# Patient Record
Sex: FEMALE | Race: WHITE | Employment: OTHER | URBAN - METROPOLITAN AREA
[De-identification: names, ages, dates, MRNs, and addresses within clinical notes are randomized per-mention and may not be internally consistent; named-entity substitution may affect disease eponyms.]

---

## 2017-01-24 NOTE — PATIENT INSTRUCTIONS
Call the imaging center at [phone] or  [phone] to schedule your echocardiogram.    Did you know you can lower your blood pressure with your daily habits?    *Losing 20 pounds of weight lowers blood pressure 5 to 20 points.  *Eating a low-fat diet rich in fruits, vegetables and low-fat dairy lowers blood pressure 8 to 14 points.  *Eating a low-salt diet lowers blood pressure 2 to 8 points.  *Exercising regularly lowers blood pressure 4 to 9 points.  *Reducing alcohol use lowers blood pressure 2 to 4 points.    [facility]    If you have any questions regarding to your visit please contact your care team:       Team Red:   Clinic Hours Telephone Number   Dr. Vita Marie, NP   7am-7pm  Monday - Thursday   7am-5pm  Fridays  [phone]  (Appointment scheduling available 24/7)    Questions about your visit?   Team Line  [phone]   [facility]gent [facility] - [place] [facility] - 11am-9pm Monday-Friday Saturday-Sunday- 9am-5pm   [place] - 5pm-9pm Monday-Friday Saturday-Sunday- 9am-5pm  [phone] - [place]   [phone] - [place]       What options do I have for visits at the clinic other than the traditional office visit?  To expand how we care for you, many of our providers are utilizing electronic visits (e-visits) and telephone visits, when medically appropriate, for interactions with their patients rather than a visit in the clinic.   We also offer nurse visits for many medical concerns. Just like any other service, we will bill your insurance company for this type of visit based on time spent on the phone with your provider. Not all insurance companies cover these visits. Please check with your medical insurance if this type of visit is covered. You will be responsible for any charges that are not paid by your insurance.      E-visits via [employer]:  generally incur a $35.00 fee.  Telephone visits:  Time spent on the  phone: *charged based on time that is spent on the phone in increments of 10 minutes. Estimated cost:   5-10 mins $30.00   11-20 mins. $59.00   21-30 mins. $85.00     Use "[employer]"hart (secure email communication and access to your chart) to send your primary care provider a message or make an appointment. Ask someone on your Team how to sign up for [employer].  For a Price Quote for your services, please call our [place] Line at [phone].      As always, Thank you for trusting us with your health care needs!  Belkis TUCKER MA    Preventive Health Recommendations  Female Ages 65 +    Yearly exam:     See your health care provider every year in order to  o Review health changes.   o Discuss preventive care.    o Review your medicines if your doctor has prescribed any.      You no longer need a yearly Pap test unless you've had an abnormal Pap test in the past 10 years. If you have vaginal symptoms, such as bleeding or discharge, be sure to talk with your provider about a Pap test.      Every 1 to 2 years, have a mammogram.  If you are over 69, talk with your health care provider about whether or not you want to continue having screening mammograms.      Every 10 years, have a colonoscopy. Or, have a yearly FIT test (stool test). These exams will check for colon cancer.       Have a cholesterol test every 5 years, or more often if your doctor advises it.       Have a diabetes test (fasting glucose) every three years. If you are at risk for diabetes, you should have this test more often.       At age 65, have a bone density scan (DEXA) to check for osteoporosis (brittle bone disease).    Shots:    Get a flu shot each year.    Get a tetanus shot every 10 years.    Talk to your doctor about your pneumonia vaccines. There are now two you should receive - Pneumovax (PPSV 23) and Prevnar (PCV 13).    Talk to your doctor about the shingles vaccine.    Talk to your doctor about the hepatitis B vaccine.    Nutrition:     Eat at  least 5 servings of fruits and vegetables each day.      Eat whole-grain bread, whole-wheat pasta and brown rice instead of white grains and rice.      Talk to your provider about Calcium and Vitamin D.     Lifestyle    Exercise at least 150 minutes a week (30 minutes a day, 5 days a week). This will help you control your weight and prevent disease.      Limit alcohol to one drink per day.      No smoking.       Wear sunscreen to prevent skin cancer.       See your dentist twice a year for an exam and cleaning.      See your eye doctor every 1 to 2 years to screen for conditions such as glaucoma, macular degeneration and cataracts.    The 10-year ASCVD risk score (Dawn BOBO Jr., et al., 2013) is: 12%    Values used to calculate the score:      Age: 71 years      Sex: Female      Is an : No      Diabetic: No      Tobacco smoker: No      Systolic Blood Pressure: 150 mmHg      Prescribed Antihypertensives: No      HDL Cholesterol: 52 mg/dL      Total Cholesterol: 220 mg/dL

## 2017-01-24 NOTE — PROGRESS NOTES
SUBJECTIVE:                                                            Chelo Brooks is a 71 year old female super morbidly obese  with history of asthma who presents for Preventive Visit.  Are you in the first 12 months of your Medicare Part B coverage?  No    She did not schedule stress echocardiogram as recommended last year. She has ongoing shortness of breath, worse with exertion, accompanied by intermittent dependent lower extremity edema. Symptoms are moderate and unchanged for months. She has interrupted sleep with snoring and worsening fatigue, but denies chest pain or palpitations. She did not start hypercholesterolemia medication as recommended. She has chronic intermittent low back pain without injury or neurologic symptoms. She tried physical therapy once for this, and does not think it would help to try again.     Healthy Habits:    Do you get at least three servings of calcium containing foods daily (dairy, green leafy vegetables, etc.)? no    Amount of exercise or daily activities, outside of work: no    Problems taking medications regularly Yes not taking pravastatin     Medication side effects: No    Have you had an eye exam in the past two years? yes    Do you see a dentist twice per year? no    Do you have sleep apnea, excessive snoring or daytime drowsiness?yes    COGNITIVE SCREEN  1) Repeat 3 items (Banana, Sunrise, Chair)    2) Clock draw: NORMAL  3) 3 item recall: Recalls 3 objects  Results: NORMAL clock, 1-2 items recalled: COGNITIVE IMPAIRMENT LESS LIKELY    Mini-CogTM Copyright NEPTALI Martinez. Licensed by the author for use in [facility]; reprinted with permission (danilo@.[facility]). All rights reserved.            Extreme fatigue    All Histories reviewed and updated in EPIC as appropriate.  Social History   Substance Use Topics     Smoking status: Never Smoker      Smokeless tobacco: Never Used     Alcohol Use: Yes      Comment: very rare         The patient does not drink  >3 drinks per day nor >7 drinks per week.    Today's PHQ-2 Score:   PHQ-2 ( 1999 [employer]) 1/25/2017 4/25/2016   Q1: Little interest or pleasure in doing things 0 0   Q2: Feeling down, depressed or hopeless 0 0   PHQ-2 Score 0 0       Do you feel safe in your environment - Yes    Do you have a Health Care Directive?: No: Advance care planning reviewed with patient; information given to patient to review.    Current providers sharing in care for this patient include:   Patient Care Team:  Vita Zavala MD as PCP - General (Family Practice)      Hearing impairment: No    Ability to successfully perform activities of daily living: Yes, no assistance needed     Fall risk:  Fallen 2 or more times in the past year?: No  Any fall with injury in the past year?: No    Home safety:  none identified      The following health maintenance items are reviewed in Epic and correct as of today:  Health Maintenance   Topic Date Due     DEXA SCAN SCREENING (SYSTEM ASSIGNED)  03/09/2012     FIT Q1 YR (NO INBASKET)  06/05/2013     EYE EXAM Q1 YEAR( NO INBASKET)  11/05/2014     ASTHMA CONTROL TEST Q6 MOS (NO INBASKET)  08/22/2016     INFLUENZA VACCINE (SYSTEM ASSIGNED)  09/01/2016     MAMMO SCREEN Q2 YR (SYSTEM ASSIGNED)  09/18/2016     FALL RISK ASSESSMENT  11/23/2016     LIPID MONITORING Q1 YEAR( NO INBASKET)  11/23/2016     ADVANCE DIRECTIVE PLANNING Q5 YRS (NO INBASKET)  03/15/2017     ASTHMA ACTION PLAN Q1 YR (NO INBASKET)  01/25/2018     TETANUS IMMUNIZATION (SYSTEM ASSIGNED)  01/25/2027     PNEUMOCOCCAL  Completed     HEPATITIS C SCREENING  Completed     ROS:  CONSTITUTIONAL:POSITIVE  for weight gain  I: NEGATIVE for worrisome rashes, moles or lesions  E: NEGATIVE for vision changes or irritation  E/M: NEGATIVE for ear, mouth and throat problems  RESP:as above  B: NEGATIVE for masses, tenderness or discharge  CV: as above   GI: NEGATIVE for nausea, abdominal pain, heartburn, or change in bowel habits  : NEGATIVE for frequency,  "dysuria, or hematuria  MUSCULOSKELETAL: as above   N: NEGATIVE for weakness, dizziness or paresthesias  E: NEGATIVE for temperature intolerance, skin/hair changes  H: NEGATIVE for bleeding problems  P: NEGATIVE for changes in mood or affect    Problem list, Medication list, Allergies, and Medical/Social/Surgical histories reviewed in [facility] and updated as appropriate.  OBJECTIVE:                                                            /88 mmHg  Pulse 89  Temp(Src) 96.2  F (35.7  C) (Oral)  Ht 5' 7\" (1.702 m)  Wt 333 lb (151.048 kg)  BMI 52.14 kg/m2  SpO2 97%  Breastfeeding? No Estimated body mass index is 52.14 kg/(m^2) as calculated from the following:    Height as of this encounter: 5' 7\" (1.702 m).    Weight as of this encounter: 333 lb (151.048 kg).  EXAM:   GENERAL: alert, no distress and obese  EYES: Eyes grossly normal to inspection, PERRL and conjunctivae and sclerae normal  HENT: ear canals and TM's normal, nose and mouth without ulcers or lesions  NECK: no adenopathy, no asymmetry, masses, or scars and thyroid normal to palpation  RESP: lungs clear to auscultation - no rales, rhonchi or wheezes  BREAST: normal without masses, tenderness or nipple discharge and no palpable axillary masses or adenopathy  CV: regular rate and rhythm, normal S1 S2, no S3 or S4, no murmur, click or rub, no peripheral edema and peripheral pulses strong  ABDOMEN: soft, nontender, no hepatosplenomegaly, no masses and bowel sounds normal  MS: no gross musculoskeletal defects noted, no edema  SKIN: no suspicious lesions or rashes  NEURO: Normal strength and tone, mentation intact and speech normal  PSYCH: mentation appears normal, affect normal/bright    ASSESSMENT / PLAN:                                                            (Z00.00) Wellness examination  (primary encounter diagnosis)  Plan: TD (ADULT, 7+) PRESERVE FREE          (E66.01) Morbid obesity due to excess calories (H)  Plan: Comprehensive metabolic " panel        Counseled to make better food choices, exercise as tolerated, and lose weight. Offered bariatric surgery referral.     (I10) Hypertension goal BP (blood pressure) < 150/90  Comment: uncontrolled with lifestyle strategies   Plan: Comprehensive metabolic panel, lisinopril         (PRINIVIL/ZESTRIL) 10 MG tablet, Basic         metabolic panel        Discussed risks and benefits of this medication. Follow up in one month with BMP or sooner for worsening of symptoms or side effects.     (E78.5) Hyperlipidemia LDL goal <130  Plan: Comprehensive metabolic panel, LDL cholesterol         direct        Consider statin     (J45.20) Intermittent asthma, uncomplicated  Comment: Well controlled with medications without side effects.   Plan: albuterol (PROAIR HFA/PROVENTIL HFA/VENTOLIN         HFA) 108 (90 BASE) MCG/ACT Inhaler        ACT q 6 months      (R06.02) SOB (shortness of breath)  Plan: Comprehensive metabolic panel, XR Chest 2         Views, Echocardiogram Complete        Follow-up pending results     (R60.9) Dependent edema  Plan: Comprehensive metabolic panel, Echocardiogram         Complete        See above     (M54.5) Midline low back pain without sciatica, unspecified chronicity  Plan: Over the counter pain medication as needed, ice or heat as she finds helpful, avoidance of aggravating activities, and return for persistence for consideration of further imaging or referral.     (R53.83) Fatigue, unspecified type  Plan: CBC with platelets differential, TSH with free         T4 reflex, SLEEP EVALUATION & MANAGEMENT         REFERRAL - ADULT          (R06.83) Snoring  Plan: SLEEP EVALUATION & MANAGEMENT REFERRAL - ADULT            End of Life Planning:  Patient currently has an advanced directive: No.  I have verified the patient's ablity to prepare an advanced directive/make health care decisions.  Literature was provided to assist patient in preparing an advanced directive.    COUNSELING:  Reviewed  "preventive health counseling, as reflected in patient instructions  Special attention given to:       Regular exercise       Healthy diet/nutrition       Vaccinated for: Influenza and Td       Osteoporosis Prevention/Bone Health       Colon cancer screening       Hepatitis C screening       The 10-year ASCVD risk score (Dawn BOBO Jr., et al., 2013) is: 14%    Values used to calculate the score:      Age: 71 years      Sex: Female      Is an : No      Diabetic: No      Tobacco smoker: No      Systolic Blood Pressure: 140 mmHg      Prescribed Antihypertensives: Yes      HDL Cholesterol: 52 mg/dL      Total Cholesterol: 220 mg/dL       Advanced Planning         Estimated body mass index is 52.14 kg/(m^2) as calculated from the following:    Height as of this encounter: 5' 7\" (1.702 m).    Weight as of this encounter: 333 lb (151.048 kg).  Weight management plan: Discussed healthy diet and exercise guidelines and patient will follow up in 1 month in clinic to re-evaluate.   reports that she has never smoked. She has never used smokeless tobacco.      Appropriate preventive services were discussed with this patient, including applicable screening as appropriate for cardiovascular disease, diabetes, osteopenia/osteoporosis, and glaucoma.  As appropriate for age/gender, discussed screening for colorectal cancer, prostate cancer, breast cancer, and cervical cancer. Checklist reviewing preventive services available has been given to the patient.    Reviewed patients plan of care and provided an AVS. The Basic Care Plan (routine screening as documented in Health Maintenance) for Chelo meets the Care Plan requirement. This Care Plan has been established and reviewed with the Patient.    Counseling Resources:  ATP IV Guidelines  Pooled Cohorts Equation Calculator  Breast Cancer Risk Calculator  FRAX Risk Assessment  ICSI Preventive Guidelines  Dietary Guidelines for Americans, 2010  USDA's MyPlate  ASA " Prophylaxis  Lung CA Screening    Vita Zavala MD  [facility]

## 2017-01-25 ENCOUNTER — RADIANT APPOINTMENT (OUTPATIENT)
Dept: GENERAL RADIOLOGY | Facility: CLINIC | Age: 72
End: 2017-01-25
Attending: FAMILY MEDICINE
Payer: COMMERCIAL

## 2017-01-25 ENCOUNTER — OFFICE VISIT (OUTPATIENT)
Dept: FAMILY MEDICINE | Facility: CLINIC | Age: 72
End: 2017-01-25
Payer: COMMERCIAL

## 2017-01-25 ENCOUNTER — RADIANT APPOINTMENT (OUTPATIENT)
Dept: MAMMOGRAPHY | Facility: CLINIC | Age: 72
End: 2017-01-25
Attending: FAMILY MEDICINE
Payer: COMMERCIAL

## 2017-01-25 VITALS
HEART RATE: 89 BPM | WEIGHT: 293 LBS | OXYGEN SATURATION: 97 % | DIASTOLIC BLOOD PRESSURE: 88 MMHG | TEMPERATURE: 96.2 F | HEIGHT: 67 IN | SYSTOLIC BLOOD PRESSURE: 140 MMHG | BODY MASS INDEX: 45.99 KG/M2

## 2017-01-25 DIAGNOSIS — M54.50 MIDLINE LOW BACK PAIN WITHOUT SCIATICA, UNSPECIFIED CHRONICITY: ICD-10-CM

## 2017-01-25 DIAGNOSIS — R06.02 SOB (SHORTNESS OF BREATH): ICD-10-CM

## 2017-01-25 DIAGNOSIS — Z12.31 VISIT FOR SCREENING MAMMOGRAM: ICD-10-CM

## 2017-01-25 DIAGNOSIS — E66.01 MORBID OBESITY DUE TO EXCESS CALORIES (H): ICD-10-CM

## 2017-01-25 DIAGNOSIS — R60.9 DEPENDENT EDEMA: ICD-10-CM

## 2017-01-25 DIAGNOSIS — E78.5 HYPERLIPIDEMIA LDL GOAL <130: ICD-10-CM

## 2017-01-25 DIAGNOSIS — Z78.0 ASYMPTOMATIC POSTMENOPAUSAL STATUS: ICD-10-CM

## 2017-01-25 DIAGNOSIS — Z23 NEED FOR VACCINATION: ICD-10-CM

## 2017-01-25 DIAGNOSIS — I10 HYPERTENSION GOAL BP (BLOOD PRESSURE) < 150/90: ICD-10-CM

## 2017-01-25 DIAGNOSIS — Z71.89 ADVANCED DIRECTIVES, COUNSELING/DISCUSSION: ICD-10-CM

## 2017-01-25 DIAGNOSIS — J45.20 INTERMITTENT ASTHMA, UNCOMPLICATED: ICD-10-CM

## 2017-01-25 DIAGNOSIS — Z23 NEED FOR PROPHYLACTIC VACCINATION AND INOCULATION AGAINST INFLUENZA: ICD-10-CM

## 2017-01-25 DIAGNOSIS — R06.83 SNORING: ICD-10-CM

## 2017-01-25 DIAGNOSIS — R53.83 FATIGUE, UNSPECIFIED TYPE: ICD-10-CM

## 2017-01-25 DIAGNOSIS — Z00.00 WELLNESS EXAMINATION: Primary | ICD-10-CM

## 2017-01-25 DIAGNOSIS — Z12.11 SCREEN FOR COLON CANCER: ICD-10-CM

## 2017-01-25 LAB
ALBUMIN SERPL-MCNC: 3.5 G/DL (ref 3.4–5)
ALP SERPL-CCNC: 71 U/L (ref 40–150)
ALT SERPL W P-5'-P-CCNC: 21 U/L (ref 0–50)
ANION GAP SERPL CALCULATED.3IONS-SCNC: 7 MMOL/L (ref 3–14)
AST SERPL W P-5'-P-CCNC: 17 U/L (ref 0–45)
BASOPHILS # BLD AUTO: 0 10E9/L (ref 0–0.2)
BASOPHILS NFR BLD AUTO: 0.6 %
BILIRUB SERPL-MCNC: 0.6 MG/DL (ref 0.2–1.3)
BUN SERPL-MCNC: 18 MG/DL (ref 7–30)
CALCIUM SERPL-MCNC: 8.4 MG/DL (ref 8.5–10.1)
CHLORIDE SERPL-SCNC: 107 MMOL/L (ref 94–109)
CO2 SERPL-SCNC: 29 MMOL/L (ref 20–32)
CREAT SERPL-MCNC: 0.94 MG/DL (ref 0.52–1.04)
DIFFERENTIAL METHOD BLD: NORMAL
EOSINOPHIL # BLD AUTO: 0.2 10E9/L (ref 0–0.7)
EOSINOPHIL NFR BLD AUTO: 4.4 %
ERYTHROCYTE [DISTWIDTH] IN BLOOD BY AUTOMATED COUNT: 14.6 % (ref 10–15)
GFR SERPL CREATININE-BSD FRML MDRD: 59 ML/MIN/1.7M2
GLUCOSE SERPL-MCNC: 86 MG/DL (ref 70–99)
HCT VFR BLD AUTO: 42.6 % (ref 35–47)
HGB BLD-MCNC: 13.6 G/DL (ref 11.7–15.7)
LDLC SERPL DIRECT ASSAY-MCNC: 137 MG/DL
LYMPHOCYTES # BLD AUTO: 1 10E9/L (ref 0.8–5.3)
LYMPHOCYTES NFR BLD AUTO: 20.5 %
MCH RBC QN AUTO: 27 PG (ref 26.5–33)
MCHC RBC AUTO-ENTMCNC: 31.9 G/DL (ref 31.5–36.5)
MCV RBC AUTO: 85 FL (ref 78–100)
MONOCYTES # BLD AUTO: 0.4 10E9/L (ref 0–1.3)
MONOCYTES NFR BLD AUTO: 8.3 %
NEUTROPHILS # BLD AUTO: 3.2 10E9/L (ref 1.6–8.3)
NEUTROPHILS NFR BLD AUTO: 66.2 %
PLATELET # BLD AUTO: 175 10E9/L (ref 150–450)
POTASSIUM SERPL-SCNC: 4 MMOL/L (ref 3.4–5.3)
PROT SERPL-MCNC: 6.8 G/DL (ref 6.8–8.8)
RBC # BLD AUTO: 5.04 10E12/L (ref 3.8–5.2)
SODIUM SERPL-SCNC: 143 MMOL/L (ref 133–144)
TSH SERPL DL<=0.005 MIU/L-ACNC: 1.35 MU/L (ref 0.4–4)
WBC # BLD AUTO: 4.8 10E9/L (ref 4–11)

## 2017-01-25 PROCEDURE — 36415 COLL VENOUS BLD VENIPUNCTURE: CPT | Performed by: FAMILY MEDICINE

## 2017-01-25 PROCEDURE — 85025 COMPLETE CBC W/AUTO DIFF WBC: CPT | Performed by: FAMILY MEDICINE

## 2017-01-25 PROCEDURE — 83721 ASSAY OF BLOOD LIPOPROTEIN: CPT | Performed by: FAMILY MEDICINE

## 2017-01-25 PROCEDURE — 99213 OFFICE O/P EST LOW 20 MIN: CPT | Mod: 25 | Performed by: FAMILY MEDICINE

## 2017-01-25 PROCEDURE — 80053 COMPREHEN METABOLIC PANEL: CPT | Performed by: FAMILY MEDICINE

## 2017-01-25 PROCEDURE — G0008 ADMIN INFLUENZA VIRUS VAC: HCPCS | Mod: 59 | Performed by: FAMILY MEDICINE

## 2017-01-25 PROCEDURE — G0202 SCR MAMMO BI INCL CAD: HCPCS | Mod: TC

## 2017-01-25 PROCEDURE — 99397 PER PM REEVAL EST PAT 65+ YR: CPT | Mod: 25 | Performed by: FAMILY MEDICINE

## 2017-01-25 PROCEDURE — 90662 IIV NO PRSV INCREASED AG IM: CPT | Performed by: FAMILY MEDICINE

## 2017-01-25 PROCEDURE — 84443 ASSAY THYROID STIM HORMONE: CPT | Performed by: FAMILY MEDICINE

## 2017-01-25 PROCEDURE — 90714 TD VACC NO PRESV 7 YRS+ IM: CPT | Performed by: FAMILY MEDICINE

## 2017-01-25 PROCEDURE — 90471 IMMUNIZATION ADMIN: CPT | Performed by: FAMILY MEDICINE

## 2017-01-25 PROCEDURE — 71020 XR CHEST 2 VW: CPT

## 2017-01-25 RX ORDER — AMLODIPINE BESYLATE 5 MG/1
5 TABLET ORAL DAILY
Qty: 30 TABLET | Refills: 0 | Status: CANCELLED | OUTPATIENT
Start: 2017-01-25

## 2017-01-25 RX ORDER — LISINOPRIL 10 MG/1
10 TABLET ORAL DAILY
Qty: 30 TABLET | Refills: 0 | Status: SHIPPED | OUTPATIENT
Start: 2017-01-25 | End: 2017-02-16

## 2017-01-25 RX ORDER — ALBUTEROL SULFATE 90 UG/1
1-2 AEROSOL, METERED RESPIRATORY (INHALATION) EVERY 4 HOURS PRN
Qty: 1 INHALER | Refills: 3 | Status: SHIPPED | OUTPATIENT
Start: 2017-01-25 | End: 2018-02-13

## 2017-01-25 RX ORDER — PRAVASTATIN SODIUM 20 MG
20 TABLET ORAL DAILY
Qty: 90 TABLET | Refills: 3 | Status: CANCELLED | OUTPATIENT
Start: 2017-01-25

## 2017-01-25 ASSESSMENT — ANXIETY QUESTIONNAIRES
6. BECOMING EASILY ANNOYED OR IRRITABLE: NOT AT ALL
7. FEELING AFRAID AS IF SOMETHING AWFUL MIGHT HAPPEN: NOT AT ALL
3. WORRYING TOO MUCH ABOUT DIFFERENT THINGS: MORE THAN HALF THE DAYS
2. NOT BEING ABLE TO STOP OR CONTROL WORRYING: MORE THAN HALF THE DAYS
1. FEELING NERVOUS, ANXIOUS, OR ON EDGE: SEVERAL DAYS
5. BEING SO RESTLESS THAT IT IS HARD TO SIT STILL: NOT AT ALL
GAD7 TOTAL SCORE: 5

## 2017-01-25 ASSESSMENT — PATIENT HEALTH QUESTIONNAIRE - PHQ9: 5. POOR APPETITE OR OVEREATING: NOT AT ALL

## 2017-01-25 NOTE — MR AVS SNAPSHOT
After Visit Summary   1/25/2017    Chelo Brooks    MRN: [id number]           Patient Information     Date Of Birth          1945        Visit Information        Provider Department      1/25/2017 11:40 AM Vita Zavala MD [facility]        Today's Diagnoses     Wellness examination    -  1     Morbid obesity due to excess calories (H)         Hypertension goal BP (blood pressure) < 150/90         Hyperlipidemia LDL goal <130         Intermittent asthma, uncomplicated         SOB (shortness of breath)         Dependent edema         Midline low back pain without sciatica, unspecified chronicity         Fatigue, unspecified type         Snoring         Screen for colon cancer         Asymptomatic postmenopausal status         Visit for screening mammogram         Need for vaccination           Care Instructions    Call the imaging center at [phone] or  [phone] to schedule your echocardiogram.    Did you know you can lower your blood pressure with your daily habits?    *Losing 20 pounds of weight lowers blood pressure 5 to 20 points.  *Eating a low-fat diet rich in fruits, vegetables and low-fat dairy lowers blood pressure 8 to 14 points.  *Eating a low-salt diet lowers blood pressure 2 to 8 points.  *Exercising regularly lowers blood pressure 4 to 9 points.  *Reducing alcohol use lowers blood pressure 2 to 4 points.    [facility]    If you have any questions regarding to your visit please contact your care team:       Team Red:   Clinic Hours Telephone Number   Dr. Vita Marie, NP   7am-7pm  Monday - Thursday   7am-5pm  Fridays  [phone]  (Appointment scheduling available 24/7)    Questions about your visit?   Team Line  [phone]   Urgent Care - [place] - 11am-9pm Monday-Friday Saturday-Sunday- 9am-5pm   [place] - 5pm-9pm Monday-Friday Saturday-Sunday- 9am-5pm   [phone] - [place]   [phone] - [place]       What options do I have for visits at the clinic other than the traditional office visit?  To expand how we care for you, many of our providers are utilizing electronic visits (e-visits) and telephone visits, when medically appropriate, for interactions with their patients rather than a visit in the clinic.   We also offer nurse visits for many medical concerns. Just like any other service, we will bill your insurance company for this type of visit based on time spent on the phone with your provider. Not all insurance companies cover these visits. Please check with your medical insurance if this type of visit is covered. You will be responsible for any charges that are not paid by your insurance.      E-visits via [employer]:  generally incur a $35.00 fee.  Telephone visits:  Time spent on the phone: *charged based on time that is spent on the phone in increments of 10 minutes. Estimated cost:   5-10 mins $30.00   11-20 mins. $59.00   21-30 mins. $85.00     Use [employer] (secure email communication and access to your chart) to send your primary care provider a message or make an appointment. Ask someone on your Team how to sign up for [employer].  For a Price Quote for your services, please call our Consumer Price Line at [phone].      As always, Thank you for trusting us with your health care needs!  Belkis TUCKER MA    Preventive Health Recommendations  Female Ages 65 +    Yearly exam:     See your health care provider every year in order to  o Review health changes.   o Discuss preventive care.    o Review your medicines if your doctor has prescribed any.      You no longer need a yearly Pap test unless you've had an abnormal Pap test in the past 10 years. If you have vaginal symptoms, such as bleeding or discharge, be sure to talk with your provider about a Pap test.      Every 1 to 2 years, have a mammogram.  If you are over 69, talk with your health care  provider about whether or not you want to continue having screening mammograms.      Every 10 years, have a colonoscopy. Or, have a yearly FIT test (stool test). These exams will check for colon cancer.       Have a cholesterol test every 5 years, or more often if your doctor advises it.       Have a diabetes test (fasting glucose) every three years. If you are at risk for diabetes, you should have this test more often.       At age 65, have a bone density scan (DEXA) to check for osteoporosis (brittle bone disease).    Shots:    Get a flu shot each year.    Get a tetanus shot every 10 years.    Talk to your doctor about your pneumonia vaccines. There are now two you should receive - Pneumovax (PPSV 23) and Prevnar (PCV 13).    Talk to your doctor about the shingles vaccine.    Talk to your doctor about the hepatitis B vaccine.    Nutrition:     Eat at least 5 servings of fruits and vegetables each day.      Eat whole-grain bread, whole-wheat pasta and brown rice instead of white grains and rice.      Talk to your provider about Calcium and Vitamin D.     Lifestyle    Exercise at least 150 minutes a week (30 minutes a day, 5 days a week). This will help you control your weight and prevent disease.      Limit alcohol to one drink per day.      No smoking.       Wear sunscreen to prevent skin cancer.       See your dentist twice a year for an exam and cleaning.      See your eye doctor every 1 to 2 years to screen for conditions such as glaucoma, macular degeneration and cataracts.    The 10-year ASCVD risk score (Dawn BOBO Jr., et al., 2013) is: 12%    Values used to calculate the score:      Age: 71 years      Sex: Female      Is an : No      Diabetic: No      Tobacco smoker: No      Systolic Blood Pressure: 150 mmHg      Prescribed Antihypertensives: No      HDL Cholesterol: 52 mg/dL      Total Cholesterol: 220 mg/dL          Follow-ups after your visit        Additional Services     SLEEP EVALUATION  & MANAGEMENT REFERRAL - ADULT       Please be aware that coverage of these services is subject to the terms and limitations of your health insurance plan.  Call member services at your health plan with any benefit or coverage questions.      Please bring the following to your appointment:    >>   List of current medications   >>   This referral request   >>   Any documents/labs given to you for this referral    [facility] - [place]  [phone] (Age 15 and up)                  Follow-up notes from your care team     Return in about 1 month (around 2/25/2017) for BP Recheck.      Future tests that were ordered for you today     Open Future Orders        Priority Expected Expires Ordered    Fecal colorectal cancer screen (FIT) Routine 2/15/2017 4/19/2017 1/25/2017    SLEEP EVALUATION & MANAGEMENT REFERRAL - ADULT Routine  1/25/2018 1/25/2017    XR Chest 2 Views Routine 1/25/2017 1/25/2018 1/25/2017    Echocardiogram Complete Routine  1/25/2018 1/25/2017    Basic metabolic panel Routine 2/25/2017 1/25/2018 1/25/2017    DEXA HIP/PELVIS/SPINE - Future Routine  1/24/2018 1/25/2017    MA SCREENING DIGITAL BILAT - Future  (s+30) Routine  1/24/2018 1/25/2017            Who to contact     If you have questions or need follow up information about today's clinic visit or your schedule please contact [facility] [place] directly at [phone].  Normal or non-critical lab and imaging results will be communicated to you by MyChart, letter or phone within 4 business days after the clinic has received the results. If you do not hear from us within 7 days, please contact the clinic through MyChart or phone. If you have a critical or abnormal lab result, we will notify you by phone as soon as possible.  Submit refill requests through [employer] or call your pharmacy and they will forward the refill request to us. Please allow 3 business days for your refill to be completed.          Additional Information About  "Your Visit        Care EveryWhere ID     This is your Care EveryWhere ID. This could be used by other organizations to access your [facility] medical records  [id number]        Your Vitals Were     Pulse Temperature Height BMI (Body Mass Index) Pulse Oximetry Breastfeeding?    89 96.2  F (35.7  C) (Oral) 5' 7\" (1.702 m) 52.14 kg/m2 97% No       Blood Pressure from Last 3 Encounters:   01/25/17 150/100   04/25/16 136/84   02/22/16 138/86    Weight from Last 3 Encounters:   01/25/17 333 lb (151.048 kg)   04/25/16 320 lb (145.151 kg)   02/22/16 325 lb (147.419 kg)              We Performed the Following     Asthma Action Plan   (Please complete E-AAP by signing order and opening link in order details)     CBC with platelets differential     Comprehensive metabolic panel     LDL cholesterol direct     TD (ADULT, 7+) PRESERVE FREE     TSH with free T4 reflex          Today's Medication Changes          These changes are accurate as of: 1/25/17 12:39 PM.  If you have any questions, ask your nurse or doctor.               Start taking these medicines.        Dose/Directions    lisinopril 10 MG tablet   Commonly known as:  PRINIVIL/ZESTRIL   Used for:  Hypertension goal BP (blood pressure) < 150/90   Started by:  Vita Zavala MD        Dose:  10 mg   Take 1 tablet (10 mg) by mouth daily   Quantity:  30 tablet   Refills:  0            Where to get your medicines      These medications were sent to [id number] [place] [facility]  [address]  [address] [place] [address]     Phone:  [phone]    - albuterol 108 (90 BASE) MCG/ACT Inhaler  - lisinopril 10 MG tablet             Primary Care Provider Office Phone # Fax #    Vita Zavala [phone]       [facility] [address]  [place] [address]        Thank you!     Thank you for choosing [facility]  for your care. Our goal is always to provide " you with excellent care. Hearing back from our patients is one way we can continue to improve our services. Please take a few minutes to complete the written survey that you may receive in the mail after your visit with us. Thank you!             Your Updated Medication List - Protect others around you: Learn how to safely use, store and throw away your medicines at www.disposemymeds.org.          This list is accurate as of: 1/25/17 12:39 PM.  Always use your most recent med list.                   Brand Name Dispense Instructions for use    albuterol 108 (90 BASE) MCG/ACT Inhaler    PROAIR HFA/PROVENTIL HFA/VENTOLIN HFA    1 Inhaler    Inhale 1-2 puffs into the lungs every 4 hours as needed for shortness of breath / dyspnea       fluticasone 50 MCG/ACT spray    FLONASE     2 sprays by Both Nostrils route daily.       lisinopril 10 MG tablet    PRINIVIL/ZESTRIL    30 tablet    Take 1 tablet (10 mg) by mouth daily       mometasone 0.1 % cream    ELOCON    60 g    Apply  topically.       SINUS RINSE BOTTLE KIT Pack      Spray 1 Bottle in nostril daily as needed.       TYLENOL 500 MG tablet   Generic drug:  acetaminophen      Take 1 tablet (500 mg) by mouth every 4 hours as needed for pain

## 2017-01-25 NOTE — PROGRESS NOTES
Injectable Influenza Immunization Documentation    1.  Is the person to be vaccinated sick today?  No    2. Does the person to be vaccinated have an allergy to eggs or to a component of the vaccine?  No    3. Has the person to be vaccinated today ever had a serious reaction to influenza vaccine in the past?  No    4. Has the person to be vaccinated ever had Guillain-[place] syndrome?  No     Form completed by Belkis TUCKER MA

## 2017-01-25 NOTE — NURSING NOTE
"Chief Complaint   Patient presents with     Wellness Visit     Flu Shot       Initial /100 mmHg  Pulse 89  Temp(Src) 96.2  F (35.7  C) (Oral)  Ht 5' 7\" (1.702 m)  Wt 333 lb (151.048 kg)  BMI 52.14 kg/m2  SpO2 97%  Breastfeeding? No Estimated body mass index is 52.14 kg/(m^2) as calculated from the following:    Height as of this encounter: 5' 7\" (1.702 m).    Weight as of this encounter: 333 lb (151.048 kg).  BP completed using cuff size: vince TUCKER MA      "

## 2017-01-25 NOTE — Clinical Note
[address] [place] [address]    January 26, 2017    Chelo Brooks  [address] [facility] [id number]          Dear Chelo,    Your liver, thyroid, blood glucose and blood count tests are normal. Your kidney test is slightly abnormal. Avoid medications like ibuprofen, aleve, Excedrin, or higher doses than 81 mg of aspirin. Let's repeat this test at your follow-up within one month.     Based on your cholesterol level and risk factors, your 10-year ASCVD heart disease risk score (Dawn BOBO Jr., et al., 2013) is: 14%. You can lower your risk for heart disease by taking a daily cholesterol medication called atorvastatin 40 mg daily. Let me know when you're ready to start so I can send a prescription. Possible side effects include muscle aches and liver problems.     Enclosed is a copy of your results.     Results for orders placed or performed in visit on 01/25/17   Comprehensive metabolic panel   Result Value Ref Range    Sodium 143 133 - 144 mmol/L    Potassium 4.0 3.4 - 5.3 mmol/L    Chloride 107 94 - 109 mmol/L    Carbon Dioxide 29 20 - 32 mmol/L    Anion Gap 7 3 - 14 mmol/L    Glucose 86 70 - 99 mg/dL    Urea Nitrogen 18 7 - 30 mg/dL    Creatinine 0.94 0.52 - 1.04 mg/dL    GFR Estimate 59 (L) >60 mL/min/1.7m2    GFR Estimate If Black 72 >60 mL/min/1.7m2    Calcium 8.4 (L) 8.5 - 10.1 mg/dL    Bilirubin Total 0.6 0.2 - 1.3 mg/dL    Albumin 3.5 3.4 - 5.0 g/dL    Protein Total 6.8 6.8 - 8.8 g/dL    Alkaline Phosphatase 71 40 - 150 U/L    ALT 21 0 - 50 U/L    AST 17 0 - 45 U/L   CBC with platelets differential   Result Value Ref Range    WBC 4.8 4.0 - 11.0 10e9/L    RBC Count 5.04 3.8 - 5.2 10e12/L    Hemoglobin 13.6 11.7 - 15.7 g/dL    Hematocrit 42.6 35.0 - 47.0 %    MCV 85 78 - 100 fl    MCH 27.0 26.5 - 33.0 pg    MCHC 31.9 31.5 - 36.5 g/dL    RDW 14.6 10.0 - 15.0 %    Platelet Count 175 150 - 450 10e9/L    Diff  Method Automated Method     % Neutrophils 66.2 %    % Lymphocytes 20.5 %    % Monocytes 8.3 %    % Eosinophils 4.4 %    % Basophils 0.6 %    Absolute Neutrophil 3.2 1.6 - 8.3 10e9/L    Absolute Lymphocytes 1.0 0.8 - 5.3 10e9/L    Absolute Monocytes 0.4 0.0 - 1.3 10e9/L    Absolute Eosinophils 0.2 0.0 - 0.7 10e9/L    Absolute Basophils 0.0 0.0 - 0.2 10e9/L   TSH with free T4 reflex   Result Value Ref Range    TSH 1.35 0.40 - 4.00 mU/L   LDL cholesterol direct   Result Value Ref Range    LDL Cholesterol Direct 137 (H) <100 mg/dL       If you have any questions or concerns, please call myself or my nurse at [phone].      Sincerely,        Vita Zavala MD/HA

## 2017-01-25 NOTE — Clinical Note
[address] [place] [address]    January 25, 2017    Chelo Brooks  [address] [place] [phone]          Dear Mary Whitney is a copy of your results. Your labs are all within normal limits    Results for orders placed or performed in visit on 01/25/17   XR Chest 2 Views    Narrative    XR CHEST 2 VW  1/25/2017 1:49 PM    HISTORY:  Shortness of breath    COMPARISON:  None.      Impression    IMPRESSION:  Negative.     JOHN SAHU MD       If you have any questions or concerns, please call myself or my nurse at [phone].      Sincerely,      Vita Zavala MD, dr

## 2017-01-25 NOTE — Clinical Note
My Asthma Action Plan  Name: Chelo Brooks   YOB: 1945  Date: 1/25/2017   My doctor: Vita Zavala   My clinic: [facility]      My Control Medicine:          Dose:    My Rescue Medicine: Albuterol (Proair/Ventolin/Proventil) HFA        Dose: 2 puffs as needed    My Asthma Severity: intermittent  Avoid your asthma triggers: upper respiratory infections        GREEN ZONE   Good Control    I feel good    No cough or wheeze    Can work, sleep and play without asthma symptoms       Take your asthma control medicine every day.     1. If exercise triggers your asthma, take your rescue medication    15 minutes before exercise or sports, and    During exercise if you have asthma symptoms  2. Spacer to use with inhaler: If you have a spacer, make sure to use it with your inhaler             YELLOW ZONE Getting Worse  I have ANY of these:    I do not feel good    Cough or wheeze    Chest feels tight    Wake up at night   1. Keep taking your Green Zone medications  2. Start taking your rescue medicine:    every 20 minutes for up to 1 hour. Then every 4 hours for 24-48 hours.  3. If you stay in the Yellow Zone for more than 12-24 hours, contact your doctor.  4. If you do not return to the Green Zone in 12-24 hours or you get worse, start taking your oral steroid medicine if prescribed by your provider.           RED ZONE Medical Alert - Get Help  I have ANY of these:    I feel awful    Medicine is not helping    Breathing getting harder    Trouble walking or talking    Nose opens wide to breathe       1. Take your rescue medicine NOW  2. If your provider has prescribed an oral steroid medicine, start taking it NOW  3. Call your doctor NOW  4. If you are still in the Red Zone after 20 minutes and you have not reached your doctor:    Take your rescue medicine again and    Call 911 or go to the emergency room right away    See your regular doctor within 2 weeks of an Emergency Room or Urgent Care  visit for follow-up treatment.        The above medication may be given at school or day care?: N/A (Adult Patient)  Child can carry and use inhaler(s) at school with approval of school nurse?: N/A (Adult Patient)    Electronically signed by: Vita Zavala, January 25, 2017    Annual Reminders:  Meet with Asthma Educator,  Flu Shot in the Fall, consider Pneumonia Vaccination for patients with asthma (aged 19 and older).    Pharmacy: [facility] [address] [place], [place] [address] AT [facility]                    Asthma Triggers  How To Control Things That Make Your Asthma Worse    Triggers are things that make your asthma worse.  Look at the list below to help you find your triggers and what you can do about them.  You can help prevent asthma flare-ups by staying away from your triggers.      Trigger                                                          What you can do   Cigarette Smoke  Tobacco smoke can make asthma worse. Do not allow smoking in your home, car or around you.  Be sure no one smokes at a child s day care or school.  If you smoke, ask your health care provider for ways to help you quit.  Ask family members to quit too.  Ask your health care provider for a referral to Quit Plan to help you quit smoking, or call 4-068-544-PLAN.     Colds, Flu, Bronchitis  These are common triggers of asthma. Wash your hands often.  Don t touch your eyes, nose or mouth.  Get a flu shot every year.     Dust Mites  These are tiny bugs that live in cloth or carpet. They are too small to see. Wash sheets and blankets in hot water every week.   Encase pillows and mattress in dust mite proof covers.  Avoid having carpet if you can. If you have carpet, vacuum weekly.   Use a dust mask and HEPA vacuum.   Pollen and Outdoor Mold  Some people are allergic to trees, grass, or weed pollen, or molds. Try to keep your windows closed.  Limit time out doors when pollen count is high.   Ask you  health care provider about taking medicine during allergy season.     Animal Dander  Some people are allergic to skin flakes, urine or saliva from pets with fur or feathers. Keep pets with fur or feathers out of your home.    If you can t keep the pet outdoors, then keep the pet out of your bedroom.  Keep the bedroom door closed.  Keep pets off cloth furniture and away from stuffed toys.     Mice, Rats, and Cockroaches  Some people are allergic to the waste from these pests.   Cover food and garbage.  Clean up spills and food crumbs.  Store grease in the refrigerator.   Keep food out of the bedroom.   Indoor Mold  This can be a trigger if your home has high moisture. Fix leaking faucets, pipes, or other sources of water.   Clean moldy surfaces.  Dehumidify basement if it is damp and smelly.   Smoke, Strong Odors, and Sprays  These can reduce air quality. Stay away from strong odors and sprays, such as perfume, powder, hair spray, paints, smoke incense, paint, cleaning products, candles and new carpet.   Exercise or Sports  Some people with asthma have this trigger. Be active!  Ask your doctor about taking medicine before sports or exercise to prevent symptoms.    Warm up for 5-10 minutes before and after sports or exercise.     Other Triggers of Asthma  Cold air:  Cover your nose and mouth with a scarf.  Sometimes laughing or crying can be a trigger.  Some medicines and food can trigger asthma.

## 2017-01-26 ASSESSMENT — ASTHMA QUESTIONNAIRES: ACT_TOTALSCORE: 19

## 2017-01-26 ASSESSMENT — ANXIETY QUESTIONNAIRES: GAD7 TOTAL SCORE: 5

## 2017-01-26 ASSESSMENT — PATIENT HEALTH QUESTIONNAIRE - PHQ9: SUM OF ALL RESPONSES TO PHQ QUESTIONS 1-9: 4

## 2017-01-26 NOTE — PROGRESS NOTES
Quick Note:    Mail letter:  Your liver, thyroid, blood glucose and blood count tests are normal. Your kidney test is slightly abnormal. Avoid medications like ibuprofen, aleve, Excedrin, or higher doses than 81 mg of aspirin. Let's repeat this test at your follow-up within one month.     Based on your cholesterol level and risk factors, your 10-year ASCVD heart disease risk score (Dawn BOBO Jr., et al., 2013) is: 14%. You can lower your risk for heart disease by taking a daily cholesterol medication called atorvastatin 40 mg daily. Let me know when you're ready to start so I can send a prescription. Possible side effects include muscle aches and liver problems.     Vita Zavala MD    The 10-year ASCVD risk score (Dawn BOBO Jr., et al., 2013) is: 14%   Values used to calculate the score:   Age: 71 years   Sex: Female   Is an : No   Diabetic: No   Tobacco smoker: No   Systolic Blood Pressure: 140 mmHg   Prescribed Antihypertensives: Yes   HDL Cholesterol: 52 mg/dL   Total Cholesterol: 220 mg/dL    ______

## 2017-02-01 ENCOUNTER — TELEPHONE (OUTPATIENT)
Dept: FAMILY MEDICINE | Facility: CLINIC | Age: 72
End: 2017-02-01

## 2017-02-01 DIAGNOSIS — E78.5 HYPERLIPIDEMIA LDL GOAL <130: Primary | ICD-10-CM

## 2017-02-01 RX ORDER — ATORVASTATIN CALCIUM 40 MG/1
40 TABLET, FILM COATED ORAL DAILY
Qty: 90 TABLET | Refills: 3 | Status: SHIPPED | OUTPATIENT
Start: 2017-02-01 | End: 2018-04-05

## 2017-02-01 NOTE — TELEPHONE ENCOUNTER
Reason for Call:  Test results    Detailed comments: patient would like a call back to go over blood test results that were done on 1-. Call to discuss new cholesterol medication also.    Phone Number Patient can be reached at: Home number on file [phone] (home)    Best Time: before 3 :30 pm    Can we leave a detailed message on this number? YES    Call taken on 2/1/2017 at 1:23 PM by Madhuri Howell

## 2017-02-01 NOTE — TELEPHONE ENCOUNTER
RN spoke with patient and patient states she would like to proceed with taking cholesterol lowering medication per her PCP's recommendation.  Patient states she will be going on vacation on 2/23/17 and not back until sometimes in March and she will follow up with PCP when she comes back.    Medication and pharmacy teed up.  Please review and advise.         Result Notes      Notes Recorded by Najma Pereyra on 1/26/2017 at 9:30 AM  MAILED TO PATIENT. AGUAYO  ------    Notes Recorded by Vita Zavala MD on 1/26/2017 at 9:20 AM  Mail letter:  Your liver, thyroid, blood glucose and blood count tests are normal. Your kidney test is slightly abnormal. Avoid medications like ibuprofen, aleve, Excedrin, or higher doses than 81 mg of aspirin. Let's repeat this test at your follow-up within one month.     Based on your cholesterol level and risk factors, your 10-year ASCVD heart disease risk score (Dawn BOBO Jr., et al., 2013) is: 14%. You can lower your risk for heart disease by taking a daily cholesterol medication called atorvastatin 40 mg daily. Let me know when you're ready to start so I can send a prescription. Possible side effects include muscle aches and liver problems.     Vita Zavala MD    The 10-year ASCVD risk score (Dawn BOBO Jr., et al., 2013) is: 14%    Values used to calculate the score:      Age: 71 years      Sex: Female      Is an : No      Diabetic: No      Tobacco smoker: No      Systolic Blood Pressure: 140 mmHg      Prescribed Antihypertensives: Yes      HDL Cholesterol: 52 mg/dL      Total Cholesterol: 220 mg/dL                 Raulito MORROW RN, BSN

## 2017-02-02 ENCOUNTER — RADIANT APPOINTMENT (OUTPATIENT)
Dept: BONE DENSITY | Facility: CLINIC | Age: 72
End: 2017-02-02
Attending: FAMILY MEDICINE
Payer: COMMERCIAL

## 2017-02-02 ENCOUNTER — RADIANT APPOINTMENT (OUTPATIENT)
Dept: CARDIOLOGY | Facility: CLINIC | Age: 72
End: 2017-02-02
Attending: FAMILY MEDICINE
Payer: COMMERCIAL

## 2017-02-02 DIAGNOSIS — R06.02 SOB (SHORTNESS OF BREATH): ICD-10-CM

## 2017-02-02 DIAGNOSIS — Z78.0 ASYMPTOMATIC POSTMENOPAUSAL STATUS: ICD-10-CM

## 2017-02-02 DIAGNOSIS — R60.9 DEPENDENT EDEMA: ICD-10-CM

## 2017-02-02 PROCEDURE — 93306 TTE W/DOPPLER COMPLETE: CPT | Performed by: INTERNAL MEDICINE

## 2017-02-02 PROCEDURE — 77080 DXA BONE DENSITY AXIAL: CPT | Performed by: INTERNAL MEDICINE

## 2017-02-02 NOTE — Clinical Note
[address]  [place] [address]    February 3, 2017    Chelo OSCAR Cecilia  [address] [facility] [id number]          Dear Chelo,    Your bone mass is mildly low, called osteopenia. We can continue to follow this by repeating the DEXA test in 3 to 5 years.  Get several servings of dairy daily (or calcium 1000 - 1200 mg daily split into 2 doses), take vitamin D 1000 units daily over-the-counter, exercise regularly, and avoid falls.     Enclosed is a copy of your results.     Results for orders placed or performed in visit on 17   DEXA HIP/PELVIS/SPINE - Future    Narrative    BONE DENSITOMETRY  [address] [place] [address]  2017     PATIENT: Chelo Brooks  CHART: [id number]    :  1945  AGE:  71 year old  SEX:  female   REFERRING PROVIDER:  Vita Zavala MD    PROCEDURE:  Bone density scanning was performed using DXA technology of   the lumbar spine and hip.  Scanning was performed on a Lunar Prodigy   scanner.  Reporting is completed in the form of a T-score.  The T-score   represents the standard deviation from peak bone mass based on a young   healthy adult.    REFERENCE T-SCORES:       Normal                -1.0 and greater                                  Osteopenia         Between -1.0 and   -2.5                                            Osteoporosis     -2.5 and less                                         RISK FACTORS:  Post-menopausal, Height loss of 2 inches      CURRENT TREATMENT:  none listed    FINDINGS:               Lumbar Spine (L1-L4)      T-score:  -0.6, degenerative   changes present               Left Femoral Neck            T-score:  -1.7               Right Femoral Neck         T-score:  -1.6                              Lumbar (L1-L4) BMD: 1.110                             Total Hip Mean BMD: 0.924      IMPRESSION  Osteopenia.,  "Degenerative changes of the lumbar spine which may falsely   elevate results.    Patient had a study performed previously, however the scans are not   available to compare to the current study.     Recommendations include ensuring adequate Calcium and Vitamin D.    The current NOF Guidelines recommend treatment for patients with prior hip   or vertebral fracture, T-score -2.5 or below, or 10 year risk of any major   osteoporotic fracture >20% or 10 year risk of hip fracture >3%, as   calculated using the FRAX calculator (www.shef.ac.uk/FRAX or you can   google \"FRAX\").      This patient's risks based on available information, with the use of FRAX,   are 8.9 % for major osteoporotic fracture and 1.3 % for hip fracture.   Based on these guidelines, treatment (in addition to calcium and vitamin   D) is not recommended for this patient, after ruling out other causes of   osteoporosis.  This is meant as an aid to clinical decision making; one must still use   clinical judgement.      Follow up can be considered in 3 years.   ___________________  Katelynn Manuel M.D.  Electronically signed            If you have any questions or concerns, please call myself or my nurse at [phone].      Sincerely,        Vita Zavala MD/AGUAYO  "

## 2017-02-03 NOTE — PROGRESS NOTES
Quick Note:    Mail letter:  Your bone mass is mildly low, called osteopenia. We can continue to follow this by repeating the DEXA test in 3 to 5 years. Get several servings of dairy daily (or calcium 1000 - 1200 mg daily split into 2 doses), take vitamin D 1000 units daily over-the-counter, exercise regularly, and avoid falls.     Vita Zavala MD    ______

## 2017-02-08 ENCOUNTER — OFFICE VISIT (OUTPATIENT)
Dept: SLEEP MEDICINE | Facility: CLINIC | Age: 72
End: 2017-02-08
Payer: COMMERCIAL

## 2017-02-08 VITALS
HEIGHT: 67 IN | SYSTOLIC BLOOD PRESSURE: 138 MMHG | BODY MASS INDEX: 45.99 KG/M2 | DIASTOLIC BLOOD PRESSURE: 79 MMHG | WEIGHT: 293 LBS | HEART RATE: 72 BPM | OXYGEN SATURATION: 97 %

## 2017-02-08 DIAGNOSIS — I10 HYPERTENSION GOAL BP (BLOOD PRESSURE) < 150/90: Chronic | ICD-10-CM

## 2017-02-08 DIAGNOSIS — R53.83 FATIGUE, UNSPECIFIED TYPE: ICD-10-CM

## 2017-02-08 DIAGNOSIS — R06.83 SNORING: Primary | ICD-10-CM

## 2017-02-08 DIAGNOSIS — F51.04 PSYCHOPHYSIOLOGICAL INSOMNIA: ICD-10-CM

## 2017-02-08 DIAGNOSIS — E66.01 MORBID OBESITY DUE TO EXCESS CALORIES (H): Chronic | ICD-10-CM

## 2017-02-08 DIAGNOSIS — G47.9 DISTURBANCE IN SLEEP BEHAVIOR: ICD-10-CM

## 2017-02-08 DIAGNOSIS — G47.10 ORGANIC HYPERSOMNIA: ICD-10-CM

## 2017-02-08 PROCEDURE — 99204 OFFICE O/P NEW MOD 45 MIN: CPT | Performed by: INTERNAL MEDICINE

## 2017-02-08 NOTE — NURSING NOTE
"Chief Complaint   Patient presents with     Consult     Tired all the time-snoring       Initial Ht 1.702 m (5' 7\")  Wt 149.233 kg (329 lb)  BMI 51.52 kg/m2 Estimated body mass index is 51.52 kg/(m^2) as calculated from the following:    Height as of this encounter: 1.702 m (5' 7\").    Weight as of this encounter: 149.233 kg (329 lb).  Medication Reconciliation: complete  Neck circumference: 15.5 inches/39 cm    "

## 2017-02-08 NOTE — PATIENT INSTRUCTIONS

## 2017-02-08 NOTE — PROGRESS NOTES
Sleep Consultation:    Date on this visit: 2/8/2017    Chelo Brooks is sent by Vita Zavala for a sleep consultation regarding snoring/fatigue.    Primary Physician: Vita Zavala     Chief Complaint   Patient presents with     Consult     Tired all the time-snoring         Chelo goes to bed at 11 PM during the week. She gest up at 8:30 AM without an alarm. Sometimes she goes back to bed an lays there for another 1/2 hour and 'plan her day'. She falls asleep in <20 minutes.  Chelo has difficulty falling asleep 1-2/week taking 20 minutes.  She wakes up 2-4 times a night.  She has trouble falling back asleep every night taking 2-3 hours. She 'rolls around' in bed. Her 'brain kicks in' and she starts 'thinking about things'. She is also a light sleeper and her neighbors awakens her at night. Chelo wakes up to go to the bathroom. She has some snort arousals. On weekends,schedule is similar. Patient gets an average of '7' hours of sleep per night.     Patient does not use electronics in bed and watch TV in bed.     Chelo does snore snoring is very loud. Patient does not have a regular bed partner. She does have witnessed apneas. Patient sleeps on her side and stomach. She has no morning headaches. She has occasional restless legs (1-2/month). She has difficulty describing restless legs, she admits to discomfort in legs mostly at bedtime, and there may be an urge to move. Ibuprofen seems to help    Chelo denies any sleep walking, sleep talking, dream enactment, sleep paralysis, cataplexy and hypnogogic/hypnopompic hallucinations.    Chelo has difficulty breathing through her nose, denies reflux at night.      Patient describes themself as a night person. Patient's Natalbany Sleepiness score 11/24 consistent with excessive daytime sleepiness. She has had poor energy lately.      Chelo naps rarely. She takes some inadvertant naps. She denies dozing while driving. She uses 0-1 cups/day of coffee, 1-2 sodas/day. Last  caffeine intake is usually before 10 pm.    Recent Labs   Lab Test  01/25/17   1251  11/23/15   1203   NA  143  141   POTASSIUM  4.0  4.1   CHLORIDE  107  108   CO2  29  25   ANIONGAP  7  8   GLC  86  89   BUN  18  27   CR  0.94  0.90   HECTOR  8.4*  8.7         Allergies:    Allergies   Allergen Reactions     Adhesive Tape        Medications:    Current Outpatient Prescriptions   Medication Sig Dispense Refill     atorvastatin (LIPITOR) 40 MG tablet Take 1 tablet (40 mg) by mouth daily 90 tablet 3     albuterol (PROAIR HFA/PROVENTIL HFA/VENTOLIN HFA) 108 (90 BASE) MCG/ACT Inhaler Inhale 1-2 puffs into the lungs every 4 hours as needed for shortness of breath / dyspnea 1 Inhaler 3     lisinopril (PRINIVIL/ZESTRIL) 10 MG tablet Take 1 tablet (10 mg) by mouth daily 30 tablet 0     acetaminophen (TYLENOL) 500 MG tablet Take 1 tablet (500 mg) by mouth every 4 hours as needed for pain       Hypertonic Nasal Wash (SINUS RINSE BOTTLE KIT) PACK Spray 1 Bottle in nostril daily as needed.       fluticasone (FLONASE) 50 MCG/ACT nasal spray 2 sprays by Both Nostrils route daily.       mometasone (ELOCON) 0.1 % cream Apply  topically. 60 g 2       Problem List:  Patient Active Problem List    Diagnosis Date Noted     Morbid obesity due to excess calories (H) 11/23/2015     Priority: Medium     Surgical referral declined '16       Intermittent asthma, uncomplicated 11/23/2015     Priority: Medium     Hypertension goal BP (blood pressure) < 150/90      Priority: Medium     Hyperlipidemia LDL goal <130 11/27/2012     Priority: Medium     Osteopenia      Priority: Low     Health Care Home 05/08/2012     Priority: Low     [facility] .  Oscar Smith RN, C-BC    [phone]     DX V65.8 REPLACED WITH [id number] HEALTH CARE HOME (04/08/2013)       Allergic rhinitis due to animal dander      Priority: Low     4/5/12 RAST pos. only to cat mildly       Advanced directives, counseling/discussion 03/15/2012     Priority: Low      Discussed advance care planning with patient; information given to patient to review. 3/15/2012        DJD (degenerative joint disease) 10/21/2005     Priority: Low     Reflux esophagitis 06/17/2003     Priority: Low        Past Medical/Surgical History:  Past Medical History   Diagnosis Date     Pneumonia, organism unspecified      Diagnostic skin and sensitization tests 4/5/12      RAST pos. 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 Paternal Uncle      MI      HEART DISEASE Brother 48     pacer      DIABETES Maternal Grandmother      Hypertension Maternal Grandmother      DIABETES Mother      Hypertension Mother      DIABETES Maternal Aunt      Hypertension Maternal Aunt      Cancer - colorectal Maternal Grandfather      Circulatory Father      d. DVT     HEART DISEASE Father      pacer     C.A.D. Maternal Aunt      MI     Review of Systems:  A complete review of systems reviewed by me is negative with the exeption of what  "has been mentioned in the history of present illness.  CONSTITUTIONAL:  POSITIVE for  weight gain  EYES: NEGATIVE for changes in vision, blind spots, double vision.  ENT:  POSITIVE for  sinus pain and post-nasal drip  CARDIAC: NEGATIVE for fast heartbeats or fluttering in chest, chest pain or pressure, breathlessness when lying flat, swollen legs or swollen feet.  NEUROLOGIC:  POSITIVE for  weakness or numbness in the arms or legs  DERMATOLOGIC: NEGATIVE for rashes, new moles or change in mole(s)  PULMONARY:  POSITIVE for  SOB at rest, SOB with activity and dry cough  GASTROINTESTINAL: NEGATIVE for nausea or vomitting, loose or watery stools, fat or grease in stools, constipation, abdominal pain, bowel movements black in color or blood noted.  GENITOURINARY: NEGATIVE for pain during urination, blood in urine, urinating more frequently than usual, irregular menstrual periods.  MUSCULOSKELETAL:  POSITIVE for  bone or joint pain  ENDOCRINE: NEGATIVE for increased thirst or urination, diabetes.  LYMPHATIC: NEGATIVE for swollen lymph nodes, lumps or bumps in the breasts or nipple discharge.    Physical Examination:  Vitals: /79 mmHg  Pulse 72  Ht 1.702 m (5' 7\")  Wt 149.233 kg (329 lb)  BMI 51.52 kg/m2  SpO2 97%  BMI= Body mass index is 51.52 kg/(m^2).         Angie Total Score 2/8/2017   Total score - Angie 11     GENERAL APPEARANCE: alert and no distress  EYES: Eyes grossly normal to inspection, PERRL and conjunctivae and sclerae normal  HENT: ear canals and TM's normal, nose and mouth without ulcers or lesions and oropharynx crowded  NECK: no adenopathy, no asymmetry, masses, or scars and thyroid normal to palpation  RESP: lungs clear to auscultation - no rales, rhonchi or wheezes  CV: regular rates and rhythm, normal S1 S2, no S3 or S4 and no murmur, click or rub  LYMPHATICS: normal ant/post cervical and supraclavicular nodes  ABDOMEN: soft, nontender, without hepatosplenomegaly or masses  MS: " extremities normal- no gross deformities noted  NEURO: Normal strength and tone, mentation intact, speech normal and cranial nerves 2-12 intact  PSYCH: mentation appears normal and affect normal/bright  Mallampati Class: IV.  Tonsillar Stage: not visualized.    Impression/Plan:    Loud, socially disruptive snoring, witnessed apneas, excessive daytime sleepiness (ESS 11), sleep maintenance difficulties, obesity, narrowed oropharynx oropharynx. Options discussed. Polysomnogram (using 4% desaturation/Medicare/2012 AASM 1B scoring rules) for moderate-high probability obstructive sleep apnea. Ambien if needed. Patient is a poor candidate for Home Sleep Testing due to not high probability of FLOR, insomnia and and noisy home environment.    Sleep onset << maintenance difficulties. Some psychophysiologic insomnia suspected. We discussed stimulus control briefly.     Literature provided regarding sleep apnea and insomnia.      She will follow up with me in approximately two weeks after her sleep study has been competed to review the results and discuss plan of care.       Polysomnography reviewed.  Obstructive sleep apnea reviewed.  Complications of untreated sleep apnea were reviewed.    Thad Gannon     CC: Vita Zavala

## 2017-02-08 NOTE — MR AVS SNAPSHOT
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  Weight management is a personal decision.  If you are interested in exploring weight loss strategies, the following discussion covers the approaches that may be successful. Body mass index (BMI) is one way to tell whether you are at a healthy weight, overweight, or obese. It measures your weight in relation to your height.  A BMI of 18.5 to 24.9 is in the healthy range. A person with a BMI of 25 to 29.9 is considered overweight, and someone with a BMI of 30 or greater is considered obese. More than two-thirds of American adults are considered overweight or obese.  Being overweight or obese increases the risk for further weight gain. Excess weight may lead to heart disease and diabetes.  Creating and following plans for healthy eating and physical activity may help you improve your health.  Weight control is part of healthy lifestyle and includes exercise, emotional health, and healthy eating habits. Careful eating habits lifelong are the mainstay of weight control. Though there are significant health benefits from weight loss, long-term weight loss with diet alone may be very difficult to achieve- studies show long-term success with dietary management in less than 10% of people. Attaining a healthy weight may be especially difficult to achieve in those with severe obesity. In some cases,  medications, devices and surgical management might be considered.  What can you do?  If you are overweight or obese and are interested in methods for weight loss, you should discuss this with your provider.     Consider reducing daily calorie intake by 500 calories.     Keep a food journal.     Avoiding skipping meals, consider cutting portions instead.    Diet combined with exercise helps maintain muscle while optimizing fat loss. Strength training is particularly important for building and maintaining muscle mass. Exercise helps reduce stress, increase energy, and improves fitness. Increasing exercise without diet control, however, may not burn enough calories to loose weight.       Start walking three days a week 10-20 minutes at a time    Work towards walking thirty minutes five days a week     Eventually, increase the speed of your walking for 1-2 minutes at time    In addition, we recommend that you review healthy lifestyles and methods for weight loss available through the National Institutes of Health patient information sites:  http://win.niddk.nih.gov/publications/index.htm    And look into health and wellness programs that may be available through your health insurance provider, employer, local community center, or lien club.    Weight management plan: Patient was referred to their PCP to discuss a diet and exercise plan.            Follow-ups after your visit        Follow-up notes from your care team     Return in about 2 weeks (around 2/22/2017) for results.      Your next 10 appointments already scheduled     Feb 14, 2017  8:00 PM   PSG Split with BK BED 1   [facility] ([facility])    [address] [place] [address]   [phone]            Mar 14, 2017  9:40 AM   New Visit with Slim Velásquez MD   [facility] ([facility])    [address] [place] [address]  "[place] [address]   [phone]            Mar 15, 2017 10:40 AM   Return Sleep Patient with Thad Gannon MD   [facility]    [address] [place] [address]   [phone]              Future tests that were ordered for you today     Open Future Orders        Priority Expected Expires Ordered    Comprehensive Sleep Study Routine  8/7/2017 2/8/2017            Who to contact     If you have questions or need follow up information about today's clinic visit or your schedule please contact [facility] directly at [phone].  Normal or non-critical lab and imaging results will be communicated to you by MyChart, letter or phone within 4 business days after the clinic has received the results. If you do not hear from us within 7 days, please contact the clinic through MyChart or phone. If you have a critical or abnormal lab result, we will notify you by phone as soon as possible.  Submit refill requests through [employer] or call your pharmacy and they will forward the refill request to us. Please allow 3 business days for your refill to be completed.          Additional Information About Your Visit        Care EveryWhere ID     This is your Care EveryWhere ID. This could be used by other organizations to access your [facility] medical records  [id number]        Your Vitals Were     Pulse Height BMI (Body Mass Index) Pulse Oximetry          72 1.702 m (5' 7\") 51.52 kg/m2 97%         Blood Pressure from Last 3 Encounters:   02/08/17 138/79   01/25/17 140/88   04/25/16 136/84    Weight from Last 3 Encounters:   02/08/17 149.233 kg (329 lb)   01/25/17 151.048 kg (333 lb)   04/25/16 145.151 kg (320 lb)              We Performed the Following     SLEEP EVALUATION & MANAGEMENT REFERRAL - ADULT        Primary Care Provider Office Phone # Fax #    Vita Zavala [phone]       [facility] [address] [facility]  [place] [address]        Thank you!     Thank you for choosing [facility]  for your care. Our goal is always to provide you with excellent care. Hearing back from our patients is one way we can continue to improve our services. Please take a few minutes to complete the written survey that you may receive in the mail after your visit with us. Thank you!             Your Updated Medication List - Protect others around you: Learn how to safely use, store and throw away your medicines at www.disposemymeds.org.          This list is accurate as of: 2/8/17 11:51 AM.  Always use your most recent med list.                   Brand Name Dispense Instructions for use    albuterol 108 (90 BASE) MCG/ACT Inhaler    PROAIR HFA/PROVENTIL HFA/VENTOLIN HFA    1 Inhaler    Inhale 1-2 puffs into the lungs every 4 hours as needed for shortness of breath / dyspnea       atorvastatin 40 MG tablet    LIPITOR    90 tablet    Take 1 tablet (40 mg) by mouth daily       fluticasone 50 MCG/ACT spray    FLONASE     2 sprays by Both Nostrils route daily.       lisinopril 10 MG tablet    PRINIVIL/ZESTRIL    30 tablet    Take 1 tablet (10 mg) by mouth daily       mometasone 0.1 % cream    ELOCON    60 g    Apply  topically.       SINUS RINSE BOTTLE KIT Pack      Spray 1 Bottle in nostril daily as needed.       TYLENOL 500 MG tablet   Generic drug:  acetaminophen      Take 1 tablet (500 mg) by mouth every 4 hours as needed for pain

## 2017-02-14 ENCOUNTER — THERAPY VISIT (OUTPATIENT)
Dept: SLEEP MEDICINE | Facility: CLINIC | Age: 72
End: 2017-02-14
Payer: COMMERCIAL

## 2017-02-14 DIAGNOSIS — F51.04 PSYCHOPHYSIOLOGICAL INSOMNIA: ICD-10-CM

## 2017-02-14 DIAGNOSIS — G47.10 ORGANIC HYPERSOMNIA: ICD-10-CM

## 2017-02-14 DIAGNOSIS — G47.9 DISTURBANCE IN SLEEP BEHAVIOR: ICD-10-CM

## 2017-02-14 DIAGNOSIS — G47.33 OSA (OBSTRUCTIVE SLEEP APNEA): Chronic | ICD-10-CM

## 2017-02-14 DIAGNOSIS — I10 HYPERTENSION GOAL BP (BLOOD PRESSURE) < 150/90: Chronic | ICD-10-CM

## 2017-02-14 DIAGNOSIS — R53.83 FATIGUE, UNSPECIFIED TYPE: ICD-10-CM

## 2017-02-14 DIAGNOSIS — R06.83 SNORING: ICD-10-CM

## 2017-02-14 DIAGNOSIS — E66.01 MORBID OBESITY DUE TO EXCESS CALORIES (H): Chronic | ICD-10-CM

## 2017-02-14 PROCEDURE — 95810 POLYSOM 6/> YRS 4/> PARAM: CPT | Performed by: INTERNAL MEDICINE

## 2017-02-14 NOTE — MR AVS SNAPSHOT
After Visit Summary   2/14/2017    Chelo Brooks    MRN: [id number]           Patient Information     Date Of Birth          1945        Visit Information        Provider Department      2/14/2017 8:00 PM BK BED 1 [facility]        Today's Diagnoses     Fatigue, unspecified type        Snoring        Morbid obesity due to excess calories (H)        Hypertension goal BP (blood pressure) < 150/90        Psychophysiological insomnia [F51.04]        Organic hypersomnia        Disturbance in sleep behavior        FLOR (obstructive sleep apnea)- severe (AHI 89)           Follow-ups after your visit        Your next 10 appointments already scheduled     Mar 14, 2017  9:40 AM CDT   New Visit with Slim Velásquez MD   [facility] ([facility])    [address] [place] [address] 2nd Floor  [facility] [address] [phone]            Mar 15, 2017 10:40 AM CDT   Return Sleep Patient with Thad Gannon MD   [facility] [place]    [id number] [address] [address] [phone]              Who to contact     If you have questions or need follow up information about today's clinic visit or your schedule please contact [facility] directly at [phone].  Normal or non-critical lab and imaging results will be communicated to you by MyChart, letter or phone within 4 business days after the clinic has received the results. If you do not hear from us within 7 days, please contact the clinic through MyChart or phone. If you have a critical or abnormal lab result, we will notify you by phone as soon as possible.  Submit refill requests through [employer] or call your pharmacy and they will forward the refill request to us. Please allow 3 business days for your refill to be completed.          Additional Information About Your Visit        Care EveryWhere ID      This is your Care EveryWhere ID. This could be used by other organizations to access your [place] medical records  [id number]         Blood Pressure from Last 3 Encounters:   02/08/17 138/79   01/25/17 140/88   04/25/16 136/84    Weight from Last 3 Encounters:   02/08/17 (!) 149.2 kg (329 lb)   01/25/17 (!) 151 kg (333 lb)   04/25/16 (!) 145.2 kg (320 lb)              We Performed the Following     Comprehensive Sleep Study          Today's Medication Changes          These changes are accurate as of: 2/14/17 11:59 PM.  If you have any questions, ask your nurse or doctor.               Start taking these medicines.        Dose/Directions    zolpidem 5 MG tablet   Commonly known as:  AMBIEN   Used for:  FLOR (obstructive sleep apnea)        Take tablet by mouth 15 minutes prior to sleep, for Sleep Study   Quantity:  1 tablet   Refills:  0            Where to get your medicines      Some of these will need a paper prescription and others can be bought over the counter.  Ask your nurse if you have questions.     Bring a paper prescription for each of these medications     zolpidem 5 MG tablet                Primary Care Provider Office Phone # Fax #    Vita Zavala [phone]       [address] [address]        Thank you!     Thank you for choosing [facility]  for your care. Our goal is always to provide you with excellent care. Hearing back from our patients is one way we can continue to improve our services. Please take a few minutes to complete the written survey that you may receive in the mail after your visit with us. Thank you!             Your Updated Medication List - Protect others around you: Learn how to safely use, store and throw away your medicines at www.disposemymeds.org.          This list is accurate as of: 2/14/17 11:59 PM.  Always use your most recent med list.                   Brand Name Dispense Instructions for use     albuterol 108 (90 BASE) MCG/ACT Inhaler    PROAIR HFA/PROVENTIL HFA/VENTOLIN HFA    1 Inhaler    Inhale 1-2 puffs into the lungs every 4 hours as needed for shortness of breath / dyspnea       atorvastatin 40 MG tablet    LIPITOR    90 tablet    Take 1 tablet (40 mg) by mouth daily       fluticasone 50 MCG/ACT spray    FLONASE     2 sprays by Both Nostrils route daily.       mometasone 0.1 % cream    ELOCON    60 g    Apply  topically.       SINUS RINSE BOTTLE KIT Pack      Spray 1 Bottle in nostril daily as needed.       TYLENOL 500 MG tablet   Generic drug:  acetaminophen      Take 1 tablet (500 mg) by mouth every 4 hours as needed for pain       zolpidem 5 MG tablet    AMBIEN    1 tablet    Take tablet by mouth 15 minutes prior to sleep, for Sleep Study

## 2017-02-15 PROBLEM — G47.33 OSA (OBSTRUCTIVE SLEEP APNEA): Chronic | Status: ACTIVE | Noted: 2017-02-15

## 2017-02-15 RX ORDER — ZOLPIDEM TARTRATE 5 MG/1
TABLET ORAL
Qty: 1 TABLET | Refills: 0 | Status: SHIPPED | OUTPATIENT
Start: 2017-02-15 | End: 2017-03-14

## 2017-02-15 NOTE — NURSING NOTE
Diagnostic PSG completed per provider order.  Patient met criteria for PAP therapy, although patient did not achieve 2 hours of sleep until late in study.

## 2017-02-15 NOTE — PROCEDURES
" SLEEP STUDY INTERPRETATION  POLYSOMNOGRAPHY REPORT      Patient: Janina Brooks  YOB: 1945  Study Date: 2/14/2017  MRN: [id number]  Referring Provider: Vita Zavala MD  Ordering Provider: Thad Gannon MD    Indications for Polysomnography: The patient is a 71 y old Female who is 5' 7\" and weighs 329.0 lbs.  Her BMI is 51.6, [place] sleepiness scale 11.0 and neck size is 39.0.  A diagnostic polysomnogram was performed to evaluate for loud, socially disruptive snoring, witnessed apneas, excessive daytime sleepiness (ESS 11), sleep maintenance difficulties, obesity, narrowed oropharynx oropharynx.    Polysomnogram Data:  A full night polysomnogram recorded the standard physiologic parameters including EEG, EOG, EMG, ECG, nasal and oral airflow.  Respiratory parameters of chest and abdominal movements were recorded with respiratory inductance plethysmography.  Oxygen saturation was recorded by pulse oximetry.      Sleep Architecture: poor total sleep time without ambien  The total recording time of the polysomnogram was 439.0 minutes.  The total sleep time was 96.0 minutes.  Sleep latency was increased at 47.8 minutes without the use of a sleep aid (though one was prescribed).  REM latency was n/a..  Arousal index was 100.0 arousals per hour.  Sleep efficiency was very poor at 21.9%.  Wake after sleep onset was 291.5 minutes.  The patient spent 39.1% of sleep time in Stage N1, 60.9% in Stage N2, 0.0% in Stages N3, and 0.0% in REM.      Respiration:     Events - The polysomnogram revealed a presence of 19 obstructive, 0 central, and 0 mixed apneas resulting in an apnea index of 11.9 events per hour.  There were 124 hypopneas resulting in a hypopnea index of 77.5 events per hour.  The combined apnea/hypopnea index was 89.4 events per hour.  The REM AHI was n/a.  The supine AHI was 93.9 events per hour.  The RERA index was 3.1 events per hour.   The RDI was 92.5 events per hour.    Snoring - was reported as " moderate to loud.    Respiratory rate and pattern - was notable for normal respiratory rate and pattern.    Sustained Sleep Associated Hypoventilation - Transcutaneous carbon dioxide monitoring was not used    Sleep Associated Hypoxemia - (Greater than 5 minutes O2 sat below 89%) was not present.  Baseline oxygen saturation was 93.8%. Lowest oxygen saturation was 77%.  Time spent less than or equal to 88% was 27.5 minutes.  Time spent less than or equal to 89% was 34.4 minutes.  92.5 3.1 89.4     Movement Activity:     Periodic Limb Activity - There were 0 PLMs during the entire study.     REM EMG Activity - Excessive transient / sustained muscle activity was not present.    Nocturnal Behavior - Abnormal sleep related behaviors were not noted    Bruxism - None apparent.    Cardiac Summary:   The average pulse rate was 72.5 bpm.  The minimum pulse rate was 50.0 bpm while the maximum pulse rate was 97.4 bpm. The rhythm is normal sinus. Arrhythmias were not noted.      Assessment:     Very severe obstructive sleep apnea with sleep related hypoxemia    Poorly consolidated sleep    Recommendations:    Recommend repeat polysomnography with full night titration of positive airway pressure therapy for the control of sleep disordered breathing with ambien.    Advise regarding the risks of drowsy driving.    Suggest optimizing sleep schedule and avoiding sleep deprivation.    Weight management (if BMI > 30).         _____________________________________   Thad Gannon MD             Range(%) Time in range (min) Time in range (%) Time in or below range (min) Time in or below range (%)   0.0 - 89.0 34.4 8.2% 34.4 8.2%   0.0 - 88.0 27.5 6.6% 27.5 6.6%      89.4 93.9 -

## 2017-02-15 NOTE — PROGRESS NOTES
Sleep study shows very severe obstructive sleep apnea even with only 90 minutes of sleep.    Recommend Polysomnogram with all-night titration. Would recommend using ambien this time. Rx in my outbox    Am set-up if appropriate    Orders placed

## 2017-02-16 DIAGNOSIS — I10 HYPERTENSION GOAL BP (BLOOD PRESSURE) < 150/90: ICD-10-CM

## 2017-02-16 RX ORDER — LISINOPRIL 10 MG/1
10 TABLET ORAL DAILY
Qty: 30 TABLET | Refills: 0 | Status: SHIPPED | OUTPATIENT
Start: 2017-02-16 | End: 2017-03-29

## 2017-02-16 NOTE — TELEPHONE ENCOUNTER
lisinopril (PRINIVIL/ZESTRIL) 10 MG tablet  Last Written Prescription Date: 1/25/2017  Last Fill Quantity: 30, # refills: 0  Last Office Visit with G, P or [facility] prescribing provider: 1/25/2017    Potassium   Date Value Ref Range Status   01/25/2017 4.0 3.4 - 5.3 mmol/L Final     Creatinine   Date Value Ref Range Status   01/25/2017 0.94 0.52 - 1.04 mg/dL Final     BP Readings from Last 3 Encounters:   02/08/17 138/79   01/25/17 140/88   04/25/16 136/84

## 2017-02-16 NOTE — PROGRESS NOTES
Patient does not live close to the sleep center right now and has to try to figure out a way to have someone drop her off and pick her up from the sleep study if she has to take the ambien.  She is going on vacation and does not want to take care of this prior to that.  She stated she does not like being hooked up to all the wires and had a really terrible night the night of the study and is not interested in doing that again right now.  At this time she will think about a way to get to and from the next study and will discuss this with you at her scheduled f/u appt.

## 2017-02-16 NOTE — TELEPHONE ENCOUNTER
Reason for Call:  Other prescription    Detailed comments: Patient would like to request and refill for the prescription Lisinopril . also should the patient completed Labs before this prescription refilled ? Please contact the patient to discuss further    Phone Number Patient can be reached at: Home number on file [phone] (home)    Best Time: today    Can we leave a detailed message on this number? YES    Call taken on 2/16/2017 at 1:04 PM by Marc Vazquez

## 2017-02-16 NOTE — TELEPHONE ENCOUNTER
RN spoke with patient and informed her per last OV on 1/25/17 as it states below for pt to follow up in 1 month for BMP.  Patient agrees to come in for a lab next week as she was out of town and that is why she wasn't able to come in.  RN helped patient scheduling a lab only appointment on 2/21/17.    (I10) Hypertension goal BP (blood pressure) < 150/90  Comment: uncontrolled with lifestyle strategies   Plan: Comprehensive metabolic panel, lisinopril   (PRINIVIL/ZESTRIL) 10 MG tablet, Basic   metabolic panel  Discussed risks and benefits of this medication. Follow up in one month with BMP or sooner for worsening of symptoms or side effects.       One time refill provided until lab test results comes back.  Pt informed further refills will be provided after the lab results back.  Patient agrees and verbalized understanding.      Signed Prescriptions:                        Disp   Refills    lisinopril (PRINIVIL/ZESTRIL) 10 MG tablet 30 tab*0        Sig: Take 1 tablet (10 mg) by mouth daily  Authorizing Provider: ESTRADA WOO  Ordering User: ALEXSANDER SOUTH, RN, BSN

## 2017-02-21 DIAGNOSIS — G47.33 OSA (OBSTRUCTIVE SLEEP APNEA): Chronic | ICD-10-CM

## 2017-02-21 DIAGNOSIS — I10 HYPERTENSION GOAL BP (BLOOD PRESSURE) < 150/90: ICD-10-CM

## 2017-02-21 LAB
ANION GAP SERPL CALCULATED.3IONS-SCNC: 2 MMOL/L (ref 3–14)
BUN SERPL-MCNC: 15 MG/DL (ref 7–30)
CALCIUM SERPL-MCNC: 8.7 MG/DL (ref 8.5–10.1)
CHLORIDE SERPL-SCNC: 108 MMOL/L (ref 94–109)
CO2 SERPL-SCNC: 33 MMOL/L (ref 20–32)
CREAT SERPL-MCNC: 0.88 MG/DL (ref 0.52–1.04)
GFR SERPL CREATININE-BSD FRML MDRD: 63 ML/MIN/1.7M2
GLUCOSE SERPL-MCNC: 91 MG/DL (ref 70–99)
POTASSIUM SERPL-SCNC: 4.3 MMOL/L (ref 3.4–5.3)
SODIUM SERPL-SCNC: 143 MMOL/L (ref 133–144)

## 2017-02-21 PROCEDURE — 36415 COLL VENOUS BLD VENIPUNCTURE: CPT | Performed by: FAMILY MEDICINE

## 2017-02-21 PROCEDURE — 80048 BASIC METABOLIC PNL TOTAL CA: CPT | Performed by: FAMILY MEDICINE

## 2017-02-21 NOTE — LETTER
[address] [place] [address]    February 22, 2017    Chelo Brooks  [address] [place] [id number]          Dear Chelo,  Your results are normal.   Enclosed is a copy of your results.     Results for orders placed or performed in visit on 02/21/17   Basic metabolic panel   Result Value Ref Range    Sodium 143 133 - 144 mmol/L    Potassium 4.3 3.4 - 5.3 mmol/L    Chloride 108 94 - 109 mmol/L    Carbon Dioxide 33 (H) 20 - 32 mmol/L    Anion Gap 2 (L) 3 - 14 mmol/L    Glucose 91 70 - 99 mg/dL    Urea Nitrogen 15 7 - 30 mg/dL    Creatinine 0.88 0.52 - 1.04 mg/dL    GFR Estimate 63 >60 mL/min/1.7m2    GFR Estimate If Black 76 >60 mL/min/1.7m2    Calcium 8.7 8.5 - 10.1 mg/dL       If you have any questions or concerns, please call myself or my nurse at [phone].      Sincerely,        Vita Zavala MD /pb

## 2017-03-13 ENCOUNTER — PRE VISIT (OUTPATIENT)
Dept: CARDIOLOGY | Facility: CLINIC | Age: 72
End: 2017-03-13

## 2017-03-13 NOTE — TELEPHONE ENCOUNTER
HPI:  Chelo Brooks is a 71 year old female super morbidly obese  with history of asthma who presents for Preventive Visit.  Are you in the first 12 months of your Medicare Part B coverage? No     She did not schedule stress echocardiogram as recommended last year. She has ongoing shortness of breath, worse with exertion, accompanied by intermittent dependent lower extremity edema. Symptoms are moderate and unchanged for months. She has interrupted sleep with snoring and worsening fatigue, but denies chest pain or palpitations. She did not start hypercholesterolemia medication as recommended. She has chronic intermittent low back pain without injury or neurologic symptoms. She tried physical therapy once for this, and does not think it would help to try again.      Healthy Habits:    Do you get at least three servings of calcium containing foods daily (dairy, green leafy vegetables, etc.)? no    Amount of exercise or daily activities, outside of work: no    Problems taking medications regularly Yes not taking pravastatin     Medication side effects: No    Have you had an eye exam in the past two years? yes    Do you see a dentist twice per year? no    Do you have sleep apnea, excessive snoring or daytime drowsiness?yes     COGNITIVE SCREEN  1) Repeat 3 items (Banana, Sunrise, Chair)   2) Clock draw: NORMAL  3) 3 item recall: Recalls 3 objects  Results: NORMAL clock, 1-2 items recalled: COGNITIVE IMPAIRMENT LESS LIKELY     Mini-CogTM Copyright S Juan. Licensed by the author for use in [facility]; reprinted with permission (danilo@.[facility]). All rights reserved.      Extreme fatigue    ASSESSMENT & PLAN:  (Z00.00) Wellness examination (primary encounter diagnosis)  Plan: TD (ADULT, 7+) PRESERVE FREE      (E66.01) Morbid obesity due to excess calories (H)  Plan: Comprehensive metabolic panel  Counseled to make better food choices, exercise as tolerated, and lose weight. Offered bariatric surgery  referral.      (I10) Hypertension goal BP (blood pressure) < 150/90  Comment: uncontrolled with lifestyle strategies   Plan: Comprehensive metabolic panel, lisinopril   (PRINIVIL/ZESTRIL) 10 MG tablet, Basic   metabolic panel  Discussed risks and benefits of this medication. Follow up in one month with BMP or sooner for worsening of symptoms or side effects.      (E78.5) Hyperlipidemia LDL goal <130  Plan: Comprehensive metabolic panel, LDL cholesterol   direct  Consider statin      (J45.20) Intermittent asthma, uncomplicated  Comment: Well controlled with medications without side effects.   Plan: albuterol (PROAIR HFA/PROVENTIL HFA/VENTOLIN   HFA) 108 (90 BASE) MCG/ACT Inhaler  ACT q 6 months      (R06.02) SOB (shortness of breath)  Plan: Comprehensive metabolic panel, XR Chest 2   Views, Echocardiogram Complete  Follow-up pending results      (R60.9) Dependent edema  Plan: Comprehensive metabolic panel, Echocardiogram   Complete  See above      (M54.5) Midline low back pain without sciatica, unspecified chronicity  Plan: Over the counter pain medication as needed, ice or heat as she finds helpful, avoidance of aggravating activities, and return for persistence for consideration of further imaging or referral.      (R53.83) Fatigue, unspecified type  Plan: CBC with platelets differential, TSH with free   T4 reflex, SLEEP EVALUATION & MANAGEMENT   REFERRAL - ADULT      (R06.83) Snoring  Plan: SLEEP EVALUATION & MANAGEMENT REFERRAL - ADULT         End of Life Planning:  Patient currently has an advanced directive: No. I have verified the patient's ablity to prepare an advanced directive/make health care decisions. Literature was provided to assist patient in preparing an advanced directive.    Last EC2015  Sinus  Rhythm   WITHIN NORMAL LIMITS    Last ECHOCARDIOGRAM: 2017     Procedure  Echocardiogram with two-dimensional, color and spectral Doppler  performed.  _____________________________________________________________________________  __        Interpretation Summary     Technically difficult study.  Left ventricular function, chamber size, wall motion, and wall thickness are  normal.The EF is 55-60%.  Right ventricular function, chamber size, wall motion, and thickness are  normal.  No pericardial effusion is present.  _____________________________________________________________________________  __        Left Ventricle  Left ventricular function, chamber size, wall motion, and wall thickness are  normal.The EF is 55-60%. Left ventricular diastolic function is indeterminate.     Right Ventricle  Right ventricular function, chamber size, wall motion, and thickness are  normal.     Atria  The right atria appears normal. Mild left atrial enlargement is present. The  atrial septum is intact as assessed by color Doppler .     Mitral Valve  The mitral valve is normal. Trace mitral insufficiency is present.        Aortic Valve  The aortic valve cannot be assessed.     Tricuspid Valve  The tricuspid valve is normal. Trace tricuspid insufficiency is present. The  right ventricular systolic pressure is approximated at 37.4 mmHg plus the  right atrial pressure. Mild pulmonary hypertension is present.     Pulmonic Valve  The pulmonic valve cannot be assessed.     Vessels  The aorta root is normal. The inferior vena cava was normal in size with  preserved respiratory variability. The pulmonary artery is normal. Estimated  right atrial pressure is < 5 mmHg.     Pericardium  No pericardial effusion is present.        Compared to Previous Study  Previous study not available for comparison.  _____________________________________________________________________________  __  MMode/2D Measurements & Calculations  Ao root diam: 3.3 cm     asc Aorta Diam: 3.5 cm  LA/Ao: 1.3  LVOT diam: 2.4 cm  LVOT area: 4.5 cm2  LA Volume (BP): 103.0 ml  LA Volume Index (BP): 41.0  ml/m2        Doppler Measurements & Calculations  MV E max gabriel: 101.0 cm/sec  MV A max gabriel: 80.0 cm/sec  MV E/A: 1.3  MV dec time: 0.19 sec  Ao V2 max: 243.0 cm/sec  Ao max P.7 mmHg  Ao V2 mean: 160.5 cm/sec  Ao mean P.0 mmHg  Ao V2 VTI: 50.4 cm  SHANNAN(I,D): 3.4 cm2  SHANNAN(V,D): 3.4 cm2  LV V1 max P.2 mmHg  LV V1 max: 182.0 cm/sec  LV V1 VTI: 37.6 cm     MR ERO: 0.14 cm2  MR volume: 23.1 ml  CO(LVOT): 11.4 l/min  CI(LVOT): 4.5 l/min/m2  SV(LVOT): 170.1 ml  PA V2 max: 107.0 cm/sec  PA max P.6 mmHg  TR max gabriel: 305.7 cm/sec  TR max P.4 mmHg  Pulm Sys Gabriel: 65.8 cm/sec  Pulm Cornejo Gabriel: 52.4 cm/sec  Pulm S/D: 1.3  SHANNAN Index (cm2/m2): 1.3  Lateral E/e': 9.5  Medial E/e': 12.9

## 2017-03-14 ENCOUNTER — OFFICE VISIT (OUTPATIENT)
Dept: CARDIOLOGY | Facility: CLINIC | Age: 72
End: 2017-03-14
Payer: COMMERCIAL

## 2017-03-14 VITALS
BODY MASS INDEX: 52 KG/M2 | HEART RATE: 67 BPM | SYSTOLIC BLOOD PRESSURE: 123 MMHG | OXYGEN SATURATION: 96 % | WEIGHT: 293 LBS | DIASTOLIC BLOOD PRESSURE: 79 MMHG

## 2017-03-14 DIAGNOSIS — E66.01 MORBID OBESITY DUE TO EXCESS CALORIES (H): ICD-10-CM

## 2017-03-14 DIAGNOSIS — R06.09 DOE (DYSPNEA ON EXERTION): Primary | ICD-10-CM

## 2017-03-14 DIAGNOSIS — R60.9 DEPENDENT EDEMA: ICD-10-CM

## 2017-03-14 PROCEDURE — 93000 ELECTROCARDIOGRAM COMPLETE: CPT | Performed by: INTERNAL MEDICINE

## 2017-03-14 PROCEDURE — 99204 OFFICE O/P NEW MOD 45 MIN: CPT | Mod: 25 | Performed by: INTERNAL MEDICINE

## 2017-03-14 ASSESSMENT — PAIN SCALES - GENERAL: PAINLEVEL: NO PAIN (0)

## 2017-03-14 NOTE — PROGRESS NOTES
HPI:   Patient is a 71 yo woman seen in consultation for Dr. Vita Zavala for shortness of breath.    Patient says she had noted increased dyspnea for the past 6 months; she has to slow down or stop walking after less than 1/2 block to catch her breath. No orthopnea or PND. Tires easily with activity and must rest frequently. No chest pain or pressure. She also has daytime sleepiness; says she had a sleep study but needs to go back for treatment; report says she has severe FLOR with hypoxemia. Notes occasional flutter in her heart lasting 1 second; no sustained palpitations; no presyncope or syncope. She has swollen ankles; says she used fluid pill several years ago with modest improvement.    PAST MEDICAL HISTORY:  Past Medical History   Diagnosis Date     Diagnostic skin and sensitization tests 4/5/12      RAST pos. only to cat mildly     Hepatitis B childhood      resolved, patient is not a carrier      Pneumonia, organism unspecified      S/P knee replacements 10/23/2013     Shingles 2014     scalp       CURRENT MEDICATIONS:  Current Outpatient Prescriptions   Medication Sig Dispense Refill     lisinopril (PRINIVIL/ZESTRIL) 10 MG tablet Take 1 tablet (10 mg) by mouth daily 30 tablet 0     atorvastatin (LIPITOR) 40 MG tablet Take 1 tablet (40 mg) by mouth daily 90 tablet 3     albuterol (PROAIR HFA/PROVENTIL HFA/VENTOLIN HFA) 108 (90 BASE) MCG/ACT Inhaler Inhale 1-2 puffs into the lungs every 4 hours as needed for shortness of breath / dyspnea 1 Inhaler 3     acetaminophen (TYLENOL) 500 MG tablet Take 1 tablet (500 mg) by mouth every 4 hours as needed for pain       Hypertonic Nasal Wash (SINUS RINSE BOTTLE KIT) PACK Spray 1 Bottle in nostril daily as needed.       fluticasone (FLONASE) 50 MCG/ACT nasal spray 2 sprays by Both Nostrils route daily.       mometasone (ELOCON) 0.1 % cream Apply  topically. 60 g 2       PAST SURGICAL HISTORY:  Past Surgical History   Procedure Laterality Date     C vaginal  hysterectomy  1977     benign, prolapse, ovaries remain      C total knee arthroplasty  3/2000     right     C total knee arthroplasty  6/8/12     left       ALLERGIES     Allergies   Allergen Reactions     Adhesive Tape        FAMILY HISTORY:  Family History   Problem Relation Age of Onset     C.A.D. Paternal Uncle      MI      HEART DISEASE Brother 48     pacer      DIABETES Maternal Grandmother      Hypertension Maternal Grandmother      DIABETES Mother      Hypertension Mother      DIABETES Maternal Aunt      Hypertension Maternal Aunt      Cancer - colorectal Maternal Grandfather      Circulatory Father      d. DVT     HEART DISEASE Father      pacer     C.A.D. 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 No weight gain/loss   ENT: No visual disturbance, ear ache, epistaxis  Allergies/Immunologic: Negative.   Respiratory: No cough, hemoptysis  Cardiovascular: As per HPI  GI: No nausea, vomiting, hematemesis or hematochezia; patient has been troubled with reflux  : No urinary frequency, dysuria, or hematuria  Integument: Negative  Psychiatric: Negative  Neuro: Negative  Endocrinology: Negative   Musculoskeletal: right knee pain; low back pain    EXAM:  /79 (BP Location: Left arm, Patient Position: Chair, Cuff Size: Adult Large)  Pulse 67  Wt (!) 150.6 kg (332 lb)  SpO2 96%   BMI 52 kg/m2  In general, the patient is a pleasant morbidly obese female in no apparent distress.    HEENT: sclerae not injected; nares clear.    Neck: No adenopathy.  No thyromegaly. Carotids +4/4 bilaterally without bruits.  No jugular venous distension.   Heart: RRR. Normal S1, S2 splits physiologically. No murmur, rub, click, or gallop. The PMI is in the 5th ICS in the midclavicular line. There is no heave.    Lungs: CTA.  No ronchi, wheezes, rales.   Abdomen: Soft, nontender, nondistended. No organomegaly.   Extremities: No clubbing, cyanosis, or edema.  The pulses are +4/4 at the radial and PT sites bilaterally.    Neurologic: Alert and oriented to person/place/time, normal speech, gait and affect    Labs:  LIPID RESULTS:  Lab Results   Component Value Date    CHOL 220 (H) 11/23/2015    HDL 52 11/23/2015     (H) 01/25/2017     (H) 11/23/2015    TRIG 130 11/23/2015    CHOLHDLRATIO 4.4 10/23/2013    NHDL 168 (H) 11/23/2015       LIVER ENZYME RESULTS:  Lab Results   Component Value Date    AST 17 01/25/2017    ALT 21 01/25/2017       CBC RESULTS:  Lab Results   Component Value Date    WBC 4.8 01/25/2017    RBC 5.04 01/25/2017    HGB 13.6 01/25/2017    HCT 42.6 01/25/2017    MCV 85 01/25/2017    MCH 27.0 01/25/2017    MCHC 31.9 01/25/2017    RDW 14.6 01/25/2017     01/25/2017       BMP RESULTS:  Lab Results   Component Value Date     02/21/2017    POTASSIUM 4.3 02/21/2017    CHLORIDE 108 02/21/2017    CO2 33 (H) 02/21/2017    ANIONGAP 2 (L) 02/21/2017    GLC 91 02/21/2017    BUN 15 02/21/2017    CR 0.88 02/21/2017    GFRESTIMATED 63 02/21/2017    GFRESTBLACK 76 02/21/2017    HECTOR 8.7 02/21/2017        A1C RESULTS:  No results found for: A1C    INR RESULTS:  Lab Results   Component Value Date    INR 1.6 (A) 07/02/2012    INR 2.2 (A) 06/28/2012    INR 1.7 06/15/2012       Procedures:      Assessment and Plan:   1. Marked exertional dyspnea for the past 6 months; this may be in part related  to her obesity, but the marked increase in dyspnea in this patient with multiple risk factors suggests that this may represent an angina equivalent   -cardiac MRI with stress to look for ischemia; patient is morbidly obese so that a SPECT study is likely to give an ambiguous or false positive result so will do MRI which will provide much better imaging    2.  Hyperlipidemia - recently started on atorvastatin   -atorvastatin 40 mg/day   -repeat lipids at her primary clinic in the next 1-2 months    3. Pedal edema; venous pressure is normal on exam and ivc is normal on echo indicating that venous pressure is normal    -elevate legs whenever possible   -consider support hose    4. Essential hypertension - blood pressure good today   -lisinopril 10 mg/day    Follow up by phone if stress test is normal; we will arrange early clinic visit if it is abnormal    CC  Patient Care Team:  Vita Zavala MD as PCP - General (Family Practice)

## 2017-03-14 NOTE — NURSING NOTE
Cardiac Testing: Patient given instructions regarding  Cardiac stress MRI with contrast. Discussed purpose, preparation, procedure and when to expect results reported back to the patient. Patient demonstrated understanding of this information and agreed to call with further questions or concerns.-Patient will call to schedule-she also is claustrophobic so will  Have to arrive 1 hour prior to the MRI check in time  Med Reconcile: Reviewed and verified all current medications with the patient. The updated medication list was printed and given to the patient.  Patient stated she understood all health information given and agreed to call with further questions or concerns.  Katty Solis RN

## 2017-03-14 NOTE — NURSING NOTE
"Chief Complaint   Patient presents with     Shortness of Breath     Referral from Latia BALLARD for: SOB, Dependent edema. Pt c/o increased fatigued for the last months and swelling. Reports occ. palpitations and SOB with any type of exertion. Denies any chest pain. States is not dizzy or lightheaded today.       Initial /79 (BP Location: Left arm, Patient Position: Chair, Cuff Size: Adult Large)  Pulse 67  Wt (!) 332 lb (150.6 kg)  SpO2 96%  BMI 52 kg/m2 Estimated body mass index is 52 kg/(m^2) as calculated from the following:    Height as of 2/8/17: 5' 7\" (1.702 m).    Weight as of this encounter: 332 lb (150.6 kg)..  BP completed using cuff size: vince Mariee CMA        "

## 2017-03-14 NOTE — LETTER
3/14/2017      RE: Chelo Brooks  [address] [facility] [address]       Dear Colleague,    Thank you for the opportunity to participate in the care of your patient, Chelo Brooks, at the [facility] HEART AT [facility] [employer] [facility]. Please see a copy of my visit note below.    HPI:   Patient is a 71 yo woman seen in consultation for Dr. Vita Zavala for shortness of breath.    Patient says she had noted increased dyspnea for the past 6 months; she has to slow down or stop walking after less than 1/2 block to catch her breath. No orthopnea or PND. Tires easily with activity and must rest frequently. No chest pain or pressure. She also has daytime sleepiness; says she had a sleep study but needs to go back for treatment; report says she has severe FLOR with hypoxemia. Notes occasional flutter in her heart lasting 1 second; no sustained palpitations; no presyncope or syncope. She has swollen ankles; says she used fluid pill several years ago with modest improvement.    PAST MEDICAL HISTORY:  Past Medical History   Diagnosis Date     Diagnostic skin and sensitization tests 4/5/12      RAST pos. only to cat mildly     Hepatitis B childhood      resolved, patient is not a carrier      Pneumonia, organism unspecified      S/P knee replacements 10/23/2013     Shingles 2014     scalp       CURRENT MEDICATIONS:  Current Outpatient Prescriptions   Medication Sig Dispense Refill     lisinopril (PRINIVIL/ZESTRIL) 10 MG tablet Take 1 tablet (10 mg) by mouth daily 30 tablet 0     atorvastatin (LIPITOR) 40 MG tablet Take 1 tablet (40 mg) by mouth daily 90 tablet 3     albuterol (PROAIR HFA/PROVENTIL HFA/VENTOLIN HFA) 108 (90 BASE) MCG/ACT Inhaler Inhale 1-2 puffs into the lungs every 4 hours as needed for shortness of breath / dyspnea 1 Inhaler 3     acetaminophen (TYLENOL) 500 MG tablet Take 1 tablet (500 mg) by mouth every 4 hours as needed for  pain       Hypertonic Nasal Wash (SINUS RINSE BOTTLE KIT) PACK Spray 1 Bottle in nostril daily as needed.       fluticasone (FLONASE) 50 MCG/ACT nasal spray 2 sprays by Both Nostrils route daily.       mometasone (ELOCON) 0.1 % cream Apply  topically. 60 g 2       PAST SURGICAL HISTORY:  Past Surgical History   Procedure Laterality Date     C vaginal hysterectomy  1977     benign, prolapse, ovaries remain      C total knee arthroplasty  3/2000     right     C total knee arthroplasty  6/8/12     left       ALLERGIES     Allergies   Allergen Reactions     Adhesive Tape        FAMILY HISTORY:  Family History   Problem Relation Age of Onset     C.A.D. Paternal Uncle      MI      HEART DISEASE Brother 48     pacer      DIABETES Maternal Grandmother      Hypertension Maternal Grandmother      DIABETES Mother      Hypertension Mother      DIABETES Maternal Aunt      Hypertension Maternal Aunt      Cancer - colorectal Maternal Grandfather      Circulatory Father      d. DVT     HEART DISEASE Father      pacer     C.A.D. 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 No weight gain/loss   ENT: No visual disturbance, ear ache, epistaxis  Allergies/Immunologic: Negative.   Respiratory: No cough, hemoptysis  Cardiovascular: As per HPI  GI: No nausea, vomiting, hematemesis or hematochezia; patient  has been troubled with reflux  : No urinary frequency, dysuria, or hematuria  Integument: Negative  Psychiatric: Negative  Neuro: Negative  Endocrinology: Negative   Musculoskeletal: right knee pain; low back pain    EXAM:  /79 (BP Location: Left arm, Patient Position: Chair, Cuff Size: Adult Large)  Pulse 67  Wt (!) 150.6 kg (332 lb)  SpO2 96%  BMI 52 kg/m2  In general, the patient is a pleasant morbidly obese female in no apparent distress.    HEENT: sclerae not injected; nares clear.    Neck: No adenopathy.  No thyromegaly. Carotids +4/4 bilaterally without bruits.  No jugular venous distension.   Heart: RRR. Normal S1, S2 splits physiologically. No murmur, rub, click, or gallop. The PMI is in the 5th ICS in the midclavicular line. There is no heave.    Lungs: CTA.  No ronchi, wheezes, rales.   Abdomen: Soft, nontender, nondistended. No organomegaly.   Extremities: No clubbing, cyanosis, or edema.  The pulses are +4/4 at the radial and PT sites bilaterally.    Neurologic: Alert and oriented to person/place/time, normal speech, gait and affect    Labs:  LIPID RESULTS:  Lab Results   Component Value Date    CHOL 220 (H) 11/23/2015    HDL 52 11/23/2015     (H) 01/25/2017     (H) 11/23/2015    TRIG 130 11/23/2015    CHOLHDLRATIO 4.4 10/23/2013    NHDL 168 (H) 11/23/2015       LIVER ENZYME RESULTS:  Lab Results   Component Value Date    AST 17 01/25/2017    ALT 21 01/25/2017       CBC RESULTS:  Lab Results   Component Value Date    WBC 4.8 01/25/2017    RBC 5.04 01/25/2017    HGB 13.6 01/25/2017    HCT 42.6 01/25/2017    MCV 85 01/25/2017    MCH 27.0 01/25/2017    MCHC 31.9 01/25/2017    RDW 14.6 01/25/2017     01/25/2017       BMP RESULTS:  Lab Results   Component Value Date     02/21/2017    POTASSIUM 4.3 02/21/2017    CHLORIDE 108 02/21/2017    CO2 33 (H) 02/21/2017    ANIONGAP 2 (L) 02/21/2017    GLC 91 02/21/2017    BUN 15 02/21/2017    CR 0.88 02/21/2017    GFRESTIMATED 63  02/21/2017    REGINA 76 02/21/2017    HECTOR 8.7 02/21/2017        A1C RESULTS:  No results found for: A1C    INR RESULTS:  Lab Results   Component Value Date    INR 1.6 (A) 07/02/2012    INR 2.2 (A) 06/28/2012    INR 1.7 06/15/2012       Procedures:      Assessment and Plan:   1. Marked exertional dyspnea for the past 6 months; this may be in part related to her obesity, but the marked increase in dyspnea in this patient with multiple risk factors suggests that this may represent an angina equivalent   -cardiac MRI with stress to look for ischemia; patient is morbidly obese so that a SPECT study is likely to give an ambiguous or false positive result so will do MRI which will provide much better imaging    2.  Hyperlipidemia - recently started on atorvastatin   -atorvastatin 40 mg/day   -repeat lipids at her primary clinic in the next 1-2 months    3. Pedal edema; venous pressure is normal on exam and ivc is normal on echo indicating that venous pressure is normal    -elevate legs whenever possible   -consider support hose    4. Essential hypertension - blood pressure good today   -lisinopril 10 mg/day    Follow up by phone if stress test is normal; we will arrange early clinic visit if it is abnormal    Please do not hesitate to contact me if you have any questions/concerns.     Sincerely,     Slim Velásquez MD    CC  Patient Care Team:  Vita Zavala MD as PCP - General (Family Practice)

## 2017-03-14 NOTE — PATIENT INSTRUCTIONS
1.  Dr. Slim Velásquez has ordered a Cardiac stress MRI to be performed on you. We will have you arrive 1 hour prior to receive oral sedation.  Due to this you will need a .  Please call [phone] to schedule. The cardiology nurse will contact you regarding the results of your Cardiac MRI (please see result notification details at bottom of summary).    CARDIAC MRI INSTRUCTIONS:    *Nothing to eat for 3 hours prior to test.  Water is permitable.    *No tobacco use for 12 hours prior to test.    *No medications or drinks containing caffeine (coffee, tea, chocolate, Anacin, Excedrin, NoDoz, etc.  for 12 hours prior to test.       *All other medications should be taken, especially High Blood Pressure prescriptions    *Please allow 2 hour for test.            [facility] Cardiology - [place] Location    If you have any questions regarding to your visit please contact your care team:     Cardiology  Telephone Number   Susan Brandon  Cardiology RN's.    Mely Mariee CMA [phone]    After hours: [phone].  [phone]   For scheduling appts:     [phone] or  [phone]    After hours: [phone]   For the Device Clinic (Pacemakers and ICD's)  RN's :  Estella Real   During business hours: [phone]  After business hours:  [phone]- select option 4.      If you need a medication refill please contact your pharmacy.  Please allow 3 business days for your refill to be completed.    As always, Thank you for trusting us with your health care needs!  _____________________________________________________________________

## 2017-03-14 NOTE — MR AVS SNAPSHOT
After Visit Summary   3/14/2017    Chelo Brooks    MRN: [id number]           Patient Information     Date Of Birth          1945        Visit Information        Provider Department      3/14/2017 9:40 AM Slim Velásquez MD [facility] PHYSICIANS HEART AT [facility]        Today's Diagnoses     UMANA (dyspnea on exertion)    -  1      Care Instructions    1.  Dr. Slim Velásquez has ordered a Cardiac stress MRI to be performed on you. We will have you arrive 1 hour prior to receive oral sedation.  Due to this you will need a .  Please call [phone] to schedule. The cardiology nurse will contact you regarding the results of your Cardiac MRI (please see result notification details at bottom of summary).    CARDIAC MRI INSTRUCTIONS:    *Nothing to eat for 3 hours prior to test.  Water is permitable.    *No tobacco use for 12 hours prior to test.    *No medications or drinks containing caffeine (coffee, tea, chocolate, Anacin, Excedrin, NoDoz, etc.  for 12 hours prior to test.       *All other medications should be taken, especially High Blood Pressure prescriptions    *Please allow 2 hour for test.            [facility] Cardiology - [place] Location    If you have any questions regarding to your visit please contact your care team:     Cardiology  Telephone Number   Susan Brandon  Cardiology RN's.    Mley Mariee CMA [phone]    After hours: [phone].  [phone]   For scheduling appts:     [phone] or  [phone]    After hours: [phone]   For the Device Clinic (Pacemakers and ICD's)  RN's :  Estella Real   During business hours: [phone]  After business hours:  [phone]- select option 4.      If you need a medication refill please contact your pharmacy.  Please allow 3 business days for your refill to be completed.    As always, Thank you for trusting us with your health care  needs!  _____________________________________________________________________            Follow-ups after your visit        Your next 10 appointments already scheduled     Mar 15, 2017 10:40 AM CDT   Return Sleep Patient with Thad Gannon MD   [facility] ([facility])    [address] [place] [id number] [phone]              Who to contact     If you have questions or need follow up information about today's clinic visit or your schedule please contact [facility] PHYSICIANS HEART AT [facility] directly at [phone].  Normal or non-critical lab and imaging results will be communicated to you by MyChart, letter or phone within 4 business days after the clinic has received the results. If you do not hear from us within 7 days, please contact the clinic through MyChart or phone. If you have a critical or abnormal lab result, we will notify you by phone as soon as possible.  Submit refill requests through [employer] or call your pharmacy and they will forward the refill request to us. Please allow 3 business days for your refill to be completed.          Additional Information About Your Visit        Care EveryWhere ID     This is your Care EveryWhere ID. This could be used by other organizations to access your [place] medical records  [id number]        Your Vitals Were     Pulse Pulse Oximetry BMI (Body Mass Index)             67 96% 52 kg/m2          Blood Pressure from Last 3 Encounters:   03/14/17 123/79   02/08/17 138/79   01/25/17 140/88    Weight from Last 3 Encounters:   03/14/17 (!) 150.6 kg (332 lb)   02/08/17 (!) 149.2 kg (329 lb)   01/25/17 (!) 151 kg (333 lb)              We Performed the Following     EKG 12-lead complete w/read - Clinics        Primary Care Provider Office Phone # Fax #    Vita Zavala [phone]       [address] [address]         Thank you!     Thank you for choosing [facility] PHYSICIANS HEART AT [facility]  for your care. Our goal is always to provide you with excellent care. Hearing back from our patients is one way we can continue to improve our services. Please take a few minutes to complete the written survey that you may receive in the mail after your visit with us. Thank you!             Your Updated Medication List - Protect others around you: Learn how to safely use, store and throw away your medicines at www.disposemymeds.org.          This list is accurate as of: 3/14/17 10:40 AM.  Always use your most recent med list.                   Brand Name Dispense Instructions for use    albuterol 108 (90 BASE) MCG/ACT Inhaler    PROAIR HFA/PROVENTIL HFA/VENTOLIN HFA    1 Inhaler    Inhale 1-2 puffs into the lungs every 4 hours as needed for shortness of breath / dyspnea       atorvastatin 40 MG tablet    LIPITOR    90 tablet    Take 1 tablet (40 mg) by mouth daily       fluticasone 50 MCG/ACT spray    FLONASE     2 sprays by Both Nostrils route daily.       lisinopril 10 MG tablet    PRINIVIL/ZESTRIL    30 tablet    Take 1 tablet (10 mg) by mouth daily       mometasone 0.1 % cream    ELOCON    60 g    Apply  topically.       SINUS RINSE BOTTLE KIT Pack      Spray 1 Bottle in nostril daily as needed.       TYLENOL 500 MG tablet   Generic drug:  acetaminophen      Take 1 tablet (500 mg) by mouth every 4 hours as needed for pain

## 2017-03-15 ENCOUNTER — OFFICE VISIT (OUTPATIENT)
Dept: SLEEP MEDICINE | Facility: CLINIC | Age: 72
End: 2017-03-15
Payer: COMMERCIAL

## 2017-03-15 ENCOUNTER — TELEPHONE (OUTPATIENT)
Dept: CARDIOLOGY | Facility: CLINIC | Age: 72
End: 2017-03-15

## 2017-03-15 VITALS
WEIGHT: 293 LBS | DIASTOLIC BLOOD PRESSURE: 83 MMHG | OXYGEN SATURATION: 98 % | BODY MASS INDEX: 45.99 KG/M2 | SYSTOLIC BLOOD PRESSURE: 131 MMHG | HEART RATE: 62 BPM | HEIGHT: 67 IN

## 2017-03-15 DIAGNOSIS — Z76.89 HEALTH CARE HOME: Chronic | ICD-10-CM

## 2017-03-15 DIAGNOSIS — G47.33 OSA (OBSTRUCTIVE SLEEP APNEA): Primary | Chronic | ICD-10-CM

## 2017-03-15 DIAGNOSIS — E66.01 MORBID OBESITY DUE TO EXCESS CALORIES (H): Chronic | ICD-10-CM

## 2017-03-15 PROCEDURE — 99214 OFFICE O/P EST MOD 30 MIN: CPT | Performed by: INTERNAL MEDICINE

## 2017-03-15 NOTE — TELEPHONE ENCOUNTER
Patient called stating wanting to schedule the cardiac stress MRI at the [facility].  She has difficulty with transportation and is claustrophobic.  Caller states that she can get a ride on Wednesday, March 22nd but it has to be after 1:00 pm.  Writer contacted scheduling, the  attempted to contact the radiology manager to okay an afternoon test on 3/22.  Manager was unavailable at this time and  will call the clinic back once she speaks to the manager to ok the test time in the afternoon.    Katty Solis RN

## 2017-03-15 NOTE — MR AVS SNAPSHOT
After Visit Summary   3/15/2017    Chelo Brooks    MRN: [id number]           Patient Information     Date Of Birth          1945        Visit Information        Provider Department      3/15/2017 10:40 AM Thad Gannon MD [facility]        Today's Diagnoses     FLOR (obstructive sleep apnea)- severe (AHI 89)    -  1    Morbid obesity due to excess calories (H)        Health Care Home          Care Instructions      Your BMI is Body mass index is 51.84 kg/(m^2).  Weight management is a personal decision.  If you are interested in exploring weight loss strategies, the following discussion covers the approaches that may be successful. Body mass index (BMI) is one way to tell whether you are at a healthy weight, overweight, or obese. It measures your weight in relation to your height.  A BMI of 18.5 to 24.9 is in the healthy range. A person with a BMI of 25 to 29.9 is considered overweight, and someone with a BMI of 30 or greater is considered obese. More than two-thirds of American adults are considered overweight or obese.  Being overweight or obese increases the risk for further weight gain. Excess weight may lead to heart disease and diabetes.  Creating and following plans for healthy eating and physical activity may help you improve your health.  Weight control is part of healthy lifestyle and includes exercise, emotional health, and healthy eating habits. Careful eating habits lifelong are the mainstay of weight control. Though there are significant health benefits from weight loss, long-term weight loss with diet alone may be very difficult to achieve- studies show long-term success with dietary management in less than 10% of people. Attaining a healthy weight may be especially difficult to achieve in those with severe obesity. In some cases, medications, devices and surgical management might be considered.  What can you do?  If you are overweight or obese and are interested in  methods for weight loss, you should discuss this with your provider.     Consider reducing daily calorie intake by 500 calories.     Keep a food journal.     Avoiding skipping meals, consider cutting portions instead.    Diet combined with exercise helps maintain muscle while optimizing fat loss. Strength training is particularly important for building and maintaining muscle mass. Exercise helps reduce stress, increase energy, and improves fitness. Increasing exercise without diet control, however, may not burn enough calories to loose weight.       Start walking three days a week 10-20 minutes at a time    Work towards walking thirty minutes five days a week     Eventually, increase the speed of your walking for 1-2 minutes at time    In addition, we recommend that you review healthy lifestyles and methods for weight loss available through the National Institutes of Health patient information sites:  http://win.niddk.nih.gov/publications/index.htm    And look into health and wellness programs that may be available through your health insurance provider, employer, local community center, or lien club.    Weight management plan: Patient was referred to their PCP to discuss a diet and exercise plan.            Follow-ups after your visit        Follow-up notes from your care team     Return in about 2 months (around 5/15/2017) for Routine Visit.      Your next 10 appointments already scheduled     Mar 17, 2017 10:30 AM CDT   PAP SETUP with PHILIPPE HANSON   [facility] [place]    [address] [place] [id number]   [phone]            May 04, 2017 10:30 AM CDT   Return Sleep Patient with Thad Gannon MD   [facility] [place]    [address] [place] [id number]   [phone]              Future tests that were ordered for you today     Open Future Orders         "Priority Expected Expires Ordered    MRI Cardiac w/contrast and stress Routine 3/15/2017 3/14/2018 3/14/2017            Who to contact     If you have questions or need follow up information about today's clinic visit or your schedule please contact [facility] directly at [phone].  Normal or non-critical lab and imaging results will be communicated to you by MyChart, letter or phone within 4 business days after the clinic has received the results. If you do not hear from us within 7 days, please contact the clinic through MyChart or phone. If you have a critical or abnormal lab result, we will notify you by phone as soon as possible.  Submit refill requests through Can'tWait or call your pharmacy and they will forward the refill request to us. Please allow 3 business days for your refill to be completed.          Additional Information About Your Visit        Care EveryWhere ID     This is your Care EveryWhere ID. This could be used by other organizations to access your [place] medical records  [id number]        Your Vitals Were     Pulse Height Pulse Oximetry BMI (Body Mass Index)          62 1.702 m (5' 7\") 98% 51.84 kg/m2         Blood Pressure from Last 3 Encounters:   03/15/17 131/83   03/14/17 123/79   02/08/17 138/79    Weight from Last 3 Encounters:   03/15/17 (!) 150.1 kg (331 lb)   03/14/17 (!) 150.6 kg (332 lb)   02/08/17 (!) 149.2 kg (329 lb)              We Performed the Following     Comprehensive DME          Today's Medication Changes          These changes are accurate as of: 3/15/17 11:22 AM.  If you have any questions, ask your nurse or doctor.               Start taking these medicines.        Dose/Directions    order for DME   Used for:  FLOR (obstructive sleep apnea)        Equipment being ordered: Auto CPAP 5-18   Quantity:  1 Units   Refills:  0            Where to get your medicines      Information about where to get these medications is not yet available     ! Ask your " nurse or doctor about these medications     order for DME                Primary Care Provider Office Phone # Fax #    Vita Zavala [phone]       [address] [address]        Thank you!     Thank you for choosing [facility]  for your care. Our goal is always to provide you with excellent care. Hearing back from our patients is one way we can continue to improve our services. Please take a few minutes to complete the written survey that you may receive in the mail after your visit with us. Thank you!             Your Updated Medication List - Protect others around you: Learn how to safely use, store and throw away your medicines at www.disposemymeds.org.          This list is accurate as of: 3/15/17 11:22 AM.  Always use your most recent med list.                   Brand Name Dispense Instructions for use    albuterol 108 (90 BASE) MCG/ACT Inhaler    PROAIR HFA/PROVENTIL HFA/VENTOLIN HFA    1 Inhaler    Inhale 1-2 puffs into the lungs every 4 hours as needed for shortness of breath / dyspnea       atorvastatin 40 MG tablet    LIPITOR    90 tablet    Take 1 tablet (40 mg) by mouth daily       fluticasone 50 MCG/ACT spray    FLONASE     2 sprays by Both Nostrils route daily.       lisinopril 10 MG tablet    PRINIVIL/ZESTRIL    30 tablet    Take 1 tablet (10 mg) by mouth daily       mometasone 0.1 % cream    ELOCON    60 g    Apply  topically.       order for DME     1 Units    Equipment being ordered: Auto CPAP 5-18       SINUS RINSE BOTTLE KIT Pack      Spray 1 Bottle in nostril daily as needed.       TYLENOL 500 MG tablet   Generic drug:  acetaminophen      Take 1 tablet (500 mg) by mouth every 4 hours as needed for pain

## 2017-03-15 NOTE — NURSING NOTE
"Chief Complaint   Patient presents with     RECHECK     results       Initial Ht 1.702 m (5' 7\")  Wt (!) 150.1 kg (331 lb)  BMI 51.84 kg/m2 Estimated body mass index is 51.84 kg/(m^2) as calculated from the following:    Height as of this encounter: 1.702 m (5' 7\").    Weight as of this encounter: 150.1 kg (331 lb).  Medication Reconciliation: complete    "

## 2017-03-15 NOTE — PATIENT INSTRUCTIONS

## 2017-03-15 NOTE — PROGRESS NOTES
Sleep Study Follow-Up Visit:    Date on this visit: 3/15/2017    Chelo Brooks comes in today for follow-up of her sleep study done on 2/14/17 at the [facility] [place] for loud, socially disruptive snoring, witnessed apneas, excessive daytime sleepiness (ESS 11), sleep maintenance difficulties, obesity, narrowed oropharynx oropharynx.  Sleep onset << maintenance difficulties, some psychophysiologic insomnia suspected.         Study Date: 2/14/2017  Sleep Architecture: poor total sleep time without ambien  The total recording time of the polysomnogram was 439.0 minutes. The total sleep time was 96.0 minutes. Sleep latency was increased at 47.8 minutes without the use of a sleep aid (though one was prescribed). REM latency was n/a.. Arousal index was 100.0 arousals per hour. Sleep efficiency was very poor at 21.9%. Wake after sleep onset was 291.5 minutes. The patient spent 39.1% of sleep time in Stage N1, 60.9% in Stage N2, 0.0% in Stages N3, and 0.0% in REM.      Respiration:     Events - The polysomnogram revealed a presence of 19 obstructive, 0 central, and 0 mixed apneas resulting in an apnea index of 11.9 events per hour. There were 124 hypopneas resulting in a hypopnea index of 77.5 events per hour. The combined apnea/hypopnea index was 89.4 events per hour.  The REM AHI was n/a. The supine AHI was 93.9 events per hour. The RERA index was 3.1 events per hour. The RDI was 92.5 events per hour.    Snoring - was reported as moderate to loud.    Respiratory rate and pattern - was notable for normal respiratory rate and pattern.    Sustained Sleep Associated Hypoventilation - Transcutaneous carbon dioxide monitoring was not used    Sleep Associated Hypoxemia - (Greater than 5 minutes O2 sat below 89%) was not present. Baseline oxygen saturation was 93.8%. Lowest oxygen saturation was 77%. Time spent less than or equal to 88% was 27.5 minutes. Time spent less than or equal to 89% was 34.4  minutes.     Movement Activity:     Periodic Limb Activity - There were 0 PLMs during the entire study.     REM EMG Activity - Excessive transient / sustained muscle activity was not present.    Nocturnal Behavior - Abnormal sleep related behaviors were not noted    Bruxism - None apparent.     Cardiac Summary:   The average pulse rate was 72.5 bpm. The minimum pulse rate was 50.0 bpm while the maximum pulse rate was 97.4 bpm. The rhythm is normal sinus. Arrhythmias were not noted.         These findings were reviewed with patient.     Past medical/surgical history, family history, social history, medications and allergies were reviewed.      Problem List:  Patient Active Problem List    Diagnosis Date Noted     Hyperlipidemia LDL goal <130 11/27/2012     Priority: High     Osteopenia      Priority: Medium     Morbid obesity due to excess calories (H) 11/23/2015     Priority: Medium     Surgical referral declined '16       Intermittent asthma, uncomplicated 11/23/2015     Priority: Medium     Hypertension goal BP (blood pressure) < 150/90      Priority: Medium     FLOR (obstructive sleep apnea)- severe (AHI 89) 02/15/2017     Study Date: 2/14/2017- (329.0 lbs) The total sleep time was 96.0 minutes. The combined apnea/hypopnea index was 89.4 events per hour.  The REM AHI was n/a.  The supine AHI was 93.9 events per hour. RDI was 92.5 events per hour.  Lowest oxygen saturation was 77%.  Time spent less than or equal to 88% was 27.5 minutes.  Time spent less than or equal to 89% was 34.4 minutes.       [facility] 05/08/2012     Formerly [facility] for .  Oscar Smith RN, C-BC    [phone]     DX V65.8 REPLACED WITH [id number] [facility] (04/08/2013)       Allergic rhinitis due to animal dander      4/5/12 RAST pos. only to cat mildly       Advanced directives, counseling/discussion 03/15/2012     Discussed advance care planning with patient; information given to patient to review. 3/15/2012         DJD (degenerative joint disease) 10/21/2005     Reflux esophagitis 06/17/2003        Impression/Plan:    Very severe obstructive sleep apnea with sleep related hypoxemia, poorly consolidated sleep     Recommend repeat polysomnography with full night titration of positive airway pressure therapy for the control of sleep disordered breathing with ambien. Patient is reluctant to undergo another sleep study. She wants to try autopap.     She will follow up with me in about 2 month(s).     Twenty-five minutes spent with patient, all of which were spent face-to-face counseling, consulting, coordinating plan of care.      Thad Gannon     CC: Vita Zavala

## 2017-03-16 NOTE — TELEPHONE ENCOUNTER
Spoke to A[employer] in scheduling. Pt was approved to be seen on Wednesday, March 22, 2017 for a 2:30 arrival for a 3:30 MRI.     Verified this with pt who verbalized understanding of appt, prep, and location.    Susan Farrar RN

## 2017-03-16 NOTE — TELEPHONE ENCOUNTER
Spoke to patient to inform her and patient made a correction-the time for the test has to be after 2:00 pm.  I told her we will do our best and call her with a time.  Katty Solis RN

## 2017-03-17 ENCOUNTER — DOCUMENTATION ONLY (OUTPATIENT)
Dept: SLEEP MEDICINE | Facility: CLINIC | Age: 72
End: 2017-03-17
Payer: COMMERCIAL

## 2017-03-17 PROCEDURE — 99207 ZZC NO BILLABLE SERVICE THIS VISIT: CPT

## 2017-03-17 NOTE — PROGRESS NOTES
Patient was offered choice of vendor and chose [facility].  Patient Chelo Brooks was set up at [place] on March 17, 2017. Patient received a Resmed AirSense 10 Auto. Pressures were set at 5-18 cm H2O.   Patient s ramp is 5 cm H2O for Auto and FLEX/EPR is EPR.  Patient received a Resmed Mask name: Airfit F20  Full Face mask Size For Her, heated tubing and heated humidifier.  Patient is enrolled in the STM Program and does need to meet compliance. Patient has a follow up on TBD with Dr. Aramis Palm Her

## 2017-03-20 ENCOUNTER — DOCUMENTATION ONLY (OUTPATIENT)
Dept: SLEEP MEDICINE | Facility: CLINIC | Age: 72
End: 2017-03-20

## 2017-03-20 DIAGNOSIS — G47.33 OSA (OBSTRUCTIVE SLEEP APNEA): ICD-10-CM

## 2017-03-20 DIAGNOSIS — E66.01 MORBID OBESITY DUE TO EXCESS CALORIES (H): ICD-10-CM

## 2017-03-20 NOTE — PROGRESS NOTES
3 DAY STM VISIT    Patient contacted for 3 day STM visit  Message left for patient to return call    Current settings:  EPAP Min Auto CPAP: 5.0 (The minimum allowable pressure in cmH2O)       EPAP Max Auto CPAP: 18.0 (The maximum allowable pressure in cmH2O)       Assessment:  Pt is using nightly more than 4 hours  Action plan: Pt to have f/u 14 day STM visit.

## 2017-03-22 ENCOUNTER — HOSPITAL ENCOUNTER (OUTPATIENT)
Dept: MRI IMAGING | Facility: CLINIC | Age: 72
Discharge: HOME | End: 2017-03-22
Attending: INTERNAL MEDICINE | Admitting: INTERNAL MEDICINE
Payer: COMMERCIAL

## 2017-03-22 DIAGNOSIS — R06.09 DOE (DYSPNEA ON EXERTION): ICD-10-CM

## 2017-03-22 PROCEDURE — 75563 CARD MRI W/STRESS IMG & DYE: CPT

## 2017-03-22 PROCEDURE — 25000132 ZZH RX MED GY IP 250 OP 250 PS 637: Performed by: INTERNAL MEDICINE

## 2017-03-22 PROCEDURE — 93017 CV STRESS TEST TRACING ONLY: CPT

## 2017-03-22 PROCEDURE — 93018 CV STRESS TEST I&R ONLY: CPT | Performed by: INTERNAL MEDICINE

## 2017-03-22 PROCEDURE — 93005 ELECTROCARDIOGRAM TRACING: CPT

## 2017-03-22 PROCEDURE — A9585 GADOBUTROL INJECTION: HCPCS | Performed by: INTERNAL MEDICINE

## 2017-03-22 PROCEDURE — 93010 ELECTROCARDIOGRAM REPORT: CPT | Mod: ZP | Performed by: INTERNAL MEDICINE

## 2017-03-22 PROCEDURE — 75563 CARD MRI W/STRESS IMG & DYE: CPT | Mod: 26 | Performed by: INTERNAL MEDICINE

## 2017-03-22 PROCEDURE — 25000128 H RX IP 250 OP 636: Performed by: INTERNAL MEDICINE

## 2017-03-22 PROCEDURE — 93016 CV STRESS TEST SUPVJ ONLY: CPT | Performed by: INTERNAL MEDICINE

## 2017-03-22 PROCEDURE — 25500064 ZZH RX 255 OP 636: Performed by: INTERNAL MEDICINE

## 2017-03-22 PROCEDURE — 40000065 ZZH STATISTIC EKG NON-CHARGEABLE

## 2017-03-22 RX ORDER — AMINOPHYLLINE 25 MG/ML
100 INJECTION, SOLUTION INTRAVENOUS ONCE
Status: COMPLETED | OUTPATIENT
Start: 2017-03-22 | End: 2017-03-22

## 2017-03-22 RX ORDER — ALBUTEROL SULFATE 90 UG/1
2 AEROSOL, METERED RESPIRATORY (INHALATION) EVERY 5 MIN PRN
Status: DISCONTINUED | OUTPATIENT
Start: 2017-03-22 | End: 2017-03-23 | Stop reason: HOSPADM

## 2017-03-22 RX ORDER — REGADENOSON 0.08 MG/ML
0.4 INJECTION, SOLUTION INTRAVENOUS ONCE
Status: COMPLETED | OUTPATIENT
Start: 2017-03-22 | End: 2017-03-22

## 2017-03-22 RX ORDER — DIAZEPAM 5 MG
5 TABLET ORAL EVERY 30 MIN PRN
Status: DISCONTINUED | OUTPATIENT
Start: 2017-03-22 | End: 2017-03-23 | Stop reason: HOSPADM

## 2017-03-22 RX ORDER — GADOBUTROL 604.72 MG/ML
10 INJECTION INTRAVENOUS ONCE
Status: COMPLETED | OUTPATIENT
Start: 2017-03-22 | End: 2017-03-22

## 2017-03-22 RX ORDER — ACYCLOVIR 200 MG/1
0-1 CAPSULE ORAL
Status: DISCONTINUED | OUTPATIENT
Start: 2017-03-22 | End: 2017-03-23 | Stop reason: HOSPADM

## 2017-03-22 RX ADMIN — GADOBUTROL 10 ML: 604.72 INJECTION INTRAVENOUS at 15:17

## 2017-03-22 RX ADMIN — DIAZEPAM 5 MG: 5 TABLET ORAL at 14:15

## 2017-03-22 RX ADMIN — AMINOPHYLLINE 100 MG: 25 INJECTION, SOLUTION INTRAVENOUS at 15:20

## 2017-03-22 RX ADMIN — REGADENOSON 0.4 MG: 0.08 INJECTION, SOLUTION INTRAVENOUS at 15:15

## 2017-03-22 NOTE — PROGRESS NOTES
Pt returned my call and states things are going pretty well.  She is getting used to it. The first few nights she was able to use it and then last night when she took it off and wasn't able to put it back on. She wasn't sure why but after an hour she was able to put it back on and go back to sleep. She is wondering if a nasal mask would be better.  She will keep using the FFM for a little longer

## 2017-03-22 NOTE — PROGRESS NOTES
Pt here for cardiac MRI with stress. Test, medication and side effects reviewed with patient.  Lung sounds clear.  Pt denies caffeine use. Pt given 5 mg po valium for claustrophobia. Pt tolerated Lexiscan dose without any adverse reactions. 100 mg iv aminophylline given post stress per protocol. Vitals stable throughout. Post monitoring completed. Pre and post ECGs completed. Discharged to [facility] waiting room accompanied by staff.

## 2017-03-23 LAB
INTERPRETATION ECG - MUSE: NORMAL
INTERPRETATION ECG - MUSE: NORMAL

## 2017-03-27 DIAGNOSIS — G47.33 OBSTRUCTIVE SLEEP APNEA SYNDROME: Primary | ICD-10-CM

## 2017-03-29 DIAGNOSIS — I10 HYPERTENSION GOAL BP (BLOOD PRESSURE) < 150/90: ICD-10-CM

## 2017-03-30 NOTE — TELEPHONE ENCOUNTER
lisinopril (PRINIVIL/ZESTRIL) 10 MG       Last Written Prescription Date: 02/16/17  Last Fill Quantity: 30, # refills: 0    Last Office Visit with [facility], [facility] or [facility] prescribing provider:  01/25/17   Future Office Visit:        BP Readings from Last 3 Encounters:   03/15/17 131/83   03/14/17 123/79   02/08/17 138/79

## 2017-03-31 RX ORDER — LISINOPRIL 10 MG/1
TABLET ORAL
Qty: 90 TABLET | Refills: 3 | Status: SHIPPED | OUTPATIENT
Start: 2017-03-31 | End: 2017-05-30 | Stop reason: DRUGHIGH

## 2017-03-31 NOTE — TELEPHONE ENCOUNTER
Routing refill request to provider for review/approval because:  Jenny given x1 and patient did not follow up, please advise

## 2017-03-31 NOTE — TELEPHONE ENCOUNTER
Signed Prescriptions:                        Disp   Refills    lisinopril (PRINIVIL/ZESTRIL) 10 MG tablet 90 tab*3        Sig: TAKE 1 TABLET(10 MG) BY MOUTH DAILY  Authorizing Provider: ESTRADA WOO

## 2017-04-03 ENCOUNTER — DOCUMENTATION ONLY (OUTPATIENT)
Dept: SLEEP MEDICINE | Facility: CLINIC | Age: 72
End: 2017-04-03

## 2017-04-03 NOTE — PROGRESS NOTES
14 DAY STM VISIT    Subjective measures:  Pt states things are going ok.  She is getting used to it.  She can hear herself breathing and sometimes that bothers her.  Pt is benefiting from therapy.    Assessment: Pt meeting objective benchmarks.  Patient meeting subjective benchmarks.   Action plan: Pt to have 30 day STM visit.   Device settings:    EPAP Min Auto CPAP: 5.0 (The minimum allowable pressure in cmH2O)    EPAP Max Auto CPAP: 18.0 (The maximum allowable pressure in cmH2O)    Target IPAP (95% of Target) 14 day average (Resmed): 16.5cm H20      Objective measures: 14 day rolling measure     % compliance greater than four hours rolling average 14 days: 92.8%     95% OF Leak in litres Rolling Average 14 Days: 15.68 lpm last data upload        AHI Rolling Average 14 Day: 3.63  last data upload        Time mask on face 14 day average: 398 min         Objective measure goal  Compliance   Goal >70%  Leak   Goal < 10%  AHI  Goal < 5  Usage  Goal >240

## 2017-04-13 ENCOUNTER — DOCUMENTATION ONLY (OUTPATIENT)
Dept: SLEEP MEDICINE | Facility: CLINIC | Age: 72
End: 2017-04-13

## 2017-04-13 NOTE — PROGRESS NOTES
Pt came in because she was having issues with her tubing and her mask.  The air from the vent holes is blowing in her eyes.  I fit her with a small Eson 2 nasal mask to try.  She thought it was much more comfortable.  She is going to try if for a few days and see if it is more comfortable than her FFM.  If she likes it better, she will come in next week for a mask exchange.

## 2017-04-17 ENCOUNTER — DOCUMENTATION ONLY (OUTPATIENT)
Dept: SLEEP MEDICINE | Facility: CLINIC | Age: 72
End: 2017-04-17

## 2017-04-17 DIAGNOSIS — G47.33 OSA (OBSTRUCTIVE SLEEP APNEA): ICD-10-CM

## 2017-04-17 DIAGNOSIS — E66.01 MORBID OBESITY DUE TO EXCESS CALORIES (H): ICD-10-CM

## 2017-04-17 NOTE — PROGRESS NOTES
30 DAY STM VISIT    Subjective measures:  Pt states that things are going ok.  She hasn't been able to use the loaner nasal mask due to being so congested and coughing with her sinuses and her asthma.  She will let me know when she tries the nasal mask.      Assessment: Pt meeting objective benchmarks.  Patient meeting subjective benchmarks.   Action plan: Pt to have 6 month STM visit  Patient has a follow up visit with Dr. Gannon on 5/4/17.   Device settings:    EPAP Min Auto CPAP: 5.0 (The minimum allowable pressure in cmH2O)    EPAP Max Auto CPAP: 18.0 (The maximum allowable pressure in cmH2O)    Target IPAP (95% of Target) 14 day average (Resmed): 15.7cm H20    Objective measures: 14 day rolling measures      % compliance greater than four hours rolling average 14 days: 78.5%     95% OF Leak in litres Rolling Average 14 Days: 14.31 lpm  last  upload     AHI Rolling Average 14 Day: 2.34 last  upload     Time mask on face 14 day average: 353 min        Objective measure goal  Compliance   Goal >70%  Leak   Goal < 10%  AHI  Goal < 5  Usage  Goal >240

## 2017-04-21 ENCOUNTER — TELEPHONE (OUTPATIENT)
Dept: FAMILY MEDICINE | Facility: CLINIC | Age: 72
End: 2017-04-21

## 2017-04-21 NOTE — TELEPHONE ENCOUNTER
Panel Management Review      Patient has the following on her problem list:     Asthma review     ACT Total Scores 1/25/2017   ACT TOTAL SCORE (Goal Greater than or Equal to 20) 19   In the past 12 months, how many times did you visit the emergency room for your asthma without being admitted to the hospital? 0   In the past 12 months, how many times were you hospitalized overnight because of your asthma? 0      1. Is Asthma diagnosis on the Problem List? Yes    2. Is Asthma listed on Health Maintenance? Yes    3. Patient is due for:  none    Hypertension   Last three blood pressure readings:  BP Readings from Last 3 Encounters:   03/15/17 131/83   03/14/17 123/79   02/08/17 138/79     Blood pressure: Passed    HTN Guidelines:  Age 18-59 BP range:  Less than 140/90  Age 60-85 with Diabetes:  Less than 140/90  Age 60-85 without Diabetes:  less than 150/90      Composite cancer screening  Chart review shows that this patient is due/due soon for the following Fecal Colorectal (FIT)  Summary:    Patient is due/failing the following:   FIT    Action needed:   Routed to provider for review.    Type of outreach:    None, routed to provider for review.    Questions for provider review:    None                                                                                                                                    Belkis TUCKER MA       Chart routed to Provider .

## 2017-04-27 ENCOUNTER — OFFICE VISIT (OUTPATIENT)
Dept: FAMILY MEDICINE | Facility: CLINIC | Age: 72
End: 2017-04-27
Payer: COMMERCIAL

## 2017-04-27 VITALS
OXYGEN SATURATION: 97 % | RESPIRATION RATE: 26 BRPM | WEIGHT: 293 LBS | DIASTOLIC BLOOD PRESSURE: 80 MMHG | TEMPERATURE: 96.8 F | BODY MASS INDEX: 45.99 KG/M2 | SYSTOLIC BLOOD PRESSURE: 136 MMHG | HEART RATE: 80 BPM | HEIGHT: 67 IN

## 2017-04-27 DIAGNOSIS — J45.21 INTERMITTENT ASTHMA, WITH ACUTE EXACERBATION: ICD-10-CM

## 2017-04-27 DIAGNOSIS — J01.90 ACUTE SINUSITIS WITH SYMPTOMS > 10 DAYS: Primary | ICD-10-CM

## 2017-04-27 PROCEDURE — 99214 OFFICE O/P EST MOD 30 MIN: CPT | Performed by: NURSE PRACTITIONER

## 2017-04-27 RX ORDER — PREDNISONE 20 MG/1
40 TABLET ORAL DAILY
Qty: 10 TABLET | Refills: 0 | Status: SHIPPED | OUTPATIENT
Start: 2017-04-27 | End: 2017-05-02

## 2017-04-27 NOTE — MR AVS SNAPSHOT
After Visit Summary   4/27/2017    Chelo Brooks    MRN: [id number]           Patient Information     Date Of Birth          1945        Visit Information        Provider Department      4/27/2017 10:00 AM Latia Marie APRN CNP [facility]        Today's Diagnoses     Acute sinusitis with symptoms > 10 days    -  1    Intermittent asthma, with acute exacerbation          Care Instructions    [facility]    If you have any questions regarding to your visit please contact your care team:       Team Red:   Clinic Hours Telephone Number   Dr. Vita Marie, NP   7am-7pm  Monday - Thursday   7am-5pm  Fridays  [phone]  (Appointment scheduling available 24/7)    Questions about your visit?   Team Line  [phone]   Urgent Care - [place] and [facility] - 11am-9pm Monday-Friday Saturday-Sunday- 9am-5pm   [place] - 5pm-9pm Monday-Friday Saturday-Sunday- 9am-5pm  [phone] - [facility]  [phone] - [place]       What options do I have for visits at the clinic other than the traditional office visit?  To expand how we care for you, many of our providers are utilizing electronic visits (e-visits) and telephone visits, when medically appropriate, for interactions with their patients rather than a visit in the clinic.   We also offer nurse visits for many medical concerns. Just like any other service, we will bill your insurance company for this type of visit based on time spent on the phone with your provider. Not all insurance companies cover these visits. Please check with your medical insurance if this type of visit is covered. You will be responsible for any charges that are not paid by your insurance.      E-visits via [employer]:  generally incur a $35.00 fee.  Telephone visits:  Time spent on the phone: *charged based on time that is spent on the phone in increments of 10 minutes.  Estimated cost:   5-10 mins $30.00   11-20 mins. $59.00   21-30 mins. $85.00     Use [email or web address] (secure email communication and access to your chart) to send your primary care provider a message or make an appointment. Ask someone on your Team how to sign up for [employer].  For a Price Quote for your services, please call our InDMusic Line at [phone].      As always, Thank you for trusting us with your health care needs!    Valery Cruz CMA          Follow-ups after your visit        Your next 10 appointments already scheduled     May 10, 2017 10:20 AM CDT   Return Sleep Patient with Thad Gannon MD   [facility] ([facility])    [address] [place] [id number] [phone]            Jun 07, 2017  8:40 AM CDT   Office Visit with Lazara Dover MD   [facility] ([address] [id number] [phone]           Bring a current list of meds and any records pertaining to this visit.  For Physicals, please bring immunization records and any forms needing to be filled out.  Please arrive 10 minutes early to complete paperwork.              Who to contact     If you have questions or need follow up information about today's clinic visit or your schedule please contact [facility] directly at [phone].  Normal or non-critical lab and imaging results will be communicated to you by MyChart, letter or phone within 4 business days after the clinic has received the results. If you do not hear from us within 7 days, please contact the clinic through [email or web address] or phone. If you have a critical or abnormal lab result, we will notify you by phone as soon as possible.  Submit refill requests through [employer] or call your pharmacy and they will forward the refill request to us. Please allow 3 business days for your refill to be completed.          Additional Information About  "Your Visit        viavoohart Information     Austral 3D lets you send messages to your doctor, view your test results, renew your prescriptions, schedule appointments and more. To sign up, go to [email or web address] . Click on \"Log in\" on the left side of the screen, which will take you to the Welcome page. Then click on \"Sign up Now\" on the right side of the page.     You will be asked to enter the access code listed below, as well as some personal information. Please follow the directions to create your username and password.     Your access code is: [email or web address]  Expires: 2017 10:14 AM     Your access code will  in 90 days. If you need help or a new code, please call your [place] clinic or [phone].        Care EveryWhere ID     This is your Care EveryWhere ID. This could be used by other organizations to access your [place] medical records  [id number]        Your Vitals Were     Pulse Temperature Respirations Height Pulse Oximetry Breastfeeding?    80 96.8  F (36  C) (Oral) 26 5' 7\" (1.702 m) 97% No    BMI (Body Mass Index)                   53.31 kg/m2            Blood Pressure from Last 3 Encounters:   17 136/80   03/15/17 131/83   17 123/79    Weight from Last 3 Encounters:   17 (!) 340 lb 6.4 oz (154.4 kg)   03/15/17 (!) 331 lb (150.1 kg)   17 (!) 332 lb (150.6 kg)              Today, you had the following     No orders found for display         Today's Medication Changes          These changes are accurate as of: 17 10:14 AM.  If you have any questions, ask your nurse or doctor.               Start taking these medicines.        Dose/Directions    amoxicillin-clavulanate 875-125 MG per tablet   Commonly known as:  AUGMENTIN   Used for:  Acute sinusitis with symptoms > 10 days   Started by:  Latia Marie APRN CNP        Dose:  1 tablet   Take 1 tablet by mouth 2 times daily   Quantity:  20 tablet   Refills:  0       predniSONE 20 MG tablet   Commonly " known as:  DELTASONE   Used for:  Intermittent asthma, with acute exacerbation   Started by:  Latia Marie APRN CNP        Dose:  40 mg   Take 2 tablets (40 mg) by mouth daily for 5 days   Quantity:  10 tablet   Refills:  0            Where to get your medicines      These medications were sent to [id number] - [place] - [address] AT [facility] [address]  [address] [facility], [place] [address]     Phone:  [phone]     amoxicillin-clavulanate 875-125 MG per tablet         Call your pharmacy to confirm that your medication is ready for pickup. It may take up to 24 hours for them to receive the prescription. If the prescription is not ready within 3 business days, please contact your clinic or your provider.     We will let you know when these medications are ready. If you don't hear back within 3 business days, please contact us.     predniSONE 20 MG tablet                Primary Care Provider Office Phone # Fax #    Vita Zavala [phone]       [facility] [address] [facility] [facility] [address]        Thank you!     Thank you for choosing [facility]  for your care. Our goal is always to provide you with excellent care. Hearing back from our patients is one way we can continue to improve our services. Please take a few minutes to complete the written survey that you may receive in the mail after your visit with us. Thank you!             Your Updated Medication List - Protect others around you: Learn how to safely use, store and throw away your medicines at www.disposemymeds.org.          This list is accurate as of: 4/27/17 10:14 AM.  Always use your most recent med list.                   Brand Name Dispense Instructions for use    albuterol 108 (90 BASE) MCG/ACT Inhaler    PROAIR HFA/PROVENTIL HFA/VENTOLIN HFA    1 Inhaler    Inhale 1-2 puffs into the lungs every 4 hours as needed for shortness of breath  / dyspnea       amoxicillin-clavulanate 875-125 MG per tablet    AUGMENTIN    20 tablet    Take 1 tablet by mouth 2 times daily       atorvastatin 40 MG tablet    LIPITOR    90 tablet    Take 1 tablet (40 mg) by mouth daily       fluticasone 50 MCG/ACT spray    FLONASE     2 sprays by Both Nostrils route daily.       lisinopril 10 MG tablet    PRINIVIL/ZESTRIL    90 tablet    TAKE 1 TABLET(10 MG) BY MOUTH DAILY       mometasone 0.1 % cream    ELOCON    60 g    Apply  topically.       order for DME     1 Units    Equipment being ordered: Auto CPAP 5-18       predniSONE 20 MG tablet    DELTASONE    10 tablet    Take 2 tablets (40 mg) by mouth daily for 5 days       SINUS RINSE BOTTLE KIT Pack      Spray 1 Bottle in nostril daily as needed.       TYLENOL 500 MG tablet   Generic drug:  acetaminophen      Take 1 tablet (500 mg) by mouth every 4 hours as needed for pain

## 2017-04-27 NOTE — PATIENT INSTRUCTIONS
[facility]    If you have any questions regarding to your visit please contact your care team:       Team Red:   Clinic Hours Telephone Number   Dr. Vita Marie, NP   7am-7pm  Monday - Thursday   7am-5pm  Fridays  [phone]  (Appointment scheduling available 24/7)    Questions about your visit?   Team Line  [phone]   [facility] [place] [facility] 11am-9pm Monday-Friday Saturday-Sunday- 9am-5pm   [place] - 5pm-9pm Monday-Friday Saturday-Sunday- 9am-5pm  [phone] - [place]   [phone] - [place]       What options do I have for visits at the clinic other than the traditional office visit?  To expand how we care for you, many of our providers are utilizing electronic visits (e-visits) and telephone visits, when medically appropriate, for interactions with their patients rather than a visit in the clinic.   We also offer nurse visits for many medical concerns. Just like any other service, we will bill your insurance company for this type of visit based on time spent on the phone with your provider. Not all insurance companies cover these visits. Please check with your medical insurance if this type of visit is covered. You will be responsible for any charges that are not paid by your insurance.      E-visits via [email or web address]:  generally incur a $35.00 fee.  Telephone visits:  Time spent on the phone: *charged based on time that is spent on the phone in increments of 10 minutes. Estimated cost:   5-10 mins $30.00   11-20 mins. $59.00   21-30 mins. $85.00     Use HuJe labst (secure email communication and access to your chart) to send your primary care provider a message or make an appointment. Ask someone on your Team how to sign up for [email or web address].  For a Price Quote for your services, please call our Consumer Price Line at [phone].      As always, Thank you for trusting us with your health care needs!    Valery  Anthony, CMA

## 2017-04-27 NOTE — NURSING NOTE
"Chief Complaint   Patient presents with     URI       Initial Pulse 80  Temp 96.8  F (36  C) (Oral)  Resp 26  Ht 5' 7\" (1.702 m)  Wt (!) 340 lb 6.4 oz (154.4 kg)  SpO2 97%  Breastfeeding? No  BMI 53.31 kg/m2 Estimated body mass index is 53.31 kg/(m^2) as calculated from the following:    Height as of this encounter: 5' 7\" (1.702 m).    Weight as of this encounter: 340 lb 6.4 oz (154.4 kg).  Medication Reconciliation: complete     Ravindra Gordon    "

## 2017-04-27 NOTE — PROGRESS NOTES
SUBJECTIVE:                                                    Chelo Brooks is a 72 year old female who presents to clinic today for the following health issues:    RESPIRATORY SYMPTOMS      Duration: x since march     Description  nasal congestion, facial pain/pressure, cough, fever, chills, headache, fatigue/malaise and conjunctival irritation    Severity: moderate    Accompanying signs and symptoms: None    History (predisposing factors):  asthma    Precipitating or alleviating factors: Mucinex, nasal rinse     Therapies tried and outcome:  rest and fluids, cough drops     Symptoms began 1 month ago, did have some improvement and then symptoms worsened again.  History of asthma. Using inhaler once daily.    Problem list and histories reviewed & adjusted, as indicated.  Additional history: as documented    Patient Active Problem List   Diagnosis     Reflux esophagitis     DJD (degenerative joint disease)     Advanced directives, counseling/discussion     Allergic rhinitis due to animal dander     Health Care Home     Hyperlipidemia LDL goal <130     Hypertension goal BP (blood pressure) < 150/90     Morbid obesity due to excess calories (H)     Intermittent asthma, uncomplicated     Osteopenia     FLOR (obstructive sleep apnea)- severe (AHI 89)     Past Surgical History:   Procedure Laterality Date     C TOTAL KNEE ARTHROPLASTY  3/2000    right     C TOTAL KNEE ARTHROPLASTY  6/8/12    left     C VAGINAL HYSTERECTOMY  1977    benign, prolapse, ovaries remain        Social History   Substance Use Topics     Smoking status: Never Smoker     Smokeless tobacco: Never Used     Alcohol use 0.0 oz/week     0 Standard drinks or equivalent per week      Comment: very rare       Family History   Problem Relation Age of Onset     Circulatory Father      d. DVT     HEART DISEASE Father      pacer     DIABETES Mother      Hypertension Mother      C.A.D. Paternal Uncle      MI      HEART DISEASE Brother 48     pacer       "DIABETES Maternal Grandmother      Hypertension Maternal Grandmother      DIABETES Maternal Aunt      Hypertension Maternal Aunt      Cancer - colorectal Maternal Grandfather      C.A.D. Maternal Aunt      MI           Reviewed and updated as needed this visit by clinical staff  Tobacco  Allergies  Meds  Med Hx  Surg Hx  Fam Hx  Soc Hx      Reviewed and updated as needed this visit by Provider         ROS:  Constitutional, HEENT, cardiovascular, pulmonary, gi systems are negative, except as otherwise noted.    OBJECTIVE:                                                    /80 (BP Location: Right arm, Patient Position: Chair, Cuff Size: Adult Regular)  Pulse 80  Temp 96.8  F (36  C) (Oral)  Resp 26  Ht 5' 7\" (1.702 m)  Wt (!) 340 lb 6.4 oz (154.4 kg)  SpO2 97%  Breastfeeding? No  BMI 53.31 kg/m2  Body mass index is 53.31 kg/(m^2).  GENERAL: healthy, alert and no distress  EYES: Eyes grossly normal to inspection, PERRL and conjunctivae and sclerae normal  HENT: normal cephalic/atraumatic, ear canals and TM's normal, nose and mouth without ulcers or lesions, oropharynx clear, oral mucous membranes moist and sinuses: maxillary tenderness on bilateral  NECK: no adenopathy, no asymmetry, masses, or scars and thyroid normal to palpation  RESP: Expiratory wheezes throughout  CV: regular rates and rhythm, normal S1 S2, no S3 or S4 and no murmur, click or rub    Diagnostic Test Results:  none      ASSESSMENT/PLAN:                                                        1. Acute sinusitis with symptoms > 10 days  Reviewed that she should take all doses of the antibiotic, even if symptoms improve prior to finishing the medication. She may eat a yogurt or take a probiotic daily while on the antibiotic to prevent diarrhea.    - amoxicillin-clavulanate (AUGMENTIN) 875-125 MG per tablet; Take 1 tablet by mouth 2 times daily  Dispense: 20 tablet; Refill: 0    2. Intermittent asthma, with acute " exacerbation  Reviewed risks/benefits of Prednisone.  - predniSONE (DELTASONE) 20 MG tablet; Take 2 tablets (40 mg) by mouth daily for 5 days  Dispense: 10 tablet; Refill: 0    Return ACT in 4-6 weeks, needs follow up if asthma not controlled when better    MARIE Martinez [facility]

## 2017-05-10 ENCOUNTER — APPOINTMENT (OUTPATIENT)
Dept: SLEEP MEDICINE | Facility: CLINIC | Age: 72
End: 2017-05-10
Payer: COMMERCIAL

## 2017-05-10 ENCOUNTER — OFFICE VISIT (OUTPATIENT)
Dept: SLEEP MEDICINE | Facility: CLINIC | Age: 72
End: 2017-05-10
Payer: COMMERCIAL

## 2017-05-10 VITALS
HEIGHT: 67 IN | HEART RATE: 66 BPM | WEIGHT: 293 LBS | DIASTOLIC BLOOD PRESSURE: 80 MMHG | BODY MASS INDEX: 45.99 KG/M2 | SYSTOLIC BLOOD PRESSURE: 188 MMHG | OXYGEN SATURATION: 95 %

## 2017-05-10 DIAGNOSIS — E66.01 MORBID OBESITY DUE TO EXCESS CALORIES (H): ICD-10-CM

## 2017-05-10 DIAGNOSIS — G47.33 OSA (OBSTRUCTIVE SLEEP APNEA): Chronic | ICD-10-CM

## 2017-05-10 DIAGNOSIS — G47.33 OSA (OBSTRUCTIVE SLEEP APNEA): Primary | ICD-10-CM

## 2017-05-10 PROCEDURE — 99214 OFFICE O/P EST MOD 30 MIN: CPT | Performed by: INTERNAL MEDICINE

## 2017-05-10 NOTE — PATIENT INSTRUCTIONS
Sleep Therapy (CPAP) Replacement Supplies    Mask and headgear    6 months  Headgear     6 months  Mask alone     3 months  Chinstrap     6 months  Humidifier Chamber    6 months  Reusable Filters    6 months  Disposable Filters    2/month  Tubing      3   Cushions (nasal, pillows or full face)  2/month      Medical Assistance only pays for 1 cushion/month  Medicare only pays for 1 full face cushion/month    Your BMI is Body mass index is 52.94 kg/(m^2).  Weight management is a personal decision.  If you are interested in exploring weight loss strategies, the following discussion covers the approaches that may be successful. Body mass index (BMI) is one way to tell whether you are at a healthy weight, overweight, or obese. It measures your weight in relation to your height.  A BMI of 18.5 to 24.9 is in the healthy range. A person with a BMI of 25 to 29.9 is considered overweight, and someone with a BMI of 30 or greater is considered obese. More than two-thirds of American adults are considered overweight or obese.  Being overweight or obese increases the risk for further weight gain. Excess weight may lead to heart disease and diabetes.  Creating and following plans for healthy eating and physical activity may help you improve your health.  Weight control is part of healthy lifestyle and includes exercise, emotional health, and healthy eating habits. Careful eating habits lifelong are the mainstay of weight control. Though there are significant health benefits from weight loss, long-term weight loss with diet alone may be very difficult to achieve- studies show long-term success with dietary management in less than 10% of people. Attaining a healthy weight may be especially difficult to achieve in those with severe obesity. In some cases, medications, devices and surgical management might be considered.  What can you do?  If you are overweight or obese and are interested in methods for weight loss, you should  discuss this with your provider.     Consider reducing daily calorie intake by 500 calories.     Keep a food journal.     Avoiding skipping meals, consider cutting portions instead.    Diet combined with exercise helps maintain muscle while optimizing fat loss. Strength training is particularly important for building and maintaining muscle mass. Exercise helps reduce stress, increase energy, and improves fitness. Increasing exercise without diet control, however, may not burn enough calories to loose weight.       Start walking three days a week 10-20 minutes at a time    Work towards walking thirty minutes five days a week     Eventually, increase the speed of your walking for 1-2 minutes at time    In addition, we recommend that you review healthy lifestyles and methods for weight loss available through the National Institutes of Health patient information sites:  http://win.niddk.nih.gov/publications/index.htm    And look into health and wellness programs that may be available through your health insurance provider, employer, local community center, or lien club.    Weight management plan: Patient was referred to their PCP to discuss a diet and exercise plan.

## 2017-05-10 NOTE — NURSING NOTE
"Chief Complaint   Patient presents with     CPAP Follow Up     cpap f/u       Initial There were no vitals taken for this visit. Estimated body mass index is 53.31 kg/(m^2) as calculated from the following:    Height as of 4/27/17: 1.702 m (5' 7\").    Weight as of 4/27/17: 154.4 kg (340 lb 6.4 oz).  Medication Reconciliation: complete    "

## 2017-05-10 NOTE — MR AVS SNAPSHOT
After Visit Summary   5/10/2017    Chelo Brooks    MRN: [id number]           Patient Information     Date Of Birth          1945        Visit Information        Provider Department      5/10/2017 10:20 AM Thad Gannon MD [facility]        Today's Diagnoses     FLOR (obstructive sleep apnea)    -  1    Morbid obesity due to excess calories (H)        FLOR (obstructive sleep apnea)- severe (AHI 89)          Care Instructions      Your BMI is Body mass index is 52.94 kg/(m^2).  Weight management is a personal decision.  If you are interested in exploring weight loss strategies, the following discussion covers the approaches that may be successful. Body mass index (BMI) is one way to tell whether you are at a healthy weight, overweight, or obese. It measures your weight in relation to your height.  A BMI of 18.5 to 24.9 is in the healthy range. A person with a BMI of 25 to 29.9 is considered overweight, and someone with a BMI of 30 or greater is considered obese. More than two-thirds of American adults are considered overweight or obese.  Being overweight or obese increases the risk for further weight gain. Excess weight may lead to heart disease and diabetes.  Creating and following plans for healthy eating and physical activity may help you improve your health.  Weight control is part of healthy lifestyle and includes exercise, emotional health, and healthy eating habits. Careful eating habits lifelong are the mainstay of weight control. Though there are significant health benefits from weight loss, long-term weight loss with diet alone may be very difficult to achieve- studies show long-term success with dietary management in less than 10% of people. Attaining a healthy weight may be especially difficult to achieve in those with severe obesity. In some cases, medications, devices and surgical management might be considered.  What can you do?  If you are overweight or obese and are  interested in methods for weight loss, you should discuss this with your provider.     Consider reducing daily calorie intake by 500 calories.     Keep a food journal.     Avoiding skipping meals, consider cutting portions instead.    Diet combined with exercise helps maintain muscle while optimizing fat loss. Strength training is particularly important for building and maintaining muscle mass. Exercise helps reduce stress, increase energy, and improves fitness. Increasing exercise without diet control, however, may not burn enough calories to loose weight.       Start walking three days a week 10-20 minutes at a time    Work towards walking thirty minutes five days a week     Eventually, increase the speed of your walking for 1-2 minutes at time    In addition, we recommend that you review healthy lifestyles and methods for weight loss available through the National Institutes of Health patient information sites:  http://win.niddk.nih.gov/publications/index.htm    And look into health and wellness programs that may be available through your health insurance provider, employer, local community center, or lien club.    Weight management plan: Patient was referred to their PCP to discuss a diet and exercise plan.            Follow-ups after your visit        Follow-up notes from your care team     Return in about 1 year (around 5/10/2018) for Routine Visit.      Your next 10 appointments already scheduled     May 30, 2017 11:20 AM CDT   Office Visit with MD Debo [place] Giovanni Fonseca ([facility] [place]    [address] [address] [phone]           Bring a current list of meds and any records pertaining to this visit.  For Physicals, please bring immunization records and any forms needing to be filled out.  Please arrive 10 minutes early to complete paperwork.            Jun 07, 2017  8:40 AM CDT   Office Visit with MD Debo [place] Giovanni Fonseca ([place]  "[facility] [place]    [address]  [place] [address] [phone]           Bring a current list of meds and any records pertaining to this visit.  For Physicals, please bring immunization records and any forms needing to be filled out.  Please arrive 10 minutes early to complete paperwork.              Who to contact     If you have questions or need follow up information about today's clinic visit or your schedule please contact [facility] directly at [phone].  Normal or non-critical lab and imaging results will be communicated to you by MyChart, letter or phone within 4 business days after the clinic has received the results. If you do not hear from us within 7 days, please contact the clinic through [employer] or phone. If you have a critical or abnormal lab result, we will notify you by phone as soon as possible.  Submit refill requests through [employer] or call your pharmacy and they will forward the refill request to us. Please allow 3 business days for your refill to be completed.          Additional Information About Your Visit        [facility] [employer] Information     [employer] lets you send messages to your doctor, view your test results, renew your prescriptions, schedule appointments and more. To sign up, go to [email or web address] . Click on \"Log in\" on the left side of the screen, which will take you to the Welcome page. Then click on \"Sign up Now\" on the right side of the page.     You will be asked to enter the access code listed below, as well as some personal information. Please follow the directions to create your username and password.     Your access code is: [email or web address]  Expires: 2017 10:14 AM     Your access code will  in 90 days. If you need help or a new code, please call your [place] clinic or [phone].        Care EveryWhere ID     This is your Care EveryWhere ID. This could be used by other organizations to access your [place] medical " "records  [id number]        Your Vitals Were     Pulse Height Pulse Oximetry BMI (Body Mass Index)          66 1.702 m (5' 7\") 95% 52.94 kg/m2         Blood Pressure from Last 3 Encounters:   05/10/17 188/80   04/27/17 136/80   03/15/17 131/83    Weight from Last 3 Encounters:   05/10/17 (!) 153.3 kg (338 lb)   04/27/17 (!) 154.4 kg (340 lb 6.4 oz)   03/15/17 (!) 150.1 kg (331 lb)              We Performed the Following     Comprehensive DME          Today's Medication Changes          These changes are accurate as of: 5/10/17 11:41 AM.  If you have any questions, ask your nurse or doctor.               These medicines have changed or have updated prescriptions.        Dose/Directions    order for DME   This may have changed:  additional instructions   Used for:  FLOR (obstructive sleep apnea)        Equipment being ordered: Auto CPAP 11-18   Quantity:  1 Units   Refills:  0            Where to get your medicines      Information about where to get these medications is not yet available     ! Ask your nurse or doctor about these medications     order for DME                Primary Care Provider Office Phone # Fax #    Vita Zavala [phone]       [address] [address]        Thank you!     Thank you for choosing [facility]  for your care. Our goal is always to provide you with excellent care. Hearing back from our patients is one way we can continue to improve our services. Please take a few minutes to complete the written survey that you may receive in the mail after your visit with us. Thank you!             Your Updated Medication List - Protect others around you: Learn how to safely use, store and throw away your medicines at www.disposemymeds.org.          This list is accurate as of: 5/10/17 11:41 AM.  Always use your most recent med list.                   Brand Name Dispense Instructions for use    albuterol 108 (90 BASE) " MCG/ACT Inhaler    PROAIR HFA/PROVENTIL HFA/VENTOLIN HFA    1 Inhaler    Inhale 1-2 puffs into the lungs every 4 hours as needed for shortness of breath / dyspnea       amoxicillin-clavulanate 875-125 MG per tablet    AUGMENTIN    20 tablet    Take 1 tablet by mouth 2 times daily       atorvastatin 40 MG tablet    LIPITOR    90 tablet    Take 1 tablet (40 mg) by mouth daily       fluticasone 50 MCG/ACT spray    FLONASE     2 sprays by Both Nostrils route daily.       lisinopril 10 MG tablet    PRINIVIL/ZESTRIL    90 tablet    TAKE 1 TABLET(10 MG) BY MOUTH DAILY       mometasone 0.1 % cream    ELOCON    60 g    Apply  topically.       order for DME     1 Units    Equipment being ordered: Auto CPAP 11-18       SINUS RINSE BOTTLE KIT Pack      Spray 1 Bottle in nostril daily as needed.       TYLENOL 500 MG tablet   Generic drug:  acetaminophen      Take 500 mg by mouth every 4 hours as needed for pain Reported on 5/10/2017

## 2017-05-10 NOTE — PROGRESS NOTES
Obstructive Sleep Apnea- PAP Follow-Up Visit:    Chief Complaint   Patient presents with     CPAP Follow Up     cpap f/u       Chelo Brooks comes in today for follow-up of their very severe sleep apnea with sleep related hypoxemia , managed with CPAP.     Initial sleep study done on 2/14/17 at the [facility] for loud, socially disruptive snoring, witnessed apneas, excessive daytime sleepiness (ESS 11), sleep maintenance difficulties, obesity, narrowed oropharynx oropharynx. Sleep onset << maintenance difficulties, some psychophysiologic insomnia suspected.            Study Date: 2/14/2017  Sleep Architecture: poor total sleep time without ambien. The total sleep time was 96.0 minutes. Sleep latency was increased at 47.8 minutes without the use of a sleep aid (though one was prescribed). REM latency was n/a.. Arousal index was 100.0 arousals per hour. Sleep efficiency was very poor at 21.9%. Wake after sleep onset was 291.5 minutes. The patient spent 39.1% of sleep time in Stage N1, 60.9% in Stage N2, 0.0% in Stages N3, and 0.0% in REM.       Respiration:     Events - The polysomnogram revealed a presence of 19 obstructive, 0 central, and 0 mixed apneas resulting in an apnea index of 11.9 events per hour. There were 124 hypopneas resulting in a hypopnea index of 77.5 events per hour. The combined apnea/hypopnea index was 89.4 events per hour.  The REM AHI was n/a. The supine AHI was 93.9 events per hour. The RERA index was 3.1 events per hour. The RDI was 92.5 events per hour..    Sustained Sleep Associated Hypoventilation - Transcutaneous carbon dioxide monitoring was not used    Sleep Associated Hypoxemia - (Greater than 5 minutes O2 sat below 89%) was present. Baseline oxygen saturation was 93.8%. Lowest oxygen saturation was 77%. Time spent less than or equal to 88% was 27.5 minutes.      Overall, the patient rates their experience with PAP as 8 (0 poor, 10 great). The mask is  comfortable. The mask is not leaking, 0 nights per week. They are not snoring with the mask on. They are not having gasp arousals.  They are not having significant oral/nasal dryness. The pressure settings are comfortable.         Bedtime is typically 11pm-12am. Usually it takes about 30 minutes to fall asleep with the mask on. Wake time is typically 8 am.  Patient is using PAP therapy 6-7 hours per night. The patient is usually getting 6-7 hours of sleep per night.    She has less problems staying asleep 3-4 times a week, and goes to the bathroom at night last   Patient uses full-face mask. She states after 6 am she takes it off because she feels she is suffocated. She is not sure if she is sleeping between 6-8 AM.     Patient does feel more  rested in the morning. She still feels tired. She naps at times off cpap on couch. But is unable to sleep on her bed.     Waite Sleepiness Scale: 4/24. She still has some fatigue.   ResMed   Auto-PAP 5-18 cmH2O download:  30 total days of use.  0 nonuse days. 83% days with >4 hours use.  Average use 5 hours 57 minutes per day. Median Leak 6.7 L/min. 95%ile Leak 19.1 L/min. CPAP 95% pressure 16.5cm. AHI 2.3        Past medical/surgical history, family history, social history, medications and allergies were reviewed.      Problem List:  Patient Active Problem List    Diagnosis Date Noted     FLOR (obstructive sleep apnea)- severe (AHI 89) 02/15/2017     Priority: Medium     Study Date: 2/14/2017- (329.0 lbs) The total sleep time was 96.0 minutes. The combined apnea/hypopnea index was 89.4 events per hour.  The REM AHI was n/a.  The supine AHI was 93.9 events per hour. RDI was 92.5 events per hour.  Lowest oxygen saturation was 77%.  Time spent less than or equal to 88% was 27.5 minutes.  Time spent less than or equal to 89% was 34.4 minutes.       Morbid obesity due to excess calories (H) 11/23/2015     Priority: Medium     Surgical referral declined '16       Intermittent  "asthma, uncomplicated 11/23/2015     Priority: Medium     Hypertension goal BP (blood pressure) < 150/90      Priority: Medium     Hyperlipidemia LDL goal <130 11/27/2012     Priority: Medium     Osteopenia      Priority: Low     Health Care Home 05/08/2012     Priority: Low     [facility] for .  Oscar Smith RN, C-BC    [phone]       Allergic rhinitis due to animal dander      Priority: Low     4/5/12 RAST pos. only to cat mildly       Advanced directives, counseling/discussion 03/15/2012     Priority: Low     Discussed advance care planning with patient; information given to patient to review. 3/15/2012        DJD (degenerative joint disease) 10/21/2005     Priority: Low     Reflux esophagitis 06/17/2003     Priority: Low        /80  Pulse 66  Ht 1.702 m (5' 7\")  Wt (!) 153.3 kg (338 lb)  SpO2 95%  BMI 52.94 kg/m2        Impression/Plan:    Severe Sleep apnea. Tolerating PAP well. Daytime symptoms are improved.  Still some residual fatigue or sleepiness. Also may have some advanced sleep phase syndrome or insomnia symptoms.   --Narrowed pressures to 11-18 cm  -Use CPAP for all sleep  -Encouraged getting out of bed when awake.     Obesity. See patient instructions.     Chelo Brooks will follow up in about 1 year(s).     Twenty-five minutes spent with patient, all of which were spent face-to-face counseling, consulting, coordinating plan of care.              "

## 2017-05-22 NOTE — PROGRESS NOTES
SUBJECTIVE:  Chelo Brooks is a 72 year old super morbidly obese female who presents with persistent sinus congestion, drainage, and shortness of breath despite treatment of sinusitis with Augmentin. Symptoms initially were helped, but not resolved, and worsened off antibiotics. Symptom onset has been gradual, worsening for a time period of weeks. Severity is described as moderate. Course of her symptoms over time is worsening. She denies fever or chest pain, but has cough, shortness of breath, and wheezing.     She has ongoing exertional dyspnea despite cardiology evaluation with stress testing.     Patient Active Problem List    Diagnosis Date Noted     Mild persistent asthma without complication 05/30/2017     Priority: High     Morbid obesity due to excess calories (H) 11/23/2015     Priority: High     Surgical referral declined '16       Hypertension goal BP (blood pressure) < 150/90      Priority: High     Hyperlipidemia LDL goal <130 11/27/2012     Priority: High     FLOR (obstructive sleep apnea)- severe (AHI 89) 02/15/2017     Priority: Medium     Study Date: 2/14/2017- (329.0 lbs) The total sleep time was 96.0 minutes. The combined apnea/hypopnea index was 89.4 events per hour.  The REM AHI was n/a.  The supine AHI was 93.9 events per hour. RDI was 92.5 events per hour.  Lowest oxygen saturation was 77%.  Time spent less than or equal to 88% was 27.5 minutes.  Time spent less than or equal to 89% was 34.4 minutes.       DJD (degenerative joint disease) 10/21/2005     Priority: Medium     Osteopenia      Priority: Low     Health Care Home 05/08/2012     Priority: Low     [facility] for .  Oscar Smith RN, C-BC    [phone]       Allergic rhinitis due to animal dander      Priority: Low     4/5/12 RAST pos. only to cat mildly       Advanced directives, counseling/discussion 03/15/2012     Priority: Low     Discussed advance care planning with patient; information given to patient to  review. 3/15/2012        Reflux esophagitis 06/17/2003     Priority: Low       Past Medical History:   Diagnosis Date     Diagnostic skin and sensitization tests 4/5/12     RAST pos. only to cat mildly     Hepatitis B childhood     resolved, patient is not a carrier      Pneumonia, organism unspecified      S/P knee replacements 10/23/2013     Shingles 2014    scalp       Past Surgical History:   Procedure Laterality Date     C TOTAL KNEE ARTHROPLASTY  3/2000    right     C TOTAL KNEE ARTHROPLASTY  6/8/12    left     C VAGINAL HYSTERECTOMY  1977    benign, prolapse, ovaries remain        Social History     Social History     Marital status:      Spouse name: N/A     Number of children: 2     Years of education: 12     Occupational History     service center  Retired     Social History Main Topics     Smoking status: Never Smoker     Smokeless tobacco: Never Used     Alcohol use 0.0 oz/week     0 Standard drinks or equivalent per week      Comment: very rare       Drug use: No     Sexual activity: Not Currently     Partners: Male     Birth control/ protection: Post-menopausal, Surgical     Other Topics Concern     Not on file     Social History Narrative       Family History   Problem Relation Age of Onset     Circulatory Father      d. DVT     HEART DISEASE Father      pacer     DIABETES Mother      Hypertension Mother      C.A.D. Paternal Uncle      MI      HEART DISEASE Brother 48     pacer      DIABETES Maternal Grandmother      Hypertension Maternal Grandmother      DIABETES Maternal Aunt      Hypertension Maternal Aunt      Cancer - colorectal Maternal Grandfather      C.A.D. Maternal Aunt      MI       Current Outpatient Prescriptions   Medication     lisinopril (PRINIVIL/ZESTRIL) 20 MG tablet     loratadine (CLARITIN) 10 MG tablet     mometasone (ASMANEX 120 METERED DOSES) 220 MCG/INH Inhaler     doxycycline Monohydrate 100 MG CAPS     order for DME     atorvastatin (LIPITOR) 40 MG tablet      "albuterol (PROAIR HFA/PROVENTIL HFA/VENTOLIN HFA) 108 (90 BASE) MCG/ACT Inhaler     acetaminophen (TYLENOL) 500 MG tablet     Hypertonic Nasal Wash (SINUS RINSE BOTTLE KIT) PACK     fluticasone (FLONASE) 50 MCG/ACT nasal spray     mometasone (ELOCON) 0.1 % cream     [DISCONTINUED] lisinopril (PRINIVIL/ZESTRIL) 10 MG tablet     No current facility-administered medications for this visit.        Allergies:  Adhesive tape    ROS:  General: POSITIVE for weight gain  EYES: Negative for vision changes or eye problems  ENT: see HPI   Resp: as above  CV: No chest pains or palpitations; ongoing dependent edema   GI: No change in bowel habits  : No urinary frequency or dysuria, bladder or kidney problems  MUSCULOSKELETAL: No significant muscle or joint pains  NEUROLOGIC: No headaches, numbness, tingling, weakness, problems with balance or coordination  PSYCHIATRIC: No problems with anxiety, depression or mental health  HEME/IMMUNE/ALLERGY: No history of bleeding or clotting problems or anemia.  No allergies or immune system problems  ENDOCRINE: No history of thyroid disease, diabetes or other endocrine disorders  SKIN: No rashes, worrisome lesions or skin problems     EXAM:  BP (!) 148/92 (BP Location: Right arm, Patient Position: Chair, Cuff Size: Adult Large)  Pulse 92  Temp 98.2  F (36.8  C) (Oral)  Ht 5' 7\" (1.702 m)  Wt (!) 349 lb (158.3 kg)  SpO2 92%  BMI 54.66 kg/m2  GENERAL APPEARANCE: alert, no distress, cooperative and morbidly obese  EYES: EOMI,  PERRL  HENT: ear canals and TM's normal and nose and mouth without ulcers or lesions  NECK: no adenopathy, no asymmetry, masses, or scars and thyroid normal to palpation  RESP: lungs clear to auscultation - no rales, rhonchi or wheezes  CV: regular rates and rhythm, normal S1 S2, no S3 or S4 and no murmur, click or rub -  MS: extremities normal- no gross deformities noted, no evidence of inflammation in joints, FROM in all extremities.  PSYCH: mentation appears " normal and affect normal/bright    Results for orders placed or performed in visit on 05/30/17   Spirometry, Breathing Capacity   Result Value Ref Range    FEV-1      FVC      FEV1/FVC      FEF 25/75        ASSESSMENT/PLAN:  (J01.90) Acute sinusitis, recurrence not specified, unspecified location  (primary encounter diagnosis)  Plan: doxycycline Monohydrate 100 MG CAPS        Call or return to clinic as needed if these symptoms worsen or fail to improve as anticipated.     (J45.30) Mild persistent asthma without complication  Comment: uncontrolled   Plan: Spirometry, Breathing Capacity, mometasone         (ASMANEX 120 METERED DOSES) 220 MCG/INH Inhaler        Discussed risks and benefits of this medication. Follow up in one month or sooner for worsening of symptoms or side effects.     (E66.01) Morbid obesity due to excess calories (H)  Plan: Basic metabolic panel, HEALTH  REFERRAL,         Basic metabolic panel          (E78.5) Hyperlipidemia LDL goal <130  Plan: Lipid panel reflex to direct LDL          (I10) Hypertension goal BP (blood pressure) < 150/90  Plan: Basic metabolic panel, HEALTH  REFERRAL,         lisinopril (PRINIVIL/ZESTRIL) 20 MG tablet,         Basic metabolic panel        Increase dose. Follow up in one month with me for BMP or sooner for worsening of symptoms or side effects.        Vita Zavala MD  Department of Family Practice  [address] [place] [address]  [phone] (voicemail)  [phone] (pager)    TROY Score (Last Two) 1/25/2017   TROY Raw Score 29   Activation Score 52.9   TROY Level 2

## 2017-05-30 ENCOUNTER — OFFICE VISIT (OUTPATIENT)
Dept: FAMILY MEDICINE | Facility: CLINIC | Age: 72
End: 2017-05-30
Payer: COMMERCIAL

## 2017-05-30 VITALS
HEIGHT: 67 IN | WEIGHT: 293 LBS | SYSTOLIC BLOOD PRESSURE: 148 MMHG | TEMPERATURE: 98.2 F | HEART RATE: 92 BPM | OXYGEN SATURATION: 92 % | BODY MASS INDEX: 45.99 KG/M2 | DIASTOLIC BLOOD PRESSURE: 92 MMHG

## 2017-05-30 DIAGNOSIS — J01.90 ACUTE SINUSITIS, RECURRENCE NOT SPECIFIED, UNSPECIFIED LOCATION: Primary | ICD-10-CM

## 2017-05-30 DIAGNOSIS — E78.5 HYPERLIPIDEMIA LDL GOAL <130: Chronic | ICD-10-CM

## 2017-05-30 DIAGNOSIS — M85.80 OSTEOPENIA: Chronic | ICD-10-CM

## 2017-05-30 DIAGNOSIS — J45.30 MILD PERSISTENT ASTHMA WITHOUT COMPLICATION: ICD-10-CM

## 2017-05-30 DIAGNOSIS — E55.9 VITAMIN D DEFICIENCY: ICD-10-CM

## 2017-05-30 DIAGNOSIS — I10 HYPERTENSION GOAL BP (BLOOD PRESSURE) < 150/90: Chronic | ICD-10-CM

## 2017-05-30 DIAGNOSIS — E66.01 MORBID OBESITY DUE TO EXCESS CALORIES (H): Chronic | ICD-10-CM

## 2017-05-30 DIAGNOSIS — R06.02 SOB (SHORTNESS OF BREATH): ICD-10-CM

## 2017-05-30 LAB
ANION GAP SERPL CALCULATED.3IONS-SCNC: 8 MMOL/L (ref 3–14)
BUN SERPL-MCNC: 17 MG/DL (ref 7–30)
CALCIUM SERPL-MCNC: 8.6 MG/DL (ref 8.5–10.1)
CHLORIDE SERPL-SCNC: 107 MMOL/L (ref 94–109)
CO2 SERPL-SCNC: 28 MMOL/L (ref 20–32)
CREAT SERPL-MCNC: 0.88 MG/DL (ref 0.52–1.04)
FEF 25/75: NORMAL
FEV-1: NORMAL
FEV1/FVC: NORMAL
FVC: NORMAL
GFR SERPL CREATININE-BSD FRML MDRD: 63 ML/MIN/1.7M2
GLUCOSE SERPL-MCNC: 84 MG/DL (ref 70–99)
NT-PROBNP SERPL-MCNC: 719 PG/ML (ref 0–125)
POTASSIUM SERPL-SCNC: 3.6 MMOL/L (ref 3.4–5.3)
SODIUM SERPL-SCNC: 143 MMOL/L (ref 133–144)

## 2017-05-30 PROCEDURE — 94010 BREATHING CAPACITY TEST: CPT | Performed by: FAMILY MEDICINE

## 2017-05-30 PROCEDURE — 99214 OFFICE O/P EST MOD 30 MIN: CPT | Mod: 25 | Performed by: FAMILY MEDICINE

## 2017-05-30 PROCEDURE — 36415 COLL VENOUS BLD VENIPUNCTURE: CPT | Performed by: FAMILY MEDICINE

## 2017-05-30 PROCEDURE — 83880 ASSAY OF NATRIURETIC PEPTIDE: CPT | Performed by: FAMILY MEDICINE

## 2017-05-30 PROCEDURE — 82306 VITAMIN D 25 HYDROXY: CPT | Performed by: FAMILY MEDICINE

## 2017-05-30 PROCEDURE — 80048 BASIC METABOLIC PNL TOTAL CA: CPT | Performed by: FAMILY MEDICINE

## 2017-05-30 RX ORDER — LISINOPRIL 20 MG/1
20 TABLET ORAL DAILY
Qty: 90 TABLET | Refills: 0 | Status: SHIPPED | OUTPATIENT
Start: 2017-05-30 | End: 2017-06-20

## 2017-05-30 RX ORDER — LORATADINE 10 MG/1
10 TABLET ORAL PRN
COMMUNITY
Start: 2017-05-30 | End: 2019-11-15

## 2017-05-30 RX ORDER — DOXYCYCLINE 100 MG/1
100 CAPSULE ORAL 2 TIMES DAILY
Qty: 20 CAPSULE | Refills: 0 | Status: SHIPPED | OUTPATIENT
Start: 2017-05-30 | End: 2017-06-09

## 2017-05-30 NOTE — PROGRESS NOTES
Mail letter:    Your results are normal. Follow-up with me in 1 month or sooner for worsening of symptoms.     Vita Zavala MD

## 2017-05-30 NOTE — PATIENT INSTRUCTIONS
Did you know you can lower your blood pressure with your daily habits?    *Losing 20 pounds of weight lowers blood pressure 5 to 20 points.  *Eating a low-fat diet rich in fruits, vegetables and low-fat dairy lowers blood pressure 8 to 14 points.  *Eating a low-salt diet lowers blood pressure 2 to 8 points.  *Exercising regularly lowers blood pressure 4 to 9 points.  *Reducing alcohol use lowers blood pressure 2 to 4 points.    [facility], [place]    If you have any questions regarding to your visit please contact your care team:       Team Red:   Clinic Hours Telephone Number   Dr. Vita Marie, NP   7am-7pm  Monday - Thursday   7am-5pm  Fridays  [phone]  (Appointment scheduling available 24/7)    Questions about your visit?   Team Line  [phone]   [facility] [place] [facility]am-[facility]pm Monday-Friday Saturday-Sunday- 9am-5pm   [place] - 5pm-9pm Monday-Friday Saturday-Sunday- 9am-5pm  [phone] - [facility]  [phone] - [place]       What options do I have for visits at the clinic other than the traditional office visit?  To expand how we care for you, many of our providers are utilizing electronic visits (e-visits) and telephone visits, when medically appropriate, for interactions with their patients rather than a visit in the clinic.   We also offer nurse visits for many medical concerns. Just like any other service, we will bill your insurance company for this type of visit based on time spent on the phone with your provider. Not all insurance companies cover these visits. Please check with your medical insurance if this type of visit is covered. You will be responsible for any charges that are not paid by your insurance.      E-visits via [employer]:  generally incur a $35.00 fee.  Telephone visits:  Time spent on the phone: *charged based on time that is spent on the phone in increments of 10 minutes. Estimated  cost:   5-10 mins $30.00   11-20 mins. $59.00   21-30 mins. $85.00     Use [employer] (secure email communication and access to your chart) to send your primary care provider a message or make an appointment. Ask someone on your Team how to sign up for [employer].  For a Price Quote for your services, please call our CareCentrix Line at [phone].      As always, Thank you for trusting us with your health care needs!

## 2017-05-30 NOTE — LETTER
[address] [place] [address]    May 30, 2017    Chelo Brooks  [address] [place] [id number]          Dear Chelo,  Your results are normal. Follow-up with me in 1 month or sooner for worsening of symptoms.   Enclosed is a copy of your results.         If you have any questions or concerns, please call myself or my nurse at [phone].      Sincerely,        Vita Zavala MD/kevin

## 2017-05-30 NOTE — MR AVS SNAPSHOT
After Visit Summary   5/30/2017    Chelo Brooks    MRN: [id number]           Patient Information     Date Of Birth          1945        Visit Information        Provider Department      5/30/2017 11:20 AM Vita Zavala MD [facility]        Today's Diagnoses     Acute sinusitis, recurrence not specified, unspecified location    -  1    Mild persistent asthma without complication        Morbid obesity due to excess calories (H)        Hyperlipidemia LDL goal <130        Hypertension goal BP (blood pressure) < 150/90        SOB (shortness of breath)        Osteopenia          Care Instructions    Did you know you can lower your blood pressure with your daily habits?    *Losing 20 pounds of weight lowers blood pressure 5 to 20 points.  *Eating a low-fat diet rich in fruits, vegetables and low-fat dairy lowers blood pressure 8 to 14 points.  *Eating a low-salt diet lowers blood pressure 2 to 8 points.  *Exercising regularly lowers blood pressure 4 to 9 points.  *Reducing alcohol use lowers blood pressure 2 to 4 points.    [facility]    If you have any questions regarding to your visit please contact your care team:       Team Red:   Clinic Hours Telephone Number   Dr. Vita Marie, NP   7am-7pm  Monday - Thursday   7am-5pm  Fridays  [phone]  (Appointment scheduling available 24/7)    Questions about your visit?   Team Line  [phone]   [facility] - 11am-9pm Monday-Friday Saturday-Sunday- 9am-5pm   [place] - 5pm-9pm Monday-Friday Saturday-Sunday- 9am-5pm  [phone] - [place]   [phone] - [place]       What options do I have for visits at the clinic other than the traditional office visit?  To expand how we care for you, many of our providers are utilizing electronic visits (e-visits) and telephone visits, when medically appropriate, for interactions with  their patients rather than a visit in the clinic.   We also offer nurse visits for many medical concerns. Just like any other service, we will bill your insurance company for this type of visit based on time spent on the phone with your provider. Not all insurance companies cover these visits. Please check with your medical insurance if this type of visit is covered. You will be responsible for any charges that are not paid by your insurance.      E-visits via [employer]:  generally incur a $35.00 fee.  Telephone visits:  Time spent on the phone: *charged based on time that is spent on the phone in increments of 10 minutes. Estimated cost:   5-10 mins $30.00   11-20 mins. $59.00   21-30 mins. $85.00     Use [employer] (secure email communication and access to your chart) to send your primary care provider a message or make an appointment. Ask someone on your Team how to sign up for [employer].  For a Price Quote for your services, please call our Anchorâ„¢ Line at [phone].      As always, Thank you for trusting us with your health care needs!            Follow-ups after your visit        Additional Services     HEALTH  REFERRAL       Your provider has referred you to a Health .    A Health  will reach out to the patient within three days, if the following criteria has been met.     Clinic: [facility]    Reason for Referral: Obesity/Hypertension    Patient does have knowledge of this service.    Patient Criteria: Obesity (BMI >= 35kg/m2)/ Hypertension (Blood Pressure >= 140/90)    Provider's Main Focus: Diet/Weight Loss and Lifestyle Changes/Disease Management:                  Follow-up notes from your care team     Return in about 1 month (around 6/30/2017), or if symptoms worsen or fail to improve, for BP Recheck, asthma.      Your next 10 appointments already scheduled     Jun 07, 2017  8:40 AM CDT   Office Visit with MD Debo [place] Giovanni Fonseca ([facility]  "[place])    [address]  [place] [address] [phone]           Bring a current list of meds and any records pertaining to this visit.  For Physicals, please bring immunization records and any forms needing to be filled out.  Please arrive 10 minutes early to complete paperwork.              Future tests that were ordered for you today     Open Future Orders        Priority Expected Expires Ordered    Lipid panel reflex to direct LDL Routine 2017    Basic metabolic panel Routine 2017            Who to contact     If you have questions or need follow up information about today's clinic visit or your schedule please contact [facility] directly at [phone].  Normal or non-critical lab and imaging results will be communicated to you by MyChart, letter or phone within 4 business days after the clinic has received the results. If you do not hear from us within 7 days, please contact the clinic through MyChart or phone. If you have a critical or abnormal lab result, we will notify you by phone as soon as possible.  Submit refill requests through [employer] or call your pharmacy and they will forward the refill request to us. Please allow 3 business days for your refill to be completed.          Additional Information About Your Visit        [facility] Information     [employer] lets you send messages to your doctor, view your test results, renew your prescriptions, schedule appointments and more. To sign up, go to [email or web address] . Click on \"Log in\" on the left side of the screen, which will take you to the Welcome page. Then click on \"Sign up Now\" on the right side of the page.     You will be asked to enter the access code listed below, as well as some personal information. Please follow the directions to create your username and password.     Your access code is: [email or web address]  Expires: 2017 10:14 AM     Your access code will  " "in 90 days. If you need help or a new code, please call your [place] clinic or [phone].        Care EveryWhere ID     This is your Care EveryWhere ID. This could be used by other organizations to access your [place] medical records  [id number]        Your Vitals Were     Pulse Temperature Height Pulse Oximetry BMI (Body Mass Index)       92 98.2  F (36.8  C) (Oral) 5' 7\" (1.702 m) 92% 54.66 kg/m2        Blood Pressure from Last 3 Encounters:   05/30/17 (!) 148/92   05/10/17 188/80   04/27/17 136/80    Weight from Last 3 Encounters:   05/30/17 (!) 349 lb (158.3 kg)   05/10/17 (!) 338 lb (153.3 kg)   04/27/17 (!) 340 lb 6.4 oz (154.4 kg)              We Performed the Following     Basic metabolic panel     BNP-N terminal pro     HEALTH  REFERRAL     Spirometry, Breathing Capacity     Vitamin D Deficiency          Today's Medication Changes          These changes are accurate as of: 5/30/17 12:05 PM.  If you have any questions, ask your nurse or doctor.               Start taking these medicines.        Dose/Directions    doxycycline Monohydrate 100 MG Caps   Used for:  Acute sinusitis, recurrence not specified, unspecified location   Started by:  Vita Zavala MD        Dose:  100 mg   Take 1 capsule (100 mg) by mouth 2 times daily for 10 days   Quantity:  20 capsule   Refills:  0       mometasone 220 MCG/INH Inhaler   Commonly known as:  ASMANEX 120 METERED DOSES   Used for:  Mild persistent asthma without complication   Started by:  Vita Zavala MD        Dose:  1 puff   Inhale 1 puff into the lungs every evening   Quantity:  3 Inhaler   Refills:  0         These medicines have changed or have updated prescriptions.        Dose/Directions    lisinopril 20 MG tablet   Commonly known as:  PRINIVIL/ZESTRIL   This may have changed:  See the new instructions.   Used for:  Hypertension goal BP (blood pressure) < 150/90   Changed by:  Vita Zavala MD        Dose:  20 mg   Take 1 tablet (20 mg) by " mouth daily   Quantity:  90 tablet   Refills:  0            Where to get your medicines      These medications were sent to [id number] - [place], [place] - [address] [address]  [address] [place] [address]     Phone:  [phone]     doxycycline Monohydrate 100 MG Caps    lisinopril 20 MG tablet    mometasone 220 MCG/INH Inhaler                Primary Care Provider Office Phone # Fax #    Vita Zavala [phone]       [facility] [address]  [place] [address]        Thank you!     Thank you for choosing [facility]  for your care. Our goal is always to provide you with excellent care. Hearing back from our patients is one way we can continue to improve our services. Please take a few minutes to complete the written survey that you may receive in the mail after your visit with us. Thank you!             Your Updated Medication List - Protect others around you: Learn how to safely use, store and throw away your medicines at www.disposemymeds.org.          This list is accurate as of: 5/30/17 12:05 PM.  Always use your most recent med list.                   Brand Name Dispense Instructions for use    albuterol 108 (90 BASE) MCG/ACT Inhaler    PROAIR HFA/PROVENTIL HFA/VENTOLIN HFA    1 Inhaler    Inhale 1-2 puffs into the lungs every 4 hours as needed for shortness of breath / dyspnea       atorvastatin 40 MG tablet    LIPITOR    90 tablet    Take 1 tablet (40 mg) by mouth daily       CLARITIN 10 MG tablet   Generic drug:  loratadine      Take 1 tablet (10 mg) by mouth daily       doxycycline Monohydrate 100 MG Caps     20 capsule    Take 1 capsule (100 mg) by mouth 2 times daily for 10 days       fluticasone 50 MCG/ACT spray    FLONASE     2 sprays by Both Nostrils route daily.       lisinopril 20 MG tablet    PRINIVIL/ZESTRIL    90 tablet    Take 1 tablet (20 mg) by mouth daily       mometasone 0.1  % cream    ELOCON    60 g    Apply  topically.       mometasone 220 MCG/INH Inhaler    ASMANEX 120 METERED DOSES    3 Inhaler    Inhale 1 puff into the lungs every evening       order for DME     1 Units    Equipment being ordered: Auto CPAP 11-18       sinus rinse bottle      Spray 1 Bottle in nostril daily as needed.       TYLENOL 500 MG tablet   Generic drug:  acetaminophen      Take 500 mg by mouth every 4 hours as needed for pain Reported on 5/10/2017

## 2017-05-30 NOTE — NURSING NOTE
"Chief Complaint   Patient presents with     RECHECK     on sinus       Initial BP (!) 148/92 (BP Location: Right arm, Patient Position: Chair, Cuff Size: Adult Large)  Pulse 92  Temp 98.2  F (36.8  C) (Oral)  Ht 5' 7\" (1.702 m)  Wt (!) 349 lb (158.3 kg)  SpO2 92%  BMI 54.66 kg/m2 Estimated body mass index is 54.66 kg/(m^2) as calculated from the following:    Height as of this encounter: 5' 7\" (1.702 m).    Weight as of this encounter: 349 lb (158.3 kg).  Medication Reconciliation: complete    "

## 2017-05-31 ENCOUNTER — TELEPHONE (OUTPATIENT)
Dept: NURSING | Facility: CLINIC | Age: 72
End: 2017-05-31

## 2017-05-31 LAB — DEPRECATED CALCIDIOL+CALCIFEROL SERPL-MC: 12 UG/L (ref 20–75)

## 2017-05-31 RX ORDER — CHOLECALCIFEROL (VITAMIN D3) 50 MCG
2000 TABLET ORAL DAILY
COMMUNITY

## 2017-05-31 RX ORDER — ERGOCALCIFEROL 1.25 MG/1
50000 CAPSULE, LIQUID FILLED ORAL
Qty: 8 CAPSULE | Refills: 0 | Status: SHIPPED | OUTPATIENT
Start: 2017-05-31 | End: 2017-07-20

## 2017-05-31 ASSESSMENT — ASTHMA QUESTIONNAIRES: ACT_TOTALSCORE: 8

## 2017-05-31 NOTE — PROGRESS NOTES
Please call patient:  Your vitamin D level is low. This can cause fatigue, body aches, and thin bones. Take Vitamin D 50,000 units weekly for 8 weeks (prescription has been sent to your [facility] pharmacy), then take over-the-counter vitamin D 2000 units daily indefinitely.       Your kidney and blood glucose tests are normal. Your BNP heart test is likely normal for you. Follow-up with me in clinic in 1 month.     Vita Zavala MD

## 2017-06-01 NOTE — TELEPHONE ENCOUNTER
Reason for Call:  Other returning call    Detailed comments:  Patient returning the call. Please call her back.     Phone Number Patient can be reached at: Home number on file [phone] (home)    Best Time:  Any    Can we leave a detailed message on this number? YES    Call taken on 6/1/2017 at 9:27 AM by Ginny Tucker

## 2017-06-02 ENCOUNTER — TELEPHONE (OUTPATIENT)
Dept: FAMILY MEDICINE | Facility: CLINIC | Age: 72
End: 2017-06-02

## 2017-06-02 DIAGNOSIS — J45.30 MILD PERSISTENT ASTHMA WITHOUT COMPLICATION: Primary | ICD-10-CM

## 2017-06-02 NOTE — TELEPHONE ENCOUNTER
She should call her insurance to see what formulary options she has with her plan. I will send a possible alternate:  Signed Prescriptions:                        Disp   Refills    beclomethasone (QVAR) 80 MCG/ACT Inhaler   3 Inha*0        Sig: Inhale 1 puff into the lungs 2 times daily  Authorizing Provider: ESTRADA WOO

## 2017-06-02 NOTE — TELEPHONE ENCOUNTER
Pt called the RN hotline and spoke with writer.  Pt states the inhaler that Dr. Zavala prescribed it is too expensive for her to pay $ 110 out of her pocket.  Pt doesn't remember the name of the inhaler but the only inhaler that sent recently was ASMANEX.   Pt states she can't offer to pay that much for an inhaler.    Will route to MD to advise further.    Raulito MORROW, RN, BSN

## 2017-06-05 NOTE — TELEPHONE ENCOUNTER
Called patient and informed.  Patient states she will give the Qvar inhaler a try.  Letha Woods MA

## 2017-06-06 NOTE — PATIENT INSTRUCTIONS
[facility]    If you have any questions regarding to your visit please contact your care team:       Team Red:   Clinic Hours Telephone Number   Dr. Vita De La Rosa  (pediatrics)  Latia Marie NP 7am-7pm  Monday - Thursday   7am-5pm  Fridays  [phone] (fax)    Raulito GILL  [phone]   Urgent Care - [place] and [place] Monday-Friday  [place] - 11am-8pm  Saturday-Sunday  Both sites - 9am-5pm  [phone] - [facility]  [phone] - [place]       What options do I have for visits at the clinic other than the traditional office visit?  To expand how we care for you, many of our providers are utilizing electronic visits (e-visits) and telephone visits, when medically appropriate, for interactions with their patients rather than a visit in the clinic.   We also offer nurse visits for many medical concerns. Just like any other service, we will bill your insurance company for this type of visit based on time spent on the phone with your provider. Not all insurance companies cover these visits. Please check with your medical insurance if this type of visit is covered. You will be responsible for any charges that are not paid by your insurance.      E-visits via [employer]:  generally incur a $35.00 fee.  Telephone visits:  Time spent on the phone: *charged based on time that is spent on the phone in increments of 10 minutes. Estimated cost:   5-10 mins $30.00   11-20 mins. $59.00   21-30 mins. $85.00     As always, Thank you for trusting us with your health care needs!

## 2017-06-06 NOTE — PROGRESS NOTES
"SUBJECTIVE:  Chelo Brooks is a 72 year old female who presents with follow-up of asthma, allergies, hypertension, hypercholesterolemia, vitamin D deficiency, and obesity. Symptom onset has been improving for a time period of 2 weeks. Severity is described as mild. Course of her symptoms over time is improving. She is mostly bothered by post nasal drip.     OBJECTIVE:  EXAM:  /80  Pulse 89  Temp 96.5  F (35.8  C)  Resp 16  Ht 5' 7\" (1.702 m)  Wt (!) 327 lb (148.3 kg)  SpO2 96%  BMI 51.22 kg/m2   Constitutional: alert, no distress, cooperative and morbidly obese   Psychiatric: mentation appears normal and affect normal/bright  JOINT/EXTREMITIES: extremities normal- no gross deformities noted and gait normal    Results for orders placed or performed in visit on 05/30/17   Spirometry, Breathing Capacity   Result Value Ref Range    FEV-1      FVC      FEV1/FVC      FEF 25/75     Basic metabolic panel   Result Value Ref Range    Sodium 143 133 - 144 mmol/L    Potassium 3.6 3.4 - 5.3 mmol/L    Chloride 107 94 - 109 mmol/L    Carbon Dioxide 28 20 - 32 mmol/L    Anion Gap 8 3 - 14 mmol/L    Glucose 84 70 - 99 mg/dL    Urea Nitrogen 17 7 - 30 mg/dL    Creatinine 0.88 0.52 - 1.04 mg/dL    GFR Estimate 63 >60 mL/min/1.7m2    GFR Estimate If Black 77 >60 mL/min/1.7m2    Calcium 8.6 8.5 - 10.1 mg/dL   Vitamin D Deficiency   Result Value Ref Range    Vitamin D Deficiency screening 12 (L) 20 - 75 ug/L   BNP-N terminal pro   Result Value Ref Range    N-Terminal Pro Bnp 719 (H) 0 - 125 pg/mL       ASSESSMENT/PLAN:  (J45.30) Mild persistent asthma without complication  (primary encounter diagnosis)  Comment: Well controlled with medications without side effects.   Plan: beclomethasone (QVAR) 80 MCG/ACT Inhaler,         ALLERGY/ASTHMA ADULT REFERRAL        ACT q 6 months      (J30.81) Allergic rhinitis due to animal dander  Plan: ALLERGY/ASTHMA ADULT REFERRAL, fluticasone         (FLONASE) 50 MCG/ACT spray       "   (E66.01) Morbid obesity due to excess calories (H)  (I10) Hypertension goal BP (blood pressure) < 150/90  Comment: Well controlled with medications without side effects.   Plan: lisinopril (PRINIVIL/ZESTRIL) 20 MG tablet          (E78.5) Hyperlipidemia LDL goal <130  Plan: continue lifestyle strategies - consider statin pending results today     (E55.9) Vitamin D deficiency  Plan: when high dose prescription supplement is complete reduce dose to 2000 units daily indefinitely       Vita Zavala MD    TROY Score (Last Two) 1/25/2017   TROY Raw Score 29   Activation Score 52.9   TROY Level 2

## 2017-06-20 ENCOUNTER — OFFICE VISIT (OUTPATIENT)
Dept: FAMILY MEDICINE | Facility: CLINIC | Age: 72
End: 2017-06-20
Payer: COMMERCIAL

## 2017-06-20 VITALS
HEART RATE: 89 BPM | WEIGHT: 293 LBS | BODY MASS INDEX: 45.99 KG/M2 | TEMPERATURE: 96.5 F | RESPIRATION RATE: 16 BRPM | DIASTOLIC BLOOD PRESSURE: 80 MMHG | OXYGEN SATURATION: 96 % | SYSTOLIC BLOOD PRESSURE: 142 MMHG | HEIGHT: 67 IN

## 2017-06-20 DIAGNOSIS — E66.01 MORBID OBESITY DUE TO EXCESS CALORIES (H): Chronic | ICD-10-CM

## 2017-06-20 DIAGNOSIS — E78.5 HYPERLIPIDEMIA LDL GOAL <130: Chronic | ICD-10-CM

## 2017-06-20 DIAGNOSIS — E55.9 VITAMIN D DEFICIENCY: ICD-10-CM

## 2017-06-20 DIAGNOSIS — J45.30 MILD PERSISTENT ASTHMA WITHOUT COMPLICATION: Primary | ICD-10-CM

## 2017-06-20 DIAGNOSIS — J30.81 ALLERGIC RHINITIS DUE TO ANIMAL DANDER: Chronic | ICD-10-CM

## 2017-06-20 DIAGNOSIS — I10 HYPERTENSION GOAL BP (BLOOD PRESSURE) < 150/90: Chronic | ICD-10-CM

## 2017-06-20 LAB
ANION GAP SERPL CALCULATED.3IONS-SCNC: 3 MMOL/L (ref 3–14)
BUN SERPL-MCNC: 22 MG/DL (ref 7–30)
CALCIUM SERPL-MCNC: 9.1 MG/DL (ref 8.5–10.1)
CHLORIDE SERPL-SCNC: 106 MMOL/L (ref 94–109)
CHOLEST SERPL-MCNC: 175 MG/DL
CO2 SERPL-SCNC: 32 MMOL/L (ref 20–32)
CREAT SERPL-MCNC: 0.96 MG/DL (ref 0.52–1.04)
GFR SERPL CREATININE-BSD FRML MDRD: 57 ML/MIN/1.7M2
GLUCOSE SERPL-MCNC: 91 MG/DL (ref 70–99)
HDLC SERPL-MCNC: 56 MG/DL
LDLC SERPL CALC-MCNC: 99 MG/DL
NONHDLC SERPL-MCNC: 119 MG/DL
POTASSIUM SERPL-SCNC: 4.3 MMOL/L (ref 3.4–5.3)
SODIUM SERPL-SCNC: 141 MMOL/L (ref 133–144)
TRIGL SERPL-MCNC: 98 MG/DL

## 2017-06-20 PROCEDURE — 80061 LIPID PANEL: CPT | Performed by: FAMILY MEDICINE

## 2017-06-20 PROCEDURE — 99213 OFFICE O/P EST LOW 20 MIN: CPT | Performed by: FAMILY MEDICINE

## 2017-06-20 PROCEDURE — 80048 BASIC METABOLIC PNL TOTAL CA: CPT | Performed by: FAMILY MEDICINE

## 2017-06-20 PROCEDURE — 36415 COLL VENOUS BLD VENIPUNCTURE: CPT | Performed by: FAMILY MEDICINE

## 2017-06-20 RX ORDER — FLUTICASONE PROPIONATE 50 MCG
2 SPRAY, SUSPENSION (ML) NASAL DAILY
Qty: 1 BOTTLE | Refills: 11 | Status: SHIPPED | OUTPATIENT
Start: 2017-06-20 | End: 2017-09-29

## 2017-06-20 RX ORDER — LISINOPRIL 20 MG/1
20 TABLET ORAL DAILY
Qty: 90 TABLET | Refills: 3 | Status: SHIPPED | OUTPATIENT
Start: 2017-06-20 | End: 2017-09-29

## 2017-06-20 NOTE — LETTER
[address] [place] [address]    June 21, 2017    Chelo Brooks  [address] [facility] [address]          Dear Chelo,    Your cholesterol is well controlled and you do not have diabetes. Your kidney test is on the low side of normal, but stable over time. In general, avoid medications like ibuprofen, aleve, Excedrin, or higher doses than 81 mg of aspirin. Follow-up yearly.     Enclosed is a copy of your results.     Results for orders placed or performed in visit on 06/20/17   Lipid panel reflex to direct LDL   Result Value Ref Range    Cholesterol 175 <200 mg/dL    Triglycerides 98 <150 mg/dL    HDL Cholesterol 56 >49 mg/dL    LDL Cholesterol Calculated 99 <100 mg/dL    Non HDL Cholesterol 119 <130 mg/dL   Basic metabolic panel   Result Value Ref Range    Sodium 141 133 - 144 mmol/L    Potassium 4.3 3.4 - 5.3 mmol/L    Chloride 106 94 - 109 mmol/L    Carbon Dioxide 32 20 - 32 mmol/L    Anion Gap 3 3 - 14 mmol/L    Glucose 91 70 - 99 mg/dL    Urea Nitrogen 22 7 - 30 mg/dL    Creatinine 0.96 0.52 - 1.04 mg/dL    GFR Estimate 57 (L) >60 mL/min/1.7m2    GFR Estimate If Black 70 >60 mL/min/1.7m2    Calcium 9.1 8.5 - 10.1 mg/dL       If you have any questions or concerns, please call myself or my nurse at [phone].      Sincerely,        Vita Zavala MD/HA

## 2017-06-20 NOTE — MR AVS SNAPSHOT
After Visit Summary   6/20/2017    Chelo Brooks    MRN: [id number]           Patient Information     Date Of Birth          1945        Visit Information        Provider Department      6/20/2017 10:40 AM Vita Zavala MD [facility]        Today's Diagnoses     Mild persistent asthma without complication    -  1    Allergic rhinitis due to animal dander        Morbid obesity due to excess calories (H)        Hypertension goal BP (blood pressure) < 150/90        Hyperlipidemia LDL goal <130        Vitamin D deficiency          Care Instructions    [facility]    If you have any questions regarding to your visit please contact your care team:       Team Red:   Clinic Hours Telephone Number   Dr. Vita De La Rosa  (pediatrics)  Latia Marie NP 7am-7pm  Monday - Thursday   7am-5pm  Fridays  [phone] (fax)    Raulito GILL  [phone]   Urgent Care - [place] and [place] Monday-Friday  [place] - 11am-8pm  Saturday-Sunday  Both sites - 9am-5pm  [phone] - [facility]  [phone] [facility]       What options do I have for visits at the clinic other than the traditional office visit?  To expand how we care for you, many of our providers are utilizing electronic visits (e-visits) and telephone visits, when medically appropriate, for interactions with their patients rather than a visit in the clinic.   We also offer nurse visits for many medical concerns. Just like any other service, we will bill your insurance company for this type of visit based on time spent on the phone with your provider. Not all insurance companies cover these visits. Please check with your medical insurance if this type of visit is covered. You will be responsible for any charges that are not paid by your insurance.      E-visits via [employer]:  generally incur a $35.00 fee.  Telephone visits:  Time spent on the phone: *charged  based on time that is spent on the phone in increments of 10 minutes. Estimated cost:   5-10 mins $30.00   11-20 mins. $59.00   21-30 mins. $85.00     As always, Thank you for trusting us with your health care needs!                      Follow-ups after your visit        Additional Services     ALLERGY/ASTHMA ADULT REFERRAL       Your provider has referred you to: [facility]: [facility] [phone]  [email or web address]    Please be aware that coverage of these services is subject to the terms and limitations of your health insurance plan.  Call member services at your health plan with any benefit or coverage questions.      Please bring the following with you to your appointment:    (1) Any X-Rays, CTs or MRIs which have been performed.  Contact the facility where they were done to arrange for  prior to your scheduled appointment.    (2) List of current medications  (3) This referral request   (4) Any documents/labs given to you for this referral                  Follow-up notes from your care team     Return in about 6 months (around 12/20/2017) for Wellness visit, asthma.      Who to contact     If you have questions or need follow up information about today's clinic visit or your schedule please contact [facility] directly at [phone].  Normal or non-critical lab and imaging results will be communicated to you by MyChart, letter or phone within 4 business days after the clinic has received the results. If you do not hear from us within 7 days, please contact the clinic through MyChart or phone. If you have a critical or abnormal lab result, we will notify you by phone as soon as possible.  Submit refill requests through [employer] or call your pharmacy and they will forward the refill request to us. Please allow 3 business days for your refill to be completed.          Additional Information About Your Visit        [employer] Information     [employer] lets  "you send messages to your doctor, view your test results, renew your prescriptions, schedule appointments and more. To sign up, go to [email or web address] . Click on \"Log in\" on the left side of the screen, which will take you to the Welcome page. Then click on \"Sign up Now\" on the right side of the page.     You will be asked to enter the access code listed below, as well as some personal information. Please follow the directions to create your username and password.     Your access code is: [email or web address]  Expires: 2017 10:14 AM     Your access code will  in 90 days. If you need help or a new code, please call your [facility] clinic or [phone].        Care EveryWhere ID     This is your Care EveryWhere ID. This could be used by other organizations to access your [facility] medical records  [id number]        Your Vitals Were     Pulse Temperature Respirations Height Pulse Oximetry BMI (Body Mass Index)    89 96.5  F (35.8  C) 16 5' 7\" (1.702 m) 96% 51.22 kg/m2       Blood Pressure from Last 3 Encounters:   17 142/80   17 (!) 148/92   05/10/17 188/80    Weight from Last 3 Encounters:   17 (!) 327 lb (148.3 kg)   17 (!) 349 lb (158.3 kg)   05/10/17 (!) 338 lb (153.3 kg)              We Performed the Following     ALLERGY/ASTHMA ADULT REFERRAL          Where to get your medicines      These medications were sent to [id number] - [place]IVERA  [address] AT [facility] [address] [place]  [address] [facility] [address] [facility] [address], [place] [address]     Phone:  [phone]     beclomethasone 80 MCG/ACT Inhaler    fluticasone 50 MCG/ACT spray    lisinopril 20 MG tablet          Primary Care Provider Office Phone # Fax #    Vita Zavala [phone]       [facility] [address] [facility] [address]  [place] [address]        Thank you!     Thank you for choosing [facility]  for your care. Our goal is always to provide you with excellent " care. Hearing back from our patients is one way we can continue to improve our services. Please take a few minutes to complete the written survey that you may receive in the mail after your visit with us. Thank you!             Your Updated Medication List - Protect others around you: Learn how to safely use, store and throw away your medicines at www.disposemymeds.org.          This list is accurate as of: 6/20/17 11:07 AM.  Always use your most recent med list.                   Brand Name Dispense Instructions for use    albuterol 108 (90 BASE) MCG/ACT Inhaler    PROAIR HFA/PROVENTIL HFA/VENTOLIN HFA    1 Inhaler    Inhale 1-2 puffs into the lungs every 4 hours as needed for shortness of breath / dyspnea       atorvastatin 40 MG tablet    LIPITOR    90 tablet    Take 1 tablet (40 mg) by mouth daily       beclomethasone 80 MCG/ACT Inhaler    QVAR    3 Inhaler    Inhale 1 puff into the lungs 2 times daily       CLARITIN 10 MG tablet   Generic drug:  loratadine      Take 1 tablet (10 mg) by mouth daily       fluticasone 50 MCG/ACT spray    FLONASE    1 Bottle    Spray 2 sprays into both nostrils daily       lisinopril 20 MG tablet    PRINIVIL/ZESTRIL    90 tablet    Take 1 tablet (20 mg) by mouth daily       mometasone 0.1 % cream    ELOCON    60 g    Apply  topically.       order for DME     1 Units    Equipment being ordered: Auto CPAP 11-18       sinus rinse bottle      Spray 1 Bottle in nostril daily as needed.       TYLENOL 500 MG tablet   Generic drug:  acetaminophen      Take 500 mg by mouth every 4 hours as needed for pain Reported on 5/10/2017       vitamin D 2000 UNITS tablet      Take 2,000 Units by mouth daily       vitamin D 38015 UNIT capsule    ERGOCALCIFEROL    8 capsule    Take 1 capsule (50,000 Units) by mouth every 7 days for 8 doses . Then, take over-the-counter vitamin D 2000 units daily indefinitely (no refills)

## 2017-06-21 ASSESSMENT — ASTHMA QUESTIONNAIRES: ACT_TOTALSCORE: 19

## 2017-06-21 NOTE — PROGRESS NOTES
Mail letter:  Your cholesterol is well controlled and you do not have diabetes. Your kidney test is on the low side of normal, but stable over time. In general, avoid medications like ibuprofen, aleve, Excedrin, or higher doses than 81 mg of aspirin. Follow-up yearly.    Vita Zavala MD

## 2017-07-17 ENCOUNTER — OFFICE VISIT (OUTPATIENT)
Dept: ORTHOPEDICS | Facility: CLINIC | Age: 72
End: 2017-07-17
Payer: COMMERCIAL

## 2017-07-17 ENCOUNTER — RADIANT APPOINTMENT (OUTPATIENT)
Dept: GENERAL RADIOLOGY | Facility: CLINIC | Age: 72
End: 2017-07-17
Attending: ORTHOPAEDIC SURGERY
Payer: COMMERCIAL

## 2017-07-17 VITALS — TEMPERATURE: 98.2 F | WEIGHT: 293 LBS | BODY MASS INDEX: 45.99 KG/M2 | RESPIRATION RATE: 18 BRPM | HEIGHT: 67 IN

## 2017-07-17 DIAGNOSIS — M25.571 RIGHT ANKLE PAIN, UNSPECIFIED CHRONICITY: ICD-10-CM

## 2017-07-17 DIAGNOSIS — M25.561 RIGHT KNEE PAIN, UNSPECIFIED CHRONICITY: ICD-10-CM

## 2017-07-17 DIAGNOSIS — M25.561 RIGHT KNEE PAIN, UNSPECIFIED CHRONICITY: Primary | ICD-10-CM

## 2017-07-17 DIAGNOSIS — M48.061 LUMBAR SPINAL STENOSIS: ICD-10-CM

## 2017-07-17 PROCEDURE — 73610 X-RAY EXAM OF ANKLE: CPT | Mod: RT

## 2017-07-17 PROCEDURE — 99204 OFFICE O/P NEW MOD 45 MIN: CPT | Performed by: ORTHOPAEDIC SURGERY

## 2017-07-17 PROCEDURE — 73562 X-RAY EXAM OF KNEE 3: CPT | Mod: RT

## 2017-07-17 NOTE — MR AVS SNAPSHOT
After Visit Summary   7/17/2017    Chelo Brooks    MRN: [id number]           Patient Information     Date Of Birth          1945        Visit Information        Provider Department      7/17/2017 11:15 AM Dieter Santana MD [facility]        Today's Diagnoses     Right knee pain, unspecified chronicity    -  1    Right ankle pain, unspecified chronicity        Spinal stenosis, unspecified spinal region          Care Instructions    You have been referred for a lumbar spine MRI and epidural steroid injection. This will be done at the [facility]. You can call [phone] to make an appointment. You can call us at [phone] with any questions or concerns you may have. The [facility] is located at [address] [place] [address].          Follow-ups after your visit        Your next 10 appointments already scheduled     Jul 18, 2017 10:30 AM CDT   New Visit with Libby Guillen OD   [facility] ([facility])    [address] [place] [id number] [phone]              Future tests that were ordered for you today     Open Future Orders        Priority Expected Expires Ordered    MR Lumbar Spine w/o Contrast Routine  7/17/2018 7/17/2017    XR Lumbar Epidural Injection Incl Imaging Routine 7/17/2017 7/17/2018 7/17/2017            Who to contact     If you have questions or need follow up information about today's clinic visit or your schedule please contact [facility] directly at [phone].  Normal or non-critical lab and imaging results will be communicated to you by MyChart, letter or phone within 4 business days after the clinic has received the results. If you do not hear from us within 7 days, please contact the clinic through [employer] or phone. If you have a critical or abnormal lab result, we will notify you by phone as soon as possible.  Submit refill requests through [employer] or  "call your pharmacy and they will forward the refill request to us. Please allow 3 business days for your refill to be completed.          Additional Information About Your Visit        MyChart Information     [employer] lets you send messages to your doctor, view your test results, renew your prescriptions, schedule appointments and more. To sign up, go to [email or web address] . Click on \"Log in\" on the left side of the screen, which will take you to the Welcome page. Then click on \"Sign up Now\" on the right side of the page.     You will be asked to enter the access code listed below, as well as some personal information. Please follow the directions to create your username and password.     Your access code is: [email or web address]  Expires: 2017 10:14 AM     Your access code will  in 90 days. If you need help or a new code, please call your [place] clinic or [phone].        Care EveryWhere ID     This is your [facility] EveryWhere ID. This could be used by other organizations to access your [facility] medical records  [id number]        Your Vitals Were     Temperature Respirations Height BMI (Body Mass Index)          98.2  F (36.8  C) 18 1.702 m (5' 7\") 51.69 kg/m2         Blood Pressure from Last 3 Encounters:   17 142/80   17 (!) 148/92   05/10/17 188/80    Weight from Last 3 Encounters:   17 (!) 149.7 kg (330 lb)   17 (!) 148.3 kg (327 lb)   17 (!) 158.3 kg (349 lb)               Primary Care Provider Office Phone # Fax #    Vita Zavala [phone]       [place] [id number] [facility] [address]        Equal Access to Services     TAHIR PERSAUD : Daphne De La Rosa, waaxda luqadaha, qaybta kaalmarachel biggs, lily tello. [facility] [phone].    ATENCIÓN: Si habla español, tiene a luque disposición servicios gratuitos de asistencia lingüística. Llame al [phone].    We comply with applicable " federal civil rights laws and [place] laws. We do not discriminate on the basis of race, color, national origin, age, disability sex, sexual orientation or gender identity.            Thank you!     Thank you for choosing [facility]  for your care. Our goal is always to provide you with excellent care. Hearing back from our patients is one way we can continue to improve our services. Please take a few minutes to complete the written survey that you may receive in the mail after your visit with us. Thank you!             Your Updated Medication List - Protect others around you: Learn how to safely use, store and throw away your medicines at www.disposemymeds.org.          This list is accurate as of: 7/17/17 12:29 PM.  Always use your most recent med list.                   Brand Name Dispense Instructions for use Diagnosis    albuterol 108 (90 BASE) MCG/ACT Inhaler    PROAIR HFA/PROVENTIL HFA/VENTOLIN HFA    1 Inhaler    Inhale 1-2 puffs into the lungs every 4 hours as needed for shortness of breath / dyspnea    Intermittent asthma, uncomplicated       atorvastatin 40 MG tablet    LIPITOR    90 tablet    Take 1 tablet (40 mg) by mouth daily    Hyperlipidemia LDL goal <130       beclomethasone 80 MCG/ACT Inhaler    QVAR    3 Inhaler    Inhale 1 puff into the lungs 2 times daily    Mild persistent asthma without complication       CLARITIN 10 MG tablet   Generic drug:  loratadine      Take 1 tablet (10 mg) by mouth daily        fluticasone 50 MCG/ACT spray    FLONASE    1 Bottle    Spray 2 sprays into both nostrils daily    Allergic rhinitis due to animal dander       lisinopril 20 MG tablet    PRINIVIL/ZESTRIL    90 tablet    Take 1 tablet (20 mg) by mouth daily    Hypertension goal BP (blood pressure) < 150/90       mometasone 0.1 % cream    ELOCON    60 g    Apply  topically.    Contact dermatitis and other eczema, due to unspecified cause       order for DME     1 Units    Equipment being ordered:  Auto CPAP 11-18    FLOR (obstructive sleep apnea)       sinus rinse bottle      Spray 1 Bottle in nostril daily as needed.    Head ache       TYLENOL 500 MG tablet   Generic drug:  acetaminophen      Take 500 mg by mouth every 4 hours as needed for pain Reported on 5/10/2017        vitamin D 2000 UNITS tablet      Take 2,000 Units by mouth daily        vitamin D 50245 UNIT capsule    ERGOCALCIFEROL    8 capsule    Take 1 capsule (50,000 Units) by mouth every 7 days for 8 doses . Then, take over-the-counter vitamin D 2000 units daily indefinitely (no refills)    Vitamin D deficiency

## 2017-07-17 NOTE — NURSING NOTE
"Chief Complaint   Patient presents with     Consult     Right ankle and knee pain since 7/13/17 from walking. Pain level 0-10/10 sharp and episodie. Rest makes the pain better and movement makes the pain worse.       Initial Temp 98.2  F (36.8  C)  Resp 18  Ht 1.702 m (5' 7\")  Wt (!) 149.7 kg (330 lb)  BMI 51.69 kg/m2 Estimated body mass index is 51.69 kg/(m^2) as calculated from the following:    Height as of this encounter: 1.702 m (5' 7\").    Weight as of this encounter: 149.7 kg (330 lb).  Medication Reconciliation: complete   Orin Rios MA      "

## 2017-07-17 NOTE — LETTER
7/17/2017         RE: Chelo Brooks  [address] [facility] [address]        Dear Colleague,    Thank you for referring your patient, Chelo Brooks, to the [facility]. Please see a copy of my visit note below.    Chelo Brooks is a 72 year old female who is seen as self referral for right foot and knee pain.  She developed this while walking it was very falls. She was walking on pavement approach to restroom. It was not uneven footing. She felt something pop at the lateral aspect of her right ankle and hurt into the right lateral calf. Even prior to that she has had some problems with lifting her right leg. It feels weak.  She has pain rated between 0 and 10/10 with episodic sharp pain.  She has occasional tingling in the right lateral leg.  Of note she does have pain when walking upright. She does better with walking with a shopping cart.  She had right total knee arthroplasty in about 1990. Left total knee arthroplasty in 2012.  X-rays of the right ankle and right knee were obtained today. These images with the patient. There is no significant osteoarthritis of the right ankle. No fractures. Right knee shows good position of components. No sign of loosening or wear.      Past Medical History:   Diagnosis Date     Diagnostic skin and sensitization tests 4/5/12     RAST pos. only to cat mildly     Hepatitis B childhood     resolved, patient is not a carrier      Pneumonia, organism      S/P knee replacements 10/23/2013     Shingles 2014    scalp     Vitamin D deficiency        Past Surgical History:   Procedure Laterality Date     C TOTAL KNEE ARTHROPLASTY  3/2000    right     C TOTAL KNEE ARTHROPLASTY  6/8/12    left     C VAGINAL HYSTERECTOMY  1977    benign, prolapse, ovaries remain        Family History   Problem Relation Age of Onset     Circulatory Father      d. DVT     HEART DISEASE Father      pacer     DIABETES Mother      Hypertension Mother      C.A.D. Paternal  Uncle      MI      HEART DISEASE Brother 48     pacer      DIABETES Maternal Grandmother      Hypertension Maternal Grandmother      DIABETES Maternal Aunt      Hypertension Maternal Aunt      Cancer - colorectal Maternal Grandfather      C.A.D. Maternal Aunt      MI       Social History     Social History     Marital status:      Spouse name: N/A     Number of children: 2     Years of education: 12     Occupational History     service center  Retired     Social History Main Topics     Smoking status: Never Smoker     Smokeless tobacco: Never Used     Alcohol use 0.0 oz/week     0 Standard drinks or equivalent per week      Comment: very rare       Drug use: No     Sexual activity: Not Currently     Partners: Male     Birth control/ protection: Post-menopausal, Surgical     Other Topics Concern     Not on file     Social History Narrative       Current Outpatient Prescriptions   Medication Sig Dispense Refill     beclomethasone (QVAR) 80 MCG/ACT Inhaler Inhale 1 puff into the lungs 2 times daily 3 Inhaler 1     lisinopril (PRINIVIL/ZESTRIL) 20 MG tablet Take 1 tablet (20 mg) by mouth daily 90 tablet 3     fluticasone (FLONASE) 50 MCG/ACT spray Spray 2 sprays into both nostrils daily 1 Bottle 11     vitamin D (ERGOCALCIFEROL) 61396 UNIT capsule Take 1 capsule (50,000 Units) by mouth every 7 days for 8 doses . Then, take over-the-counter vitamin D 2000 units daily indefinitely (no refills) 8 capsule 0     Cholecalciferol (VITAMIN D) 2000 UNITS tablet Take 2,000 Units by mouth daily       loratadine (CLARITIN) 10 MG tablet Take 1 tablet (10 mg) by mouth daily       order for DME Equipment being ordered: Auto CPAP 11-18 1 Units 0     atorvastatin (LIPITOR) 40 MG tablet Take 1 tablet (40 mg) by mouth daily 90 tablet 3     albuterol (PROAIR HFA/PROVENTIL HFA/VENTOLIN HFA) 108 (90 BASE) MCG/ACT Inhaler Inhale 1-2 puffs into the lungs every 4 hours as needed for shortness of breath / dyspnea 1 Inhaler 3      "acetaminophen (TYLENOL) 500 MG tablet Take 500 mg by mouth every 4 hours as needed for pain Reported on 5/10/2017       Hypertonic Nasal Wash (SINUS RINSE BOTTLE KIT) PACK Spray 1 Bottle in nostril daily as needed.       mometasone (ELOCON) 0.1 % cream Apply  topically. 60 g 2       Allergies   Allergen Reactions     Adhesive Tape        REVIEW OF SYSTEMS:  CONSTITUTIONAL:  NEGATIVE for fever, chills, change in weight, not feeling tired  SKIN:  NEGATIVE for worrisome rashes, no skin lumps, no skin ulcers and no non-healing wounds  EYES:  NEGATIVE for vision changes or irritation.  ENT/MOUTH:  NEGATIVE.  No hearing loss, no hoarseness, no difficulty swallowing.  RESP:  NEGATIVE. No cough or shortness of breath.  CV:  NEGATIVE for chest pain, palpitations or peripheral edema  GI:  NEGATIVE for nausea, abdominal pain, heartburn, or change in bowel habits  :  Negative. No dysuria, no hematuria  MUSCULOSKELETAL:  See HPI above  NEURO:  NEGATIVE . No headaches, no dizziness,  no numbness  ENDOCRINE:  NEGATIVE for temperature intolerance, skin/hair changes  HEME/ALLERGY/IMMUNE:  NEGATIVE for bleeding problems  PSYCHIATRIC:  NEGATIVE. no anxiety, no depression.     Exam:  Vitals: Temp 98.2  F (36.8  C)  Resp 18  Ht 1.702 m (5' 7\")  Wt (!) 149.7 kg (330 lb)  BMI 51.69 kg/m2  BMI= Body mass index is 51.69 kg/(m^2).  Constitutional:  healthy, alert and no distress  Neuro: Alert and Oriented x 3, Gait normal. Sensation grossly WNL.  Psych: Affect normal   Respiratory: Breathing not labored.  Cardiovascular: normal peripheral pulses  Lymph: no adenopathy  Skin: No rashes,worrisome lesions or skin problems  She has tenderness at both SI joints.  She has tenderness in the right sciatic notch.  She had good mobility of the hips without pain.  No tenderness over the greater trochanters.  She has good mobility of the knees from 0-120 degrees.  There is no medial or lateral joint line tenderness.  There is mild ligamentous " laxity of left lateral collateral ligament.  No laxity on right.  There is significant bilateral pedal edema.  There is no tenderness of the medial or lateral malleolus on either ankle.  There is no subluxation of the peroneal tendons.  No tenderness over the peroneal tendons.  No laxity of the ankle ligaments.  Sensation, motor and circulation are intact.    Assessment:   Pain is most likely from spinal stenosis.  Plan we discussed option walking with walker anti-inflammatories or epidural steroid injection. She would like to proceed with MRI and epidural steroid injection.        Again, thank you for allowing me to participate in the care of your patient.        Sincerely,        Dieter Santana MD

## 2017-07-17 NOTE — PATIENT INSTRUCTIONS
You have been referred for a lumbar spine MRI and epidural steroid injection. This will be done at the [facility]. You can call [phone] to make an appointment. You can call us at [phone] with any questions or concerns you may have. The [facility] is located at [address] [place], [place] [address] [address].

## 2017-07-17 NOTE — PROGRESS NOTES
Chelo Brooks is a 72 year old female who is seen as self referral for right foot and knee pain.  She developed this while walking it was very falls. She was walking on pavement approach to restroom. It was not uneven footing. She felt something pop at the lateral aspect of her right ankle and hurt into the right lateral calf. Even prior to that she has had some problems with lifting her right leg. It feels weak.  She has pain rated between 0 and 10/10 with episodic sharp pain.  She has occasional tingling in the right lateral leg.  Of note she does have pain when walking upright. She does better with walking with a shopping cart.  She had right total knee arthroplasty in about 1990. Left total knee arthroplasty in 2012.  X-rays of the right ankle and right knee were obtained today. These images with the patient. There is no significant osteoarthritis of the right ankle. No fractures. Right knee shows good position of components. No sign of loosening or wear.      Past Medical History:   Diagnosis Date     Diagnostic skin and sensitization tests 4/5/12     RAST pos. only to cat mildly     Hepatitis B childhood     resolved, patient is not a carrier      Pneumonia, organism      S/P knee replacements 10/23/2013     Shingles 2014    scalp     Vitamin D deficiency        Past Surgical History:   Procedure Laterality Date     C TOTAL KNEE ARTHROPLASTY  3/2000    right     C TOTAL KNEE ARTHROPLASTY  6/8/12    left     C VAGINAL HYSTERECTOMY  1977    benign, prolapse, ovaries remain        Family History   Problem Relation Age of Onset     Circulatory Father      d. DVT     HEART DISEASE Father      pacer     DIABETES Mother      Hypertension Mother      C.A.D. Paternal Uncle      MI      HEART DISEASE Brother 48     pacer      DIABETES Maternal Grandmother      Hypertension Maternal Grandmother      DIABETES Maternal Aunt      Hypertension Maternal Aunt      Cancer - colorectal Maternal Grandfather      C.A.D. Maternal  Aunt      MI       Social History     Social History     Marital status:      Spouse name: N/A     Number of children: 2     Years of education: 12     Occupational History     service center  Retired     Social History Main Topics     Smoking status: Never Smoker     Smokeless tobacco: Never Used     Alcohol use 0.0 oz/week     0 Standard drinks or equivalent per week      Comment: very rare       Drug use: No     Sexual activity: Not Currently     Partners: Male     Birth control/ protection: Post-menopausal, Surgical     Other Topics Concern     Not on file     Social History Narrative       Current Outpatient Prescriptions   Medication Sig Dispense Refill     beclomethasone (QVAR) 80 MCG/ACT Inhaler Inhale 1 puff into the lungs 2 times daily 3 Inhaler 1     lisinopril (PRINIVIL/ZESTRIL) 20 MG tablet Take 1 tablet (20 mg) by mouth daily 90 tablet 3     fluticasone (FLONASE) 50 MCG/ACT spray Spray 2 sprays into both nostrils daily 1 Bottle 11     vitamin D (ERGOCALCIFEROL) 78746 UNIT capsule Take 1 capsule (50,000 Units) by mouth every 7 days for 8 doses . Then, take over-the-counter vitamin D 2000 units daily indefinitely (no refills) 8 capsule 0     Cholecalciferol (VITAMIN D) 2000 UNITS tablet Take 2,000 Units by mouth daily       loratadine (CLARITIN) 10 MG tablet Take 1 tablet (10 mg) by mouth daily       order for DME Equipment being ordered: Auto CPAP 11-18 1 Units 0     atorvastatin (LIPITOR) 40 MG tablet Take 1 tablet (40 mg) by mouth daily 90 tablet 3     albuterol (PROAIR HFA/PROVENTIL HFA/VENTOLIN HFA) 108 (90 BASE) MCG/ACT Inhaler Inhale 1-2 puffs into the lungs every 4 hours as needed for shortness of breath / dyspnea 1 Inhaler 3     acetaminophen (TYLENOL) 500 MG tablet Take 500 mg by mouth every 4 hours as needed for pain Reported on 5/10/2017       Hypertonic Nasal Wash (SINUS RINSE BOTTLE KIT) PACK Spray 1 Bottle in nostril daily as needed.       mometasone (ELOCON) 0.1 % cream Apply   "topically. 60 g 2       Allergies   Allergen Reactions     Adhesive Tape        REVIEW OF SYSTEMS:  CONSTITUTIONAL:  NEGATIVE for fever, chills, change in weight, not feeling tired  SKIN:  NEGATIVE for worrisome rashes, no skin lumps, no skin ulcers and no non-healing wounds  EYES:  NEGATIVE for vision changes or irritation.  ENT/MOUTH:  NEGATIVE.  No hearing loss, no hoarseness, no difficulty swallowing.  RESP:  NEGATIVE. No cough or shortness of breath.  CV:  NEGATIVE for chest pain, palpitations or peripheral edema  GI:  NEGATIVE for nausea, abdominal pain, heartburn, or change in bowel habits  :  Negative. No dysuria, no hematuria  MUSCULOSKELETAL:  See HPI above  NEURO:  NEGATIVE . No headaches, no dizziness,  no numbness  ENDOCRINE:  NEGATIVE for temperature intolerance, skin/hair changes  HEME/ALLERGY/IMMUNE:  NEGATIVE for bleeding problems  PSYCHIATRIC:  NEGATIVE. no anxiety, no depression.     Exam:  Vitals: Temp 98.2  F (36.8  C)  Resp 18  Ht 1.702 m (5' 7\")  Wt (!) 149.7 kg (330 lb)  BMI 51.69 kg/m2  BMI= Body mass index is 51.69 kg/(m^2).  Constitutional:  healthy, alert and no distress  Neuro: Alert and Oriented x 3, Gait normal. Sensation grossly WNL.  Psych: Affect normal   Respiratory: Breathing not labored.  Cardiovascular: normal peripheral pulses  Lymph: no adenopathy  Skin: No rashes,worrisome lesions or skin problems  She has tenderness at both SI joints.  She has tenderness in the right sciatic notch.  She had good mobility of the hips without pain.  No tenderness over the greater trochanters.  She has good mobility of the knees from 0-120 degrees.  There is no medial or lateral joint line tenderness.  There is mild ligamentous laxity of left lateral collateral ligament.  No laxity on right.  There is significant bilateral pedal edema.  There is no tenderness of the medial or lateral malleolus on either ankle.  There is no subluxation of the peroneal tendons.  No tenderness over the " peroneal tendons.  No laxity of the ankle ligaments.  Sensation, motor and circulation are intact.    Assessment:   Pain is most likely from spinal stenosis.  Plan we discussed option walking with walker anti-inflammatories or epidural steroid injection. She would like to proceed with MRI and epidural steroid injection.

## 2017-07-18 ENCOUNTER — OFFICE VISIT (OUTPATIENT)
Dept: OPTOMETRY | Facility: CLINIC | Age: 72
End: 2017-07-18
Payer: COMMERCIAL

## 2017-07-18 DIAGNOSIS — H25.813 COMBINED FORMS OF AGE-RELATED CATARACT OF BOTH EYES: ICD-10-CM

## 2017-07-18 DIAGNOSIS — H52.03 HYPEROPIA WITH PRESBYOPIA OF BOTH EYES: ICD-10-CM

## 2017-07-18 DIAGNOSIS — Z01.00 EXAMINATION OF EYES AND VISION: Primary | ICD-10-CM

## 2017-07-18 DIAGNOSIS — H52.221 REGULAR ASTIGMATISM OF RIGHT EYE: ICD-10-CM

## 2017-07-18 DIAGNOSIS — D31.32 CHOROIDAL NEVUS, LEFT EYE: ICD-10-CM

## 2017-07-18 DIAGNOSIS — H52.4 HYPEROPIA WITH PRESBYOPIA OF BOTH EYES: ICD-10-CM

## 2017-07-18 PROCEDURE — 92015 DETERMINE REFRACTIVE STATE: CPT | Performed by: OPTOMETRIST

## 2017-07-18 PROCEDURE — 92004 COMPRE OPH EXAM NEW PT 1/>: CPT | Performed by: OPTOMETRIST

## 2017-07-18 ASSESSMENT — VISUAL ACUITY
METHOD: SNELLEN - LINEAR
OS_CC+: -2
OD_CC+: -1
OD_CC: 20/60 -1
OS_SC: 20/200
OS_SC: 20/100
OS_CC: 20/40
OS_CC: 20/25
OD_SC: 20/200
CORRECTION_TYPE: GLASSES
OD_SC: 20/200
OD_CC: 20/30
OS_SC+: -1

## 2017-07-18 ASSESSMENT — CUP TO DISC RATIO
OS_RATIO: 0.3
OD_RATIO: 0.2

## 2017-07-18 ASSESSMENT — REFRACTION_WEARINGRX
OD_CYLINDER: +1.00
OS_CYLINDER: +0.75
OS_SPHERE: +3.00
SPECS_TYPE: PAL
OS_ADD: +2.50
OS_AXIS: 150
OD_AXIS: 158
OD_ADD: +2.50
OD_SPHERE: +3.00

## 2017-07-18 ASSESSMENT — TONOMETRY
IOP_METHOD: APPLANATION
OD_IOP_MMHG: 20
OS_IOP_MMHG: 18

## 2017-07-18 ASSESSMENT — SLIT LAMP EXAM - LIDS
COMMENTS: NORMAL
COMMENTS: NORMAL

## 2017-07-18 ASSESSMENT — REFRACTION_MANIFEST
OS_CYLINDER: SPHERE
OD_ADD: +2.75
OS_ADD: +2.75
OD_SPHERE: +2.00
OD_AXIS: 140
OS_SPHERE: +2.00
OD_CYLINDER: +0.25

## 2017-07-18 ASSESSMENT — CONF VISUAL FIELD
OS_NORMAL: 1
OD_NORMAL: 1

## 2017-07-18 ASSESSMENT — EXTERNAL EXAM - LEFT EYE: OS_EXAM: NORMAL

## 2017-07-18 ASSESSMENT — EXTERNAL EXAM - RIGHT EYE: OD_EXAM: NORMAL

## 2017-07-18 NOTE — PATIENT INSTRUCTIONS
A final glasses prescription was given.  Allow time for adaptation.  The glasses may cause dizziness and affect depth perception for awhile.  Monitor cataracts and choroidal nevus by having yearly exams.  Wear sunglasses when outside.  Return to clinic 1 year for Comprehensive Vision Exam      Libby Guillen O.D  [address]  [place]  [address]    [phone]

## 2017-07-18 NOTE — PROGRESS NOTES
Chief Complaint   Patient presents with     COMPREHENSIVE EYE EXAM      Accompanied by self  Last Eye Exam: 2 years ago  Dilated Previously: Yes    What are you currently using to see?  glasses       Distance Vision Acuity: Noticed gradual change in both eyes    Near Vision Acuity: Not satisfied     Eye Comfort: tired eyes after reading for long periods of time  Do you use eye drops? : No  Occupation or Hobbies: retired    Alicia Renardak, Optometric Tech          Medical, surgical and family histories reviewed and updated 7/18/2017.       OBJECTIVE: See Ophthalmology exam    ASSESSMENT:    ICD-10-CM    1. Examination of eyes and vision Z01.00 EYE EXAM (SIMPLE-NONBILLABLE)     REFRACTION   2. Hyperopia with presbyopia of both eyes H52.03 EYE EXAM (SIMPLE-NONBILLABLE)    H52.4 REFRACTION   3. Regular astigmatism of right eye H52.221 EYE EXAM (SIMPLE-NONBILLABLE)     REFRACTION   4. Combined forms of age-related cataract of both eyes H25.813 EYE EXAM (SIMPLE-NONBILLABLE)   5. Choroidal nevus, left eye D31.32 EYE EXAM (SIMPLE-NONBILLABLE)      PLAN:   A final glasses prescription was given.  Allow time for adaptation.  The glasses may cause dizziness and affect depth perception for awhile.  Monitor cataracts and choroidal nevus by having yearly exams.  Wear sunglasses when outside.  Return to clinic 1 year for Comprehensive Vision Exam      Libby Guillen O.D  [address] [place]  [address]    [phone]

## 2017-07-18 NOTE — MR AVS SNAPSHOT
After Visit Summary   7/18/2017    Chelo Brooks    MRN: [id number]           Patient Information     Date Of Birth          1945        Visit Information        Provider Department      7/18/2017 10:30 AM Libby Guillen OD [facility]        Today's Diagnoses     Examination of eyes and vision    -  1    Hyperopia with presbyopia of both eyes        Regular astigmatism of right eye        Combined forms of age-related cataract of both eyes        Choroidal nevus, left eye          Care Instructions        A final glasses prescription was given.  Allow time for adaptation.  The glasses may cause dizziness and affect depth perception for awhile.  Monitor cataracts and choroidal nevus by having yearly exams.  Wear sunglasses when outside.  Return to clinic 1 year for Comprehensive Vision Exam      Libby Guillen O.D  [address]  [place]  [address] [phone]                  Follow-ups after your visit        Follow-up notes from your care team     Return in about 1 year (around 7/18/2018) for Eye Exam.      Your next 10 appointments already scheduled     Jul 24, 2017  1:15 PM CDT   MR LUMBAR SPINE W/O CONTRAST with MGMR1   [facility]    [id number] [place] [id number] [phone]           Take your medicines as usual, unless your doctor tells you not to. Bring a list of your current medicines to your exam (including vitamins, minerals and over-the-counter drugs). Also bring the results of similar scans you may have had.  Please remove any body piercings and hair extensions before you arrive.  Follow your doctor s orders. If you do not, we may have to postpone your exam.  You will not have contrast for this exam. You do not need to do anything special to prepare.  The MRI machine uses a strong magnet. Please wear clothes without metal (snaps, zippers). A sweatsuit  works well, or we may give you a hospital gown.   **IMPORTANT** THE INSTRUCTIONS BELOW ARE ONLY FOR THOSE PATIENTS WHO HAVE BEEN TOLD THEY WILL RECEIVE SEDATION OR GENERAL ANESTHESIA DURING THEIR MRI PROCEDURE:  IF YOU WILL RECEIVE SEDATION (take medicine to help you relax during your exam):   You must get the medicine from your doctor before you arrive. Bring the medicine to the exam. Do not take it at home.   Arrive one hour early. Bring someone who can take you home after the test. Your medicine will make you sleepy. After the exam, you may not drive, take a bus or take a taxi by yourself.   No eating 8 hours before your exam. You may have clear liquids up until 4 hours before your exam. (Clear liquids include water, clear tea, black coffee and fruit juice without pulp.)  IF YOU WILL RECEIVE ANESTHESIA (be asleep for your exam):   Arrive 1 1/2 hours early. Bring someone who can take you home after the test. You may not drive, take a bus or take a taxi by yourself.   No eating 8 hours before your exam. You may have clear liquids up until 4 hours before your exam. (Clear liquids include water, clear tea, black coffee and fruit juice without pulp.)   You will spend four to five hours in the recovery room.  Please call the Imaging Department at your exam site with any questions.            Aug 01, 2017 11:00 AM CDT   XR LUMBAR EPIDURAL INJECTION with MGXR3, MG NEURO RAD   [facility]    [address] [place] [id number] [phone]           For nerve root injection, please send or bring copies of any MRIs or other scans you have had.  Bring a list of your current medicines to your exam. (Include vitamins, minerals and over-the-counter medicines.) Leave your valuables at home.  Plan to have someone drive you home afterward.  Stop taking the following medicines (but talk to your doctor first):   If you take blood thinners, you may need to stop taking them  a few days before treatment. Talk to your doctor before stopping these medicines.Stop taking Coumadin (warfarin) 3 days before treatment. Restart the day after treatment.   If you take Plavix, Ticlid, Pletal or Persantine, please ask your doctor if you should stop these medicines. You may need extra tests on the morning of your scan. You may take your other medicines as normal.  Stop all food and drink (including water) 3 hours before your test or treatment.  Please tell the doctor:   If you are allergic to X-ray dye (contrast fluid).   If you may be pregnant.  Injections take about 30 to 45 minutes. Most people spend up to 2 hours in the clinic or hospital.  Please call the Imaging Department at your exam site with any questions              Future tests that were ordered for you today     Open Future Orders        Priority Expected Expires Ordered    MR Lumbar Spine w/o Contrast Routine  7/17/2018 7/17/2017    XR Lumbar Epidural Injection Incl Imaging Routine 7/17/2017 7/17/2018 7/17/2017            Who to contact     If you have questions or need follow up information about today's clinic visit or your schedule please contact [facility] directly at [phone].  Normal or non-critical lab and imaging results will be communicated to you by [employer], letter or phone within 4 business days after the clinic has received the results. If you do not hear from us within 7 days, please contact the clinic through [employer] or phone. If you have a critical or abnormal lab result, we will notify you by phone as soon as possible.  Submit refill requests through [employer] or call your pharmacy and they will forward the refill request to us. Please allow 3 business days for your refill to be completed.          Additional Information About Your Visit        [employer] Information     [employer] lets you send messages to your doctor, view your test results, renew your prescriptions, schedule appointments and more. To sign  "up, go to [email or web address] . Click on \"Log in\" on the left side of the screen, which will take you to the Welcome page. Then click on \"Sign up Now\" on the right side of the page.     You will be asked to enter the access code listed below, as well as some personal information. Please follow the directions to create your username and password.     Your access code is: [email or web address]  Expires: 2017 10:14 AM     Your access code will  in 90 days. If you need help or a new code, please call your [facility] clinic or [phone].        Care EveryWhere ID     This is your Care EveryWhere ID. This could be used by other organizations to access your [facility] medical records  [id number]         Blood Pressure from Last 3 Encounters:   17 142/80   17 (!) 148/92   05/10/17 188/80    Weight from Last 3 Encounters:   17 (!) 149.7 kg (330 lb)   17 (!) 148.3 kg (327 lb)   17 (!) 158.3 kg (349 lb)              We Performed the Following     EYE EXAM (SIMPLE-NONBILLABLE)     REFRACTION        Primary Care Provider Office Phone # Fax #    Vita Zavala [phone]       [address] [address]        Equal Access to Services     TAHIR PERSAUD AH: Hadii aad ku hadasho Sogueritaali, waaxda luqadaha, qaybta kaalmada adeegyada, lily tello. So [facility] [phone].    ATENCIÓN: Si habla español, tiene a luque disposición servicios gratuitos de asistencia lingüística. Llame al [phone].    We comply with applicable federal civil rights laws and [place] laws. We do not discriminate on the basis of race, color, national origin, age, disability sex, sexual orientation or gender identity.            Thank you!     Thank you for choosing [facility]  for your care. Our goal is always to provide you with excellent care. Hearing back from our patients is one way we can continue to improve our services. " Please take a few minutes to complete the written survey that you may receive in the mail after your visit with us. Thank you!             Your Updated Medication List - Protect others around you: Learn how to safely use, store and throw away your medicines at www.disposemymeds.org.          This list is accurate as of: 7/18/17 11:46 AM.  Always use your most recent med list.                   Brand Name Dispense Instructions for use Diagnosis    albuterol 108 (90 BASE) MCG/ACT Inhaler    PROAIR HFA/PROVENTIL HFA/VENTOLIN HFA    1 Inhaler    Inhale 1-2 puffs into the lungs every 4 hours as needed for shortness of breath / dyspnea    Intermittent asthma, uncomplicated       atorvastatin 40 MG tablet    LIPITOR    90 tablet    Take 1 tablet (40 mg) by mouth daily    Hyperlipidemia LDL goal <130       beclomethasone 80 MCG/ACT Inhaler    QVAR    3 Inhaler    Inhale 1 puff into the lungs 2 times daily    Mild persistent asthma without complication       CLARITIN 10 MG tablet   Generic drug:  loratadine      Take 1 tablet (10 mg) by mouth daily        fluticasone 50 MCG/ACT spray    FLONASE    1 Bottle    Spray 2 sprays into both nostrils daily    Allergic rhinitis due to animal dander       lisinopril 20 MG tablet    PRINIVIL/ZESTRIL    90 tablet    Take 1 tablet (20 mg) by mouth daily    Hypertension goal BP (blood pressure) < 150/90       mometasone 0.1 % cream    ELOCON    60 g    Apply  topically.    Contact dermatitis and other eczema, due to unspecified cause       order for DME     1 Units    Equipment being ordered: Auto CPAP 11-18    FLOR (obstructive sleep apnea)       sinus rinse bottle      Spray 1 Bottle in nostril daily as needed.    Head ache       TYLENOL 500 MG tablet   Generic drug:  acetaminophen      Take 500 mg by mouth every 4 hours as needed for pain Reported on 5/10/2017        vitamin D 2000 UNITS tablet      Take 2,000 Units by mouth daily        vitamin D 40088 UNIT capsule    ERGOCALCIFEROL     8 capsule    Take 1 capsule (50,000 Units) by mouth every 7 days for 8 doses . Then, take over-the-counter vitamin D 2000 units daily indefinitely (no refills)    Vitamin D deficiency

## 2017-07-24 ENCOUNTER — RADIANT APPOINTMENT (OUTPATIENT)
Dept: MRI IMAGING | Facility: CLINIC | Age: 72
End: 2017-07-24
Attending: PHYSICIAN ASSISTANT
Payer: COMMERCIAL

## 2017-07-24 DIAGNOSIS — M48.061 LUMBAR SPINAL STENOSIS: ICD-10-CM

## 2017-07-24 PROCEDURE — 72148 MRI LUMBAR SPINE W/O DYE: CPT | Performed by: RADIOLOGY

## 2017-07-26 ENCOUNTER — TELEPHONE (OUTPATIENT)
Dept: NURSING | Facility: CLINIC | Age: 72
End: 2017-07-26

## 2017-07-31 ENCOUNTER — TELEPHONE (OUTPATIENT)
Dept: ORTHOPEDICS | Facility: CLINIC | Age: 72
End: 2017-07-31

## 2017-07-31 NOTE — TELEPHONE ENCOUNTER
Called patient back and let her know Dr. Santana would like her to come in to discuss her MRI results so they can decide where to go from their.    Orin Rios MA

## 2017-07-31 NOTE — TELEPHONE ENCOUNTER
Reason for Call:  Request for results:    Name of test or procedure: MRI    Date of test of procedure: 07/24/17    Location of the test or procedure: [facility]    OK to leave the result message on voice mail or with a family member? YES    Phone number Patient can be reached at:  Home number on file [phone] (home) or Cell number on file:    No relevant phone numbers on file.       Additional comments: patient has not received a call in regards to the MRI results. Patient requesting a call back    Call taken on 7/31/2017 at 2:04 PM by Carly Quezada

## 2017-08-01 ENCOUNTER — RADIANT APPOINTMENT (OUTPATIENT)
Dept: GENERAL RADIOLOGY | Facility: CLINIC | Age: 72
End: 2017-08-01
Attending: PHYSICIAN ASSISTANT
Payer: COMMERCIAL

## 2017-08-01 DIAGNOSIS — M48.061 LUMBAR SPINAL STENOSIS: ICD-10-CM

## 2017-08-01 PROCEDURE — 62323 NJX INTERLAMINAR LMBR/SAC: CPT | Performed by: RADIOLOGY

## 2017-08-01 RX ORDER — IOPAMIDOL 408 MG/ML
10 INJECTION, SOLUTION INTRATHECAL ONCE
Status: COMPLETED | OUTPATIENT
Start: 2017-08-01 | End: 2017-08-01

## 2017-08-01 RX ORDER — BUPIVACAINE HYDROCHLORIDE 5 MG/ML
10 INJECTION, SOLUTION PERINEURAL ONCE
Status: COMPLETED | OUTPATIENT
Start: 2017-08-01 | End: 2017-08-01

## 2017-08-01 RX ORDER — METHYLPREDNISOLONE ACETATE 80 MG/ML
80 INJECTION, SUSPENSION INTRA-ARTICULAR; INTRALESIONAL; INTRAMUSCULAR; SOFT TISSUE ONCE
Status: COMPLETED | OUTPATIENT
Start: 2017-08-01 | End: 2017-08-01

## 2017-08-01 RX ADMIN — METHYLPREDNISOLONE ACETATE 80 MG: 80 INJECTION, SUSPENSION INTRA-ARTICULAR; INTRALESIONAL; INTRAMUSCULAR; SOFT TISSUE at 11:44

## 2017-08-01 RX ADMIN — IOPAMIDOL 4 ML: 408 INJECTION, SOLUTION INTRATHECAL at 11:43

## 2017-08-01 RX ADMIN — BUPIVACAINE HYDROCHLORIDE 10 MG: 5 INJECTION, SOLUTION PERINEURAL at 11:43

## 2017-08-07 ENCOUNTER — OFFICE VISIT (OUTPATIENT)
Dept: ORTHOPEDICS | Facility: CLINIC | Age: 72
End: 2017-08-07
Payer: COMMERCIAL

## 2017-08-07 VITALS — RESPIRATION RATE: 16 BRPM | BODY MASS INDEX: 51.43 KG/M2 | TEMPERATURE: 98.2 F | WEIGHT: 293 LBS

## 2017-08-07 DIAGNOSIS — M48.061 LUMBAR SPINAL STENOSIS: Primary | ICD-10-CM

## 2017-08-07 PROCEDURE — 99212 OFFICE O/P EST SF 10 MIN: CPT | Performed by: ORTHOPAEDIC SURGERY

## 2017-08-07 NOTE — MR AVS SNAPSHOT
"              After Visit Summary   8/7/2017    Chelo Brooks    MRN: [id number]           Patient Information     Date Of Birth          1945        Visit Information        Provider Department      8/7/2017 1:30 PM Dieter Santana MD [facility]        Today's Diagnoses     Lumbar spinal stenosis    -  1       Follow-ups after your visit        Your next 10 appointments already scheduled     Aug 11, 2017 11:00 AM CDT   Nurse Only with HEALTH  - REGION 1   [facility] ([address] [id number] [phone]              Who to contact     If you have questions or need follow up information about today's clinic visit or your schedule please contact [facility] directly at [phone].  Normal or non-critical lab and imaging results will be communicated to you by MyChart, letter or phone within 4 business days after the clinic has received the results. If you do not hear from us within 7 days, please contact the clinic through MyChart or phone. If you have a critical or abnormal lab result, we will notify you by phone as soon as possible.  Submit refill requests through [employer] or call your pharmacy and they will forward the refill request to us. Please allow 3 business days for your refill to be completed.          Additional Information About Your Visit        MyChart Information     [employer] lets you send messages to your doctor, view your test results, renew your prescriptions, schedule appointments and more. To sign up, go to [email or web address] . Click on \"Log in\" on the left side of the screen, which will take you to the Welcome page. Then click on \"Sign up Now\" on the right side of the page.     You will be asked to enter the access code listed below, as well as some personal information. Please follow the directions to create your username and password.     Your access code is: [email or web address]  Expires: " 2017  2:46 PM     Your access code will  in 90 days. If you need help or a new code, please call your [facility] clinic or [phone].        Care EveryWhere ID     This is your Care EveryWhere ID. This could be used by other organizations to access your [facility] medical records  [id number]        Your Vitals Were     Temperature Respirations BMI (Body Mass Index)             98.2  F (36.8  C) (Oral) 16 51.43 kg/m2          Blood Pressure from Last 3 Encounters:   17 142/80   17 (!) 148/92   05/10/17 188/80    Weight from Last 3 Encounters:   17 (!) 149 kg (328 lb 6.4 oz)   17 (!) 149.7 kg (330 lb)   17 (!) 148.3 kg (327 lb)              Today, you had the following     No orders found for display       Primary Care Provider Office Phone # Fax #    Vita Zavala [phone]       [address] [address]        Equal Access to Services     [facility]: Hadii aad ku hadasho Soomaali, waaxda luqadaha, qaybta kaalmada adeegyada, lily echavarria . So [facility] [phone].    ATENCIÓN: Si habla español, tiene a luque disposición servicios gratuitos de asistencia lingüística. Llame al [phone].    We comply with applicable federal civil rights laws and [place] laws. We do not discriminate on the basis of race, color, national origin, age, disability sex, sexual orientation or gender identity.            Thank you!     Thank you for choosing [facility]  for your care. Our goal is always to provide you with excellent care. Hearing back from our patients is one way we can continue to improve our services. Please take a few minutes to complete the written survey that you may receive in the mail after your visit with us. Thank you!             Your Updated Medication List - Protect others around you: Learn how to safely use, store and throw away your medicines at www.[employer].org.           This list is accurate as of: 8/7/17  2:46 PM.  Always use your most recent med list.                   Brand Name Dispense Instructions for use Diagnosis    albuterol 108 (90 BASE) MCG/ACT Inhaler    PROAIR HFA/PROVENTIL HFA/VENTOLIN HFA    1 Inhaler    Inhale 1-2 puffs into the lungs every 4 hours as needed for shortness of breath / dyspnea    Intermittent asthma, uncomplicated       atorvastatin 40 MG tablet    LIPITOR    90 tablet    Take 1 tablet (40 mg) by mouth daily    Hyperlipidemia LDL goal <130       beclomethasone 80 MCG/ACT Inhaler    QVAR    3 Inhaler    Inhale 1 puff into the lungs 2 times daily    Mild persistent asthma without complication       CLARITIN 10 MG tablet   Generic drug:  loratadine      Take 1 tablet (10 mg) by mouth daily        fluticasone 50 MCG/ACT spray    FLONASE    1 Bottle    Spray 2 sprays into both nostrils daily    Allergic rhinitis due to animal dander       lisinopril 20 MG tablet    PRINIVIL/ZESTRIL    90 tablet    Take 1 tablet (20 mg) by mouth daily    Hypertension goal BP (blood pressure) < 150/90       mometasone 0.1 % cream    ELOCON    60 g    Apply  topically.    Contact dermatitis and other eczema, due to unspecified cause       order for DME     1 Units    Equipment being ordered: Auto CPAP 11-18    FLOR (obstructive sleep apnea)       sinus rinse bottle      Spray 1 Bottle in nostril daily as needed.    Head ache       TYLENOL 500 MG tablet   Generic drug:  acetaminophen      Take 500 mg by mouth every 4 hours as needed for pain Reported on 5/10/2017        vitamin D 2000 UNITS tablet      Take 2,000 Units by mouth daily

## 2017-08-07 NOTE — LETTER
8/7/2017         RE: Chelo Brooks  [address] [facility] [address]        Dear Colleague,    Thank you for referring your patient, Chelo Brooks, to the [facility]. Please see a copy of my visit note below.    Follow up lumbar MRI and epidural steroid injection. The lumbar MRI showed significant spinal stenosis at L4 5 she received epidural steroid injection one week ago. She had very good relief the first day while the lidocaine was active. Since then she has had return of much of her pain.  She is wondering what else to do at this point.  Her pain is still consistent with sciatica. She does have tingling into the feet.    I explained that spinal stenosis could be a cause of her leg symptoms. The fact that the legs felt much better after the epidural steroid injection likely verifies this.  I recommended getting another week for the steroid to fully affect. If she still has significant problems that point they could consider a repeat epidural steroid injection. Since I do not deal with backs, I recommended she follow-up with Dr. Vita Zavala  for further orders or referrals..      Again, thank you for allowing me to participate in the care of your patient.        Sincerely,        Dieter Santana MD

## 2017-08-07 NOTE — PROGRESS NOTES
Follow up lumbar MRI and epidural steroid injection. The lumbar MRI showed significant spinal stenosis at L4 5 she received epidural steroid injection one week ago. She had very good relief the first day while the lidocaine was active. Since then she has had return of much of her pain.  She is wondering what else to do at this point.  Her pain is still consistent with sciatica. She does have tingling into the feet.    I explained that spinal stenosis could be a cause of her leg symptoms. The fact that the legs felt much better after the epidural steroid injection likely verifies this.  I recommended getting another week for the steroid to fully affect. If she still has significant problems that point they could consider a repeat epidural steroid injection. Since I do not deal with backs, I recommended she follow-up with Dr. Vita Zavala  for further orders or referrals..

## 2017-08-07 NOTE — NURSING NOTE
"Chief Complaint   Patient presents with     RECHECK     right foot MRI results       Initial Temp 98.2  F (36.8  C) (Oral)  Resp 16  Wt (!) 149 kg (328 lb 6.4 oz)  BMI 51.43 kg/m2 Estimated body mass index is 51.43 kg/(m^2) as calculated from the following:    Height as of 7/17/17: 1.702 m (5' 7\").    Weight as of this encounter: 149 kg (328 lb 6.4 oz).  Medication Reconciliation: darlene Sullivan CMA 8/7/2017 1:26 PM      "

## 2017-08-11 ENCOUNTER — ALLIED HEALTH/NURSE VISIT (OUTPATIENT)
Dept: NURSING | Facility: CLINIC | Age: 72
End: 2017-08-11

## 2017-08-11 DIAGNOSIS — E66.01 MORBID OBESITY DUE TO EXCESS CALORIES (H): Primary | Chronic | ICD-10-CM

## 2017-08-11 PROCEDURE — 99207 ZZC HEALTH COACHING, NO CHARGE: CPT

## 2017-08-11 NOTE — NURSING NOTE
August 11, 2017    [facility]  [address]  [place] [address]  [phone]  Health Coaching Progress Note    Patient Name: Chelo Brooks Date: August 11, 2017      Session Length: 60      DATA    PRM Master Survey Scores Reviewed: Yes    Core Healthy Days Survey:    Would you say that in general your health is: : Good    TROY Score (Last Two) 1/25/2017 8/11/2017   TROY Raw Score 29 27   Activation Score 52.9 47.4   TROY Level 2 2       PHQ-2 Score 8/11/2017 5/30/2017   PHQ-2 Total Score Interpretation - Positive if 3 or more points; Administer PHQ-9 if positive 1 0       PHQ-9 SCORE 1/25/2017   Total Score 4       Treatment Objective(s) Addressed in This Session:  Target Behavior(s): diet/weight loss    Current Stressors / Issues:  Chelo mentions a lot of stressors today.  She notes that she takes care of her brothers house and she has been busy with lots of [employer] projects and patient has also been having back pain which has been limiting her activity.      What Patient Does Well:   Patient states that she has been eating greek yogurt for breakfast sometimes  Previous Successes:   Chelo mentions that she lost 59 pounds on weight watchers   Areas in Need of Improvement:   Today Chelo would like to focus on her breakfast.  She mentions that sometimes she eats greek yogurt, but sometimes she just eats pudding or donuts or cereal.  Today we had a long discussion of the importance of including protein with breakfast. We discussed some protein options for breakfast and writer made a list of protein rich breakfast foods.  We also discussed limiting desserts in the morning, Chelo set a goal of not eating sweets before noon 5 days out of the week, she is going to keep a log or kathy on her calendar when she meets this goal.  We discussed the importance of making small lifestyle changes for weight loss.   Barriers to Change:   Stress with [employer] projects,  brothers house and new injury.  Reasons for Change:   Chelo would like to change to look and feel healthier  Plan/Goal for the Next 4 Weeks:   GOAL #1: Choose one sources of protein for breakfast each day from list provided  GOAL #1 Progress Toward Goal: 0%  GOAL #2: Avoid desserts before noon 5 days out of the week  GOAL #2 Progress Toward Goal: 0%    Intervention:  Motivational Interviewing    MI Intervention: Expressed Empathy/Understanding, Supported Autonomy, Collaboration, Evocation, Permission to raise concern or advise, Open-ended questions, Reflections: simple and complex, Change talk (evoked) and Reframe     Change Talk Expressed by the Patient: Desire to change Ability to change Reasons to change Need to change Committment to change Activation Taking steps    Provider Response to Change Talk: E - Evoked more info from patient about behavior change, A - Affirmed patient's thoughts, decisions, or attempts at behavior change, R - Reflected patient's change talk and S - Summarized patient's change talk statements    Assessment / Progress on Treatment Objective(s) / Homework:  New Objective established this session - PREPARATION (Decided to change - considering how); Intervened by negotiating a change plan and determining options / strategies for behavior change, identifying triggers, exploring social supports, and working towards setting a date to begin behavior change         Plan: (Homework, other):  Patient was encouraged to continue to seek condition-related information and education, as well as schedule a follow up appointment with the Health  in 4 weeks. Patient has set self-identified goals and will monitor progress until the next appointment.  Health Coaching follow-up scheduled for September 8th at 11:00 am.      Mavis Frankel, ROMI, LD

## 2017-08-11 NOTE — MR AVS SNAPSHOT
After Visit Summary   8/11/2017    Chelo Brooks    MRN: [id number]           Patient Information     Date Of Birth          1945        Visit Information        Provider Department      8/11/2017 11:00 AM HEALTH  - REGION 1 [facility]        Care Instructions      August 11, 2017    [facility]  [address]  [place] [address]  [phone]  Health Coaching Progress Note    Patient Name: Chelo Brooks Date: August 11, 2017          Plan: (Homework, other):  Patient was encouraged to continue to seek condition-related information and education, as well as schedule a follow up appointment with the Health  in 4 weeks. Patient has set self-identified goals and will monitor progress until the next appointment.  Follow-up scheduled for September 8th at 11:00 am.  Mavis Frankel       Goals:  1) Choose one source of protein from list every day  2) Avoid sweets before noon 5 days out of the week.  You can keep track using a calendar or log book.                Follow-ups after your visit        Who to contact     If you have questions or need follow up information about today's clinic visit or your schedule please contact [facility] directly at [phone].  Normal or non-critical lab and imaging results will be communicated to you by Magnetichart, letter or phone within 4 business days after the clinic has received the results. If you do not hear from us within 7 days, please contact the clinic through HealthTellt or phone. If you have a critical or abnormal lab result, we will notify you by phone as soon as possible.  Submit refill requests through [employer] or call your pharmacy and they will forward the refill request to us. Please allow 3 business days for your refill to be completed.          Additional Information About Your Visit        [employer] Information     [employer] lets you send messages to  "your doctor, view your test results, renew your prescriptions, schedule appointments and more. To sign up, go to [email or web address] . Click on \"Log in\" on the left side of the screen, which will take you to the Welcome page. Then click on \"Sign up Now\" on the right side of the page.     You will be asked to enter the access code listed below, as well as some personal information. Please follow the directions to create your username and password.     Your access code is: [email or web address]  Expires: 2017  2:46 PM     Your access code will  in 90 days. If you need help or a new code, please call your [facility] clinic or [phone].        Care EveryWhere ID     This is your Care EveryWhere ID. This could be used by other organizations to access your [facility] medical records  [id number]         Blood Pressure from Last 3 Encounters:   17 142/80   17 (!) 148/92   05/10/17 188/80    Weight from Last 3 Encounters:   17 (!) 328 lb 6.4 oz (149 kg)   17 (!) 330 lb (149.7 kg)   17 (!) 327 lb (148.3 kg)              Today, you had the following     No orders found for display       Primary Care Provider Office Phone # Fax #    Vita Zavala [phone]       [address] [address]        Equal Access to Services     [facility]: Hadii aad ku hadasho Soomaali, waaxda luqadaha, qaybta kaalmada adeegyada, waxay michelle echavarria . So [facility] [phone].    ATENCIÓN: Si habla español, tiene a luque disposición servicios gratuitos de asistencia lingüística. Llame al [phone].    We comply with applicable federal civil rights laws and [place] laws. We do not discriminate on the basis of race, color, national origin, age, disability sex, sexual orientation or gender identity.            Thank you!     Thank you for choosing [facility]  for your care. Our goal is always to provide you with excellent care. Hearing back " from our patients is one way we can continue to improve our services. Please take a few minutes to complete the written survey that you may receive in the mail after your visit with us. Thank you!             Your Updated Medication List - Protect others around you: Learn how to safely use, store and throw away your medicines at www.disposemymeds.org.          This list is accurate as of: 8/11/17 11:52 AM.  Always use your most recent med list.                   Brand Name Dispense Instructions for use Diagnosis    albuterol 108 (90 BASE) MCG/ACT Inhaler    PROAIR HFA/PROVENTIL HFA/VENTOLIN HFA    1 Inhaler    Inhale 1-2 puffs into the lungs every 4 hours as needed for shortness of breath / dyspnea    Intermittent asthma, uncomplicated       atorvastatin 40 MG tablet    LIPITOR    90 tablet    Take 1 tablet (40 mg) by mouth daily    Hyperlipidemia LDL goal <130       beclomethasone 80 MCG/ACT Inhaler    QVAR    3 Inhaler    Inhale 1 puff into the lungs 2 times daily    Mild persistent asthma without complication       CLARITIN 10 MG tablet   Generic drug:  loratadine      Take 1 tablet (10 mg) by mouth daily        fluticasone 50 MCG/ACT spray    FLONASE    1 Bottle    Spray 2 sprays into both nostrils daily    Allergic rhinitis due to animal dander       lisinopril 20 MG tablet    PRINIVIL/ZESTRIL    90 tablet    Take 1 tablet (20 mg) by mouth daily    Hypertension goal BP (blood pressure) < 150/90       mometasone 0.1 % cream    ELOCON    60 g    Apply  topically.    Contact dermatitis and other eczema, due to unspecified cause       order for DME     1 Units    Equipment being ordered: Auto CPAP 11-18    FLOR (obstructive sleep apnea)       sinus rinse bottle      Spray 1 Bottle in nostril daily as needed.    Head ache       TYLENOL 500 MG tablet   Generic drug:  acetaminophen      Take 500 mg by mouth every 4 hours as needed for pain Reported on 5/10/2017        vitamin D 2000 UNITS tablet      Take 2,000 Units  by mouth daily

## 2017-08-11 NOTE — PATIENT INSTRUCTIONS
August 11, 2017    [facility]  [address]  [place] [address]  [phone]  Health Coaching Progress Note    Patient Name: Chelo Brooks Date: August 11, 2017          Plan: (Homework, other):  Patient was encouraged to continue to seek condition-related information and education, as well as schedule a follow up appointment with the Health  in 4 weeks. Patient has set self-identified goals and will monitor progress until the next appointment.  Follow-up scheduled for September 8th at 11:00 am.  Mavis Frankel       Goals:  1) Choose one source of protein from list every day  2) Avoid sweets before noon 5 days out of the week.  You can keep track using a calendar or log book.

## 2017-09-13 ENCOUNTER — ALLIED HEALTH/NURSE VISIT (OUTPATIENT)
Dept: NURSING | Facility: CLINIC | Age: 72
End: 2017-09-13

## 2017-09-13 DIAGNOSIS — E66.01 MORBID OBESITY DUE TO EXCESS CALORIES (H): Primary | Chronic | ICD-10-CM

## 2017-09-13 PROCEDURE — 99207 ZZC HEALTH COACHING, NO CHARGE: CPT

## 2017-09-13 NOTE — PATIENT INSTRUCTIONS
September 13, 2017    [facility]  [address]  [place] [address]  [phone]  Health Coaching Progress Note    Patient Name: Chelo Brooks Date: September 13, 2017          Plan: (Homework, other):  Patient was encouraged to continue to seek condition-related information and education, as well as schedule a follow up appointment with the Health  in 4 weeks. Patient has set self-identified goals and will monitor progress until the next appointment.  Follow-up scheduled for October 10th at 10:30 am.  Mavis Frankel, ROMI, LD       Goals:  1) Try choose one protein for breakfast  2) No desserts before noon  3) No eating after 7:30

## 2017-09-13 NOTE — NURSING NOTE
September 13, 2017    [facility]  [address]  [place] [address]  [phone]  Health Coaching Progress Note    Patient Name: Chelo Brooks Date: September 13, 2017      Session Length: 30      DATA    PRM Master Survey Scores Reviewed: Yes    Core Healthy Days Survey:         TROY Score (Last Two) 1/25/2017 8/11/2017   TROY Raw Score 29 27   Activation Score 52.9 47.4   TROY Level 2 2       PHQ-2 Score 8/11/2017 5/30/2017   PHQ-2 Total Score Interpretation - Positive if 3 or more points; Administer PHQ-9 if positive 1 0       PHQ-9 SCORE 1/25/2017   Total Score 4       Treatment Objective(s) Addressed in This Session:  Target Behavior(s): diet/weight loss    Current Stressors / Issues:  Chelo mentions a lot of stressors today.  She notes that she takes care of her brothers house and she has been busy with lots of sewing projects and patient has also been having back pain which has been limiting her activity.      What Patient Does Well:   Chelo notes that she is doing great with not eating sweets until after noon.  She has been keeping track in her journal of the days that she does well with this goal.  Chelo also mentions that most days she is getting a source of protein for breakfast.   Previous Successes:   Chelo feels successful so far with her goals  Areas in Need of Improvement:   Chelo is continuing to work on making her goals into a habit. Patient has been noticing that she is much more aware of her food choices and thinking more about the things that she eats.   Barriers to Change:   Stress with sewing projects, brothers house and new injury.  Reasons for Change:   Chelo would like to change to look and feel healthier  Plan/Goal for the Next 4 Weeks:   GOAL #1: Choose one source of protein for breakfast  GOAL #1 Progress Toward Goal: 25%  GOAL #2: Avoid desserts before noon  GOAL #2 Progress Toward Goal: 50%  GOAL #3: Avoid eating after  7:30 pm  GOAL #3 Progress Toward Goal: 0%    Intervention:  Motivational Interviewing    MI Intervention: Expressed Empathy/Understanding, Supported Autonomy, Collaboration, Evocation, Permission to raise concern or advise, Open-ended questions, Reflections: simple and complex, Change talk (evoked) and Reframe     Change Talk Expressed by the Patient: Desire to change Ability to change Reasons to change Need to change Committment to change Activation Taking steps    Provider Response to Change Talk: E - Evoked more info from patient about behavior change, A - Affirmed patient's thoughts, decisions, or attempts at behavior change, R - Reflected patient's change talk and S - Summarized patient's change talk statements    Assessment / Progress on Treatment Objective(s) / Homework:  Minimal progress - ACTION (Actively working towards change); Intervened by reinforcing change plan / affirming steps taken  New Objective established this session - PREPARATION (Decided to change - considering how); Intervened by negotiating a change plan and determining options / strategies for behavior change, identifying triggers, exploring social supports, and working towards setting a date to begin behavior change         Plan: (Homework, other):  Patient was encouraged to continue to seek condition-related information and education, as well as schedule a follow up appointment with the Health  in 4 weeks. Patient has set self-identified goals and will monitor progress until the next appointment.  Follow-up scheduled for October 10th at 11:30 am at the [facility].      Mavis Frankel, RD, LD

## 2017-09-13 NOTE — MR AVS SNAPSHOT
After Visit Summary   9/13/2017    Chelo Brooks    MRN: [id number]           Patient Information     Date Of Birth          1945        Visit Information        Provider Department      9/13/2017 10:30 AM HEALTH  - REGION 1 [facility]        Care Instructions      September 13, 2017    [facility]  [address]  [place] [address]  [phone]  Health Coaching Progress Note    Patient Name: Chelo Brooks Date: September 13, 2017          Plan: (Homework, other):  Patient was encouraged to continue to seek condition-related information and education, as well as schedule a follow up appointment with the Health  in 4 weeks. Patient has set self-identified goals and will monitor progress until the next appointment.  Follow-up scheduled for October 10th at 10:30 am.  Mavis Frankel, ROMI, LD       Goals:  1) Try choose one protein for breakfast  2) No desserts before noon  3) No eating after 7:30              Follow-ups after your visit        Who to contact     If you have questions or need follow up information about today's clinic visit or your schedule please contact [facility] directly at [phone].  Normal or non-critical lab and imaging results will be communicated to you by [employer], letter or phone within 4 business days after the clinic has received the results. If you do not hear from us within 7 days, please contact the clinic through [employer] or phone. If you have a critical or abnormal lab result, we will notify you by phone as soon as possible.  Submit refill requests through [employer] or call your pharmacy and they will forward the refill request to us. Please allow 3 business days for your refill to be completed.          Additional Information About Your Visit        [employer] Information     [employer] lets you send messages to your doctor, view your test results, renew your  "prescriptions, schedule appointments and more. To sign up, go to [email or web address] . Click on \"Log in\" on the left side of the screen, which will take you to the Welcome page. Then click on \"Sign up Now\" on the right side of the page.     You will be asked to enter the access code listed below, as well as some personal information. Please follow the directions to create your username and password.     Your access code is: [email or web address]  Expires: 2017  2:46 PM     Your access code will  in 90 days. If you need help or a new code, please call your [place] clinic or [phone].        Care EveryWhere ID     This is your Care EveryWhere ID. This could be used by other organizations to access your [place] medical records  [id number]         Blood Pressure from Last 3 Encounters:   17 142/80   17 (!) 148/92   05/10/17 188/80    Weight from Last 3 Encounters:   17 (!) 328 lb 6.4 oz (149 kg)   17 (!) 330 lb (149.7 kg)   17 (!) 327 lb (148.3 kg)              Today, you had the following     No orders found for display       Primary Care Provider Office Phone # Fax #    [facility] [phone] [address] [address]        Equal Access to Services     [employer]: Hadii trip ku hadasho Soomaali, waaxda luqadaha, qaybta kaalmada adeegyada, lily echavarria . So [facility] [phone].    ATENCIÓN: Si habla español, tiene a luque disposición servicios gratuitos de asistencia lingüística. Llame al [phone].    We comply with applicable federal civil rights laws and [place] laws. We do not discriminate on the basis of race, color, national origin, age, disability sex, sexual orientation or gender identity.            Thank you!     Thank you for choosing [facility]  for your care. Our goal is always to provide you with excellent care. Hearing back from our patients is one way we can continue to " improve our services. Please take a few minutes to complete the written survey that you may receive in the mail after your visit with us. Thank you!             Your Updated Medication List - Protect others around you: Learn how to safely use, store and throw away your medicines at www.disposemymeds.org.          This list is accurate as of: 9/13/17 10:56 AM.  Always use your most recent med list.                   Brand Name Dispense Instructions for use Diagnosis    albuterol 108 (90 BASE) MCG/ACT Inhaler    PROAIR HFA/PROVENTIL HFA/VENTOLIN HFA    1 Inhaler    Inhale 1-2 puffs into the lungs every 4 hours as needed for shortness of breath / dyspnea    Intermittent asthma, uncomplicated       atorvastatin 40 MG tablet    LIPITOR    90 tablet    Take 1 tablet (40 mg) by mouth daily    Hyperlipidemia LDL goal <130       beclomethasone 80 MCG/ACT Inhaler    QVAR    3 Inhaler    Inhale 1 puff into the lungs 2 times daily    Mild persistent asthma without complication       CLARITIN 10 MG tablet   Generic drug:  loratadine      Take 1 tablet (10 mg) by mouth daily        fluticasone 50 MCG/ACT spray    FLONASE    1 Bottle    Spray 2 sprays into both nostrils daily    Allergic rhinitis due to animal dander       lisinopril 20 MG tablet    PRINIVIL/ZESTRIL    90 tablet    Take 1 tablet (20 mg) by mouth daily    Hypertension goal BP (blood pressure) < 150/90       mometasone 0.1 % cream    ELOCON    60 g    Apply  topically.    Contact dermatitis and other eczema, due to unspecified cause       order for DME     1 Units    Equipment being ordered: Auto CPAP 11-18    FLOR (obstructive sleep apnea)       sinus rinse bottle      Spray 1 Bottle in nostril daily as needed.    Head ache       TYLENOL 500 MG tablet   Generic drug:  acetaminophen      Take 500 mg by mouth every 4 hours as needed for pain Reported on 5/10/2017        vitamin D 2000 UNITS tablet      Take 2,000 Units by mouth daily

## 2017-09-29 ENCOUNTER — OFFICE VISIT (OUTPATIENT)
Dept: FAMILY MEDICINE | Facility: CLINIC | Age: 72
End: 2017-09-29
Payer: COMMERCIAL

## 2017-09-29 VITALS
HEIGHT: 67 IN | BODY MASS INDEX: 45.99 KG/M2 | DIASTOLIC BLOOD PRESSURE: 86 MMHG | TEMPERATURE: 96.9 F | WEIGHT: 293 LBS | SYSTOLIC BLOOD PRESSURE: 136 MMHG | HEART RATE: 99 BPM | OXYGEN SATURATION: 97 %

## 2017-09-29 DIAGNOSIS — J45.30 MILD PERSISTENT ASTHMA WITHOUT COMPLICATION: ICD-10-CM

## 2017-09-29 DIAGNOSIS — J30.81 ALLERGIC RHINITIS DUE TO ANIMAL DANDER: Chronic | ICD-10-CM

## 2017-09-29 DIAGNOSIS — J01.90 ACUTE NON-RECURRENT SINUSITIS, UNSPECIFIED LOCATION: Primary | ICD-10-CM

## 2017-09-29 DIAGNOSIS — Z23 NEED FOR PROPHYLACTIC VACCINATION AND INOCULATION AGAINST INFLUENZA: ICD-10-CM

## 2017-09-29 DIAGNOSIS — I10 HYPERTENSION GOAL BP (BLOOD PRESSURE) < 150/90: Chronic | ICD-10-CM

## 2017-09-29 PROCEDURE — 90662 IIV NO PRSV INCREASED AG IM: CPT | Performed by: NURSE PRACTITIONER

## 2017-09-29 PROCEDURE — 99207 C PAF COMPLETED  NO CHARGE: CPT | Performed by: NURSE PRACTITIONER

## 2017-09-29 PROCEDURE — 99214 OFFICE O/P EST MOD 30 MIN: CPT | Mod: 25 | Performed by: NURSE PRACTITIONER

## 2017-09-29 PROCEDURE — G0008 ADMIN INFLUENZA VIRUS VAC: HCPCS | Performed by: NURSE PRACTITIONER

## 2017-09-29 RX ORDER — LISINOPRIL 20 MG/1
20 TABLET ORAL DAILY
Qty: 90 TABLET | Refills: 3 | Status: SHIPPED | OUTPATIENT
Start: 2017-09-29 | End: 2018-01-10 | Stop reason: ALTCHOICE

## 2017-09-29 RX ORDER — FLUTICASONE PROPIONATE 50 MCG
2 SPRAY, SUSPENSION (ML) NASAL DAILY
Qty: 1 BOTTLE | Refills: 11 | Status: SHIPPED | OUTPATIENT
Start: 2017-09-29 | End: 2018-01-15

## 2017-09-29 NOTE — PROGRESS NOTES
SUBJECTIVE:   Chelo Brooks is a 72 year old female who presents to clinic today for the following health issues:      ENT Symptoms             Symptoms: cc Present Absent Comment   Fever/Chills  x  Had it previously   Fatigue  x     Muscle Aches   x    Eye Irritation   x    Sneezing  x     Nasal Avery/Drg  x     Sinus Pressure/Pain  x     Loss of smell  x     Dental pain  x     Sore Throat  x  Due to drainage   Swollen Glands   x    Ear Pain/Fullness   x    Cough  x     Wheeze  x     Chest Pain  x  Due to drainage   Shortness of breath  x     Rash   x    Other  x  headache     Symptom duration:  6 days    Symptom severity:  moderate   Treatments tried:  Flonase   Contacts:  None     Had low grade fevers initially, none for the last 2 days.  Feels more SOB than usual, has some wheezing when laying down, chest tightness. Has only needed rescue inhaler twice in the past week and denies nighttime awakening due to asthma. Has not been using her Qvar- does not want to take a daily inhaler and it's expensive.      Problem list and histories reviewed & adjusted, as indicated.  Additional history: as documented    Patient Active Problem List   Diagnosis     Reflux esophagitis     DJD (degenerative joint disease)     Advanced directives, counseling/discussion     Allergic rhinitis due to animal dander     Health Care Home     Hyperlipidemia LDL goal <130     Hypertension goal BP (blood pressure) < 150/90     Morbid obesity due to excess calories (H)     Osteopenia     FLOR (obstructive sleep apnea)- severe (AHI 89)     Mild persistent asthma without complication     Vitamin D deficiency     Lumbar spinal stenosis     Combined forms of age-related cataract of both eyes     Choroidal nevus, left eye     Past Surgical History:   Procedure Laterality Date     C TOTAL KNEE ARTHROPLASTY  3/2000    right     C TOTAL KNEE ARTHROPLASTY  6/8/12    left     C VAGINAL HYSTERECTOMY  1977    benign, prolapse, ovaries remain       "  Social History   Substance Use Topics     Smoking status: Never Smoker     Smokeless tobacco: Never Used     Alcohol use 0.0 oz/week     0 Standard drinks or equivalent per week      Comment: very rare       Family History   Problem Relation Age of Onset     Circulatory Father      d. DVT     HEART DISEASE Father      pacer     DIABETES Mother      Hypertension Mother      C.A.D. Paternal Uncle      MI      HEART DISEASE Brother 48     pacer      DIABETES Maternal Grandmother      Hypertension Maternal Grandmother      DIABETES Maternal Aunt      Hypertension Maternal Aunt      Cancer - colorectal Maternal Grandfather      C.A.D. Maternal Aunt      MI     Glaucoma No family hx of      Macular Degeneration No family hx of              Reviewed and updated as needed this visit by clinical staff     Reviewed and updated as needed this visit by Provider         ROS:  Constitutional, HEENT, cardiovascular, pulmonary systems are negative, except as otherwise noted.      OBJECTIVE:   /86  Pulse 99  Temp 96.9  F (36.1  C) (Oral)  Ht 5' 7\" (1.702 m)  Wt (!) 327 lb (148.3 kg)  SpO2 97%  BMI 51.22 kg/m2  Body mass index is 51.22 kg/(m^2).  GENERAL: healthy, alert and no distress  EYES: Eyes grossly normal to inspection, PERRL and conjunctivae and sclerae normal  HENT: normal cephalic/atraumatic, ear canals and TM's normal, nose and mouth without ulcers or lesions, oropharynx clear, oral mucous membranes moist and sinuses: not tender  NECK: no adenopathy, no asymmetry, masses, or scars and thyroid normal to palpation  RESP: lungs clear to auscultation - no rales, rhonchi or wheezes  CV: regular rate and rhythm, normal S1 S2, no S3 or S4, no murmur, click or rub, no peripheral edema and peripheral pulses strong    Diagnostic Test Results:  No results found for this or any previous visit (from the past 24 hour(s)).    ASSESSMENT/PLAN:         1. Acute non-recurrent sinusitis, unspecified location  Discussed viral " nature of infection- would not recommend antibiotics unless symptoms persist for 14 days or she develops a fever.     2. Hypertension goal BP (blood pressure) < 150/90  Controlled, medications refilled.  - lisinopril (PRINIVIL/ZESTRIL) 20 MG tablet; Take 1 tablet (20 mg) by mouth daily  Dispense: 90 tablet; Refill: 3    3. Allergic rhinitis due to animal dander  Continue Flonase  - fluticasone (FLONASE) 50 MCG/ACT spray; Spray 2 sprays into both nostrils daily  Dispense: 1 Bottle; Refill: 11    4. Mild persistent asthma without complication  Will switch to Arnuity and have her use coupon available at our pharmacy. Counseled regarding the importance of taking this inhaler regularly to prevent asthma flares. Needs to start today due to slight worsening of her asthma symptoms with the sinusitis. Not severe enough for Prednisone- reviewed warning signs of worsening asthma control.  - fluticasone furoate (ARNUITY ELLIPTA) 200 MCG/ACT inhalation powder; Inhale 1 puff into the lungs daily  Dispense: 1 Inhaler; Refill: 5    5. Need for prophylactic vaccination and inoculation against influenza    - FLU VACCINE, INCREASED ANTIGEN, PRESV FREE, AGE 65+ [67042]  - Vaccine Administration, Initial [95137]    Follow up 6 weeks with Primary Care Provider for asthma    MARIE Martinez [facility]

## 2017-09-29 NOTE — NURSING NOTE
"Chief Complaint   Patient presents with     URI       Initial /86  Pulse 99  Temp 96.9  F (36.1  C) (Oral)  Ht 5' 7\" (1.702 m)  Wt (!) 327 lb (148.3 kg)  SpO2 97%  BMI 51.22 kg/m2 Estimated body mass index is 51.22 kg/(m^2) as calculated from the following:    Height as of this encounter: 5' 7\" (1.702 m).    Weight as of this encounter: 327 lb (148.3 kg).  Medication Reconciliation: complete     Laurie Chery MA       "

## 2017-09-29 NOTE — MR AVS SNAPSHOT
"              After Visit Summary   9/29/2017    Chelo Brooks    MRN: [id number]           Patient Information     Date Of Birth          1945        Visit Information        Provider Department      9/29/2017 9:40 AM Latia Marie APRN CNP [facility]        Today's Diagnoses     Acute non-recurrent sinusitis, unspecified location    -  1    Hypertension goal BP (blood pressure) < 150/90        Allergic rhinitis due to animal dander        Mild persistent asthma without complication           Follow-ups after your visit        Your next 10 appointments already scheduled     Oct 10, 2017 10:30 AM CDT   Nurse Only with HEALTH  - REGION 1   [facility] ([facility])    [address] [address] [phone]              Who to contact     If you have questions or need follow up information about today's clinic visit or your schedule please contact [facility] directly at [phone].  Normal or non-critical lab and imaging results will be communicated to you by [employer], letter or phone within 4 business days after the clinic has received the results. If you do not hear from us within 7 days, please contact the clinic through [employer] or phone. If you have a critical or abnormal lab result, we will notify you by phone as soon as possible.  Submit refill requests through [employer] or call your pharmacy and they will forward the refill request to us. Please allow 3 business days for your refill to be completed.          Additional Information About Your Visit        [employer] Information     [employer] lets you send messages to your doctor, view your test results, renew your prescriptions, schedule appointments and more. To sign up, go to [email or web address] . Click on \"Log in\" on the left side of the screen, which will take you to the Welcome page. Then click on \"Sign up Now\" on the right side of the page.     You will be asked " "to enter the access code listed below, as well as some personal information. Please follow the directions to create your username and password.     Your access code is: [email or web address]  Expires: 2017  2:46 PM     Your access code will  in 90 days. If you need help or a new code, please call your [place] clinic or [phone].        Care EveryWhere ID     This is your Care EveryWhere ID. This could be used by other organizations to access your [place] medical records  [id number]        Your Vitals Were     Pulse Temperature Height Pulse Oximetry BMI (Body Mass Index)       99 96.9  F (36.1  C) (Oral) 5' 7\" (1.702 m) 97% 51.22 kg/m2        Blood Pressure from Last 3 Encounters:   17 136/86   17 142/80   17 (!) 148/92    Weight from Last 3 Encounters:   17 (!) 327 lb (148.3 kg)   17 (!) 328 lb 6.4 oz (149 kg)   17 (!) 330 lb (149.7 kg)              We Performed the Following     PAF COMPLETED          Today's Medication Changes          These changes are accurate as of: 17 10:12 AM.  If you have any questions, ask your nurse or doctor.               Start taking these medicines.        Dose/Directions    fluticasone furoate 200 MCG/ACT inhalation powder   Commonly known as:  ARNUITY ELLIPTA   Used for:  Mild persistent asthma without complication   Started by:  Latia Marie APRN CNP        Dose:  1 puff   Inhale 1 puff into the lungs daily   Quantity:  1 Inhaler   Refills:  5         Stop taking these medicines if you haven't already. Please contact your care team if you have questions.     beclomethasone 80 MCG/ACT Inhaler   Commonly known as:  QVAR   Stopped by:  Latia Marie APRN CNP                Where to get your medicines      These medications were sent to [facility] - [address]  [address], [place] [address]     Phone:  [phone]     fluticasone furoate 200 MCG/ACT " inhalation powder         [id number]  [place], [place] - [address] AT [address] [place]  [address] [place] [address]     Phone:  [phone]     fluticasone 50 MCG/ACT spray    lisinopril 20 MG tablet                Primary Care Provider Office Phone # Fax #    [facility] [phone] [address]  [place] [address]        Equal Access to Services     [facility]: Hadii aad ku hadasho Soomaali, waaxda luqadaha, qaybta kaalmada adeegyada, waxay idiin hayaan adeeg kharaelisa echavarria . So [facility] [phone].    ATENCIÓN: Si habla español, tiene a luque disposición servicios gratuitos de asistencia lingüística. LlVan [facility] [phone].    We comply with applicable federal civil rights laws and [place] laws. We do not discriminate on the basis of race, color, national origin, age, disability sex, sexual orientation or gender identity.            Thank you!     Thank you for choosing [facility]  for your care. Our goal is always to provide you with excellent care. Hearing back from our patients is one way we can continue to improve our services. Please take a few minutes to complete the written survey that you may receive in the mail after your visit with us. Thank you!             Your Updated Medication List - Protect others around you: Learn how to safely use, store and throw away your medicines at www.disposemymeds.org.          This list is accurate as of: 9/29/17 10:12 AM.  Always use your most recent med list.                   Brand Name Dispense Instructions for use Diagnosis    albuterol 108 (90 BASE) MCG/ACT Inhaler    PROAIR HFA/PROVENTIL HFA/VENTOLIN HFA    1 Inhaler    Inhale 1-2 puffs into the lungs every 4 hours as needed for shortness of breath / dyspnea    Intermittent asthma, uncomplicated       atorvastatin 40 MG tablet    LIPITOR    90 tablet    Take 1 tablet (40 mg) by  mouth daily    Hyperlipidemia LDL goal <130       CLARITIN 10 MG tablet   Generic drug:  loratadine      Take 1 tablet (10 mg) by mouth daily        fluticasone 50 MCG/ACT spray    FLONASE    1 Bottle    Spray 2 sprays into both nostrils daily    Allergic rhinitis due to animal dander       fluticasone furoate 200 MCG/ACT inhalation powder    ARNUITY ELLIPTA    1 Inhaler    Inhale 1 puff into the lungs daily    Mild persistent asthma without complication       lisinopril 20 MG tablet    PRINIVIL/ZESTRIL    90 tablet    Take 1 tablet (20 mg) by mouth daily    Hypertension goal BP (blood pressure) < 150/90       mometasone 0.1 % cream    ELOCON    60 g    Apply  topically.    Contact dermatitis and other eczema, due to unspecified cause       order for DME     1 Units    Equipment being ordered: Auto CPAP 11-18    FLOR (obstructive sleep apnea)       sinus rinse bottle      Spray 1 Bottle in nostril daily as needed.    Head ache       TYLENOL 500 MG tablet   Generic drug:  acetaminophen      Take 500 mg by mouth every 4 hours as needed for pain Reported on 5/10/2017        vitamin D 2000 UNITS tablet      Take 2,000 Units by mouth daily

## 2017-09-29 NOTE — PROGRESS NOTES
Injectable Influenza Immunization Documentation    1.  Is the person to be vaccinated sick today?   No    2. Does the person to be vaccinated have an allergy to a component   of the vaccine?   No    3. Has the person to be vaccinated ever had a serious reaction   to influenza vaccine in the past?   No    4. Has the person to be vaccinated ever had Guillain-Barré syndrome?   No    Form completed by Elda Carroll. MA

## 2017-11-07 ENCOUNTER — ALLIED HEALTH/NURSE VISIT (OUTPATIENT)
Dept: NURSING | Facility: CLINIC | Age: 72
End: 2017-11-07

## 2017-11-07 DIAGNOSIS — E66.01 MORBID OBESITY DUE TO EXCESS CALORIES (H): Primary | Chronic | ICD-10-CM

## 2017-11-07 PROCEDURE — 99207 ZZC HEALTH COACHING, NO CHARGE: CPT

## 2017-11-07 NOTE — MR AVS SNAPSHOT
"              After Visit Summary   2017    Chelo Brooks    MRN: [id number]           Patient Information     Date Of Birth          1945        Visit Information        Provider Department      2017 1:30 PM [facility]        Today's Diagnoses     Morbid obesity due to excess calories (H)    -  1       Follow-ups after your visit        Who to contact     If you have questions or need follow up information about today's clinic visit or your schedule please contact [facility] directly at [phone].  Normal or non-critical lab and imaging results will be communicated to you by Ampla Pharmaceuticalshart, letter or phone within 4 business days after the clinic has received the results. If you do not hear from us within 7 days, please contact the clinic through AppScale Systemst or phone. If you have a critical or abnormal lab result, we will notify you by phone as soon as possible.  Submit refill requests through JobSpice or call your pharmacy and they will forward the refill request to us. Please allow 3 business days for your refill to be completed.          Additional Information About Your Visit        MyChart Information     JobSpice lets you send messages to your doctor, view your test results, renew your prescriptions, schedule appointments and more. To sign up, go to [email or web address] . Click on \"Log in\" on the left side of the screen, which will take you to the Welcome page. Then click on \"Sign up Now\" on the right side of the page.     You will be asked to enter the access code listed below, as well as some personal information. Please follow the directions to create your username and password.     Your access code is: [email or web address]  Expires: 2018 10:36 AM     Your access code will  in 90 days. If you need help or a new code, please call your [facility] or [phone].        Care EveryWhere ID     This is your Care EveryWhere ID. This could be " used by other organizations to access your [place] medical records  [id number]         Blood Pressure from Last 3 Encounters:   09/29/17 136/86   06/20/17 142/80   05/30/17 (!) 148/92    Weight from Last 3 Encounters:   09/29/17 (!) 327 lb (148.3 kg)   08/07/17 (!) 328 lb 6.4 oz (149 kg)   07/17/17 (!) 330 lb (149.7 kg)              Today, you had the following     No orders found for display       Primary Care Provider Office Phone # Fax #    Vita Zavala [phone] [address] [address]        Equal Access to Services     IGNACIA PERSAUD : Hadii trip valentin hadasho Soomaali, waaxda luqadaha, qaybta kaalmada adeegyada, lily echavarria . So [facility] [phone].    ATENCIÓN: Si habla español, tiene a luque disposición servicios gratuitos de asistencia lingüística. [facility] [phone].    We comply with applicable federal civil rights laws and [place] laws. We do not discriminate on the basis of race, color, national origin, age, disability, sex, sexual orientation, or gender identity.            Thank you!     Thank you for choosing [facility]  for your care. Our goal is always to provide you with excellent care. Hearing back from our patients is one way we can continue to improve our services. Please take a few minutes to complete the written survey that you may receive in the mail after your visit with us. Thank you!             Your Updated Medication List - Protect others around you: Learn how to safely use, store and throw away your medicines at www.disposemymeds.org.          This list is accurate as of: 11/7/17 11:59 PM.  Always use your most recent med list.                   Brand Name Dispense Instructions for use Diagnosis    albuterol 108 (90 BASE) MCG/ACT Inhaler    PROAIR HFA/PROVENTIL HFA/VENTOLIN HFA    1 Inhaler    Inhale 1-2 puffs into the lungs every 4 hours as needed for shortness of breath / dyspnea    Intermittent  asthma, uncomplicated       atorvastatin 40 MG tablet    LIPITOR    90 tablet    Take 1 tablet (40 mg) by mouth daily    Hyperlipidemia LDL goal <130       CLARITIN 10 MG tablet   Generic drug:  loratadine      Take 1 tablet (10 mg) by mouth daily        fluticasone 50 MCG/ACT spray    FLONASE    1 Bottle    Spray 2 sprays into both nostrils daily    Allergic rhinitis due to animal dander       fluticasone furoate 200 MCG/ACT inhalation powder    ARNUITY ELLIPTA    1 Inhaler    Inhale 1 puff into the lungs daily    Mild persistent asthma without complication       lisinopril 20 MG tablet    PRINIVIL/ZESTRIL    90 tablet    Take 1 tablet (20 mg) by mouth daily    Hypertension goal BP (blood pressure) < 150/90       mometasone 0.1 % cream    ELOCON    60 g    Apply  topically.    Contact dermatitis and other eczema, due to unspecified cause       order for DME     1 Units    Equipment being ordered: Auto CPAP 11-18    FLOR (obstructive sleep apnea)       sinus rinse bottle      Spray 1 Bottle in nostril daily as needed.    Head ache       TYLENOL 500 MG tablet   Generic drug:  acetaminophen      Take 500 mg by mouth every 4 hours as needed for pain Reported on 5/10/2017        vitamin D 2000 UNITS tablet      Take 2,000 Units by mouth daily

## 2017-11-07 NOTE — NURSING NOTE
November 7, 2017    [facility]  [address]  [place] [address]  [phone]  Health Coaching Progress Note    Patient Name: Chelo Brooks Date: November 7, 2017      Session Length: 30      DATA    PRM Master Survey Scores Reviewed: Yes    Core Healthy Days Survey:         TROY Score (Last Two) 1/25/2017 8/11/2017   TROY Raw Score 29 27   Activation Score 52.9 47.4   TROY Level 2 2       PHQ-2 Score 8/11/2017 5/30/2017   PHQ-2 Total Score Interpretation - Positive if 3 or more points; Administer PHQ-9 if positive 1 0       PHQ-9 SCORE 1/25/2017   Total Score 4       Treatment Objective(s) Addressed in This Session:  Target Behavior(s): diet/weight loss    Current Stressors / Issues:  Chelo has been very busy at her [employer] and working on projects for the holidays    What Patient Does Well:   Chelo is feeling very good with her goal of not eating after 7:30 pm.  Most days she does well with her goal to not have any sweets before noon, but some days it doesn't work.  She has been eating a greek yogurt for breakfast with her oatmeal for protein  Previous Successes:   Chelo thinks she might have lost a couple pounds  Areas in Need of Improvement:   Chelo states that she needs to continue to focus on her goal of not eating desserts before noon and making sure she has a source of protein at breakfast.    Barriers to Change:   Chelo has a lot of temptations for sweets as a lot of time the meetings at her [employer] include some sort of sweets/desserts  Reasons for Change:   Chelo would like to change to look and feel healthier  Plan/Goal for the Next 4 Weeks:   GOAL #1: eat a source of protein at breakfast  GOAL #1 Progress Toward Goal: 50%  GOAL #2: Do no eat any desserts before noon  GOAL #2 Progress Toward Goal: 25%  GOAL #3: No eating after 7:30 pm  GOAL #3 Progress Toward Goal: 50%    Intervention:  Motivational Interviewing    MI Intervention: Expressed  Empathy/Understanding, Supported Autonomy, Collaboration, Evocation, Permission to raise concern or advise, Open-ended questions, Reflections: simple and complex, Change talk (evoked) and Reframe     Change Talk Expressed by the Patient: Desire to change Ability to change Reasons to change Need to change Committment to change Activation Taking steps    Provider Response to Change Talk: E - Evoked more info from patient about behavior change, A - Affirmed patient's thoughts, decisions, or attempts at behavior change, R - Reflected patient's change talk and S - Summarized patient's change talk statements    Assessment / Progress on Treatment Objective(s) / Homework:  Minimal progress - ACTION (Actively working towards change); Intervened by reinforcing change plan / affirming steps taken         Plan: (Homework, other):  Patient was encouraged to continue to seek condition-related information and education, as well as schedule a follow up appointment with the Health . Patient has set self-identified goals and will monitor progress until the next appointment.  Patient has a very busy November and December, writer will call patient in the middle of December to schedule a follow-up in January.      Mavis Frankel, RD, LD

## 2017-12-18 ENCOUNTER — TELEPHONE (OUTPATIENT)
Dept: NURSING | Facility: CLINIC | Age: 72
End: 2017-12-18

## 2017-12-20 ENCOUNTER — OFFICE VISIT (OUTPATIENT)
Dept: FAMILY MEDICINE | Facility: CLINIC | Age: 72
End: 2017-12-20
Payer: COMMERCIAL

## 2017-12-20 ENCOUNTER — TELEPHONE (OUTPATIENT)
Dept: FAMILY MEDICINE | Facility: CLINIC | Age: 72
End: 2017-12-20

## 2017-12-20 VITALS
OXYGEN SATURATION: 96 % | RESPIRATION RATE: 19 BRPM | SYSTOLIC BLOOD PRESSURE: 132 MMHG | HEIGHT: 67 IN | HEART RATE: 95 BPM | BODY MASS INDEX: 45.99 KG/M2 | TEMPERATURE: 100.3 F | DIASTOLIC BLOOD PRESSURE: 84 MMHG | WEIGHT: 293 LBS

## 2017-12-20 DIAGNOSIS — J45.30 MILD PERSISTENT ASTHMA WITHOUT COMPLICATION: ICD-10-CM

## 2017-12-20 DIAGNOSIS — J45.30 MILD PERSISTENT ASTHMA WITHOUT COMPLICATION: Primary | ICD-10-CM

## 2017-12-20 DIAGNOSIS — J45.901 EXACERBATION OF PERSISTENT ASTHMA, UNSPECIFIED ASTHMA SEVERITY: Primary | ICD-10-CM

## 2017-12-20 DIAGNOSIS — M48.061 SPINAL STENOSIS OF LUMBAR REGION, UNSPECIFIED WHETHER NEUROGENIC CLAUDICATION PRESENT: ICD-10-CM

## 2017-12-20 DIAGNOSIS — J06.9 UPPER RESPIRATORY TRACT INFECTION, UNSPECIFIED TYPE: ICD-10-CM

## 2017-12-20 PROCEDURE — 99214 OFFICE O/P EST MOD 30 MIN: CPT | Performed by: FAMILY MEDICINE

## 2017-12-20 RX ORDER — ALBUTEROL SULFATE 0.83 MG/ML
1 SOLUTION RESPIRATORY (INHALATION) ONCE
Qty: 3 ML | Refills: 0 | COMMUNITY
Start: 2017-12-20 | End: 2019-06-18

## 2017-12-20 RX ORDER — PREDNISONE 20 MG/1
20 TABLET ORAL DAILY
Qty: 5 TABLET | Refills: 0 | Status: SHIPPED | OUTPATIENT
Start: 2017-12-20 | End: 2017-12-25

## 2017-12-20 NOTE — NURSING NOTE
"Chief Complaint   Patient presents with     URI       Initial Pulse 95  Temp 100.3  F (37.9  C) (Oral)  Resp 19  Ht 5' 7\" (1.702 m)  Wt (!) 326 lb (147.9 kg)  SpO2 96%  Breastfeeding? No  BMI 51.06 kg/m2 Estimated body mass index is 51.06 kg/(m^2) as calculated from the following:    Height as of this encounter: 5' 7\" (1.702 m).    Weight as of this encounter: 326 lb (147.9 kg).  Medication Reconciliation: complete     Ravindra Gordon      "

## 2017-12-20 NOTE — MR AVS SNAPSHOT
After Visit Summary   12/20/2017    Chelo Brooks    MRN: [id number]           Patient Information     Date Of Birth          1945        Visit Information        Provider Department      12/20/2017 11:00 AM Vita Zavala MD [facility]        Today's Diagnoses     Exacerbation of persistent asthma, unspecified asthma severity    -  1    Upper respiratory tract infection, unspecified type        Mild persistent asthma without complication        Spinal stenosis of lumbar region, unspecified whether neurogenic claudication present          Care Instructions    [facility]    If you have any questions regarding to your visit please contact your care team:       Team Red:   Clinic Hours Telephone Number   Dr. Vita Marie, NP   7am-7pm  Monday - Thursday   7am-5pm  Fridays  [phone]  (Appointment scheduling available 24/7)    Questions about your visit?   Team Line  [phone]   [facility] [place] [facility] 11am-9pm Monday-Friday Saturday-Sunday- 9am-5pm   [place] - 5pm-9pm Monday-Friday Saturday-Sunday- 9am-5pm  [phone] - [place]   [phone] - [place]       What options do I have for visits at the clinic other than the traditional office visit?  To expand how we care for you, many of our providers are utilizing electronic visits (e-visits) and telephone visits, when medically appropriate, for interactions with their patients rather than a visit in the clinic.   We also offer nurse visits for many medical concerns. Just like any other service, we will bill your insurance company for this type of visit based on time spent on the phone with your provider. Not all insurance companies cover these visits. Please check with your medical insurance if this type of visit is covered. You will be responsible for any charges that are not paid by your insurance.      E-visits via  JobScouthart:  generally incur a $35.00 fee.  Telephone visits:  Time spent on the phone: *charged based on time that is spent on the phone in increments of 10 minutes. Estimated cost:   5-10 mins $30.00   11-20 mins. $59.00   21-30 mins. $85.00     Use JobScouthart (secure email communication and access to your chart) to send your primary care provider a message or make an appointment. Ask someone on your Team how to sign up for [email or web address].  For a Price Quote for your services, please call our [place] Price Line at [phone].      As always, Thank you for trusting us with your health care needs!  Belkis TUCKER MA            Follow-ups after your visit        Additional Services     NEUROSURGERY REFERRAL       Your provider has referred you to: [facility]: [facility] [phone]   [email or web address]    Please be aware that coverage of these services is subject to the terms and limitations of your health insurance plan.  Call member services at your health plan with any benefit or coverage questions.      Please bring the following with you to your appointment:    (1) Any X-Rays, CTs or MRIs which have been performed.  Contact the facility where they were done to arrange for  prior to your scheduled appointment.   (2) List of current medications  (3) This referral request   (4) Any documents/labs given to you for this referral                  Follow-up notes from your care team     Return in about 1 month (around 1/20/2018), or if symptoms worsen or fail to improve, for asthma.      Your next 10 appointments already scheduled     Jan 17, 2018 10:00 AM CST   SHORT with MD [facility] ([facility]    [address]  [place] [id number] [phone]              Who to contact     If you have questions or need follow up information about today's clinic visit or your schedule please contact [facility]  "directly at [phone].  Normal or non-critical lab and imaging results will be communicated to you by Novushart, letter or phone within 4 business days after the clinic has received the results. If you do not hear from us within 7 days, please contact the clinic through Novushart or phone. If you have a critical or abnormal lab result, we will notify you by phone as soon as possible.  Submit refill requests through myTips or call your pharmacy and they will forward the refill request to us. Please allow 3 business days for your refill to be completed.          Additional Information About Your Visit        NovusharPicurio Information     myTips lets you send messages to your doctor, view your test results, renew your prescriptions, schedule appointments and more. To sign up, go to [email or web address] . Click on \"Log in\" on the left side of the screen, which will take you to the Welcome page. Then click on \"Sign up Now\" on the right side of the page.     You will be asked to enter the access code listed below, as well as some personal information. Please follow the directions to create your username and password.     Your access code is: [email or web address]  Expires: 2018 10:36 AM     Your access code will  in 90 days. If you need help or a new code, please call your [facility] clinic or [phone].        Care EveryWhere ID     This is your Care EveryWhere ID. This could be used by other organizations to access your [facility] medical records  [id number]        Your Vitals Were     Pulse Temperature Respirations Height Pulse Oximetry Breastfeeding?    95 100.3  F (37.9  C) (Oral) 19 5' 7\" (1.702 m) 96% No    BMI (Body Mass Index)                   51.06 kg/m2            Blood Pressure from Last 3 Encounters:   17 132/84   17 136/86   17 142/80    Weight from Last 3 Encounters:   17 (!) 326 lb (147.9 kg)   17 (!) 327 lb (148.3 kg)   17 (!) 328 lb 6.4 oz (149 kg)              We " Performed the Following     NEUROSURGERY REFERRAL          Today's Medication Changes          These changes are accurate as of: 12/20/17 12:15 PM.  If you have any questions, ask your nurse or doctor.               Start taking these medicines.        Dose/Directions    beclomethasone 80 MCG/ACT Inhaler   Commonly known as:  QVAR   Used for:  Mild persistent asthma without complication   Started by:  Vita Zavala MD        Dose:  1 puff   Inhale 1 puff into the lungs 2 times daily   Quantity:  1 Inhaler   Refills:  0       predniSONE 20 MG tablet   Commonly known as:  DELTASONE   Used for:  Exacerbation of persistent asthma, unspecified asthma severity   Started by:  Vita Zavala MD        Dose:  20 mg   Take 1 tablet (20 mg) by mouth daily for 5 days   Quantity:  5 tablet   Refills:  0         These medicines have changed or have updated prescriptions.        Dose/Directions    * albuterol 108 (90 BASE) MCG/ACT Inhaler   Commonly known as:  PROAIR HFA/PROVENTIL HFA/VENTOLIN HFA   This may have changed:  Another medication with the same name was added. Make sure you understand how and when to take each.   Used for:  Intermittent asthma, uncomplicated   Changed by:  Vita Zavala MD        Dose:  1-2 puff   Inhale 1-2 puffs into the lungs every 4 hours as needed for shortness of breath / dyspnea   Quantity:  1 Inhaler   Refills:  3       * albuterol (2.5 MG/3ML) 0.083% neb solution   This may have changed:  You were already taking a medication with the same name, and this prescription was added. Make sure you understand how and when to take each.   Used for:  Exacerbation of persistent asthma, unspecified asthma severity   Changed by:  Vita Zavala MD        Dose:  1 vial   Take 1 vial (2.5 mg) by nebulization once for 1 dose   Quantity:  3 mL   Refills:  0       * Notice:  This list has 2 medication(s) that are the same as other medications prescribed for you. Read the directions carefully, and ask  your doctor or other care provider to review them with you.      Stop taking these medicines if you haven't already. Please contact your care team if you have questions.     fluticasone furoate 200 MCG/ACT inhalation powder   Commonly known as:  ARNUITY ELLIPTA   Stopped by:  Vita Zavala MD                Where to get your medicines      These medications were [place] [facility] [place] - [address]  [address], [place] [address]     Phone:  [phone]     beclomethasone 80 MCG/ACT Inhaler    predniSONE 20 MG tablet         Some of these will need a paper prescription and others can be bought over the counter.  Ask your nurse if you have questions.     You don't need a prescription for these medications     albuterol (2.5 MG/3ML) 0.083% neb solution                Primary Care Provider Office Phone # Fax #    Vita Zavala [phone] [address] [address]        Equal Access to Services     [facility] AH: Hadii aad ku hadasho Soomaali, waaxda luqadaha, qaybta kaalmada adeegyada, waxay idiin hayjohnn marie echavarria . So [facility] [phone].    ATENCIÓN: Si habla español, tiene a luque disposición servicios gratuitos de asistencia lingüística. [facility] [phone].    We comply with applicable federal civil rights laws and [place] laws. We do not discriminate on the basis of race, color, national origin, age, disability, sex, sexual orientation, or gender identity.            Thank you!     Thank you for choosing [facility]  for your care. Our goal is always to provide you with excellent care. Hearing back from our patients is one way we can continue to improve our services. Please take a few minutes to complete the written survey that you may receive in the mail after your visit with us. Thank you!             Your Updated Medication List - Protect others around you: Learn how to safely use,  store and throw away your medicines at www.disposemymeds.org.          This list is accurate as of: 12/20/17 12:15 PM.  Always use your most recent med list.                   Brand Name Dispense Instructions for use Diagnosis    * albuterol 108 (90 BASE) MCG/ACT Inhaler    PROAIR HFA/PROVENTIL HFA/VENTOLIN HFA    1 Inhaler    Inhale 1-2 puffs into the lungs every 4 hours as needed for shortness of breath / dyspnea    Intermittent asthma, uncomplicated       * albuterol (2.5 MG/3ML) 0.083% neb solution     3 mL    Take 1 vial (2.5 mg) by nebulization once for 1 dose    Exacerbation of persistent asthma, unspecified asthma severity       atorvastatin 40 MG tablet    LIPITOR    90 tablet    Take 1 tablet (40 mg) by mouth daily    Hyperlipidemia LDL goal <130       beclomethasone 80 MCG/ACT Inhaler    QVAR    1 Inhaler    Inhale 1 puff into the lungs 2 times daily    Mild persistent asthma without complication       CLARITIN 10 MG tablet   Generic drug:  loratadine      Take 10 mg by mouth as needed        fluticasone 50 MCG/ACT spray    FLONASE    1 Bottle    Spray 2 sprays into both nostrils daily    Allergic rhinitis due to animal dander       lisinopril 20 MG tablet    PRINIVIL/ZESTRIL    90 tablet    Take 1 tablet (20 mg) by mouth daily    Hypertension goal BP (blood pressure) < 150/90       mometasone 0.1 % cream    ELOCON    60 g    Apply  topically.    Contact dermatitis and other eczema, due to unspecified cause       order for DME     1 Units    Equipment being ordered: Auto CPAP 11-18    FLOR (obstructive sleep apnea)       predniSONE 20 MG tablet    DELTASONE    5 tablet    Take 1 tablet (20 mg) by mouth daily for 5 days    Exacerbation of persistent asthma, unspecified asthma severity       sinus rinse bottle      Spray 1 Bottle in nostril daily as needed.    Head ache       TYLENOL 500 MG tablet   Generic drug:  acetaminophen      Take 500 mg by mouth every 4 hours as needed for pain Reported on 5/10/2017         vitamin D 2000 UNITS tablet      Take 2,000 Units by mouth daily        * Notice:  This list has 2 medication(s) that are the same as other medications prescribed for you. Read the directions carefully, and ask your doctor or other care provider to review them with you.

## 2017-12-20 NOTE — TELEPHONE ENCOUNTER
Signed Prescriptions:                        Disp   Refills    beclomethasone (QVAR) 40 MCG/ACT Inhaler   1 Inha*0        Sig: Inhale 2 puffs into the lungs 2 times daily  Authorizing Provider: ESTRADA WOO

## 2017-12-20 NOTE — PROGRESS NOTES
"  SUBJECTIVE:   Chelo Brooks is a 72 year old super morbidly obese female who presents to clinic today for the following health issues:    RESPIRATORY SYMPTOMS      Duration: x 4 days     Description  nasal congestion, facial pain/pressure, cough, fever, chills, ear pain both, headache, fatigue/malaise, hoarse voice, myalgias, conjunctival irritation and stomach ache    Severity: moderate    Accompanying signs and symptoms: Glands hurt    History (predisposing factors):  asthma    Precipitating or alleviating factors: none     Therapies tried and outcome:  rest and fluids, tea, Flonase, inhaler     Patient also complains of back pain for months.    I have reviewed the patient's medical history in detail and updated the computerized patient record.     ROS:  CONSTITUTIONAL: POSITIVE for fever    E: NEGATIVE for vision changes or irritation  ENT/MOUTH: see HPI   RESP:as above  CV: NEGATIVE for chest pain/chest pressure  MUSCULOSKELETAL:back pain    OBJECTIVE:     /84 (BP Location: Left arm, Patient Position: Chair, Cuff Size: Adult Regular)  Pulse 95  Temp 100.3  F (37.9  C) (Oral)  Resp 19  Ht 5' 7\" (1.702 m)  Wt (!) 326 lb (147.9 kg)  SpO2 96%  Breastfeeding? No  BMI 51.06 kg/m2  Body mass index is 51.06 kg/(m^2).  GENERAL: alert, no distress and obese  EYES: Eyes grossly normal to inspection, PERRL and conjunctivae and sclerae normal  HENT: ear canals and TM's normal, nose and mouth without ulcers or lesions  NECK: no adenopathy, no asymmetry, masses, or scars and thyroid normal to palpation  RESP: expiratory wheezes throughout which clear after neb   CV: regular rate and rhythm, normal S1 S2, no S3 or S4, no murmur, click or rub, no peripheral edema   MS: extremities normal- no gross deformities noted  PSYCH: mentation appears normal, affect normal/bright    Diagnostic Test Results:  Results for orders placed or performed in visit on 08/01/17   XR Lumbar Epidural Injection Incl Imaging    " Narrative    Right L4-5 transforaminal epidural steroid injection 8/1/2017.    History: Spinal stenosis, lumbar region.    Comparison: MRI lumbar spine 7/24/2017. Lumbar spine radiographs  3/15/2012.    Procedure: The patient's prior MRI was carefully reviewed. After  discussing symptomatology with the patient and visualizing the  appropriate imaging studies, it was determined to utilize a  transforaminal approach on the right at the level of L4-5. After  obtaining verbal and written consent, the patient was placed prone on  the fluoroscopy table and the skin of the back was prepped and draped  in the normal sterile fashion. 1% lidocaine was used to anesthetize  the skin and subcutaneous tissues. Using fluoroscopic guidance, a 5  inch, 22-gauge spinal needle was advanced into the epidural space.  Notably, the entire length of the 5 inch needle was needed to achieve  access into the epidural space from this approach. 1.5 cc of  [id number]-[id number] was injected to verify an epidural location of the needle  tip. After verifying an epidural location, a mixture of 1 cc 0.5%  bupivacaine, 1 cc of Depo-Medrol (80 mg/mL), and 1 cc of  preservative-free saline was then injected into the epidural space.  The needle was removed with the stylet in place. The patient tolerated  the procedure well and there were no immediate complications. The  patient was observed for 15 minutes after the procedure and left the  department in good condition.    The patient reported a 4/10 pain scale rating prior to the procedure.  Postprocedure pain was not recorded.    Fluoroscopy time: 34.3 seconds      Impression    Impression:  Successful right L4-5 transforaminal epidural steroid  injection.    LILLIE ORTIZ MD       ASSESSMENT/PLAN:   (J45.901) Exacerbation of persistent asthma, unspecified asthma severity  (primary encounter diagnosis)  (J06.9) Upper respiratory tract infection, unspecified type  Plan: predniSONE (DELTASONE) 20 MG tablet,  albuterol         (2.5 MG/3ML) 0.083% neb solution        Discussed risks and benefits of this medication. Follow up in one month or sooner for worsening of symptoms or side effects.     (J45.30) Mild persistent asthma without complication  Comment: patient has poor understanding of asthma management   Plan: beclomethasone (QVAR) 80 MCG/ACT Inhaler        Discussed risks and benefits of this medication. Follow up in one month or sooner for worsening of symptoms or side effects.     (M48.061) Spinal stenosis of lumbar region, unspecified whether neurogenic claudication present  Comment: patient is not interested in physical therapy despite recommendation and already tried an injection; super morbid obesity is a contributor   Plan: NEUROSURGERY REFERRAL             See Patient Instructions    Vita Zavala MD  [facility]

## 2017-12-20 NOTE — TELEPHONE ENCOUNTER
If we change The QVar 80mcg 1 puff bid to Qvar 40mcg 2puff bid we can save the patient 40.00.  Please send new rx if you agree  Thank you  [facility] Jennnigs [facility]  Phone [phone]  Fax      [phone]

## 2017-12-20 NOTE — PATIENT INSTRUCTIONS
[facility]    If you have any questions regarding to your visit please contact your care team:       Team Red:   Clinic Hours Telephone Number   Dr. Vita Marie, NP   7am-7pm  Monday - Thursday   7am-5pm  Fridays  [phone]  (Appointment scheduling available 24/7)    Questions about your visit?   Team Line  [phone]   [facility] - [place] - 11am-9pm Monday-Friday Saturday-Sunday- 9am-5pm   [place] - 5pm-9pm Monday-Friday Saturday-Sunday- 9am-5pm  [phone] - [place]   [phone] - [place]       What options do I have for visits at the clinic other than the traditional office visit?  To expand how we care for you, many of our providers are utilizing electronic visits (e-visits) and telephone visits, when medically appropriate, for interactions with their patients rather than a visit in the clinic.   We also offer nurse visits for many medical concerns. Just like any other service, we will bill your insurance company for this type of visit based on time spent on the phone with your provider. Not all insurance companies cover these visits. Please check with your medical insurance if this type of visit is covered. You will be responsible for any charges that are not paid by your insurance.      E-visits via [employer]:  generally incur a $35.00 fee.  Telephone visits:  Time spent on the phone: *charged based on time that is spent on the phone in increments of 10 minutes. Estimated cost:   5-10 mins $30.00   11-20 mins. $59.00   21-30 mins. $85.00     Use [employer] (secure email communication and access to your chart) to send your primary care provider a message or make an appointment. Ask someone on your Team how to sign up for [employer].  For a Price Quote for your services, please call our Consumer Price Line at [phone].      As always, Thank you for trusting us with your health care needs!  Beklis TUCKER  MA

## 2017-12-20 NOTE — NURSING NOTE
Albuterol nebulizer given at 11:53 AM.   Lot#: [id number]   Expiration Date: 3/2019  NDC- 5447-4883-96  Belkis GRAHAM MA

## 2017-12-20 NOTE — LETTER
My Asthma Action Plan  Name: Chelo Brooks   YOB: 1945  Date: 12/20/2017   My doctor: Vita Zavala MD   My clinic: [facility]        My Control Medicine: Beclomethasone (QVar) -  80 mcg 1 puff twice daily  My Rescue Medicine: Albuterol (Proair/Ventolin/Proventil) inhaler 2 puffs every 4 hours as needed   My Asthma Severity: mild persistent  Avoid your asthma triggers: upper respiratory infections               GREEN ZONE   Good Control    I feel good    No cough or wheeze    Can work, sleep and play without asthma symptoms       Take your asthma control medicine every day.     1. If exercise triggers your asthma, take your rescue medication    15 minutes before exercise or sports, and    During exercise if you have asthma symptoms  2. Spacer to use with inhaler: If you have a spacer, make sure to use it with your inhaler             YELLOW ZONE Getting Worse  I have ANY of these:    I do not feel good    Cough or wheeze    Chest feels tight    Wake up at night   1. Keep taking your Green Zone medications  2. Start taking your rescue medicine:    every 20 minutes for up to 1 hour. Then every 4 hours for 24-48 hours.  3. If you stay in the Yellow Zone for more than 12-24 hours, contact your doctor.  4. If you do not return to the Green Zone in 12-24 hours or you get worse, start taking your oral steroid medicine if prescribed by your provider.           RED ZONE Medical Alert - Get Help  I have ANY of these:    I feel awful    Medicine is not helping    Breathing getting harder    Trouble walking or talking    Nose opens wide to breathe       1. Take your rescue medicine NOW  2. If your provider has prescribed an oral steroid medicine, start taking it NOW  3. Call your doctor NOW  4. If you are still in the Red Zone after 20 minutes and you have not reached your doctor:    Take your rescue medicine again and    Call 911 or go to the emergency room right away    See your regular doctor  within 2 weeks of an Emergency Room or Urgent Care visit for follow-up treatment.        Electronically signed by: Vita Zavala, December 20, 2017    Annual Reminders:  Meet with Asthma Educator,  Flu Shot in the Fall, consider Pneumonia Vaccination for patients with asthma (aged 19 and older).    [facility] [address] [place], [place] [address], [place] - [address] AT [facility] & [address]                    Asthma Triggers  How To Control Things That Make Your Asthma Worse    Triggers are things that make your asthma worse.  Look at the list below to help you find your triggers and what you can do about them.  You can help prevent asthma flare-ups by staying away from your triggers.      Trigger                                                          What you can do   Cigarette Smoke  Tobacco smoke can make asthma worse. Do not allow smoking in your home, car or around you.  Be sure no one smokes at a child s day care or school.  If you smoke, ask your health care provider for ways to help you quit.  Ask family members to quit too.  Ask your health care provider for a referral to Quit Plan to help you quit smoking, or call 0-064-256-PLAN.     Colds, Flu, Bronchitis  These are common triggers of asthma. Wash your hands often.  Don t touch your eyes, nose or mouth.  Get a flu shot every year.     Dust Mites  These are tiny bugs that live in cloth or carpet. They are too small to see. Wash sheets and blankets in hot water every week.   Encase pillows and mattress in dust mite proof covers.  Avoid having carpet if you can. If you have carpet, vacuum weekly.   Use a dust mask and HEPA vacuum.   Pollen and Outdoor Mold  Some people are allergic to trees, grass, or weed pollen, or molds. Try to keep your windows closed.  Limit time out doors when pollen count is high.   Ask you health care provider about taking medicine during allergy season.     Animal Dander  Some people are allergic to skin flakes,  urine or saliva from pets with fur or feathers. Keep pets with fur or feathers out of your home.    If you can t keep the pet outdoors, then keep the pet out of your bedroom.  Keep the bedroom door closed.  Keep pets off cloth furniture and away from stuffed toys.     Mice, Rats, and Cockroaches  Some people are allergic to the waste from these pests.   Cover food and garbage.  Clean up spills and food crumbs.  Store grease in the refrigerator.   Keep food out of the bedroom.   Indoor Mold  This can be a trigger if your home has high moisture. Fix leaking faucets, pipes, or other sources of water.   Clean moldy surfaces.  Dehumidify basement if it is damp and smelly.   Smoke, Strong Odors, and Sprays  These can reduce air quality. Stay away from strong odors and sprays, such as perfume, powder, hair spray, paints, smoke incense, paint, cleaning products, candles and new carpet.   Exercise or Sports  Some people with asthma have this trigger. Be active!  Ask your doctor about taking medicine before sports or exercise to prevent symptoms.    Warm up for 5-10 minutes before and after sports or exercise.     Other Triggers of Asthma  Cold air:  Cover your nose and mouth with a scarf.  Sometimes laughing or crying can be a trigger.  Some medicines and food can trigger asthma.

## 2018-01-09 ENCOUNTER — TELEPHONE (OUTPATIENT)
Dept: FAMILY MEDICINE | Facility: CLINIC | Age: 73
End: 2018-01-09

## 2018-01-09 DIAGNOSIS — G47.33 OSA (OBSTRUCTIVE SLEEP APNEA): Chronic | ICD-10-CM

## 2018-01-09 DIAGNOSIS — I10 HYPERTENSION GOAL BP (BLOOD PRESSURE) < 150/90: Chronic | ICD-10-CM

## 2018-01-09 DIAGNOSIS — E66.01 MORBID OBESITY DUE TO EXCESS CALORIES (H): Chronic | ICD-10-CM

## 2018-01-09 DIAGNOSIS — J30.81 ALLERGIC RHINITIS DUE TO ANIMAL DANDER: Chronic | ICD-10-CM

## 2018-01-09 DIAGNOSIS — J45.40 UNCONTROLLED MODERATE PERSISTENT ASTHMA: Primary | ICD-10-CM

## 2018-01-09 NOTE — TELEPHONE ENCOUNTER
"Spoke with patient.   Patient is asking for some kind of medication to get rid of these symptoms. Patient states \"I am tired of coming in, I have been in 8 times in the last year for the same issue and all I am told is it is a viral infection and we can't do anything for you. I am at my wits end and need some answers/medication whatever to get over this. I do not want to come in as I already have an appointment on the 17th. Please send in some kind of medication.\"    Please advise  Patti Espino RN    "

## 2018-01-09 NOTE — TELEPHONE ENCOUNTER
Reason for call:  Patient reporting a symptom    Symptom or request: Cold and cough     Duration (how long have symptoms been present): 1 year    Have you been treated for this before? Yes    Additional comments: Has been seen many times for cough and cold and she would like an Rx for cough medication with codeine. Also would like to discuss her blood pressure medication that could be causing her to have the cough and would like to possibly change to a different medication.    Phone Number patient can be reached at:  Home number on file [phone] (home)    Best Time:  Before 1 pm or after 3:30 pm    Can we leave a detailed message on this number:  YES    Call taken on 1/9/2018 at 11:22 AM by Madhuri Howell

## 2018-01-09 NOTE — TELEPHONE ENCOUNTER
Message left on VM to return call to RN hotline at [phone].   Patti Espino RN      Per 12/20 Office Visit note with Dr. Zavala: Follow-up around 1/20/2018 is symptoms worsen or fail to get better.    (J45.901) Exacerbation of persistent asthma, unspecified asthma severity  (primary encounter diagnosis)  (J06.9) Upper respiratory tract infection, unspecified type  Plan: predniSONE (DELTASONE) 20 MG tablet, albuterol         (2.5 MG/3ML) 0.083% neb solution        Discussed risks and benefits of this medication. Follow up in one month or sooner for worsening of symptoms or side effects.      (J45.30) Mild persistent asthma without complication  Comment: patient has poor understanding of asthma management   Plan: beclomethasone (QVAR) 80 MCG/ACT Inhaler        Discussed risks and benefits of this medication. Follow up in one month or sooner for worsening of symptoms or side effects.

## 2018-01-09 NOTE — TELEPHONE ENCOUNTER
Please call patient: I am very concerned about your uncontrolled asthma. It is important that you be seen for follow-up to consider more testing. In the meantime, switch your Qvar to Advair twice daily and schedule consult with our allergist/asthma specialist.   Vita Zavala MD

## 2018-01-10 RX ORDER — LOSARTAN POTASSIUM 50 MG/1
50 TABLET ORAL DAILY
Qty: 30 TABLET | Refills: 0 | Status: SHIPPED | OUTPATIENT
Start: 2018-01-10 | End: 2018-03-22

## 2018-01-10 NOTE — TELEPHONE ENCOUNTER
Pt was given provider's message as written. Pt doesn't think this is from her asthma. Wondering why she can't get Codeine cough syrup. She was told some medications can cause cough. Is the lisinopril causing this? Medications are too expensive. Can't afford to keep switching medications.     She will keep her appt on 1/17 with Dr. Zavala and try to get in with the allergist. Will keep using what she has if the Advair costs $80 or more, she will not pick it up.     Laurie Cassidy RN  [facility] [place]

## 2018-01-10 NOTE — TELEPHONE ENCOUNTER
Discussed ongoing cough with patient by phone and that codeine is not safe, effective, or standard of care. Discussed etiology and treatment of asthma. Recommended that she be seen to consider x-ray and prednisone. She prefers to keep upcoming appointment and plans to see allergist. She would like to also change lisinopril to losartan to rule out ACE-I cough, which is fine. She is not interested in changing Qvar to Advair as I recommend. Consider GERD.   Vita Zavala MD

## 2018-01-15 ENCOUNTER — OFFICE VISIT (OUTPATIENT)
Dept: ALLERGY | Facility: CLINIC | Age: 73
End: 2018-01-15
Attending: FAMILY MEDICINE
Payer: COMMERCIAL

## 2018-01-15 VITALS
BODY MASS INDEX: 45.99 KG/M2 | OXYGEN SATURATION: 95 % | WEIGHT: 293 LBS | HEART RATE: 81 BPM | RESPIRATION RATE: 14 BRPM | HEIGHT: 67 IN | TEMPERATURE: 97.8 F

## 2018-01-15 DIAGNOSIS — J30.81 CHRONIC ALLERGIC RHINITIS DUE TO ANIMAL HAIR AND DANDER: ICD-10-CM

## 2018-01-15 DIAGNOSIS — R05.3 CHRONIC COUGH: Primary | ICD-10-CM

## 2018-01-15 LAB
FEF 25/75: NORMAL
FEV-1: NORMAL
FEV1/FVC: NORMAL
FVC: NORMAL

## 2018-01-15 PROCEDURE — 99204 OFFICE O/P NEW MOD 45 MIN: CPT | Mod: 25 | Performed by: ALLERGY & IMMUNOLOGY

## 2018-01-15 PROCEDURE — 94010 BREATHING CAPACITY TEST: CPT | Performed by: ALLERGY & IMMUNOLOGY

## 2018-01-15 RX ORDER — OMEPRAZOLE 40 MG/1
40 CAPSULE, DELAYED RELEASE ORAL DAILY
Qty: 30 CAPSULE | Refills: 1 | Status: SHIPPED | OUTPATIENT
Start: 2018-01-15 | End: 2018-02-13

## 2018-01-15 RX ORDER — FLUTICASONE PROPIONATE 50 MCG
2 SPRAY, SUSPENSION (ML) NASAL DAILY
Qty: 1 BOTTLE | Refills: 11 | Status: SHIPPED | OUTPATIENT
Start: 2018-01-15 | End: 2018-02-13

## 2018-01-15 ASSESSMENT — PAIN SCALES - GENERAL: PAINLEVEL: NO PAIN (0)

## 2018-01-15 NOTE — MR AVS SNAPSHOT
After Visit Summary   1/15/2018    Chelo Brooks    MRN: [id number]           Patient Information     Date Of Birth          1945        Visit Information        Provider Department      1/15/2018 2:40 PM Zahraa Wallace MD [facility]        Today's Diagnoses     Chronic cough    -  1    Chronic allergic rhinitis, unspecified seasonality, unspecified trigger        Mild persistent asthma without complication          Care Instructions    If you have any questions regarding your allergies, asthma, or what we discussed during your visit today please call the allergy clinic or contact us via [employer].    [facility]: [phone]    Follow-up in 1 to 2 months    1. Finish up the Qvar inhalers that you have before filling any new prescriptions. Rinse your mouth and brush your teeth afterwards.   Qvar 40mcg - Take 2 puffs twice daily   Qvar 80mcg - Take 1 puff twice daily    2. Use the albuterol inhaler (ProAir) as needed for cough, shortness of breath, chest tightness, and wheezing. Take 2 to 4 puffs every 4 hours as needed.    3. Start the prilosec (omeprazole) 40mg - Take 1 capsule every morning on an empty stomath    4. Start using the flonase (fluticasone) nasal spray every day - 2 sprays in each nostril daily    5. Stop the lisinopril and switch to the losartan      Using an Inhaler with a Spacer  To control asthma, you need to use your medicines the right way. Some medicines are inhaled using a device called a metered-dose inhaler (MDI). Metered-dose inhalers deliver medicine with a fine spray. You may be asked to use a spacer (holding tube) with your inhaler. The spacer helps make sure all the medicine you need goes into your lungs.   Steps for using an inhaler with a spacer  Step 1:    Remove the caps from the inhaler and spacer.    Shake the inhaler well and attach the spacer. If the inhaler is being used for the first time or has not been used for a while, prime it  as directed by the product maker.  Step 2:    Breathe out normally.    Put the spacer between your teeth. Close your lips tightly around it.    Keep your chin up.  Step 3:    Spray 1 puff into the spacer by pressing down on the inhaler.    Then breathe in through your mouth as slowly and deeply as you can. This should take about 5-10 seconds. If you breathe too quickly, you may hear a whistling sound in certain spacers.  Step 4:    Take the spacer out of your mouth.    Hold your breath for a count of 10.    Then hold your lips together and slowly breathe out through your mouth.          If you re prescribed more than 1 puff of medicine at a time, wait at least 30 seconds between puffs. This number may be different for different medicines. Shake the inhaler again. Then repeat steps 2 to 4.   Date Last Reviewed: 10/1/2016    8803-7987 [employer]. [address] [place] [address]. All rights reserved. This information is not intended as a substitute for professional medical care. Always follow your healthcare professional's instructions.                Follow-ups after your visit        Your next 10 appointments already scheduled     Jan 25, 2018  9:40 AM CST   New Visit with Maria Fernanda Anaya NP   [facility]    [address] [place] [id number] [phone]              Who to contact     If you have questions or need follow up information about today's clinic visit or your schedule please contact [facility] directly at [phone].  Normal or non-critical lab and imaging results will be communicated to you by [employer], letter or phone within 4 business days after the clinic has received the results. If you do not hear from us within 7 days, please contact the clinic through [employer] or phone. If you have a critical or abnormal lab result, we will notify you by phone as soon as possible.  Submit refill requests through [employer]  "or call your pharmacy and they will forward the refill request to us. Please allow 3 business days for your refill to be completed.          Additional Information About Your Visit        MyChart Information     [employer] lets you send messages to your doctor, view your test results, renew your prescriptions, schedule appointments and more. To sign up, go to [email or web address] . Click on \"Log in\" on the left side of the screen, which will take you to the Welcome page. Then click on \"Sign up Now\" on the right side of the page.     You will be asked to enter the access code listed below, as well as some personal information. Please follow the directions to create your username and password.     Your access code is: [email or web address]  Expires: 2018 10:36 AM     Your access code will  in 90 days. If you need help or a new code, please call your [facility] clinic or [phone].        Care EveryWhere ID     This is your Care EveryWhere ID. This could be used by other organizations to access your [facility] medical records  [id number]        Your Vitals Were     Pulse Temperature Respirations Height Pulse Oximetry BMI (Body Mass Index)    81 97.8  F (36.6  C) (Oral) 14 1.702 m (5' 7\") 95% 50.43 kg/m2       Blood Pressure from Last 3 Encounters:   17 132/84   17 136/86   17 142/80    Weight from Last 3 Encounters:   01/15/18 (!) 146.1 kg (322 lb)   17 (!) 147.9 kg (326 lb)   17 (!) 148.3 kg (327 lb)              We Performed the Following     Spirometry, Breathing Capacity          Today's Medication Changes          These changes are accurate as of: 1/15/18  3:39 PM.  If you have any questions, ask your nurse or doctor.               Start taking these medicines.        Dose/Directions    omeprazole 40 MG capsule   Commonly known as:  priLOSEC   Used for:  Chronic cough   Started by:  Zahraa Wallace MD        Dose:  40 mg   Take 1 capsule (40 mg) by mouth daily Take 30-60 minutes " before a meal.   Quantity:  30 capsule   Refills:  1         Stop taking these medicines if you haven't already. Please contact your care team if you have questions.     fluticasone-salmeterol 250-50 MCG/DOSE diskus inhaler   Commonly known as:  ADVAIR DISKUS   Stopped by:  Zahraa Wallace MD                Where to get your medicines      These medications were sent to [id number] - [place], [place] - [address] AT [facility] [address] [place]  [address] [facility], [place] [address]     Phone:  [phone]     fluticasone 50 MCG/ACT spray    omeprazole 40 MG capsule                Primary Care Provider Office Phone # Fax #    Vita Zavala [phone] [address] [facility]  [place] [address]        Equal Access to Services     [facility]: Hadii trip ku hadasho Soomaali, waaxda luqadaha, qaybta kaalmada adeegyada, lily martinez haybon echavarria . So [facility] [phone].    ATENCIÓN: Si habla español, tiene a luque disposición servicios gratuitos de asistencia lingüística. Llame al [phone].    We comply with applicable federal civil rights laws and [place] laws. We do not discriminate on the basis of race, color, national origin, age, disability, sex, sexual orientation, or gender identity.            Thank you!     Thank you for choosing [facility]  for your care. Our goal is always to provide you with excellent care. Hearing back from our patients is one way we can continue to improve our services. Please take a few minutes to complete the written survey that you may receive in the mail after your visit with us. Thank you!             Your Updated Medication List - Protect others around you: Learn how to safely use, store and throw away your medicines at www.disposemymeds.org.          This list is accurate as of: 1/15/18  3:39 PM.  Always use your most recent med list.                   Brand Name Dispense Instructions for use  Diagnosis    albuterol 108 (90 BASE) MCG/ACT Inhaler    PROAIR HFA/PROVENTIL HFA/VENTOLIN HFA    1 Inhaler    Inhale 1-2 puffs into the lungs every 4 hours as needed for shortness of breath / dyspnea    Intermittent asthma, uncomplicated       atorvastatin 40 MG tablet    LIPITOR    90 tablet    Take 1 tablet (40 mg) by mouth daily    Hyperlipidemia LDL goal <130       CLARITIN 10 MG tablet   Generic drug:  loratadine      Take 10 mg by mouth as needed        fluticasone 50 MCG/ACT spray    FLONASE    1 Bottle    Spray 2 sprays into both nostrils daily    Chronic allergic rhinitis, unspecified seasonality, unspecified trigger       losartan 50 MG tablet    COZAAR    30 tablet    Take 1 tablet (50 mg) by mouth daily    Hypertension goal BP (blood pressure) < 150/90       mometasone 0.1 % cream    ELOCON    60 g    Apply  topically.    Contact dermatitis and other eczema, due to unspecified cause       omeprazole 40 MG capsule    priLOSEC    30 capsule    Take 1 capsule (40 mg) by mouth daily Take 30-60 minutes before a meal.    Chronic cough       order for DME     1 Units    Equipment being ordered: Auto CPAP 11-18    FLOR (obstructive sleep apnea)       QVAR 40 MCG/ACT Inhaler   Generic drug:  beclomethasone     1 Inhaler    Inhale 2 puffs into the lungs 2 times daily    Mild persistent asthma without complication, Chronic cough       sinus rinse bottle      Spray 1 Bottle in nostril daily as needed.    Head ache       TYLENOL 500 MG tablet   Generic drug:  acetaminophen      Take 500 mg by mouth every 4 hours as needed for pain Reported on 5/10/2017        vitamin D 2000 UNITS tablet      Take 2,000 Units by mouth daily

## 2018-01-15 NOTE — PROGRESS NOTES
Dear Vita Zavala MD    Thank you for referring your patient Chelo Brooks to the Allergy/Immunology Clinic. Chelo Brooks was seen in the Allergy Clinic at [facility]. The following are my recommendations regarding her Allergic Rhinitis Due to Animals and Chronic Cough    1. Continue Qvar 80mcg twice daily - use up current supply of 40mcg and 80mcg inhalers as instructed  2. Continue albuterol HFA, 2-4 puffs every 4 hours as needed  3. Optichamber given in clinic and appropriate inhaler and spacer technique reviewed  4. Begin omeprazole 40mg daily  5. Resume fluticasone nasal spray, 2 sprays in each nostril daily - appropriate nasal spray technique reviewed  6. Discontinue lisinopril and switch to losartan  7. Follow-up in 1 to 2 months      Chelo Brooks is a 72 year old  or  female being seen today in consultation for a chronic cough. She states that for the past year she has had cough with clear sputum production. In the past Chelo reports she was followed by an allergist but hasn't been seen in the allergy clinic for many years. She is not sure whether her cough is due to asthma, allergies, or some other cause. In the past she had been taking Claritin-D and prilosec however these medications were stopped due to her elevated blood pressure. Chelo reports that her cough waxes and wanes in severity and some days or weeks are better than others. At times her cough has fully resolved and then it returns about a week later. Chelo complains that her ribs and sides are sore from coughing and her throat and head hurt as well. She is supposed to wear a CPAP machine at night but can't due this because she often is coughing or gagging. She has not worn her CPAP in the last 4 months. Chelo reports that she has no energy and does not want to go anywhere because of her symptoms. Talking for extended periods of time causes her to cough. She is not taking antihistamines  regularly and uses flonase nasal spray as needed. In the past she has tried sinus irrigation rinses but feels the solution sits in her nostrils and drips out when she moves or bends over.    Currently Chelo has been prescribed Qvar and albuterol for her cough. She completed a course of prednisone in December but does not feel that it was helpful. She rarely uses the albuterol inhaler but in the past has used it during severe coughing fits. Chelo is currently taking Qvar 40mcg 2 puffs twice a day. She does not have a spacer for her inhalers. She denies associated shortness of breath or chest tightness.      Component      Latest Ref Rng & Units 4/5/2012   Allergen A alternata      <0.35 KU(A)/L <0.35 . . .   Allergen D farinae      <0.35 KU(A)/L <0.35 . . .   Allergen, D Pteronyssinus      <0.35 KU(A)/L <0.35 . . .   Allergen Luis Grass      <0.35 KU(A)/L <0.35 . . .   Allergen Margo Grass      <0.35 KU(A)/L <0.35 . . .   Allergen Rye Grass      <0.35 KU(A)/L <0.35 . . .   Allergen Prince      <0.35 KU(A)/L <0.35 . . .   Allergen A fumigatus      <0.35 KU(A)/L <0.35 . . .   Allergen C herbarum      <0.35 KU(A)/L <0.35 . . .   Allergen P notatum      <0.35 KU(A)/L <0.35 . . .   Allergen Cat Dander      <0.35 KU(A)/L 0.57 (H)   Allergen Dog Dander      <0.35 KU(A)/L <0.35 . . .   Allergen Oak(white)      <0.35 KU(A)/L <0.35 . . .   Allergen, Silver Birch      <0.35 KU(A)/L <0.35 . . .   Allergen Hempstead      <0.35 KU(A)/L <0.35 . . .   Allergen White Lucas      <0.35 KU(A)/L <0.35 . . .   Allergen Russian Thistle      <0.35 KU(A)/L <0.35 . . .   Allergen, Ragweed Short      <0.35 KU(A)/L <0.35 . . .   Allergen, Giant Ragweed      <0.35 KU(A)/L <0.35 . . .   Allergen Lambs Qtrs      <0.35 KU(A)/L <0.35 . . .       Past Medical History:   Diagnosis Date     Diagnostic skin and sensitization tests 4/5/12     RAST pos. only to cat mildly     DJD (degenerative joint disease) 10/21/2005     Hepatitis B childhood      resolved, patient is not a carrier      Hyperlipidemia LDL goal <130 11/27/2012     Hypertension goal BP (blood pressure) < 150/90      Lumbar spinal stenosis 7/17/2017     Mild persistent asthma without complication 5/30/2017     Morbid obesity due to excess calories (H) 11/23/2015    Surgical referral declined '16      FLOR (obstructive sleep apnea)      Shingles 2014    scalp     Vitamin D deficiency      Family History   Problem Relation Age of Onset     Circulatory Father      d. DVT     HEART DISEASE Father      pacer     DIABETES Mother      Hypertension Mother      C.A.D. Paternal Uncle      MI      HEART DISEASE Brother 48     pacer      DIABETES Maternal Grandmother      Hypertension Maternal Grandmother      DIABETES Maternal Aunt      Hypertension Maternal Aunt      Cancer - colorectal Maternal Grandfather      C.A.D. Maternal Aunt      MI     Glaucoma No family hx of      Macular Degeneration No family hx of      Past Surgical History:   Procedure Laterality Date     C TOTAL KNEE ARTHROPLASTY  3/2000    right     C TOTAL KNEE ARTHROPLASTY  6/8/12    left     C VAGINAL HYSTERECTOMY  1977    benign, prolapse, ovaries remain        ENVIRONMENTAL HISTORY: [place] The home is heated with a forced air. They does have central air conditioning. The patient's bedroom is furnished with Indoor plants and carpeting in bedroom.  Pets inside the house include None. There is not history of cockroach or mice infestation. There is/are 0 smokers in the house.  The house does not have a damp basement.     SOCIAL HISTORY:   Chelo is retired, previously employed at [employer].  She lives alone.    REVIEW OF SYSTEMS:  General: negative for weight gain. negative for weight loss. positive  for changes in sleep.   Eyes: negative for itching. negative for redness. negative for tearing/watering.  Ears: negative for fullness. negative for hearing loss. negative for dizziness.   Nose: positive   for snoring.positive  for changes in smell. positive  for drainage.   Throat: negative for hoarseness. negative for sore throat. negative for trouble swallowing.   Lungs: negative for shortness of breath.negative for wheezing. positive  for sputum production.   Cardiovascular: negative for chest pain. negative for swelling of ankles. negative for fast or irregular heartbeat.   Gastrointestinal: negative for nausea. positive  for heartburn. positive  for acid reflux.   Musculoskeletal: positive  for joint pain. positive  for joint stiffness. positive  for joint swelling.   Neurologic: negative for seizures. negative for fainting. negative for weakness.   Psychiatric: negative for changes in mood. negative for anxiety.   Endocrine: negative for cold intolerance. negative for heat intolerance. negative for tremors.   Hematologic: negative for easy bruising. negative for easy bleeding.  Integumentary: negative for rash. negative for scaling. negative for nail changes.       Current Outpatient Prescriptions:      losartan (COZAAR) 50 MG tablet, Take 1 tablet (50 mg) by mouth daily, Disp: 30 tablet, Rfl: 0     fluticasone-salmeterol (ADVAIR DISKUS) 250-50 MCG/DOSE diskus inhaler, Inhale 1 puff into the lungs 2 times daily, Disp: 1 Inhaler, Rfl: 0     fluticasone (FLONASE) 50 MCG/ACT spray, Spray 2 sprays into both nostrils daily, Disp: 1 Bottle, Rfl: 11     Cholecalciferol (VITAMIN D) 2000 UNITS tablet, Take 2,000 Units by mouth daily, Disp: , Rfl:      loratadine (CLARITIN) 10 MG tablet, Take 10 mg by mouth as needed , Disp: , Rfl:      order for DME, Equipment being ordered: Auto CPAP 11-18, Disp: 1 Units, Rfl: 0     atorvastatin (LIPITOR) 40 MG tablet, Take 1 tablet (40 mg) by mouth daily, Disp: 90 tablet, Rfl: 3     albuterol (PROAIR HFA/PROVENTIL HFA/VENTOLIN HFA) 108 (90 BASE) MCG/ACT Inhaler, Inhale 1-2 puffs into the lungs every 4 hours as needed for shortness of breath / dyspnea, Disp: 1 Inhaler, Rfl: 3      "acetaminophen (TYLENOL) 500 MG tablet, Take 500 mg by mouth every 4 hours as needed for pain Reported on 5/10/2017, Disp: , Rfl:      Hypertonic Nasal Wash (SINUS RINSE BOTTLE KIT) PACK, Spray 1 Bottle in nostril daily as needed., Disp: , Rfl:      mometasone (ELOCON) 0.1 % cream, Apply  topically., Disp: 60 g, Rfl: 2  Immunization History   Administered Date(s) Administered     Influenza (High Dose) 3 valent vaccine 10/23/2013, 01/25/2017, 09/29/2017     Influenza (IIV3) PF 11/16/2000, 10/20/2015     Pneumo Conj 13-V (2010&after) 11/23/2015     Pneumococcal 23 valent 06/30/1995, 03/15/2012     TD (ADULT, 7+) 01/25/2017     TDAP Vaccine (Adacel) 02/23/2007     Allergies   Allergen Reactions     Adhesive Tape          EXAM:   Pulse 81  Temp 97.8  F (36.6  C) (Oral)  Resp 14  Ht 1.702 m (5' 7\")  Wt (!) 146.1 kg (322 lb)  SpO2 95%  BMI 50.43 kg/m2  GENERAL APPEARANCE: alert, cooperative and not in distress  SKIN: no rashes, no lesions  HEAD: atraumatic, normocephalic  EYES: lids and lashes normal, conjunctivae and sclerae clear, pupils equal, round, reactive to light, EOM full and intact  ENT: no scars or lesions, nasal exam showed no discharge, swelling or lesions noted, otoscopy showed external auditory canals clear, tympanic membranes normal, tongue midline and normal, soft palate, uvula, and tonsils normal  NECK: no asymmetry, masses, or scars, supple without significant adenopathy  LUNGS: unlabored respirations, no intercostal retractions or accessory muscle use, clear to auscultation without rales or wheezes  HEART: regular rate and rhythm without murmurs and normal S1 and S2  MUSCULOSKELETAL: no musculoskeletal defects are noted  NEURO: no focal deficits noted  PSYCH: does not appear depressed or anxious    WORKUP: Spirometry  SPIROMETRY       FVC 3.03L (90% of predicted).     FEV1 2.48L (97% of predicted).     FEV1/FVC 82%     FEF 25%-75%  2.75L/s (133% of predicted).    These values are consistent with " normal lung function without evidence of airflow obstruction however patient was unable to complete the maneuver and exhale for full 6 seconds without coughing    Asthma Control Test (ACT) total score: 11     ASSESSMENT/PLAN:  Chelo Brooks is a 72 year old female here for evaluation of chronic cough. Her symptoms have been ongoing for the past year and have not significantly improved with inhaled or oral steroids. She does not frequently use albuterol and has not consistently used intranasal steroids. Chelo was counseled regarding common causes of cough including allergies, asthma, chronic sinus disease, and acid reflux. Previous allergy testing was essentially negative with the only positive result being cat. Chelo does not have a pet cat and is rarely exposed to cats. Spirometry obtained today was essentially normal however she was unable to adequately perform the test due to coughing. We discussed resuming treatment for acid reflux in addition to continuing inhaled corticosteroid therapy. Chelo was also advised to resume use of flonase daily as this may improve post-nasal drainage that could be contributing to her cough.    1. Continue Qvar 80mcg twice daily - use up current supply of 40mcg and 80mcg inhalers as instructed  2. Continue albuterol HFA, 2-4 puffs every 4 hours as needed  3. Optichamber given in clinic and appropriate inhaler and spacer technique reviewed  4. Begin omeprazole 40mg daily  5. Resume fluticasone nasal spray, 2 sprays in each nostril daily - appropriate nasal spray technique reviewed  6. Discontinue lisinopril and switch to losartan  7. Follow-up in 1 to 2 months      Zahraa Wallace MD  Allergy/Immunology  [facility] [place] and [place], [place]      Chart documentation done in part with Dragon Voice Recognition Software. Although reviewed after completion, some word and grammatical errors may remain.

## 2018-01-15 NOTE — PATIENT INSTRUCTIONS
If you have any questions regarding your allergies, asthma, or what we discussed during your visit today please call the allergy clinic or contact us via [employer].    [facility]: [phone]    Follow-up in 1 to 2 months    1. Finish up the Qvar inhalers that you have before filling any new prescriptions. Rinse your mouth and brush your teeth afterwards.   Qvar 40mcg - Take 2 puffs twice daily   Qvar 80mcg - Take 1 puff twice daily    2. Use the albuterol inhaler (ProAir) as needed for cough, shortness of breath, chest tightness, and wheezing. Take 2 to 4 puffs every 4 hours as needed.    3. Start the prilosec (omeprazole) 40mg - Take 1 capsule every morning on an empty stomath    4. Start using the flonase (fluticasone) nasal spray every day - 2 sprays in each nostril daily    5. Stop the lisinopril and switch to the losartan      Using an Inhaler with a Spacer  To control asthma, you need to use your medicines the right way. Some medicines are inhaled using a device called a metered-dose inhaler (MDI). Metered-dose inhalers deliver medicine with a fine spray. You may be asked to use a spacer (holding tube) with your inhaler. The spacer helps make sure all the medicine you need goes into your lungs.   Steps for using an inhaler with a spacer  Step 1:    Remove the caps from the inhaler and spacer.    Shake the inhaler well and attach the spacer. If the inhaler is being used for the first time or has not been used for a while, prime it as directed by the product maker.  Step 2:    Breathe out normally.    Put the spacer between your teeth. Close your lips tightly around it.    Keep your chin up.  Step 3:    Spray 1 puff into the spacer by pressing down on the inhaler.    Then breathe in through your mouth as slowly and deeply as you can. This should take about 5-10 seconds. If you breathe too quickly, you may hear a whistling sound in certain spacers.  Step 4:    Take the spacer out of your  mouth.    Hold your breath for a count of 10.    Then hold your lips together and slowly breathe out through your mouth.          If you re prescribed more than 1 puff of medicine at a time, wait at least 30 seconds between puffs. This number may be different for different medicines. Shake the inhaler again. Then repeat steps 2 to 4.   Date Last Reviewed: 10/1/2016    [id number] [employer]. [address] [place] [address], [place] [address]. All rights reserved. This information is not intended as a substitute for professional medical care. Always follow your healthcare professional's instructions.

## 2018-01-15 NOTE — NURSING NOTE
"Chief Complaint   Patient presents with     Allergy Consult     Uncontrolled moderate persistent asthma       Initial Pulse 81  Temp 97.8  F (36.6  C) (Oral)  Resp 14  Ht 1.702 m (5' 7\")  Wt (!) 146.1 kg (322 lb)  SpO2 95%  BMI 50.43 kg/m2 Estimated body mass index is 50.43 kg/(m^2) as calculated from the following:    Height as of this encounter: 1.702 m (5' 7\").    Weight as of this encounter: 146.1 kg (322 lb).  Medication Reconciliation: complete   Jake Johnson MA      "

## 2018-01-15 NOTE — Clinical Note
"    1/15/2018         RE: Chelo Brooks  [address] [facility] [address]        Dear Colleague,    Thank you for referring your patient, Chelo Brooks, to the [facility]. Please see a copy of my visit note below.    Dear Vita Zaavla MD    Thank you for referring your patient Chelo Brooks to the Allergy/Immunology Clinic. Chelo Brooks was seen in the Allergy Clinic at [facility]. The following are my recommendations regarding her {Allergydiagnoses:562682}    Chelo Brooks is a 72 year old  or  female being seen today in consultation for     Has had a cough with clear sputum production for the past year. Used to see an allergist but hasn't seen one in many years.    Taking claritin-D and prilosec and felt they were raising her blood pressure and these medications were stopped by her PCP. Since stopping the medications these symptoms have resolved. Currently her cough is better than it was last week. Can go for a week without coughing and then the next week the symptoms return. She doesn't know what else to do. Her ribs and sides hurt, her throat hurts and head hurts from the coughing. Supposed to wear CPAP at night but can't because she is coughing and gagging - not using it for the past 4 months. Feels like she has no energy and doesn't want to go anywhere because of the coughing. Can't talk for prolonged periods without coughing.    Currently taking Qvar 40mcg 2 puffs twice daily. Rarely using albuterol - has used this in the past for a \"rescue.\"    Hasn't been on prilosec for the past year. Not sure where she is producing all of this mucus from. Also has rhinorrhea and has flonase nasal spray. Will use that as needed. Has a neti pot/sinus irrigation rinse but doesn't use it regularly - tends to sit in her nostrils and if she bends over the water drips out. Completed a course of prednisone in December but doesn't feel that anything " helps with the cough.    Still on lisinopril - hasn't picked up losartan yet.    Component      Latest Ref Rng & Units 4/5/2012   Allergen A alternata      <0.35 KU(A)/L <0.35 . . .   Allergen D farinae      <0.35 KU(A)/L <0.35 . . .   Allergen, D Pteronyssinus      <0.35 KU(A)/L <0.35 . . .   Allergen Luis Grass      <0.35 KU(A)/L <0.35 . . .   Allergen Margo Grass      <0.35 KU(A)/L <0.35 . . .   Allergen Rye Grass      <0.35 KU(A)/L <0.35 . . .   Allergen Prince      <0.35 KU(A)/L <0.35 . . .   Allergen A fumigatus      <0.35 KU(A)/L <0.35 . . .   Allergen C herbarum      <0.35 KU(A)/L <0.35 . . .   Allergen P notatum      <0.35 KU(A)/L <0.35 . . .   Allergen Cat Dander      <0.35 KU(A)/L 0.57 (H)   Allergen Dog Dander      <0.35 KU(A)/L <0.35 . . .   Allergen Oak(white)      <0.35 KU(A)/L <0.35 . . .   Allergen, Silver Birch      <0.35 KU(A)/L <0.35 . . .   Allergen Fergus      <0.35 KU(A)/L <0.35 . . .   Allergen White Lucas      <0.35 KU(A)/L <0.35 . . .   Allergen Russian Thistle      <0.35 KU(A)/L <0.35 . . .   Allergen, Ragweed Short      <0.35 KU(A)/L <0.35 . . .   Allergen, Giant Ragweed      <0.35 KU(A)/L <0.35 . . .   Allergen Lambs Qtrs      <0.35 KU(A)/L <0.35 . . .       Past Medical History:   Diagnosis Date     Diagnostic skin and sensitization tests 4/5/12     RAST pos. only to cat mildly     DJD (degenerative joint disease) 10/21/2005     Hepatitis B childhood     resolved, patient is not a carrier      Hyperlipidemia LDL goal <130 11/27/2012     Hypertension goal BP (blood pressure) < 150/90      Lumbar spinal stenosis 7/17/2017     Mild persistent asthma without complication 5/30/2017     Morbid obesity due to excess calories (H) 11/23/2015    Surgical referral declined '16      FLOR (obstructive sleep apnea)      Shingles 2014    scalp     Vitamin D deficiency      Family History   Problem Relation Age of Onset     Circulatory Father      d. DVT     HEART DISEASE Father      pacer      DIABETES Mother      Hypertension Mother      C.A.D. Paternal Uncle      MI      HEART DISEASE Brother 48     pacer      DIABETES Maternal Grandmother      Hypertension Maternal Grandmother      DIABETES Maternal Aunt      Hypertension Maternal Aunt      Cancer - colorectal Maternal Grandfather      C.A.D. Maternal Aunt      MI     Glaucoma No family hx of      Macular Degeneration No family hx of      Past Surgical History:   Procedure Laterality Date     C TOTAL KNEE ARTHROPLASTY  3/2000    right     C TOTAL KNEE ARTHROPLASTY  6/8/12    left     C VAGINAL HYSTERECTOMY  1977    benign, prolapse, ovaries remain        ENVIRONMENTAL HISTORY: [place] The home is heated with a forced air. They does have central air conditioning. The patient's bedroom is furnished with Indoor plants and carpeting in bedroom.  Pets inside the house include None. There is not history of cockroach or mice infestation. There is/are 0 smokers in the house.  The house does not have a damp basement.     SOCIAL HISTORY:   Chelo is retired, previously employed at [employer].  She lives alone.    REVIEW OF SYSTEMS:  General: negative for weight gain. negative for weight loss. positive  for changes in sleep.   Eyes: negative for itching. negative for redness. negative for tearing/watering.  Ears: negative for fullness. negative for hearing loss. negative for dizziness.   Nose: positive  for snoring.positive  for changes in smell. positive  for drainage.   Throat: negative for hoarseness. negative for sore throat. negative for trouble swallowing.   Lungs: negative for shortness of breath.negative for wheezing. positive  for sputum production.   Cardiovascular: negative for chest pain. negative for swelling of ankles. negative for fast or irregular heartbeat.   Gastrointestinal: negative for nausea. positive  for heartburn. positive  for acid reflux.   Musculoskeletal: positive  for joint pain. positive  for  joint stiffness. positive  for joint swelling.   Neurologic: negative for seizures. negative for fainting. negative for weakness.   Psychiatric: negative for changes in mood. negative for anxiety.   Endocrine: negative for cold intolerance. negative for heat intolerance. negative for tremors.   Hematologic: negative for easy bruising. negative for easy bleeding.  Integumentary: negative for rash. negative for scaling. negative for nail changes.       Current Outpatient Prescriptions:      losartan (COZAAR) 50 MG tablet, Take 1 tablet (50 mg) by mouth daily, Disp: 30 tablet, Rfl: 0     fluticasone-salmeterol (ADVAIR DISKUS) 250-50 MCG/DOSE diskus inhaler, Inhale 1 puff into the lungs 2 times daily, Disp: 1 Inhaler, Rfl: 0     fluticasone (FLONASE) 50 MCG/ACT spray, Spray 2 sprays into both nostrils daily, Disp: 1 Bottle, Rfl: 11     Cholecalciferol (VITAMIN D) 2000 UNITS tablet, Take 2,000 Units by mouth daily, Disp: , Rfl:      loratadine (CLARITIN) 10 MG tablet, Take 10 mg by mouth as needed , Disp: , Rfl:      order for DME, Equipment being ordered: Auto CPAP 11-18, Disp: 1 Units, Rfl: 0     atorvastatin (LIPITOR) 40 MG tablet, Take 1 tablet (40 mg) by mouth daily, Disp: 90 tablet, Rfl: 3     albuterol (PROAIR HFA/PROVENTIL HFA/VENTOLIN HFA) 108 (90 BASE) MCG/ACT Inhaler, Inhale 1-2 puffs into the lungs every 4 hours as needed for shortness of breath / dyspnea, Disp: 1 Inhaler, Rfl: 3     acetaminophen (TYLENOL) 500 MG tablet, Take 500 mg by mouth every 4 hours as needed for pain Reported on 5/10/2017, Disp: , Rfl:      Hypertonic Nasal Wash (SINUS RINSE BOTTLE KIT) PACK, Spray 1 Bottle in nostril daily as needed., Disp: , Rfl:      mometasone (ELOCON) 0.1 % cream, Apply  topically., Disp: 60 g, Rfl: 2  Immunization History   Administered Date(s) Administered     Influenza (High Dose) 3 valent vaccine 10/23/2013, 01/25/2017, 09/29/2017     Influenza (IIV3) PF 11/16/2000, 10/20/2015     Pneumo Conj 13-V  "(2010&after) 11/23/2015     Pneumococcal 23 valent 06/30/1995, 03/15/2012     TD (ADULT, 7+) 01/25/2017     TDAP Vaccine (Adacel) 02/23/2007     Allergies   Allergen Reactions     Adhesive Tape          EXAM:   Pulse 81  Temp 97.8  F (36.6  C) (Oral)  Resp 14  Ht 1.702 m (5' 7\")  Wt (!) 146.1 kg (322 lb)  SpO2 95%  BMI 50.43 kg/m2  GENERAL APPEARANCE: alert, cooperative and not in distress  SKIN: no rashes, no lesions  HEAD: atraumatic, normocephalic  EYES: lids and lashes normal, conjunctivae and sclerae clear, pupils equal, round, reactive to light, EOM full and intact  ENT: no scars or lesions, nasal exam showed no discharge, swelling or lesions noted, otoscopy showed external auditory canals clear, tympanic membranes normal, tongue midline and normal, soft palate, uvula, and tonsils normal  NECK: no asymmetry, masses, or scars, supple without significant adenopathy  LUNGS: unlabored respirations, no intercostal retractions or accessory muscle use, clear to auscultation without rales or wheezes  HEART: regular rate and rhythm without murmurs and normal S1 and S2  MUSCULOSKELETAL: no musculoskeletal defects are noted  NEURO: no focal deficits noted  PSYCH: does not appear depressed or anxious    WORKUP: Spirometry  SPIROMETRY       FVC 3.03L (90% of predicted).     FEV1 2.48L (97% of predicted).     FEV1/FVC 82%     FEF 25%-75%  2.75L/s (133% of predicted).    These values are consistent with normal lung function without evidence of airflow obstruction however patient was unable to complete the maneuver and exhale for full 6 seconds without coughing    Asthma Control Test (ACT) total score: 11     ASSESSMENT/PLAN:  Chelo Brooks is a 72 year old female    ***      Zahraa Wallace MD  Allergy/Immunology  [place], [place]      Chart documentation done in part with Dragon Voice Recognition Software. Although reviewed after completion, some word and grammatical errors may remain.    Again, " thank you for allowing me to participate in the care of your patient.        Sincerely,        Zahraa Wallace MD

## 2018-01-16 ENCOUNTER — TELEPHONE (OUTPATIENT)
Dept: ALLERGY | Facility: CLINIC | Age: 73
End: 2018-01-16

## 2018-01-16 ASSESSMENT — ASTHMA QUESTIONNAIRES: ACT_TOTALSCORE: 11

## 2018-01-16 NOTE — TELEPHONE ENCOUNTER
Patient expressed concerns about cost of her inhalers at her clinic visit. She would like to know if it is cheaper to get Qvar 40mcg 2 puffs twice daily vs Qvar 80mcg 1 puff twice daily. She states that when she went to refill her medication last she paid about $40 for the Qvar 40mcg strength inhaler. Spoke with staff at the [facility]. Currently patient's cost is $172.78 for the 40mcg inhaler and $331.07 for the 80mcg inhaler. This may be due to changes in her insurance formulary vs new deductible given that start of the new year. Please call patient to relay this information and to have her contact her insurance company directly. They should be able to tell her which inhaled asthma medications are on their formulary as well as likely cost/co-pay.

## 2018-01-16 NOTE — TELEPHONE ENCOUNTER
Relayed information to patient.  She will speak with her insurance company regarding formulary vs. Deductible.  No further action needed, closing encounter.    Antonette Dash RN

## 2018-01-25 ENCOUNTER — OFFICE VISIT (OUTPATIENT)
Dept: NEUROSURGERY | Facility: CLINIC | Age: 73
End: 2018-01-25
Attending: FAMILY MEDICINE
Payer: COMMERCIAL

## 2018-01-25 VITALS — SYSTOLIC BLOOD PRESSURE: 148 MMHG | DIASTOLIC BLOOD PRESSURE: 89 MMHG | OXYGEN SATURATION: 98 % | HEART RATE: 94 BPM

## 2018-01-25 DIAGNOSIS — M54.16 LUMBAR RADICULOPATHY: Primary | ICD-10-CM

## 2018-01-25 PROCEDURE — 99203 OFFICE O/P NEW LOW 30 MIN: CPT | Performed by: NURSE PRACTITIONER

## 2018-01-25 NOTE — NURSING NOTE
"Chelo Brooks is a 72 year old female who presents for:  Chief Complaint   Patient presents with     Neurologic Problem     referral from Dr. Zavala for lumbar stenosis        Initial Vitals:  There were no vitals taken for this visit. Estimated body mass index is 50.43 kg/(m^2) as calculated from the following:    Height as of 1/15/18: 5' 7\" (1.702 m).    Weight as of 1/15/18: 322 lb (146.1 kg).. There is no height or weight on file to calculate BSA. BP completed using cuff size: regular forearm    Data Unavailable    Do you feel safe in your environment?  Yes  Do you need any refills today? No    Nursing Comments: referral from Dr. Zavala for lumbar stenosis   You rate your pain today as 6-7 leg weakness      Nola Prince, CMA,AAS      "

## 2018-01-25 NOTE — LETTER
"    1/25/2018         RE: Chelo Brooks  [address] [facility] [address]        Dear Colleague,    Thank you for referring your patient, Chelo Brooks, to the [facility]. Please see a copy of my visit note below.    Dr. Guillermo Gilmore  [facility]  Neurosurgery Clinic Visit        CC: Low back pain    Primary care Provider: Vita Zavala      Reason For Visit:   I was asked by Dr. Zavala to consult on the patient for spinal stenosis of lumbar spine.      HPI: Chelo Brooks is a 72 year old female with low back pain, \"for years.\"  She states that it has continued to bother her, \"But I just never did anything about it.\"  She describes her pain as a constant dull pain that increases with walking, prolonged standing, lifting and bending.  She states the pain had gone into her right leg previously but after a lumbar BRII (right L4-5) in 8/2017 the RLE pain improved.  She states she had an incident with pain in her right foot this summer and the pain \"shot into my lower leg\" but that hasn't happened again.  For the most part her low back pain is better while seated.  She also takes antiinflammatories with mild benefit.  She denies falls or foot drop.  She denies BLE weakness.  She denies changes to bowel or bladder.       Pain right now:  6    Past Medical History:   Diagnosis Date     Diagnostic skin and sensitization tests 4/5/12     RAST pos. only to cat mildly     DJD (degenerative joint disease) 10/21/2005     Hepatitis B childhood     resolved, patient is not a carrier      Hyperlipidemia LDL goal <130 11/27/2012     Hypertension goal BP (blood pressure) < 150/90      Lumbar spinal stenosis 7/17/2017     Mild persistent asthma without complication 5/30/2017     Morbid obesity due to excess calories (H) 11/23/2015    Surgical referral declined '16      FLOR (obstructive sleep apnea)      Shingles 2014    scalp     Vitamin D deficiency        Past Medical " History reviewed with patient during visit.    Past Surgical History:   Procedure Laterality Date     C TOTAL KNEE ARTHROPLASTY  3/2000    right     C TOTAL KNEE ARTHROPLASTY  6/8/12    left     C VAGINAL HYSTERECTOMY  1977    benign, prolapse, ovaries remain      Past Surgical History reviewed with patient during visit.    Current Outpatient Prescriptions   Medication     omeprazole (PRILOSEC) 40 MG capsule     fluticasone (FLONASE) 50 MCG/ACT spray     beclomethasone (QVAR) 40 MCG/ACT Inhaler     losartan (COZAAR) 50 MG tablet     Cholecalciferol (VITAMIN D) 2000 UNITS tablet     loratadine (CLARITIN) 10 MG tablet     order for DME     albuterol (PROAIR HFA/PROVENTIL HFA/VENTOLIN HFA) 108 (90 BASE) MCG/ACT Inhaler     acetaminophen (TYLENOL) 500 MG tablet     Hypertonic Nasal Wash (SINUS RINSE BOTTLE KIT) PACK     mometasone (ELOCON) 0.1 % cream     atorvastatin (LIPITOR) 40 MG tablet     No current facility-administered medications for this visit.        Allergies   Allergen Reactions     Adhesive Tape        Social History     Social History     Marital status:      Spouse name: N/A     Number of children: 2     Years of education: 12     Occupational History     service center  Retired     Social History Main Topics     Smoking status: Never Smoker     Smokeless tobacco: Never Used     Alcohol use 0.0 oz/week     0 Standard drinks or equivalent per week      Comment: very rare       Drug use: No     Sexual activity: Not Currently     Partners: Male     Birth control/ protection: Post-menopausal, Female Surgical     Other Topics Concern     None     Social History Narrative       Family History   Problem Relation Age of Onset     Circulatory Father      d. DVT     HEART DISEASE Father      pacer     DIABETES Mother      Hypertension Mother      C.A.D. Paternal Uncle      MI      HEART DISEASE Brother 48     pacer      DIABETES Maternal Grandmother      Hypertension Maternal Grandmother      DIABETES  Maternal Aunt      Hypertension Maternal Aunt      Cancer - colorectal Maternal Grandfather      C.A.D. Maternal Aunt      MI     Glaucoma No family hx of      Macular Degeneration No family hx of          ROS: 10 point ROS neg other than the symptoms noted above in the HPI.    Vital Signs: /89 (BP Location: Right arm, Cuff Size: Adult Regular)  Pulse 94  SpO2 98%  Breastfeeding? No    Examination:  Constitutional:  Alert, overweight, NAD.  HEENT: Normocephalic, atraumatic.   Pulm:  Without shortness of breath   CV:  No pitting edema of BLE.      Neurological:  Awake  Alert  Oriented x 3  Speech clear  Cranial nerves II - XII intact  Motor exam   Shoulder Abduction:  Right:  5/5   Left:  5/5  Biceps:                      Right:  5/5   Left:  5/5  Triceps:                     Right:  5/5   Left:  5/5  Wrist Extensors:       Right:  5/5   Left:  5/5  Wrist Flexors:           Right:  5/5   Left:  5/5  Intrinsics:                   Right:  5/5   Left:  5/5  Hip Flexor:                Right: 5/5  Left:  5/5  Hip Adductor:             Right:  5/5  Left:  5/5  Hip Abductor:             Right:  5/5  Left:  5/5  Gastroc Soleus:        Right:  5/5  Left:  5/5  Tib/Ant:                      Right:  5/5  Left:  5/5  EHL:                          Right:  5/5  Left:  5/5   Sensation normal to bilateral upper and lower extremities  DTRs 1+ symmetric  Gait: Able to stand from a seated position. Normal non-antalgic, non-myelopathic gait.  Able to heel/toe walk without loss of balance, some difficulty.  Cervical examination reveals good range of motion.  No tenderness to palpation of the cervical spine or paraspinous muscles bilaterally.    Lumbar examination reveals tenderness of the spine midline and bilateral paraspinous muscles.  Negative SI joint, sciatic notch or greater trochanteric tenderness to palpation bilaterally.  Straight leg raise is negative bilaterally.  Limited ROM due to pain.     Imaging: MRI  "7/24/17:  Impression:   Multilevel degenerative changes throughout the spine. Findings are most pronounced at L4-5, with severe bilateral facet arthropathy and ligamentum flavum hypertrophy causing severe canal narrowing and lateral recess narrowing. There is likely impingement of descending L5 nerve roots at this level.     Assessment/Plan:   Lumbar DDD  Lumbar Radiculopathy    Chelo Brooks is a 72 year old female with low back pain, \"for years.\"  She states that it has continued to bother her, \"But I just never did anything about it.\"  She describes her pain as a constant dull pain that increases with walking, prolonged standing, lifting and bending.  She states the pain had gone into her right leg previously but after a lumbar BRII (right L4-5) in 8/2017 the RLE pain improved.  She states she had an incident with pain in her right foot this summer and the pain \"shot into my lower leg\" but that hasn't happened again.  For the most part her low back pain is better while seated.  She also takes antiinflammatories with mild benefit.  She denies falls or foot drop.  She denies BLE weakness.  She denies changes to bowel or bladder.  We reviewed MRI results.  We discussed repeating the LESI for her low back pain, at L4-5.  She would like to try another BRII.  If this is not helpful she will contact us.  We discussed weight loss as well, especially if consideration of surgery for spinal canal stenosis.  She was open to this discussed.  If she develops increased pain, lower extremity weakness she will contact us sooner.       Patient Instructions   Schedule lumbar epidural steroid injection (someon will contact you)  Please contact the clinic if pain persists at [phone].    -Patient was also contacted after visit and message left for recommendation of flexion/extension Xrays of lumbar spine.  Order placed and patient can contact our main office to help assist to schedule Xrays.    Maria Fernanda Anaya CNP  Spine and Brain " [place] [id number] [facility] [address]  [place] [address]    Tel [phone]  Pager [phone]      Again, thank you for allowing me to participate in the care of your patient.        Sincerely,        Maria Fernanda Anaya NP

## 2018-01-25 NOTE — PATIENT INSTRUCTIONS
Schedule lumbar epidural steroid injection (someone will contact you)  Please contact the clinic if pain persists at [phone].

## 2018-01-25 NOTE — PROGRESS NOTES
"Dr. Guillermo Gilmore  [facility]  Neurosurgery Clinic Visit        CC: Low back pain    Primary care Provider: Vita Zavala      Reason For Visit:   I was asked by Dr. Zavala to consult on the patient for spinal stenosis of lumbar spine.      HPI: Chelo Brooks is a 72 year old female with low back pain, \"for years.\"  She states that it has continued to bother her, \"But I just never did anything about it.\"  She describes her pain as a constant dull pain that increases with walking, prolonged standing, lifting and bending.  She states the pain had gone into her right leg previously but after a lumbar BRII (right L4-5) in 8/2017 the RLE pain improved.  She states she had an incident with pain in her right foot this summer and the pain \"shot into my lower leg\" but that hasn't happened again.  For the most part her low back pain is better while seated.  She also takes antiinflammatories with mild benefit.  She denies falls or foot drop.  She denies BLE weakness.  She denies changes to bowel or bladder.       Pain right now:  6    Past Medical History:   Diagnosis Date     Diagnostic skin and sensitization tests 4/5/12     RAST pos. only to cat mildly     DJD (degenerative joint disease) 10/21/2005     Hepatitis B childhood     resolved, patient is not a carrier      Hyperlipidemia LDL goal <130 11/27/2012     Hypertension goal BP (blood pressure) < 150/90      Lumbar spinal stenosis 7/17/2017     Mild persistent asthma without complication 5/30/2017     Morbid obesity due to excess calories (H) 11/23/2015    Surgical referral declined '16      FLOR (obstructive sleep apnea)      Shingles 2014    scalp     Vitamin D deficiency        Past Medical History reviewed with patient during visit.    Past Surgical History:   Procedure Laterality Date     C TOTAL KNEE ARTHROPLASTY  3/2000    right     C TOTAL KNEE ARTHROPLASTY  6/8/12    left     C VAGINAL HYSTERECTOMY  1977    benign, prolapse, ovaries " remain      Past Surgical History reviewed with patient during visit.    Current Outpatient Prescriptions   Medication     omeprazole (PRILOSEC) 40 MG capsule     fluticasone (FLONASE) 50 MCG/ACT spray     beclomethasone (QVAR) 40 MCG/ACT Inhaler     losartan (COZAAR) 50 MG tablet     Cholecalciferol (VITAMIN D) 2000 UNITS tablet     loratadine (CLARITIN) 10 MG tablet     order for DME     albuterol (PROAIR HFA/PROVENTIL HFA/VENTOLIN HFA) 108 (90 BASE) MCG/ACT Inhaler     acetaminophen (TYLENOL) 500 MG tablet     Hypertonic Nasal Wash (SINUS RINSE BOTTLE KIT) PACK     mometasone (ELOCON) 0.1 % cream     atorvastatin (LIPITOR) 40 MG tablet     No current facility-administered medications for this visit.        Allergies   Allergen Reactions     Adhesive Tape        Social History     Social History     Marital status:      Spouse name: N/A     Number of children: 2     Years of education: 12     Occupational History     service center  Retired     Social History Main Topics     Smoking status: Never Smoker     Smokeless tobacco: Never Used     Alcohol use 0.0 oz/week     0 Standard drinks or equivalent per week      Comment: very rare       Drug use: No     Sexual activity: Not Currently     Partners: Male     Birth control/ protection: Post-menopausal, Female Surgical     Other Topics Concern     None     Social History Narrative       Family History   Problem Relation Age of Onset     Circulatory Father      d. DVT     HEART DISEASE Father      pacer     DIABETES Mother      Hypertension Mother      C.A.D. Paternal Uncle      MI      HEART DISEASE Brother 48     pacer      DIABETES Maternal Grandmother      Hypertension Maternal Grandmother      DIABETES Maternal Aunt      Hypertension Maternal Aunt      Cancer - colorectal Maternal Grandfather      C.A.D. Maternal Aunt      MI     Glaucoma No family hx of      Macular Degeneration No family hx of          ROS: 10 point ROS neg other than the symptoms noted  above in the HPI.    Vital Signs: /89 (BP Location: Right arm, Cuff Size: Adult Regular)  Pulse 94  SpO2 98%  Breastfeeding? No    Examination:  Constitutional:  Alert, overweight, NAD.  HEENT: Normocephalic, atraumatic.   Pulm:  Without shortness of breath   CV:  No pitting edema of BLE.      Neurological:  Awake  Alert  Oriented x 3  Speech clear  Cranial nerves II - XII intact  Motor exam   Shoulder Abduction:  Right:  5/5   Left:  5/5  Biceps:                      Right:  5/5   Left:  5/5  Triceps:                     Right:  5/5   Left:  5/5  Wrist Extensors:       Right:  5/5   Left:  5/5  Wrist Flexors:           Right:  5/5   Left:  5/5  Intrinsics:                   Right:  5/5   Left:  5/5  Hip Flexor:                Right: 5/5  Left:  5/5  Hip Adductor:             Right:  5/5  Left:  5/5  Hip Abductor:             Right:  5/5  Left:  5/5  Gastroc Soleus:        Right:  5/5  Left:  5/5  Tib/Ant:                      Right:  5/5  Left:  5/5  EHL:                          Right:  5/5  Left:  5/5   Sensation normal to bilateral upper and lower extremities  DTRs 1+ symmetric  Gait: Able to stand from a seated position. Normal non-antalgic, non-myelopathic gait.  Able to heel/toe walk without loss of balance, some difficulty.  Cervical examination reveals good range of motion.  No tenderness to palpation of the cervical spine or paraspinous muscles bilaterally.    Lumbar examination reveals tenderness of the spine midline and bilateral paraspinous muscles.  Negative SI joint, sciatic notch or greater trochanteric tenderness to palpation bilaterally.  Straight leg raise is negative bilaterally.  Limited ROM due to pain.     Imaging: MRI 7/24/17:  Impression:   Multilevel degenerative changes throughout the spine. Findings are most pronounced at L4-5, with severe bilateral facet arthropathy and ligamentum flavum hypertrophy causing severe canal narrowing and lateral recess narrowing. There is likely  "impingement of descending L5 nerve roots at this level.     Assessment/Plan:   Lumbar DDD  Lumbar Radiculopathy    Chelo Brooks is a 72 year old female with low back pain, \"for years.\"  She states that it has continued to bother her, \"But I just never did anything about it.\"  She describes her pain as a constant dull pain that increases with walking, prolonged standing, lifting and bending.  She states the pain had gone into her right leg previously but after a lumbar BRII (right L4-5) in 8/2017 the RLE pain improved.  She states she had an incident with pain in her right foot this summer and the pain \"shot into my lower leg\" but that hasn't happened again.  For the most part her low back pain is better while seated.  She also takes antiinflammatories with mild benefit.  She denies falls or foot drop.  She denies BLE weakness.  She denies changes to bowel or bladder.  We reviewed MRI results.  We discussed repeating the LESI for her low back pain, at L4-5.  She would like to try another BRII.  If this is not helpful she will contact us.  We discussed weight loss as well, especially if consideration of surgery for spinal canal stenosis.  She was open to this discussed.  If she develops increased pain, lower extremity weakness she will contact us sooner.       Patient Instructions   Schedule lumbar epidural steroid injection (someon will contact you)  Please contact the clinic if pain persists at [phone].    -Patient was also contacted after visit and message left for recommendation of flexion/extension Xrays of lumbar spine.  Order placed and patient can contact our main office to help assist to schedule Xrays.    Maria Fernanda Anaya [facility]  [facility]  [address]  [address] [place] [id number]    Tel [phone]  Pager [phone]    "

## 2018-01-25 NOTE — MR AVS SNAPSHOT
"              After Visit Summary   1/25/2018    Chelo Brooks    MRN: [id number]           Patient Information     Date Of Birth          1945        Visit Information        Provider Department      1/25/2018 9:40 AM Maria Fernanda Anaya NP [facility]        Today's Diagnoses     Lumbar radiculopathy    -  1      Care Instructions    Schedule lumbar epidural steroid injection (someon will contact you)  Please contact the clinic if pain persists at [phone].            Follow-ups after your visit        Future tests that were ordered for you today     Open Future Orders        Priority Expected Expires Ordered    XR Lumbar Translaminar Inj Incl Imaging Routine 1/25/2018 1/25/2019 1/25/2018            Who to contact     If you have questions or need follow up information about today's clinic visit or your schedule please contact [facility] directly at [phone].  Normal or non-critical lab and imaging results will be communicated to you by MyChart, letter or phone within 4 business days after the clinic has received the results. If you do not hear from us within 7 days, please contact the clinic through [employer] or phone. If you have a critical or abnormal lab result, we will notify you by phone as soon as possible.  Submit refill requests through [employer] or call your pharmacy and they will forward the refill request to us. Please allow 3 business days for your refill to be completed.          Additional Information About Your Visit        MyChart Information     [employer] lets you send messages to your doctor, view your test results, renew your prescriptions, schedule appointments and more. To sign up, go to [email or web address] . Click on \"Log in\" on the left side of the screen, which will take you to the Welcome page. Then click on \"Sign up Now\" on the right side of the page.     You will be asked to enter the access code listed below, as well as some personal " information. Please follow the directions to create your username and password.     Your access code is: [email or web address]  Expires: 2018 10:36 AM     Your access code will  in 90 days. If you need help or a new code, please call your [facility] clinic or [phone].        Care EveryWhere ID     This is your Care EveryWhere ID. This could be used by other organizations to access your [facility] medical records  [id number]        Your Vitals Were     Pulse Pulse Oximetry Breastfeeding?             94 98% No          Blood Pressure from Last 3 Encounters:   18 148/89   17 132/84   17 136/86    Weight from Last 3 Encounters:   01/15/18 (!) 322 lb (146.1 kg)   17 (!) 326 lb (147.9 kg)   17 (!) 327 lb (148.3 kg)               Primary Care Provider Office Phone # Fax #    Vita Zavala [phone] [address] [facility] [address]        Equal Access to Services     [facility]: Hadii aad ku hadasho Soomaali, waaxda luqadaha, qaybta kaalmada adekennyyada, lily echavarria . So [facility] [phone].    ATENCIÓN: Si habla español, tiene a luque disposición servicios gratuitos de asistencia lingüística. [facility] [phone].    We comply with applicable federal civil rights laws and [place] laws. We do not discriminate on the basis of race, color, national origin, age, disability, sex, sexual orientation, or gender identity.            Thank you!     Thank you for choosing [facility]  for your care. Our goal is always to provide you with excellent care. Hearing back from our patients is one way we can continue to improve our services. Please take a few minutes to complete the written survey that you may receive in the mail after your visit with us. Thank you!             Your Updated Medication List - Protect others around you: Learn how to safely use, store and throw away your medicines at www.disposemymeds.org.           This list is accurate as of 1/25/18 10:24 AM.  Always use your most recent med list.                   Brand Name Dispense Instructions for use Diagnosis    albuterol 108 (90 BASE) MCG/ACT Inhaler    PROAIR HFA/PROVENTIL HFA/VENTOLIN HFA    1 Inhaler    Inhale 1-2 puffs into the lungs every 4 hours as needed for shortness of breath / dyspnea    Intermittent asthma, uncomplicated       atorvastatin 40 MG tablet    LIPITOR    90 tablet    Take 1 tablet (40 mg) by mouth daily    Hyperlipidemia LDL goal <130       CLARITIN 10 MG tablet   Generic drug:  loratadine      Take 10 mg by mouth as needed        fluticasone 50 MCG/ACT spray    FLONASE    1 Bottle    Spray 2 sprays into both nostrils daily    Chronic allergic rhinitis, unspecified seasonality, unspecified trigger       losartan 50 MG tablet    COZAAR    30 tablet    Take 1 tablet (50 mg) by mouth daily    Hypertension goal BP (blood pressure) < 150/90       mometasone 0.1 % cream    ELOCON    60 g    Apply  topically.    Contact dermatitis and other eczema, due to unspecified cause       omeprazole 40 MG capsule    priLOSEC    30 capsule    Take 1 capsule (40 mg) by mouth daily Take 30-60 minutes before a meal.    Chronic cough       order for DME     1 Units    Equipment being ordered: Auto CPAP 11-18    FLOR (obstructive sleep apnea)       QVAR 40 MCG/ACT Inhaler   Generic drug:  beclomethasone     1 Inhaler    Inhale 2 puffs into the lungs 2 times daily    Mild persistent asthma without complication, Chronic cough       sinus rinse bottle      Spray 1 Bottle in nostril daily as needed.    Head ache       TYLENOL 500 MG tablet   Generic drug:  acetaminophen      Take 500 mg by mouth every 4 hours as needed for pain Reported on 5/10/2017        vitamin D 2000 UNITS tablet      Take 2,000 Units by mouth daily

## 2018-02-06 ENCOUNTER — RADIANT APPOINTMENT (OUTPATIENT)
Dept: GENERAL RADIOLOGY | Facility: CLINIC | Age: 73
End: 2018-02-06
Attending: NURSE PRACTITIONER
Payer: COMMERCIAL

## 2018-02-06 VITALS — SYSTOLIC BLOOD PRESSURE: 150 MMHG | DIASTOLIC BLOOD PRESSURE: 90 MMHG | OXYGEN SATURATION: 96 % | HEART RATE: 67 BPM

## 2018-02-06 DIAGNOSIS — M54.16 LUMBAR RADICULOPATHY: ICD-10-CM

## 2018-02-06 PROCEDURE — 72110 X-RAY EXAM L-2 SPINE 4/>VWS: CPT | Performed by: RADIOLOGY

## 2018-02-06 PROCEDURE — 62323 NJX INTERLAMINAR LMBR/SAC: CPT | Performed by: RADIOLOGY

## 2018-02-06 RX ORDER — IOPAMIDOL 408 MG/ML
10 INJECTION, SOLUTION INTRATHECAL ONCE
Status: COMPLETED | OUTPATIENT
Start: 2018-02-06 | End: 2018-02-06

## 2018-02-06 RX ORDER — METHYLPREDNISOLONE ACETATE 80 MG/ML
80 INJECTION, SUSPENSION INTRA-ARTICULAR; INTRALESIONAL; INTRAMUSCULAR; SOFT TISSUE ONCE
Status: COMPLETED | OUTPATIENT
Start: 2018-02-06 | End: 2018-02-06

## 2018-02-06 RX ORDER — BUPIVACAINE HYDROCHLORIDE 5 MG/ML
10 INJECTION, SOLUTION PERINEURAL ONCE
Status: COMPLETED | OUTPATIENT
Start: 2018-02-06 | End: 2018-02-06

## 2018-02-06 RX ADMIN — METHYLPREDNISOLONE ACETATE 80 MG: 80 INJECTION, SUSPENSION INTRA-ARTICULAR; INTRALESIONAL; INTRAMUSCULAR; SOFT TISSUE at 11:32

## 2018-02-06 RX ADMIN — IOPAMIDOL 2 ML: 408 INJECTION, SOLUTION INTRATHECAL at 11:31

## 2018-02-06 RX ADMIN — BUPIVACAINE HYDROCHLORIDE 2 ML: 5 INJECTION, SOLUTION PERINEURAL at 11:31

## 2018-02-06 NOTE — DISCHARGE SUMMARY
AFTER YOU GO HOME    ? DO relax; minimize your activity for 24 hours  ? You may resume normal activity tomorrow  ? You may remove the bandage in the evening or next morning  ? You may resume bathing the next day  ? Drink at least 4 extra glasses of fluid today if not on fluid restrictions  ? DO NOT drive or operate machinery at home or at work for at least 24 hours      VISIT THE EMERGENCY ROOM OR URGENT CARE IF:    ? There is redness or swelling at the injection site  ? There is discharge from the injection site  ? You develop a temperature of 101  F or greater      ADDITIONAL INSTRUCTIONS:     ? You may resume your Coumadin or other blood thinner at your regular dose today.  Follow up with your physician to have your INR rechecked if indicated.  ? If you gain no relief from the injection after two (2) weeks, follow-up with your provider for your options.        Contacts:    During business hours from 8 to 5 pm, you may call [phone] to reach a nurse advisor at [facility].  After hours, call [facility]  [phone].  Ask for the Radiologist on-call.  Someone is on-call 24 hrs/day.  [facility] Toll Free Number   [phone]

## 2018-02-06 NOTE — PROGRESS NOTES
: Chelo was seen in X-ray today for a lumbar epidural injection. Patient rated pain before procedure 8/10. After procedure patient rated pain 5/10. This pain level is acceptable to patient. Patient discharged home with her son.

## 2018-02-13 ENCOUNTER — OFFICE VISIT (OUTPATIENT)
Dept: ALLERGY | Facility: CLINIC | Age: 73
End: 2018-02-13
Payer: COMMERCIAL

## 2018-02-13 VITALS
HEIGHT: 67 IN | RESPIRATION RATE: 20 BRPM | SYSTOLIC BLOOD PRESSURE: 160 MMHG | HEART RATE: 77 BPM | WEIGHT: 293 LBS | OXYGEN SATURATION: 97 % | DIASTOLIC BLOOD PRESSURE: 97 MMHG | BODY MASS INDEX: 45.99 KG/M2

## 2018-02-13 DIAGNOSIS — K21.9 GASTROESOPHAGEAL REFLUX DISEASE, ESOPHAGITIS PRESENCE NOT SPECIFIED: ICD-10-CM

## 2018-02-13 DIAGNOSIS — J30.81 CHRONIC ALLERGIC RHINITIS DUE TO ANIMAL HAIR AND DANDER: ICD-10-CM

## 2018-02-13 DIAGNOSIS — R05.3 CHRONIC COUGH: Primary | ICD-10-CM

## 2018-02-13 PROCEDURE — 99213 OFFICE O/P EST LOW 20 MIN: CPT | Performed by: ALLERGY & IMMUNOLOGY

## 2018-02-13 RX ORDER — FLUTICASONE PROPIONATE 50 MCG
2 SPRAY, SUSPENSION (ML) NASAL DAILY
Qty: 1 BOTTLE | Refills: 11 | Status: SHIPPED | OUTPATIENT
Start: 2018-02-13 | End: 2020-11-10

## 2018-02-13 RX ORDER — OMEPRAZOLE 40 MG/1
40 CAPSULE, DELAYED RELEASE ORAL DAILY
Qty: 30 CAPSULE | Refills: 5 | Status: SHIPPED | OUTPATIENT
Start: 2018-02-13 | End: 2018-10-17

## 2018-02-13 RX ORDER — ALBUTEROL SULFATE 90 UG/1
2 AEROSOL, METERED RESPIRATORY (INHALATION) EVERY 4 HOURS PRN
Qty: 1 INHALER | Refills: 3 | Status: SHIPPED | OUTPATIENT
Start: 2018-02-13 | End: 2019-06-18

## 2018-02-13 NOTE — PROGRESS NOTES
Chelo Brooks was seen in the Allergy Clinic at [facility]. The following are my recommendations regarding her Allergic Rhinitis Due to Animals, GERD and Chronic Cough    1. Continue Qvar 80mcg, 1 puff twice daily  2. Continue omeprazole 40mg every morning  3. Continue fluticasone nasal spray, 2 sprays in each nostril daily  4. Continue albuterol HFA, 2-4 puffs every 4 hours as needed  5. Follow-up in 6 months, sooner if needed      Chelo Brooks is a 72 year old American female who is seen today for follow-up of cough. She states that since she has begun taking the omeprazole her cough has completely resolved. She no longer has any symptoms of cough, gagging, or spitting up. In the past Chelo has taken OTC omeprazole for occasional symptoms but had not been consistent with this medication. She is pleased that her symptoms have improved. She continues to take Qvar every morning but does sometimes forget the evening dose. In addition Chelo has also begun using flonase nasal spray and switched her blood pressure medication to losartan from lisinopril.      REVIEW OF SYSTEMS:  General: negative for weight gain. negative for weight loss. negative for changes in sleep.   Eyes: negative for itching. negative for redness. negative for tearing/watering.  Ears: negative for fullness. negative for hearing loss. negative for dizziness.   Nose: negative for snoring.negative for changes in smell. negative for drainage.   Throat: negative for hoarseness. negative for sore throat. negative for trouble swallowing.   Lungs: negative for shortness of breath.negative for wheezing. negative for sputum production.   Cardiovascular: negative for chest pain. negative for swelling of ankles. negative for fast or irregular heartbeat.   Gastrointestinal: negative for nausea. negative for heartburn. negative for acid reflux.   Musculoskeletal: negative for joint pain. negative for joint stiffness. negative for joint  "swelling.   Neurologic: negative for seizures. negative for fainting. negative for weakness.   Psychiatric: negative for changes in mood. negative for anxiety.   Endocrine: negative for cold intolerance. negative for heat intolerance. negative for tremors.   Hematologic: negative for easy bruising. negative for easy bleeding.  Integumentary: negative for rash. negative for scaling. negative for nail changes.       Current Outpatient Prescriptions:      omeprazole (PRILOSEC) 40 MG capsule, Take 1 capsule (40 mg) by mouth daily Take 30-60 minutes before a meal., Disp: 30 capsule, Rfl: 1     fluticasone (FLONASE) 50 MCG/ACT spray, Spray 2 sprays into both nostrils daily, Disp: 1 Bottle, Rfl: 11     beclomethasone (QVAR) 40 MCG/ACT Inhaler, Inhale 2 puffs into the lungs 2 times daily, Disp: 1 Inhaler, Rfl: 0     losartan (COZAAR) 50 MG tablet, Take 1 tablet (50 mg) by mouth daily, Disp: 30 tablet, Rfl: 0     Cholecalciferol (VITAMIN D) 2000 UNITS tablet, Take 2,000 Units by mouth daily, Disp: , Rfl:      loratadine (CLARITIN) 10 MG tablet, Take 10 mg by mouth as needed , Disp: , Rfl:      order for DME, Equipment being ordered: Auto CPAP 11-18, Disp: 1 Units, Rfl: 0     albuterol (PROAIR HFA/PROVENTIL HFA/VENTOLIN HFA) 108 (90 BASE) MCG/ACT Inhaler, Inhale 1-2 puffs into the lungs every 4 hours as needed for shortness of breath / dyspnea, Disp: 1 Inhaler, Rfl: 3     acetaminophen (TYLENOL) 500 MG tablet, Take 500 mg by mouth every 4 hours as needed for pain Reported on 5/10/2017, Disp: , Rfl:      Hypertonic Nasal Wash (SINUS RINSE BOTTLE KIT) PACK, Spray 1 Bottle in nostril daily as needed., Disp: , Rfl:      mometasone (ELOCON) 0.1 % cream, Apply  topically., Disp: 60 g, Rfl: 2     atorvastatin (LIPITOR) 40 MG tablet, Take 1 tablet (40 mg) by mouth daily (Patient not taking: Reported on 1/25/2018), Disp: 90 tablet, Rfl: 3    EXAM:   BP (!) 160/97 (BP Location: Left arm)  Pulse 77  Resp 20  Ht 1.702 m (5' 7\")  Wt " (!) 148.3 kg (327 lb)  SpO2 97%  BMI 51.22 kg/m2  GENERAL APPEARANCE: alert, cooperative and not in distress  SKIN: no rashes, no lesions  HEAD: atraumatic, normocephalic  ENT: no scars or lesions, tongue midline and normal, soft palate, uvula, and tonsils normal  NECK: no asymmetry, masses, or scars, supple without significant adenopathy  LUNGS: unlabored respirations, no intercostal retractions or accessory muscle use, clear to auscultation without rales or wheezes  HEART: regular rate and rhythm without murmurs and normal S1 and S2  MUSCULOSKELETAL: no musculoskeletal defects are noted  NEURO: no focal deficits noted  PSYCH: does not appear depressed or anxious      WORKUP:  None    ASSESSMENT/PLAN:  Chelo Brooks is a 72 year old female here for follow-up of chronic cough. At her last visit she was counseled on proper use of inhaled medications and additional therapies were initiated including nasal steroid and PPI. Chelo reports complete resolution of her cough symptoms since she was last seen. At this time she was advised to continue with her current medications. Will consider weaning some of these therapies at her follow-up visit if her symptoms have remained well controlled.    1. Continue Qvar 80mcg, 1 puff twice daily  2. Continue omeprazole 40mg every morning  3. Continue fluticasone nasal spray, 2 sprays in each nostril daily  4. Continue albuterol HFA, 2-4 puffs every 4 hours as needed  5. Follow-up in 6 months, sooner if needed      Zahraa Wallace MD  Allergy/Immunology  [facility] and [place], [place]      Chart documentation done in part with Dragon Voice Recognition Software. Although reviewed after completion, some word and grammatical errors may remain.

## 2018-02-13 NOTE — MR AVS SNAPSHOT
After Visit Summary   2/13/2018    Chelo Brooks    MRN: [id number]           Patient Information     Date Of Birth          1945        Visit Information        Provider Department      2/13/2018 10:20 AM Zahraa Wallace MD [facility]        Today's Diagnoses     Chronic cough    -  1    Gastroesophageal reflux disease, esophagitis presence not specified        Chronic allergic rhinitis due to animal hair and dander        Intermittent asthma, uncomplicated          Care Instructions    If you have any questions regarding your allergies, asthma, or what we discussed during your visit today please call the allergy clinic or contact us via [employer] technology.    [facility] Allergy: [phone]      Follow-up in 6 months - sooner if needed    Continue with the omeprazole every morning    Continue the Qvar 80mcg - 1 puff twice daily    Continue the flonase nasal spray - 2 sprays in each nostril daily           Follow-ups after your visit        Follow-up notes from your care team     Return in about 6 months (around 8/13/2018).      Who to contact     If you have questions or need follow up information about today's clinic visit or your schedule please contact [facility] directly at [phone].  Normal or non-critical lab and imaging results will be communicated to you by [employer], letter or phone within 4 business days after the clinic has received the results. If you do not hear from us within 7 days, please contact the clinic through [employer] or phone. If you have a critical or abnormal lab result, we will notify you by phone as soon as possible.  Submit refill requests through [employer] technology or call your pharmacy and they will forward the refill request to us. Please allow 3 business days for your refill to be completed.          Additional Information About Your Visit        MyChart Information     [employer] technology lets you send messages to your doctor, view your test results, renew your  "prescriptions, schedule appointments and more. To sign up, go to [email or web address] . Click on \"Log in\" on the left side of the screen, which will take you to the Welcome page. Then click on \"Sign up Now\" on the right side of the page.     You will be asked to enter the access code listed below, as well as some personal information. Please follow the directions to create your username and password.     Your access code is: [email or web address]  Expires: 2018 10:42 AM     Your access code will  in 90 days. If you need help or a new code, please call your [place] clinic or [phone].        Care EveryWhere ID     This is your Care EveryWhere ID. This could be used by other organizations to access your [place] medical records  [id number]        Your Vitals Were     Pulse Respirations Height Pulse Oximetry BMI (Body Mass Index)       77 20 1.702 m (5' 7\") 97% 51.22 kg/m2        Blood Pressure from Last 3 Encounters:   18 (!) 160/97   18 150/90   18 148/89    Weight from Last 3 Encounters:   18 (!) 148.3 kg (327 lb)   01/15/18 (!) 146.1 kg (322 lb)   17 (!) 147.9 kg (326 lb)              Today, you had the following     No orders found for display         Today's Medication Changes          These changes are accurate as of 18 10:42 AM.  If you have any questions, ask your nurse or doctor.               Start taking these medicines.        Dose/Directions    beclomethasone 80 MCG/ACT Inhaler   Commonly known as:  QVAR   Used for:  Chronic cough   Replaces:  QVAR 40 MCG/ACT Inhaler   Started by:  Zahraa Wallace MD        Dose:  1 puff   Inhale 1 puff into the lungs 2 times daily   Quantity:  1 Inhaler   Refills:  5         These medicines have changed or have updated prescriptions.        Dose/Directions    albuterol 108 (90 BASE) MCG/ACT Inhaler   Commonly known as:  PROAIR HFA/PROVENTIL HFA/VENTOLIN HFA   This may have changed:    - how much to take  - reasons to take this "   Used for:  Intermittent asthma, uncomplicated   Changed by:  Zahraa Wallace MD        Dose:  2 puff   Inhale 2 puffs into the lungs every 4 hours as needed   Quantity:  1 Inhaler   Refills:  3         Stop taking these medicines if you haven't already. Please contact your care team if you have questions.     QVAR 40 MCG/ACT Inhaler   Generic drug:  beclomethasone   Replaced by:  beclomethasone 80 MCG/ACT Inhaler   Stopped by:  Zahraa Wallace MD                Where to get your medicines      These medications were sent to [facility] [id number] [place] [address] [address] [facility] [address] [place]  [address] [facility] [address]     Phone:  [phone]     albuterol 108 (90 BASE) MCG/ACT Inhaler    beclomethasone 80 MCG/ACT Inhaler    fluticasone 50 MCG/ACT spray    omeprazole 40 MG capsule                Primary Care Provider Office Phone # Fax #    Vita Zavala [phone] [address] [facility] [address]        Equal Access to Services     [facility]: Hadii aad ku hadasho Soomaali, waaxda luqadaha, qaybta kaalmada adepamella, lily tello. So [facility] [phone].    ATENCIÓN: Si habla español, tiene a luque disposición servicios grathoustonos de asistencia lingüística. [facility] [phone].    We comply with applicable federal civil rights laws and [place] laws. We do not discriminate on the basis of race, color, national origin, age, disability, sex, sexual orientation, or gender identity.            Thank you!     Thank you for choosing [facility]  for your care. Our goal is always to provide you with excellent care. Hearing back from our patients is one way we can continue to improve our services. Please take a few minutes to complete the written survey that you may receive in the mail after your visit with us. Thank you!             Your Updated Medication List - Protect others around you: Learn how  to safely use, store and throw away your medicines at www.disposemymeds.org.          This list is accurate as of 2/13/18 10:42 AM.  Always use your most recent med list.                   Brand Name Dispense Instructions for use Diagnosis    albuterol 108 (90 BASE) MCG/ACT Inhaler    PROAIR HFA/PROVENTIL HFA/VENTOLIN HFA    1 Inhaler    Inhale 2 puffs into the lungs every 4 hours as needed    Intermittent asthma, uncomplicated       atorvastatin 40 MG tablet    LIPITOR    90 tablet    Take 1 tablet (40 mg) by mouth daily    Hyperlipidemia LDL goal <130       beclomethasone 80 MCG/ACT Inhaler    QVAR    1 Inhaler    Inhale 1 puff into the lungs 2 times daily    Chronic cough       CLARITIN 10 MG tablet   Generic drug:  loratadine      Take 10 mg by mouth as needed        fluticasone 50 MCG/ACT spray    FLONASE    1 Bottle    Spray 2 sprays into both nostrils daily    Chronic cough, Chronic allergic rhinitis due to animal hair and dander       losartan 50 MG tablet    COZAAR    30 tablet    Take 1 tablet (50 mg) by mouth daily    Hypertension goal BP (blood pressure) < 150/90       mometasone 0.1 % cream    ELOCON    60 g    Apply  topically.    Contact dermatitis and other eczema, due to unspecified cause       omeprazole 40 MG capsule    priLOSEC    30 capsule    Take 1 capsule (40 mg) by mouth daily Take 30-60 minutes before a meal.    Chronic cough, Gastroesophageal reflux disease, esophagitis presence not specified       order for DME     1 Units    Equipment being ordered: Auto CPAP 11-18    FLOR (obstructive sleep apnea)       sinus rinse bottle      Spray 1 Bottle in nostril daily as needed.    Head ache       TYLENOL 500 MG tablet   Generic drug:  acetaminophen      Take 500 mg by mouth every 4 hours as needed for pain Reported on 5/10/2017        vitamin D 2000 UNITS tablet      Take 2,000 Units by mouth daily

## 2018-02-13 NOTE — LETTER
2/13/2018         RE: Chelo Brooks  [address] [facility] [address]        Dear Colleague,    Thank you for referring your patient, Chelo Brooks, to the [facility]. Please see a copy of my visit note below.    Chelo Brooks was seen in the Allergy Clinic at [facility]. The following are my recommendations regarding her Allergic Rhinitis Due to Animals, GERD and Chronic Cough    1. Continue Qvar 80mcg, 1 puff twice daily  2. Continue omeprazole 40mg every morning  3. Continue fluticasone nasal spray, 2 sprays in each nostril daily  4. Continue albuterol HFA, 2-4 puffs every 4 hours as needed  5. Follow-up in 6 months, sooner if needed      Chelo Brooks is a 72 year old American female who is seen today for follow-up of cough. She states that since she has begun taking the omeprazole her cough has completely resolved. She no longer has any symptoms of cough, gagging, or spitting up. In the past Chelo has taken OTC omeprazole for occasional symptoms but had not been consistent with this medication. She is pleased that her symptoms have improved. She continues to take Qvar every morning but does sometimes forget the evening dose. In addition Chelo has also begun using flonase nasal spray and switched her blood pressure medication to losartan from lisinopril.      REVIEW OF SYSTEMS:  General: negative for weight gain. negative for weight loss. negative for changes in sleep.   Eyes: negative for itching. negative for redness. negative for tearing/watering.  Ears: negative for fullness. negative for hearing loss. negative for dizziness.   Nose: negative for snoring.negative for changes in smell. negative for drainage.   Throat: negative for hoarseness. negative for sore throat. negative for trouble swallowing.   Lungs: negative for shortness of breath.negative for wheezing. negative for sputum production.   Cardiovascular: negative for chest pain. negative for  swelling of ankles. negative for fast or irregular heartbeat.   Gastrointestinal: negative for nausea. negative for heartburn. negative for acid reflux.   Musculoskeletal: negative for joint pain. negative for joint stiffness. negative for joint swelling.   Neurologic: negative for seizures. negative for fainting. negative for weakness.   Psychiatric: negative for changes in mood. negative for anxiety.   Endocrine: negative for cold intolerance. negative for heat intolerance. negative for tremors.   Hematologic: negative for easy bruising. negative for easy bleeding.  Integumentary: negative for rash. negative for scaling. negative for nail changes.       Current Outpatient Prescriptions:      omeprazole (PRILOSEC) 40 MG capsule, Take 1 capsule (40 mg) by mouth daily Take 30-60 minutes before a meal., Disp: 30 capsule, Rfl: 1     fluticasone (FLONASE) 50 MCG/ACT spray, Spray 2 sprays into both nostrils daily, Disp: 1 Bottle, Rfl: 11     beclomethasone (QVAR) 40 MCG/ACT Inhaler, Inhale 2 puffs into the lungs 2 times daily, Disp: 1 Inhaler, Rfl: 0     losartan (COZAAR) 50 MG tablet, Take 1 tablet (50 mg) by mouth daily, Disp: 30 tablet, Rfl: 0     Cholecalciferol (VITAMIN D) 2000 UNITS tablet, Take 2,000 Units by mouth daily, Disp: , Rfl:      loratadine (CLARITIN) 10 MG tablet, Take 10 mg by mouth as needed , Disp: , Rfl:      order for DME, Equipment being ordered: Auto CPAP 11-18, Disp: 1 Units, Rfl: 0     albuterol (PROAIR HFA/PROVENTIL HFA/VENTOLIN HFA) 108 (90 BASE) MCG/ACT Inhaler, Inhale 1-2 puffs into the lungs every 4 hours as needed for shortness of breath / dyspnea, Disp: 1 Inhaler, Rfl: 3     acetaminophen (TYLENOL) 500 MG tablet, Take 500 mg by mouth every 4 hours as needed for pain Reported on 5/10/2017, Disp: , Rfl:      Hypertonic Nasal Wash (SINUS RINSE BOTTLE KIT) PACK, Spray 1 Bottle in nostril daily as needed., Disp: , Rfl:      mometasone (ELOCON) 0.1 % cream, Apply  topically., Disp: 60 g, Rfl:  "2     atorvastatin (LIPITOR) 40 MG tablet, Take 1 tablet (40 mg) by mouth daily (Patient not taking: Reported on 1/25/2018), Disp: 90 tablet, Rfl: 3    EXAM:   BP (!) 160/97 (BP Location: Left arm)  Pulse 77  Resp 20  Ht 1.702 m (5' 7\")  Wt (!) 148.3 kg (327 lb)  SpO2 97%  BMI 51.22 kg/m2  GENERAL APPEARANCE: alert, cooperative and not in distress  SKIN: no rashes, no lesions  HEAD: atraumatic, normocephalic  ENT: no scars or lesions, tongue midline and normal, soft palate, uvula, and tonsils normal  NECK: no asymmetry, masses, or scars, supple without significant adenopathy  LUNGS: unlabored respirations, no intercostal retractions or accessory muscle use, clear to auscultation without rales or wheezes  HEART: regular rate and rhythm without murmurs and normal S1 and S2  MUSCULOSKELETAL: no musculoskeletal defects are noted  NEURO: no focal deficits noted  PSYCH: does not appear depressed or anxious      WORKUP:  None    ASSESSMENT/PLAN:  Chelo Brooks is a 72 year old female here for follow-up of chronic cough. At her last visit she was counseled on proper use of inhaled medications and additional therapies were initiated including nasal steroid and PPI. Chelo reports complete resolution of her cough symptoms since she was last seen. At this time she was advised to continue with her current medications. Will consider weaning some of these therapies at her follow-up visit if her symptoms have remained well controlled.    1. Continue Qvar 80mcg, 1 puff twice daily  2. Continue omeprazole 40mg every morning  3. Continue fluticasone nasal spray, 2 sprays in each nostril daily  4. Continue albuterol HFA, 2-4 puffs every 4 hours as needed  5. Follow-up in 6 months, sooner if needed      Zahraa Wallace MD  Allergy/Immunology  [facility] and [place], [place]      Chart documentation done in part with Dragon Voice Recognition Software. Although reviewed after completion, some word and grammatical errors may " remain.      Again, thank you for allowing me to participate in the care of your patient.        Sincerely,        Zahraa Wallace MD

## 2018-02-14 ASSESSMENT — ASTHMA QUESTIONNAIRES: ACT_TOTALSCORE: 25

## 2018-02-15 ENCOUNTER — TELEPHONE (OUTPATIENT)
Dept: ALLERGY | Facility: CLINIC | Age: 73
End: 2018-02-15

## 2018-02-15 NOTE — TELEPHONE ENCOUNTER
"prakash faxed request stating \" drug not covered by pt ins. The preferred alternative is proair HFA, proair respiclick. Please send in new rx or try for PA?    Dorene Neal  Specialty CSS    "

## 2018-02-15 NOTE — TELEPHONE ENCOUNTER
Not sure which drug this message is referring to. Called and spoke with pharmacist at [facility]. Patient picked up both omeprazole and fluticasone nasal spray. Qvar and Albuterol inhalers were ordered to be kept on file and they were not filled yesterday per patient's request. Pharmacist unsure why fax was sent. Disregarding message below.

## 2018-03-07 ENCOUNTER — TELEPHONE (OUTPATIENT)
Dept: NEUROSURGERY | Facility: CLINIC | Age: 73
End: 2018-03-07

## 2018-03-07 NOTE — TELEPHONE ENCOUNTER
LM for patient to review Xray results showing changes with flexion and extension.  Per Dr. Gilmore recommend scheduling appointment with him to review results and discuss recommendations.

## 2018-03-08 ENCOUNTER — TELEPHONE (OUTPATIENT)
Dept: NEUROSURGERY | Facility: CLINIC | Age: 73
End: 2018-03-08

## 2018-03-08 DIAGNOSIS — M54.50 LUMBAGO: ICD-10-CM

## 2018-03-08 DIAGNOSIS — M47.819 FACET ARTHROPATHY: Primary | ICD-10-CM

## 2018-03-08 NOTE — TELEPHONE ENCOUNTER
REASON FOR CALL:  Pt states she would like to speak with a nurse prior to scheduling appt with Dr. Gilmore as she fears she is not a surgical candidate d/t her current weight.     Detailed message can be left:  YES

## 2018-03-08 NOTE — TELEPHONE ENCOUNTER
Spoke with Chelo Mcduffie CNP and recommends for patient to continue with conservative therapies such at PT and injections.Patient's BMI would need to be less to reduce post op complications.    Called and LVM to discuss. Awaiting call back.

## 2018-03-14 NOTE — TELEPHONE ENCOUNTER
Returned patient's call. Discussed with her that Chelo Mcduffie CNP and recommends for her to continue with conservative therapies such at PT and injections.Patient's BMI would need to be less to reduce post op complications.     Patient has had 2 injections. 1st injection resolved her right leg and groin pain but did not help her low back pain. Her second BRII 2/6/18 did not provide relief for her continued low back pain. Patient states she needs to takes breaks to sit frequently due to her back pain. She denies any radicular pain at this time. Patient does not want any more injections as she feels they have not been that helpful. She is open to PT but is having a financial hardship and would not be able to commit to PT program at this time. She said maybe in a few months. Patient is aware of her BMI and has a stationary bike at home. Patient would like to know of any other conservative therapies to help manage her continued low back pain. Call routed to Chelo Mcduffie CNP for further recommendation.

## 2018-03-15 NOTE — TELEPHONE ENCOUNTER
Chelo Mcduffie CNP is recommending chronic pain care and patient also has facet arthropathy which is probably what is causing back pain LMBB may be helpful.     Called and spoke to patient. Discussed Chelo's recommendations. Patient is in agreement with plan. She would like chronic pain referral and injection order to be sent to [facility]. She prefers [place] location. Referral faxed to [facility].

## 2018-04-03 ENCOUNTER — TRANSFERRED RECORDS (OUTPATIENT)
Dept: HEALTH INFORMATION MANAGEMENT | Facility: CLINIC | Age: 73
End: 2018-04-03

## 2018-04-03 NOTE — PATIENT INSTRUCTIONS
Did you know you can lower your blood pressure with your daily habits?    *Losing 20 pounds of weight lowers blood pressure 5 to 20 points.  *Eating a low-fat diet rich in fruits, vegetables and low-fat dairy lowers blood pressure 8 to 14 points.  *Eating a low-salt diet lowers blood pressure 2 to 8 points.  *Exercising regularly lowers blood pressure 4 to 9 points.  *Reducing alcohol use lowers blood pressure 2 to 4 points.      [facility]    If you have any questions regarding to your visit please contact your care team:       Team Red:   Clinic Hours Telephone Number   Dr. Vita Marie, NP   7am-7pm  Monday - Thursday   7am-5pm  Fridays  [phone]  (Appointment scheduling available 24/7)    Questions about your visit?   Team Line  [phone]   [facility] [place] [facility] [facility] 11am-9pm Monday-Friday Saturday-Sunday- 9am-5pm   [place] - 5pm-9pm Monday-Friday Saturday-Sunday- 9am-5pm  [phone] - [facility]  [phone] - [place]       What options do I have for visits at the clinic other than the traditional office visit?  To expand how we care for you, many of our providers are utilizing electronic visits (e-visits) and telephone visits, when medically appropriate, for interactions with their patients rather than a visit in the clinic.   We also offer nurse visits for many medical concerns. Just like any other service, we will bill your insurance company for this type of visit based on time spent on the phone with your provider. Not all insurance companies cover these visits. Please check with your medical insurance if this type of visit is covered. You will be responsible for any charges that are not paid by your insurance.      E-visits via [employer]:  generally incur a $35.00 fee.  Telephone visits:  Time spent on the phone: *charged based on time that is spent on the phone in increments of 10 minutes. Estimated  cost:   5-10 mins $30.00   11-20 mins. $59.00   21-30 mins. $85.00     Use [employer]hart (secure email communication and access to your chart) to send your primary care provider a message or make an appointment. Ask someone on your Team how to sign up for [employer].  For a Price Quote for your services, please call our [employer] Line at [phone].      As always, Thank you for trusting us with your health care needs!      The 10-year ASCVD risk score (Dawn JERAMIE Emanuel, et al., 2013) is: 20.3%    Values used to calculate the score:      Age: 73 years      Sex: Female      Is Non- : No      Diabetic: No      Tobacco smoker: No      Systolic Blood Pressure: 160 mmHg      Is BP treated: Yes      HDL Cholesterol: 56 mg/dL      Total Cholesterol: 175 mg/dL

## 2018-04-03 NOTE — PROGRESS NOTES
"  SUBJECTIVE:   Chelo Brooks is a 73 year old female who presents to clinic today for the following health issues:    Hyperlipidemia Follow-Up      Rate your low fat/cholesterol diet?: fair    Taking statin?  No    Other lipid medications/supplements?:  none    Hypertension Follow-up      Outpatient blood pressures are not being checked.    Low Salt Diet: low salt    Asthma Follow-Up    Was ACT completed today?    Yes    ACT Total Scores 4/5/2018   ACT TOTAL SCORE -   ASTHMA ER VISITS -   ASTHMA HOSPITALIZATIONS -   ACT TOTAL SCORE (Goal Greater than or Equal to 20) 20   In the past 12 months, how many times did you visit the emergency room for your asthma without being admitted to the hospital? 0   In the past 12 months, how many times were you hospitalized overnight because of your asthma? 0       Recent asthma triggers that patient is dealing with: allergies        Amount of exercise or physical activity: None    Problems taking medications regularly: occasionally forgets    Medication side effects: none    Diet: low salt and low fat/cholesterol    She has gradually worsening bilateral lower extremity edema with itchy rash for months. She denies PND, orthopnea, palpitations, chest pain, of shortness of breath. She is trying to lose weight but has chronic low back pain.     I have reviewed the patient's medical history in detail and updated the computerized patient record.     ROS:  CONSTITUTIONAL: NEGATIVE for fever, chills, change in weight  INTEGUMENTARY/SKIN: leg rash   RESP: NEGATIVE for significant cough or SOB  CV: lower extremity edema  MUSCULOSKELETAL:back pain  ENDOCRINE: NEGATIVE for temperature intolerance, skin/hair changes  HEME: NEGATIVE for bleeding problems    OBJECTIVE:     BP (!) 160/96  Pulse 67  Temp 97.4  F (36.3  C) (Oral)  Resp 20  Ht 5' 7\" (1.702 m)  Wt 330 lb (149.7 kg)  SpO2 94%  Breastfeeding? No  BMI 51.69 kg/m2  Body mass index is 51.69 kg/(m^2).  GENERAL: alert, no " distress and obese  EYES: Eyes grossly normal to inspection, PERRL and conjunctivae and sclerae normal  NECK: no adenopathy, no asymmetry, masses, or scars and thyroid normal to palpation  RESP: lungs clear to auscultation - no rales, rhonchi or wheezes  CV: regular rate and rhythm, normal S1 S2, no S3 or S4, no murmur, click or rub   MS: severe woody bipedal edema; no deformity; normal gait    SKIN: hyperpigmented rash on bilateral shins without ulcers   PSYCH: mentation appears normal, affect normal/bright    Diagnostic Test Results:  Results for orders placed or performed in visit on 02/06/18   XR Lumbar Translaminar Inj Incl Imaging    Narrative    Lumbar epidural steroid injection    History: Low back pain.    Fluoroscopy time: 22nd    Procedure:    After verbal and written consent, the patient was placed  prone on the fluoroscopy table and the skin of the back was prepped  and draped in the normal sterile fashion. After discussing  symptomatology with the patient and visualizing the appropriate  imaging studies, it was determined utilize a left interlaminar  approach at the level of L3-4. 1% lidocaine was used to anesthetize  the skin and subcutaneous tissues. Utilizing fluoroscopic guidance, a  5 inch, 22-gauge spinal needle was advanced into the epidural space. 1  to 2 cc of Isovue-M 200 contrast was injected to verify flow of  contrast into the dorsal  epidural space. There was no evidence for  intravascular filling .  A mixture of 2 cc 0.5% Marcaine, 1 cc of 80  mg/cc Depo-Medrol, and 3 cc of preservative-free saline was then  injected into the epidural space. The needle was removed. The patient  tolerated the procedure well and there were no immediate  complications. The patient was observed for 15 minutes after the  procedure and left the department in good condition.    The patient reported a 8 out of 10 pain scale rating prior to the  procedure. After the procedure the patient rated the pain level as a    5 out of 10.      Impression    Impression:  Successful lumbar epidural steroid injection.    CRUZ GIRALDO MD       ASSESSMENT/PLAN:   (E66.01) Morbid obesity due to excess calories (H)  (primary encounter diagnosis)  (I87.2) Stasis dermatitis of both legs  (R60.9) Dependent edema  Plan: Comprehensive metabolic panel, TSH with free T4        reflex, CBC with platelets differential, order         for DME, Albumin Random Urine Quantitative with        Creat Ratio        Counseled to make better food choices, exercise as tolerated, and lose weight. Lower extremity elevation, low-salt diet, compression stockings, and call or return to clinic as needed if these symptoms worsen or fail to improve as anticipated. Follow-up 1 month.     (I10) Hypertension goal BP (blood pressure) < 150/90  Comment: uncontrolled   Plan: Comprehensive metabolic panel, Albumin Random         Urine Quantitative with Creat Ratio,         losartan-hydrochlorothiazide (HYZAAR) 50-12.5         MG per tablet        Change losartan to losartan-HCTZ. Follow-up 1 month.     (E78.5) Hyperlipidemia LDL goal <130  Plan: Lipid panel reflex to direct LDL Fasting,         Comprehensive metabolic panel, TSH with free T4        reflex        Likely needs atorvastatin 40 mg daily     (Z12.11) Screen for colon cancer  Plan: Fecal colorectal cancer screen (FIT)            See Patient Instructions    Vita Zavala MD  [facility]

## 2018-04-05 ENCOUNTER — OFFICE VISIT (OUTPATIENT)
Dept: FAMILY MEDICINE | Facility: CLINIC | Age: 73
End: 2018-04-05
Payer: COMMERCIAL

## 2018-04-05 VITALS
WEIGHT: 293 LBS | HEART RATE: 67 BPM | HEIGHT: 67 IN | OXYGEN SATURATION: 94 % | DIASTOLIC BLOOD PRESSURE: 96 MMHG | RESPIRATION RATE: 20 BRPM | SYSTOLIC BLOOD PRESSURE: 160 MMHG | BODY MASS INDEX: 45.99 KG/M2 | TEMPERATURE: 97.4 F

## 2018-04-05 DIAGNOSIS — Z12.11 SCREEN FOR COLON CANCER: ICD-10-CM

## 2018-04-05 DIAGNOSIS — I87.2 STASIS DERMATITIS OF BOTH LEGS: ICD-10-CM

## 2018-04-05 DIAGNOSIS — R60.9 DEPENDENT EDEMA: ICD-10-CM

## 2018-04-05 DIAGNOSIS — E78.5 HYPERLIPIDEMIA LDL GOAL <130: Chronic | ICD-10-CM

## 2018-04-05 DIAGNOSIS — I10 HYPERTENSION GOAL BP (BLOOD PRESSURE) < 150/90: Chronic | ICD-10-CM

## 2018-04-05 DIAGNOSIS — E66.01 MORBID OBESITY DUE TO EXCESS CALORIES (H): Primary | Chronic | ICD-10-CM

## 2018-04-05 PROBLEM — J45.30 MILD PERSISTENT ASTHMA WITHOUT COMPLICATION: Status: ACTIVE | Noted: 2017-05-30

## 2018-04-05 PROBLEM — L30.9 ECZEMA, UNSPECIFIED TYPE: Status: ACTIVE | Noted: 2018-04-05

## 2018-04-05 LAB
ALBUMIN SERPL-MCNC: 3.5 G/DL (ref 3.4–5)
ALP SERPL-CCNC: 76 U/L (ref 40–150)
ALT SERPL W P-5'-P-CCNC: 18 U/L (ref 0–50)
ANION GAP SERPL CALCULATED.3IONS-SCNC: 8 MMOL/L (ref 3–14)
AST SERPL W P-5'-P-CCNC: 26 U/L (ref 0–45)
BASOPHILS # BLD AUTO: 0 10E9/L (ref 0–0.2)
BASOPHILS NFR BLD AUTO: 0.8 %
BILIRUB SERPL-MCNC: 0.7 MG/DL (ref 0.2–1.3)
BUN SERPL-MCNC: 20 MG/DL (ref 7–30)
CALCIUM SERPL-MCNC: 8.8 MG/DL (ref 8.5–10.1)
CHLORIDE SERPL-SCNC: 105 MMOL/L (ref 94–109)
CHOLEST SERPL-MCNC: 220 MG/DL
CO2 SERPL-SCNC: 28 MMOL/L (ref 20–32)
CREAT SERPL-MCNC: 0.94 MG/DL (ref 0.52–1.04)
DIFFERENTIAL METHOD BLD: NORMAL
EOSINOPHIL # BLD AUTO: 0.2 10E9/L (ref 0–0.7)
EOSINOPHIL NFR BLD AUTO: 2.9 %
ERYTHROCYTE [DISTWIDTH] IN BLOOD BY AUTOMATED COUNT: 14.9 % (ref 10–15)
GFR SERPL CREATININE-BSD FRML MDRD: 58 ML/MIN/1.7M2
GLUCOSE SERPL-MCNC: 87 MG/DL (ref 70–99)
HCT VFR BLD AUTO: 39.3 % (ref 35–47)
HDLC SERPL-MCNC: 58 MG/DL
HGB BLD-MCNC: 12.8 G/DL (ref 11.7–15.7)
LDLC SERPL CALC-MCNC: 141 MG/DL
LYMPHOCYTES # BLD AUTO: 1.3 10E9/L (ref 0.8–5.3)
LYMPHOCYTES NFR BLD AUTO: 25.9 %
MCH RBC QN AUTO: 28.1 PG (ref 26.5–33)
MCHC RBC AUTO-ENTMCNC: 32.6 G/DL (ref 31.5–36.5)
MCV RBC AUTO: 86 FL (ref 78–100)
MONOCYTES # BLD AUTO: 0.5 10E9/L (ref 0–1.3)
MONOCYTES NFR BLD AUTO: 9 %
NEUTROPHILS # BLD AUTO: 3.1 10E9/L (ref 1.6–8.3)
NEUTROPHILS NFR BLD AUTO: 61.4 %
NONHDLC SERPL-MCNC: 162 MG/DL
PLATELET # BLD AUTO: 187 10E9/L (ref 150–450)
POTASSIUM SERPL-SCNC: 4.3 MMOL/L (ref 3.4–5.3)
PROT SERPL-MCNC: 6.9 G/DL (ref 6.8–8.8)
RBC # BLD AUTO: 4.55 10E12/L (ref 3.8–5.2)
SODIUM SERPL-SCNC: 141 MMOL/L (ref 133–144)
TRIGL SERPL-MCNC: 104 MG/DL
TSH SERPL DL<=0.005 MIU/L-ACNC: 1.61 MU/L (ref 0.4–4)
WBC # BLD AUTO: 5.1 10E9/L (ref 4–11)

## 2018-04-05 PROCEDURE — 36415 COLL VENOUS BLD VENIPUNCTURE: CPT | Performed by: FAMILY MEDICINE

## 2018-04-05 PROCEDURE — 84443 ASSAY THYROID STIM HORMONE: CPT | Performed by: FAMILY MEDICINE

## 2018-04-05 PROCEDURE — 85025 COMPLETE CBC W/AUTO DIFF WBC: CPT | Performed by: FAMILY MEDICINE

## 2018-04-05 PROCEDURE — 99214 OFFICE O/P EST MOD 30 MIN: CPT | Performed by: FAMILY MEDICINE

## 2018-04-05 PROCEDURE — 80061 LIPID PANEL: CPT | Performed by: FAMILY MEDICINE

## 2018-04-05 PROCEDURE — 80053 COMPREHEN METABOLIC PANEL: CPT | Performed by: FAMILY MEDICINE

## 2018-04-05 RX ORDER — LOSARTAN POTASSIUM 50 MG/1
TABLET ORAL
Qty: 30 TABLET | Refills: 0 | Status: CANCELLED | OUTPATIENT
Start: 2018-04-05

## 2018-04-05 RX ORDER — LOSARTAN POTASSIUM AND HYDROCHLOROTHIAZIDE 12.5; 5 MG/1; MG/1
1 TABLET ORAL DAILY
Qty: 30 TABLET | Refills: 1 | Status: SHIPPED | OUTPATIENT
Start: 2018-04-05 | End: 2018-06-14

## 2018-04-05 RX ORDER — LOSARTAN POTASSIUM 100 MG/1
100 TABLET ORAL DAILY
Qty: 30 TABLET | Refills: 0 | Status: CANCELLED | OUTPATIENT
Start: 2018-04-05

## 2018-04-05 RX ORDER — MOMETASONE FUROATE 1 MG/G
CREAM TOPICAL
Qty: 60 G | Refills: 2 | Status: SHIPPED | OUTPATIENT
Start: 2018-04-05 | End: 2019-11-15

## 2018-04-05 NOTE — LETTER
[address]  [place] [address]    April 9, 2018    Chelo Brooks  [address] [facility] [address]          Dear Chelo,    Your thyroid, liver, blood counts and blood glucose tests are normal. Based on your high cholesterol levels, your 10-year risk for heart disease is 21%. I recommend resuming atorvastatin daily to lower your cholesterol, and have sent a prescription to your pharmacy when you are ready to restart.     Kidney function is measured by blood work that measures creatinine and calculates estimated GFR (glomerular filtration rate).  As the eGFR reading has become more readily available with routine labs, there has been a renewed focus on managing chronic kidney disease in the medical community and at our clinic in particular.     By current criteria you do have stage 3 chronic kidney disease as your eGFR is < 60.  This is not something you should worry about as it is quite common, but it does mean we should be monitoring some labs on a regular basis and making sure we are controlling other risk factors that could cause worsening of your kidney function.  Avoid taking medications such as ibuprofen and aleve, which can cause kidney injury    Enclosed is a copy of your results.     Results for orders placed or performed in visit on 04/05/18   Lipid panel reflex to direct LDL Fasting   Result Value Ref Range    Cholesterol 220 (H) <200 mg/dL    Triglycerides 104 <150 mg/dL    HDL Cholesterol 58 >49 mg/dL    LDL Cholesterol Calculated 141 (H) <100 mg/dL    Non HDL Cholesterol 162 (H) <130 mg/dL   Comprehensive metabolic panel   Result Value Ref Range    Sodium 141 133 - 144 mmol/L    Potassium 4.3 3.4 - 5.3 mmol/L    Chloride 105 94 - 109 mmol/L    Carbon Dioxide 28 20 - 32 mmol/L    Anion Gap 8 3 - 14 mmol/L    Glucose 87 70 - 99 mg/dL    Urea Nitrogen 20 7 - 30 mg/dL    Creatinine 0.94 0.52 - 1.04 mg/dL     GFR Estimate 58 (L) >60 mL/min/1.7m2    GFR Estimate If Black 71 >60 mL/min/1.7m2    Calcium 8.8 8.5 - 10.1 mg/dL    Bilirubin Total 0.7 0.2 - 1.3 mg/dL    Albumin 3.5 3.4 - 5.0 g/dL    Protein Total 6.9 6.8 - 8.8 g/dL    Alkaline Phosphatase 76 40 - 150 U/L    ALT 18 0 - 50 U/L    AST 26 0 - 45 U/L   TSH with free T4 reflex   Result Value Ref Range    TSH 1.61 0.40 - 4.00 mU/L   CBC with platelets differential   Result Value Ref Range    WBC 5.1 4.0 - 11.0 10e9/L    RBC Count 4.55 3.8 - 5.2 10e12/L    Hemoglobin 12.8 11.7 - 15.7 g/dL    Hematocrit 39.3 35.0 - 47.0 %    MCV 86 78 - 100 fl    MCH 28.1 26.5 - 33.0 pg    MCHC 32.6 31.5 - 36.5 g/dL    RDW 14.9 10.0 - 15.0 %    Platelet Count 187 150 - 450 10e9/L    Diff Method Automated Method     % Neutrophils 61.4 %    % Lymphocytes 25.9 %    % Monocytes 9.0 %    % Eosinophils 2.9 %    % Basophils 0.8 %    Absolute Neutrophil 3.1 1.6 - 8.3 10e9/L    Absolute Lymphocytes 1.3 0.8 - 5.3 10e9/L    Absolute Monocytes 0.5 0.0 - 1.3 10e9/L    Absolute Eosinophils 0.2 0.0 - 0.7 10e9/L    Absolute Basophils 0.0 0.0 - 0.2 10e9/L       If you have any questions or concerns, please call myself or my nurse at [phone].      Sincerely,        Vita Zavala MD/ha

## 2018-04-05 NOTE — NURSING NOTE
"Chief Complaint   Patient presents with     Hypertension     Lipids     Asthma       Initial BP (!) 160/96  Pulse 67  Temp 97.4  F (36.3  C) (Oral)  Resp 20  Ht 5' 7\" (1.702 m)  Wt 330 lb (149.7 kg)  SpO2 94%  Breastfeeding? No  BMI 51.69 kg/m2 Estimated body mass index is 51.69 kg/(m^2) as calculated from the following:    Height as of this encounter: 5' 7\" (1.702 m).    Weight as of this encounter: 330 lb (149.7 kg).  Medication Reconciliation: complete   Belkis TUCKER MA      "

## 2018-04-05 NOTE — MR AVS SNAPSHOT
After Visit Summary   4/5/2018    Chelo Brooks    MRN: [id number]           Patient Information     Date Of Birth          1945        Visit Information        Provider Department      4/5/2018 10:20 AM Vita Zavala MD [facility]        Today's Diagnoses     Morbid obesity due to excess calories (H)    -  1    Stasis dermatitis of both legs        Dependent edema        Hypertension goal BP (blood pressure) < 150/90        Hyperlipidemia LDL goal <130        Screen for colon cancer          Care Instructions    Did you know you can lower your blood pressure with your daily habits?    *Losing 20 pounds of weight lowers blood pressure 5 to 20 points.  *Eating a low-fat diet rich in fruits, vegetables and low-fat dairy lowers blood pressure 8 to 14 points.  *Eating a low-salt diet lowers blood pressure 2 to 8 points.  *Exercising regularly lowers blood pressure 4 to 9 points.  *Reducing alcohol use lowers blood pressure 2 to 4 points.      [facility] (formerly [facility])[3]    If you have any questions regarding to your visit please contact your care team:       Team Red:   Clinic Hours Telephone Number   Dr. Vita Marie, NP   7am-7pm  Monday - Thursday   7am-5pm  Fridays  [phone]  (Appointment scheduling available 24/7)    Questions about your visit?   Team Line  [phone]   Urgent Care - [place] - 11am-9pm Monday-Friday Saturday-Sunday- 9am-5pm   [place] - 5pm-9pm Monday-Friday Saturday-Sunday- 9am-5pm  [phone] - [place]   [phone] - [place]       What options do I have for visits at the clinic other than the traditional office visit?  To expand how we care for you, many of our providers are utilizing electronic visits (e-visits) and telephone visits, when medically appropriate, for interactions with their patients rather than a visit in the clinic.   We also offer nurse visits for  many medical concerns. Just like any other service, we will bill your insurance company for this type of visit based on time spent on the phone with your provider. Not all insurance companies cover these visits. Please check with your medical insurance if this type of visit is covered. You will be responsible for any charges that are not paid by your insurance.      E-visits via [employer]:  generally incur a $35.00 fee.  Telephone visits:  Time spent on the phone: *charged based on time that is spent on the phone in increments of 10 minutes. Estimated cost:   5-10 mins $30.00   11-20 mins. $59.00   21-30 mins. $85.00     Use WeTOWNS (secure email communication and access to your chart) to send your primary care provider a message or make an appointment. Ask someone on your Team how to sign up for WeTOWNS.  For a Price Quote for your services, please call our [employer] Line at [phone].      As always, Thank you for trusting us with your health care needs!      The 10-year ASCVD risk score (Sterling DC Jr, et al., 2013) is: 20.3%    Values used to calculate the score:      Age: 73 years      Sex: Female      Is Non- : No      Diabetic: No      Tobacco smoker: No      Systolic Blood Pressure: 160 mmHg      Is BP treated: Yes      HDL Cholesterol: 56 mg/dL      Total Cholesterol: 175 mg/dL              Follow-ups after your visit        Follow-up notes from your care team     Return in about 1 month (around 5/5/2018) for BP Recheck.      Future tests that were ordered for you today     Open Future Orders        Priority Expected Expires Ordered    Fecal colorectal cancer screen (FIT) Routine 4/26/2018 6/28/2018 4/5/2018            Who to contact     If you have questions or need follow up information about today's clinic visit or your schedule please contact [facility] directly at [phone].  Normal or non-critical lab and imaging results will be communicated to you by  "MyChart, letter or phone within 4 business days after the clinic has received the results. If you do not hear from us within 7 days, please contact the clinic through [employer] or phone. If you have a critical or abnormal lab result, we will notify you by phone as soon as possible.  Submit refill requests through [employer] or call your pharmacy and they will forward the refill request to us. Please allow 3 business days for your refill to be completed.          Additional Information About Your Visit        [employer] Information     INCHRON lets you send messages to your doctor, view your test results, renew your prescriptions, schedule appointments and more. To sign up, go to [email or web address] . Click on \"Log in\" on the left side of the screen, which will take you to the Welcome page. Then click on \"Sign up Now\" on the right side of the page.     You will be asked to enter the access code listed below, as well as some personal information. Please follow the directions to create your username and password.     Your access code is: [email or web address]  Expires: 2018 11:42 AM     Your access code will  in 90 days. If you need help or a new code, please call your [facility] clinic or [phone].        Care EveryWhere ID     This is your Care EveryWhere ID. This could be used by other organizations to access your [facility] medical records  [id number]        Your Vitals Were     Pulse Temperature Respirations Height Pulse Oximetry Breastfeeding?    67 97.4  F (36.3  C) (Oral) 20 5' 7\" (1.702 m) 94% No    BMI (Body Mass Index)                   51.69 kg/m2            Blood Pressure from Last 3 Encounters:   18 (!) 160/96   18 (!) 160/97   18 150/90    Weight from Last 3 Encounters:   18 330 lb (149.7 kg)   18 (!) 327 lb (148.3 kg)   01/15/18 (!) 322 lb (146.1 kg)              We Performed the Following     Albumin Random Urine Quantitative with Creat Ratio     CBC with platelets " differential     Comprehensive metabolic panel     Lipid panel reflex to direct LDL Fasting     TSH with free T4 reflex          Today's Medication Changes          These changes are accurate as of 4/5/18 11:15 AM.  If you have any questions, ask your nurse or doctor.               Start taking these medicines.        Dose/Directions    losartan-hydrochlorothiazide 50-12.5 MG per tablet   Commonly known as:  HYZAAR   Used for:  Hypertension goal BP (blood pressure) < 150/90   Started by:  Vita Zavala MD        Dose:  1 tablet   Take 1 tablet by mouth daily   Quantity:  30 tablet   Refills:  1       order for DME   Used for:  Morbid obesity due to excess calories (H), Stasis dermatitis of both legs, Dependent edema   Started by:  Vita Zavala MD        Equipment being ordered: Jobst medium strength knee high compression stockings   Quantity:  2 Package   Refills:  1         These medicines have changed or have updated prescriptions.        Dose/Directions    mometasone 0.1 % cream   Commonly known as:  ELOCON   This may have changed:    - how to take this  - additional instructions   Used for:  Stasis dermatitis of both legs   Changed by:  Vita Zavala MD        Apply to affected area on legs twice daily as needed   Quantity:  60 g   Refills:  2            Where to get your medicines      [facility] [id number]  [place] - [address] AT [facility] [address] [place]  [address] [place] [address]     Phone:  [phone]     losartan-hydrochlorothiazide 50-12.5 MG per tablet    mometasone 0.1 % cream         Some of these will need a paper prescription and others can be bought over the counter.  Ask your nurse if you have questions.     Bring a paper prescription for each of these medications     order for DME                Primary Care Provider Office Phone # Fax #    Vita Zavala [phone] [address] [facility]  [place]  [place] [address]        Equal Access to Services     [facility] : Hadii trip valentin averyrachelle Sogueritaali, waaxda luqadaha, qaybta kawiltonrachel biggs, lily mortensenvalentinaelisa tello. So [facility] [phone].    ATENCIÓN: Si habla español, tiene a luque disposición servicios gratuitos de asistencia lingüística. [facility] [phone].    We comply with applicable federal civil rights laws and [place] laws. We do not discriminate on the basis of race, color, national origin, age, disability, sex, sexual orientation, or gender identity.            Thank you!     Thank you for choosing [facility]  for your care. Our goal is always to provide you with excellent care. Hearing back from our patients is one way we can continue to improve our services. Please take a few minutes to complete the written survey that you may receive in the mail after your visit with us. Thank you!             Your Updated Medication List - Protect others around you: Learn how to safely use, store and throw away your medicines at www.disposemymeds.org.          This list is accurate as of 4/5/18 11:15 AM.  Always use your most recent med list.                   Brand Name Dispense Instructions for use Diagnosis    albuterol 108 (90 BASE) MCG/ACT Inhaler    PROAIR HFA/PROVENTIL HFA/VENTOLIN HFA    1 Inhaler    Inhale 2 puffs into the lungs every 4 hours as needed    Chronic cough       beclomethasone 80 MCG/ACT Inhaler    QVAR    1 Inhaler    Inhale 1 puff into the lungs 2 times daily    Chronic cough       CLARITIN 10 MG tablet   Generic drug:  loratadine      Take 10 mg by mouth as needed        fluticasone 50 MCG/ACT spray    FLONASE    1 Bottle    Spray 2 sprays into both nostrils daily    Chronic cough, Chronic allergic rhinitis due to animal hair and dander       losartan 50 MG tablet    COZAAR    30 tablet    TAKE 1 TABLET(50 MG) BY MOUTH DAILY    Hypertension goal BP (blood pressure) < 150/90        losartan-hydrochlorothiazide 50-12.5 MG per tablet    HYZAAR    30 tablet    Take 1 tablet by mouth daily    Hypertension goal BP (blood pressure) < 150/90       mometasone 0.1 % cream    ELOCON    60 g    Apply to affected area on legs twice daily as needed    Stasis dermatitis of both legs       omeprazole 40 MG capsule    priLOSEC    30 capsule    Take 1 capsule (40 mg) by mouth daily Take 30-60 minutes before a meal.    Chronic cough, Gastroesophageal reflux disease, esophagitis presence not specified       order for DME     1 Units    Equipment being ordered: Auto CPAP 11-18    FLOR (obstructive sleep apnea)       order for DME     2 Package    Equipment being ordered: Jobst medium strength knee high compression stockings    Morbid obesity due to excess calories (H), Stasis dermatitis of both legs, Dependent edema       sinus rinse bottle      Spray 1 Bottle in nostril daily as needed.    Head ache       vitamin D 2000 UNITS tablet      Take 2,000 Units by mouth daily

## 2018-04-06 RX ORDER — ATORVASTATIN CALCIUM 40 MG/1
40 TABLET, FILM COATED ORAL DAILY
Qty: 90 TABLET | Refills: 3 | Status: SHIPPED | OUTPATIENT
Start: 2018-04-06 | End: 2019-06-18

## 2018-04-06 ASSESSMENT — ASTHMA QUESTIONNAIRES: ACT_TOTALSCORE: 20

## 2018-04-06 NOTE — PROGRESS NOTES
Mail letter:  Your thyroid, liver, blood counts and blood glucose tests are normal. Based on your high cholesterol levels, your 10-year risk for heart disease is 21%. I recommend resuming atorvastatin daily to lower your cholesterol, and have sent a prescription to your pharmacy when you are ready to restart.     Kidney function is measured by blood work that measures creatinine and calculates estimated GFR (glomerular filtration rate).  As the eGFR reading has become more readily available with routine labs, there has been a renewed focus on managing chronic kidney disease in the medical community and at our clinic in particular.     By current criteria you do have stage 3 chronic kidney disease as your eGFR is < 60.  This is not something you should worry about as it is quite common, but it does mean we should be monitoring some labs on a regular basis and making sure we are controlling other risk factors that could cause worsening of your kidney function.  Avoid taking medications such as ibuprofen and aleve, which can cause kidney injury.     Vita Zavala MD    The 10-year ASCVD risk score ([place] Springschasity BOBO Jr, et al., 2013) is: 21.4%    Values used to calculate the score:      Age: 73 years      Sex: Female      Is Non- : No      Diabetic: No      Tobacco smoker: No      Systolic Blood Pressure: 160 mmHg      Is BP treated: Yes      HDL Cholesterol: 58 mg/dL      Total Cholesterol: 220 mg/dL

## 2018-05-15 ENCOUNTER — TRANSFERRED RECORDS (OUTPATIENT)
Dept: HEALTH INFORMATION MANAGEMENT | Facility: CLINIC | Age: 73
End: 2018-05-15

## 2018-05-22 ENCOUNTER — TRANSFERRED RECORDS (OUTPATIENT)
Dept: HEALTH INFORMATION MANAGEMENT | Facility: CLINIC | Age: 73
End: 2018-05-22

## 2018-06-06 ENCOUNTER — TELEPHONE (OUTPATIENT)
Dept: FAMILY MEDICINE | Facility: CLINIC | Age: 73
End: 2018-06-06

## 2018-06-06 NOTE — TELEPHONE ENCOUNTER
Panel Management Review      Patient has the following on her problem list:     Asthma review     ACT Total Scores 4/5/2018   ACT TOTAL SCORE -   ASTHMA ER VISITS -   ASTHMA HOSPITALIZATIONS -   ACT TOTAL SCORE (Goal Greater than or Equal to 20) 20   In the past 12 months, how many times did you visit the emergency room for your asthma without being admitted to the hospital? 0   In the past 12 months, how many times were you hospitalized overnight because of your asthma? 0      1. Is Asthma diagnosis on the Problem List? Yes    2. Is Asthma listed on Health Maintenance? Yes    3. Patient is due for:  none    Hypertension   Last three blood pressure readings:  BP Readings from Last 3 Encounters:   04/05/18 (!) 160/96   02/13/18 (!) 160/97   02/06/18 150/90     Blood pressure: FAILED    HTN Guidelines:  Age 18-59 BP range:  Less than 140/90  Age 60-85 with Diabetes:  Less than 140/90  Age 60-85 without Diabetes:  less than 150/90      Composite cancer screening  Chart review shows that this patient is due/due soon for the following Fecal Colorectal (FIT) and wellness exam  Summary:    Patient is due/failing the following:   FIT and PHYSICAL    Action needed:   Patient needs office visit for wellness exam.    Type of outreach:    Phone, spoke to patient.  Wellness exam scheduled    Questions for provider review:    None                                                                                                                                    Belkis TUCKER MA

## 2018-06-14 ENCOUNTER — OFFICE VISIT (OUTPATIENT)
Dept: FAMILY MEDICINE | Facility: CLINIC | Age: 73
End: 2018-06-14
Payer: COMMERCIAL

## 2018-06-14 VITALS
WEIGHT: 293 LBS | DIASTOLIC BLOOD PRESSURE: 88 MMHG | HEIGHT: 67 IN | HEART RATE: 78 BPM | OXYGEN SATURATION: 96 % | TEMPERATURE: 97.2 F | BODY MASS INDEX: 45.99 KG/M2 | SYSTOLIC BLOOD PRESSURE: 136 MMHG | RESPIRATION RATE: 14 BRPM

## 2018-06-14 DIAGNOSIS — R79.89 ELEVATED PARATHYROID HORMONE: ICD-10-CM

## 2018-06-14 DIAGNOSIS — E66.01 MORBID OBESITY DUE TO EXCESS CALORIES (H): Chronic | ICD-10-CM

## 2018-06-14 DIAGNOSIS — Z00.00 ROUTINE HISTORY AND PHYSICAL EXAMINATION OF ADULT: Primary | ICD-10-CM

## 2018-06-14 DIAGNOSIS — M85.80 OSTEOPENIA, UNSPECIFIED LOCATION: Chronic | ICD-10-CM

## 2018-06-14 DIAGNOSIS — E78.5 HYPERLIPIDEMIA LDL GOAL <100: ICD-10-CM

## 2018-06-14 DIAGNOSIS — I12.9 RENAL HYPERTENSION: ICD-10-CM

## 2018-06-14 DIAGNOSIS — N18.30 CKD (CHRONIC KIDNEY DISEASE) STAGE 3, GFR 30-59 ML/MIN (H): ICD-10-CM

## 2018-06-14 DIAGNOSIS — J45.30 MILD PERSISTENT ASTHMA WITHOUT COMPLICATION: ICD-10-CM

## 2018-06-14 DIAGNOSIS — Z12.31 VISIT FOR SCREENING MAMMOGRAM: ICD-10-CM

## 2018-06-14 PROCEDURE — 99397 PER PM REEVAL EST PAT 65+ YR: CPT | Performed by: FAMILY MEDICINE

## 2018-06-14 RX ORDER — LOSARTAN POTASSIUM AND HYDROCHLOROTHIAZIDE 12.5; 5 MG/1; MG/1
1 TABLET ORAL DAILY
Qty: 90 TABLET | Refills: 3 | Status: SHIPPED | OUTPATIENT
Start: 2018-06-14 | End: 2019-04-08

## 2018-06-14 ASSESSMENT — PAIN SCALES - GENERAL: PAINLEVEL: NO PAIN (0)

## 2018-06-14 NOTE — PROGRESS NOTES
"SUBJECTIVE:   Chelo Brooks is a 73 year old female who presents for Preventive Visit.    {PVP to inform patient that additional E&M charge may apply, if additional problems addressed:021423::\"click delete button to remove this line now\"}  Are you in the first 12 months of your Medicare coverage?  {No Yes:599765::\"No\"}    Physical             {Hearing Test Done (Optional):175702}  {Add if <65 person on Medicare  - Required Questions (Optional):403706}  Fall risk:  {Document Fall Risk in the Assessments Section of the Navigator:067591}  {If any of the above assessments are answered yes, consider ordering appropriate referrals (Optional):229270::\"click delete button to remove this line now\"}  {AWV Cognitive Screenin}    Reviewed and updated as needed this visit by clinical staff  Tobacco  Allergies  Meds  Med Hx  Surg Hx  Fam Hx  Soc Hx        Reviewed and updated as needed this visit by Provider        Social History   Substance Use Topics     Smoking status: Never Smoker     Smokeless tobacco: Never Used     Alcohol use 0.0 oz/week     0 Standard drinks or equivalent per week      Comment: very rare         No flowsheet data found.{add AUDIT responses (Optional) (A score of 7 for adult men is an indication of hazardous drinking; a score of 8 or more is an indication of an alcohol use disorder.  A score of 7 or more for adult women is an indication of hazardous drinking or an alchohol use disorder):892035}    {Outside tests to abstract? :314494}    {additional problems to add (Optional):331460}    Today's PHQ-2 Score:   PHQ-2 (  [employer]) 2018   Q1: Little interest or pleasure in doing things 0   Q2: Feeling down, depressed or hopeless 0   PHQ-2 Score 0       Do you feel safe in your environment - {YES/NO/NA:923241}    Do you have a Health Care Directive?: {HEALTHCARE DIRECTIVE STATUS:018978}    Current providers sharing in care for this patient include:   Patient Care Team:  Vita Zavala MD " "as PCP - General (Family Practice)    The following health maintenance items are reviewed in Epic and correct as of today:  Health Maintenance   Topic Date Due     FIT Q1 YR  2013     MEDICARE ANNUAL WELLNESS VISIT  2018     EYE EXAM Q1 YEAR  2018     ASTHMA CONTROL TEST Q6 MOS  10/05/2018     ASTHMA ACTION PLAN Q1 YR  2018     MAMMO SCREEN Q2 YR (SYSTEM ASSIGNED)  2019     BMP Q1 YR  2019     FALL RISK ASSESSMENT  2019     LIPID MONITORING Q1 YEAR  2019     ADVANCE DIRECTIVE PLANNING Q5 YRS  2022     DEXA Q5 YR  2022     TETANUS IMMUNIZATION (SYSTEM ASSIGNED)  2027     PNEUMOCOCCAL  Completed     INFLUENZA VACCINE  Completed     HEPATITIS C SCREENING  Completed     {Chronicprobdata (Optional):026341}    {Decision Support (Optional):940364}    Review of Systems  {ROS COMP:253812}    OBJECTIVE:   There were no vitals taken for this visit. Estimated body mass index is 51.69 kg/(m^2) as calculated from the following:    Height as of 18: 5' 7\" (1.702 m).    Weight as of 18: 330 lb (149.7 kg).  Physical Exam  {Exam :510002}    ASSESSMENT / PLAN:   {Diag Picklist:383649}    End of Life Planning:  Patient currently has an advanced directive: { :059308}    COUNSELING:  {Medicare Counselin}    {BP Counseling- Complete if BP >= 120/80  (Optional):457584}    Estimated body mass index is 51.69 kg/(m^2) as calculated from the following:    Height as of 18: 5' 7\" (1.702 m).    Weight as of 18: 330 lb (149.7 kg).  {Weight Management Plan -- Complete if patient has an abnormal BMI (Optional):376267}   reports that she has never smoked. She has never used smokeless tobacco.  {Tobacco Cessation -- Complete if patient is a smoker (Optional):957708}    Appropriate preventive services were discussed with this patient, including applicable screening as appropriate for cardiovascular disease, diabetes, osteopenia/osteoporosis, and glaucoma.  As " appropriate for age/gender, discussed screening for colorectal cancer, prostate cancer, breast cancer, and cervical cancer. Checklist reviewing preventive services available has been given to the patient.    Reviewed patients plan of care and provided an AVS. The {CarePlan:375044} for Chelo meets the Care Plan requirement. This Care Plan has been established and reviewed with the {PATIENT, FAMILY MEMBER, CAREGIVER:691786}.    Counseling Resources:  ATP IV Guidelines  Pooled Cohorts Equation Calculator  Breast Cancer Risk Calculator  FRAX Risk Assessment  ICSI Preventive Guidelines  Dietary Guidelines for Americans, 2010  USDA's MyPlate  ASA Prophylaxis  Lung CA Screening    Vita Zavala MD  [facility]

## 2018-06-14 NOTE — MR AVS SNAPSHOT
After Visit Summary   6/14/2018    Chelo Brooks    MRN: [id number]           Patient Information     Date Of Birth          1945        Visit Information        Provider Department      6/14/2018 2:20 PM Vita Zavala MD [facility]        Today's Diagnoses     Wellness examination    -  1    Morbid obesity due to excess calories (H)        CKD (chronic kidney disease) stage 3, GFR 30-59 ml/min        Renal hypertension        Hyperlipidemia LDL goal <100        Mild persistent asthma without complication        Visit for screening mammogram          Care Instructions    [facility]    If you have any questions regarding to your visit please contact your care team:       Team Red:   Clinic Hours Telephone Number   Dr. Vita Marie, NP   7am-7pm  Monday - Thursday   7am-5pm  Fridays  [phone]  (Appointment scheduling available 24/7)    Questions about your recent visit?   Team Line  [phone]   Urgent Care - [place] [facility] - 11am-9pm Monday-Friday Saturday-Sunday- 9am-5pm   [place] - 5pm-9pm Monday-Friday Saturday-Sunday- 9am-5pm  [phone] - [place]  [phone] - [place]       What options do I have for a visit other than an office visit? We offer electronic visits (e-visits) and telephone visits, when medically appropriate.  Please check with your medical insurance to see if these types of visits are covered, as you will be responsible for any charges that are not paid by your insurance.      You can use buildabrand (secure electronic communication) to access to your chart, send your primary care provider a message, or make an appointment. Ask a team member how to get started.     For a price quote for your services, please call our Consumer Price Line at [phone] or our Imaging Cost estimation line at [phone] (for imaging tests).        Preventive Health  Recommendations  Female Ages 65 +    Yearly exam:     See your health care provider every year in order to  o Review health changes.   o Discuss preventive care.    o Review your medicines if your doctor has prescribed any.      You no longer need a yearly Pap test unless you've had an abnormal Pap test in the past 10 years. If you have vaginal symptoms, such as bleeding or discharge, be sure to talk with your provider about a Pap test.      Every 1 to 2 years, have a mammogram.  If you are over 69, talk with your health care provider about whether or not you want to continue having screening mammograms.      Every 10 years, have a colonoscopy. Or, have a yearly FIT test (stool test). These exams will check for colon cancer.       Have a cholesterol test every 5 years, or more often if your doctor advises it.       Have a diabetes test (fasting glucose) every three years. If you are at risk for diabetes, you should have this test more often.       At age 65, have a bone density scan (DEXA) to check for osteoporosis (brittle bone disease).    Shots:    Get a flu shot each year.    Get a tetanus shot every 10 years.    Talk to your doctor about your pneumonia vaccines. There are now two you should receive - Pneumovax (PPSV 23) and Prevnar (PCV 13).    Talk to your doctor about the shingles vaccine.    Talk to your doctor about the hepatitis B vaccine.    Nutrition:     Eat at least 5 servings of fruits and vegetables each day.      Eat whole-grain bread, whole-wheat pasta and brown rice instead of white grains and rice.      Talk to your provider about Calcium and Vitamin D.     Lifestyle    Exercise at least 150 minutes a week (30 minutes a day, 5 days a week). This will help you control your weight and prevent disease.      Limit alcohol to one drink per day.      No smoking.       Wear sunscreen to prevent skin cancer.       See your dentist twice a year for an exam and cleaning.      See your eye doctor every 1 to  2 years to screen for conditions such as glaucoma, macular degeneration, cataracts, etc.      It is recommended that you get a vaccination for shingles called Shingrix (given as 2 shots, 2 to 6 months apart), even if you have already had the Zostavax vaccine. Discuss getting the Shingix vaccine from your pharmacist, or schedule an ancillary shot visit here. Some insurances do not cover the cost of these vaccines.      The 10-year ASCVD risk score (Dawn JERAMIE Jr, et al., 2013) is: 15.9%    Values used to calculate the score:      Age: 73 years      Sex: Female      Is Non- : No      Diabetic: No      Tobacco smoker: No      Systolic Blood Pressure: 136 mmHg      Is BP treated: Yes      HDL Cholesterol: 58 mg/dL      Total Cholesterol: 220 mg/dL    Discharged by BETHANY Jay MA            Follow-ups after your visit        Follow-up notes from your care team     Return in about 6 months (around 12/14/2018) for asthma, BP Recheck.      Future tests that were ordered for you today     Open Future Orders        Priority Expected Expires Ordered    *MA Screening Digital Bilateral Routine  6/14/2019 6/14/2018            Who to contact     If you have questions or need follow up information about today's clinic visit or your schedule please contact [facility] directly at [phone].  Normal or non-critical lab and imaging results will be communicated to you by MyChart, letter or phone within 4 business days after the clinic has received the results. If you do not hear from us within 7 days, please contact the clinic through MyChart or phone. If you have a critical or abnormal lab result, we will notify you by phone as soon as possible.  Submit refill requests through Time Warden or call your pharmacy and they will forward the refill request to us. Please allow 3 business days for your refill to be completed.          Additional Information About Your Visit        Care EveryWhere ID     This is  "your Care EveryWhere ID. This could be used by other organizations to access your [place] medical records  [id number]        Your Vitals Were     Pulse Temperature Respirations Height Pulse Oximetry BMI (Body Mass Index)    78 97.2  F (36.2  C) (Oral) 14 5' 7\" (1.702 m) 96% 51.84 kg/m2       Blood Pressure from Last 3 Encounters:   06/14/18 136/88   04/05/18 (!) 160/96   02/13/18 (!) 160/97    Weight from Last 3 Encounters:   06/14/18 (!) 331 lb (150.1 kg)   04/05/18 330 lb (149.7 kg)   02/13/18 (!) 327 lb (148.3 kg)              We Performed the Following     PAF COMPLETED          Where to get your medicines      These medications were sent to "[facility] - [place] - [address] AT [facility] [address] [place]  [address] [place] [address]     Phone:  [phone]     losartan-hydrochlorothiazide 50-12.5 MG per tablet          Primary Care Provider Office Phone # Fax #    Vita Zavala [phone] [address]  [place] [address]        Equal Access to Services     IGNACIA PERSAUD AH: Hadii trip ku hadasho Soomaali, waaxda luqadaha, qaybta kaalmada adeegyada, lily echavarria . So [facility] [phone].    ATENCIÓN: Si habla español, tiene a luque disposición servicios gratuitos de asistencia lingüística. [facility] [phone].    We comply with applicable federal civil rights laws and [place] laws. We do not discriminate on the basis of race, color, national origin, age, disability, sex, sexual orientation, or gender identity.            Thank you!     Thank you for choosing [facility]  for your care. Our goal is always to provide you with excellent care. Hearing back from our patients is one way we can continue to improve our services. Please take a few minutes to complete the written survey that you may receive in the mail after your visit with us. Thank you!             Your Updated Medication List - " Protect others around you: Learn how to safely use, store and throw away your medicines at www.disposemymeds.org.          This list is accurate as of 6/14/18  3:23 PM.  Always use your most recent med list.                   Brand Name Dispense Instructions for use Diagnosis    albuterol 108 (90 Base) MCG/ACT Inhaler    PROAIR HFA/PROVENTIL HFA/VENTOLIN HFA    1 Inhaler    Inhale 2 puffs into the lungs every 4 hours as needed    Chronic cough       atorvastatin 40 MG tablet    LIPITOR    90 tablet    Take 1 tablet (40 mg) by mouth daily    Hyperlipidemia LDL goal <130       beclomethasone 80 MCG/ACT Inhaler    QVAR    1 Inhaler    Inhale 1 puff into the lungs 2 times daily    Chronic cough       CLARITIN 10 MG tablet   Generic drug:  loratadine      Take 10 mg by mouth as needed        fluticasone 50 MCG/ACT spray    FLONASE    1 Bottle    Spray 2 sprays into both nostrils daily    Chronic cough, Chronic allergic rhinitis due to animal hair and dander       losartan-hydrochlorothiazide 50-12.5 MG per tablet    HYZAAR    90 tablet    Take 1 tablet by mouth daily    CKD (chronic kidney disease) stage 3, GFR 30-59 ml/min, Renal hypertension       mometasone 0.1 % cream    ELOCON    60 g    Apply to affected area on legs twice daily as needed    Stasis dermatitis of both legs       omeprazole 40 MG capsule    priLOSEC    30 capsule    Take 1 capsule (40 mg) by mouth daily Take 30-60 minutes before a meal.    Chronic cough, Gastroesophageal reflux disease, esophagitis presence not specified       order for DME     1 Units    Equipment being ordered: Auto CPAP 11-18    FLOR (obstructive sleep apnea)       order for DME     2 Package    Equipment being ordered: Jobst medium strength knee high compression stockings    Morbid obesity due to excess calories (H), Stasis dermatitis of both legs, Dependent edema       sinus rinse bottle      Spray 1 Bottle in nostril daily as needed.    Head ache       vitamin D 2000 units  tablet      Take 2,000 Units by mouth daily

## 2018-06-14 NOTE — PROGRESS NOTES
SUBJECTIVE:   Chelo Brooks is a 73 year old female who presents for Preventive Visit.      Are you in the first 12 months of your Medicare coverage?  No    Physical             {Hearing Test Done (Optional):125233}  {Add if <65 person on Medicare  - Required Questions (Optional):504534}  Fall risk:       COGNITIVE SCREEN  1) Repeat 3 items   2) Clock draw: NORMAL  3) 3 item recall: Recalls 3 objects  Results: NORMAL clock, 1-2 items recalled: COGNITIVE IMPAIRMENT LESS LIKELY    Mini-CogTM Copyright S Juan. Licensed by the author for use in [employer]; reprinted with permission ([email or web address]@[email or web address]). All rights reserved.        Reviewed and updated as needed this visit by clinical staff  Tobacco  Allergies  Meds  Med Hx  Surg Hx  Fam Hx  Soc Hx        Reviewed and updated as needed this visit by Provider        Social History   Substance Use Topics     Smoking status: Never Smoker     Smokeless tobacco: Never Used     Alcohol use 0.0 oz/week     0 Standard drinks or equivalent per week      Comment: very rare         No flowsheet data found.        {additional problems to add (Optional):952935}    Today's PHQ-2 Score:   PHQ-2 ( 1999 [employer]) 6/14/2018   Q1: Little interest or pleasure in doing things 0   Q2: Feeling down, depressed or hopeless 0   PHQ-2 Score 0       Do you feel safe in your environment - Yes    Do you have a Health Care Directive?: No: Advance care planning reviewed with patient; information given to patient to review.    Current providers sharing in care for this patient include:   Patient Care Team:  Vita Zavala MD as PCP - General (Family Practice)    The following health maintenance items are reviewed in Epic and correct as of today:  Health Maintenance   Topic Date Due     FIT Q1 YR  06/05/2013     MEDICARE ANNUAL WELLNESS VISIT  01/25/2018     EYE EXAM Q1 YEAR  07/18/2018     ASTHMA CONTROL TEST Q6 MOS  10/05/2018     ASTHMA ACTION PLAN Q1 YR  12/20/2018     MAMMO SCREEN  "Q2 YR (SYSTEM ASSIGNED)  2019     BMP Q1 YR  2019     FALL RISK ASSESSMENT  2019     LIPID MONITORING Q1 YEAR  2019     ADVANCE DIRECTIVE PLANNING Q5 YRS  2022     DEXA Q5 YR  2022     TETANUS IMMUNIZATION (SYSTEM ASSIGNED)  2027     PNEUMOCOCCAL  Completed     INFLUENZA VACCINE  Completed     HEPATITIS C SCREENING  Completed     {Chronicprobdata (Optional):762008}    {Decision Support (Optional):111995}    Review of Systems  {ROS COMP:129427}    OBJECTIVE:   /88  Pulse 78  Temp 97.2  F (36.2  C) (Oral)  Resp 14  Ht 5' 7\" (1.702 m)  Wt (!) 331 lb (150.1 kg)  SpO2 96%  BMI 51.84 kg/m2 Estimated body mass index is 51.84 kg/(m^2) as calculated from the following:    Height as of this encounter: 5' 7\" (1.702 m).    Weight as of this encounter: 331 lb (150.1 kg).  Physical Exam  {Exam :570488}    ASSESSMENT / PLAN:   {Diag Picklist:146942}    End of Life Planning:  Patient currently has an advanced directive: { :919173}    COUNSELING:  {Medicare Counselin}    {BP Counseling- Complete if BP >= 120/80  (Optional):962399}    Estimated body mass index is 51.84 kg/(m^2) as calculated from the following:    Height as of this encounter: 5' 7\" (1.702 m).    Weight as of this encounter: 331 lb (150.1 kg).  {Weight Management Plan -- Complete if patient has an abnormal BMI (Optional):790075}   reports that she has never smoked. She has never used smokeless tobacco.  {Tobacco Cessation -- Complete if patient is a smoker (Optional):827440}    Appropriate preventive services were discussed with this patient, including applicable screening as appropriate for cardiovascular disease, diabetes, osteopenia/osteoporosis, and glaucoma.  As appropriate for age/gender, discussed screening for colorectal cancer, prostate cancer, breast cancer, and cervical cancer. Checklist reviewing preventive services available has been given to the patient.    Reviewed patients plan of care and " provided an AVS. The {CarePlan:078446} for Chelo meets the Care Plan requirement. This Care Plan has been established and reviewed with the {PATIENT, FAMILY MEMBER, CAREGIVER:097272}.    Counseling Resources:  ATP IV Guidelines  Pooled Cohorts Equation Calculator  Breast Cancer Risk Calculator  FRAX Risk Assessment  ICSI Preventive Guidelines  Dietary Guidelines for Americans, 2010  USDA's MyPlate  ASA Prophylaxis  Lung CA Screening    Vita Zavala MD  [facility]

## 2018-06-14 NOTE — PATIENT INSTRUCTIONS
[facility]    If you have any questions regarding to your visit please contact your care team:       Team Red:   Clinic Hours Telephone Number   Dr. Vita Marie, NP   7am-7pm  Monday - Thursday   7am-5pm  Fridays  [phone]  (Appointment scheduling available 24/7)    Questions about your recent visit?   Team Line  [phone]   Ur[facility] - [place] [facility] - 11am-9pm Monday-Friday Saturday-Sunday- 9am-5pm   [place] - 5pm-9pm Monday-Friday Saturday-Sunday- 9am-5pm  [phone] - [place]  [phone] - [place]       What options do I have for a visit other than an office visit? We offer electronic visits (e-visits) and telephone visits, when medically appropriate.  Please check with your medical insurance to see if these types of visits are covered, as you will be responsible for any charges that are not paid by your insurance.      You can use Use It Better (secure electronic communication) to access to your chart, send your primary care provider a message, or make an appointment. Ask a team member how to get started.     For a price quote for your services, please call our Consumer Price Line at [phone] or our Imaging Cost estimation line at [phone] (for imaging tests).        Preventive Health Recommendations  Female Ages 65 +    Yearly exam:     See your health care provider every year in order to  o Review health changes.   o Discuss preventive care.    o Review your medicines if your doctor has prescribed any.      You no longer need a yearly Pap test unless you've had an abnormal Pap test in the past 10 years. If you have vaginal symptoms, such as bleeding or discharge, be sure to talk with your provider about a Pap test.      Every 1 to 2 years, have a mammogram.  If you are over 69, talk with your health care provider about whether or not you want to continue having screening mammograms.      Every  10 years, have a colonoscopy. Or, have a yearly FIT test (stool test). These exams will check for colon cancer.       Have a cholesterol test every 5 years, or more often if your doctor advises it.       Have a diabetes test (fasting glucose) every three years. If you are at risk for diabetes, you should have this test more often.       At age 65, have a bone density scan (DEXA) to check for osteoporosis (brittle bone disease).    Shots:    Get a flu shot each year.    Get a tetanus shot every 10 years.    Talk to your doctor about your pneumonia vaccines. There are now two you should receive - Pneumovax (PPSV 23) and Prevnar (PCV 13).    Talk to your doctor about the shingles vaccine.    Talk to your doctor about the hepatitis B vaccine.    Nutrition:     Eat at least 5 servings of fruits and vegetables each day.      Eat whole-grain bread, whole-wheat pasta and brown rice instead of white grains and rice.      Talk to your provider about Calcium and Vitamin D.     Lifestyle    Exercise at least 150 minutes a week (30 minutes a day, 5 days a week). This will help you control your weight and prevent disease.      Limit alcohol to one drink per day.      No smoking.       Wear sunscreen to prevent skin cancer.       See your dentist twice a year for an exam and cleaning.      See your eye doctor every 1 to 2 years to screen for conditions such as glaucoma, macular degeneration, cataracts, etc.      It is recommended that you get a vaccination for shingles called Shingrix (given as 2 shots, 2 to 6 months apart), even if you have already had the Zostavax vaccine. Discuss getting the Shingix vaccine from your pharmacist, or schedule an ancillary shot visit here. Some insurances do not cover the cost of these vaccines.      The 10-year ASCVD risk score (Kemp JERAMIE Emanuel, et al., 2013) is: 15.9%    Values used to calculate the score:      Age: 73 years      Sex: Female      Is Non- : No      Diabetic:  No      Tobacco smoker: No      Systolic Blood Pressure: 136 mmHg      Is BP treated: Yes      HDL Cholesterol: 58 mg/dL      Total Cholesterol: 220 mg/dL    Discharged by BETHANY Jay MA

## 2018-06-14 NOTE — PROGRESS NOTES
SUBJECTIVE:   Chelo Brooks is a 73 year old female super morbidly obese  with asthma and renal hypertension who presents for Preventive Visit.  Are you in the first 12 months of your Medicare coverage?  No    HPI      Fall risk:  Fallen 2 or more times in the past year?: No  Any fall with injury in the past year?: No    COGNITIVE SCREEN  1) Repeat 3 items (Banana, Sunrise, Chair)    2) Clock draw: NORMAL  3) 3 item recall: Recalls 3 objects  Results: NORMAL clock, 1-2 items recalled: COGNITIVE IMPAIRMENT LESS LIKELY    Mini-CogTM Copyright S Juan. Licensed by the author for use in [facility]; reprinted with permission (danilo@[facility]). All rights reserved.        Reviewed and updated as needed this visit by clinical staff  Tobacco  Allergies  Meds  Problems  Med Hx  Surg Hx  Fam Hx  Soc Hx          Reviewed and updated as needed this visit by Provider  Allergies  Meds  Problems        Social History   Substance Use Topics     Smoking status: Never Smoker     Smokeless tobacco: Never Used     Alcohol use 0.0 oz/week     0 Standard drinks or equivalent per week      Comment: very rare         No flowsheet data found.        Today's PHQ-2 Score:   PHQ-2 (  [employer]) 2018   Q1: Little interest or pleasure in doing things 0   Q2: Feeling down, depressed or hopeless 0   PHQ-2 Score 0       Do you feel safe in your environment - Yes    Do you have a Health Care Directive?: No: Advance care planning reviewed with patient; information given to patient to review.    Current providers sharing in care for this patient include:   Patient Care Team:  Vita Zavala MD as PCP - General (Family Practice)    The following health maintenance items are reviewed in Epic and correct as of today:  Health Maintenance   Topic Date Due     FIT Q1 YR  2013     EYE EXAM Q1 YEAR  2018     ASTHMA CONTROL TEST Q6 MOS  10/05/2018     ASTHMA ACTION PLAN Q1 YR  2018     MAMMO SCREEN Q2 YR  (SYSTEM ASSIGNED)  01/25/2019     BMP Q1 YR  04/05/2019     FALL RISK ASSESSMENT  04/05/2019     LIPID MONITORING Q1 YEAR  04/05/2019     MEDICARE ANNUAL WELLNESS VISIT  06/14/2019     ADVANCE DIRECTIVE PLANNING Q5 YRS  01/25/2022     DEXA Q5 YR  02/02/2022     TETANUS IMMUNIZATION (SYSTEM ASSIGNED)  01/25/2027     PNEUMOCOCCAL  Completed     INFLUENZA VACCINE  Completed     HEPATITIS C SCREENING  Completed     Patient Active Problem List   Diagnosis     Reflux esophagitis     Advanced directives, counseling/discussion     Allergic rhinitis due to animal dander     Health Care Home     Hyperlipidemia LDL goal <100     Renal hypertension     Morbid obesity due to excess calories (H)     Osteopenia     FLOR (obstructive sleep apnea)- severe (AHI 89)     Mild persistent asthma without complication     Vitamin D deficiency     Lumbar spinal stenosis     Combined forms of age-related cataract of both eyes     Choroidal nevus, left eye     S/P knee replacements     Eczema, unspecified type     Stasis dermatitis of both legs     CKD (chronic kidney disease) stage 3, GFR 30-59 ml/min     Past Surgical History:   Procedure Laterality Date     C TOTAL KNEE ARTHROPLASTY  3/2000    right     C TOTAL KNEE ARTHROPLASTY  6/8/12    left     C VAGINAL HYSTERECTOMY  1977    benign, prolapse, ovaries remain        Social History   Substance Use Topics     Smoking status: Never Smoker     Smokeless tobacco: Never Used     Alcohol use 0.0 oz/week     0 Standard drinks or equivalent per week      Comment: very rare       Family History   Problem Relation Age of Onset     Circulatory Father      d. DVT     HEART DISEASE Father      pacer     DIABETES Mother      Hypertension Mother      C.A.D. Paternal Uncle      MI      HEART DISEASE Brother 48     pacer      DIABETES Maternal Grandmother      Hypertension Maternal Grandmother      DIABETES Maternal Aunt      Hypertension Maternal Aunt      Cancer - colorectal Maternal Grandfather       ANUSHA Maternal Aunt      MI     Glaucoma No family hx of      Macular Degeneration No family hx of          Current Outpatient Prescriptions   Medication Sig Dispense Refill     albuterol (PROAIR HFA/PROVENTIL HFA/VENTOLIN HFA) 108 (90 BASE) MCG/ACT Inhaler Inhale 2 puffs into the lungs every 4 hours as needed 1 Inhaler 3     atorvastatin (LIPITOR) 40 MG tablet Take 1 tablet (40 mg) by mouth daily 90 tablet 3     beclomethasone (QVAR) 80 MCG/ACT Inhaler Inhale 1 puff into the lungs 2 times daily 1 Inhaler 5     Cholecalciferol (VITAMIN D) 2000 UNITS tablet Take 2,000 Units by mouth daily       fluticasone (FLONASE) 50 MCG/ACT spray Spray 2 sprays into both nostrils daily 1 Bottle 11     Hypertonic Nasal Wash (SINUS RINSE BOTTLE KIT) PACK Spray 1 Bottle in nostril daily as needed.       loratadine (CLARITIN) 10 MG tablet Take 10 mg by mouth as needed        losartan-hydrochlorothiazide (HYZAAR) 50-12.5 MG per tablet Take 1 tablet by mouth daily 90 tablet 3     mometasone (ELOCON) 0.1 % cream Apply to affected area on legs twice daily as needed 60 g 2     omeprazole (PRILOSEC) 40 MG capsule Take 1 capsule (40 mg) by mouth daily Take 30-60 minutes before a meal. 30 capsule 5     order for DME Equipment being ordered: Jobst medium strength knee high compression stockings 2 Package 1     order for DME Equipment being ordered: Auto CPAP 11-18 1 Units 0     [DISCONTINUED] losartan-hydrochlorothiazide (HYZAAR) 50-12.5 MG per tablet Take 1 tablet by mouth daily 30 tablet 1     Allergies   Allergen Reactions     Adhesive Tape        Review of Systems  CONSTITUTIONAL: NEGATIVE for fever, chills, change in weight  INTEGUMENTARY/SKIN: stasis dermatitis   EYES: NEGATIVE for vision changes or irritation  ENT/MOUTH: NEGATIVE for ear, mouth and throat problems  RESP: NEGATIVE for significant cough or SOB  BREAST: NEGATIVE for masses, tenderness or discharge  CV: NEGATIVE for chest pain, palpitations or peripheral edema  GI:  "NEGATIVE for nausea, abdominal pain, heartburn, or change in bowel habits  : NEGATIVE for frequency, dysuria, or hematuria  MUSCULOSKELETAL:neck pain  NEURO: NEGATIVE for weakness, dizziness or paresthesias  ENDOCRINE: NEGATIVE for temperature intolerance, skin/hair changes  HEME: NEGATIVE for bleeding problems  PSYCHIATRIC: NEGATIVE for changes in mood or affect    OBJECTIVE:   /88  Pulse 78  Temp 97.2  F (36.2  C) (Oral)  Resp 14  Ht 5' 7\" (1.702 m)  Wt (!) 331 lb (150.1 kg)  SpO2 96%  BMI 51.84 kg/m2 Estimated body mass index is 51.84 kg/(m^2) as calculated from the following:    Height as of this encounter: 5' 7\" (1.702 m).    Weight as of this encounter: 331 lb (150.1 kg).  Physical Exam  GENERAL: alert and no distress  EYES: Eyes grossly normal to inspection, PERRL and conjunctivae and sclerae normal  HENT: ear canals and TM's normal, nose and mouth without ulcers or lesions  NECK: no adenopathy, no asymmetry, masses, or scars and thyroid normal to palpation  RESP: lungs clear to auscultation - no rales, rhonchi or wheezes  BREAST: normal without masses, tenderness or nipple discharge and no palpable axillary masses or adenopathy  CV: regular rate and rhythm, normal S1 S2, no S3 or S4, no murmur, click or rub, no peripheral edema and peripheral pulses strong  ABDOMEN: soft, nontender, no hepatosplenomegaly, no masses and bowel sounds normal  MS: no gross musculoskeletal defects noted, no edema  SKIN: scaly red rash on shins   NEURO: Normal strength and tone, mentation intact and speech normal  PSYCH: mentation appears normal, affect normal/bright    ASSESSMENT / PLAN:   (Z00.00) Wellness examination  (primary encounter diagnosis)  Plan: *MA Screening Digital Bilateral          (E66.01) Morbid obesity due to excess calories (H)  Plan: continue lifestyle strategies     (N18.3) CKD (chronic kidney disease) stage 3, GFR 30-59 ml/min  (I12.9) Renal hypertension  Comment: Well controlled with " "medications without side effects.   Plan: losartan-hydrochlorothiazide (HYZAAR) 50-12.5         MG per tablet          (E78.5) Hyperlipidemia LDL goal <100  Plan: continue statin     (J45.30) Mild persistent asthma without complication  Comment: Well controlled with medications without side effects.   Plan: ACT q 6 months      End of Life Planning:  Patient currently has an advanced directive: No.  I have verified the patient's ablity to prepare an advanced directive/make health care decisions.      COUNSELING:  Reviewed preventive health counseling, as reflected in patient instructions  Special attention given to:       Regular exercise       Healthy diet/nutrition       Osteoporosis Prevention/Bone Health       Colon cancer screening       Hepatitis C screening       The 10-year ASCVD risk score (Dawn BOBO Jr, et al., 2013) is: 15.9%    Values used to calculate the score:      Age: 73 years      Sex: Female      Is Non- : No      Diabetic: No      Tobacco smoker: No      Systolic Blood Pressure: 136 mmHg      Is BP treated: Yes      HDL Cholesterol: 58 mg/dL      Total Cholesterol: 220 mg/dL        Estimated body mass index is 51.84 kg/(m^2) as calculated from the following:    Height as of this encounter: 5' 7\" (1.702 m).    Weight as of this encounter: 331 lb (150.1 kg).  Weight management plan: Discussed healthy diet and exercise guidelines and patient will follow up in 6 months in clinic to re-evaluate.   reports that she has never smoked. She has never used smokeless tobacco.      Appropriate preventive services were discussed with this patient, including applicable screening as appropriate for cardiovascular disease, diabetes, osteopenia/osteoporosis, and glaucoma.  As appropriate for age/gender, discussed screening for colorectal cancer, prostate cancer, breast cancer, and cervical cancer. Checklist reviewing preventive services available has been given to the patient.    Reviewed " patients plan of care and provided an AVS. The Basic Care Plan (routine screening as documented in Health Maintenance) for Chelo meets the Care Plan requirement. This Care Plan has been established and reviewed with the Patient.    Counseling Resources:  ATP IV Guidelines  Pooled Cohorts Equation Calculator  Breast Cancer Risk Calculator  FRAX Risk Assessment  ICSI Preventive Guidelines  Dietary Guidelines for Americans, 2010  [employer] My[employer]ate  ASA Prophylaxis  Lung CA Screening    Vita Zavala MD  [facility]

## 2018-06-14 NOTE — LETTER
[address] [place] [address]    June 19, 2019    Chleo Brooks  [address] [place] [id number]      Dear Chelo,    Your results are normal.     Enclosed is a copy of your results.   Results for orders placed or performed in visit on 06/14/18   Vitamin D Deficiency   Result Value Ref Range    Vitamin D Deficiency screening 34 20 - 75 ug/L       If you have any questions or concerns, please call myself or my nurse at [phone].    Sincerely,    Vita Zavala MD/marbin

## 2018-06-15 ASSESSMENT — ASTHMA QUESTIONNAIRES: ACT_TOTALSCORE: 21

## 2018-07-24 DIAGNOSIS — E78.5 HYPERLIPIDEMIA LDL GOAL <130: ICD-10-CM

## 2018-07-24 RX ORDER — ATORVASTATIN CALCIUM 40 MG/1
TABLET, FILM COATED ORAL
Qty: 90 TABLET | Refills: 0 | OUTPATIENT
Start: 2018-07-24

## 2018-07-24 NOTE — TELEPHONE ENCOUNTER
Called and verified with pharmacy on duplicate prescription. Please disregard. Nicolasa Conner MA

## 2018-07-31 ENCOUNTER — RADIANT APPOINTMENT (OUTPATIENT)
Dept: MAMMOGRAPHY | Facility: CLINIC | Age: 73
End: 2018-07-31
Attending: FAMILY MEDICINE
Payer: COMMERCIAL

## 2018-07-31 DIAGNOSIS — Z12.31 VISIT FOR SCREENING MAMMOGRAM: ICD-10-CM

## 2018-07-31 PROCEDURE — 77067 SCR MAMMO BI INCL CAD: CPT | Mod: TC

## 2018-10-17 DIAGNOSIS — K21.9 GASTROESOPHAGEAL REFLUX DISEASE, ESOPHAGITIS PRESENCE NOT SPECIFIED: ICD-10-CM

## 2018-10-17 DIAGNOSIS — R05.3 CHRONIC COUGH: ICD-10-CM

## 2018-10-17 RX ORDER — OMEPRAZOLE 40 MG/1
40 CAPSULE, DELAYED RELEASE ORAL DAILY
Qty: 30 CAPSULE | Refills: 3 | Status: SHIPPED | OUTPATIENT
Start: 2018-10-17 | End: 2019-05-06

## 2018-11-25 NOTE — PATIENT INSTRUCTIONS
If you have any questions regarding your allergies, asthma, or what we discussed during your visit today please call the allergy clinic or contact us via [employer].    [facility]: [phone]      Follow-up in 6 months - sooner if needed    Continue with the omeprazole every morning    Continue the Qvar 80mcg - 1 puff twice daily    Continue the flonase nasal spray - 2 sprays in each nostril daily    13:20

## 2019-04-08 ENCOUNTER — TELEPHONE (OUTPATIENT)
Dept: FAMILY MEDICINE | Facility: CLINIC | Age: 74
End: 2019-04-08

## 2019-04-08 DIAGNOSIS — N18.30 CKD (CHRONIC KIDNEY DISEASE) STAGE 3, GFR 30-59 ML/MIN (H): Primary | ICD-10-CM

## 2019-04-08 DIAGNOSIS — I12.9 RENAL HYPERTENSION: ICD-10-CM

## 2019-04-08 DIAGNOSIS — E66.01 MORBID OBESITY DUE TO EXCESS CALORIES (H): Chronic | ICD-10-CM

## 2019-04-08 RX ORDER — OLMESARTAN MEDOXOMIL AND HYDROCHLOROTHIAZIDE 20/12.5 20; 12.5 MG/1; MG/1
1 TABLET ORAL DAILY
Qty: 30 TABLET | Refills: 0 | Status: SHIPPED | OUTPATIENT
Start: 2019-04-08 | End: 2019-05-27

## 2019-04-08 NOTE — TELEPHONE ENCOUNTER
Reason for Call:  Other prescription    Detailed comments: Patient received notification from her pharmacy that Losartan is being recalled. Patient is out of her medication and needs a replacement. Patient also needs her handicap sticker renewed. Please call patient to advise.    Phone Number Patient can be reached at: Home number on file [phone] (home)    Best Time: any    Can we leave a detailed message on this number? YES    Call taken on 4/8/2019 at 9:06 AM by Shannan Yan

## 2019-04-08 NOTE — TELEPHONE ENCOUNTER
Switch losartan-hydrochlorothiazide to   Signed Prescriptions:                        Disp   Refills    olmesartan-hydrochlorothiazide (BENICAR) 2*30 tab*0        Sig: Take 1 tablet by mouth daily  Authorizing Provider: VITA ZAVALA    Follow-up within 1 month  - form will be completed then  Vita Zavala MD

## 2019-04-08 NOTE — TELEPHONE ENCOUNTER
Detailed message left for patient with providers message as written- advised to call back RN hotline [phone] if any questions or if she would like to schedule visit call [phone].   Cielo Paige RN

## 2019-04-10 ENCOUNTER — TELEPHONE (OUTPATIENT)
Dept: FAMILY MEDICINE | Facility: CLINIC | Age: 74
End: 2019-04-10

## 2019-04-10 NOTE — TELEPHONE ENCOUNTER
Called [facility] and spoke with Steffi   She verified that olmesartan is on back order but patient is on the combination drug Olmesartan-hydrochlorothiazide   They do have the combination drug Olmesartan-hydrochlorothiazide and can dispense this    Patient notified.  She stated that she made a trip to the pharmacy for her med and was told that it was on backorder  Explained to her that the pharmacist checked verified that Olmesartan-hydrochlorothiazide is in stock  Patient will contact the pharmacy again    Luis Angel Aldridge RN    
Reason for Call:  Other prescription    Detailed comments: Patient calling states her pharmacy had to order her new blood pressure medication and will not have it until at least 05/01/19. Please advise    Phone Number Patient can be reached at: Home number on file [phone] (home)    Best Time: Any    Can we leave a detailed message on this number? YES    Call taken on 4/10/2019 at 2:32 PM by Nely Larry    
yes

## 2019-04-10 NOTE — TELEPHONE ENCOUNTER
Patient has appointment scheduled for 5/8/2019 with Dr. Dover for med check.  Closing encounter.     Ksenia Jo RN

## 2019-05-06 DIAGNOSIS — K21.9 GASTROESOPHAGEAL REFLUX DISEASE, ESOPHAGITIS PRESENCE NOT SPECIFIED: ICD-10-CM

## 2019-05-06 DIAGNOSIS — R05.3 CHRONIC COUGH: ICD-10-CM

## 2019-05-06 RX ORDER — OMEPRAZOLE 40 MG/1
40 CAPSULE, DELAYED RELEASE ORAL DAILY
Qty: 30 CAPSULE | Refills: 1 | Status: SHIPPED | OUTPATIENT
Start: 2019-05-06 | End: 2019-06-18 | Stop reason: ALTCHOICE

## 2019-05-06 NOTE — TELEPHONE ENCOUNTER
Reason for call:  Medication   If this is a refill request, has the caller requested the refill from the pharmacy already? N/A  Will the patient be using a [facility]? No  Name of the pharmacy and phone number for the current request[facility]     Name of the medication requested: omeprazole (PRILOSEC) 40 MG capsule    Other request: No more refills     Phone number to reach patient:  Home number on file [phone] (home)    Best Time:  anytime    Can we leave a detailed message on this number?  YES     Next 5 appointments (look out 90 days)    Jun 18, 2019  9:40 AM CDT  PHYSICAL with Vita Zavala MD  [facility] [place] ([facility]) [address]  [place] [address] [phone]       Josy Henderson,

## 2019-05-06 NOTE — TELEPHONE ENCOUNTER
Next 5 appointments (look out 90 days)    Jun 18, 2019  9:40 AM CDT  PHYSICAL with Vita [facility]Zvala MD  [facility] [address]  [place] [address]  [phone]       Josy Henderson,     Dr. Dover is out of office.

## 2019-05-07 ENCOUNTER — TELEPHONE (OUTPATIENT)
Dept: FAMILY MEDICINE | Facility: CLINIC | Age: 74
End: 2019-05-07

## 2019-05-07 NOTE — TELEPHONE ENCOUNTER
Reason for call:  Other   Patient called regarding (reason for call): call back  Additional comments: patient is calling because she needs a handicap sticker, please call back    Phone number to reach patient:  Home number on file [phone] (home)    Best Time:  any    Can we leave a detailed message on this number?  YES

## 2019-05-13 NOTE — TELEPHONE ENCOUNTER
Patient called back she would like form mailed to her home and asked about her Prilosec going to run out soon.  Paola Evans,

## 2019-05-13 NOTE — TELEPHONE ENCOUNTER
Form done called patient left message as she needs to fill out top portion. Left message to if she would like to  or mail to home address.  Paola Evans,

## 2019-05-27 DIAGNOSIS — N18.30 CKD (CHRONIC KIDNEY DISEASE) STAGE 3, GFR 30-59 ML/MIN (H): ICD-10-CM

## 2019-05-27 DIAGNOSIS — I12.9 RENAL HYPERTENSION: ICD-10-CM

## 2019-05-27 DIAGNOSIS — E66.01 MORBID OBESITY DUE TO EXCESS CALORIES (H): Chronic | ICD-10-CM

## 2019-05-30 RX ORDER — OLMESARTAN MEDOXOMIL AND HYDROCHLOROTHIAZIDE 20/12.5 20; 12.5 MG/1; MG/1
1 TABLET ORAL DAILY
Qty: 30 TABLET | Refills: 0 | Status: SHIPPED | OUTPATIENT
Start: 2019-05-30 | End: 2019-06-18

## 2019-06-18 ENCOUNTER — OFFICE VISIT (OUTPATIENT)
Dept: FAMILY MEDICINE | Facility: CLINIC | Age: 74
End: 2019-06-18
Payer: COMMERCIAL

## 2019-06-18 VITALS
HEART RATE: 74 BPM | TEMPERATURE: 97 F | DIASTOLIC BLOOD PRESSURE: 70 MMHG | SYSTOLIC BLOOD PRESSURE: 126 MMHG | BODY MASS INDEX: 45.99 KG/M2 | HEIGHT: 67 IN | OXYGEN SATURATION: 97 % | RESPIRATION RATE: 16 BRPM | WEIGHT: 293 LBS

## 2019-06-18 DIAGNOSIS — Z00.00 ENCOUNTER FOR MEDICARE ANNUAL WELLNESS EXAM: Primary | ICD-10-CM

## 2019-06-18 DIAGNOSIS — I12.9 RENAL HYPERTENSION: ICD-10-CM

## 2019-06-18 DIAGNOSIS — R79.89 ELEVATED PARATHYROID HORMONE: ICD-10-CM

## 2019-06-18 DIAGNOSIS — M79.89 MASS OF SOFT TISSUE OF FACE: ICD-10-CM

## 2019-06-18 DIAGNOSIS — E78.5 HYPERLIPIDEMIA LDL GOAL <100: ICD-10-CM

## 2019-06-18 DIAGNOSIS — N18.30 CKD (CHRONIC KIDNEY DISEASE) STAGE 3, GFR 30-59 ML/MIN (H): ICD-10-CM

## 2019-06-18 DIAGNOSIS — E66.01 MORBID OBESITY DUE TO EXCESS CALORIES (H): Chronic | ICD-10-CM

## 2019-06-18 DIAGNOSIS — K21.9 GASTROESOPHAGEAL REFLUX DISEASE, ESOPHAGITIS PRESENCE NOT SPECIFIED: ICD-10-CM

## 2019-06-18 DIAGNOSIS — J45.20 MILD INTERMITTENT ASTHMA, UNSPECIFIED WHETHER COMPLICATED: ICD-10-CM

## 2019-06-18 PROBLEM — J45.30 MILD PERSISTENT ASTHMA WITHOUT COMPLICATION: Status: RESOLVED | Noted: 2017-05-30 | Resolved: 2019-06-18

## 2019-06-18 LAB
ANION GAP SERPL CALCULATED.3IONS-SCNC: 7 MMOL/L (ref 3–14)
BASOPHILS # BLD AUTO: 0 10E9/L (ref 0–0.2)
BASOPHILS NFR BLD AUTO: 0.5 %
BUN SERPL-MCNC: 25 MG/DL (ref 7–30)
CALCIUM SERPL-MCNC: 9.1 MG/DL (ref 8.5–10.1)
CHLORIDE SERPL-SCNC: 104 MMOL/L (ref 94–109)
CHOLEST SERPL-MCNC: 223 MG/DL
CO2 SERPL-SCNC: 28 MMOL/L (ref 20–32)
CREAT SERPL-MCNC: 1.07 MG/DL (ref 0.52–1.04)
DIFFERENTIAL METHOD BLD: NORMAL
EOSINOPHIL # BLD AUTO: 0.2 10E9/L (ref 0–0.7)
EOSINOPHIL NFR BLD AUTO: 3.6 %
ERYTHROCYTE [DISTWIDTH] IN BLOOD BY AUTOMATED COUNT: 14.9 % (ref 10–15)
GFR SERPL CREATININE-BSD FRML MDRD: 52 ML/MIN/{1.73_M2}
GLUCOSE SERPL-MCNC: 101 MG/DL (ref 70–99)
HCT VFR BLD AUTO: 40.9 % (ref 35–47)
HDLC SERPL-MCNC: 47 MG/DL
HGB BLD-MCNC: 13.3 G/DL (ref 11.7–15.7)
LDLC SERPL CALC-MCNC: 142 MG/DL
LYMPHOCYTES # BLD AUTO: 1.2 10E9/L (ref 0.8–5.3)
LYMPHOCYTES NFR BLD AUTO: 22.2 %
MCH RBC QN AUTO: 28.1 PG (ref 26.5–33)
MCHC RBC AUTO-ENTMCNC: 32.5 G/DL (ref 31.5–36.5)
MCV RBC AUTO: 86 FL (ref 78–100)
MONOCYTES # BLD AUTO: 0.5 10E9/L (ref 0–1.3)
MONOCYTES NFR BLD AUTO: 8.9 %
NEUTROPHILS # BLD AUTO: 3.6 10E9/L (ref 1.6–8.3)
NEUTROPHILS NFR BLD AUTO: 64.8 %
NONHDLC SERPL-MCNC: 176 MG/DL
PLATELET # BLD AUTO: 195 10E9/L (ref 150–450)
POTASSIUM SERPL-SCNC: 4.5 MMOL/L (ref 3.4–5.3)
PTH-INTACT SERPL-MCNC: 202 PG/ML (ref 18–80)
RBC # BLD AUTO: 4.74 10E12/L (ref 3.8–5.2)
SODIUM SERPL-SCNC: 139 MMOL/L (ref 133–144)
TRIGL SERPL-MCNC: 168 MG/DL
WBC # BLD AUTO: 5.6 10E9/L (ref 4–11)

## 2019-06-18 PROCEDURE — 82306 VITAMIN D 25 HYDROXY: CPT | Performed by: FAMILY MEDICINE

## 2019-06-18 PROCEDURE — 85025 COMPLETE CBC W/AUTO DIFF WBC: CPT | Performed by: FAMILY MEDICINE

## 2019-06-18 PROCEDURE — 80048 BASIC METABOLIC PNL TOTAL CA: CPT | Performed by: FAMILY MEDICINE

## 2019-06-18 PROCEDURE — 99207 C PAF COMPLETED  NO CHARGE: CPT | Performed by: FAMILY MEDICINE

## 2019-06-18 PROCEDURE — 36415 COLL VENOUS BLD VENIPUNCTURE: CPT | Performed by: FAMILY MEDICINE

## 2019-06-18 PROCEDURE — 80061 LIPID PANEL: CPT | Performed by: FAMILY MEDICINE

## 2019-06-18 PROCEDURE — 99214 OFFICE O/P EST MOD 30 MIN: CPT | Mod: 25 | Performed by: FAMILY MEDICINE

## 2019-06-18 PROCEDURE — 83970 ASSAY OF PARATHORMONE: CPT | Performed by: FAMILY MEDICINE

## 2019-06-18 PROCEDURE — G0438 PPPS, INITIAL VISIT: HCPCS | Performed by: FAMILY MEDICINE

## 2019-06-18 RX ORDER — OMEPRAZOLE 40 MG/1
40 CAPSULE, DELAYED RELEASE ORAL DAILY
Qty: 30 CAPSULE | Refills: 1 | Status: CANCELLED | OUTPATIENT
Start: 2019-06-18

## 2019-06-18 RX ORDER — ALBUTEROL SULFATE 90 UG/1
1-2 AEROSOL, METERED RESPIRATORY (INHALATION) EVERY 4 HOURS PRN
Qty: 1 INHALER | Refills: 3 | Status: SHIPPED | OUTPATIENT
Start: 2019-06-18 | End: 2020-09-14

## 2019-06-18 RX ORDER — ATORVASTATIN CALCIUM 40 MG/1
40 TABLET, FILM COATED ORAL DAILY
Qty: 90 TABLET | Refills: 3 | Status: CANCELLED | OUTPATIENT
Start: 2019-06-18

## 2019-06-18 RX ORDER — CHOLECALCIFEROL (VITAMIN D3) 50 MCG
2000 TABLET ORAL DAILY
COMMUNITY
End: 2019-11-13

## 2019-06-18 RX ORDER — ATORVASTATIN CALCIUM 40 MG/1
40 TABLET, FILM COATED ORAL DAILY
Qty: 90 TABLET | Refills: 3 | Status: SHIPPED | OUTPATIENT
Start: 2019-06-18 | End: 2020-07-21

## 2019-06-18 RX ORDER — OLMESARTAN MEDOXOMIL AND HYDROCHLOROTHIAZIDE 20/12.5 20; 12.5 MG/1; MG/1
1 TABLET ORAL DAILY
Qty: 90 TABLET | Refills: 3 | Status: SHIPPED | OUTPATIENT
Start: 2019-06-18 | End: 2020-06-22

## 2019-06-18 ASSESSMENT — MIFFLIN-ST. JEOR: SCORE: 2057.64

## 2019-06-18 ASSESSMENT — ACTIVITIES OF DAILY LIVING (ADL): CURRENT_FUNCTION: NO ASSISTANCE NEEDED

## 2019-06-18 NOTE — LETTER
My Asthma Action Plan  Name: Chelo Brooks   YOB: 1945  Date: 6/18/2019   My doctor: Vita Zavala MD   My clinic: [facility]        My Control Medicine: Beclomethasone (QVar) -  80 mcg twice daily  My Rescue Medicine: Albuterol (Proair/Ventolin/Proventil) inhaler 2 puffs every 4 hours as needed   My Asthma Severity: mild persistent  Avoid your asthma triggers: upper respiratory infections  allergies            GREEN ZONE   Good Control    I feel good    No cough or wheeze    Can work, sleep and play without asthma symptoms       Take your asthma control medicine every day.     1. If exercise triggers your asthma, take your rescue medication    15 minutes before exercise or sports, and    During exercise if you have asthma symptoms  2. Spacer to use with inhaler: If you have a spacer, make sure to use it with your inhaler             YELLOW ZONE Getting Worse  I have ANY of these:    I do not feel good    Cough or wheeze    Chest feels tight    Wake up at night   1. Keep taking your Green Zone medications  2. Start taking your rescue medicine:    every 20 minutes for up to 1 hour. Then every 4 hours for 24-48 hours.  3. If you stay in the Yellow Zone for more than 12-24 hours, contact your doctor.  4. If you do not return to the Green Zone in 12-24 hours or you get worse, start taking your oral steroid medicine if prescribed by your provider.           RED ZONE Medical Alert - Get Help  I have ANY of these:    I feel awful    Medicine is not helping    Breathing getting harder    Trouble walking or talking    Nose opens wide to breathe       1. Take your rescue medicine NOW  2. If your provider has prescribed an oral steroid medicine, start taking it NOW  3. Call your doctor NOW  4. If you are still in the Red Zone after 20 minutes and you have not reached your doctor:    Take your rescue medicine again and    Call 911 or go to the emergency room right away    See your regular doctor  within 2 weeks of an Emergency Room or Urgent Care visit for follow-up treatment.          Annual Reminders:  Meet with Asthma Educator,  Flu Shot in the Fall, consider Pneumonia Vaccination for patients with asthma (aged 19 and older).    Pharmacy:    [facility] [place] [place] - [address] AT [facility] & [facility] [place], [place] - [address]                      Asthma Triggers  How To Control Things That Make Your Asthma Worse    Triggers are things that make your asthma worse.  Look at the list below to help you find your triggers and what you can do about them.  You can help prevent asthma flare-ups by staying away from your triggers.      Trigger                                                          What you can do   Cigarette Smoke  Tobacco smoke can make asthma worse. Do not allow smoking in your home, car or around you.  Be sure no one smokes at a child s day care or school.  If you smoke, ask your health care provider for ways to help you quit.  Ask family members to quit too.  Ask your health care provider for a referral to Quit Plan to help you quit smoking, or call 9-401-160-PLAN.     Colds, Flu, Bronchitis  These are common triggers of asthma. Wash your hands often.  Don t touch your eyes, nose or mouth.  Get a flu shot every year.     Dust Mites  These are tiny bugs that live in cloth or carpet. They are too small to see. Wash sheets and blankets in hot water every week.   Encase pillows and mattress in dust mite proof covers.  Avoid having carpet if you can. If you have carpet, vacuum weekly.   Use a dust mask and HEPA vacuum.   Pollen and Outdoor Mold  Some people are allergic to trees, grass, or weed pollen, or molds. Try to keep your windows closed.  Limit time out doors when pollen count is high.   Ask you health care provider about taking medicine during allergy season.     Animal Dander  Some people are allergic to skin  flakes, urine or saliva from pets with fur or feathers. Keep pets with fur or feathers out of your home.    If you can t keep the pet outdoors, then keep the pet out of your bedroom.  Keep the bedroom door closed.  Keep pets off cloth furniture and away from stuffed toys.     Mice, Rats, and Cockroaches  Some people are allergic to the waste from these pests.   Cover food and garbage.  Clean up spills and food crumbs.  Store grease in the refrigerator.   Keep food out of the bedroom.   Indoor Mold  This can be a trigger if your home has high moisture. Fix leaking faucets, pipes, or other sources of water.   Clean moldy surfaces.  Dehumidify basement if it is damp and smelly.   Smoke, Strong Odors, and Sprays  These can reduce air quality. Stay away from strong odors and sprays, such as perfume, powder, hair spray, paints, smoke incense, paint, cleaning products, candles and new carpet.   Exercise or Sports  Some people with asthma have this trigger. Be active!  Ask your doctor about taking medicine before sports or exercise to prevent symptoms.    Warm up for 5-10 minutes before and after sports or exercise.     Other Triggers of Asthma  Cold air:  Cover your nose and mouth with a scarf.  Sometimes laughing or crying can be a trigger.  Some medicines and food can trigger asthma.

## 2019-06-18 NOTE — PATIENT INSTRUCTIONS
Patient Education   Personalized Prevention Plan  You are due for the preventive services outlined below.  Your care team is available to assist you in scheduling these services.  If you have already completed any of these items, please share that information with your care team to update in your medical record.  Health Maintenance Due   Topic Date Due     Zoster (Shingles) Vaccine (1 of 2) 03/09/1997     FIT Test  06/05/2013     Asthma Control Test  12/14/2018     Asthma Action Plan - yearly  12/20/2018     PHQ-2  01/01/2019     Basic Metabolic Panel  04/05/2019     Cholesterol Lab  04/05/2019     Annual Wellness Visit  06/14/2019     Your Health Risk Assessment indicates you feel you are not in good health    A healthy lifestyle helps keep the body fit and the mind alert. It helps protect you from disease, helps you fight disease, and helps prevent chronic disease (disease that doesn't go away) from getting worse. This is important as you get older and begin to notice twinges in muscles and joints and a decline in the strength and stamina you once took for granted. A healthy lifestyle includes good healthcare, good nutrition, weight control, recreation, and regular exercise. Avoid harmful substances and do what you can to keep safe. Another part of a healthy lifestyle is stay mentally active and socially involved.    Good healthcare     Have a wellness visit every year.     If you have new symptoms, let us know right away. Don't wait until the next checkup.     Take medicines exactly as prescribed and keep your medicines in a safe place. Tell us if your medicine causes problems.   Healthy diet and weight control     Eat 3 or 4 small, nutritious, low-fat, high-fiber meals a day. Include a variety of fruits, vegetables, and whole-grain foods.     Make sure you get enough calcium in your diet. Calcium, vitamin D, and exercise help prevent osteoporosis (bone thinning).     If you live alone, try eating with others  when you can. That way you get a good meal and have company while you eat it.     Try to keep a healthy weight. If you eat more calories than your body uses for energy, it will be stored as fat and you will gain weight.     Recreation   Recreation is not limited to sports and team events. It includes any activity that provides relaxation, interest, enjoyment, and exercise. Recreation provides an outlet for physical, mental, and social energy. It can give a sense of worth and achievement. It can help you stay healthy.    Mental Exercise and Social Involvement  Mental and emotional health is as important as physical health. Keep in touch with friends and family. Stay as active as possible. Continue to learn and challenge yourself.   Things you can do to stay mentally active are:    Learn something new, like a foreign language or musical instrument.     Play SCRABBLE or do crossword puzzles. If you cannot find people to play these games with you at home, you can play them with others on your computer through the Internet.     Join a games club--anything from card games to chess or checkers or lawn bowling.     Start a new hobby.     Go back to school.     Volunteer.     Read.   Keep up with world events.    Exercise for a Healthier Heart     Exercise with a friend. When activity is fun, you're more likely to stick with it.   You may wonder how you can improve the health of your heart. If you re thinking about exercise, you re on the right track. You don t need to become an athlete, but you do need a certain amount of brisk exercise to help strengthen your heart. If you have been diagnosed with a heart condition, your doctor may recommend exercise to help stabilize your condition. To help make exercise a habit, choose safe, fun activities.  Be sure to check with your healthcare provider before starting an exercise program.   Why exercise?  Exercising regularly offers many healthy rewards. It can help you do all of the  following:    Improve your blood cholesterol level to help prevent further heart trouble    Lower your blood pressure to help prevent a stroke or heart attack    Control diabetes, or reduce your risk of getting this disease    Improve your heart and lung function    Reach and maintain a healthy weight    Make your muscles stronger and more limber so you can stay active    Prevent falls and fractures by slowing the loss of bone mass (osteoporosis)    Manage stress better    Reduce your blood pressure    Improve your sense of self and your body image  Exercise tips  Ease into your routine. Set small goals. Then build on them.  Exercise on most days. Aim for a total of 150 or more minutes of moderate to  vigorous intensity activity each week. Consider 40 minutes, 3 to 4 times a week. For best results, activity should last for 40 minutes on average. It is OK to work up to the 40 minute period over time. Examples of moderate-intensity activity is walking 1 mile in 15 minutes or 30 to 45 minutes of yard work.  Step up your daily activity level. Along with your exercise program, try being more active throughout the day. Walk instead of drive. Do more household tasks or yard work.  Choose one or more activities you enjoy. Walking is one of the easiest things you can do. You can also try swimming, riding a bike, dancing, or taking an exercise class.  Stop exercising and call your doctor if you:    Have chest pain or feel dizzy or lightheaded    Feel burning, tightness, pressure, or heaviness in your chest, neck, shoulders, back, or arms    Have unusual shortness of breath    Have increased joint or muscle pain    Have palpitations or an irregular heartbeat   Date Last Reviewed: 5/1/2016 2000-2018 [employer]. [address] [place] [id number]. All rights reserved. This information is not intended as a substitute for professional medical care. Always follow your healthcare professional's instructions.           It is recommended that you get a vaccination for shingles called Shingrix (given as 2 shots, 2 to 6 months apart), even if you have already had the Zostavax vaccine. Discuss getting the Shingix vaccine from your pharmacist, or schedule an ancillary shot visit here. Some insurances do not cover the cost of these vaccines.

## 2019-06-18 NOTE — PROGRESS NOTES
SUBJECTIVE:   Chelo Brooks is a 74 year old female who presents for Preventive Visit. Are you in the first 12 months of your Medicare coverage?  No    Hypertension well controlled on current medications without side effects, chest pain, or dyspnea. Hypercholesterolemia well controlled with current treatment plan without side effects. Also presents for follow-up of GERD controlled with medication. Patient also has asthma.  Patient has had asthma for years.  Occasional wheezing and shortness of breath are triggered by colds and relieved by albuterol.     She also has a soft lump on the right side of her face which hasn't changed in 4 weeks. No associated symptoms.     Healthy Habits:     In general, how would you rate your overall health?  Fair    Frequency of exercise:  None    Taking medications regularly:  No    Barriers to taking medications:  Not applicable    Medication side effects:  Muscle aches    Ability to successfully perform activities of daily living:  No assistance needed    Home Safety:  No safety concerns identified    Hearing Impairment:  No hearing concerns    In the past 6 months, have you been bothered by leaking of urine?  No    In general, how would you rate your overall mental or emotional health?  Good      PHQ-2 Total Score: 0    Additional concerns today:  Yes    Do you feel safe in your environment? Yes    Do you have a Health Care Directive? No: Advance care planning reviewed with patient; information given to patient to review.      Fall risk  Fallen 2 or more times in the past year?: No  Any fall with injury in the past year?: No    Cognitive Screening   1) Repeat 3 items (Leader, Season, Table)    2) Clock draw: NORMAL  3) 3 item recall: Recalls 2 objects   Results: NORMAL clock, 1-2 items recalled: COGNITIVE IMPAIRMENT LESS LIKELY    Mini-CogTM Copyright NEPTALI Martinez. Licensed by the author for use in [facility]; reprinted with permission (danilo@.[email or web address]). All rights  reserved.      Do you have sleep apnea, excessive snoring or daytime drowsiness?: yes    Reviewed and updated as needed this visit by clinical staff  Tobacco  Allergies  Meds  Problems  Med Hx  Surg Hx  Fam Hx  Soc Hx          Reviewed and updated as needed this visit by Provider  Problems        Social History     Tobacco Use     Smoking status: Never Smoker     Smokeless tobacco: Never Used   Substance Use Topics     Alcohol use: Yes     Alcohol/week: 0.0 oz     Comment: very rare           Alcohol Use 1/25/2017   Prescreen: >3 drinks/day or >7 drinks/week? The patient does not drink >3 drinks per day nor >7 drinks per week.           Lump on right jaw line    Current providers sharing in care for this patient include:   Patient Care Team:  Vita Zavala MD as PCP - General (Family Practice)  Vita Zavala MD as Assigned PCP    The following health maintenance items are reviewed in Epic and correct as of today:  Health Maintenance   Topic Date Due     FIT-DNA (Cologuard)  03/09/1967     ZOSTER IMMUNIZATION (1 of 2) 03/09/1997     ASTHMA CONTROL TEST  12/14/2018     ASTHMA ACTION PLAN  12/20/2018     BMP  04/05/2019     LIPID  04/05/2019     MEDICARE ANNUAL WELLNESS VISIT  06/18/2020     MAMMO SCREENING  07/31/2020     ADVANCE CARE PLANNING  01/25/2022     DEXA  02/02/2022     DTAP/TDAP/TD IMMUNIZATION (3 - Td) 01/25/2027     HEPATITIS C SCREENING  Completed     PHQ-2  Completed     INFLUENZA VACCINE  Completed     IPV IMMUNIZATION  Aged Out     MENINGITIS IMMUNIZATION  Aged Out           Review of Systems  CONSTITUTIONAL: NEGATIVE for fever, chills, change in weight  INTEGUMENTARY/SKIN: NEGATIVE for worrisome rashes, moles or lesions  EYES: NEGATIVE for vision changes or irritation  ENT/MOUTH: as above   RESP: NEGATIVE for significant cough or SOB  BREAST: NEGATIVE for masses, tenderness or discharge  CV: NEGATIVE for chest pain, palpitations or peripheral edema  GI: NEGATIVE for nausea, abdominal  "pain, heartburn, or change in bowel habits  : NEGATIVE for frequency, dysuria, or hematuria  MUSCULOSKELETAL: NEGATIVE for significant arthralgias or myalgia  NEURO: NEGATIVE for weakness, dizziness or paresthesias  ENDOCRINE: NEGATIVE for temperature intolerance, skin/hair changes  HEME: NEGATIVE for bleeding problems  PSYCHIATRIC: NEGATIVE for changes in mood or affect    OBJECTIVE:   /70   Pulse 74   Temp 97  F (36.1  C) (Oral)   Resp 16   Ht 1.702 m (5' 7\")   Wt (!) 151.5 kg (334 lb)   SpO2 97%   Breastfeeding? No   BMI 52.31 kg/m   Estimated body mass index is 52.31 kg/m  as calculated from the following:    Height as of this encounter: 1.702 m (5' 7\").    Weight as of this encounter: 151.5 kg (334 lb).  Physical Exam  GENERAL: alert, no distress and obese  EYES: Eyes grossly normal to inspection, PERRL and conjunctivae and sclerae normal  HENT: normal cephalic/atraumatic, ear canals and TM's normal, nose and mouth without ulcers or lesions, oropharynx clear, oral mucous membranes moist and fullness at angle of right mandible   NECK: no adenopathy, no asymmetry, masses, or scars and thyroid normal to palpation  RESP: lungs clear to auscultation - no rales, rhonchi or wheezes  BREAST: normal without masses, tenderness or nipple discharge and no palpable axillary masses or adenopathy  CV: regular rate and rhythm, normal S1 S2, no S3 or S4, no murmur, click or rub, no peripheral edema    ABDOMEN: soft, nontender, no hepatosplenomegaly, no masses and bowel sounds normal  MS: no gross musculoskeletal defects noted, no edema  SKIN: no suspicious lesions or rashes  NEURO: Normal strength and tone, mentation intact and speech normal  PSYCH: mentation appears normal, affect normal/bright    Diagnostic Test Results:  Labs reviewed in Epic  Results for orders placed or performed in visit on 06/18/19   BASIC METABOLIC PANEL   Result Value Ref Range    Sodium 139 133 - 144 mmol/L    Potassium 4.5 3.4 - 5.3 " mmol/L    Chloride 104 94 - 109 mmol/L    Carbon Dioxide 28 20 - 32 mmol/L    Anion Gap 7 3 - 14 mmol/L    Glucose 101 (H) 70 - 99 mg/dL    Urea Nitrogen 25 7 - 30 mg/dL    Creatinine 1.07 (H) 0.52 - 1.04 mg/dL    GFR Estimate 52 (L) >60 mL/min/[1.73_m2]    GFR Estimate If Black 60 (L) >60 mL/min/[1.73_m2]    Calcium 9.1 8.5 - 10.1 mg/dL   Lipid panel reflex to direct LDL Non-fasting   Result Value Ref Range    Cholesterol 223 (H) <200 mg/dL    Triglycerides 168 (H) <150 mg/dL    HDL Cholesterol 47 (L) >49 mg/dL    LDL Cholesterol Calculated 142 (H) <100 mg/dL    Non HDL Cholesterol 176 (H) <130 mg/dL   Parathyroid Hormone Intact   Result Value Ref Range    Parathyroid Hormone Intact 202 (H) 18 - 80 pg/mL   CBC with platelets differential   Result Value Ref Range    WBC 5.6 4.0 - 11.0 10e9/L    RBC Count 4.74 3.8 - 5.2 10e12/L    Hemoglobin 13.3 11.7 - 15.7 g/dL    Hematocrit 40.9 35.0 - 47.0 %    MCV 86 78 - 100 fl    MCH 28.1 26.5 - 33.0 pg    MCHC 32.5 31.5 - 36.5 g/dL    RDW 14.9 10.0 - 15.0 %    Platelet Count 195 150 - 450 10e9/L    % Neutrophils 64.8 %    % Lymphocytes 22.2 %    % Monocytes 8.9 %    % Eosinophils 3.6 %    % Basophils 0.5 %    Absolute Neutrophil 3.6 1.6 - 8.3 10e9/L    Absolute Lymphocytes 1.2 0.8 - 5.3 10e9/L    Absolute Monocytes 0.5 0.0 - 1.3 10e9/L    Absolute Eosinophils 0.2 0.0 - 0.7 10e9/L    Absolute Basophils 0.0 0.0 - 0.2 10e9/L    Diff Method Automated Method        ASSESSMENT / PLAN:   (Z00.00) Encounter for Medicare annual wellness exam  (primary encounter diagnosis)  (E66.01) Morbid obesity due to excess calories (H)    (N18.3) CKD (chronic kidney disease) stage 3, GFR 30-59 ml/min (I12.9) Renal hypertension  Comment: Well controlled with medications without side effects.   Plan: BASIC METABOLIC PANEL,         olmesartan-hydrochlorothiazide (BENICAR)         20-12.5 MG tablet, OFFICE/OUTPT VISIT,ESTANTOINE         III          (E78.5) Hyperlipidemia LDL goal <100  Comment: Well  controlled with medications without side effects.   Plan: Lipid panel reflex to direct LDL Non-fasting,         atorvastatin (LIPITOR) 40 MG tablet, Lipid         panel reflex to direct LDL Fasting,         OFFICE/OUTPT VISIT,EST,LEVL III          (K21.9) Gastroesophageal reflux disease, esophagitis presence not specified  Plan: ranitidine (ZANTAC) 300 MG tablet, OFFICE/OUTPT        VISIT,EST,LEVL III        Wean from PPI to Zantac; Discussed risks and benefits of this medication. Call or return to clinic as needed if these symptoms worsen      (J45.20) Mild intermittent asthma, unspecified whether complicated  Comment: Well controlled with medications without side effects.   Plan: albuterol (PROAIR HFA/PROVENTIL HFA/VENTOLIN         HFA) 108 (90 Base) MCG/ACT inhaler,         OFFICE/OUTPT VISIT,EST,LEVL III          (R22.0) Mass of soft tissue of face  Comment: Differential diagnoses would include: parotid mass, stone, or lymphadenopathy   Plan: CBC with platelets differential, OTOLARYNGOLOGY        REFERRAL, OFFICE/OUTPT VISIT,EST,LEVL III           (E34.9) Elevated parathyroid hormone  Plan: Parathyroid Hormone Intact, Vitamin D         Deficiency            End of Life Planning:  Patient currently has an advanced directive: No.  I have verified the patient's ablity to prepare an advanced directive/make health care decisions.  Literature was provided to assist patient in preparing an advanced directive.    COUNSELING:  Reviewed preventive health counseling, as reflected in patient instructions  Special attention given to:       Regular exercise       Healthy diet/nutrition       Osteoporosis Prevention/Bone Health       Colon cancer screening       Hepatitis C screening       HIV screening for high risk patient       The 10-year ASCVD risk score (Dawn BOBO Jr., et al., 2013) is: 15.6%    Values used to calculate the score:      Age: 74 years      Sex: Female      Is Non- : No      Diabetic:  "No      Tobacco smoker: No      Systolic Blood Pressure: 126 mmHg      Is BP treated: Yes      HDL Cholesterol: 47 mg/dL      Total Cholesterol: 223 mg/dL       Advanced Planning     Estimated body mass index is 52.31 kg/m  as calculated from the following:    Height as of this encounter: 1.702 m (5' 7\").    Weight as of this encounter: 151.5 kg (334 lb).    Weight management plan: Discussed healthy diet and exercise guidelines     reports that she has never smoked. She has never used smokeless tobacco.      Appropriate preventive services were discussed with this patient, including applicable screening as appropriate for cardiovascular disease, diabetes, osteopenia/osteoporosis, and glaucoma.  As appropriate for age/gender, discussed screening for colorectal cancer, prostate cancer, breast cancer, and cervical cancer. Checklist reviewing preventive services available has been given to the patient.    Reviewed patients plan of care and provided an AVS. The Basic Care Plan (routine screening as documented in Health Maintenance) for Chelo meets the Care Plan requirement. This Care Plan has been established and reviewed with the Patient.    Counseling Resources:  ATP IV Guidelines  Pooled Cohorts Equation Calculator  Breast Cancer Risk Calculator  FRAX Risk Assessment  ICSI Preventive Guidelines  Dietary Guidelines for Americans, 2010  [employer]'s MyPlate  ASA Prophylaxis  Lung CA Screening    Vita Zavala MD  [facility]    Identified Health Risks:  "

## 2019-06-18 NOTE — PROGRESS NOTES
The patient was provided with suggestions to help her develop a healthy physical lifestyle.  She is at risk for lack of exercise and has been provided with information to increase physical activity for the benefit of her well-being.

## 2019-06-18 NOTE — RESULT ENCOUNTER NOTE
Please call patient:    Your parathyroid hormone is high, which may be due to low vitamin D. Take 2000 units of vitamin D daily over-the-counter. Return for lab-only recheck of your tests in 3 months.    Your kidney tests are stable. You do not have diabetes. Your blood counts are normal. Your cholesterol is high. Eat healthy foods like fruits, vegetables, beans, nuts, seeds, and whole grains (such as wheat pasta and wheat bread). Drink water and milk instead of pop and juice. Avoid sweets. Exercise regularly. Take atorvastatin daily to lower your 16% risk of heart disease over the next 10 years. I have sent a prescription when you are ready.     Vita Zavala MD    The 10-year ASCVD risk score (Dawnchasity BOBO Jr., et al., 2013) is: 15.6%    Values used to calculate the score:      Age: 74 years      Sex: Female      Is Non- : No      Diabetic: No      Tobacco smoker: No      Systolic Blood Pressure: 126 mmHg      Is BP treated: Yes      HDL Cholesterol: 47 mg/dL      Total Cholesterol: 223 mg/dL

## 2019-06-19 ENCOUNTER — TELEPHONE (OUTPATIENT)
Dept: FAMILY MEDICINE | Facility: CLINIC | Age: 74
End: 2019-06-19

## 2019-06-19 NOTE — TELEPHONE ENCOUNTER
Left message on answering machine for patient to call back to the RN hotline at [phone].    Laurie Cassidy RN  [facility]

## 2019-06-19 NOTE — TELEPHONE ENCOUNTER
Vita Zavala MD  [facility] Rn Triage Pool             Please call patient:     Your parathyroid hormone is high, which may be due to low vitamin D. Take 2000 units of vitamin D daily over-the-counter. Return for lab-only recheck of your tests in 3 months.     Your kidney tests are stable. You do not have diabetes. Your blood counts are normal. Your cholesterol is high. Eat healthy foods like fruits, vegetables, beans, nuts, seeds, and whole grains (such as wheat pasta and wheat bread). Drink water and milk instead of pop and juice. Avoid sweets. Exercise regularly. Take atorvastatin daily to lower your 16% risk of heart disease over the next 10 years. I have sent a prescription when you are ready.     Vita Zavala MD     The 10-year ASCVD risk score (Dawn JERAMIE Jr., et al., 2013) is: 15.6%     Values used to calculate the score:       Age: 74 years       Sex: Female       Is Non- : No       Diabetic: No       Tobacco smoker: No       Systolic Blood Pressure: 126 mmHg       Is BP treated: Yes       HDL Cholesterol: 47 mg/dL       Total Cholesterol: 223 mg/dL

## 2019-06-19 NOTE — TELEPHONE ENCOUNTER
Patient called and left VM on RN Hotline.    Called and spoke with patient & notified of provider's results as written.  Patient states she currently takes 1,000 units vitamin D daily but will double up. States she will start taking the atorvastatin.    Verbalizes understanding & no further questions.     Ksenia Jo, RN

## 2019-07-02 ENCOUNTER — TELEPHONE (OUTPATIENT)
Dept: FAMILY MEDICINE | Facility: CLINIC | Age: 74
End: 2019-07-02

## 2019-07-02 DIAGNOSIS — J30.81 ALLERGIC RHINITIS DUE TO ANIMAL DANDER: Chronic | ICD-10-CM

## 2019-07-02 DIAGNOSIS — J45.20 MILD INTERMITTENT ASTHMA, UNSPECIFIED WHETHER COMPLICATED: Primary | ICD-10-CM

## 2019-07-18 DIAGNOSIS — N18.30 CKD (CHRONIC KIDNEY DISEASE) STAGE 3, GFR 30-59 ML/MIN (H): ICD-10-CM

## 2019-07-18 DIAGNOSIS — E66.01 MORBID OBESITY DUE TO EXCESS CALORIES (H): Chronic | ICD-10-CM

## 2019-07-18 DIAGNOSIS — I12.9 RENAL HYPERTENSION: ICD-10-CM

## 2019-07-18 RX ORDER — OLMESARTAN MEDOXOMIL AND HYDROCHLOROTHIAZIDE 20/12.5 20; 12.5 MG/1; MG/1
1 TABLET ORAL DAILY
Qty: 90 TABLET | Refills: 3 | Status: CANCELLED | OUTPATIENT
Start: 2019-07-18

## 2019-07-18 NOTE — TELEPHONE ENCOUNTER
Called and verified with pharmacy on duplicate prescription. Please disregard. ARCELIA Schmitz

## 2019-07-22 RX ORDER — OLMESARTAN MEDOXOMIL AND HYDROCHLOROTHIAZIDE 20/12.5 20; 12.5 MG/1; MG/1
1 TABLET ORAL DAILY
Qty: 30 TABLET | Refills: 0 | OUTPATIENT
Start: 2019-07-22

## 2019-08-13 ASSESSMENT — ASTHMA QUESTIONNAIRES: ACT_TOTALSCORE: 8

## 2019-09-16 ENCOUNTER — OFFICE VISIT (OUTPATIENT)
Dept: ALLERGY | Facility: CLINIC | Age: 74
End: 2019-09-16
Payer: COMMERCIAL

## 2019-09-16 VITALS
OXYGEN SATURATION: 95 % | TEMPERATURE: 98.4 F | HEART RATE: 88 BPM | DIASTOLIC BLOOD PRESSURE: 76 MMHG | BODY MASS INDEX: 52.47 KG/M2 | SYSTOLIC BLOOD PRESSURE: 124 MMHG | WEIGHT: 293 LBS

## 2019-09-16 DIAGNOSIS — J45.30 POORLY CONTROLLED MILD PERSISTENT ASTHMA: Primary | ICD-10-CM

## 2019-09-16 LAB
FEF 25/75: NORMAL
FEV-1: NORMAL
FEV1/FVC: NORMAL
FVC: NORMAL

## 2019-09-16 PROCEDURE — 94010 BREATHING CAPACITY TEST: CPT | Performed by: ALLERGY & IMMUNOLOGY

## 2019-09-16 PROCEDURE — 99214 OFFICE O/P EST MOD 30 MIN: CPT | Mod: 25 | Performed by: ALLERGY & IMMUNOLOGY

## 2019-09-16 NOTE — PROGRESS NOTES
Chelo Brooks was seen in the Allergy Clinic at [facility]. The following are my recommendations regarding her Mild Persistent Asthma    1. Resume Qvar 80mcg, 2 puffs twice daily  2. Take 2 to 4 puffs of albuterol HFA every 4 hours while awake until symptoms resolve then resume using as needed  3. If symptoms not improving over the next 3-5 days will consider a short course of prednisone  4. Follow-up in 4 weeks, sooner if needed      Chelo Brooks is a 74 year old American female who is seen today for delayed follow-up of chronic cough. She was last seen in the clinic on 2/13/18. She reports that a couple of weeks ago she felt feverish and sweaty though did not check her temperature. After a couple of days she felt her fever broke and since then she has been having difficulty breathing and chest tightness. These symptoms have been persistent and seem to be worsening over the past week. Two of her neighbors were recently diagnosed with pneumonia. Chelo denies having nocturnal symptoms and her cough has been non-productive. She starts to cough after she has been talking for a long time. Today she also reports having a headache and is not feeling well. She has felt limited in her ability to do her usual activities and tires easily. This weekend she was at a [place]orship [place] and felt her breathing was heavy. She had to use albuterol a few times throughout the day. She denies having symptoms of wheezing. Chelo feels she gets sick every spring and fall. She has recently resumed using her albuterol inhaler and finds it to be helpful. She does not recall the last time she used her Qvar inhaler.      Past Medical History:   Diagnosis Date     CKD (chronic kidney disease) stage 3, GFR 30-59 ml/min (H)      Diagnostic skin and sensitization tests 4/5/12     RAST pos. only to cat mildly     DJD (degenerative joint disease) 10/21/2005     Eczema, unspecified type 4/5/2018     Elevated parathyroid hormone  6/18/2019     Hepatitis B childhood     resolved, patient is not a carrier      HTN (hypertension)      Hyperlipidemia 11/27/2012     Hypertension goal BP (blood pressure) < 140/90      Lumbar spinal stenosis 7/17/2017     Mild persistent asthma without complication 5/30/2017     Morbid obesity due to excess calories (H) 11/23/2015    Surgical referral declined '16      FLOR (obstructive sleep apnea)      Shingles 2014    scalp     Stasis dermatitis of both legs 4/5/2018     Vitamin D deficiency      Family History   Problem Relation Age of Onset     Circulatory Father         d. DVT     Heart Disease Father         pacer     Diabetes Mother      Hypertension Mother      C.A.D. Paternal Uncle         MI      Heart Disease Brother 48        pacer      Diabetes Maternal Grandmother      Hypertension Maternal Grandmother      Diabetes Maternal Aunt      Hypertension Maternal Aunt      Cancer - colorectal Maternal Grandfather      C.A.D. Maternal Aunt         MI     Glaucoma No family hx of      Macular Degeneration No family hx of      Social History     Tobacco Use     Smoking status: Never Smoker     Smokeless tobacco: Never Used   Substance Use Topics     Alcohol use: Yes     Alcohol/week: 0.0 oz     Comment: very rare       Drug use: No       Past medical, family, and social history were reviewed.    REVIEW OF SYSTEMS:  General: negative for weight gain. negative for weight loss. negative for changes in sleep. Positive for headache.  Eyes: negative for itching. negative for redness. negative for tearing/watering. negative for vision changes  Ears: negative for fullness. negative for hearing loss. negative for dizziness.   Nose: negative for snoring.negative for changes in smell. negative for drainage.   Throat: positive  for hoarseness. negative for sore throat. negative for trouble swallowing.   Lungs: positive  for cough. positive  for shortness of breath.positive  for wheezing. negative for sputum production.    Cardiovascular: negative for chest pain. negative for swelling of ankles. negative for fast or irregular heartbeat.   Gastrointestinal: negative for nausea. negative for heartburn. negative for acid reflux.   Musculoskeletal: negative for joint pain. negative for joint stiffness. negative for joint swelling.   Neurologic: negative for seizures. negative for fainting. negative for weakness.   Psychiatric: negative for changes in mood. negative for anxiety.   Endocrine: negative for cold intolerance. negative for heat intolerance. negative for tremors.   Hematologic: negative for easy bruising. negative for easy bleeding.  Integumentary: negative for rash. negative for scaling. negative for nail changes.       Current Outpatient Medications:      albuterol (PROAIR HFA/PROVENTIL HFA/VENTOLIN HFA) 108 (90 Base) MCG/ACT inhaler, Inhale 1-2 puffs into the lungs every 4 hours as needed for shortness of breath / dyspnea, Disp: 1 Inhaler, Rfl: 3     atorvastatin (LIPITOR) 40 MG tablet, Take 1 tablet (40 mg) by mouth daily, Disp: 90 tablet, Rfl: 3     beclomethasone HFA (QVAR REDIHALER) 80 MCG/ACT inhaler, Inhale 2 puffs into the lungs 2 times daily, Disp: 1 Inhaler, Rfl: 1     Cholecalciferol (VITAMIN D) 2000 UNITS tablet, Take 2,000 Units by mouth daily, Disp: , Rfl:      fluticasone (FLONASE) 50 MCG/ACT spray, Spray 2 sprays into both nostrils daily, Disp: 1 Bottle, Rfl: 11     loratadine (CLARITIN) 10 MG tablet, Take 10 mg by mouth as needed , Disp: , Rfl:      olmesartan-hydrochlorothiazide (BENICAR) 20-12.5 MG tablet, Take 1 tablet by mouth daily, Disp: 90 tablet, Rfl: 3     order for DME, Equipment being ordered: Auto CPAP 11-18, Disp: 1 Units, Rfl: 0     ranitidine (ZANTAC) 300 MG tablet, Take 1 tablet (300 mg) by mouth At Bedtime, Disp: 90 tablet, Rfl: 3     vitamin D3 (CHOLECALCIFEROL) 2000 units (50 mcg) tablet, Take 1 tablet (2,000 Units) by mouth daily, Disp: , Rfl:      fluticasone (ARNUITY ELLIPTA) 200  MCG/ACT inhaler, Inhale 1 puff into the lungs daily, Disp: 1 each, Rfl: 1     Hypertonic Nasal Wash (SINUS RINSE BOTTLE KIT) PACK, Spray 1 Bottle in nostril daily as needed. (Patient not taking: Reported on 9/16/2019), Disp: , Rfl:      mometasone (ELOCON) 0.1 % cream, Apply to affected area on legs twice daily as needed (Patient not taking: Reported on 9/16/2019), Disp: 60 g, Rfl: 2    EXAM:   /76 (BP Location: Right arm, Patient Position: Sitting, Cuff Size: Adult Large)   Pulse 88   Temp 98.4  F (36.9  C) (Oral)   Wt (!) 152 kg (335 lb)   SpO2 95%   BMI 52.47 kg/m    GENERAL APPEARANCE: alert, cooperative and not in distress  SKIN: no rashes, no lesions  HEAD: atraumatic, normocephalic  EYES: lids and lashes normal, conjunctivae and sclerae clear  ENT: no scars or lesions, nasal exam showed no discharge, swelling or lesions noted, tongue midline and normal, soft palate, uvula, and tonsils normal  NECK: no asymmetry, masses, or scars, supple without significant adenopathy  LUNGS: unlabored respirations, no intercostal retractions or accessory muscle use, clear to auscultation without rales or wheezes  HEART: regular rate and rhythm without murmurs and normal S1 and S2  MUSCULOSKELETAL: no musculoskeletal defects are noted  NEURO: no focal deficits noted  PSYCH: does not appear depressed or anxious      WORKUP:  Spirometry    SPIROMETRY       FVC 2.48L (75% of predicted).     FEV1 1.75L (70% of predicted).     FEV1/FVC 71%      I have reviewed and interpreted these results. These values are consistent with mild airflow obstruction with possible restrictive component. There has been a decrease in values when compared to those obtained on 1/15/18.    Asthma Control Test (ACT) total score: 12       ASSESSMENT/PLAN:  Chelo Brooks is a 74 year old female here for evaluation of cough and chest tightness. There were no abnormal findings on respiratory exam and she is afebrile. She reports having daytime  symptoms though no nocturnal symptoms that have interrupted her sleep. We discussed that she should resume her inhaled corticosteroid medications and continue with regular use of albuterol until her symptoms resolve.    1. Resume Qvar 80mcg, 2 puffs twice daily  2. Take 2 to 4 puffs of albuterol HFA every 4 hours while awake until symptoms resolve then resume using as needed  3. If symptoms not improving over the next 3-5 days will consider a short course of prednisone  4. Follow-up in 4 weeks, sooner if needed      Thank you for allowing me to participate in the care of Chelo Brooks.      Zahraa Wallace MD  Allergy/Immunology  [facility] and [place], [place]      Chart documentation done in part with Dragon Voice Recognition Software. Although reviewed after completion, some word and grammatical errors may remain.

## 2019-09-16 NOTE — PATIENT INSTRUCTIONS
If you have any questions regarding your allergies, asthma, or what we discussed during your visit today please call the allergy clinic or contact us via [employer].    [facility] RN Line: [phone]  [facility] Scheduling Line: [phone]  [facility] Scheduling Line: [phone]      Start taking the Qvar every day - 2 puffs every morning and 2 puffs every evening. Rinse your mouth and brush your teeth afterwards.      Take 2 puffs of your albuterol inhaler every 4 hours while awake until you feel you are back to normal. Then use it only as needed.      Call with an update on Thursday morning - [phone]. If you are not feeling better by then we can discuss other treatment options including a course of prednisone.      Follow-up in 4 weeks.

## 2019-09-16 NOTE — LETTER
9/16/2019         RE: Chelo Brooks  [address] [place] [address] [address]  [facility] [id number]        Dear Colleague,    Thank you for referring your patient, Chelo Brooks, to the [facility]. Please see a copy of my visit note below.    Chelo Brooks was seen in the Allergy Clinic at [facility]. The following are my recommendations regarding her Mild Persistent Asthma    1. Resume Qvar 80mcg, 2 puffs twice daily  2. Take 2 to 4 puffs of albuterol HFA every 4 hours while awake until symptoms resolve then resume using as needed  3. If symptoms not improving over the next 3-5 days will consider a short course of prednisone  4. Follow-up in 4 weeks, sooner if needed      Chelo Brooks is a 74 year old American female who is seen today for delayed follow-up of chronic cough. She was last seen in the clinic on 2/13/18. She reports that a couple of weeks ago she felt feverish and sweaty though did not check her temperature. After a couple of days she felt her fever broke and since then she has been having difficulty breathing and chest tightness. These symptoms have been persistent and seem to be worsening over the past week. Two of her neighbors were recently diagnosed with pneumonia. Chelo denies having nocturnal symptoms and her cough has been non-productive. She starts to cough after she has been talking for a long time. Today she also reports having a headache and is not feeling well. She has felt limited in her ability to do her usual activities and tires easily. This weekend she was at a [employer] festival and felt her breathing was heavy. She had to use albuterol a few times throughout the day. She denies having symptoms of wheezing. Chelo feels she gets sick every spring and fall. She has recently resumed using her albuterol inhaler and finds it to be helpful. She does not recall the last time she used her Qvar inhaler.      Past Medical History:   Diagnosis Date     CKD  (chronic kidney disease) stage 3, GFR 30-59 ml/min (H)      Diagnostic skin and sensitization tests 4/5/12     RAST pos. only to cat mildly     DJD (degenerative joint disease) 10/21/2005     Eczema, unspecified type 4/5/2018     Elevated parathyroid hormone 6/18/2019     Hepatitis B childhood     resolved, patient is not a carrier      HTN (hypertension)      Hyperlipidemia 11/27/2012     Hypertension goal BP (blood pressure) < 140/90      Lumbar spinal stenosis 7/17/2017     Mild persistent asthma without complication 5/30/2017     Morbid obesity due to excess calories (H) 11/23/2015    Surgical referral declined '16      FLOR (obstructive sleep apnea)      Shingles 2014    scalp     Stasis dermatitis of both legs 4/5/2018     Vitamin D deficiency      Family History   Problem Relation Age of Onset     Circulatory Father         d. DVT     Heart Disease Father         pacer     Diabetes Mother      Hypertension Mother      C.A.D. Paternal Uncle         MI      Heart Disease Brother 48        pacer      Diabetes Maternal Grandmother      Hypertension Maternal Grandmother      Diabetes Maternal Aunt      Hypertension Maternal Aunt      Cancer - colorectal Maternal Grandfather      C.A.D. Maternal Aunt         MI     Glaucoma No family hx of      Macular Degeneration No family hx of      Social History     Tobacco Use     Smoking status: Never Smoker     Smokeless tobacco: Never Used   Substance Use Topics     Alcohol use: Yes     Alcohol/week: 0.0 oz     Comment: very rare       Drug use: No       Past medical, family, and social history were reviewed.    REVIEW OF SYSTEMS:  General: negative for weight gain. negative for weight loss. negative for changes in sleep. Positive for headache.  Eyes: negative for itching. negative for redness. negative for tearing/watering. negative for vision changes  Ears: negative for fullness. negative for hearing loss. negative for dizziness.   Nose: negative for snoring.negative for  changes in smell. negative for drainage.   Throat: positive  for hoarseness. negative for sore throat. negative for trouble swallowing.   Lungs: positive  for cough. positive  for shortness of breath.positive  for wheezing. negative for sputum production.   Cardiovascular: negative for chest pain. negative for swelling of ankles. negative for fast or irregular heartbeat.   Gastrointestinal: negative for nausea. negative for heartburn. negative for acid reflux.   Musculoskeletal: negative for joint pain. negative for joint stiffness. negative for joint swelling.   Neurologic: negative for seizures. negative for fainting. negative for weakness.   Psychiatric: negative for changes in mood. negative for anxiety.   Endocrine: negative for cold intolerance. negative for heat intolerance. negative for tremors.   Hematologic: negative for easy bruising. negative for easy bleeding.  Integumentary: negative for rash. negative for scaling. negative for nail changes.       Current Outpatient Medications:      albuterol (PROAIR HFA/PROVENTIL HFA/VENTOLIN HFA) 108 (90 Base) MCG/ACT inhaler, Inhale 1-2 puffs into the lungs every 4 hours as needed for shortness of breath / dyspnea, Disp: 1 Inhaler, Rfl: 3     atorvastatin (LIPITOR) 40 MG tablet, Take 1 tablet (40 mg) by mouth daily, Disp: 90 tablet, Rfl: 3     beclomethasone HFA (QVAR REDIHALER) 80 MCG/ACT inhaler, Inhale 2 puffs into the lungs 2 times daily, Disp: 1 Inhaler, Rfl: 1     Cholecalciferol (VITAMIN D) 2000 UNITS tablet, Take 2,000 Units by mouth daily, Disp: , Rfl:      fluticasone (FLONASE) 50 MCG/ACT spray, Spray 2 sprays into both nostrils daily, Disp: 1 Bottle, Rfl: 11     loratadine (CLARITIN) 10 MG tablet, Take 10 mg by mouth as needed , Disp: , Rfl:      olmesartan-hydrochlorothiazide (BENICAR) 20-12.5 MG tablet, Take 1 tablet by mouth daily, Disp: 90 tablet, Rfl: 3     order for DME, Equipment being ordered: Auto CPAP 11-18, Disp: 1 Units, Rfl: 0     ranitidine  (ZANTAC) 300 MG tablet, Take 1 tablet (300 mg) by mouth At Bedtime, Disp: 90 tablet, Rfl: 3     vitamin D3 (CHOLECALCIFEROL) 2000 units (50 mcg) tablet, Take 1 tablet (2,000 Units) by mouth daily, Disp: , Rfl:      fluticasone (ARNUITY ELLIPTA) 200 MCG/ACT inhaler, Inhale 1 puff into the lungs daily, Disp: 1 each, Rfl: 1     Hypertonic Nasal Wash (SINUS RINSE BOTTLE KIT) PACK, Spray 1 Bottle in nostril daily as needed. (Patient not taking: Reported on 9/16/2019), Disp: , Rfl:      mometasone (ELOCON) 0.1 % cream, Apply to affected area on legs twice daily as needed (Patient not taking: Reported on 9/16/2019), Disp: 60 g, Rfl: 2    EXAM:   /76 (BP Location: Right arm, Patient Position: Sitting, Cuff Size: Adult Large)   Pulse 88   Temp 98.4  F (36.9  C) (Oral)   Wt (!) 152 kg (335 lb)   SpO2 95%   BMI 52.47 kg/m     GENERAL APPEARANCE: alert, cooperative and not in distress  SKIN: no rashes, no lesions  HEAD: atraumatic, normocephalic  EYES: lids and lashes normal, conjunctivae and sclerae clear  ENT: no scars or lesions, nasal exam showed no discharge, swelling or lesions noted, tongue midline and normal, soft palate, uvula, and tonsils normal  NECK: no asymmetry, masses, or scars, supple without significant adenopathy  LUNGS: unlabored respirations, no intercostal retractions or accessory muscle use, clear to auscultation without rales or wheezes  HEART: regular rate and rhythm without murmurs and normal S1 and S2  MUSCULOSKELETAL: no musculoskeletal defects are noted  NEURO: no focal deficits noted  PSYCH: does not appear depressed or anxious      WORKUP:  Spirometry    SPIROMETRY       FVC 2.48L (75% of predicted).     FEV1 1.75L (70% of predicted).     FEV1/FVC 71%      I have reviewed and interpreted these results. These values are consistent with mild airflow obstruction with possible restrictive component. There has been a decrease in values when compared to those obtained on 1/15/18.    Asthma  Control Test (ACT) total score: 12       ASSESSMENT/PLAN:  Chelo Brooks is a 74 year old female here for evaluation of cough and chest tightness. There were no abnormal findings on respiratory exam and she is afebrile. She reports having daytime symptoms though no nocturnal symptoms that have interrupted her sleep. We discussed that she should resume her inhaled corticosteroid medications and continue with regular use of albuterol until her symptoms resolve.    1. Resume Qvar 80mcg, 2 puffs twice daily  2. Take 2 to 4 puffs of albuterol HFA every 4 hours while awake until symptoms resolve then resume using as needed  3. If symptoms not improving over the next 3-5 days will consider a short course of prednisone  4. Follow-up in 4 weeks, sooner if needed      Thank you for allowing me to participate in the care of Chelo Brooks.      Zahraa Wallace MD  Allergy/Immunology  [place], [place]      Chart documentation done in part with Dragon Voice Recognition Software. Although reviewed after completion, some word and grammatical errors may remain.    Again, thank you for allowing me to participate in the care of your patient.        Sincerely,        Zahraa Wallace MD

## 2019-09-17 ENCOUNTER — TELEPHONE (OUTPATIENT)
Dept: ALLERGY | Facility: CLINIC | Age: 74
End: 2019-09-17

## 2019-09-17 DIAGNOSIS — J45.30 POORLY CONTROLLED MILD PERSISTENT ASTHMA: Primary | ICD-10-CM

## 2019-09-17 ASSESSMENT — ASTHMA QUESTIONNAIRES: ACT_TOTALSCORE: 12

## 2019-09-17 NOTE — TELEPHONE ENCOUNTER
Updated patient regarding new prescription. Patient will contact us if this prescription is also to expensive.     Nimisha Robles RN

## 2019-09-17 NOTE — TELEPHONE ENCOUNTER
Please see list of alternative medications below. Patient should contact her insurance to determine which medications are on her formulary and may be more affordable. I have sent a prescription for Arnuityto her pharmacy in case this is cheaper. If not covered she should call to let us know which medication is preferred.    1. Arnuity Ellipta  2. Flovent HFA  3. Flovent Diskus  4. Asmanex HFA  5. Asmanex Twisthaler  6. Pulmicort Flexhaler  7. ArmonAir RespiClick

## 2019-09-17 NOTE — TELEPHONE ENCOUNTER
Left message for patient to return call to RN's direct line at [phone] to discuss medication options.     Nimisha Robles RN

## 2019-09-17 NOTE — TELEPHONE ENCOUNTER
Received voicemail from patient reporting that her QVAR inhaler will cost her $178. She is unable to afford this. Would you like to prescribe alternative?    Nimisha Robles RN

## 2019-09-24 NOTE — TELEPHONE ENCOUNTER
Message from pharmacy: Plan does not cover this medication. Please call plane at [phone] to initiate prior authorization or call/fax pharmacy to change medication. Patient ID # is [id number].    Preferred alternative is Asmanex.    Mey Lyman MA

## 2019-09-30 ENCOUNTER — NURSE TRIAGE (OUTPATIENT)
Dept: ALLERGY | Facility: CLINIC | Age: 74
End: 2019-09-30

## 2019-09-30 DIAGNOSIS — R07.89 CHEST TIGHTNESS: Primary | ICD-10-CM

## 2019-09-30 RX ORDER — PREDNISONE 10 MG/1
20 TABLET ORAL 2 TIMES DAILY
Qty: 20 TABLET | Refills: 0 | Status: SHIPPED | OUTPATIENT
Start: 2019-09-30 | End: 2019-11-13

## 2019-09-30 NOTE — TELEPHONE ENCOUNTER
Called and spoke with patient. Patient has had difficulty breathing which started yesterday. She also has chest tightness and a dry cough if she inhales to deeply. The cough is non-productive. Afebrile.     Patient was unable to fill QVAR inhaler as it will cost patient $178. Upon chart review Asmanex is the patient's preferred alternative (see encounter 9/16/19). Patient is requesting this inhaler be sent to the pharmacy. Patient also requesting short course of steroids. Per office visit note from 9/16/16 with Dr. Wallace if her symptoms are not improving or getting worse a short course of prednisone would be prescribed. Patient is going on vacation at the beginning of October. Please sent scripts if appropriate. Pharmacy entered.     Nimisha Robles RN      Additional Information    Negative: Breathing stopped and hasn't returned    Negative: Choking on something    Negative: SEVERE difficulty breathing (e.g., struggling for each breath, speaks in single words, pulse > 120)    Negative: Bluish (or gray) lips or face    Negative: Difficult to awaken or acting confused (e.g., disoriented, slurred speech)    Negative: Passed out (i.e., fainted, collapsed and was not responding)    Negative: Wheezing started suddenly after medicine, an allergic food, or bee sting    Negative: Stridor    Negative: Slow, shallow and weak breathing    Negative: Sounds like a life-threatening emergency to the triager    Negative: Chest pain    Negative: Wheezing (high pitched whistling sound) and previous asthma attacks or use of asthma medicines    Negative: Difficulty breathing and only from stuffy or runny nose    Negative: Difficulty breathing and only present when coughing    MILD difficulty breathing (e.g., minimal/no SOB at rest, SOB with walking, pulse < 100) of new onset or worse than normal    Negative: Patient wants to be seen    Negative: Continuous (nonstop) coughing    Negative: Longstanding difficulty breathing and not  responding to usual therapy    Negative: Longstanding difficulty breathing (e.g., CHF, COPD, emphysema) and worse than normal    Negative: MODERATE longstanding difficulty breathing (e.g., speaks in phrases, SOB even at rest, pulse 100-120) and SAME as normal    Negative: MILD longstanding difficulty breathing (e.g., speaks in phrases, SOB even at rest, pulse 100-120) and SAME as normal    Protocols used: BREATHING DIFFICULTY-A-OH

## 2019-09-30 NOTE — TELEPHONE ENCOUNTER
Reason for call:  Patient reporting a symptom    Symptom or request: Hard of breathing    Duration (how long have symptoms been present): 09/29/19     Have you been treated for this before? Yes    Additional comments: n/a     Phone Number patient can be reached at:  Home number on file [phone] (home)    Best Time:  any    Can we leave a detailed message on this number:  YES    Call taken on 9/30/2019 at 9:53 AM by Marcelo Hernández

## 2019-10-11 ENCOUNTER — TELEPHONE (OUTPATIENT)
Dept: ALLERGY | Facility: CLINIC | Age: 74
End: 2019-10-11

## 2019-10-11 NOTE — TELEPHONE ENCOUNTER
Reason for Call:  Other prescription    Detailed comments: patient called and needs an antibiotic for her coughing.    She is coughing up and gagging.    Please send a prescription to:    [facility] [place] [address] [address]    Thanks.    Phone Number Patient can be reached at: Home number on file [phone] (home)    Best Time: asap    Can we leave a detailed message on this number? YES    Call taken on 10/11/2019 at 1:53 PM by Ada Watson

## 2019-10-12 ENCOUNTER — OFFICE VISIT (OUTPATIENT)
Dept: URGENT CARE | Facility: URGENT CARE | Age: 74
End: 2019-10-12
Payer: COMMERCIAL

## 2019-10-12 VITALS
BODY MASS INDEX: 51.9 KG/M2 | WEIGHT: 293 LBS | RESPIRATION RATE: 20 BRPM | DIASTOLIC BLOOD PRESSURE: 85 MMHG | HEART RATE: 82 BPM | OXYGEN SATURATION: 95 % | TEMPERATURE: 98.1 F | SYSTOLIC BLOOD PRESSURE: 150 MMHG

## 2019-10-12 DIAGNOSIS — J45.901 EXACERBATION OF ASTHMA, UNSPECIFIED ASTHMA SEVERITY, UNSPECIFIED WHETHER PERSISTENT: ICD-10-CM

## 2019-10-12 DIAGNOSIS — J20.9 ACUTE BRONCHITIS WITH SYMPTOMS > 10 DAYS: Primary | ICD-10-CM

## 2019-10-12 DIAGNOSIS — G47.30 SLEEP APNEA, UNSPECIFIED TYPE: ICD-10-CM

## 2019-10-12 DIAGNOSIS — I10 ELEVATED BLOOD PRESSURE READING WITH DIAGNOSIS OF HYPERTENSION: ICD-10-CM

## 2019-10-12 PROCEDURE — 99214 OFFICE O/P EST MOD 30 MIN: CPT | Performed by: PHYSICIAN ASSISTANT

## 2019-10-12 RX ORDER — DOXYCYCLINE HYCLATE 100 MG
100 TABLET ORAL 2 TIMES DAILY
Qty: 20 TABLET | Refills: 0 | Status: SHIPPED | OUTPATIENT
Start: 2019-10-12 | End: 2019-11-15

## 2019-10-12 NOTE — PROGRESS NOTES
S: 73 yo female is here for cough for 2 weeks.  Does have a history of asthma.  Saw her allergist/pulmonologist just.  Was given prednisone for 5 days which helped while she is on it.  Yesterday she did get yellow productive sputum.  She has nasal congestion for which she has been using a Jennifer pot.  She is tried [place] plus cold medication.  She uses Asmanex and albuterol inhalers.  No fever.  No rash.      Allergies   Allergen Reactions     Adhesive Tape        Past Medical History:   Diagnosis Date     CKD (chronic kidney disease) stage 3, GFR 30-59 ml/min (H)      Diagnostic skin and sensitization tests 4/5/12     RAST pos. only to cat mildly     DJD (degenerative joint disease) 10/21/2005     Eczema, unspecified type 4/5/2018     Elevated parathyroid hormone 6/18/2019     Hepatitis B childhood     resolved, patient is not a carrier      HTN (hypertension)      Hyperlipidemia 11/27/2012     Hypertension goal BP (blood pressure) < 140/90      Lumbar spinal stenosis 7/17/2017     Mild persistent asthma without complication 5/30/2017     Morbid obesity due to excess calories (H) 11/23/2015    Surgical referral declined '16      FLOR (obstructive sleep apnea)      Shingles 2014    scalp     Stasis dermatitis of both legs 4/5/2018     Vitamin D deficiency        albuterol (PROAIR HFA/PROVENTIL HFA/VENTOLIN HFA) 108 (90 Base) MCG/ACT inhaler, Inhale 1-2 puffs into the lungs every 4 hours as needed for shortness of breath / dyspnea  atorvastatin (LIPITOR) 40 MG tablet, Take 1 tablet (40 mg) by mouth daily  beclomethasone HFA (QVAR REDIHALER) 80 MCG/ACT inhaler, Inhale 2 puffs into the lungs 2 times daily  Cholecalciferol (VITAMIN D) 2000 UNITS tablet, Take 2,000 Units by mouth daily  fluticasone (ARNUITY ELLIPTA) 200 MCG/ACT inhaler, Inhale 1 puff into the lungs daily  fluticasone (FLONASE) 50 MCG/ACT spray, Spray 2 sprays into both nostrils daily  Hypertonic Nasal Wash (SINUS RINSE BOTTLE KIT) PACK, [place] 1  Bottle in nostril daily as needed. (Patient not taking: Reported on 2019)  loratadine (CLARITIN) 10 MG tablet, Take 10 mg by mouth as needed   mometasone (ELOCON) 0.1 % cream, Apply to affected area on legs twice daily as needed (Patient not taking: Reported on 2019)  mometasone furoate (ASMANEX HFA) 200 MCG/ACT inhaler, Inhale 2 puffs into the lungs 2 times daily  olmesartan-hydrochlorothiazide (BENICAR) 20-12.5 MG tablet, Take 1 tablet by mouth daily  order for DME, Equipment being ordered: Auto CPAP 11-18  [] predniSONE (DELTASONE) 10 MG tablet, Take 2 tablets (20 mg) by mouth 2 times daily for 5 days  ranitidine (ZANTAC) 300 MG tablet, Take 1 tablet (300 mg) by mouth At Bedtime  vitamin D3 (CHOLECALCIFEROL) 2000 units (50 mcg) tablet, Take 1 tablet (2,000 Units) by mouth daily    No current facility-administered medications on file prior to visit.       Social History     Tobacco Use     Smoking status: Never Smoker     Smokeless tobacco: Never Used   Substance Use Topics     Alcohol use: Yes     Alcohol/week: 0.0 standard drinks     Comment: very rare         ROS:  CONSTITUTIONAL: Negative for fatigue or fever.  EYES: Negative for eye problems.  ENT: As above.  RESP: As above.  CV: Negative for chest pains.  GI: Negative for vomiting.  MUSCULOSKELETAL:  Negative for significant muscle or joint pains.  NEUROLOGIC: Negative for headaches.  SKIN: Negative for rash.  PSYCH: Normal mentation for age.    OBJECTIVE:  BP (!) 173/81 (BP Location: Right arm, Patient Position: Sitting, Cuff Size: Adult Regular)   Pulse 82   Temp 98.1  F (36.7  C) (Oral)   Resp 20   Wt (!) 150.3 kg (331 lb 6.4 oz)   SpO2 95%   BMI 51.90 kg/m      Recheck blood pressure 150/85  GENERAL APPEARANCE: Healthy, alert and no distress.  EYES:Conjunctiva/sclera clear.  EARS: No cerumen.   Ear canals w/o erythema.  TM's intact w/o erythema.    NOSE/MOUTH: Nose without ulcers, erythema or lesions.  SINUSES: No maxillary sinus  tenderness.  THROAT: No erythema w/o tonsillar enlargement . No exudates.  NECK: Supple, nontender, no lymphadenopathy.  RESP: Lungs with a few expiratory wheezes in the bases.    CV: Regular rate and rhythm, normal S1 S2, no murmur noted.  NEURO: Awake, alert    SKIN: No rashes        ASSESSMENT:     ICD-10-CM    1. Acute bronchitis with symptoms > 10 days J20.9 doxycycline hyclate (VIBRA-TABS) 100 MG tablet   2. Exacerbation of asthma, unspecified asthma severity, unspecified whether persistent J45.901 order for DME   3. Sleep apnea, unspecified type G47.30 order for DME   4. Elevated blood pressure reading with diagnosis of hypertension I10            PLAN: Offered to put her on her longer course of prednisone but she declines at this time.  She would like to try antibiotic.  She will follow-up next week.  Of interest she also has a sleep apnea machine but the mask is ripped and she feels this is coming into play.  She is given a prescription for a new mask for her sleep apnea machine.  I have discussed clinical findings with patient. Chelo to follow up with Primary Care provider regarding elevated blood pressure.    Side effects of medications discussed.  Symptomatic care is discussed.  I have discussed the possibility of  worsening symptoms and to RTC or ER if they occur.  All questions are answered and patient is in agreement with plan.   Patient care instructions are given to at the end of visit.   Lots of rest and fluids.    Daxa Brooks PA-C

## 2019-10-14 NOTE — TELEPHONE ENCOUNTER
Called and spoke with patient. Patient reports that she went in to Urgent Care yesterday and has started antibiotics. No further action needed.    Nimisha Robles RN

## 2019-10-28 ENCOUNTER — TRANSFERRED RECORDS (OUTPATIENT)
Dept: HEALTH INFORMATION MANAGEMENT | Facility: CLINIC | Age: 74
End: 2019-10-28

## 2019-10-29 PROBLEM — I26.99 PULMONARY EMBOLI (H): Status: ACTIVE | Noted: 2019-10-28

## 2019-10-29 LAB
CREAT SERPL-MCNC: 1.1 MG/DL (ref 0.57–1.11)
EJECTION FRACTION: NORMAL %
GFR SERPL CREATININE-BSD FRML MDRD: 49 ML/MIN/1.73M2
GLUCOSE SERPL-MCNC: 134 MG/DL (ref 65–100)

## 2019-10-31 LAB
INR PPP: 1.2
POTASSIUM SERPL-SCNC: 4 MMOL/L (ref 3.5–5)

## 2019-11-03 ENCOUNTER — NURSE TRIAGE (OUTPATIENT)
Dept: NURSING | Facility: CLINIC | Age: 74
End: 2019-11-03

## 2019-11-03 NOTE — TELEPHONE ENCOUNTER
Lorraine RN with [facility] is calling and states that Chelo was in [facility] 28th-31st with PE and discharged with therapy and INR checks.  Phone number was not valid from discharge papers.  Chelo has contacted Home Care to begin services.  Today Kelsey GILL is requesting to speak with on call MD.  LINDSEY transferred Kelsey through to page  for [place].

## 2019-11-04 ENCOUNTER — TELEPHONE (OUTPATIENT)
Dept: FAMILY MEDICINE | Facility: CLINIC | Age: 74
End: 2019-11-04

## 2019-11-04 ENCOUNTER — DOCUMENTATION ONLY (OUTPATIENT)
Dept: CARE COORDINATION | Facility: CLINIC | Age: 74
End: 2019-11-04

## 2019-11-04 DIAGNOSIS — I82.409 DEEP VEIN THROMBOSIS (DVT) (H): ICD-10-CM

## 2019-11-04 DIAGNOSIS — I26.99 PULMONARY EMBOLI (H): ICD-10-CM

## 2019-11-04 DIAGNOSIS — I26.99 PULMONARY EMBOLI (H): Primary | ICD-10-CM

## 2019-11-04 DIAGNOSIS — Z79.01 LONG TERM CURRENT USE OF ANTICOAGULANT THERAPY: ICD-10-CM

## 2019-11-04 DIAGNOSIS — I26.99 ACUTE PULMONARY EMBOLISM WITHOUT ACUTE COR PULMONALE, UNSPECIFIED PULMONARY EMBOLISM TYPE (H): ICD-10-CM

## 2019-11-04 LAB — INR PPP: 2.3 (ref 0.8–1.1)

## 2019-11-04 NOTE — TELEPHONE ENCOUNTER
To PCP:  Patient discharged from [facility] on 10/31 on Coumadin and Lovenox for new Dx of PE/DVT  Patient currently has Home care and INR today therapeutic at 2.3 today.  New INR Clinic referral requested.  Please see pended referral.  Thank you.  Berenice Poole, RN  Anticoagulation Nurse - [facility]

## 2019-11-04 NOTE — TELEPHONE ENCOUNTER
ANTICOAGULATION MANAGEMENT     Patient Name:  Chelo Brooks  Date:  2019    ASSESSMENT /SUBJECTIVE:      Today's INR result of 2.3 is therapeutic. Goal INR of 2.0-3.0      Warfarin dose taken: Warfarin taken as previously instructed    Diet: No new diet changes affecting INR    Medication changes/ interactions: No new medications/supplements affecting INR    Previous INR: Subtherapeutic     S/S of bleeding or thromboembolism: No    New injury or illness:  Yes: New diagnosis of PE    Upcoming surgery, procedure or cardioversion:  No    Additional findings: Discharged from [facility] on 10/31 with dx of PE      PLAN:    Spoke with Delma [facility] Nurse,  regarding INR result and instructed:     Warfarin Dosing Instructions: Change your warfarin dose to 5 mg daily    Instructed patient to follow up no later than: 3 days; home care    Education provided: No      Nurse Delma verbalizes understanding and agrees to warfarin dosing plan. Can hold Lovenox as INR 2.3.    Instructed to call the Anticoagulation Clinic for any changes, questions or concerns. (#[phone])        OBJECTIVE:  INR   Date Value Ref Range Status   2019 2.3 (A) 0.8 - 1.1 Final             Anticoagulation Summary  As of 2019    INR goal:   2.0-3.0   TTR:   --   INR used for dosin.3 (2019)   Warfarin maintenance plan:   5 mg (5 mg x 1) every day   Full warfarin instructions:   5 mg every day   Weekly warfarin total:   35 mg   Plan last modified:   Berenice Poole RN (2019)   Next INR check:   2019   Priority:   INR   Target end date:       Indications    Pulmonary emboli (H) [I26.99]             Anticoagulation Episode Summary     INR check location:       Preferred lab:   EXTERNAL LAB    Send INR reminders to:   CASTILLO DOMINIQUE    Comments:   New Start for PE/Home Care      Anticoagulation Care Providers     Provider Role Specialty Phone number    Vita Zavala MD Responsible Family Practice  [phone]

## 2019-11-04 NOTE — TELEPHONE ENCOUNTER
INR Clinic referral completed by PCP.  Berenice Poole, RN  Anticoagulation Nurse - [facility]

## 2019-11-04 NOTE — TELEPHONE ENCOUNTER
New INR Clinic Referral sent to PCP for signature.  Berenice Poole, RN  Anticoagulation Nurse - [facility] [place]

## 2019-11-04 NOTE — PROGRESS NOTES
[facility] now requests orders and shares plan of care/discharge summaries for some patients through [email or web address].  Please REPLY TO THIS MESSAGE OR ROUTE BACK TO THE AUTHOR in order to give authorization for orders when needed.  This is considered a verbal order, you will still receive a faxed copy of orders for signature.  Thank you for your assistance in improving collaboration for our patients.    ORDER    SN 1 week 1, 2 week 2, 1 week 1, 3 PRN for respiratory assessment, medication teaching and assess effects, disease and symptom management, pain mangement, INR checks.   PT to evaluate and treat to include strengthening, balance, ambulation, and HEP.     Delma Summers, BRIDGET Admission Clinician  [facility]  [phone]

## 2019-11-07 ENCOUNTER — TELEPHONE (OUTPATIENT)
Dept: FAMILY MEDICINE | Facility: CLINIC | Age: 74
End: 2019-11-07

## 2019-11-07 DIAGNOSIS — I26.99 PULMONARY EMBOLI (H): ICD-10-CM

## 2019-11-07 DIAGNOSIS — I82.409 DEEP VEIN THROMBOSIS (DVT) (H): ICD-10-CM

## 2019-11-07 LAB — INR PPP: 2.5

## 2019-11-07 NOTE — TELEPHONE ENCOUNTER
ACT completed on 8/12/19.  Karen Javed CMA      
Called and left message for patient to return call to Team Cristina.  Shauna Hernández CMA    
Called and spoke with patient and gave information.   Patient states she in interested in seeing an allergist again. Referral placed.   Patient states she will call back to schedule an appointment because she is busy right now and doesn't know when she'll have time.    Ksenia Irby RN  
Left message for patient to call RN hotline [phone].     Ksenia Irby RN  
MA check for questionnaire and call patient.  Paola Evans,     
Mailed Asthma letter and questionnaire to patient.  Paola Evans,     
Patient returned call regarding ACT. She stated she had a very poor score and would like to know what the next step is. Please advise.  Laurie CHAVIRA CMA ([facility])  
Patient seen on 06/18/19, failed act with a score of 18. Please initiate failed act work flow. Ira Nolan MA    
She should follow-up with me or her allergist Dr. Wallace to discuss going back on Qvar or a similar medication  Vita Zavala MD    
yes

## 2019-11-07 NOTE — TELEPHONE ENCOUNTER
ANTICOAGULATION MANAGEMENT     Patient Name:  Chelo Brooks  Date:  11/7/2019    ASSESSMENT /SUBJECTIVE:      Today's INR result of 2.5 is therapeutic. Goal INR of 2.0-3.0      Warfarin dose taken: Warfarin taken as previously instructed    Diet: No new diet changes affecting INR    Medication changes/ interactions: No new medications/supplements affecting INR    Previous INR: Therapeutic     S/S of bleeding or thromboembolism: No    New injury or illness:  No    Upcoming surgery, procedure or cardioversion:  No  Additional findings: Small bruises at lovenox injection sites and IV sites but all are resolving.    PLAN:    Spoke with Tianna RN regarding INR result and instructed:     Warfarin Dosing Instructions: Continue your current warfarin dose    Instructed patient to follow up no later than: 11/11/19    Education provided: Discussed where to follow up after homecare discharge. Educated regarding signs and symptoms of bleeding and keep an eye out for skin changes during initiation of warfarin.      Tianna RN verbalizes understanding and agrees to warfarin dosing plan.    Instructed to call the Anticoagulation Clinic for any changes, questions or concerns. (#[phone])        OBJECTIVE:  INR   Date Value Ref Range Status   11/04/2019 2.3 (A) 0.8 - 1.1 Final             Anticoagulation Summary  As of 11/7/2019    INR goal:   2.0-3.0   TTR:   --   INR used for dosing:      Warfarin maintenance plan:   5 mg (5 mg x 1) every day   Full warfarin instructions:   5 mg every day   Weekly warfarin total:   35 mg   Plan last modified:   Berenice Poole RN (11/4/2019)   Next INR check:      Target end date:       Indications    Pulmonary emboli (H) [I26.99]  Deep vein thrombosis (DVT) (H) [I82.409]             Anticoagulation Episode Summary     INR check location:       Preferred lab:   EXTERNAL LAB    Send INR reminders to:   CASTILLO DOMINIQUE    Comments:   New Start for PE/Home Care      Anticoagulation Care  Providers     Provider Role Specialty Phone number    iVta Zavlaa MD [facility] [phone]

## 2019-11-08 ENCOUNTER — TELEPHONE (OUTPATIENT)
Dept: FAMILY MEDICINE | Facility: CLINIC | Age: 74
End: 2019-11-08

## 2019-11-08 NOTE — TELEPHONE ENCOUNTER
Called and spoke with Paris from [facility] and gave verbal OK for orders below.     Ksenia Irby RN

## 2019-11-08 NOTE — TELEPHONE ENCOUNTER
Reason for call:  Order   Order or referral being requested: physical therapy, 2 times a week for 1 week.   Reason for request: Gait training, transfer training, instruction in home exersise program, and falls prevention plan.   Date needed: as soon as possible  Has the patient been seen by the PCP for this problem? YES    Additional comments: na    Phone number to reach patient:  Other phone number:     [phone]  Best Time:  any    Can we leave a detailed message on this number?  YES

## 2019-11-11 ENCOUNTER — TELEPHONE (OUTPATIENT)
Dept: FAMILY MEDICINE | Facility: CLINIC | Age: 74
End: 2019-11-11

## 2019-11-11 DIAGNOSIS — I26.99 PULMONARY EMBOLI (H): ICD-10-CM

## 2019-11-11 DIAGNOSIS — I82.409 DEEP VEIN THROMBOSIS (DVT) (H): ICD-10-CM

## 2019-11-11 LAB — INR PPP: 2.6 (ref 0.8–1.1)

## 2019-11-11 NOTE — TELEPHONE ENCOUNTER
ANTICOAGULATION MANAGEMENT     Patient Name:  Chelo Brooks  Date:  2019    ASSESSMENT /SUBJECTIVE:      Today's INR result of 2.6 is therapeutic. Goal INR of 2.0-3.0      Warfarin dose taken: Warfarin taken as previously instructed    Diet: No new diet changes affecting INR    Medication changes/ interactions: No new medications/supplements affecting INR    Previous INR: Therapeutic     S/S of bleeding or thromboembolism: No    New injury or illness:  No    Upcoming surgery, procedure or cardioversion:  No    Additional findings: None      PLAN:    Spoke with [facility] Nurse regarding INR result and instructed:     Warfarin Dosing Instructions: Continue your current warfarin dose    Instructed patient to follow up no later than: 4 days; home care     Education provided: No      Tianna [facility] Nurse verbalizes understanding and agrees to warfarin dosing plan.    Instructed to call the Anticoagulation Clinic for any changes, questions or concerns. (#[phone])        OBJECTIVE:  INR   Date Value Ref Range Status   2019 2.6 (A) 0.8 - 1.1 Final             Anticoagulation Summary  As of 2019    INR goal:   2.0-3.0   TTR:   --   INR used for dosin.6 (2019)   Warfarin maintenance plan:   5 mg (5 mg x 1) every day   Full warfarin instructions:   5 mg every day   Weekly warfarin total:   35 mg   No change documented:   Berenice Poole RN   Plan last modified:   Berenice Poole RN (2019)   Next INR check:   11/15/2019   Priority:   INR   Target end date:       Indications    Pulmonary emboli (H) [I26.99]  Deep vein thrombosis (DVT) (H) [I82.409]             Anticoagulation Episode Summary     INR check location:       Preferred lab:   EXTERNAL LAB    Send INR reminders to:   CASTILLO DOMINIQUE    Comments:   New Start for PE/Home Care      Anticoagulation Care Providers     Provider Role Specialty Phone number    AudrajovanyVita MD [facility] [phone]

## 2019-11-11 NOTE — TELEPHONE ENCOUNTER
Per HC nurse when taking patients vitals she noticed an irregular heartbeat. Patient does not feel any different but has occasional dizziness, no history of A-fib.  She will call back with vitals.   Cielo Paige RN

## 2019-11-11 NOTE — TELEPHONE ENCOUNTER
Reason for call:  Other   Patient called regarding (reason for call): call back  Additional comments: she is having an irregular heart beat. She has 1 more home pt visit.  Beat, then stop.  She is not doing anything different. Does get dizzy once in a while. She is on coumadin.     Phone number to reach patient:  Home number on file [phone] (home)    Best Time:   Any     Can we leave a detailed message on this number?  YES

## 2019-11-12 NOTE — TELEPHONE ENCOUNTER
Per Tianna, patient's BP uwr326/72, HR, 70, RR 16, SpO2 was 93% on RA  She did not get a temp as patient was drinking fluids prior to VS check, then forgot to check before the end of the visit  Tianna noticed skipped heartbeats  Patient denies feeling skipped beats or palpitations  Does report feeling dizzy occasionally but is fine otherwise    Was recently in the hospital for PE and DVT and was put on coumadin  Currently on Coumadin 5mg daily   INR today was 2.6  HC will be discharging patient on Friday after checking her INR then patient is to f/u with INR clinic  Patient told HC nurse that she will call on her own to set up a hospital f/u with PCP     If any new orders before patient is discharged from HC, please call Tianna back    Luis Angel Aldridge RN

## 2019-11-13 ENCOUNTER — TELEPHONE (OUTPATIENT)
Dept: FAMILY MEDICINE | Facility: CLINIC | Age: 74
End: 2019-11-13

## 2019-11-13 DIAGNOSIS — I26.99 PULMONARY EMBOLI (H): Primary | ICD-10-CM

## 2019-11-13 DIAGNOSIS — I87.2 STASIS DERMATITIS OF BOTH LEGS: ICD-10-CM

## 2019-11-13 DIAGNOSIS — I82.409 DEEP VEIN THROMBOSIS (DVT) (H): ICD-10-CM

## 2019-11-13 NOTE — TELEPHONE ENCOUNTER
Reason for Call:  Form, our goal is to have forms completed with 72 hours, however, some forms may require a visit or additional information.    Type of letter, form or note:  Home Health Certification    Who is the form from?: Home care    Where did the form come from: form was faxed in    What clinic location was the form placed at?: [facility]    Where the form was placed: Given to MA/RN    What number is listed as a contact on the form?: [phone]        Paola Krishna  Team [employer],       Call taken on 11/13/2019 at 10:58 AM by Paola Krishna

## 2019-11-13 NOTE — TELEPHONE ENCOUNTER
Called and spoke with [facility]. Reports patient's nurse is Tianna but she is not in office today. Cell # [phone].    Left message for Tianna to call RN hotline [phone].      Reviewed [facility] med list and reconciled against Epic med list    Discrepancies noted-    Listed on , not on Epic  Sennoside 17.2 mg tablet - take 1 tablet BID PRN- not using  Warfarin 5 mg tablet - take 1 tablet daily- is taking per INR results for PE and DVT- added to epic  Potassium chloride ER 20 mEq tablet - take 1 tablet daily- not taking  Furosemide 20 mg tablet, take 1 tablet daily- not taking  Lovenox 150 mg/ml subcutaneous injection - inject 1 syringe q12h- not taking    Listed on Epic, not on   Beclomethasone HFA (Qvar) 80 mcg/act inhaler - inhale 2 puffs BID- not taking. Was too expensive. Removed from epic. Is on Asmanex  Fluticasone (Arnuity) 200 mcg/act inhaler - inhale 1 puff daily- not taking. Removed from [facility]. Is on Asmanex    Dose Discrepancy  Loratadine (Claritin) 10 mg tablet - take 1 tablet PRN, listed on Epic  Loratadine (Claritin) 10 mg tablet - take 1 tablet daily, listed on HC- patient is taking daily. Epic updated    Atorvastatin 40 mg tablet - take 1 tablet daily, listed on Epic  Atorvastatin 40 mg tablet - take 1 tablet every other day, listed on HC- is taking every other day- is it ok to change to this on Epic    Flonase 50 mcg/act spray - spray 2 sprays into both nostrils daily, listed on Epic  Flonase 50 mcg/act spray - spray 1 spray every day PRN, listed on HC- is using 1 spray into each nostril daily PRN- is it ok to change to this on Epic?    Is on Asmanex but only taking this BID PRN- is it ok to change to this on Epic?    Ksenia Irby RN

## 2019-11-15 ENCOUNTER — TELEPHONE (OUTPATIENT)
Dept: FAMILY MEDICINE | Facility: CLINIC | Age: 74
End: 2019-11-15

## 2019-11-15 DIAGNOSIS — I26.99 PULMONARY EMBOLI (H): ICD-10-CM

## 2019-11-15 DIAGNOSIS — I82.409 DEEP VEIN THROMBOSIS (DVT) (H): ICD-10-CM

## 2019-11-15 LAB — INR PPP: 2.9 (ref 0.8–1.1)

## 2019-11-15 RX ORDER — WARFARIN SODIUM 5 MG/1
5 TABLET ORAL DAILY
COMMUNITY
Start: 2019-11-15 | End: 2019-12-03

## 2019-11-15 RX ORDER — MOMETASONE FUROATE 1 MG/G
CREAM TOPICAL
COMMUNITY
Start: 2019-11-15 | End: 2020-07-20

## 2019-11-15 RX ORDER — LORATADINE 10 MG/1
10 TABLET ORAL DAILY
COMMUNITY
Start: 2019-11-15

## 2019-11-15 NOTE — TELEPHONE ENCOUNTER
Called patient she is not sure why she takes Atorvastatin every other day.  She decided to because she heard that this is a high dose of cholesterol medication for someone her age so she decided to take it every other day.  She will discuss this further with provider at visit in Dec.  She will start taking asmanex and flonase as ordered.   HC form updated so medications match Epic orders- placed in provider folder for signature.  Cielo Paige RN

## 2019-11-15 NOTE — TELEPHONE ENCOUNTER
Tianna called back  See her answers in BLUE    See questions for provider in RED  Patient is being discharged from  today    Luis Angel Aldridge RN

## 2019-11-15 NOTE — TELEPHONE ENCOUNTER
No - she should continue prescriptions as prescribed and follow-up for side effects or questions about this     Vita Zavala MD

## 2019-11-15 NOTE — TELEPHONE ENCOUNTER
ANTICOAGULATION MANAGEMENT     Patient Name:  Chelo Brooks  Date:  11/15/2019    ASSESSMENT /SUBJECTIVE:      Today's INR result of 2.9 is therapeutic. Goal INR of 2.0-3.0      Warfarin dose taken: Warfarin taken as previously instructed    Diet: No new diet changes affecting INR    Medication changes/ interactions: No new medications/supplements affecting INR    Previous INR: Therapeutic     S/S of bleeding or thromboembolism: No    New injury or illness:  No    Upcoming surgery, procedure or cardioversion:  No    Additional findings: Discharged from Home Care today; next INR in clinic      PLAN:    Spoke with Tianna/[facility] Nurse regarding INR result and instructed:     Warfarin Dosing Instructions: Continue your current warfarin dose    Instructed patient to follow up no later than:  10 days; Clinic INR    Education provided: No      Tianna, Home Care Nurse verbalizes understanding and agrees to warfarin dosing plan.    Instructed to call the Anticoagulation Clinic for any changes, questions or concerns. (#[phone])        OBJECTIVE:  INR   Date Value Ref Range Status   11/15/2019 2.9 (A) 0.8 - 1.1 Final             Anticoagulation Summary  As of 11/15/2019    INR goal:   2.0-3.0   TTR:   100.0 % (1 d)   INR used for dosin.9 (11/15/2019)   Warfarin maintenance plan:   5 mg (5 mg x 1) every day   Full warfarin instructions:   5 mg every day   Weekly warfarin total:   35 mg   No change documented:   Berenice Poole RN   Plan last modified:   Berenice Poole RN (2019)   Next INR check:   2019   Priority:   Maintenance   Target end date:       Indications    Pulmonary emboli (H) [I26.99]  Deep vein thrombosis (DVT) (H) [I82.409]             Anticoagulation Episode Summary     INR check location:   Anticoagulation Clinic    Preferred lab:   [facility]    Send INR reminders to:   CASTILLO RIVERA    Comments:   Discharged from Home Care 11/15/2019      Anticoagulation Care  Providers     Provider Role Specialty Phone number    Vita Zavala MD [facility] [phone]

## 2019-11-18 ENCOUNTER — MEDICAL CORRESPONDENCE (OUTPATIENT)
Dept: HEALTH INFORMATION MANAGEMENT | Facility: CLINIC | Age: 74
End: 2019-11-18

## 2019-11-18 DIAGNOSIS — Z53.9 DIAGNOSIS NOT YET DEFINED: Primary | ICD-10-CM

## 2019-11-18 PROCEDURE — G0180 MD CERTIFICATION HHA PATIENT: HCPCS | Performed by: FAMILY MEDICINE

## 2019-11-27 ENCOUNTER — ANTICOAGULATION THERAPY VISIT (OUTPATIENT)
Dept: NURSING | Facility: CLINIC | Age: 74
End: 2019-11-27
Payer: COMMERCIAL

## 2019-11-27 DIAGNOSIS — I26.99 PULMONARY EMBOLI (H): ICD-10-CM

## 2019-11-27 DIAGNOSIS — I82.409 DEEP VEIN THROMBOSIS (DVT) (H): ICD-10-CM

## 2019-11-27 LAB — INR POINT OF CARE: 3.1 (ref 0.86–1.14)

## 2019-11-27 PROCEDURE — 36416 COLLJ CAPILLARY BLOOD SPEC: CPT

## 2019-11-27 PROCEDURE — 99207 ZZC NO CHARGE NURSE ONLY: CPT

## 2019-11-27 PROCEDURE — 85610 PROTHROMBIN TIME: CPT | Mod: QW

## 2019-11-27 NOTE — PROGRESS NOTES
ANTICOAGULATION FOLLOW-UP CLINIC VISIT    Patient Name:  Chelo Brooks  Date:  11/27/2019  Contact Type:  Face to Face    SUBJECTIVE:  Patient Findings     Positives:   Change in medications (Took Advil one day this past week. )        Clinical Outcomes     Negatives:   Major bleeding event, Thromboembolic event, Anticoagulation-related hospital admission, Anticoagulation-related ED visit, Anticoagulation-related fatality           OBJECTIVE    INR Protime   Date Value Ref Range Status   11/27/2019 3.1 (A) 0.86 - 1.14 Final       ASSESSMENT / PLAN  No question data found.  Anticoagulation Summary  As of 11/27/2019    INR goal:   2.0-3.0   TTR:   53.8 % (1.9 wk)   INR used for dosing:   3.1! (11/27/2019)   Warfarin maintenance plan:   5 mg (5 mg x 1) every day   Full warfarin instructions:   5 mg every day   Weekly warfarin total:   35 mg   No change documented:   Meghan Devine RN   Plan last modified:   Berenice Poole RN (11/4/2019)   Next INR check:   12/11/2019   Priority:   High   Target end date:   Indefinite    Indications    Pulmonary emboli (H) [I26.99]  Deep vein thrombosis (DVT) (H) [I82.409]             Anticoagulation Episode Summary     INR check location:   Anticoagulation Clinic    Preferred lab:   [facility]    Send INR reminders to:   CASTILLO RIVERA    Comments:   Takes warfarin in the morning      Anticoagulation Care Providers     Provider Role Specialty Phone number    SallyVita MD [facility] [phone]            See the Encounter Report to view Anticoagulation Flowsheet and Dosing Calendar (Go to Encounters tab in chart review, and find the Anticoagulation Therapy Visit)        Meghan Devine RN

## 2019-11-27 NOTE — PATIENT INSTRUCTIONS
Anticoagulation Clinic:  Please call [phone] with any problems or questions regarding your Warfarin therapy.

## 2019-12-03 ENCOUNTER — OFFICE VISIT (OUTPATIENT)
Dept: FAMILY MEDICINE | Facility: CLINIC | Age: 74
End: 2019-12-03
Payer: COMMERCIAL

## 2019-12-03 VITALS
TEMPERATURE: 98.3 F | WEIGHT: 293 LBS | RESPIRATION RATE: 24 BRPM | HEART RATE: 89 BPM | HEIGHT: 67 IN | BODY MASS INDEX: 45.99 KG/M2 | OXYGEN SATURATION: 95 % | DIASTOLIC BLOOD PRESSURE: 76 MMHG | SYSTOLIC BLOOD PRESSURE: 118 MMHG

## 2019-12-03 DIAGNOSIS — N18.30 CKD (CHRONIC KIDNEY DISEASE) STAGE 3, GFR 30-59 ML/MIN (H): ICD-10-CM

## 2019-12-03 DIAGNOSIS — I12.9 RENAL HYPERTENSION: ICD-10-CM

## 2019-12-03 DIAGNOSIS — Z23 NEED FOR PROPHYLACTIC VACCINATION AND INOCULATION AGAINST INFLUENZA: ICD-10-CM

## 2019-12-03 DIAGNOSIS — I26.99 ACUTE PULMONARY EMBOLISM, UNSPECIFIED PULMONARY EMBOLISM TYPE, UNSPECIFIED WHETHER ACUTE COR PULMONALE PRESENT (H): Primary | ICD-10-CM

## 2019-12-03 DIAGNOSIS — E04.1 THYROID NODULE: ICD-10-CM

## 2019-12-03 DIAGNOSIS — R79.89 ELEVATED PARATHYROID HORMONE: ICD-10-CM

## 2019-12-03 DIAGNOSIS — E66.01 MORBID OBESITY DUE TO EXCESS CALORIES (H): ICD-10-CM

## 2019-12-03 DIAGNOSIS — E27.9 ADRENAL NODULE (H): ICD-10-CM

## 2019-12-03 DIAGNOSIS — K80.20 GALLSTONES: ICD-10-CM

## 2019-12-03 DIAGNOSIS — E04.9 THYROID ENLARGEMENT: ICD-10-CM

## 2019-12-03 PROBLEM — I82.409 DEEP VEIN THROMBOSIS (DVT) (H): Status: RESOLVED | Noted: 2019-11-04 | Resolved: 2019-12-03

## 2019-12-03 PROCEDURE — 99214 OFFICE O/P EST MOD 30 MIN: CPT | Mod: 25 | Performed by: FAMILY MEDICINE

## 2019-12-03 PROCEDURE — 90662 IIV NO PRSV INCREASED AG IM: CPT | Performed by: FAMILY MEDICINE

## 2019-12-03 PROCEDURE — G0008 ADMIN INFLUENZA VIRUS VAC: HCPCS | Performed by: FAMILY MEDICINE

## 2019-12-03 RX ORDER — WARFARIN SODIUM 5 MG/1
5 TABLET ORAL DAILY
Qty: 30 TABLET | Refills: 4 | Status: SHIPPED | OUTPATIENT
Start: 2019-12-03 | End: 2020-01-09

## 2019-12-03 ASSESSMENT — MIFFLIN-ST. JEOR: SCORE: 2057.64

## 2019-12-03 NOTE — PATIENT INSTRUCTIONS
Call the imaging center at [phone] or  [phone] to schedule your thyroid ultrasound.    It is recommended that you get a vaccination for shingles called Shingrix (given as 2 shots, 2 to 6 months apart), even if you have already had the Zostavax vaccine. Discuss getting the Shingix vaccine from your pharmacist, or schedule an ancillary shot visit here. Some insurances do not cover the cost of these vaccines.      I recommend a CT scan of your adrenal gland in 1 year.

## 2019-12-03 NOTE — PROGRESS NOTES
Subjective     Chelo Brooks is a 74 year old female who presents to clinic today for the following health issues:    HPI     Hospital Follow-up Visit:    Hospital/Nursing Home/IP Rehab Facility: [facility]  Date of Admission: 10-28-19  Date of Discharge: 10-31-19  Reason(s) for Admission: Bilateral pulmonary embolism            Problems taking medications regularly:  None       Medication changes since discharge: None       Problems adhering to non-medication therapy:  None     Summary of hospitalization:  CareEverywhere information obtained and reviewed  Diagnostic Tests/Treatments reviewed.  Follow up needed: INR  Other Healthcare Providers Involved in Patient s Care:         None  Update since discharge: improved.      Post Discharge Medication Reconciliation: discharge medications reconciled, continue medications without change.  Plan of care communicated with patient     Coding guidelines for this visit:  Type of Medical   Decision Making Face-to-Face Visit       within 7 Days of discharge Face-to-Face Visit        within 14 days of discharge   Moderate Complexity [id number]   High Complexity [id number]          Chelo Brooks is a 74 year old female who presents with follow-up of hospitalization for acute PE/DVT. Symptom onset of exertional dyspnea has been sudden, improving for a time period of 6 weeks. Severity is described as mild. Course of her symptoms over time is improving. She endorses chronic bilateral lower extremity edema without leg pain.     Reviewed and updated as needed this visit by Provider  Tobacco  Allergies  Meds  Problems  Med Hx  Surg Hx  Fam Hx         Review of Systems   ROS COMP: CONSTITUTIONAL: NEGATIVE for fever, chills, change in weight  INTEGUMENTARY/SKIN: stasis dermatitis   ENT/MOUTH: NEGATIVE for ear, mouth and throat problems  RESP: NEGATIVE for significant cough or SOB  CV: NEGATIVE for chest pain/chest pressure, dyspnea on exertion, orthopnea, palpitations  "and paroxysmal nocturnal dyspnea  MUSCULOSKELETAL:arthralgias    HEME: NEGATIVE for bleeding problems      Objective    /76   Pulse 89   Temp 98.3  F (36.8  C) (Oral)   Resp 24   Ht 1.702 m (5' 7\")   Wt (!) 151.5 kg (334 lb)   SpO2 95%   Breastfeeding No   BMI 52.31 kg/m    Body mass index is 52.31 kg/m .  Physical Exam   GENERAL: alert, no distress and obese  NECK: no adenopathy, no asymmetry, masses, or scars and thyroid normal to palpation  RESP: lungs clear to auscultation - no rales, rhonchi or wheezes  CV: regular rates and rhythm, normal S1 S2, no S3 or S4, no murmur, click or rub and 2+ bilateral lower extremity pitting edema to proximal calves     MS:  no gross deformities noted  SKIN: hyperpigmentation of bilateral calves without ulcers   PSYCH: mentation appears normal, affect normal/bright    Diagnostic Test Results:  Labs reviewed in Epic  No results found for any visits on 12/03/19.        Assessment & Plan     (I26.99) Acute pulmonary embolism, unspecified pulmonary embolism type, unspecified whether acute cor pulmonale present (H)  (primary encounter diagnosis)  Plan: warfarin ANTICOAGULANT (COUMADIN) 5 MG tablet        Plan to complete 6 month course of anticoagulation (month 2/6)     (N18.3) CKD (chronic kidney disease) stage 3, GFR 30-59 ml/min (H)  (I12.9) Renal hypertension  (E66.01) Morbid obesity due to excess calories (H)  Comment: Well controlled with medications without side effects.   Plan: follow-up for UMAR     (E04.9) Thyroid enlargement  Plan: US Thyroid          (E27.9) Adrenal nodule (H)  Plan: plan CT in 1 year     (K80.20) Gallstones  Comment: asymptomatic     (E34.9) Elevated parathyroid hormone  Plan: follow-up for labs      BMI:   Estimated body mass index is 52.31 kg/m  as calculated from the following:    Height as of this encounter: 1.702 m (5' 7\").    Weight as of this encounter: 151.5 kg (334 lb).   Weight management plan: Discussed healthy diet and exercise " guidelines            Return in about 7 months (around 6/19/2020) for Wellness visit (fasting labs up to one week prior).    Vita Zavala MD  [facility]

## 2019-12-03 NOTE — LETTER
12/3/2019          Chelo Brooks  [address] [place], [place] [address]   [facility] [address]    [phone]    Dear Chelo,    Good to see you at your last appointment! Would you please schedule a fasting lab only appointment to verify your cholesterol is in good control? I'd also like to recheck your high parathyroid hormone level at the same time. Return for lab only recheck while fasting (nothing but water and medications for at least 8 hours.)  You may call [phone] or go to [email or web address].    Sincerely,        Vita Zavala MD  Dept of Family Practice

## 2019-12-04 ASSESSMENT — ASTHMA QUESTIONNAIRES: ACT_TOTALSCORE: 22

## 2019-12-11 ENCOUNTER — ANTICOAGULATION THERAPY VISIT (OUTPATIENT)
Dept: NURSING | Facility: CLINIC | Age: 74
End: 2019-12-11
Payer: COMMERCIAL

## 2019-12-11 ENCOUNTER — ANCILLARY PROCEDURE (OUTPATIENT)
Dept: ULTRASOUND IMAGING | Facility: CLINIC | Age: 74
End: 2019-12-11
Attending: FAMILY MEDICINE
Payer: COMMERCIAL

## 2019-12-11 DIAGNOSIS — I26.99 ACUTE PULMONARY EMBOLISM, UNSPECIFIED PULMONARY EMBOLISM TYPE, UNSPECIFIED WHETHER ACUTE COR PULMONALE PRESENT (H): ICD-10-CM

## 2019-12-11 DIAGNOSIS — E04.9 THYROID ENLARGEMENT: ICD-10-CM

## 2019-12-11 LAB — INR POINT OF CARE: 2.9 (ref 0.86–1.14)

## 2019-12-11 PROCEDURE — 36416 COLLJ CAPILLARY BLOOD SPEC: CPT

## 2019-12-11 PROCEDURE — 76536 US EXAM OF HEAD AND NECK: CPT

## 2019-12-11 PROCEDURE — 85610 PROTHROMBIN TIME: CPT | Mod: QW

## 2019-12-11 PROCEDURE — 99207 ZZC NO CHARGE NURSE ONLY: CPT

## 2019-12-11 NOTE — PROGRESS NOTES
ANTICOAGULATION FOLLOW-UP CLINIC VISIT    Patient Name:  Chelo Brooks  Date:  2019  Contact Type:  Face to Face    SUBJECTIVE:  Patient Findings     Comments:   The patient was assessed for diet, medication, and activity level changes, missed or extra doses, bruising or bleeding, with no problem findings.          Clinical Outcomes     Negatives:   Major bleeding event, Thromboembolic event, Anticoagulation-related hospital admission, Anticoagulation-related ED visit, Anticoagulation-related fatality    Comments:   The patient was assessed for diet, medication, and activity level changes, missed or extra doses, bruising or bleeding, with no problem findings.             OBJECTIVE    INR Protime   Date Value Ref Range Status   2019 2.9 (A) 0.86 - 1.14 Final       ASSESSMENT / PLAN  No question data found.  Anticoagulation Summary  As of 2019    INR goal:   2.0-3.0   TTR:   51.9 % (3.9 wk)   INR used for dosin.9 (2019)   Warfarin maintenance plan:   5 mg (5 mg x 1) every day   Full warfarin instructions:   5 mg every day   Weekly warfarin total:   35 mg   Plan last modified:   Berenice Poole RN (2019)   Next INR check:   2019   Priority:   High   Target end date:   Indefinite    Indications    Pulmonary emboli (H) [I26.99]  Deep vein thrombosis (DVT) (H) (Resolved) [I82.409]             Anticoagulation Episode Summary     INR check location:   Anticoagulation Clinic    Preferred lab:   [facility]    Send INR reminders to:   CASTILLO RIVERA    Comments:   Takes warfarin in the morning      Anticoagulation Care Providers     Provider Role Specialty Phone number    SallyVita MD [facility] [phone]            See the Encounter Report to view Anticoagulation Flowsheet and Dosing Calendar (Go to Encounters tab in chart review, and find the Anticoagulation Therapy Visit)        Meghan Devine RN

## 2019-12-11 NOTE — RESULT ENCOUNTER NOTE
Please call patient:    You have a large left thyroid nodule. Please see Dr. Toledo in general surgery to discuss further testing and/or treatment. Call our appointment line at [phone] or go to [email or web address].     Vita Zavala MD

## 2019-12-11 NOTE — PATIENT INSTRUCTIONS
Anticoagulation Clinic:  Please call [phone] with any problems or questions regarding your Warfarin therapy.

## 2019-12-16 ENCOUNTER — OFFICE VISIT (OUTPATIENT)
Dept: SURGERY | Facility: CLINIC | Age: 74
End: 2019-12-16
Payer: COMMERCIAL

## 2019-12-16 VITALS
HEART RATE: 82 BPM | DIASTOLIC BLOOD PRESSURE: 82 MMHG | SYSTOLIC BLOOD PRESSURE: 124 MMHG | BODY MASS INDEX: 52.31 KG/M2 | WEIGHT: 293 LBS

## 2019-12-16 DIAGNOSIS — K11.8 PAROTID MASS: ICD-10-CM

## 2019-12-16 DIAGNOSIS — E04.1 THYROID NODULE: Primary | ICD-10-CM

## 2019-12-16 PROCEDURE — 99204 OFFICE O/P NEW MOD 45 MIN: CPT | Performed by: SURGERY

## 2019-12-16 NOTE — LETTER
12/16/2019         RE: Chelo Brooks  [address] [place] [address] [address]  [facility] [address]        Dear Colleague,    Thank you for referring your patient, Chelo Brooks, to the [facility]. Please see a copy of my visit note below.    I was asked to see Chelo Brooks regarding thyroid nodules by Vita Zavala     Chelo Brooks is a 74 year old female with multiple thyroid nodules. This was found on CT chest for a PE. The patient reports that there has been no noticible growth recently. There are no associated symptoms of dysphonia. She has occasional dysphagia but not around the area of the thyroid. She was seen in the ED recently for dyspnea and was diagnosed with a PE. She feels better. She denies any neck pain. The patient denies a family history of thyroid cancer or a history of exposure to ionizing radiation. The TSH was 1.61 on 4/5/19.  An ultrasound on 12/11/19 revealed enlarged thyroid gland with multiple nodules. Dominant solid nodule left thyroid lobe as described. Percutaneous ultrasound guided thyroid nodule biopsy could be performed for further assessment..  She is here to discuss FNA.    Past Medical History:   has a past medical history of Adrenal nodule (H), CKD (chronic kidney disease) stage 3, GFR 30-59 ml/min (H), Diagnostic skin and sensitization tests (4/5/12), DJD (degenerative joint disease) (10/21/2005), Eczema, unspecified type (4/5/2018), Elevated parathyroid hormone (6/18/2019), Gallstones, Hepatitis B (childhood ), HTN (hypertension), Hyperlipidemia (11/27/2012), Hypertension goal BP (blood pressure) < 140/90, Lumbar spinal stenosis (7/17/2017), Mild persistent asthma without complication (5/30/2017), Morbid obesity due to excess calories (H) (11/23/2015), FLOR (obstructive sleep apnea), Pulmonary emboli (H) (10/28/2019), Shingles (2014), Stasis dermatitis of both legs (4/5/2018), Thyroid nodule, and Vitamin D deficiency.    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   Social Needs     Financial resource strain: Not on file     Food insecurity:     Worry: Not on file     Inability: Not on file     Transportation needs:     Medical: Not on file     Non-medical: Not on file   Tobacco Use     Smoking status: Never Smoker     Smokeless tobacco: Never Used   Substance and Sexual Activity     Alcohol use: Yes     Alcohol/week: 0.0 standard drinks     Comment: very rare       Drug use: No     Sexual activity: Not Currently     Partners: Male     Birth control/protection: Post-menopausal, Female Surgical   Lifestyle     Physical activity:     Days per week: Not on file     Minutes per session: Not on file     Stress: Not on file   Relationships     Social connections:     Talks on phone: Not on file     Gets together: Not on file     Attends Advent service: Not on file     Active member of club or organization: Not on file     Attends meetings of clubs or organizations: Not on file     Relationship status: Not on file     Intimate partner violence:     Fear of current or ex partner: Not on file     Emotionally abused: Not on file     Physically abused: Not on file     Forced sexual activity: Not on file   Other Topics Concern     Parent/sibling w/ CABG, MI or angioplasty before 65F 55M? Not Asked   Social History Narrative     Not on file        Family History:  Family History   Problem Relation Age of Onset     Circulatory Father         d. DVT     Heart Disease Father          pacer     Diabetes Mother      Hypertension Mother      C.A.D. Paternal Uncle         MI      Heart Disease Brother 48        pacer      Diabetes Maternal Grandmother      Hypertension Maternal Grandmother      Diabetes Maternal Aunt      Hypertension Maternal Aunt      Cancer - colorectal Maternal Grandfather      C.A.D. Maternal Aunt         MI     Glaucoma No family hx of      Macular Degeneration No family hx of        ROS:  General: negative for fever or chills  Skin: negative except mass noted above  Ears/Nose/Throat: negative for, epistaxis, bleeding gums  Respiratory: No shortness of breath, dyspnea on exertion, cough, or hemoptysis  Cardiovascular: negative for and chest pain  Gastrointestinal: negative for, nausea, vomiting and abdominal pain  Genitourinary: negative for and frequency  Neurologic: negative for and local weakness  Hematologic/Lymphatic/Immunologic: negative for, bleeding disorder and frequent infections  Endocrine: negative for, diabetes, polydipsia and polyuria    PHYSICAL EXAMINATION: A comprehensive head and neck examination was performed. Of note, there is a palpable enlargement of left lobe of the thyroid gland. She has some swelling of the parotid gland. There were no other palpable masses or adenopathy.    Vitals: /82   Pulse 82   Wt (!) 151.5 kg (334 lb)   BMI 52.31 kg/m     BMI= Body mass index is 52.31 kg/m .  Constitutional: healthy, alert and no distress  Head: Normocephalic. No masses, lesions, tenderness or abnormalities  Neck: Neck supple. No adenopathy.   ENT: Mucous membranes moist, no cervical lymphadenopathy noted.  Respiratory:  Non-labored respiration  Musculoskeletal: No gross deformity  Neurologic: No focal deficits  Psychiatric: mentation appears normal and affect normal/bright  Hematologic/Lymphatic/Immunologic: No bruising noted    IMPRESSION: Thyroid nodules, and possible parotid mass    Plan:    Given the appearance and size of the nodules an FNA is  the next step. The risks, benefits and alternatives of FNA were discussed, including (but not limited to) pain, bleeding, infection, and the potential need for additional FNAs or surgery. We discussed the possible results and briefly what each would mean. We also briefly discussed surgery. She is agreeable to an FNA and we have taken the liberty of arranging this.     CT scan head and neck.      Again, thank you for allowing me to participate in the care of your patient.        Sincerely,        Benny Toledo, DO

## 2019-12-16 NOTE — PROGRESS NOTES
I was asked to see Chelo Brooks regarding thyroid nodules by Vita Zavala     Chelo Brooks is a 74 year old female with multiple thyroid nodules. This was found on CT chest for a PE. The patient reports that there has been no noticible growth recently. There are no associated symptoms of dysphonia. She has occasional dysphagia but not around the area of the thyroid. She was seen in the ED recently for dyspnea and was diagnosed with a PE. She feels better. She denies any neck pain. The patient denies a family history of thyroid cancer or a history of exposure to ionizing radiation. The TSH was 1.61 on 4/5/19.  An ultrasound on 12/11/19 revealed enlarged thyroid gland with multiple nodules. Dominant solid nodule left thyroid lobe as described. Percutaneous ultrasound guided thyroid nodule biopsy could be performed for further assessment..  She is here to discuss FNA.    Past Medical History:   has a past medical history of Adrenal nodule (H), CKD (chronic kidney disease) stage 3, GFR 30-59 ml/min (H), Diagnostic skin and sensitization tests (4/5/12), DJD (degenerative joint disease) (10/21/2005), Eczema, unspecified type (4/5/2018), Elevated parathyroid hormone (6/18/2019), Gallstones, Hepatitis B (childhood ), HTN (hypertension), Hyperlipidemia (11/27/2012), Hypertension goal BP (blood pressure) < 140/90, Lumbar spinal stenosis (7/17/2017), Mild persistent asthma without complication (5/30/2017), Morbid obesity due to excess calories (H) (11/23/2015), FLOR (obstructive sleep apnea), Pulmonary emboli (H) (10/28/2019), Shingles (2014), Stasis dermatitis of both legs (4/5/2018), Thyroid nodule, and Vitamin D deficiency.    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   Social Needs     Financial resource strain: Not on file     Food insecurity:     Worry: Not on file     Inability: Not on file     Transportation needs:     Medical: Not on file     Non-medical: Not on file   Tobacco Use     Smoking status: Never Smoker     Smokeless tobacco: Never Used   Substance and Sexual Activity     Alcohol use: Yes     Alcohol/week: 0.0 standard drinks     Comment: very rare       Drug use: No     Sexual activity: Not Currently     Partners: Male     Birth control/protection: Post-menopausal, Female Surgical   Lifestyle     Physical activity:     Days per week: Not on file     Minutes per session: Not on file     Stress: Not on file   Relationships     Social connections:     Talks on phone: Not on file     Gets together: Not on file     Attends Oriental orthodox service: Not on file     Active member of club or organization: Not on file     Attends meetings of clubs or organizations: Not on file     Relationship status: Not on file     Intimate partner violence:     Fear of current or ex partner: Not on file     Emotionally abused: Not on file     Physically abused: Not on file     Forced sexual activity: Not on file   Other Topics Concern     Parent/sibling w/ CABG, MI or angioplasty before 65F 55M? Not Asked   Social History Narrative     Not on file        Family History:  Family History   Problem Relation Age of Onset     Circulatory Father         d. DVT     Heart Disease Father         pacer     Diabetes Mother      Hypertension Mother      C.A.D. Paternal Uncle         MI      Heart Disease Brother 48        pacer      Diabetes Maternal Grandmother      Hypertension Maternal Grandmother      Diabetes Maternal Aunt      Hypertension Maternal  Aunt      Cancer - colorectal Maternal Grandfather      ALLY. Maternal Aunt         MI     Glaucoma No family hx of      Macular Degeneration No family hx of        ROS:  General: negative for fever or chills  Skin: negative except mass noted above  Ears/Nose/Throat: negative for, epistaxis, bleeding gums  Respiratory: No shortness of breath, dyspnea on exertion, cough, or hemoptysis  Cardiovascular: negative for and chest pain  Gastrointestinal: negative for, nausea, vomiting and abdominal pain  Genitourinary: negative for and frequency  Neurologic: negative for and local weakness  Hematologic/Lymphatic/Immunologic: negative for, bleeding disorder and frequent infections  Endocrine: negative for, diabetes, polydipsia and polyuria    PHYSICAL EXAMINATION: A comprehensive head and neck examination was performed. Of note, there is a palpable enlargement of left lobe of the thyroid gland. She has some swelling of the parotid gland. There were no other palpable masses or adenopathy.    Vitals: /82   Pulse 82   Wt (!) 151.5 kg (334 lb)   BMI 52.31 kg/m    BMI= Body mass index is 52.31 kg/m .  Constitutional: healthy, alert and no distress  Head: Normocephalic. No masses, lesions, tenderness or abnormalities  Neck: Neck supple. No adenopathy.   ENT: Mucous membranes moist, no cervical lymphadenopathy noted.  Respiratory:  Non-labored respiration  Musculoskeletal: No gross deformity  Neurologic: No focal deficits  Psychiatric: mentation appears normal and affect normal/bright  Hematologic/Lymphatic/Immunologic: No bruising noted    IMPRESSION: Thyroid nodules, and possible parotid mass    Plan:    Given the appearance and size of the nodules an FNA is the next step. The risks, benefits and alternatives of FNA were discussed, including (but not limited to) pain, bleeding, infection, and the potential need for additional FNAs or surgery. We discussed the possible results and briefly what each would mean. We also  briefly discussed surgery. She is agreeable to an FNA and we have taken the liberty of arranging this.     CT scan head and neck.

## 2019-12-26 ENCOUNTER — ANTICOAGULATION THERAPY VISIT (OUTPATIENT)
Dept: NURSING | Facility: CLINIC | Age: 74
End: 2019-12-26
Payer: COMMERCIAL

## 2019-12-26 DIAGNOSIS — I26.99 ACUTE PULMONARY EMBOLISM, UNSPECIFIED PULMONARY EMBOLISM TYPE, UNSPECIFIED WHETHER ACUTE COR PULMONALE PRESENT (H): ICD-10-CM

## 2019-12-26 LAB — INR POINT OF CARE: 2 (ref 0.86–1.14)

## 2019-12-26 PROCEDURE — 36416 COLLJ CAPILLARY BLOOD SPEC: CPT

## 2019-12-26 PROCEDURE — 85610 PROTHROMBIN TIME: CPT | Mod: QW

## 2019-12-26 PROCEDURE — 99207 ZZC NO CHARGE NURSE ONLY: CPT

## 2019-12-26 NOTE — PATIENT INSTRUCTIONS
Anticoagulation Clinic:  Please call [phone] with any problems or questions regarding your Warfarin therapy.

## 2019-12-26 NOTE — PROGRESS NOTES
ANTICOAGULATION FOLLOW-UP CLINIC VISIT    Patient Name:  Chelo Brooks  Date:  2019  Contact Type:  Face to Face    SUBJECTIVE:  Patient Findings     Comments:   The patient was assessed for diet, medication, and activity level changes, missed or extra doses, bruising or bleeding, with no problem findings.          Clinical Outcomes     Negatives:   Major bleeding event, Thromboembolic event, Anticoagulation-related hospital admission, Anticoagulation-related ED visit, Anticoagulation-related fatality    Comments:   The patient was assessed for diet, medication, and activity level changes, missed or extra doses, bruising or bleeding, with no problem findings.             OBJECTIVE    INR Protime   Date Value Ref Range Status   2019 2.0 (A) 0.86 - 1.14 Final       ASSESSMENT / PLAN  No question data found.  Anticoagulation Summary  As of 2019    INR goal:   2.0-3.0   TTR:   69.0 % (1.4 mo)   INR used for dosin.0 (2019)   Warfarin maintenance plan:   5 mg (5 mg x 1) every day   Full warfarin instructions:   5 mg every day   Weekly warfarin total:   35 mg   Plan last modified:   Berenice Poole RN (2019)   Next INR check:   2020   Priority:   High   Target end date:   Indefinite    Indications    Pulmonary emboli (H) [I26.99]  Deep vein thrombosis (DVT) (H) (Resolved) [I82.409]             Anticoagulation Episode Summary     INR check location:   Anticoagulation Clinic    Preferred lab:   [facility]    Send INR reminders to:   CASTILLO RIVERA    Comments:   Takes warfarin in the morning      Anticoagulation Care Providers     Provider Role Specialty Phone number    SallyVita MD [facility] [phone]            See the Encounter Report to view Anticoagulation Flowsheet and Dosing Calendar (Go to Encounters tab in chart review, and find the Anticoagulation Therapy Visit)        Meghan Devine RN

## 2020-01-03 ENCOUNTER — ANCILLARY PROCEDURE (OUTPATIENT)
Dept: CT IMAGING | Facility: CLINIC | Age: 75
End: 2020-01-03
Attending: SURGERY
Payer: COMMERCIAL

## 2020-01-03 DIAGNOSIS — K11.8 PAROTID MASS: ICD-10-CM

## 2020-01-03 PROCEDURE — 70491 CT SOFT TISSUE NECK W/DYE: CPT | Mod: TC

## 2020-01-03 RX ORDER — IOPAMIDOL 755 MG/ML
80 INJECTION, SOLUTION INTRAVASCULAR ONCE
Status: COMPLETED | OUTPATIENT
Start: 2020-01-03 | End: 2020-01-03

## 2020-01-03 RX ADMIN — IOPAMIDOL 80 ML: 755 INJECTION, SOLUTION INTRAVASCULAR at 10:13

## 2020-01-09 ENCOUNTER — ANTICOAGULATION THERAPY VISIT (OUTPATIENT)
Dept: NURSING | Facility: CLINIC | Age: 75
End: 2020-01-09
Payer: COMMERCIAL

## 2020-01-09 DIAGNOSIS — I26.99 ACUTE PULMONARY EMBOLISM, UNSPECIFIED PULMONARY EMBOLISM TYPE, UNSPECIFIED WHETHER ACUTE COR PULMONALE PRESENT (H): ICD-10-CM

## 2020-01-09 LAB — INR POINT OF CARE: 2.1 (ref 0.86–1.14)

## 2020-01-09 PROCEDURE — 36416 COLLJ CAPILLARY BLOOD SPEC: CPT

## 2020-01-09 PROCEDURE — 99207 ZZC NO CHARGE NURSE ONLY: CPT

## 2020-01-09 PROCEDURE — 85610 PROTHROMBIN TIME: CPT | Mod: QW

## 2020-01-09 RX ORDER — WARFARIN SODIUM 5 MG/1
5 TABLET ORAL DAILY
Qty: 90 TABLET | Refills: 0 | Status: SHIPPED | OUTPATIENT
Start: 2020-01-09 | End: 2020-04-15

## 2020-01-09 NOTE — PATIENT INSTRUCTIONS
Anticoagulation Clinic:  Please call [phone] with any problems or questions regarding your Warfarin therapy.

## 2020-01-09 NOTE — PROGRESS NOTES
ANTICOAGULATION FOLLOW-UP CLINIC VISIT    Patient Name:  Chelo Brooks  Date:  2020  Contact Type:  Face to Face    SUBJECTIVE:  Patient Findings     Positives:   Missed doses    Comments:   The patient was assessed for diet, medication, and activity level changes, missed or extra doses, bruising or bleeding, with no problem findings.          Clinical Outcomes     Negatives:   Major bleeding event, Thromboembolic event, Anticoagulation-related hospital admission, Anticoagulation-related ED visit, Anticoagulation-related fatality    Comments:   The patient was assessed for diet, medication, and activity level changes, missed or extra doses, bruising or bleeding, with no problem findings.             OBJECTIVE    INR Protime   Date Value Ref Range Status   2020 2.1 (A) 0.86 - 1.14 Final       ASSESSMENT / PLAN  No question data found.  Anticoagulation Summary  As of 2020    INR goal:   2.0-3.0   TTR:   76.8 % (1.9 mo)   INR used for dosin.1 (2020)   Warfarin maintenance plan:   5 mg (5 mg x 1) every day   Full warfarin instructions:   5 mg every day   Weekly warfarin total:   35 mg   No change documented:   Meghan Devine RN   Plan last modified:   Berenice Poole RN (2019)   Next INR check:   2020   Priority:   High   Target end date:   Indefinite    Indications    Pulmonary emboli (H) [I26.99]  Deep vein thrombosis (DVT) (H) (Resolved) [I82.409]             Anticoagulation Episode Summary     INR check location:   Anticoagulation Clinic    Preferred lab:   [facility]    Send INR reminders to:   CASTILLO RIVERA    Comments:   Takes warfarin in the morning      Anticoagulation Care Providers     Provider Role Specialty Phone number    Vita Zavala MD [facility] [phone]            See the Encounter Report to view Anticoagulation Flowsheet and Dosing Calendar (Go to Encounters tab in chart review, and find the Anticoagulation  Therapy Visit)        Meghan Devine RN

## 2020-01-15 ENCOUNTER — TELEPHONE (OUTPATIENT)
Dept: SURGERY | Facility: CLINIC | Age: 75
End: 2020-01-15

## 2020-01-15 DIAGNOSIS — K11.8 PAROTID MASS: ICD-10-CM

## 2020-01-15 DIAGNOSIS — E04.1 THYROID NODULE: Primary | ICD-10-CM

## 2020-01-15 NOTE — TELEPHONE ENCOUNTER
Reason for call:  Results   Name of test or procedure: ct neck  Date of test or procedure: 1-3-2020  Location of test or procedure: [facility]    Additional comments:  Na     Phone number to reach patient:  Home number on file [phone] (home)    Best Time:   Any     Can we leave a detailed message on this number?  YES

## 2020-01-22 ENCOUNTER — ANTICOAGULATION THERAPY VISIT (OUTPATIENT)
Dept: NURSING | Facility: CLINIC | Age: 75
End: 2020-01-22
Payer: COMMERCIAL

## 2020-01-22 DIAGNOSIS — I26.99 ACUTE PULMONARY EMBOLISM, UNSPECIFIED PULMONARY EMBOLISM TYPE, UNSPECIFIED WHETHER ACUTE COR PULMONALE PRESENT (H): ICD-10-CM

## 2020-01-22 LAB — INR POINT OF CARE: 2.7 (ref 0.86–1.14)

## 2020-01-22 PROCEDURE — 36416 COLLJ CAPILLARY BLOOD SPEC: CPT

## 2020-01-22 PROCEDURE — 99207 ZZC NO CHARGE NURSE ONLY: CPT

## 2020-01-22 PROCEDURE — 85610 PROTHROMBIN TIME: CPT | Mod: QW

## 2020-01-22 NOTE — PROGRESS NOTES
ANTICOAGULATION FOLLOW-UP CLINIC VISIT    Patient Name:  Chelo Brooks  Date:  2020  Contact Type:  Face to Face    SUBJECTIVE:  Patient Findings     Comments:   The patient was assessed for diet, medication, and activity level changes, missed or extra doses, bruising or bleeding, with no problem findings.          Clinical Outcomes     Negatives:   Major bleeding event, Thromboembolic event, Anticoagulation-related hospital admission, Anticoagulation-related ED visit, Anticoagulation-related fatality    Comments:   The patient was assessed for diet, medication, and activity level changes, missed or extra doses, bruising or bleeding, with no problem findings.             OBJECTIVE    INR Protime   Date Value Ref Range Status   2020 2.7 (A) 0.86 - 1.14 Final       ASSESSMENT / PLAN  No question data found.  Anticoagulation Summary  As of 2020    INR goal:   2.0-3.0   TTR:   81.2 % (2.3 mo)   INR used for dosin.7 (2020)   Warfarin maintenance plan:   5 mg (5 mg x 1) every day   Full warfarin instructions:   5 mg every day   Weekly warfarin total:   35 mg   No change documented:   Meghan Devine, RN   Plan last modified:   Berenice Poole RN (2019)   Next INR check:   2020   Priority:   High   Target end date:   Indefinite    Indications    Pulmonary emboli (H) [I26.99]  Deep vein thrombosis (DVT) (H) (Resolved) [I82.409]             Anticoagulation Episode Summary     INR check location:   Anticoagulation Clinic    Preferred lab:   [facility]    Send INR reminders to:   CASTILLO RIVERA    Comments:   Takes warfarin in the morning      Anticoagulation Care Providers     Provider Role Specialty Phone number    Vita Zavala MD [facility] [phone]            See the Encounter Report to view Anticoagulation Flowsheet and Dosing Calendar (Go to Encounters tab in chart review, and find the Anticoagulation Therapy Visit)        Meghan  BRIDGET Devine

## 2020-01-22 NOTE — PATIENT INSTRUCTIONS
Anticoagulation Clinic:  Please call [phone] with any problems or questions regarding your Warfarin therapy.

## 2020-01-29 ENCOUNTER — TELEPHONE (OUTPATIENT)
Dept: SURGERY | Facility: CLINIC | Age: 75
End: 2020-01-29

## 2020-01-29 NOTE — TELEPHONE ENCOUNTER
Reason for Call:  Other call back    Detailed comments: Patient stated she spoke to Dr Toledo 1-1/2 weeks ago and she is waiting for a call back to schedule a thyroid biopsy. No orders, need orders ASAP.    Phone Number Patient can be reached at: Home number on file [phone] (home)    Best Time: any    Can we leave a detailed message on this number? YES    Call taken on 1/29/2020 at 12:36 PM by Shannan Yan

## 2020-01-30 NOTE — TELEPHONE ENCOUNTER
Patient's FNA to be done at [facility]. Transferred patient to [phone].

## 2020-01-31 ENCOUNTER — TELEPHONE (OUTPATIENT)
Dept: SURGERY | Facility: CLINIC | Age: 75
End: 2020-01-31

## 2020-01-31 DIAGNOSIS — K11.8 PAROTID MASS: Primary | ICD-10-CM

## 2020-01-31 NOTE — TELEPHONE ENCOUNTER
Reason for call:  Order needs to be placed in epic and then return call so that they can set up the biopsy  Patient called regarding (reason for call): Radiology is calling  Additional comments: Needs order placed for Surgical pathology exam indicating left side parotid mass    Phone number to reach patient:  Other phone number:  [phone]    Best Time:  soon    Can we leave a detailed message on this number?  NO

## 2020-01-31 NOTE — TELEPHONE ENCOUNTER
Called lab back and let them know i would forward message to Dr Toledo as he is in surgery today.    Katelynn Rose RN Specialty Triage 1/31/2020 11:23 AM

## 2020-02-03 ENCOUNTER — HOSPITAL ENCOUNTER (OUTPATIENT)
Facility: CLINIC | Age: 75
End: 2020-02-03
Admitting: RADIOLOGY
Payer: COMMERCIAL

## 2020-02-03 ENCOUNTER — TELEPHONE (OUTPATIENT)
Dept: SURGERY | Facility: CLINIC | Age: 75
End: 2020-02-03

## 2020-02-03 NOTE — TELEPHONE ENCOUNTER
CAn you find out what order I need to place the lab order needs the time it was placed in formalin. I am not doing it so I won't be able to know what time.

## 2020-02-03 NOTE — PROGRESS NOTES
Placed IR order/surg path per IR [facility] protocol and Dr. Toledo.    Ruddy Segal, BRIDGET....2/3/2020 2:25 PM

## 2020-02-05 ENCOUNTER — TELEPHONE (OUTPATIENT)
Dept: FAMILY MEDICINE | Facility: CLINIC | Age: 75
End: 2020-02-05

## 2020-02-05 ENCOUNTER — ANTICOAGULATION THERAPY VISIT (OUTPATIENT)
Dept: NURSING | Facility: CLINIC | Age: 75
End: 2020-02-05
Payer: COMMERCIAL

## 2020-02-05 DIAGNOSIS — I26.99 ACUTE PULMONARY EMBOLISM, UNSPECIFIED PULMONARY EMBOLISM TYPE, UNSPECIFIED WHETHER ACUTE COR PULMONALE PRESENT (H): Primary | ICD-10-CM

## 2020-02-05 DIAGNOSIS — I26.99 ACUTE PULMONARY EMBOLISM, UNSPECIFIED PULMONARY EMBOLISM TYPE, UNSPECIFIED WHETHER ACUTE COR PULMONALE PRESENT (H): ICD-10-CM

## 2020-02-05 LAB — INR POINT OF CARE: 2.6 (ref 0.86–1.14)

## 2020-02-05 PROCEDURE — 85610 PROTHROMBIN TIME: CPT | Mod: QW

## 2020-02-05 PROCEDURE — 99207 ZZC NO CHARGE NURSE ONLY: CPT

## 2020-02-05 PROCEDURE — 36416 COLLJ CAPILLARY BLOOD SPEC: CPT

## 2020-02-05 NOTE — TELEPHONE ENCOUNTER
Chelo requested that the instructions be mailed out. Instructions mailed out.    Meghan Devine RN - [facility]

## 2020-02-05 NOTE — TELEPHONE ENCOUNTER
Last warfarin dose: 2/20 2/21, NO warfarin  2/22, NO warfarin  2/23, NO warfarin, begin enoxaparin injections into the abdomen every 12 hours (AM and PM)  2/24, NO warfarin, enoxaparin injection into the abdomen every 12 hours (AM and PM)  2/25, NO warfarin, enoxaparin injection into the abdomen AM only (no enoxaparin 24 hours prior to surgery)   2/26, DAY OF PROCEDURE, NO enoxaparin.   2/27, Restart enoxaparin injections 24 hours after procedure unless instructed otherwise by the physician  2/28, Enoxaparin injection into the abdomen every 12 hours (AM and PM)   2/29, Enoxaparin injection into the abdomen every 12 hours (AM and PM)  3/1,   Enoxaparin injection into the abdomen every 12 hours (AM and PM)  3/2, Enoxaparin injection into the abdomen every 12 hours (AM and PM)   3/3, Enoxaparin injection into the abdomen every 12 hours (AM and PM)  3/4,   Enoxaparin injection into the abdomen AM only (no enoxaparin 24 hours prior to surgery)  3/5, DAY OF PROCEDURE, NO enoxaparin. Restart warfarin 10 mg (2 tabs) in the evening, unless instructed otherwise by the physician.  3/6, Restart enoxaparin injections 24 hours after procedure unless instructed otherwise by the physician, and 10 mg (2 tabs) warfarin in the evening.   3/7, Enoxaparin injection into the abdomen every 12 hours (AM and PM) and 5 mg (1 tabs) warfarin in the evening.  3/8, Enoxaparin injection into the abdomen every 12 hours (AM and PM) and 5 mg (1 tabs) warfarin in the evening.  3/9, Enoxaparin injection into the abdomen in the AM. Recheck INR at the [facility] for further dosing instructions.

## 2020-02-05 NOTE — PROGRESS NOTES
ANTICOAGULATION FOLLOW-UP CLINIC VISIT    Patient Name:  Chelo Brooks  Date:  2020  Contact Type:  Face to Face    SUBJECTIVE:  Patient Findings     Positives:   Upcoming invasive procedure    Comments:   Patient is leaving for [place] on 2020. Travel instructions given. Advised would call and let her know if any further instructions needed for holding for upcoming procedure.        Clinical Outcomes     Negatives:   Major bleeding event, Thromboembolic event, Anticoagulation-related hospital admission, Anticoagulation-related ED visit, Anticoagulation-related fatality    Comments:   Patient is leaving for [place] on 2020. Travel instructions given. Advised would call and let her know if any further instructions needed for holding for upcoming procedure.           OBJECTIVE    INR Protime   Date Value Ref Range Status   2020 2.6 (A) 0.86 - 1.14 Final       ASSESSMENT / PLAN  No question data found.  Anticoagulation Summary  As of 2020    INR goal:   2.0-3.0   TTR:   84.3 % (2.8 mo)   INR used for dosin.6 (2020)   Warfarin maintenance plan:   5 mg (5 mg x 1) every day   Full warfarin instructions:   : Hold; : Hold; : Hold; : Hold; : Hold; : Hold; 3/1: Hold; 3: Hold; 3/3: Hold; 3/4: Hold; Otherwise 5 mg every day   Weekly warfarin total:   35 mg   Plan last modified:   Berenice Poole RN (2019)   Next INR check:   3/12/2020   Priority:   High   Target end date:   Indefinite    Indications    Pulmonary emboli (H) [I26.99]  Deep vein thrombosis (DVT) (H) (Resolved) [I82.409]             Anticoagulation Episode Summary     INR check location:   Anticoagulation Clinic    Preferred lab:   [facility]    Send INR reminders to:   CASTILLO RIVERA    Comments:   Takes warfarin in the morning      Anticoagulation Care Providers     Provider Role Specialty Phone number    Vita Zavala MD Responsible Family Practice  [phone]            See the Encounter Report to view Anticoagulation Flowsheet and Dosing Calendar (Go to Encounters tab in chart review, and find the Anticoagulation Therapy Visit)        Meghan Devine RN

## 2020-02-05 NOTE — PATIENT INSTRUCTIONS
Anticoagulation Clinic:  Please call [phone] with any problems or questions regarding your Warfarin therapy.      Keep your body moving when traveling by airplane.    Seated Exercises:    Ankle Circles: Lift your feet off the floor and twirl your feet as if you re drawing circles with your toes. Continue this for 15 seconds, then reverse direction. Repeat as desired.    Foot Pumps: Keep your heels on the floor and lift the front of your feet toward you as high as possible. Hold for a second or two, then flatten your feet and lift your heels as high as possible, keeping the balls of your feet on the floor. Continue for 30 seconds, and repeat as desired.    Knee Lifts: Keeping your leg bent, lift your knee up to your chest. Bring back to normal position and repeat with your other leg. Repeat 20 to 30 times for each leg.      General Tips:    If you have an opportunity to move around the cabin, walk to the restroom and back.    Drink plenty of fluids, preferably water, to avoid dehydration.    Walk for 30 minutes before boarding the plane.

## 2020-02-05 NOTE — TELEPHONE ENCOUNTER
Pt will be having Thyroid Biopsy on 2/26 and a Parotid Biopsy on 3/5.     Please review pt's history and advise if he/she will need to be bridged with Lovenox. If bridging is needed patient will need a current Scr.    If so do you want it weight based: 1 mg/kg BID or 1.5 mg/kg daily? Or if prophylactic dose of 40 mg daily is needed?    Patient was previously bridged when she was first started on Warfarin     If no bridging needed, is it OK to stop coumadin 5 days prior to procedure and resume usual dose 12-24 hours after procedure? Should patient resume coumadin dose for the 3 days between end of 1st procedure before and the start to hold for next procedure    Please advise.    Meghan Devine, BRIDGET - [facility]

## 2020-02-05 NOTE — TELEPHONE ENCOUNTER
Left message for Chelo advising of need to Bridge. Patient is leaving for [place] on Friday. Advised if she wanted to  a copy of the instructions she could do so today or tomorrow, otherwise can mail out instructions. Instructions printed and placed in envelope.    Meghan Devine RN - [facility]

## 2020-02-05 NOTE — LETTER
February 5, 2020      Chelo Brooks  [address]   [place] [address]      Dear Chelo,    Here is your step by step instructions for your warfarin and lovenox injection for your upcoming procedures. Note that your Warfarin dose has changed on 3/5 and 3/6 and you need to reschedule your INR follow up from 3/12 to 3/9. Take care!     Last warfarin dose: 2/20 2/21, NO warfarin    2/22, NO warfarin    2/23, NO warfarin, begin enoxaparin injections into the abdomen every 12 hours (AM and PM)    2/24, NO warfarin, enoxaparin injection into the abdomen every 12 hours (AM and PM)    2/25, NO warfarin, enoxaparin injection into the abdomen AM only (no enoxaparin 24 hours prior to surgery)     2/26, DAY OF PROCEDURE, NO enoxaparin.     2/27, Restart enoxaparin injections 24 hours after procedure unless instructed otherwise by the physician    2/28, Enoxaparin injection into the abdomen every 12 hours (AM and PM)     2/29, Enoxaparin injection into the abdomen every 12 hours (AM and PM)    3/1,   Enoxaparin injection into the abdomen every 12 hours (AM and PM)    3/2, Enoxaparin injection into the abdomen every 12 hours (AM and PM)     3/3, Enoxaparin injection into the abdomen every 12 hours (AM and PM)    3/4,   Enoxaparin injection into the abdomen AM only (no enoxaparin 24 hours prior to surgery)    3/5, DAY OF PROCEDURE, NO enoxaparin. Restart warfarin 10 mg (2 tabs) in the   evening, unless instructed otherwise by the physician.    3/6, Restart enoxaparin injections 24 hours after procedure unless instructed otherwise by the physician, and 10 mg (2 tabs) warfarin in the evening.     3/7, Enoxaparin injection into the abdomen every 12 hours (AM and PM) and 5 mg (1 tabs) warfarin in the evening.    3/8, Enoxaparin injection into the abdomen every 12 hours (AM and PM) and 5 mg (1 tabs) warfarin in the evening.    3/9, Enoxaparin injection into the abdomen in the AM. Recheck INR at the [facility] for further dosing instructions.      Sincerely,    Meghan Devine RN - [facility]

## 2020-02-05 NOTE — TELEPHONE ENCOUNTER
Signed Prescriptions:                        Disp   Refills    enoxaparin ANTICOAGULANT (LOVENOX) 150 MG/*32 mL  0        Sig: Inject 1 mL (150 mg) Subcutaneous every 12 hours for           16 days . Begin 3 days prior to surgery. Take           last dose 12 hours prior to surgery. Resume per           surgeon's instructions until INR is 2 or above  Authorizing Provider: VITA ZAVALA    Ok to hold coumadin 5 days prior to procedure   Vita Zavala MD

## 2020-02-21 ENCOUNTER — DOCUMENTATION ONLY (OUTPATIENT)
Dept: SLEEP MEDICINE | Facility: CLINIC | Age: 75
End: 2020-02-21

## 2020-02-21 DIAGNOSIS — E66.01 MORBID OBESITY DUE TO EXCESS CALORIES (H): ICD-10-CM

## 2020-02-21 DIAGNOSIS — G47.33 OSA (OBSTRUCTIVE SLEEP APNEA): ICD-10-CM

## 2020-02-24 ENCOUNTER — OFFICE VISIT (OUTPATIENT)
Dept: SLEEP MEDICINE | Facility: CLINIC | Age: 75
End: 2020-02-24
Payer: COMMERCIAL

## 2020-02-24 ENCOUNTER — DOCUMENTATION ONLY (OUTPATIENT)
Dept: SLEEP MEDICINE | Facility: CLINIC | Age: 75
End: 2020-02-24
Payer: COMMERCIAL

## 2020-02-24 VITALS
HEIGHT: 67 IN | OXYGEN SATURATION: 97 % | WEIGHT: 293 LBS | SYSTOLIC BLOOD PRESSURE: 133 MMHG | DIASTOLIC BLOOD PRESSURE: 84 MMHG | BODY MASS INDEX: 45.99 KG/M2 | HEART RATE: 64 BPM

## 2020-02-24 DIAGNOSIS — G47.33 OSA (OBSTRUCTIVE SLEEP APNEA): Primary | ICD-10-CM

## 2020-02-24 DIAGNOSIS — E66.01 MORBID OBESITY DUE TO EXCESS CALORIES (H): ICD-10-CM

## 2020-02-24 DIAGNOSIS — G47.33 OSA (OBSTRUCTIVE SLEEP APNEA): ICD-10-CM

## 2020-02-24 PROCEDURE — 99214 OFFICE O/P EST MOD 30 MIN: CPT | Performed by: PHYSICIAN ASSISTANT

## 2020-02-24 ASSESSMENT — MIFFLIN-ST. JEOR: SCORE: 2048.57

## 2020-02-24 NOTE — PROGRESS NOTES
STM Recheck Visit     Subjective measures:   Pt called in because she is wondering if her machine is working.  She put in water in her humidifier chamber and it leaks. I told her the chamber probably needs to be replaced.  She said she needs all supplies and it's been 3 years since she's been seen.  She has an appt to get supplies and have her machine looked at on Monday.     Assessment: Pt meeting objective benchmarks.  Patient meeting subjective benchmarks.   Action plan:   Patient has a follow up visit with SMITA Rogers on 2/24/20.   Device type: Auto  PAP settings: CPAP min 11 cm  H20     CPAP max 18 cm  H20    95th% pressure 16.8 cm  H20   Objective measures: 14 day rolling measures      Compliance  71 %      Leak  22.34 lpm  last  upload      AHI 1.87   last  upload      Average number of minutes 334    Objective measure goal  Compliance   Goal >70%  Leak   Goal < 10%  AHI  Goal < 5  Usage  Goal >240

## 2020-02-24 NOTE — NURSING NOTE
"Chief Complaint   Patient presents with     CPAP Follow Up       Initial /84   Pulse 64   Ht 1.702 m (5' 7\")   Wt (!) 150.6 kg (332 lb)   SpO2 97%   BMI 52.00 kg/m   Estimated body mass index is 52 kg/m  as calculated from the following:    Height as of this encounter: 1.702 m (5' 7\").    Weight as of this encounter: 150.6 kg (332 lb).    Medication Reconciliation: complete      "

## 2020-02-24 NOTE — PATIENT INSTRUCTIONS
Your BMI is Body mass index is 52 kg/m .  Weight management is a personal decision.  If you are interested in exploring weight loss strategies, the following discussion covers the approaches that may be successful. Body mass index (BMI) is one way to tell whether you are at a healthy weight, overweight, or obese. It measures your weight in relation to your height.  A BMI of 18.5 to 24.9 is in the healthy range. A person with a BMI of 25 to 29.9 is considered overweight, and someone with a BMI of 30 or greater is considered obese. More than two-thirds of American adults are considered overweight or obese.  Being overweight or obese increases the risk for further weight gain. Excess weight may lead to heart disease and diabetes.  Creating and following plans for healthy eating and physical activity may help you improve your health.  Weight control is part of healthy lifestyle and includes exercise, emotional health, and healthy eating habits. Careful eating habits lifelong are the mainstay of weight control. Though there are significant health benefits from weight loss, long-term weight loss with diet alone may be very difficult to achieve- studies show long-term success with dietary management in less than 10% of people. Attaining a healthy weight may be especially difficult to achieve in those with severe obesity. In some cases, medications, devices and surgical management might be considered.  What can you do?  If you are overweight or obese and are interested in methods for weight loss, you should discuss this with your provider.     Consider reducing daily calorie intake by 500 calories.     Keep a food journal.     Avoiding skipping meals, consider cutting portions instead.    Diet combined with exercise helps maintain muscle while optimizing fat loss. Strength training is particularly important for building and maintaining muscle mass. Exercise helps reduce stress, increase energy, and improves fitness.  Increasing exercise without diet control, however, may not burn enough calories to loose weight.       Start walking three days a week 10-20 minutes at a time    Work towards walking thirty minutes five days a week     Eventually, increase the speed of your walking for 1-2 minutes at time    In addition, we recommend that you review healthy lifestyles and methods for weight loss available through the National Institutes of Health patient information sites:  http://win.niddk.nih.gov/publications/index.htm    And look into health and wellness programs that may be available through your health insurance provider, employer, local community center, or lien club.    Weight management plan: Patient was referred to their PCP to discuss a diet and exercise plan.

## 2020-02-24 NOTE — PROGRESS NOTES
Obstructive Sleep Apnea - PAP Follow-Up Visit:    Chief Complaint   Patient presents with     CPAP Follow Up       Chelo Brooks comes in today for follow-up of their severe sleep apnea, managed with CPAP.     Initial sleep study done on 2/14/17 at the [facility] for loud, socially disruptive snoring, witnessed apneas, excessive daytime sleepiness (ESS 11), sleep maintenance difficulties, obesity, narrowed oropharynx oropharynx. Sleep onset << maintenance difficulties, some psychophysiologic insomnia suspected.    Study Date: 2/14/2017- (329.0 lbs) The total sleep time was 96.0 minutes. The combined apnea/hypopnea index was 89.4 events per hour.  The REM AHI was n/a.  The supine AHI was 93.9 events per hour. RDI was 92.5 events per hour.  Lowest oxygen saturation was 77%.  Time spent less than or equal to 88% was 27.5 minutes.  Time spent less than or equal to 89% was 34.4 minutes.    Overall, she rates the experience with PAP as 8 (0 poor, 10 great). The mask is comfortable.  The mask is not leaking .  She is not snoring with the mask on. She is not having gasp arousals.  She is not having significant oral/nasal dryness. The pressure is comfortable.     Her PAP interface is Full Face Mask.    Bedtime is typically 2300. Usually it takes about 30 min minutes to fall asleep with the mask on. Wake time is typically 0800.  Patient is using PAP therapy 7 hours per night. The patient is usually getting 7 hours of sleep per night.    She does feel rested in the morning.    Total score - [employer]: 1 (2/24/2020 11:00 AM)    SELENE Total Score: 11    ResMed   Auto-PAP 11.0 - 18.0 cmH2O 30 day usage data:    33% of days with > 4 hours of use. 16/30 days with no use.   Average use 176 minutes per day.   95%ile Leak 22.27 L/min.   CPAP 95% pressure 16.4 cm.   AHI 1.64 events per hour.     No barriers to using CPAP.     Past medical/surgical history, family history, social history, medications and allergies  were reviewed.      Problem List:  Patient Active Problem List    Diagnosis Date Noted     Hyperlipidemia LDL goal <100 11/27/2012     Priority: High     Thyroid nodule      Priority: Medium     Gallstones      Priority: Medium     Thyroid enlargement      Priority: Medium     Adrenal nodule (H)      Priority: Medium     left - needs yearly CT       Pulmonary emboli (H) 10/28/2019     Priority: Medium     Mild intermittent asthma, unspecified whether complicated 06/18/2019     Priority: Medium     Elevated parathyroid hormone 06/18/2019     Priority: Medium     CKD (chronic kidney disease) stage 3, GFR 30-59 ml/min (H)      Priority: Medium     Eczema, unspecified type 04/05/2018     Priority: Medium     Stasis dermatitis of both legs 04/05/2018     Priority: Medium     Combined forms of age-related cataract of both eyes 07/18/2017     Priority: Medium     Choroidal nevus, left eye 07/18/2017     Priority: Medium     Lumbar spinal stenosis 07/17/2017     Priority: Medium     Vitamin D deficiency      Priority: Medium     FLOR (obstructive sleep apnea)- severe (AHI 89) 02/15/2017     Priority: Medium     Study Date: 2/14/2017- (329.0 lbs) The total sleep time was 96.0 minutes. The combined apnea/hypopnea index was 89.4 events per hour.  The REM AHI was n/a.  The supine AHI was 93.9 events per hour. RDI was 92.5 events per hour.  Lowest oxygen saturation was 77%.  Time spent less than or equal to 88% was 27.5 minutes.  Time spent less than or equal to 89% was 34.4 minutes.       Osteopenia      Priority: Medium     Morbid obesity due to excess calories (H) 11/23/2015     Priority: Medium     Surgical referral declined '16       Renal hypertension      Priority: Medium     S/P knee replacements 10/23/2013     Priority: Medium     Health Care Home 05/08/2012     Priority: Medium     [facility] for .  Oscar Smith RN, C-BC    [phone]       Allergic rhinitis due to animal dander      Priority:  "Medium     4/5/12 RAST pos. only to cat mildly       Advanced directives, counseling/discussion 03/15/2012     Priority: Medium     Discussed advance care planning with patient; information given to patient to review. 3/15/2012        Reflux esophagitis 06/17/2003     Priority: Medium        /84   Pulse 64   Ht 1.702 m (5' 7\")   Wt (!) 150.6 kg (332 lb)   SpO2 97%   BMI 52.00 kg/m      Impression/Plan:  Severe obstructive sleep apnea-  Tolerating PAP well, but does not use CPAP with all sleep.   Patient counseled to use CPAP with all sleep.   Obstructive sleep apnea reviewed. Also reviewed potential onsequences of severe untreated obstructive sleep apnea.     Chelo Brooks will follow up in about 6 month(s).     Twenty-five minutes spent with patient, all of which were spent face-to-face counseling, consulting, coordinating plan of care.      Quang Dowell PA-C  "

## 2020-02-26 ENCOUNTER — ANCILLARY PROCEDURE (OUTPATIENT)
Dept: ULTRASOUND IMAGING | Facility: CLINIC | Age: 75
End: 2020-02-26
Attending: SURGERY
Payer: COMMERCIAL

## 2020-02-26 DIAGNOSIS — E04.1 THYROID NODULE: ICD-10-CM

## 2020-02-26 LAB — COPATH REPORT: NORMAL

## 2020-02-26 PROCEDURE — 88173 CYTOPATH EVAL FNA REPORT: CPT

## 2020-02-26 PROCEDURE — 10005 FNA BX W/US GDN 1ST LES: CPT

## 2020-02-26 PROCEDURE — 00000102 ZZHCL STATISTIC CYTO WRIGHT STAIN TC

## 2020-03-06 ENCOUNTER — ANCILLARY PROCEDURE (OUTPATIENT)
Dept: GENERAL RADIOLOGY | Facility: CLINIC | Age: 75
End: 2020-03-06
Attending: NURSE PRACTITIONER
Payer: COMMERCIAL

## 2020-03-06 ENCOUNTER — OFFICE VISIT (OUTPATIENT)
Dept: FAMILY MEDICINE | Facility: CLINIC | Age: 75
End: 2020-03-06
Payer: COMMERCIAL

## 2020-03-06 VITALS
DIASTOLIC BLOOD PRESSURE: 84 MMHG | BODY MASS INDEX: 45.99 KG/M2 | HEART RATE: 91 BPM | OXYGEN SATURATION: 95 % | HEIGHT: 67 IN | WEIGHT: 293 LBS | TEMPERATURE: 97.8 F | RESPIRATION RATE: 18 BRPM | SYSTOLIC BLOOD PRESSURE: 128 MMHG

## 2020-03-06 DIAGNOSIS — R10.31 BILATERAL GROIN PAIN: ICD-10-CM

## 2020-03-06 DIAGNOSIS — R10.2 PELVIC PAIN IN FEMALE: Primary | ICD-10-CM

## 2020-03-06 DIAGNOSIS — N30.01 ACUTE CYSTITIS WITH HEMATURIA: ICD-10-CM

## 2020-03-06 DIAGNOSIS — R10.32 BILATERAL GROIN PAIN: ICD-10-CM

## 2020-03-06 LAB
ALBUMIN SERPL-MCNC: 3.3 G/DL (ref 3.4–5)
ALBUMIN UR-MCNC: NEGATIVE MG/DL
ALP SERPL-CCNC: 65 U/L (ref 40–150)
ALT SERPL W P-5'-P-CCNC: 22 U/L (ref 0–50)
ANION GAP SERPL CALCULATED.3IONS-SCNC: 5 MMOL/L (ref 3–14)
APPEARANCE UR: CLEAR
AST SERPL W P-5'-P-CCNC: 19 U/L (ref 0–45)
BACTERIA #/AREA URNS HPF: ABNORMAL /HPF
BASOPHILS # BLD AUTO: 0 10E9/L (ref 0–0.2)
BASOPHILS NFR BLD AUTO: 0.2 %
BILIRUB SERPL-MCNC: 0.7 MG/DL (ref 0.2–1.3)
BILIRUB UR QL STRIP: NEGATIVE
BUN SERPL-MCNC: 23 MG/DL (ref 7–30)
CALCIUM SERPL-MCNC: 8.9 MG/DL (ref 8.5–10.1)
CHLORIDE SERPL-SCNC: 105 MMOL/L (ref 94–109)
CO2 SERPL-SCNC: 30 MMOL/L (ref 20–32)
COLOR UR AUTO: YELLOW
CREAT SERPL-MCNC: 1.09 MG/DL (ref 0.52–1.04)
DIFFERENTIAL METHOD BLD: ABNORMAL
EOSINOPHIL # BLD AUTO: 0.1 10E9/L (ref 0–0.7)
EOSINOPHIL NFR BLD AUTO: 3.1 %
ERYTHROCYTE [DISTWIDTH] IN BLOOD BY AUTOMATED COUNT: 14.7 % (ref 10–15)
ERYTHROCYTE [SEDIMENTATION RATE] IN BLOOD BY WESTERGREN METHOD: 18 MM/H (ref 0–30)
GFR SERPL CREATININE-BSD FRML MDRD: 50 ML/MIN/{1.73_M2}
GLUCOSE SERPL-MCNC: 88 MG/DL (ref 70–99)
GLUCOSE UR STRIP-MCNC: NEGATIVE MG/DL
HCT VFR BLD AUTO: 40.5 % (ref 35–47)
HGB BLD-MCNC: 13 G/DL (ref 11.7–15.7)
HGB UR QL STRIP: ABNORMAL
KETONES UR STRIP-MCNC: NEGATIVE MG/DL
LEUKOCYTE ESTERASE UR QL STRIP: ABNORMAL
LYMPHOCYTES # BLD AUTO: 0.5 10E9/L (ref 0.8–5.3)
LYMPHOCYTES NFR BLD AUTO: 10.3 %
MCH RBC QN AUTO: 27.4 PG (ref 26.5–33)
MCHC RBC AUTO-ENTMCNC: 32.1 G/DL (ref 31.5–36.5)
MCV RBC AUTO: 85 FL (ref 78–100)
MONOCYTES # BLD AUTO: 0.2 10E9/L (ref 0–1.3)
MONOCYTES NFR BLD AUTO: 5.4 %
NEUTROPHILS # BLD AUTO: 3.6 10E9/L (ref 1.6–8.3)
NEUTROPHILS NFR BLD AUTO: 81 %
NITRATE UR QL: NEGATIVE
NON-SQ EPI CELLS #/AREA URNS LPF: ABNORMAL /LPF
PH UR STRIP: 5.5 PH (ref 5–7)
PLATELET # BLD AUTO: 179 10E9/L (ref 150–450)
POTASSIUM SERPL-SCNC: 4.3 MMOL/L (ref 3.4–5.3)
PROT SERPL-MCNC: 6.6 G/DL (ref 6.8–8.8)
RBC # BLD AUTO: 4.75 10E12/L (ref 3.8–5.2)
RBC #/AREA URNS AUTO: ABNORMAL /HPF
SODIUM SERPL-SCNC: 140 MMOL/L (ref 133–144)
SOURCE: ABNORMAL
SP GR UR STRIP: >1.03 (ref 1–1.03)
UROBILINOGEN UR STRIP-ACNC: 0.2 EU/DL (ref 0.2–1)
WBC # BLD AUTO: 4.5 10E9/L (ref 4–11)
WBC #/AREA URNS AUTO: ABNORMAL /HPF

## 2020-03-06 PROCEDURE — 81001 URINALYSIS AUTO W/SCOPE: CPT | Performed by: NURSE PRACTITIONER

## 2020-03-06 PROCEDURE — 73523 X-RAY EXAM HIPS BI 5/> VIEWS: CPT

## 2020-03-06 PROCEDURE — 36415 COLL VENOUS BLD VENIPUNCTURE: CPT | Performed by: NURSE PRACTITIONER

## 2020-03-06 PROCEDURE — 85652 RBC SED RATE AUTOMATED: CPT | Performed by: NURSE PRACTITIONER

## 2020-03-06 PROCEDURE — 99214 OFFICE O/P EST MOD 30 MIN: CPT | Performed by: NURSE PRACTITIONER

## 2020-03-06 PROCEDURE — 85025 COMPLETE CBC W/AUTO DIFF WBC: CPT | Performed by: NURSE PRACTITIONER

## 2020-03-06 PROCEDURE — 80053 COMPREHEN METABOLIC PANEL: CPT | Performed by: NURSE PRACTITIONER

## 2020-03-06 RX ORDER — CEPHALEXIN 500 MG/1
500 CAPSULE ORAL 2 TIMES DAILY
Qty: 14 CAPSULE | Refills: 0 | Status: ON HOLD | OUTPATIENT
Start: 2020-03-06 | End: 2020-05-21

## 2020-03-06 ASSESSMENT — MIFFLIN-ST. JEOR: SCORE: 2044.04

## 2020-03-06 NOTE — PATIENT INSTRUCTIONS
"  Patient Education     Bladder Infection, Female (Adult)    Urine is normally doesn't have any bacteria in it. But bacteria can get into the urinary tract from the skin around the rectum. Or they can travel in the blood from elsewhere in the body. Once they are in your urinary tract, they can cause infection in the urethra (urethritis), the bladder (cystitis), or the kidneys (pyelonephritis).  The most common place for an infection is in the bladder. This is called a bladder infection. This is one of the most common infections in women. Most bladder infections are easily treated. They are not serious unless the infection spreads to the kidney.  The phrases \"bladder infection,\" \"UTI,\" and \"cystitis\" are often used to describe the same thing. But they are not always the same. Cystitis is an inflammation of the bladder. The most common cause of cystitis is an infection.  Symptoms  The infection causes inflammation in the urethra and bladder. This causes many of the symptoms. The most common symptoms of a bladder infection are:    Pain or burning when urinating    Having to urinate more often than usual    Urgent need to urinate    Only a small amount of urine comes out    Blood in urine    Abdominal discomfort. This is usually in the lower abdomen above the pubic bone.    Cloudy urine    Strong- or bad-smelling urine    Unable to urinate (urinary retention)    Unable to hold urine in (urinary incontinence)    Fever    Loss of appetite    Confusion (in older adults)  Causes  Bladder infections are not contagious. You can't get one from someone else, from a toilet seat, or from sharing a bath.  The most common cause of bladder infections is bacteria from the bowels. The bacteria get onto the skin around the opening of the urethra. From there, they can get into the urine and travel up to the bladder, causing inflammation and infection. This usually happens because of:    Wiping improperly after urinating. Always wipe " from front to back.    Bowel incontinence    Pregnancy    Procedures such as having a catheter inserted    Older age    Not emptying your bladder. This can allow bacteria a chance to grow in your urine.    Dehydration    Constipation    Sex    Use of a diaphragm for birth control   Treatment  Bladder infections are diagnosed by a urine test. They are treated with antibiotics and usually clear up quickly without complications. Treatment helps prevent a more serious kidney infection.  Medicines  Medicines can help in the treatment of a bladder infection:    Take antibiotics until they are used up, even if you feel better. It is important to finish them to make sure the infection has cleared.    You can use acetaminophen or ibuprofen for pain, fever, or discomfort, unless another medicine was prescribed. If you have chronic liver or kidney disease, talk with your healthcare provider before using these medicines. Also talk with your provider if you've ever had a stomach ulcer or gastrointestinal bleeding, or are taking blood-thinner medicines.    If you are given phenazopydridine to reduce burning with urination, it will cause your urine to become a bright orange color. This can stain clothing.  Care and prevention  These self-care steps can help prevent future infections:    Drink plenty of fluids to prevent dehydration and flush out your bladder. Do this unless you must restrict fluids for other health reasons, or your doctor told you not to.    Proper cleaning after going to the bathroom is important. Wipe from front to back after using the toilet to prevent the spread of bacteria.    Urinate more often. Don't try to hold urine in for a long time.    Wear loose-fitting clothes and cotton underwear. Avoid tight-fitting pants.    Improve your diet and prevent constipation. Eat more fresh fruit and vegetables, and fiber, and less junk and fatty foods.    Avoid sex until your symptoms are gone.    Avoid caffeine,  alcohol, and spicy foods. These can irritate your bladder.    Urinate right after intercourse to flush out your bladder.    If you use birth control pills and have frequent bladder infections, discuss it with your doctor.  Follow-up care  Call your healthcare provider if all symptoms are not gone after 3 days of treatment. This is especially important if you have repeat infections.  If a culture was done, you will be told if your treatment needs to be changed. If directed, you can call to find out the results.  If X-rays were done, you will be told if the results will affect your treatment.  Call 911  Call 911 if any of the following occur:    Trouble breathing    Hard to wake up or confusion    Fainting or loss of consciousness    Rapid heart rate  When to seek medical advice  Call your healthcare provider right away if any of these occur:    Fever of 100.4 F (38.0 C) or higher, or as directed by your healthcare provider    Symptoms are not better by the third day of treatment    Back or belly (abdominal) pain that gets worse    Repeated vomiting, or unable to keep medicine down    Weakness or dizziness    Vaginal discharge    Pain, redness, or swelling in the outer vaginal area (labia)  Date Last Reviewed: 10/1/2016    [id number] [employer]. [address] [place] [address], [place] [address]. All rights reserved. This information is not intended as a substitute for professional medical care. Always follow your healthcare professional's instructions.

## 2020-03-06 NOTE — PROGRESS NOTES
"Subjective     Chelo Brooks is a 74 year old female who presents to clinic today for the following health issues:    HPI   Abdominal Pain      Duration:  for several weeks to months- declined triaging    Description (location/character/radiation): right and left groin, and very low abdomen \"where leg creases\"       Associated flank pain: None    Intensity:  Severe, worsening    Accompanying signs and symptoms:        Fever/Chills: YES- chills       Gas/Bloating: no        Nausea/vomitting: YES- Nausea       Diarrhea: no        Dysuria or Hematuria: no     History (previous similar pain/trauma/previous testing): none    Precipitating or alleviating factors:       Pain worse with eating/BM/urination: No. She does have to urinate frequently.      Worse in the morning and when standing up from a sitting position. Gets better after she walks for a little bit.        Pain relieved by BM: no     Therapies tried and outcome: None    LMP:  not applicable  No vaginal discharge.     Patient Active Problem List   Diagnosis     Reflux esophagitis     Advanced directives, counseling/discussion     Allergic rhinitis due to animal dander     Health Care Home     Hyperlipidemia LDL goal <100     Renal hypertension     Morbid obesity due to excess calories (H)     Osteopenia     FLOR (obstructive sleep apnea)- severe (AHI 89)     Vitamin D deficiency     Lumbar spinal stenosis     Combined forms of age-related cataract of both eyes     Choroidal nevus, left eye     S/P knee replacements     Eczema, unspecified type     Stasis dermatitis of both legs     CKD (chronic kidney disease) stage 3, GFR 30-59 ml/min (H)     Mild intermittent asthma, unspecified whether complicated     Elevated parathyroid hormone     Pulmonary emboli (H)     Gallstones     Thyroid enlargement     Adrenal nodule (H)     Thyroid nodule     Past Surgical History:   Procedure Laterality Date     C TOTAL KNEE ARTHROPLASTY  3/2000    right     C TOTAL KNEE " ARTHROPLASTY  6/8/12    left     C VAGINAL HYSTERECTOMY  1977    benign, prolapse, ovaries remain        Social History     Tobacco Use     Smoking status: Never Smoker     Smokeless tobacco: Never Used   Substance Use Topics     Alcohol use: Yes     Alcohol/week: 0.0 standard drinks     Comment: very rare       Family History   Problem Relation Age of Onset     Circulatory Father         d. DVT     Heart Disease Father         pacer     Diabetes Mother      Hypertension Mother      C.A.D. Paternal Uncle         MI      Heart Disease Brother 48        pacer      Diabetes Maternal Grandmother      Hypertension Maternal Grandmother      Diabetes Maternal Aunt      Hypertension Maternal Aunt      Cancer - colorectal Maternal Grandfather      C.A.D. Maternal Aunt         MI     Glaucoma No family hx of      Macular Degeneration No family hx of          Current Outpatient Medications   Medication Sig Dispense Refill     albuterol (PROAIR HFA/PROVENTIL HFA/VENTOLIN HFA) 108 (90 Base) MCG/ACT inhaler Inhale 1-2 puffs into the lungs every 4 hours as needed for shortness of breath / dyspnea 1 Inhaler 3     atorvastatin (LIPITOR) 40 MG tablet Take 1 tablet (40 mg) by mouth daily (Patient taking differently: Take 40 mg by mouth daily 3 times weekly) 90 tablet 3     Cholecalciferol (VITAMIN D) 2000 UNITS tablet Take 2,000 Units by mouth daily       enoxaparin ANTICOAGULANT (LOVENOX) 150 MG/ML syringe Inject 1 mL (150 mg) Subcutaneous every 12 hours for 16 days . Begin 3 days prior to surgery. Take last dose 12 hours prior to surgery. Resume per surgeon's instructions until INR is 2 or above 32 mL 0     fluticasone (FLONASE) 50 MCG/ACT spray Spray 2 sprays into both nostrils daily 1 Bottle 11     Hypertonic Nasal Wash (SINUS RINSE BOTTLE KIT) PACK Spray 1 Bottle in nostril daily as needed.       loratadine (CLARITIN) 10 MG tablet Take 1 tablet (10 mg) by mouth daily       mometasone (ELOCON) 0.1 % external cream Apply to  "affected area on legs twice daily as needed       mometasone furoate (ASMANEX HFA) 200 MCG/ACT inhaler Inhale 2 puffs into the lungs 2 times daily 1 Inhaler 3     olmesartan-hydrochlorothiazide (BENICAR) 20-12.5 MG tablet Take 1 tablet by mouth daily 90 tablet 3     order for DME Equipment being ordered: Auto CPAP 11-18 1 Units 0     ranitidine (ZANTAC) 300 MG tablet Take 1 tablet (300 mg) by mouth At Bedtime 90 tablet 3     warfarin ANTICOAGULANT (COUMADIN) 5 MG tablet Take 1 tablet (5 mg) by mouth daily Or as instructed (Patient not taking: Reported on 2/24/2020) 90 tablet 0     Allergies   Allergen Reactions     Adhesive Tape      Recent Labs   Lab Test 10/31/19 10/29/19 06/18/19  1036 04/05/18  1125 06/20/17  1130  01/25/17  1251  03/15/12  1159   LDL  --   --  142* 141* 99  --  137*   < > 153*   HDL  --   --  47* 58 56  --   --    < > 47*   TRIG  --   --  168* 104 98  --   --    < > 170*   ALT  --   --   --  18  --   --  21  --  21   CR  --  1.10 1.07* 0.94 0.96   < > 0.94   < > 0.93   GFRESTIMATED  --  49* 52* 58* 57*   < > 59*   < > 61   GFRESTBLACK  --  59* 60* 71 70   < > 72   < > 73   POTASSIUM 4.0  --  4.5 4.3 4.3   < > 4.0   < > 4.1   TSH  --   --   --  1.61  --   --  1.35   < >  --     < > = values in this interval not displayed.      BP Readings from Last 3 Encounters:   03/06/20 128/84   02/24/20 133/84   12/16/19 124/82    Wt Readings from Last 3 Encounters:   03/06/20 (!) 150.1 kg (331 lb)   02/24/20 (!) 150.6 kg (332 lb)   12/16/19 (!) 151.5 kg (334 lb)            Reviewed and updated as needed this visit by Provider  Tobacco  Allergies  Meds  Problems  Med Hx  Surg Hx  Fam Hx         Review of Systems   ROS COMP: Constitutional, HEENT, cardiovascular, pulmonary, gi and gu systems are negative, except as otherwise noted.      Objective    /84   Pulse 91   Temp 97.8  F (36.6  C) (Oral)   Resp 18   Ht 1.702 m (5' 7\")   Wt (!) 150.1 kg (331 lb)   SpO2 95%   BMI 51.84 kg/m    Body " mass index is 51.84 kg/m .  Physical Exam   GENERAL: healthy, alert and no distress  EYES: Eyes grossly normal to inspection, PERRL and conjunctivae and sclerae normal  HENT: ear canals and TM's normal, nose and mouth without ulcers or lesions  NECK: no adenopathy, no asymmetry, masses, or scars and thyroid normal to palpation  RESP: lungs clear to auscultation - no rales, rhonchi or wheezes  BREAST: normal without masses, tenderness or nipple discharge and no palpable axillary masses or adenopathy  CV: regular rate and rhythm, normal S1 S2, no S3 or S4, no murmur, click or rub, no peripheral edema and peripheral pulses strong  ABDOMEN: no tenderness suprapubic, no organomegaly or masses, bowel sounds normal and no palpable or pulsatile masses  MS: normal range of motion bilateral hips, tenderness to firm palpitation of bilateral inguinal to groin area no lumps/bumps  SKIN: no suspicious lesions or rashes  NEURO: Normal strength and tone, mentation intact and speech normal  PSYCH: mentation appears normal, affect normal/bright    Diagnostic Test Results:  Labs reviewed in Epic  Results for orders placed or performed in visit on 03/06/20 (from the past 24 hour(s))   CBC with platelets differential   Result Value Ref Range    WBC 4.5 4.0 - 11.0 10e9/L    RBC Count 4.75 3.8 - 5.2 10e12/L    Hemoglobin 13.0 11.7 - 15.7 g/dL    Hematocrit 40.5 35.0 - 47.0 %    MCV 85 78 - 100 fl    MCH 27.4 26.5 - 33.0 pg    MCHC 32.1 31.5 - 36.5 g/dL    RDW 14.7 10.0 - 15.0 %    Platelet Count 179 150 - 450 10e9/L    Diff Method Automated Method     % Neutrophils 81.0 %    % Lymphocytes 10.3 %    % Monocytes 5.4 %    % Eosinophils 3.1 %    % Basophils 0.2 %    Absolute Neutrophil 3.6 1.6 - 8.3 10e9/L    Absolute Lymphocytes 0.5 (L) 0.8 - 5.3 10e9/L    Absolute Monocytes 0.2 0.0 - 1.3 10e9/L    Absolute Eosinophils 0.1 0.0 - 0.7 10e9/L    Absolute Basophils 0.0 0.0 - 0.2 10e9/L   UA reflex to Microscopic and Culture   Result Value Ref  Range    Color Urine Yellow     Appearance Urine Clear     Glucose Urine Negative NEG^Negative mg/dL    Bilirubin Urine Negative NEG^Negative    Ketones Urine Negative NEG^Negative mg/dL    Specific Gravity Urine >1.030 1.003 - 1.035    Blood Urine Trace (A) NEG^Negative    pH Urine 5.5 5.0 - 7.0 pH    Protein Albumin Urine Negative NEG^Negative mg/dL    Urobilinogen Urine 0.2 0.2 - 1.0 EU/dL    Nitrite Urine Negative NEG^Negative    Leukocyte Esterase Urine Small (A) NEG^Negative    Source Midstream Urine    Urine Microscopic   Result Value Ref Range    WBC Urine 5-10 (A) OTO5^0 - 5 /HPF    RBC Urine O - 2 OTO2^O - 2 /HPF    Squamous Epithelial /LPF Urine Few FEW^Few /LPF    Bacteria Urine Few (A) NEG^Negative /HPF           Assessment & Plan     1. Pelvic pain in female  - CBC with platelets differential  - Comprehensive metabolic panel  - Erythrocyte sedimentation rate auto  - UA reflex to Microscopic and Culture  - Urine Microscopic    2. Bilateral groin pain  - XR Pelvis and Hip Bilateral 2 Views    3. Acute cystitis with hematuria  - cephALEXin (KEFLEX) 500 MG capsule; Take 1 capsule (500 mg) by mouth 2 times daily for 7 days  Dispense: 14 capsule; Refill: 0     Return in about 3 months (around 6/6/2020), or if symptoms worsen or fail to improve, for Physical.    MARIE Marsh [facility]

## 2020-03-09 ENCOUNTER — TELEPHONE (OUTPATIENT)
Dept: FAMILY MEDICINE | Facility: CLINIC | Age: 75
End: 2020-03-09

## 2020-03-09 NOTE — TELEPHONE ENCOUNTER
Pt was given provider's message as written.    Laurie Cassidy RN  [facility]

## 2020-03-09 NOTE — TELEPHONE ENCOUNTER
Voice mail left for patient to call RN hotline at [phone].      Notes recorded by Karo Garner APRN CNP on 3/6/2020 at 4:51 PM CST   X-ray results of pelvis and hip: Mild age related changes.    Labs look pretty good. Kidney function is a little reduced but better than it has been in the past. Make sure to stay well hydrated with water and avoid NSAIDs.   MARIE Gomes, MAYI Paige RN

## 2020-03-12 ENCOUNTER — ANTICOAGULATION THERAPY VISIT (OUTPATIENT)
Dept: NURSING | Facility: CLINIC | Age: 75
End: 2020-03-12
Payer: COMMERCIAL

## 2020-03-12 DIAGNOSIS — I26.99 ACUTE PULMONARY EMBOLISM, UNSPECIFIED PULMONARY EMBOLISM TYPE, UNSPECIFIED WHETHER ACUTE COR PULMONALE PRESENT (H): ICD-10-CM

## 2020-03-12 LAB — INR POINT OF CARE: 1.8 (ref 0.86–1.14)

## 2020-03-12 PROCEDURE — 36416 COLLJ CAPILLARY BLOOD SPEC: CPT

## 2020-03-12 PROCEDURE — 85610 PROTHROMBIN TIME: CPT | Mod: QW

## 2020-03-12 PROCEDURE — 99207 ZZC NO CHARGE NURSE ONLY: CPT

## 2020-03-12 NOTE — PATIENT INSTRUCTIONS
Last warfarin dose: 3/18  3/19, NO warfarin  3/20, NO warfarin  3/21, NO warfarin, begin enoxaparin injections into the abdomen every 12 hours (AM and PM)  3/22, NO warfarin, enoxaparin injection into the abdomen every 12 hours (AM and PM)  3/23, NO warfarin, enoxaparin injection into the abdomen AM only (no enoxaparin 24 hours prior to surgery)   3/24, DAY OF PROCEDURE, NO enoxaparin. Restart warfarin 10 mg (2 tabs) in the evening, unless instructed otherwise by the physician.  3/25, Restart enoxaparin injections 24 hours after procedure unless instructed otherwise by the physician, and 10 mg (2 tabs) warfarin in the evening.   3/26, Enoxaparin injection into the abdomen every 12 hours (AM and PM) and 5 mg (1 tabs) warfarin in the evening.  3/27,  Enoxaparin injection into the abdomen in the AM. Recheck INR at the [facility] for further dosing instructions.

## 2020-03-12 NOTE — PROGRESS NOTES
ANTICOAGULATION FOLLOW-UP CLINIC VISIT    Patient Name:  Chelo Brooks  Date:  3/12/2020  Contact Type:  Face to Face    SUBJECTIVE:  Patient Findings     Positives:   Change in health, Upcoming invasive procedure, Missed doses, Change in medications    Comments:   Patient on Keflex for UTI. Patient had held for biopsy on thyroid. Did not take Lovenox afterwards as directed. New instructions given for upcoming procedure and stressed the importance of taking lovenox before and after the procedure        Clinical Outcomes     Comments:   Patient on Keflex for UTI. Patient had held for biopsy on thyroid. Did not take Lovenox afterwards as directed. New instructions given for upcoming procedure and stressed the importance of taking lovenox before and after the procedure           OBJECTIVE    INR Protime   Date Value Ref Range Status   2020 1.8 (A) 0.86 - 1.14 Final       ASSESSMENT / PLAN  No question data found.  Anticoagulation Summary  As of 3/12/2020    INR goal:   2.0-3.0   TTR:   81.5 % (4 mo)   INR used for dosin.8! (3/12/2020)   Warfarin maintenance plan:   5 mg (5 mg x 1) every day   Full warfarin instructions:   3/19: Hold; 3/20: Hold; 3/21: Hold; 3/22: Hold; 3/23: Hold; 3/24: 10 mg; 3/25: 10 mg; Otherwise 5 mg every day   Weekly warfarin total:   35 mg   Plan last modified:   Berenice Poole, RN (2019)   Next INR check:   3/27/2020   Priority:   High   Target end date:   Indefinite    Indications    Pulmonary emboli (H) [I26.99]  Deep vein thrombosis (DVT) (H) (Resolved) [I82.409]             Anticoagulation Episode Summary     INR check location:   Anticoagulation Clinic    Preferred lab:   [facility]    Send INR reminders to:   CASTILLO RIVERA    Comments:   Takes warfarin in the morning      Anticoagulation Care Providers     Provider Role Specialty Phone number    Vita Zavala MD [facility] [phone]            See the Encounter Report to  view Anticoagulation Flowsheet and Dosing Calendar (Go to Encounters tab in chart review, and find the Anticoagulation Therapy Visit)        Meghan Devine RN

## 2020-03-20 ENCOUNTER — TELEPHONE (OUTPATIENT)
Dept: FAMILY MEDICINE | Facility: CLINIC | Age: 75
End: 2020-03-20

## 2020-03-20 DIAGNOSIS — I26.99 ACUTE PULMONARY EMBOLISM, UNSPECIFIED PULMONARY EMBOLISM TYPE, UNSPECIFIED WHETHER ACUTE COR PULMONALE PRESENT (H): Primary | ICD-10-CM

## 2020-03-20 NOTE — TELEPHONE ENCOUNTER
ACC appointment changed to lab appointment. Orders placed    Mgehan Devine RN - [facility]

## 2020-03-27 ENCOUNTER — ANTICOAGULATION THERAPY VISIT (OUTPATIENT)
Dept: FAMILY MEDICINE | Facility: CLINIC | Age: 75
End: 2020-03-27

## 2020-03-27 DIAGNOSIS — I26.99 ACUTE PULMONARY EMBOLISM, UNSPECIFIED PULMONARY EMBOLISM TYPE, UNSPECIFIED WHETHER ACUTE COR PULMONALE PRESENT (H): ICD-10-CM

## 2020-03-27 LAB
CAPILLARY BLOOD COLLECTION: NORMAL
INR PPP: 2.4 (ref 0.86–1.14)

## 2020-03-27 PROCEDURE — 99207 ZZC NO CHARGE NURSE ONLY: CPT | Performed by: FAMILY MEDICINE

## 2020-03-27 PROCEDURE — 85610 PROTHROMBIN TIME: CPT | Performed by: FAMILY MEDICINE

## 2020-03-27 PROCEDURE — 36416 COLLJ CAPILLARY BLOOD SPEC: CPT | Performed by: FAMILY MEDICINE

## 2020-03-27 NOTE — PROGRESS NOTES
ANTICOAGULATION FOLLOW-UP CLINIC VISIT    Patient Name:  Chelo Brooks  Date:  3/27/2020  Contact Type:  Telephone    SUBJECTIVE:  Patient Findings     Comments:   Procedure was postponed due to COVID. Patient continued her maintenance dose. The patient was assessed for diet, medication, and activity level changes, missed or extra doses, bruising or bleeding, with no problem findings.          Clinical Outcomes     Negatives:   Major bleeding event, Thromboembolic event, Anticoagulation-related hospital admission, Anticoagulation-related ED visit, Anticoagulation-related fatality    Comments:   Procedure was postponed due to COVID. Patient continued her maintenance dose. The patient was assessed for diet, medication, and activity level changes, missed or extra doses, bruising or bleeding, with no problem findings.             OBJECTIVE    INR   Date Value Ref Range Status   2020 2.40 (H) 0.86 - 1.14 Final     Comment:     This test is intended for monitoring Coumadin therapy.  Results are not   accurate in patients with prolonged INR due to factor deficiency.         ASSESSMENT / PLAN  No question data found.  Anticoagulation Summary  As of 3/27/2020    INR goal:   2.0-3.0   TTR:   79.8 % (4.5 mo)   INR used for dosin.40 (3/27/2020)   Warfarin maintenance plan:   5 mg (5 mg x 1) every day   Full warfarin instructions:   5 mg every day   Weekly warfarin total:   35 mg   No change documented:   Meghan Devine RN   Plan last modified:   Berenice Poole RN (2019)   Next INR check:   2020   Priority:   High   Target end date:   Indefinite    Indications    Pulmonary emboli (H) [I26.99]  Deep vein thrombosis (DVT) (H) (Resolved) [I82.409]             Anticoagulation Episode Summary     INR check location:   Anticoagulation Clinic    Preferred lab:   [facility]    Send INR reminders to:   CASTILLO RIVERA    Comments:   Takes warfarin in the morning      Anticoagulation Care  Providers     Provider Role Specialty Phone number    Vita Zavala MD [facility] [phone]            See the Encounter Report to view Anticoagulation Flowsheet and Dosing Calendar (Go to Encounters tab in chart review, and find the Anticoagulation Therapy Visit)        Meghan Devine RN

## 2020-04-01 ENCOUNTER — TELEPHONE (OUTPATIENT)
Dept: INTERVENTIONAL RADIOLOGY/VASCULAR | Facility: CLINIC | Age: 75
End: 2020-04-01

## 2020-04-03 ENCOUNTER — TELEPHONE (OUTPATIENT)
Dept: INTERVENTIONAL RADIOLOGY/VASCULAR | Facility: CLINIC | Age: 75
End: 2020-04-03

## 2020-04-03 RX ORDER — NICOTINE POLACRILEX 4 MG
15-30 LOZENGE BUCCAL
Status: CANCELLED | OUTPATIENT
Start: 2020-04-03

## 2020-04-03 RX ORDER — LIDOCAINE 40 MG/G
CREAM TOPICAL
Status: CANCELLED | OUTPATIENT
Start: 2020-04-03

## 2020-04-03 RX ORDER — SODIUM CHLORIDE 9 MG/ML
INJECTION, SOLUTION INTRAVENOUS CONTINUOUS
Status: CANCELLED | OUTPATIENT
Start: 2020-04-03

## 2020-04-03 RX ORDER — DEXTROSE MONOHYDRATE 25 G/50ML
25-50 INJECTION, SOLUTION INTRAVENOUS
Status: CANCELLED | OUTPATIENT
Start: 2020-04-03

## 2020-04-06 ENCOUNTER — TELEPHONE (OUTPATIENT)
Dept: FAMILY MEDICINE | Facility: CLINIC | Age: 75
End: 2020-04-06

## 2020-04-06 DIAGNOSIS — I26.99 ACUTE PULMONARY EMBOLISM, UNSPECIFIED PULMONARY EMBOLISM TYPE, UNSPECIFIED WHETHER ACUTE COR PULMONALE PRESENT (H): ICD-10-CM

## 2020-04-06 NOTE — TELEPHONE ENCOUNTER
Patient stopped warfarin 4 days ago and began Lovenox for procedure tomorrow. She found out today that procedure has been postponed. Advised to continue with lovenox twice daily. Restart warfarin today 10 mg Mon/Tue, then 5 mg daily and scheduled INR for 4/10. Patient verbalized understanding and has no further questions or concerns.    Meghan Devine RN - [facility]

## 2020-04-09 ENCOUNTER — TELEPHONE (OUTPATIENT)
Dept: LAB | Facility: CLINIC | Age: 75
End: 2020-04-09

## 2020-04-09 NOTE — TELEPHONE ENCOUNTER
.Left message for patient to call in regards to 24 hour pre-appointment COVID screening. Patient has an appointment at [facility] on 4/10/2020.  Please ask infection screening questions and update appointment notes on [facility] lab schedule.

## 2020-04-10 ENCOUNTER — ANTICOAGULATION THERAPY VISIT (OUTPATIENT)
Dept: FAMILY MEDICINE | Facility: CLINIC | Age: 75
End: 2020-04-10

## 2020-04-10 ENCOUNTER — TELEPHONE (OUTPATIENT)
Dept: FAMILY MEDICINE | Facility: CLINIC | Age: 75
End: 2020-04-10

## 2020-04-10 DIAGNOSIS — I26.99 ACUTE PULMONARY EMBOLISM, UNSPECIFIED PULMONARY EMBOLISM TYPE, UNSPECIFIED WHETHER ACUTE COR PULMONALE PRESENT (H): ICD-10-CM

## 2020-04-10 DIAGNOSIS — I26.99 PULMONARY EMBOLI (H): ICD-10-CM

## 2020-04-10 LAB
CAPILLARY BLOOD COLLECTION: NORMAL
INR PPP: 2.6 (ref 0.86–1.14)

## 2020-04-10 PROCEDURE — 36416 COLLJ CAPILLARY BLOOD SPEC: CPT | Performed by: FAMILY MEDICINE

## 2020-04-10 PROCEDURE — 85610 PROTHROMBIN TIME: CPT | Performed by: FAMILY MEDICINE

## 2020-04-10 PROCEDURE — 99207 ZZC NO CHARGE NURSE ONLY: CPT | Performed by: FAMILY MEDICINE

## 2020-04-10 NOTE — PROGRESS NOTES
ANTICOAGULATION FOLLOW-UP CLINIC VISIT    Patient Name:  Chelo Brooks  Date:  4/10/2020  Contact Type:  Face to Face    SUBJECTIVE:  Patient Findings     Comments:     Assessed for S/S bleeding, clotting, medication, diet, health, activity and alcohol changes          Clinical Outcomes     Negatives:   Major bleeding event, Thromboembolic event, Anticoagulation-related hospital admission, Anticoagulation-related ED visit, Anticoagulation-related fatality    Comments:     Assessed for S/S bleeding, clotting, medication, diet, health, activity and alcohol changes             OBJECTIVE    INR   Date Value Ref Range Status   04/10/2020 2.60 (H) 0.86 - 1.14 Final       ASSESSMENT / PLAN  INR assessment THER    Recheck INR In: 4 WEEKS    INR Location Clinic      Anticoagulation Summary  As of 4/10/2020    INR goal:   2.0-3.0   TTR:   81.7 % (4.9 mo)   INR used for dosing:      Warfarin maintenance plan:   5 mg (5 mg x 1) every day   Full warfarin instructions:   5 mg every day   Weekly warfarin total:   35 mg   No change documented:   Amanda Meadows RN   Plan last modified:   Berenice Poole RN (11/4/2019)   Next INR check:   5/8/2020   Priority:   High   Target end date:   Indefinite    Indications    Pulmonary emboli (H) [I26.99]  Deep vein thrombosis (DVT) (H) (Resolved) [I82.409]             Anticoagulation Episode Summary     INR check location:   Anticoagulation Clinic    Preferred lab:   [facility] R[facility]    Send INR reminders to:   CASTILLO RIVERA    Comments:   Takes warfarin in the morning      Anticoagulation Care Providers     Provider Role Specialty Phone number    SallyVita MD [facility] [phone]            See the Encounter Report to view Anticoagulation Flowsheet and Dosing Calendar (Go to Encounters tab in chart review, and find the Anticoagulation Therapy Visit)        Amanda Meadows RN

## 2020-04-10 NOTE — TELEPHONE ENCOUNTER
Anticoagulation Management    Unable to reach Chelo today.    Today's INR result of 2.6 is therapeutic (goal INR of 2.0-3.0).  Result received from: Clinic Lab    Follow up required to discuss dosing instructions and confirm understanding of instructions    Left message to call back before 3      Anticoagulation clinic to follow up [phone]    Amanda Meadows RN

## 2020-04-14 DIAGNOSIS — I26.99 ACUTE PULMONARY EMBOLISM, UNSPECIFIED PULMONARY EMBOLISM TYPE, UNSPECIFIED WHETHER ACUTE COR PULMONALE PRESENT (H): ICD-10-CM

## 2020-04-15 RX ORDER — WARFARIN SODIUM 5 MG/1
5 TABLET ORAL DAILY
Qty: 90 TABLET | Refills: 0 | Status: SHIPPED | OUTPATIENT
Start: 2020-04-15 | End: 2020-09-14

## 2020-04-15 NOTE — TELEPHONE ENCOUNTER
INR at last check was 2.6 on 04/10/2020.  Dose: 5 mg every day    Prescription approved per [facility] Refill Protocol.

## 2020-05-05 ENCOUNTER — TELEPHONE (OUTPATIENT)
Dept: FAMILY MEDICINE | Facility: CLINIC | Age: 75
End: 2020-05-05

## 2020-05-05 DIAGNOSIS — K21.00 REFLUX ESOPHAGITIS: Primary | Chronic | ICD-10-CM

## 2020-05-05 RX ORDER — FAMOTIDINE 40 MG/1
40 TABLET, FILM COATED ORAL DAILY
Qty: 90 TABLET | Refills: 0 | Status: SHIPPED | OUTPATIENT
Start: 2020-05-05 | End: 2020-08-07

## 2020-05-05 NOTE — TELEPHONE ENCOUNTER
Voice mail left for patient to call RN hotline at [phone].    Order pending for famotidine, what pharmacy would you like this sent to?  Ranitidine removed from med list.   Cielo Paige RN

## 2020-05-05 NOTE — TELEPHONE ENCOUNTER
Patient returned call and verified pharmacy.  Notified patient of medication, she verbalized understanding.  Prescription sent to pharmacy.   Cielo Paige RN

## 2020-05-05 NOTE — TELEPHONE ENCOUNTER
Reason for call:  Other     Patient called regarding (reason for call): prescription    Additional comments: Patient calling stating that her insurance will no longer be covering Zantac, and will need something new. Her insurance provided 3 possible medications -  -Famotidine  -Cimetidine  -Nizatidine   Please call to advise.     Phone number to reach patient:  Home number on file [phone] (home)    Best Time:  Any     Can we leave a detailed message on this number?  YES    Travel screening: Not Applicable

## 2020-05-07 ENCOUNTER — TELEPHONE (OUTPATIENT)
Dept: LAB | Facility: CLINIC | Age: 75
End: 2020-05-07

## 2020-05-07 NOTE — TELEPHONE ENCOUNTER
.Left message for patient to call in regards to 24 hour pre-appointment COVID screening. Patient has an appointment at [place] on 5/08/2020.  Please ask infection screening questions and update appointment notes on [facility] lab schedule.

## 2020-05-08 ENCOUNTER — ANTICOAGULATION THERAPY VISIT (OUTPATIENT)
Dept: FAMILY MEDICINE | Facility: CLINIC | Age: 75
End: 2020-05-08

## 2020-05-08 DIAGNOSIS — I26.99 PULMONARY EMBOLI (H): ICD-10-CM

## 2020-05-08 DIAGNOSIS — I26.99 ACUTE PULMONARY EMBOLISM, UNSPECIFIED PULMONARY EMBOLISM TYPE, UNSPECIFIED WHETHER ACUTE COR PULMONALE PRESENT (H): ICD-10-CM

## 2020-05-08 LAB
CAPILLARY BLOOD COLLECTION: NORMAL
INR PPP: 3.2 (ref 0.86–1.14)

## 2020-05-08 PROCEDURE — 85610 PROTHROMBIN TIME: CPT | Performed by: FAMILY MEDICINE

## 2020-05-08 PROCEDURE — 36415 COLL VENOUS BLD VENIPUNCTURE: CPT | Performed by: FAMILY MEDICINE

## 2020-05-08 NOTE — PROGRESS NOTES
Anticoagulation Management    Unable to reach Chelo today.    Today's INR result of 3.2 is supratherapeutic (goal INR of 2.0-3.0).  Result received from: Clinic Lab    Follow up required to encouraged to eat more greens and recheck in 2 weeks    [phone]      Anticoagulation clinic to follow up    Amanda Meadows RN

## 2020-05-18 ENCOUNTER — TELEPHONE (OUTPATIENT)
Dept: INTERVENTIONAL RADIOLOGY/VASCULAR | Facility: CLINIC | Age: 75
End: 2020-05-18

## 2020-05-18 DIAGNOSIS — K11.8 PAROTID MASS: Primary | ICD-10-CM

## 2020-05-21 ENCOUNTER — HOSPITAL ENCOUNTER (OUTPATIENT)
Dept: INTERVENTIONAL RADIOLOGY/VASCULAR | Facility: CLINIC | Age: 75
End: 2020-05-21
Attending: SURGERY | Admitting: RADIOLOGY
Payer: COMMERCIAL

## 2020-05-21 ENCOUNTER — HOSPITAL ENCOUNTER (OUTPATIENT)
Facility: CLINIC | Age: 75
Discharge: HOME | End: 2020-05-21
Attending: RADIOLOGY | Admitting: RADIOLOGY
Payer: COMMERCIAL

## 2020-05-21 ENCOUNTER — APPOINTMENT (OUTPATIENT)
Dept: MEDSURG UNIT | Facility: CLINIC | Age: 75
End: 2020-05-21
Attending: RADIOLOGY
Payer: COMMERCIAL

## 2020-05-21 VITALS
SYSTOLIC BLOOD PRESSURE: 119 MMHG | WEIGHT: 293 LBS | HEART RATE: 61 BPM | DIASTOLIC BLOOD PRESSURE: 72 MMHG | HEIGHT: 68 IN | OXYGEN SATURATION: 96 % | BODY MASS INDEX: 44.41 KG/M2 | TEMPERATURE: 98.4 F | RESPIRATION RATE: 16 BRPM

## 2020-05-21 DIAGNOSIS — K11.8 PAROTID MASS: ICD-10-CM

## 2020-05-21 LAB
B-HCG FREE SERPL-ACNC: 1.1 [IU]/L (ref 0.86–1.14)
HGB BLD-MCNC: 12 G/DL (ref 11.7–15.7)
PLATELET # BLD AUTO: 153 10E9/L (ref 150–450)

## 2020-05-21 PROCEDURE — 25000128 H RX IP 250 OP 636: Performed by: RADIOLOGY

## 2020-05-21 PROCEDURE — 76942 ECHO GUIDE FOR BIOPSY: CPT

## 2020-05-21 PROCEDURE — 88172 CYTP DX EVAL FNA 1ST EA SITE: CPT | Performed by: SURGERY

## 2020-05-21 PROCEDURE — 88173 CYTOPATH EVAL FNA REPORT: CPT | Performed by: SURGERY

## 2020-05-21 PROCEDURE — 00000155 ZZHCL STATISTIC H-CELL BLOCK W/STAIN: Performed by: SURGERY

## 2020-05-21 PROCEDURE — 99152 MOD SED SAME PHYS/QHP 5/>YRS: CPT

## 2020-05-21 PROCEDURE — 25800030 ZZH RX IP 258 OP 636: Performed by: RADIOLOGY

## 2020-05-21 PROCEDURE — 25000125 ZZHC RX 250: Performed by: RADIOLOGY

## 2020-05-21 PROCEDURE — 40000166 ZZH STATISTIC PP CARE STAGE 1

## 2020-05-21 PROCEDURE — 27210732 IR PAROTID BIOPSY

## 2020-05-21 PROCEDURE — 85018 HEMOGLOBIN: CPT | Performed by: RADIOLOGY

## 2020-05-21 PROCEDURE — 85610 PROTHROMBIN TIME: CPT | Mod: GZ

## 2020-05-21 PROCEDURE — 88305 TISSUE EXAM BY PATHOLOGIST: CPT | Performed by: SURGERY

## 2020-05-21 PROCEDURE — 85049 AUTOMATED PLATELET COUNT: CPT | Performed by: RADIOLOGY

## 2020-05-21 RX ORDER — NALOXONE HYDROCHLORIDE 0.4 MG/ML
.1-.4 INJECTION, SOLUTION INTRAMUSCULAR; INTRAVENOUS; SUBCUTANEOUS
Status: DISCONTINUED | OUTPATIENT
Start: 2020-05-21 | End: 2020-05-21 | Stop reason: HOSPADM

## 2020-05-21 RX ORDER — NICOTINE POLACRILEX 4 MG
15-30 LOZENGE BUCCAL
Status: DISCONTINUED | OUTPATIENT
Start: 2020-05-21 | End: 2020-05-21 | Stop reason: HOSPADM

## 2020-05-21 RX ORDER — LIDOCAINE 40 MG/G
CREAM TOPICAL
Status: DISCONTINUED | OUTPATIENT
Start: 2020-05-21 | End: 2020-05-21 | Stop reason: HOSPADM

## 2020-05-21 RX ORDER — NICOTINE POLACRILEX 4 MG
15-30 LOZENGE BUCCAL
Status: DISCONTINUED | OUTPATIENT
Start: 2020-05-21 | End: 2020-05-22 | Stop reason: HOSPADM

## 2020-05-21 RX ORDER — NICOTINE POLACRILEX 4 MG
15-30 LOZENGE BUCCAL
Status: CANCELLED | OUTPATIENT
Start: 2020-05-21

## 2020-05-21 RX ORDER — DEXTROSE MONOHYDRATE 25 G/50ML
25-50 INJECTION, SOLUTION INTRAVENOUS
Status: CANCELLED | OUTPATIENT
Start: 2020-05-21

## 2020-05-21 RX ORDER — FENTANYL CITRATE 50 UG/ML
25-50 INJECTION, SOLUTION INTRAMUSCULAR; INTRAVENOUS EVERY 5 MIN PRN
Status: DISCONTINUED | OUTPATIENT
Start: 2020-05-21 | End: 2020-05-21 | Stop reason: HOSPADM

## 2020-05-21 RX ORDER — DEXTROSE MONOHYDRATE 25 G/50ML
25-50 INJECTION, SOLUTION INTRAVENOUS
Status: DISCONTINUED | OUTPATIENT
Start: 2020-05-21 | End: 2020-05-22 | Stop reason: HOSPADM

## 2020-05-21 RX ORDER — SODIUM CHLORIDE 9 MG/ML
INJECTION, SOLUTION INTRAVENOUS CONTINUOUS
Status: DISCONTINUED | OUTPATIENT
Start: 2020-05-21 | End: 2020-05-21 | Stop reason: HOSPADM

## 2020-05-21 RX ORDER — DEXTROSE MONOHYDRATE 25 G/50ML
25-50 INJECTION, SOLUTION INTRAVENOUS
Status: DISCONTINUED | OUTPATIENT
Start: 2020-05-21 | End: 2020-05-21 | Stop reason: HOSPADM

## 2020-05-21 RX ORDER — FLUMAZENIL 0.1 MG/ML
0.2 INJECTION, SOLUTION INTRAVENOUS
Status: DISCONTINUED | OUTPATIENT
Start: 2020-05-21 | End: 2020-05-21 | Stop reason: HOSPADM

## 2020-05-21 RX ADMIN — FENTANYL CITRATE 50 MCG: 50 INJECTION, SOLUTION INTRAMUSCULAR; INTRAVENOUS at 10:26

## 2020-05-21 RX ADMIN — MIDAZOLAM 1 MG: 1 INJECTION INTRAMUSCULAR; INTRAVENOUS at 10:35

## 2020-05-21 RX ADMIN — MIDAZOLAM 1 MG: 1 INJECTION INTRAMUSCULAR; INTRAVENOUS at 10:26

## 2020-05-21 RX ADMIN — LIDOCAINE HYDROCHLORIDE 6 ML: 10 INJECTION, SOLUTION EPIDURAL; INFILTRATION; INTRACAUDAL; PERINEURAL at 10:30

## 2020-05-21 RX ADMIN — FENTANYL CITRATE 50 MCG: 50 INJECTION, SOLUTION INTRAMUSCULAR; INTRAVENOUS at 10:35

## 2020-05-21 RX ADMIN — SODIUM CHLORIDE: 9 INJECTION, SOLUTION INTRAVENOUS at 08:56

## 2020-05-21 ASSESSMENT — MIFFLIN-ST. JEOR: SCORE: 2065.11

## 2020-05-21 NOTE — PROGRESS NOTES
[facility] [place]     Neuroradiology      Pre-Procedure Sedation Assessment :      5/21/2020 9:05 AM    Expected Level: Moderate Sedation    Indication: Sedation is required for the following type of Procedure: Biopsy    Sedation and procedural consent: Risks, benefits and alternatives were discussed with Patient    PO Intake: Appropriately NPO for procedure    ASA Class: Class 2 - MILD SYSTEMIC DISEASE, NO ACUTE PROBLEMS, NO FUNCTIONAL LIMITATIONS.    Mallampati: Grade 2:  Soft palate, base of uvula, tonsillar pillars, and portion of posterior pharyngeal wall visible    Lungs: Lungs Clear with good breath sounds bilaterally    Heart: Normal heart sounds and rate    Focused history and physical completed prior to procedure. I have reviewed the lab findings, diagnostic data, medications, and the plan for sedation. I have determined this patient to be an appropriate candidate for the planned sedation and procedure and have reassessed the patient IMMEDIATELY PRIOR to sedation and procedure.    Eugene Haile MD

## 2020-05-21 NOTE — H&P
Interventional Radiology Pre-Procedural History and Physical    Date of this visit: 5/21/2020    Primary Physician: Vita Zavala        History Of Present Illness:    Chelo Brooks is a 75 year old female with a diagnosis of right parotid garo a background of who is here for Parotid biopsy.    Review of Systems:    General: Negative for recent fever.  Skin: Negative for jaundice.  Eyes: Negative for jaundice.  Respiratory: Negative for shortness of breath.  Cardiovascular: Negative for chest pain.  Gastrointestinal: Negative for abdominal pain or swelling, nausea, vomiting, or diarrhea.  Musculoskeletal: Negative for ankle swelling.       Past Medical/Surgical History:    Past Medical History:   Diagnosis Date     Adrenal nodule (H)     left - needs yearly CT     CKD (chronic kidney disease) stage 3, GFR 30-59 ml/min (H)      Diagnostic skin and sensitization tests 4/5/12     RAST pos. only to cat mildly     DJD (degenerative joint disease) 10/21/2005     Eczema, unspecified type 4/5/2018     Elevated parathyroid hormone 6/18/2019     Gallstones      Hepatitis B childhood     resolved, patient is not a carrier      HTN (hypertension)      Hyperlipidemia 11/27/2012     Hypertension goal BP (blood pressure) < 140/90      Lumbar spinal stenosis 7/17/2017     Mild persistent asthma without complication 5/30/2017     Morbid obesity due to excess calories (H) 11/23/2015    Surgical referral declined '16      FLOR (obstructive sleep apnea)      Pulmonary emboli (H) 10/28/2019     Shingles 2014    scalp     Stasis dermatitis of both legs 4/5/2018     Thyroid nodule      Vitamin D deficiency      Past Surgical History:   Procedure Laterality Date     C TOTAL KNEE ARTHROPLASTY  3/2000    right     C TOTAL KNEE ARTHROPLASTY  6/8/12    left     C VAGINAL HYSTERECTOMY  1977    benign, prolapse, ovaries remain        Current Medications:    No current outpatient medications on file.       Allergies:    Adhesive  tape    Family History:    Family History   Problem Relation Age of Onset     Circulatory Father         d. DVT     Heart Disease Father         pacer     Diabetes Mother      Hypertension Mother      C.A.D. Paternal Uncle         MI      Heart Disease Brother 48        pacer      Diabetes Maternal Grandmother      Hypertension Maternal Grandmother      Diabetes Maternal Aunt      Hypertension Maternal Aunt      Cancer - colorectal Maternal Grandfather      C.A.D. 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   Social Needs     Financial resource strain: None     Food insecurity     Worry: None     Inability: None     Transportation needs     Medical: None     Non-medical: None   Tobacco Use     Smoking status: Never Smoker     Smokeless tobacco: Never Used   Substance and Sexual Activity     Alcohol use: Yes     Alcohol/week: 0.0 standard drinks     Comment: very rare       Drug use: No     Sexual activity: Not Currently     Partners: Male     Birth control/protection: Post-menopausal, Female Surgical   Lifestyle     Physical activity     Days per week: None     Minutes per session: None     Stress: None   Relationships     Social connections     Talks on phone: None     Gets together: None     Attends Hinduism service: None     Active member of club or organization: None     Attends meetings of clubs or organizations: None     Relationship status: None     Intimate partner violence     Fear of current or ex partner: None     Emotionally abused: None     Physically abused: None     Forced sexual activity: None   Other Topics Concern     Parent/sibling w/ CABG, MI or angioplasty before  65F 55M? Not Asked   Social History Narrative     None       Physical Exam:    There were no vitals taken for this visit.     GENERAL APPEARANCE: healthy, alert and no distress  PSYCHIATRIC: mentation appears normal and affect normal.  EYES: No jaundice.  SKIN: No jaundice.   RESP: lungs clear to auscultation - no rales, rhonchi or wheezes  CARDIOVASCULAR: regular rates and rhythm, normal S1 S2, no S3 or S4 and no murmur  ABDOMEN:  soft, nontender, no masses and bowel sounds normal.  MUSCULOSKELETAL: No edema in the lower extremities.    Laboratory Studies:    Lab Results   Component Value Date    HGB 12.0 05/21/2020     Lab Results   Component Value Date     05/21/2020     Lab Results   Component Value Date    WBC 4.5 03/06/2020       Lab Results   Component Value Date    INR 3.20 05/08/2020       Lab Results   Component Value Date    PROTTOTAL 6.6 03/06/2020      Lab Results   Component Value Date    ALBUMIN 3.3 03/06/2020     Lab Results   Component Value Date    BILITOTAL 0.7 03/06/2020     Lab Results   Component Value Date    BILICONJ 0.0 03/15/2012      Lab Results   Component Value Date    ALKPHOS 65 03/06/2020     Lab Results   Component Value Date    AST 19 03/06/2020     Lab Results   Component Value Date    ALT 22 03/06/2020       Lab Results   Component Value Date    CR 1.09 03/06/2020     Lab Results   Component Value Date    BUN 23 03/06/2020       Imaging:     I reviewed the CT neck  from 1-3-2020. It shows  Right anterior parotid Mass that measures up to 1.3 cm.    ASSESSMENT/PLAN:      Chelo Brooks is a 75 year old female with a right parotid mass who is here for Fine needle aspiration.  Warfarin and Lovenox are held.  Pt is ready to proceed.

## 2020-05-21 NOTE — IP AVS SNAPSHOT
[facility] [place], Interventional Radiology  [address] [address]  Phone:  [phone]                                    After Visit Summary   5/21/2020    Chelo Brooks    MRN: [id number]           After Visit Summary Signature Page    I have received my discharge instructions, and my questions have been answered. I have discussed any challenges I see with this plan with the nurse or doctor.    ..........................................................................................................................................  Patient/Patient Representative Signature      ..........................................................................................................................................  Patient Representative Print Name and Relationship to Patient    ..................................................               ................................................  Date                                   Time    ..........................................................................................................................................  Reviewed by Signature/Title    ...................................................              ..............................................  Date                                               Time          22EPIC Rev 08/18

## 2020-05-21 NOTE — PROGRESS NOTES
Patient Name: Chelo Brooks  Medical Record Number: [id number]  Today's Date: 5/21/2020    Procedure: Right Parotid Mass Fine Needle Aspiration  Proceduralist: Dr. Eugene Haile    Procedure Start: 1015  Procedure end: 1050  Sedation medications administered: Fentanyl 100 mcg, Versed 2 mg    Report given to: BRIDGET Soria 2A  : n/a    Other Notes: Pt arrived to IR room #6 from Unit 2A. Consent reviewed. Pt denies any questions or concerns regarding procedure. Pt positioned supine with head turned left and monitored per protocol. Fine needle aspiration x 4 collected by Dr. Haile with pathology at bedside. Pt tolerated procedure without any noted complications. Pt transferred back to Unit 2A.

## 2020-05-21 NOTE — IP AVS SNAPSHOT
MRN:[id number]                      After Visit Summary   5/21/2020    Chelo Brooks    MRN: [id number]           Visit Information        Provider Department      5/21/2020  9:30 AM [facility] NEURO RAD; [facility] [place], Interventional Radiology           Review of your medicines      Notice    This visit is during an admission. Changes to the med list made in this visit will be reflected in the After Visit Summary of the admission.           Protect others around you: Learn how to safely use, store and throw away your medicines at www.disposemymeds.org.       Follow-ups after your visit       Your next 10 appointments already scheduled    Jul 28, 2020 10:30 AM CDT  Return Sleep Patient with SMITA Pruitt  [facility] ([facility] [address] [place] [id number] [phone]         Care Instructions       Further instructions from your care team       [facility]    Interventional Radiology  Patient Instructions Following Biopsy    AFTER YOU GO HOME  ? If you were given sedation DO NOT drive or operate machinery at home or at work for at least 24 hours  ? DO relax and take it easy for 48 hours, no strenuous activity for 24 hours  ? DO drink plenty of fluids  ? DO resume your regular diet, unless otherwise instructed by your Primary Physician  ? Keep the dressing dry and in place for 24 hours.  ? DO NOT SMOKE FOR AT LEAST 24 HOURS, if you have been given any medications that were to help you relax or sedate you during your procedure  ? DO NOT drink alcoholic beverages the day of your procedure  ? DO NOT do any strenuous exercise or lifting (> 10 lbs) for at least 3 days following your procedure  ? DO NOT take a bath or shower for at least 12 hours following your procedure  ? Remove dressing after shower the next day. Replace with Band aid for 2 days.  Never leave a wet dressing in place.  ? DO NOT make  "any important or legal decisions for 24 hours following your procedure  ? There should be minimum drainage from the biopsy site    CALL THE PHYSICIAN IF:  ? You start bleeding from the procedure site.  If you do start to bleed from that site,  hold pressure on the site for a minimum of 10 minutes.  Your physician will tell you if you need to return to the hospital  ? You develop nausea or vomiting  ? You have excessive swelling, redness, or tenderness at the site  ? You have drainage that looks like it is infected.  ? You experience severe pain  ? You develop hives or a rash or unexplained itching  ? You develop shortness of breath  ? You develop a temperature of 101 degrees F or greater    ADDITIONAL INSTRUCTIONS: None    [facility] INTERVENTIONAL RADIOLOGY DEPARTMENT  Procedure Physician:         Dr. Everett     Date of procedure: May 21, 2020  Telephone Numbers: [phone] Monday-Friday 8:00 am to 4:30 pm  [phone] After 4:30 pm Monday-Friday, Weekends & Holidays.   Ask for the Neuro Radiologist on call.  Someone is on call 24 hrs/day  [facility] toll free number: [phone] Monday-Friday 8:00 am to 4:30 pm  [facility] Emergency Dept: [phone]          Additional Information About Your Visit       [employer] Information    [employer] lets you send messages to your doctor, view your test results, renew your prescriptions, schedule appointments and more. To sign up, go to [email or web address] . Click on \"Log in\" on the left side of the screen, which will take you to the Welcome page. Then click on \"Sign up Now\" on the right side of the page.     You will be asked to enter the access code listed below, as well as some personal information. Please follow the directions to create your username and password.     Your access code is: [email or web address]  Expires: 2020 11:45 AM     Your access code will  in 60 days. If you need help or a new code, please call your   [facility] clinic or [phone].     " "  Care EveryWhere ID    This is your Care EveryWhere ID. This could be used by other organizations to access your [facility] medical records  [id number]       Your Vitals Were  Most recent update: 5/21/2020 10:51 AM    Blood Pressure   141/69            Pulse   61          Temperature   98.4  F (36.9  C) (Oral)          Respirations   16          Height   1.715 m (5' 7.5\")             Weight   152 kg (335 lb)      Pulse Oximetry   94%    BMI (Body Mass Index)   51.69 kg/m           Primary Care Provider Office Phone # Fax #    Vita Kathryn Zavala [phone]      Equal Access to Services    [facility]: Hadii aad ku hadasho Soomaali, waaxda luqadaha, qaybta kaalmada adeegyada, waxrosaura beltránn marie echavarria . So [facility] [phone].    ATENCIÓN: Si habla español, tiene a luque disposición servicios gratuitos de asistencia lingüística. Ll[facility] [phone].    We comply with applicable federal and state civil rights laws, including the [place] Human Rights Act. We do not discriminate on the basis of race, color, creed, Moravian, national origin, marital status, age, disability, sex, sexual orientation, or gender identity.       Thank you!    Thank you for choosing [facility] for your care. Our goal is always to provide you with excellent care. Hearing back from our patients is one way we can continue to improve our services. Please take a few minutes to complete the written survey that you may receive in the mail after you visit with us. Thank you!         Medication List     Notice    This visit is during an admission. Changes to the med list made in this visit will be reflected in the After Visit Summary of the admission.             "

## 2020-05-21 NOTE — DISCHARGE INSTRUCTIONS
[facility]    Interventional Radiology  Patient Instructions Following Parotid Biopsy    AFTER YOU GO HOME  ? If you were given sedation DO NOT drive or operate machinery at home or at work for at least 24 hours  ? DO relax and take it easy for 48 hours, no strenuous activity for 24 hours  ? DO drink plenty of fluids  ? DO resume your regular diet, unless otherwise instructed by your Primary Physician  ? Keep the dressing dry and in place for 24 hours.  ? DO NOT SMOKE FOR AT LEAST 24 HOURS, if you have been given any medications that were to help you relax or sedate you during your procedure  ? DO NOT drink alcoholic beverages the day of your procedure  ? DO NOT do any strenuous exercise or lifting (> 10 lbs) for at least 7 days following your procedure  ? DO NOT take a bath or shower for at least 12 hours following your procedure  ? Remove dressing after shower the next day. Replace with Band aid for 2 days.  Never leave a wet dressing in place.  ? DO NOT make any important or legal decisions for 24 hours following your procedure  ? There should be minimum drainage from the biopsy site    CALL THE PHYSICIAN IF:  ? You start bleeding from the procedure site.  If you do start to bleed from that site, lie down flat and hold pressure on the site for a minimum of 10 minutes.  Your physician will tell you if you need to return to the hospital  ? You develop nausea or vomiting  ? You have excessive swelling, redness, or tenderness at the site  ? You have drainage that looks like it is infected.  ? You experience severe pain  ? You develop hives or a rash or unexplained itching  ? You develop shortness of breath  ? You develop a temperature of 101 degrees F or greater        ADDITIONAL INSTRUCTIONS  Continue with Lovenox directions per physician instruction.   If you are taking Coumadin, follow up with your Coumadin Clinic or Primary Care MD to have your INR rechecked.    [facility] INTERVENTIONAL RADIOLOGY  DEPARTMENT  Procedure Physician:         Dr. Haile                                  Date of procedure: May 21, 2020  Telephone Numbers: [phone] Monday-Friday 8:00 am to 4:30 pm  [phone] After 4:30 pm Monday-Friday, Weekends & Holidays.   Ask for the Interventional Radiologist on call.  Someone is on call 24 hrs/day  [facility] toll free number: [phone] Monday-Friday 8:00 am to 4:30 pm  [facility] Emergency Dept: [phone]

## 2020-05-21 NOTE — PROGRESS NOTES
Pt tolerated oral intake. Discharge instructions reviewed with pt by Estella GARRIDO RN, copy given to pt. Pt instructed to follow up with primary MD for instructions on coumadin and lovenox. Pt tolerated ambulation. Voided. LAURENCE segura'luis. Pt will discharge home accompanied by daughter.

## 2020-05-21 NOTE — PROGRESS NOTES
Patient prepped for parotid biopsy.  Denies pain.   Family waiting off site.  H & P needs update.  Consent signed.  Labs pending.

## 2020-05-22 LAB — COPATH REPORT: NORMAL

## 2020-05-26 ENCOUNTER — TELEPHONE (OUTPATIENT)
Dept: FAMILY MEDICINE | Facility: CLINIC | Age: 75
End: 2020-05-26

## 2020-05-26 NOTE — TELEPHONE ENCOUNTER
I called patient following [facility] notification of hospital discharge.      Patient states she had a procedure and used the lovenox injections as directed.  She has since resumed her usual warfarin dose.    I assisted her with a lab appointment this week.    Addie De Jesus RN

## 2020-05-27 ENCOUNTER — TELEPHONE (OUTPATIENT)
Dept: SLEEP MEDICINE | Facility: CLINIC | Age: 75
End: 2020-05-27

## 2020-05-27 ENCOUNTER — ANTICOAGULATION THERAPY VISIT (OUTPATIENT)
Dept: NURSING | Facility: CLINIC | Age: 75
End: 2020-05-27

## 2020-05-27 DIAGNOSIS — I26.99 PULMONARY EMBOLI (H): ICD-10-CM

## 2020-05-27 DIAGNOSIS — I26.99 ACUTE PULMONARY EMBOLISM, UNSPECIFIED PULMONARY EMBOLISM TYPE, UNSPECIFIED WHETHER ACUTE COR PULMONALE PRESENT (H): ICD-10-CM

## 2020-05-27 LAB
CAPILLARY BLOOD COLLECTION: NORMAL
INR PPP: 1.3 (ref 0.86–1.14)

## 2020-05-27 PROCEDURE — 85610 PROTHROMBIN TIME: CPT | Performed by: FAMILY MEDICINE

## 2020-05-27 PROCEDURE — 36416 COLLJ CAPILLARY BLOOD SPEC: CPT | Performed by: FAMILY MEDICINE

## 2020-05-27 PROCEDURE — 99207 ZZC NO CHARGE NURSE ONLY: CPT

## 2020-05-27 NOTE — PROGRESS NOTES
Anticoagulation Management    2nd attempt    Left message to take a booster dose of warfarin,  10 mg tonight.      Anticoagulation clinic to follow up    Meghan Devine RN      Anticoagulation Management    Unable to reach Chelo today.    Today's INR result of 1.3 is subtherapeutic (goal INR of 2.0-3.0).  Result received from: Clinic Lab    Follow up required to confirm warfarin dose taken and assess for changes    Left message to call [phone]      Anticoagulation clinic to follow up need to find out if patient 1) is still taking Lovenox; 2) clarify which days she held her warfarin; 3) when she restarted her warfarin and what dose.     Meghan Devine RN  ANTICOAGULATION FOLLOW-UP CLINIC VISIT    Patient Name:  Chelo Brooks  Date:  2020  Contact Type:  Telephone    SUBJECTIVE:  Patient Findings     Comments:   Patient did not resume lovenox after procedure. Advised to restart lovenox tonight. [facility] calendar updated. If INR not in range on Friday may need refill of lovenox        Clinical Outcomes     Negatives:   Major bleeding event, Thromboembolic event, Anticoagulation-related hospital admission, Anticoagulation-related ED visit, Anticoagulation-related fatality    Comments:   Patient did not resume lovenox after procedure. Advised to restart lovenox tonight. [facility] calendar updated. If INR not in range on Friday may need refill of lovenox           OBJECTIVE    Recent labs: (last 7 days)     20  1105   INR 1.30*       ASSESSMENT / PLAN  INR assessment SUB    Recheck INR In: 3 DAYS    INR Location Outside lab      Anticoagulation Summary  As of 2020    INR goal:   2.0-3.0   TTR:   76.7 % (6.5 mo)   INR used for dosin.30! (2020)   Warfarin maintenance plan:   5 mg (5 mg x 1) every day   Full warfarin instructions:   : 10 mg; : 10 mg; Otherwise 5 mg every day   Weekly warfarin total:   35 mg   Plan last modified:   Berenice Poole RN (2019)   Next INR check:   2020    Priority:   High   Target end date:   Indefinite    Indications    Pulmonary emboli (H) [I26.99]  Deep vein thrombosis (DVT) (H) (Resolved) [I82.409]             Anticoagulation Episode Summary     INR check location:   Anticoagulation Clinic    Preferred lab:   [facility] [place]    Send INR reminders to:   CASTILLO RIVERA    Comments:   Takes warfarin in the morning      Anticoagulation Care Providers     Provider Role Specialty Phone number    Vita Zavala MD [facility] [phone]            See the Encounter Report to view Anticoagulation Flowsheet and Dosing Calendar (Go to Encounters tab in chart review, and find the Anticoagulation Therapy Visit)        Meghan Devine RN

## 2020-05-27 NOTE — TELEPHONE ENCOUNTER
Called pt and left a message informing her she will need to call her DME company to get new supplies. Gave her the number to [facility]. [phone]. em

## 2020-05-29 ENCOUNTER — ANTICOAGULATION THERAPY VISIT (OUTPATIENT)
Dept: FAMILY MEDICINE | Facility: CLINIC | Age: 75
End: 2020-05-29

## 2020-05-29 DIAGNOSIS — I26.99 ACUTE PULMONARY EMBOLISM, UNSPECIFIED PULMONARY EMBOLISM TYPE, UNSPECIFIED WHETHER ACUTE COR PULMONALE PRESENT (H): ICD-10-CM

## 2020-05-29 DIAGNOSIS — I26.99 PULMONARY EMBOLI (H): ICD-10-CM

## 2020-05-29 LAB
CAPILLARY BLOOD COLLECTION: NORMAL
INR PPP: 2.4 (ref 0.86–1.14)

## 2020-05-29 PROCEDURE — 36415 COLL VENOUS BLD VENIPUNCTURE: CPT | Performed by: FAMILY MEDICINE

## 2020-05-29 PROCEDURE — 99207 ZZC NO CHARGE NURSE ONLY: CPT | Performed by: FAMILY MEDICINE

## 2020-05-29 PROCEDURE — 85610 PROTHROMBIN TIME: CPT | Performed by: FAMILY MEDICINE

## 2020-05-29 NOTE — PROGRESS NOTES
ANTICOAGULATION FOLLOW-UP CLINIC VISIT    Patient Name:  Chelo Brooks  Date:  2020  Contact Type:  Telephone    SUBJECTIVE:  Patient Findings         Clinical Outcomes     Negatives:   Major bleeding event, Thromboembolic event, Anticoagulation-related hospital admission, Anticoagulation-related ED visit, Anticoagulation-related fatality           OBJECTIVE    Recent labs: (last 7 days)     20  1130   INR 2.40*       ASSESSMENT / PLAN  INR assessment THER    Recheck INR In: 1 WEEK    INR Location Clinic      Anticoagulation Summary  As of 2020    INR goal:   2.0-3.0   TTR:   76.3 % (6.6 mo)   INR used for dosin.40 (2020)   Warfarin maintenance plan:   5 mg (5 mg x 1) every day   Full warfarin instructions:   5 mg every day   Weekly warfarin total:   35 mg   No change documented:   Amanda Meadows RN   Plan last modified:   Berenice Poole RN (2019)   Next INR check:   2020   Priority:   High   Target end date:   Indefinite    Indications    Pulmonary emboli (H) [I26.99]  Deep vein thrombosis (DVT) (H) (Resolved) [I82.409]             Anticoagulation Episode Summary     INR check location:   Anticoagulation Clinic    Preferred lab:   [facility] [place]    Send INR reminders to:   CASTILLO RIVERA    Comments:   Takes warfarin in the morning      Anticoagulation Care Providers     Provider Role Specialty Phone number    Vita Zavala MD [facility] [phone]            See the Encounter Report to view Anticoagulation Flowsheet and Dosing Calendar (Go to Encounters tab in chart review, and find the Anticoagulation Therapy Visit)        Amanda Meadows RN

## 2020-06-05 ENCOUNTER — ANTICOAGULATION THERAPY VISIT (OUTPATIENT)
Dept: FAMILY MEDICINE | Facility: CLINIC | Age: 75
End: 2020-06-05

## 2020-06-05 DIAGNOSIS — I26.99 PULMONARY EMBOLI (H): ICD-10-CM

## 2020-06-05 DIAGNOSIS — I26.99 ACUTE PULMONARY EMBOLISM, UNSPECIFIED PULMONARY EMBOLISM TYPE, UNSPECIFIED WHETHER ACUTE COR PULMONALE PRESENT (H): ICD-10-CM

## 2020-06-05 LAB
CAPILLARY BLOOD COLLECTION: NORMAL
INR PPP: 2.3 (ref 0.86–1.14)

## 2020-06-05 PROCEDURE — 36415 COLL VENOUS BLD VENIPUNCTURE: CPT | Performed by: FAMILY MEDICINE

## 2020-06-05 PROCEDURE — 85610 PROTHROMBIN TIME: CPT | Performed by: FAMILY MEDICINE

## 2020-06-05 NOTE — PROGRESS NOTES
Anticoagulation Management    Unable to reach Chelo today.    Today's INR result of 2.3 is therapeutic (goal INR of 2.0-3.0).  Result received from: Clinic Lab    Follow up required to assess for changes     Left message to continue current dose of warfarin 5 mg daily mg tonight.    Make follow up with lab in 2 weeks  Anticoagulation clinic to follow up    Amanda Meadows RN  [phone]

## 2020-06-11 DIAGNOSIS — K11.8 PAROTID MASS: ICD-10-CM

## 2020-06-11 DIAGNOSIS — E04.1 THYROID NODULE: Primary | ICD-10-CM

## 2020-06-19 ENCOUNTER — ANTICOAGULATION THERAPY VISIT (OUTPATIENT)
Dept: FAMILY MEDICINE | Facility: CLINIC | Age: 75
End: 2020-06-19

## 2020-06-19 DIAGNOSIS — I26.99 PULMONARY EMBOLI (H): ICD-10-CM

## 2020-06-19 DIAGNOSIS — I26.99 ACUTE PULMONARY EMBOLISM, UNSPECIFIED PULMONARY EMBOLISM TYPE, UNSPECIFIED WHETHER ACUTE COR PULMONALE PRESENT (H): ICD-10-CM

## 2020-06-19 LAB
CAPILLARY BLOOD COLLECTION: NORMAL
INR PPP: 3 (ref 0.86–1.14)

## 2020-06-19 PROCEDURE — 99207 ZZC NO CHARGE NURSE ONLY: CPT | Performed by: FAMILY MEDICINE

## 2020-06-19 PROCEDURE — 85610 PROTHROMBIN TIME: CPT | Performed by: FAMILY MEDICINE

## 2020-06-19 PROCEDURE — 36415 COLL VENOUS BLD VENIPUNCTURE: CPT | Performed by: FAMILY MEDICINE

## 2020-06-19 NOTE — PROGRESS NOTES
Anticoagulation Management    Unable to reach Chelo today.    Today's INR result of 3.0 is therapeutic (goal INR of 2.0-3.0).  Result received from: Clinic Lab    Follow up required to left message to make follow up apt in 3 weeks and resume 5 mg daily of warfarin      Anticoagulation clinic to follow up    Amanda Meadows RN

## 2020-06-21 DIAGNOSIS — N18.30 CKD (CHRONIC KIDNEY DISEASE) STAGE 3, GFR 30-59 ML/MIN (H): ICD-10-CM

## 2020-06-21 DIAGNOSIS — I12.9 RENAL HYPERTENSION: ICD-10-CM

## 2020-06-22 RX ORDER — OLMESARTAN MEDOXOMIL AND HYDROCHLOROTHIAZIDE 20/12.5 20; 12.5 MG/1; MG/1
1 TABLET ORAL DAILY
Qty: 90 TABLET | Refills: 1 | Status: SHIPPED | OUTPATIENT
Start: 2020-06-22 | End: 2020-10-27

## 2020-07-09 ENCOUNTER — ANTICOAGULATION THERAPY VISIT (OUTPATIENT)
Dept: NURSING | Facility: CLINIC | Age: 75
End: 2020-07-09

## 2020-07-09 DIAGNOSIS — I26.99 PULMONARY EMBOLI (H): ICD-10-CM

## 2020-07-09 DIAGNOSIS — I26.99 ACUTE PULMONARY EMBOLISM, UNSPECIFIED PULMONARY EMBOLISM TYPE, UNSPECIFIED WHETHER ACUTE COR PULMONALE PRESENT (H): ICD-10-CM

## 2020-07-09 LAB — INR PPP: 3 (ref 0.86–1.14)

## 2020-07-09 PROCEDURE — 85610 PROTHROMBIN TIME: CPT | Performed by: FAMILY MEDICINE

## 2020-07-09 PROCEDURE — 36415 COLL VENOUS BLD VENIPUNCTURE: CPT | Performed by: FAMILY MEDICINE

## 2020-07-09 NOTE — PROGRESS NOTES
ANTICOAGULATION MANAGEMENT     Patient Name:  Chelo Brooks  Date:  7/9/2020    ASSESSMENT /SUBJECTIVE:    Today's INR result of 3.0 is therapeutic. Goal INR of 2.0-3.0      Warfarin dose taken: Warfarin taken as previously instructed    Diet: No new diet changes affecting INR    Medication changes/ interactions: No new medications/supplements affecting INR    Previous INR: Therapeutic     S/S of bleeding or thromboembolism: No    New injury or illness: No    Upcoming surgery, procedure or cardioversion: No    Additional findings: Has a lot of pain in her abdomen, does take advil occasionally for pain, maybe once a week or less.       PLAN:    Spoke with Chelo regarding INR result and instructed:     Warfarin Dosing Instructions: Continue your current warfarin dose 5 mg daily    Instructed patient to follow up no later than: 4 weeks  Lab visit scheduled    Education provided: Target INR goal and significance of current INR result      Chelo verbalizes understanding and agrees to warfarin dosing plan.    Instructed to call the Anticoagulation Clinic for any changes, questions or concerns. (#[phone])        OBJECTIVE:  INR   Date Value Ref Range Status   07/09/2020 3.00 (H) 0.86 - 1.14 Final     Comment:     This test is intended for monitoring Coumadin therapy.  Results are not   accurate in patients with prolonged INR due to factor deficiency.           No question data found.  Anticoagulation Summary  As of 7/9/2020    INR goal:   2.0-3.0   TTR:   80.4 % (7.9 mo)   INR used for dosing:   3.00 (7/9/2020)   Warfarin maintenance plan:   5 mg (5 mg x 1) every day   Full warfarin instructions:   5 mg every day   Weekly warfarin total:   35 mg   No change documented:   Meghan Devine RN   Plan last modified:   Berenice Poole RN (11/4/2019)   Next INR check:   8/6/2020   Priority:   High   Target end date:   Indefinite    Indications    Pulmonary emboli (H) [I26.99]  Deep vein thrombosis (DVT) (H) (Resolved)  [I82.409]             Anticoagulation Episode Summary     INR check location:   Anticoagulation Clinic    Preferred lab:   [facility] [place]    Send INR reminders to:   CASTILLO RIVERA    Comments:   Takes warfarin in the morning      Anticoagulation Care Providers     Provider Role Specialty Phone number    Vita Zavala MD [facility] [phone]         Meghan Devine RN - [facility] Anticoagulation Clinic

## 2020-07-20 ENCOUNTER — OFFICE VISIT (OUTPATIENT)
Dept: FAMILY MEDICINE | Facility: CLINIC | Age: 75
End: 2020-07-20
Payer: COMMERCIAL

## 2020-07-20 VITALS
OXYGEN SATURATION: 97 % | TEMPERATURE: 98.4 F | HEART RATE: 98 BPM | WEIGHT: 293 LBS | BODY MASS INDEX: 51.08 KG/M2 | SYSTOLIC BLOOD PRESSURE: 136 MMHG | DIASTOLIC BLOOD PRESSURE: 78 MMHG | RESPIRATION RATE: 16 BRPM

## 2020-07-20 DIAGNOSIS — J45.20 MILD INTERMITTENT ASTHMA, UNSPECIFIED WHETHER COMPLICATED: ICD-10-CM

## 2020-07-20 DIAGNOSIS — Z12.31 VISIT FOR SCREENING MAMMOGRAM: ICD-10-CM

## 2020-07-20 DIAGNOSIS — E04.1 THYROID NODULE: ICD-10-CM

## 2020-07-20 DIAGNOSIS — R79.89 ELEVATED PARATHYROID HORMONE: ICD-10-CM

## 2020-07-20 DIAGNOSIS — I87.2 STASIS DERMATITIS OF BOTH LEGS: ICD-10-CM

## 2020-07-20 DIAGNOSIS — N18.30 CKD (CHRONIC KIDNEY DISEASE) STAGE 3, GFR 30-59 ML/MIN (H): ICD-10-CM

## 2020-07-20 DIAGNOSIS — M16.0 PRIMARY OSTEOARTHRITIS OF BOTH HIPS: ICD-10-CM

## 2020-07-20 DIAGNOSIS — E78.5 HYPERLIPIDEMIA LDL GOAL <100: ICD-10-CM

## 2020-07-20 DIAGNOSIS — E27.9 ADRENAL NODULE (H): ICD-10-CM

## 2020-07-20 DIAGNOSIS — M48.061 SPINAL STENOSIS OF LUMBAR REGION, UNSPECIFIED WHETHER NEUROGENIC CLAUDICATION PRESENT: Primary | ICD-10-CM

## 2020-07-20 LAB
ALBUMIN SERPL-MCNC: 3.3 G/DL (ref 3.4–5)
ALP SERPL-CCNC: 71 U/L (ref 40–150)
ALT SERPL W P-5'-P-CCNC: 18 U/L (ref 0–50)
ANION GAP SERPL CALCULATED.3IONS-SCNC: 7 MMOL/L (ref 3–14)
AST SERPL W P-5'-P-CCNC: 13 U/L (ref 0–45)
BILIRUB SERPL-MCNC: 0.5 MG/DL (ref 0.2–1.3)
BUN SERPL-MCNC: 29 MG/DL (ref 7–30)
CALCIUM SERPL-MCNC: 8.7 MG/DL (ref 8.5–10.1)
CHLORIDE SERPL-SCNC: 110 MMOL/L (ref 94–109)
CHOLEST SERPL-MCNC: 240 MG/DL
CO2 SERPL-SCNC: 24 MMOL/L (ref 20–32)
CREAT SERPL-MCNC: 1.3 MG/DL (ref 0.52–1.04)
GFR SERPL CREATININE-BSD FRML MDRD: 41 ML/MIN/{1.73_M2}
GLUCOSE SERPL-MCNC: 109 MG/DL (ref 70–99)
HDLC SERPL-MCNC: 46 MG/DL
LDLC SERPL CALC-MCNC: 161 MG/DL
NONHDLC SERPL-MCNC: 194 MG/DL
POTASSIUM SERPL-SCNC: 3.9 MMOL/L (ref 3.4–5.3)
PROT SERPL-MCNC: 6.7 G/DL (ref 6.8–8.8)
PTH-INTACT SERPL-MCNC: 129 PG/ML (ref 18–80)
SODIUM SERPL-SCNC: 141 MMOL/L (ref 133–144)
TRIGL SERPL-MCNC: 164 MG/DL

## 2020-07-20 PROCEDURE — 83970 ASSAY OF PARATHORMONE: CPT | Performed by: FAMILY MEDICINE

## 2020-07-20 PROCEDURE — 82306 VITAMIN D 25 HYDROXY: CPT | Performed by: FAMILY MEDICINE

## 2020-07-20 PROCEDURE — 80053 COMPREHEN METABOLIC PANEL: CPT | Performed by: FAMILY MEDICINE

## 2020-07-20 PROCEDURE — 36415 COLL VENOUS BLD VENIPUNCTURE: CPT | Performed by: FAMILY MEDICINE

## 2020-07-20 PROCEDURE — 99214 OFFICE O/P EST MOD 30 MIN: CPT | Performed by: FAMILY MEDICINE

## 2020-07-20 PROCEDURE — 80061 LIPID PANEL: CPT | Performed by: FAMILY MEDICINE

## 2020-07-20 RX ORDER — MOMETASONE FUROATE 1 MG/G
CREAM TOPICAL
Qty: 50 G | Refills: 0 | Status: SHIPPED | OUTPATIENT
Start: 2020-07-20 | End: 2023-03-27

## 2020-07-20 NOTE — PROGRESS NOTES
DME (Durable Medical Equipment) Orders and Documentation  Orders Placed This Encounter   Procedures     Walker Order      The patient was assessed and it was determined the patient is in need of the following listed DME Supplies/Equipment. Please complete supporting documentation below to demonstrate medical necessity.      DME All Other Item(s) Documentation    List reason for need and supporting documentation for medical necessity below for each DME item.     1. Lumbar spinal stenosis

## 2020-07-20 NOTE — PROGRESS NOTES
Subjective     Chelo Brooks is a 75 year old female who presents to clinic today for the following health issues:    HPI     Musculoskeletal problem/pain      Duration: Over 1 month    Description  Location: Left leg    Intensity:  moderate, severe    Accompanying signs and symptoms: radiation of pain to foot, numbness, felt a cold draft on left leg    History  Previous similar problem: no   Previous evaluation:  none    Precipitating or alleviating factors:  Trauma or overuse: [place] a crack sound in foot over a month ago   Aggravating factors include: standing, walking and climbing stairs    Therapies tried and outcome: rest/inactivity    ABDOMINAL   PAIN     Onset: 1 month    Description:   Character: Dull ache  Location: left lower quadrant  Radiation: Pelvic region    Intensity: moderate    Progression of Symptoms:  same and intermittent    Accompanying Signs & Symptoms:  Fever/Chills?: no   Gas/Bloating: YES- gas  Nausea: no   Vomitting: no   Diarrhea?: no   Constipation:YES- off and on  Dysuria or Hematuria: no    History:   Trauma: no   Previous similar pain: no    Previous tests done: none    Precipitating factors:   Does the pain change with:     Food: no      BM: sometimes feels better     Urination: no     Alleviating factors:      Therapies Tried and outcome: none    LMP:  not applicable             Reviewed and updated as needed this visit by Provider  Tobacco  Allergies  Meds  Problems  Med Hx  Surg Hx  Fam Hx         Review of Systems   CONSTITUTIONAL: super morbid obesity   INTEGUMENTARY/SKIN: bilateral stasis dermatitis   RESP: Hx asthma   BREAST: NEGATIVE for masses, tenderness or discharge  CV: POSITIVE for lower extremity edema  GI: NEGATIVE for nausea, abdominal pain, heartburn, or change in bowel habits  : NEGATIVE for frequency, dysuria, or hematuria  MUSCULOSKELETAL: groin, back pain and radicular pain   NEURO: leg weakness and gait disturbance   ENDOCRINE: NEGATIVE for  temperature intolerance, skin/hair changes      Objective    /78   Pulse 98   Temp 98.4  F (36.9  C) (Oral)   Resp 16   Wt (!) 150.1 kg (331 lb)   SpO2 97%   BMI 51.08 kg/m    Body mass index is 51.08 kg/m .  Physical Exam   GENERAL: alert, no distress and obese  EYES: Eyes grossly normal to inspection, PERRL and conjunctivae and sclerae normal  NECK: no adenopathy, no asymmetry, masses, or scars and thyroid normal to palpation  RESP: lungs clear to auscultation - no rales, rhonchi or wheezes  CV: regular rates and rhythm, normal S1 S2, no S3 or S4, no murmur, click or rub and woody bipedal edema   ABDOMEN: soft, nontender, no hepatosplenomegaly, no masses and bowel sounds normal  MS: hunched, antalgic, slow gait; positive bilateral straight leg raise; full hip ROM   SKIN: bilateral stasis changes of shins   NEURO: weakness of legs, sensory exam grossly normal and mentation intact  PSYCH: mentation appears normal, affect normal/bright    Diagnostic Test Results:  Labs reviewed in Epic  No results found for this or any previous visit (from the past 24 hour(s)).        Assessment & Plan     (M48.061) Spinal stenosis of lumbar region, unspecified whether neurogenic claudication present  (primary encounter diagnosis)  Comment: prior trials of physical therapy and injection   Plan: Walker Order, SPINE CARE REFERRAL, MR Lumbar         Spine w/o Contrast           (M16.0) Primary osteoarthritis of both hips  Comment: mild; unlikely to be primary etiology of symptoms; prior physical therapy trial    Plan: Walker Order          (E04.1) Thyroid nodule  Plan: schedule ultrasound     (E34.9) Elevated parathyroid hormone  Plan: Parathyroid Hormone Intact, Comprehensive         metabolic panel          (E27.8) Adrenal nodule (H)  Plan: CT Abdomen Pelvis w/o Contrast          (N18.3) CKD (chronic kidney disease) stage 3, GFR 30-59 ml/min   Plan: Parathyroid Hormone Intact, Vitamin D         Deficiency, Lipid panel  reflex to direct LDL         Non-fasting, Albumin Random Urine Quantitative         with Creat Ratio, Comprehensive metabolic panel          (E78.5) Hyperlipidemia LDL goal <100  Plan: Lipid panel reflex to direct LDL Non-fasting,         Comprehensive metabolic panel          (I87.2) Stasis dermatitis of both legs  Plan: mometasone (ELOCON) 0.1 % external cream          (J45.20) Mild intermittent asthma, unspecified whether complicated  Plan: AAP     (Z12.31) Visit for screening mammogram  Plan: *MA Screening Digital Bilateral               See Patient Instructions    Return in about 1 month (around 8/20/2020) for video visit, Wellness visit.    Vita Zavala MD  [facility]

## 2020-07-20 NOTE — LETTER
My Asthma Action Plan    Name: Chelo Brooks   YOB: 1945  Date: 7/20/2020   My doctor: Vita Zavala MD   My clinic: [facility]        My Rescue Medicine:   Albuterol inhaler (Proair/Ventolin/Proventil HFA)  2-4 puffs EVERY 4 HOURS as needed. Use a spacer if recommended by your provider.   My Asthma Severity:   Intermittent / Exercise Induced  Know your asthma triggers: upper respiratory infections  allergies          GREEN ZONE   Good Control    I feel good    No cough or wheeze    Can work, sleep and play without asthma symptoms       Take your asthma control medicine every day.     1. If exercise triggers your asthma, take your rescue medication    15 minutes before exercise or sports, and    During exercise if you have asthma symptoms  2. Spacer to use with inhaler: If you have a spacer, make sure to use it with your inhaler             YELLOW ZONE Getting Worse  I have ANY of these:    I do not feel good    Cough or wheeze    Chest feels tight    Wake up at night   1. Keep taking your Green Zone medications  2. Start taking your rescue medicine:    every 20 minutes for up to 1 hour. Then every 4 hours for 24-48 hours.  3. If you stay in the Yellow Zone for more than 12-24 hours, contact your doctor.  4. If you do not return to the Green Zone in 12-24 hours or you get worse, start taking your oral steroid medicine if prescribed by your provider.           RED ZONE Medical Alert - Get Help  I have ANY of these:    I feel awful    Medicine is not helping    Breathing getting harder    Trouble walking or talking    Nose opens wide to breathe       1. Take your rescue medicine NOW  2. If your provider has prescribed an oral steroid medicine, start taking it NOW  3. Call your doctor NOW  4. If you are still in the Red Zone after 20 minutes and you have not reached your doctor:    Take your rescue medicine again and    Call 911 or go to the emergency room right away    See your regular  doctor within 2 weeks of an Emergency Room or Urgent Care visit for follow-up treatment.          Annual Reminders:  Meet with Asthma Educator,  Flu Shot in the Fall, consider Pneumonia Vaccination for patients with asthma (aged 19 and older).    Pharmacy:    [id number] - [place], [place] - [address] AT [facility] [place] [address]  [id number] - [place] [address] AT [facility] [address]  [id number] - [place], [place] - [address] [facility] [address] [facility] [address]    Electronically signed by Vita Zavala MD   Date: 07/20/20                    Asthma Triggers  How To Control Things That Make Your Asthma Worse    Triggers are things that make your asthma worse.  Look at the list below to help you find your triggers and   what you can do about them. You can help prevent asthma flare-ups by staying away from your triggers.      Trigger                                                          What you can do   Cigarette Smoke  Tobacco smoke can make asthma worse. Do not allow smoking in your home, car or around you.  Be sure no one smokes at a child s day care or school.  If you smoke, ask your health care provider for ways to help you quit.  Ask family members to quit too.  Ask your health care provider for a referral to Quit Plan to help you quit smoking, or call 2-376-287-PLAN.     Colds, Flu, Bronchitis  These are common triggers of asthma. Wash your hands often.  Don t touch your eyes, nose or mouth.  Get a flu shot every year.     Dust Mites  These are tiny bugs that live in cloth or carpet. They are too small to see. Wash sheets and blankets in hot water every week.   Encase pillows and mattress in dust mite proof covers.  Avoid having carpet if you can. If you have carpet, vacuum weekly.   Use a dust mask and HEPA vacuum.   Pollen and Outdoor Mold  Some people are  allergic to trees, grass, or weed pollen, or molds. Try to keep your windows closed.  Limit time out doors when pollen count is high.   Ask you health care provider about taking medicine during allergy season.     Animal Dander  Some people are allergic to skin flakes, urine or saliva from pets with fur or feathers. Keep pets with fur or feathers out of your home.    If you can t keep the pet outdoors, then keep the pet out of your bedroom.  Keep the bedroom door closed.  Keep pets off cloth furniture and away from stuffed toys.     Mice, Rats, and Cockroaches  Some people are allergic to the waste from these pests.   Cover food and garbage.  Clean up spills and food crumbs.  Store grease in the refrigerator.   Keep food out of the bedroom.   Indoor Mold  This can be a trigger if your home has high moisture. Fix leaking faucets, pipes, or other sources of water.   Clean moldy surfaces.  Dehumidify basement if it is damp and smelly.   Smoke, Strong Odors, and Sprays  These can reduce air quality. Stay away from strong odors and sprays, such as perfume, powder, hair spray, paints, smoke incense, paint, cleaning products, candles and new carpet.   Exercise or Sports  Some people with asthma have this trigger. Be active!  Ask your doctor about taking medicine before sports or exercise to prevent symptoms.    Warm up for 5-10 minutes before and after sports or exercise.     Other Triggers of Asthma  Cold air:  Cover your nose and mouth with a scarf.  Sometimes laughing or crying can be a trigger.  Some medicines and food can trigger asthma.

## 2020-07-20 NOTE — PATIENT INSTRUCTIONS
Call the imaging center at [phone] or  [phone] to schedule your thyroid ultrasound, MRI, and CT.    It is recommended that you get a vaccination for shingles called Shingrix (given as 2 shots, 2 to 6 months apart), even if you have already had the Zostavax vaccine. Discuss getting the Shingix vaccine from your pharmacist, or schedule an ancillary shot visit here. Some insurances do not cover the cost of these vaccines.

## 2020-07-21 LAB — DEPRECATED CALCIDIOL+CALCIFEROL SERPL-MC: 28 UG/L (ref 20–75)

## 2020-07-21 RX ORDER — ATORVASTATIN CALCIUM 40 MG/1
40 TABLET, FILM COATED ORAL DAILY
Qty: 90 TABLET | Refills: 3 | Status: SHIPPED | OUTPATIENT
Start: 2020-07-21 | End: 2021-09-21

## 2020-07-21 ASSESSMENT — ASTHMA QUESTIONNAIRES: ACT_TOTALSCORE: 20

## 2020-07-21 NOTE — RESULT ENCOUNTER NOTE
Please call patient:    You have high parathyroid hormone. This may be related to mild kidney disease. I recommend a consult with our kidney specialist at your convenience. Your provider has referred you to: [facility]: [facility] (Nephrology[facility] - [place] [phone].    Based on your cholesterol level and risk factors, your 10-year ASCVD risk score (Dawn BOBO Jr., et al., 2013) is: 20%. You can lower your risk for heart disease by taking a daily cholesterol medication called atorvastatin 40 mg daily. I sent a prescription when you're ready to start. Possible side effects include muscle aches and liver problems. Return for lab only recheck in 6 to 8 weeks while fasting (nothing but water and medications for at least 8 hours.)  You may call [phone] or go to [email or web address].    Vita Zavala MD     The 10-year ASCVD risk score (Dawn BOBO Jr., et al., 2013) is: 20%    Values used to calculate the score:      Age: 75 years      Sex: Female      Is Non- : No      Diabetic: No      Tobacco smoker: No      Systolic Blood Pressure: 136 mmHg      Is BP treated: Yes      HDL Cholesterol: 46 mg/dL      Total Cholesterol: 240 mg/dL

## 2020-07-23 ENCOUNTER — TELEPHONE (OUTPATIENT)
Dept: FAMILY MEDICINE | Facility: CLINIC | Age: 75
End: 2020-07-23

## 2020-07-23 NOTE — TELEPHONE ENCOUNTER
Patient notified of providers message as written. Patient verbalized understanding, no further questions or concerns.      Cielo Paige RN

## 2020-07-23 NOTE — TELEPHONE ENCOUNTER
Left message on voicemail for patient to return call to RN hotline at # [phone].    Luis Angel Raya, BRIDGET    Please call patient:       You have high parathyroid hormone. This may be related to mild kidney disease. I recommend a consult with our kidney specialist at your convenience. Your provider has referred you to: [facility]: Kidney (Nephrology) Clinic - [place] [phone].       Based on your cholesterol level and risk factors, your 10-year ASCVD risk score (Dawn BOBO Jr., et al., 2013) is: 20%. You can lower your risk for heart disease by taking a daily cholesterol medication called atorvastatin 40 mg daily. I sent a prescription when you're ready to start. Possible side effects include muscle aches and liver problems. Return for lab only recheck in 6 to 8 weeks while fasting (nothing but water and medications for at least 8 hours.)  You may call [phone] or go to [email or web address].       Vita Zavala MD       The 10-year ASCVD risk score (Dawn BOBO Jr., et al., 2013) is: 20%     Values used to calculate the score:       Age: 75 years       Sex: Female       Is Non- : No       Diabetic: No       Tobacco smoker: No       Systolic Blood Pressure: 136 mmHg       Is BP treated: Yes       HDL Cholesterol: 46 mg/dL       Total Cholesterol: 240 mg/dL

## 2020-07-24 ENCOUNTER — TELEPHONE (OUTPATIENT)
Dept: NEPHROLOGY | Facility: CLINIC | Age: 75
End: 2020-07-24

## 2020-07-24 DIAGNOSIS — N18.30 CKD (CHRONIC KIDNEY DISEASE) STAGE 3, GFR 30-59 ML/MIN (H): Primary | ICD-10-CM

## 2020-07-24 DIAGNOSIS — R79.89 ELEVATED PARATHYROID HORMONE: ICD-10-CM

## 2020-07-24 NOTE — TELEPHONE ENCOUNTER
RECORDS RECEIVED FROM: Internal - CKD   DATE RECEIVED: 08.28.2020   NOTES STATUS DETAILS   OFFICE NOTE from referring provider Internal 07.20.2020 Vita Zavala MD    OFFICE NOTE from other specialist  N/A    *Only VASCULITIS or LUPUS gather office notes for the following N/A    *PULMONARY   N/A    *ENT N/A    *DERMATOLOGY N/A    *RHEUMATOLOGY N/A    DISCHARGE SUMMARY from hospital N/A    DISCHARGE REPORT from the ER N/A    MEDICATION LIST Internal / CE    IMAGING  (NEED IMAGES AND REPORTS)     KIDNEY CT SCAN Scheduled 08.10.2020   KIDNEY ULTRASOUND N/A    MR ABDOMEN N/A    NUCLEAR MEDICINE RENAL N/A    LABS     CBC Internal 03.06.2020   CMP Internal 03.06.2020   BMP Care Everywhere 10.29.2019   UA Internal 03.06.2020   URINE PROTEIN Internal 03.06.2020   RENAL PANEL N/A    BIOPSY     KIDNEY BIOPSY  N/A

## 2020-07-24 NOTE — TELEPHONE ENCOUNTER
[facility] Call Center    Phone Message    May a detailed message be left on voicemail: yes     Reason for Call: Other: Patient is scheduled for CKD with Dr. Moreau please inform where the patient can get labs an if they are needed.     Action Taken: Other: p Nephrology    Travel Screening: Not Applicable

## 2020-07-27 ENCOUNTER — DOCUMENTATION ONLY (OUTPATIENT)
Dept: CARE COORDINATION | Facility: CLINIC | Age: 75
End: 2020-07-27

## 2020-07-27 VITALS — BODY MASS INDEX: 45.99 KG/M2 | HEIGHT: 67 IN | WEIGHT: 293 LBS

## 2020-07-27 ASSESSMENT — MIFFLIN-ST. JEOR: SCORE: 2034.5

## 2020-07-27 NOTE — PATIENT INSTRUCTIONS
Your BMI is Body mass index is 51.69 kg/m .  Weight management is a personal decision.  If you are interested in exploring weight loss strategies, the following discussion covers the approaches that may be successful. Body mass index (BMI) is one way to tell whether you are at a healthy weight, overweight, or obese. It measures your weight in relation to your height.  A BMI of 18.5 to 24.9 is in the healthy range. A person with a BMI of 25 to 29.9 is considered overweight, and someone with a BMI of 30 or greater is considered obese. More than two-thirds of American adults are considered overweight or obese.  Being overweight or obese increases the risk for further weight gain. Excess weight may lead to heart disease and diabetes.  Creating and following plans for healthy eating and physical activity may help you improve your health.  Weight control is part of healthy lifestyle and includes exercise, emotional health, and healthy eating habits. Careful eating habits lifelong are the mainstay of weight control. Though there are significant health benefits from weight loss, long-term weight loss with diet alone may be very difficult to achieve- studies show long-term success with dietary management in less than 10% of people. Attaining a healthy weight may be especially difficult to achieve in those with severe obesity. In some cases, medications, devices and surgical management might be considered.  What can you do?  If you are overweight or obese and are interested in methods for weight loss, you should discuss this with your provider.     Consider reducing daily calorie intake by 500 calories.     Keep a food journal.     Avoiding skipping meals, consider cutting portions instead.    Diet combined with exercise helps maintain muscle while optimizing fat loss. Strength training is particularly important for building and maintaining muscle mass. Exercise helps reduce stress, increase energy, and improves fitness.  Increasing exercise without diet control, however, may not burn enough calories to loose weight.       Start walking three days a week 10-20 minutes at a time    Work towards walking thirty minutes five days a week     Eventually, increase the speed of your walking for 1-2 minutes at time    In addition, we recommend that you review healthy lifestyles and methods for weight loss available through the National Institutes of Health patient information sites:  http://win.niddk.nih.gov/publications/index.htm    And look into health and wellness programs that may be available through your health insurance provider, employer, local community center, or lien club.    Weight management plan: Patient was referred to their PCP to discuss a diet and exercise plan.

## 2020-07-27 NOTE — PROGRESS NOTES
"Chelo Brooks is a 75 year old female who is being evaluated via a billable telephone visit.      The patient has been notified of following:     \"This telephone visit will be conducted via a call between you and your physician/provider. We have found that certain health care needs can be provided without the need for a physical exam.  This service lets us provide the care you need with a short phone conversation.  If a prescription is necessary we can send it directly to your pharmacy.  If lab work is needed we can place an order for that and you can then stop by our lab to have the test done at a later time.    Telephone visits are billed at different rates depending on your insurance coverage. During this emergency period, for some insurers they may be billed the same as an in-person visit.  Please reach out to your insurance provider with any questions.    If during the course of the call the physician/provider feels a telephone visit is not appropriate, you will not be charged for this service.\"    Patient has given verbal consent for Telephone visit?  Yes    What phone number would you like to be contacted at? [phone]    How would you like to obtain your AVS? Sarabjitt    Phone call duration: 16 minutes    Obstructive Sleep Apnea - PAP Follow-Up Visit:    Chief Complaint   Patient presents with     CPAP Follow Up       Chelo Brooks comes in today for follow-up of their severe sleep apnea, managed with CPAP.     Initial sleep study done on 2/14/17 at the [facility] for loud, socially disruptive snoring, witnessed apneas, excessive daytime sleepiness (ESS 11), sleep maintenance difficulties, obesity, narrowed oropharynx oropharynx. Sleep onset << maintenance difficulties, some psychophysiologic insomnia suspected.    Study Date: 2/14/2017- (329.0 lbs) The total sleep time was 96.0 minutes. The combined apnea/hypopnea index was 89.4 events per hour.  The REM AHI was n/a.  The " supine AHI was 93.9 events per hour. RDI was 92.5 events per hour.  Lowest oxygen saturation was 77%.  Time spent less than or equal to 88% was 27.5 minutes.  Time spent less than or equal to 89% was 34.4 minutes.    Overall, the patient rates her experience with PAP as 6 (0 poor, 10 great). The mask is comfortable. The mask is not leaking.  She is not snoring with the mask on. She is not having gasp arousals. She is not having any oral or nasal dryness. The pressure settings are comfortable     Her PAP interface is Full Face Mask.    Bedtime is typically 10:30. Usually it takes about 20 minutes to fall asleep with the mask on. Wake time is typically 5-6 AM.  Patient is using PAP therapy 6 hours per night. The patient is usually getting 8 hours of sleep per night.    She does feel rested in the morning.    Total score - [place]: 5 (7/27/2020 11:32 AM)      SELENE Total Score: 8    ResMed   Auto-PAP 11.0 - 18.0 cmH2O 30 day usage data:    30% of days with > 4 hours of use. 20/30 days with no use.   Average use 126 minutes per day.   95%ile Leak 16.13 L/min.   CPAP 95% pressure 15 cm.   AHI 1.57 events per hour.     Current problems with PAP use include:  Not cleaning and not using it.     Past medical/surgical history, family history, social history, medications and allergies were reviewed.      Problem List:  Patient Active Problem List    Diagnosis Date Noted     Hyperlipidemia LDL goal <100 11/27/2012     Priority: High     Degenerative joint disease (DJD) of hip      Priority: Medium     Thyroid nodule      Priority: Medium     plan US thyroid in 6 months (Dec 2020) as well as a repeat CT scan to evaluate the parotid and lymph node        Gallstones      Priority: Medium     Adrenal nodule (H)      Priority: Medium     left - needs yearly CT       Pulmonary emboli (H) 10/28/2019     Priority: Medium     Mild intermittent asthma, unspecified whether complicated 06/18/2019     Priority: Medium     Elevated parathyroid  "hormone 06/18/2019     Priority: Medium     CKD (chronic kidney disease) stage 3, GFR 30-59 ml/min (H)      Priority: Medium     Eczema, unspecified type 04/05/2018     Priority: Medium     Stasis dermatitis of both legs 04/05/2018     Priority: Medium     Combined forms of age-related cataract of both eyes 07/18/2017     Priority: Medium     Choroidal nevus, left eye 07/18/2017     Priority: Medium     Lumbar spinal stenosis 07/17/2017     Priority: Medium     Vitamin D deficiency      Priority: Medium     FLOR (obstructive sleep apnea)- severe (AHI 89) 02/15/2017     Priority: Medium     Study Date: 2/14/2017- (329.0 lbs) The total sleep time was 96.0 minutes. The combined apnea/hypopnea index was 89.4 events per hour.  The REM AHI was n/a.  The supine AHI was 93.9 events per hour. RDI was 92.5 events per hour.  Lowest oxygen saturation was 77%.  Time spent less than or equal to 88% was 27.5 minutes.  Time spent less than or equal to 89% was 34.4 minutes.       Osteopenia      Priority: Medium     Morbid obesity due to excess calories (H) 11/23/2015     Priority: Medium     Surgical referral declined '16       Renal hypertension      Priority: Medium     S/P knee replacements 10/23/2013     Priority: Medium     Health Care Home 05/08/2012     Priority: Medium     [facility] for .  Oscar Smith RN, [facility]    [phone]       Allergic rhinitis due to animal dander      Priority: Medium     4/5/12 RAST pos. only to cat mildly       Advanced directives, counseling/discussion 03/15/2012     Priority: Medium     Discussed advance care planning with patient; information given to patient to review. 3/15/2012        Reflux esophagitis 06/17/2003     Priority: Medium        Ht 1.702 m (5' 7\")   Wt 149.7 kg (330 lb)   BMI 51.69 kg/m      Impression/Plan:  Severe obstructive sleep apnea-  Poor CPAP compliance. Patient counseled to use CPAP with all CPAP.   Reviewed FLOR. Also reviewed potential " consequences of untreated severe FLOR.  Comprehensive DME.    Chelo Brooks will follow up in about 6 month(s).     Quang Dowell PA-C

## 2020-07-28 ENCOUNTER — VIRTUAL VISIT (OUTPATIENT)
Dept: SLEEP MEDICINE | Facility: CLINIC | Age: 75
End: 2020-07-28
Payer: COMMERCIAL

## 2020-07-28 DIAGNOSIS — G47.33 OSA (OBSTRUCTIVE SLEEP APNEA): Primary | ICD-10-CM

## 2020-07-28 DIAGNOSIS — E66.01 MORBID OBESITY DUE TO EXCESS CALORIES (H): ICD-10-CM

## 2020-07-28 PROCEDURE — 99213 OFFICE O/P EST LOW 20 MIN: CPT | Mod: 95 | Performed by: PHYSICIAN ASSISTANT

## 2020-07-31 NOTE — TELEPHONE ENCOUNTER
Per Dr. Moreau, labs ok to be done 8/6. Informed patient.  Mounika Bravo LPN  Nephrology  [phone]

## 2020-08-06 ENCOUNTER — ANTICOAGULATION THERAPY VISIT (OUTPATIENT)
Dept: NURSING | Facility: CLINIC | Age: 75
End: 2020-08-06

## 2020-08-06 DIAGNOSIS — N18.30 CKD (CHRONIC KIDNEY DISEASE) STAGE 3, GFR 30-59 ML/MIN (H): ICD-10-CM

## 2020-08-06 DIAGNOSIS — I26.99 PULMONARY EMBOLI (H): ICD-10-CM

## 2020-08-06 DIAGNOSIS — K21.00 REFLUX ESOPHAGITIS: Chronic | ICD-10-CM

## 2020-08-06 DIAGNOSIS — R79.89 ELEVATED PARATHYROID HORMONE: ICD-10-CM

## 2020-08-06 DIAGNOSIS — I26.99 ACUTE PULMONARY EMBOLISM, UNSPECIFIED PULMONARY EMBOLISM TYPE, UNSPECIFIED WHETHER ACUTE COR PULMONALE PRESENT (H): ICD-10-CM

## 2020-08-06 LAB
ALBUMIN SERPL-MCNC: 3.2 G/DL (ref 3.4–5)
ALBUMIN UR-MCNC: NEGATIVE MG/DL
ANION GAP SERPL CALCULATED.3IONS-SCNC: 5 MMOL/L (ref 3–14)
APPEARANCE UR: CLEAR
BACTERIA #/AREA URNS HPF: ABNORMAL /HPF
BILIRUB UR QL STRIP: NEGATIVE
BUN SERPL-MCNC: 25 MG/DL (ref 7–30)
CALCIUM SERPL-MCNC: 8.4 MG/DL (ref 8.5–10.1)
CHLORIDE SERPL-SCNC: 107 MMOL/L (ref 94–109)
CO2 SERPL-SCNC: 30 MMOL/L (ref 20–32)
COLOR UR AUTO: YELLOW
CREAT SERPL-MCNC: 1.32 MG/DL (ref 0.52–1.04)
CREAT UR-MCNC: 201 MG/DL
ERYTHROCYTE [DISTWIDTH] IN BLOOD BY AUTOMATED COUNT: 13.9 % (ref 10–15)
GFR SERPL CREATININE-BSD FRML MDRD: 40 ML/MIN/{1.73_M2}
GLUCOSE SERPL-MCNC: 89 MG/DL (ref 70–99)
GLUCOSE UR STRIP-MCNC: NEGATIVE MG/DL
HCT VFR BLD AUTO: 37.3 % (ref 35–47)
HGB BLD-MCNC: 12.3 G/DL (ref 11.7–15.7)
HGB UR QL STRIP: ABNORMAL
INR PPP: 4 (ref 0.86–1.14)
KETONES UR STRIP-MCNC: NEGATIVE MG/DL
LEUKOCYTE ESTERASE UR QL STRIP: ABNORMAL
MCH RBC QN AUTO: 28.9 PG (ref 26.5–33)
MCHC RBC AUTO-ENTMCNC: 33 G/DL (ref 31.5–36.5)
MCV RBC AUTO: 88 FL (ref 78–100)
NITRATE UR QL: NEGATIVE
NON-SQ EPI CELLS #/AREA URNS LPF: ABNORMAL /LPF
PH UR STRIP: 5 PH (ref 5–7)
PHOSPHATE SERPL-MCNC: 3.1 MG/DL (ref 2.5–4.5)
PLATELET # BLD AUTO: 178 10E9/L (ref 150–450)
POTASSIUM SERPL-SCNC: 3.9 MMOL/L (ref 3.4–5.3)
PROT UR-MCNC: 0.18 G/L
PROT/CREAT 24H UR: 0.09 G/G CR (ref 0–0.2)
PTH-INTACT SERPL-MCNC: 161 PG/ML (ref 18–80)
RBC # BLD AUTO: 4.26 10E12/L (ref 3.8–5.2)
RBC #/AREA URNS AUTO: ABNORMAL /HPF
SODIUM SERPL-SCNC: 142 MMOL/L (ref 133–144)
SOURCE: ABNORMAL
SP GR UR STRIP: >1.03 (ref 1–1.03)
UROBILINOGEN UR STRIP-ACNC: 0.2 EU/DL (ref 0.2–1)
WBC # BLD AUTO: 4 10E9/L (ref 4–11)
WBC #/AREA URNS AUTO: ABNORMAL /HPF

## 2020-08-06 PROCEDURE — 85027 COMPLETE CBC AUTOMATED: CPT | Performed by: STUDENT IN AN ORGANIZED HEALTH CARE EDUCATION/TRAINING PROGRAM

## 2020-08-06 PROCEDURE — 84156 ASSAY OF PROTEIN URINE: CPT | Performed by: STUDENT IN AN ORGANIZED HEALTH CARE EDUCATION/TRAINING PROGRAM

## 2020-08-06 PROCEDURE — 80069 RENAL FUNCTION PANEL: CPT | Performed by: STUDENT IN AN ORGANIZED HEALTH CARE EDUCATION/TRAINING PROGRAM

## 2020-08-06 PROCEDURE — 85610 PROTHROMBIN TIME: CPT | Performed by: STUDENT IN AN ORGANIZED HEALTH CARE EDUCATION/TRAINING PROGRAM

## 2020-08-06 PROCEDURE — 83970 ASSAY OF PARATHORMONE: CPT | Performed by: STUDENT IN AN ORGANIZED HEALTH CARE EDUCATION/TRAINING PROGRAM

## 2020-08-06 PROCEDURE — 81001 URINALYSIS AUTO W/SCOPE: CPT | Performed by: STUDENT IN AN ORGANIZED HEALTH CARE EDUCATION/TRAINING PROGRAM

## 2020-08-06 PROCEDURE — 82306 VITAMIN D 25 HYDROXY: CPT | Performed by: STUDENT IN AN ORGANIZED HEALTH CARE EDUCATION/TRAINING PROGRAM

## 2020-08-06 PROCEDURE — 36415 COLL VENOUS BLD VENIPUNCTURE: CPT | Performed by: STUDENT IN AN ORGANIZED HEALTH CARE EDUCATION/TRAINING PROGRAM

## 2020-08-06 NOTE — PROGRESS NOTES
ANTICOAGULATION MANAGEMENT     Patient Name:  Chelo Brooks  Date:  2020    ASSESSMENT /SUBJECTIVE:    Today's INR result of 4.0 is supratherapeutic. Goal INR of 2.0-3.0      Warfarin dose taken: Warfarin taken as previously instructed    Diet: No new diet changes affecting INR    Medication changes/ interactions: No interaction expected between Atorvastatin and warfarin    Previous INR: Therapeutic     S/S of bleeding or thromboembolism: No    New injury or illness: No    Upcoming surgery, procedure or cardioversion: No    Additional findings: None      PLAN:    Spoke with Chelo regarding INR result and instructed:     Warfarin Dosing Instructions: hold tonight then continue your current warfarin dose of 5 mg daily    Instructed patient to follow up no later than: 1 week  Lab visit scheduled    Education provided: Target INR goal and significance of current INR result and Monitoring for bleeding signs and symptoms      Chelo verbalizes understanding and agrees to warfarin dosing plan.    Instructed to call the Anticoagulation Clinic for any changes, questions or concerns. (#[phone])        Meghan Devine RN      OBJECTIVE:  Recent labs: (last 7 days)     20  1017   INR 4.00*         No question data found.  Anticoagulation Summary  As of 2020    INR goal:   2.0-3.0   TTR:   71.9 % (8.9 mo)   INR used for dosin.00! (2020)   Warfarin maintenance plan:   5 mg (5 mg x 1) every day   Full warfarin instructions:   : Hold; Otherwise 5 mg every day   Weekly warfarin total:   35 mg   Plan last modified:   Berenice Poole RN (2019)   Next INR check:   2020   Priority:   High   Target end date:   Indefinite    Indications    Pulmonary emboli (H) [I26.99]  Deep vein thrombosis (DVT) (H) (Resolved) [I82.409]             Anticoagulation Episode Summary     INR check location:   Anticoagulation Clinic    Preferred lab:   [facility]    Send INR reminders to:   CASTILLO  NICOLE    Comments:   Takes warfarin in the morning      Anticoagulation Care Providers     Provider Role Specialty Phone number    Vita Zavala MD [facility] [phone]

## 2020-08-07 LAB — DEPRECATED CALCIDIOL+CALCIFEROL SERPL-MC: 28 UG/L (ref 20–75)

## 2020-08-07 RX ORDER — FAMOTIDINE 40 MG/1
TABLET, FILM COATED ORAL
Qty: 90 TABLET | Refills: 3 | Status: SHIPPED | OUTPATIENT
Start: 2020-08-07 | End: 2021-09-21

## 2020-08-10 ENCOUNTER — ANCILLARY PROCEDURE (OUTPATIENT)
Dept: MRI IMAGING | Facility: CLINIC | Age: 75
End: 2020-08-10
Attending: FAMILY MEDICINE
Payer: COMMERCIAL

## 2020-08-10 ENCOUNTER — ANCILLARY PROCEDURE (OUTPATIENT)
Dept: CT IMAGING | Facility: CLINIC | Age: 75
End: 2020-08-10
Attending: FAMILY MEDICINE
Payer: COMMERCIAL

## 2020-08-10 DIAGNOSIS — E27.9 ADRENAL NODULE (H): ICD-10-CM

## 2020-08-10 DIAGNOSIS — M48.061 SPINAL STENOSIS OF LUMBAR REGION, UNSPECIFIED WHETHER NEUROGENIC CLAUDICATION PRESENT: ICD-10-CM

## 2020-08-10 PROCEDURE — 72148 MRI LUMBAR SPINE W/O DYE: CPT | Mod: TC

## 2020-08-10 PROCEDURE — 74176 CT ABD & PELVIS W/O CONTRAST: CPT | Mod: TC

## 2020-08-10 NOTE — LETTER
August 11, 2020        Chelo Brooks  [address]   [place] [address]        Dear ,    We are writing to inform you of your test results.  Here are your MRI results, showing lumbar spinal stenosis (narrowing due to degenerative changes) and a lumbar disc herniation. See the spine specialist as we discussed.       Resulted Orders   MR Lumbar Spine w/o Contrast    Narrative    MR LUMBAR SPINE WITHOUT CONTRAST 8/10/2020 3:15 PM     HISTORY:   Lumbar spinal stenosis. Spinal stenosis of lumbar region,  unspecified whether neurogenic claudication present. Leg numbness.    TECHNIQUE: Multiplanar multisequence images were obtained through the  lumbar spine without contrast.    COMPARISON: 7/24/2017.    FINDINGS: Five lumbar-type vertebral bodies are presumed. Vertebral  body heights are maintained. Posterior alignment is normal. Bone  marrow signal intensity is normal except for an incidental hemangioma  in the L3 vertebral body. Incidental note made of a Tarlov cyst in the  distal thecal sac. Disc space narrowing at L3-L4. The conus medullaris  is normal in appearance with its tip at the L2 level. Cauda equina  nerve roots are normal. Paraspinal soft tissues and visualized bony  pelvis are within normal limits.    T12-L1: Minimal disc bulge and mild facet hypertrophy is present.  There is no stenosis..    L1-L2: Normal disc. Minimal facet hypertrophy. No stenosis.    L2-L3: There is a left posterolateral disc protrusion and some mild  facet hypertrophy. There is secondary moderate left-sided foraminal  stenosis but no significant central canal stenosis. The disc  protrusion is also in continuity with the left L3 nerve root proximal  to the lateral recess.    L3-L4: Broad-based disc bulging and moderate facet hypertrophy is  present causing moderate central canal stenosis and mild-to-moderate  bilateral neural foraminal stenosis.     L4-L5: Severe facet hypertrophy is present along with  minimal disc  bulging. There is severe central canal stenosis and mild-to-moderate  bilateral neural foraminal stenosis.    L5-S1: Mild facet hypertrophy and some minimal disc bulging is  present. There is no stenosis.      Impression    IMPRESSION:  1. Multilevel degenerative disc and facet disease most advanced at  L4-L5 where there is severe central canal stenosis. This is more  pronounced than the prior exam in 2017.  2. Left posterolateral L2-L3 disc protrusion with secondary moderate  left-sided foraminal stenosis as well as some impingement upon the  left L3 nerve root proximal to the lateral recess. This is new since  the prior exam.    MARY GUILLEN MD       If you have any questions or concerns, please call the clinic at the number listed above.       Sincerely,        Vita Zavala MD

## 2020-08-10 NOTE — LETTER
August 11, 2020      Chelo Brooks  [address] [facility]   [place] [facility] [place] [address]          Dear ,    We are writing to inform you of your test results.  Your CT scan again shows a likely benign left adrenal nodule. You also have gallstones, a left kidney cyst, and an umbilical hernia. These findings do not need treatment if you are feeling well. Let's discuss this at your next yearly visit.       Resulted Orders   CT Abdomen Pelvis w/o Contrast    Narrative    CT ABDOMEN/PELVIS WITHOUT CONTRAST August 10, 2020 2:45 PM     HISTORY: Bilateral groin pain. Adrenal nodule (H).    TECHNIQUE: Noncontrast CT abdomen and pelvis was performed. Radiation  dose for this scan was reduced using automated exposure control,  adjustment of the mA and/or kV according to patient size, or iterative  reconstruction technique.    COMPARISON: None available.    FINDINGS: The exam is limited by low-dose technique.    Lower chest: Lung bases are clear.    Abdomen/pelvis: Limited evaluation of the abdominal organs due to lack  of IV contrast. There is approximately 1.2 cm left adrenal nodule with  low Hounsfield units on the unenhanced contrast measuring -8, likely  represent adrenal adenoma. No right adrenal nodule.    Cholelithiasis without CT evidence of acute cholecystitis. Mild fatty  infiltration of the pancreas. No radiodense kidney stones or  hydronephrosis in either kidney. There is 3.5 cm partially exophytic  hypoattenuating cystic focus arising from the anterior aspect of the  interpolar region of the left kidney (series 2 image 106), likely  represents renal cyst. No radiodense kidney stone is visualized.    No abnormally dilated bowel loops. No significant free fluid in the  abdomen or pelvis. No free peritoneal or portal venous gas.    Bones and soft tissue: No suspicious osseous lesion. Small  fat-containing umbilical hernia. No significant inguinal hernias.  Small soft tissue attenuation in the  subcutaneous tissue of the right  lower quadrant anterior abdominal wall (series 2 image 177),  indeterminate, could be related to medication injection.      Impression    IMPRESSION: Exam is limited by low-dose technique and patient's body  habitus.  1. 1.2 cm left adrenal nodule with imaging characteristics most  consistent with lipid rich adenoma.  2. Cholelithiasis without CT evidence of acute cholecystitis.  3. 3.5 cm left renal cyst.  4. Small fat-containing umbilical hernia. No evidence of inguinal  hernias.  5. Approximately 1 cm soft tissue nodule in the subcutaneous tissue  right lower quadrant anterior abdominal wall, indeterminate, may be  related to medication injection.    MATTEO MONTELONGO MD       If you have any questions or concerns, please call the clinic at the number listed above.       Sincerely,        Vita Zavala MD

## 2020-08-11 NOTE — RESULT ENCOUNTER NOTE
Mail letter:    Your CT scan again shows a likely benign left adrenal nodule. You also have gallstones, a left kidney cyst, and an umbilical hernia. These findings do not need treatment if you are feeling well. Let's discuss this at your next yearly visit.     Vita Zavala MD

## 2020-08-11 NOTE — RESULT ENCOUNTER NOTE
Mail letter:    Here are your MRI results, showing lumbar spinal stenosis (narrowing due to degenerative changes) and a lumbar disc herniation. See the spine specialist as we discussed.     Vita Zavala MD

## 2020-08-13 ENCOUNTER — ANTICOAGULATION THERAPY VISIT (OUTPATIENT)
Dept: NURSING | Facility: CLINIC | Age: 75
End: 2020-08-13

## 2020-08-13 ENCOUNTER — OFFICE VISIT (OUTPATIENT)
Dept: NEUROSURGERY | Facility: CLINIC | Age: 75
End: 2020-08-13
Attending: FAMILY MEDICINE
Payer: COMMERCIAL

## 2020-08-13 VITALS
BODY MASS INDEX: 45.99 KG/M2 | HEART RATE: 83 BPM | TEMPERATURE: 97.6 F | WEIGHT: 293 LBS | RESPIRATION RATE: 16 BRPM | OXYGEN SATURATION: 95 % | DIASTOLIC BLOOD PRESSURE: 84 MMHG | SYSTOLIC BLOOD PRESSURE: 136 MMHG | HEIGHT: 67 IN

## 2020-08-13 DIAGNOSIS — I26.99 PULMONARY EMBOLI (H): ICD-10-CM

## 2020-08-13 DIAGNOSIS — I26.99 ACUTE PULMONARY EMBOLISM, UNSPECIFIED PULMONARY EMBOLISM TYPE, UNSPECIFIED WHETHER ACUTE COR PULMONALE PRESENT (H): ICD-10-CM

## 2020-08-13 DIAGNOSIS — M54.16 LUMBAR RADICULOPATHY: Primary | ICD-10-CM

## 2020-08-13 LAB
CAPILLARY BLOOD COLLECTION: NORMAL
INR PPP: 2.6 (ref 0.86–1.14)

## 2020-08-13 PROCEDURE — 99213 OFFICE O/P EST LOW 20 MIN: CPT | Performed by: PHYSICIAN ASSISTANT

## 2020-08-13 PROCEDURE — 85610 PROTHROMBIN TIME: CPT | Performed by: FAMILY MEDICINE

## 2020-08-13 PROCEDURE — 36416 COLLJ CAPILLARY BLOOD SPEC: CPT | Performed by: FAMILY MEDICINE

## 2020-08-13 ASSESSMENT — MIFFLIN-ST. JEOR: SCORE: 2043.57

## 2020-08-13 ASSESSMENT — PAIN SCALES - GENERAL: PAINLEVEL: EXTREME PAIN (9)

## 2020-08-13 NOTE — NURSING NOTE
"Cehlo Brooks is a 75 year old female who presents for:  Chief Complaint   Patient presents with     Neurologic Problem        Initial Vitals:  /84   Pulse 83   Temp 97.6  F (36.4  C) (Oral)   Resp 16   Ht 5' 7\" (1.702 m)   Wt (!) 332 lb (150.6 kg)   SpO2 95%   BMI 52.00 kg/m   Estimated body mass index is 52 kg/m  as calculated from the following:    Height as of this encounter: 5' 7\" (1.702 m).    Weight as of this encounter: 332 lb (150.6 kg).. Body surface area is 2.67 meters squared. BP completed using cuff size: large  Extreme Pain (9)    Nursing Comments: Pt present today for back issues.    Maico Knox, [facility]    "

## 2020-08-13 NOTE — PROGRESS NOTES
ANTICOAGULATION MANAGEMENT     Patient Name:  Chelo Brooks  Date:  2020    ASSESSMENT /SUBJECTIVE:    Today's INR result of 2.6 is therapeutic. Goal INR of 2.0-3.0      Warfarin dose taken: Warfarin taken as previously instructed    Diet: No new diet changes affecting INR    Medication changes/ interactions: No interaction expected between Lipitor and warfarin    Previous INR: Supratherapeutic     S/S of bleeding or thromboembolism: No    New injury or illness: No    Upcoming surgery, procedure or cardioversion: Yes, not scheduled, will need a hold.     Additional findings: Patient states she has had increase in shortness of breathe, increase edema in legs      PLAN:    Spoke with Chelo regarding INR result and instructed:     Warfarin Dosing Instructions: Change your warfarin dose to 2.5 mg Mon and 5 mg ROW    Instructed patient to follow up no later than: 2 weeks  Lab visit scheduled    Education provided: Target INR goal and significance of current INR result      Chelo verbalizes understanding and agrees to warfarin dosing plan.    Instructed to call the Anticoagulation Clinic for any changes, questions or concerns. (#[phone])        Meghan Devine RN      OBJECTIVE:  Recent labs: (last 7 days)     20  1141   INR 2.60*         INR assessment THER    Recheck INR In: 2 WEEKS    INR Location Outside lab      Anticoagulation Summary  As of 2020    INR goal:   2.0-3.0   TTR:   70.8 % (9.1 mo)   INR used for dosin.60 (2020)   Warfarin maintenance plan:   2.5 mg (5 mg x 0.5) every Mon, Fri; 5 mg (5 mg x 1) all other days   Full warfarin instructions:   2.5 mg every Mon, Fri; 5 mg all other days   Weekly warfarin total:   30 mg   Plan last modified:   Meghan Devine, RN (2020)   Next INR check:   2020   Priority:   High   Target end date:   Indefinite    Indications    Pulmonary emboli (H) [I26.99]  Deep vein thrombosis (DVT) (H) (Resolved) [I82.409]              Anticoagulation Episode Summary     INR check location:   Anticoagulation Clinic    Preferred lab:   [facility]    Send INR reminders to:   [facility]ILLO [facility]    Comments:   Takes warfarin in the morning      Anticoagulation Care Providers     Provider Role Specialty Phone number    Vita Zavala MD [facility] [phone]

## 2020-08-13 NOTE — LETTER
"    8/13/2020         RE: Chelo Brooks  [address] [place] [address] [address]  [facility] [address]        Dear Colleague,    Thank you for referring your patient, Chelo Brooks, to the [facility]. Please see a copy of my visit note below.    [facility]  Neurosurgery followup:    HPI:   1/25/18   Chelo Brooks is a 72 year old female with low back pain, \"for years.\"  She states that it has continued to bother her, \"But I just never did anything about it.\"  She describes her pain as a constant dull pain that increases with walking, prolonged standing, lifting and bending.  She states the pain had gone into her right leg previously but after a lumbar BRII (right L4-5) in 8/2017 the RLE pain improved.  She states she had an incident with pain in her right foot this summer and the pain \"shot into my lower leg\" but that hasn't happened again.  For the most part her low back pain is better while seated.  She also takes antiinflammatories with mild benefit.  She denies falls or foot drop.  She denies BLE weakness.  She denies changes to bowel or bladder.   8/13/20  Returns today for follow up. Continued chronic low back and intermittent bilateral groin pain. She has had about 2 months of left leg pain. Pain located in left low back and radiates down lateral aspect of left leg to the shin. She describes pain as a cold sensation. She has had increased difficulty walking any distance. She is working to get a walker. She has done therapy and injections in the past through [facility]. Had RFA with no relief. She is on anticoagulation for prior bilateral PEs. Denies bladder or bowel incontinence.   Exam:  Constitutional:  Alert, morbidly obese, NAD.  HEENT: Normocephalic, atraumatic.   Pulm:  Without shortness of breath   CV:  No pitting edema of BLE.      /84   Pulse 83   Temp 97.6  F (36.4  C) (Oral)   Resp 16   Ht 5' 7\" (1.702 m)   Wt (!) 332 lb (150.6 kg)   SpO2 95%   BMI 52.00 " kg/m      Neurological:  Awake  Alert  Oriented x 3  Motor exam:        IP Q DF PF EHL  R   5  5   5   5    5  L   5  5   5   5    5     Reflexes are 2+ in the patellar and Achilles. There is no clonus. Downgoing Babinski.    Able to spontaneously move L/E bilaterally  Sensation intact throughout all L/E dermatomes     Antalgic gait. Tenderness to palpation of spine and bilateral Paraspinous muscles in lumbar region.     Imaging: Lumbar MRI from 8/10/20 reviewed in office. 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 New left L2-3 disc protrusion impinging the left L3 nerve root.  A/P: Returns today for follow up. Continued chronic low back and intermittent bilateral groin pain. She has had about 2 months of left leg pain. Pain located in left low back and radiates down lateral aspect of left leg to the shin. She describes pain as a cold sensation. She has had increased difficulty walking any distance. Lumbar MRI reviewed in office. Discussed with PCP who is OK holding coumadin for LESI. Will plan for LESI vs having her follow up with Dr. Isabel. Patient voiced understanding and agreement.          Mary Resendiz PA-C  [facility]  [address] [place] [id number]    Tel [phone]  Pager [phone]      Again, thank you for allowing me to participate in the care of your patient.        Sincerely,        Mary Resendiz PA-C

## 2020-08-13 NOTE — PROGRESS NOTES
Anticoagulation Management    Unable to reach Chelo today.    Today's INR result of 2.6 is therapeutic (goal INR of 2.0-3.0).  Result received from: Clinic Lab    Follow up required to confirm warfarin dose taken and assess for changes    Left message to call [phone]     Plan: in range with 30mg/week: change maintenance dose to 2.5 mg Mon/Fri and 5 mg ROW and recheck INR in 2 weeks      Anticoagulation clinic to follow up    Meghan Devine RN

## 2020-08-13 NOTE — PROGRESS NOTES
"[facility]surgery  Neurosurgery followup:    HPI:   1/25/18   Chelo Brooks is a 72 year old female with low back pain, \"for years.\"  She states that it has continued to bother her, \"But I just never did anything about it.\"  She describes her pain as a constant dull pain that increases with walking, prolonged standing, lifting and bending.  She states the pain had gone into her right leg previously but after a lumbar BRII (right L4-5) in 8/2017 the RLE pain improved.  She states she had an incident with pain in her right foot this summer and the pain \"shot into my lower leg\" but that hasn't happened again.  For the most part her low back pain is better while seated.  She also takes antiinflammatories with mild benefit.  She denies falls or foot drop.  She denies BLE weakness.  She denies changes to bowel or bladder.   8/13/20  Returns today for follow up. Continued chronic low back and intermittent bilateral groin pain. She has had about 2 months of left leg pain. Pain located in left low back and radiates down lateral aspect of left leg to the shin. She describes pain as a cold sensation. She has had increased difficulty walking any distance. She is working to get a walker. She has done therapy and injections in the past through [facility]. Had RFA with no relief. She is on anticoagulation for prior bilateral PEs. Denies bladder or bowel incontinence.   Exam:  Constitutional:  Alert, morbidly obese, NAD.  HEENT: Normocephalic, atraumatic.   Pulm:  Without shortness of breath   CV:  No pitting edema of BLE.      /84   Pulse 83   Temp 97.6  F (36.4  C) (Oral)   Resp 16   Ht 5' 7\" (1.702 m)   Wt (!) 332 lb (150.6 kg)   SpO2 95%   BMI 52.00 kg/m      Neurological:  Awake  Alert  Oriented x 3  Motor exam:        IP Q DF PF EHL  R   5  5   5   5    5  L   5  5   5   5    5     Reflexes are 2+ in the patellar and Achilles. There is no clonus. Downgoing Babinski.    Able to spontaneously move L/E " bilaterally  Sensation intact throughout all L/E dermatomes     Antalgic gait. Tenderness to palpation of spine and bilateral Paraspinous muscles in lumbar region.     Imaging: Lumbar MRI from 8/10/20 reviewed in office. 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 New left L2-3 disc protrusion impinging the left L3 nerve root.  A/P: Returns today for follow up. Continued chronic low back and intermittent bilateral groin pain. She has had about 2 months of left leg pain. Pain located in left low back and radiates down lateral aspect of left leg to the shin. She describes pain as a cold sensation. She has had increased difficulty walking any distance. Lumbar MRI reviewed in office. Discussed with PCP who is OK holding coumadin for LESI. Will plan for LESI vs having her follow up with Dr. Isabel. Patient voiced understanding and agreement.          Mary Resendiz PA-C  [facility]  [address] [place] [id number]    Tel [phone]  Pager [phone]

## 2020-08-14 ENCOUNTER — TELEPHONE (OUTPATIENT)
Dept: NEUROSURGERY | Facility: CLINIC | Age: 75
End: 2020-08-14

## 2020-08-14 ENCOUNTER — TELEPHONE (OUTPATIENT)
Dept: PALLIATIVE MEDICINE | Facility: CLINIC | Age: 75
End: 2020-08-14

## 2020-08-14 DIAGNOSIS — I26.99 PULMONARY EMBOLI (H): Primary | ICD-10-CM

## 2020-08-14 DIAGNOSIS — M54.16 LUMBAR RADICULOPATHY: Primary | ICD-10-CM

## 2020-08-14 NOTE — TELEPHONE ENCOUNTER
"Per Mary Resendiz PA-C: Call her and let her know her PCP was okay with BRII, but if she doesn t want to try and would rather meet with surgeon instead, we can have her follow up with Dr. granda.     Called and spoke to patient. She wants to move forward with trying the injection first. Order placed with pain management. Per OV note \"PCP who is OK holding coumadin for LESI.\"    Note placed in the order as well to indicate this.     "

## 2020-08-14 NOTE — TELEPHONE ENCOUNTER
"Screening Questions for Radiology Injections:    Injection to be done at which interventional clinic site? [facility]    Instruct patient to arrive as directed prior to the scheduled appointment time:    [place] minutes before      [place]: 30 minutes before; if IV needed 1 hour before     Dr. Morocho-no IV needed for Cervical BRII; please instruct to arrive 30\" early    Procedure ordered by Martín    Procedure ordered? LESI     As a reminder, receiving steroids can decrease your body's ability to fight infection.   Would you still like to move forward with scheduling the injection?  Yes      Transforaminal Cervical BRII - no pain provider currently performing    What insurance would patient like us to bill for this procedure? [employer]       Worker's comp or MVA (motor vehicle accident) -Any injection DO NOT SCHEDULE and route to Selene Jorge Luis.      [employer] insurance - For SI joint injections, DO NOT SCHEDULE and route Selene Kang.       [employer] - Any injection besides hip/shoulder/knee joint DO NOT SCHEDULE and route to Selene Jorge Luis. She will obtain PA and call pt back to schedule procedure or notify pt of denial.       [employer]-Route to Selene for review      **BCBS- ALL need to be routed to [place] for review if a PA is needed**      IF SCHEDULING IN [place] AND NEEDS A PA, IT IS OKAY TO SCHEDULE. [place] HANDLES THEIR OWN PA'S AFTER THE PATIENT IS SCHEDULED. PLEASE SCHEDULE AT LEAST 1 WEEK OUT SO A PA CAN BE OBTAINED.    Any chance of pregnancy? NO   If YES, do NOT schedule and route to RN pool    Is an  needed? No     Patient has a drive home? (mandatory) YES: informed     Is patient taking any blood thinners (i.e. plavix, coumadin, jantoven, warfarin, heparin, pradaxa or dabigatran, etc)? Yes - Warfarin    If hold needed, do NOT schedule, route to RN pool     Is patient taking any aspirin products (includes Excedrin and Fiorinal)? No     If more than 325mg/day, OK to " schedule; Instruct pt to decrease to less than 325 mg for 7 days AND route to RN pool    For CERVICAL procedures, hold all aspirin products for 6 days.     Tell pt that if aspirin product is not held for 6 days, the procedure WILL BE cancelled.      Does the patient have a bleeding or clotting disorder? No     If YES, okay to schedule AND route to RN nurse pool    For any patients with platelet count <100, must be forwarded to provider    Is patient diabetic?  No  If YES, instruct them to bring their glucometer.    Does patient have an active infection or treated for one within the past week? No     Is patient currently taking any antibiotics?  No     For patients on chronic, preventative, or prophylactic antibiotics, procedures may be scheduled.     For patients on antibiotics for active or recent infection:antibiotic course must have been completed for 4 days    Is patient currently taking any steroid medications? (i.e. Prednisone, Medrol)  No     For patients on steroid medications, course must have been completed for 4 days    Is patient actively being treated for cancer or immunocompromised? No  If YES, do NOT schedule and route to RN pool     Are you able to get on and off an exam table with minimal or no assistance? Yes  If NO, do NOT schedule and route to RN pool    Are you able to roll over and lay on your stomach with minimal or no assistance? Yes  If NO, do NOT schedule and route to RN pool     Any allergies to contrast dye, iodine, shellfish, or numbing and steroid medications? No  If YES, route to RN pool AND add allergy information to appointment notes    Allergies: Adhesive tape      Has the patient had a flu shot or any other vaccinations within 7 days before or after the procedure.  No     Does patient have an MRI/CT?  YES: 2020  Check Procedure Scheduling Grid to see if required.      Was the MRI done within the last 3 years?  Yes    If yes, where was the MRI done i.e.[facility], [facility],  [facility], [facility] etc?       If no, do not schedule and route to RN pool    If MRI was not [facility], [facility] do NOT schedule and route to RN pool.      If pt has an imaging disc, the injection MAY be scheduled but pt has to bring disc to appt.     If they show up without the disc the injection cannot be done    Procedure Specific Instructions:      If celiac plexus block, informed patient NPO for 6 hours and that it is okay to take medications with sips of water, especially blood pressure medications  Not Applicable         If this is for a cervical procedure, informed patient that aspirin needs to be held for 6 days.   Not Applicable      If IV needed:    Do not schedule procedures requiring IV placement in the first appointment of the day or first appointment after lunch. Do NOT schedule at 0745, 0815 or 1245.     Instructed pt to arrive 30 minutes early for IV start if required. (Check Procedure Scheduling Grid)  Not Applicable    Reminders:      If you are started on any steroids or antibiotics between now and your appointment, you must contact us because the procedure may need to be cancelled.  No      For all procedures except radiofrequency ablations (RFAs) and spinal cord stimulator (SCS) trials, informed patient:    IV sedation is not provided for this procedure.  If you feel that an oral anti-anxiety medication is needed, you can discuss this further with your referring provider or primary care provider.  The Pain Clinic provider will discuss specifics of what the procedure includes at your appointment.  Most procedures last 10-20 minutes.  We use numbing medications to help with any discomfort during the procedure.  Not Applicable      For patients 85 or older we recommend having an adult stay w/ them for the remainder of the day.       Does the patient have any questions?  NO  Jenny Sykes  [facility]

## 2020-08-17 ENCOUNTER — ANCILLARY PROCEDURE (OUTPATIENT)
Dept: MAMMOGRAPHY | Facility: CLINIC | Age: 75
End: 2020-08-17
Attending: FAMILY MEDICINE
Payer: COMMERCIAL

## 2020-08-17 DIAGNOSIS — Z12.31 VISIT FOR SCREENING MAMMOGRAM: ICD-10-CM

## 2020-08-17 PROCEDURE — 77067 SCR MAMMO BI INCL CAD: CPT | Mod: TC

## 2020-08-17 NOTE — TELEPHONE ENCOUNTER
Patient is requesting to schedule an injection with the [facility].     This would require the patient to hold:               Coumadin (Warfarin)     Hold 5 days prior to procedure.  Check INR prior to procedure.  Ok to resume night of procedure, unless directed otherwise by provider or anticoagulation clinic.    We are requesting your approval to hold the medication for this time frame.    Please keep call open and route back to the PAIN NURSE [[id number]] pool.     Routed to primary care provider. (the neurosurg order says okay to hold by primary care provider)    Linda Gray RN-BSN  [facility]

## 2020-08-17 NOTE — TELEPHONE ENCOUNTER
Called pt.     Injection scheduled for:  8/24/20  Coumadin hold starts on:  8/19/20  Is lovenox bridging needed?  NO  INR order in?: YES  Lab appointment done?  YES  Injection intake questions reviewed?  YES  Infection/antibiotic information reviewed?  YES  Location confirmed: :Yes [facility]  Is pt arriving early?:Yes 1345 to first floor.  Pt informed that, due to COVID-19 that there are no visitors and that a mask is mandatory.    Linda Gray RN-BSN  [facility]

## 2020-08-18 ENCOUNTER — ANCILLARY PROCEDURE (OUTPATIENT)
Dept: ULTRASOUND IMAGING | Facility: CLINIC | Age: 75
End: 2020-08-18
Attending: SURGERY
Payer: COMMERCIAL

## 2020-08-18 DIAGNOSIS — E04.1 THYROID NODULE: ICD-10-CM

## 2020-08-18 PROCEDURE — 76536 US EXAM OF HEAD AND NECK: CPT

## 2020-08-22 DIAGNOSIS — E04.1 THYROID NODULE: Primary | ICD-10-CM

## 2020-08-24 ENCOUNTER — ANTICOAGULATION THERAPY VISIT (OUTPATIENT)
Dept: NURSING | Facility: CLINIC | Age: 75
End: 2020-08-24

## 2020-08-24 ENCOUNTER — ANCILLARY PROCEDURE (OUTPATIENT)
Dept: RADIOLOGY | Facility: CLINIC | Age: 75
End: 2020-08-24
Attending: PSYCHIATRY & NEUROLOGY
Payer: COMMERCIAL

## 2020-08-24 ENCOUNTER — RADIOLOGY INJECTION OFFICE VISIT (OUTPATIENT)
Dept: PALLIATIVE MEDICINE | Facility: CLINIC | Age: 75
End: 2020-08-24
Payer: COMMERCIAL

## 2020-08-24 VITALS
RESPIRATION RATE: 16 BRPM | OXYGEN SATURATION: 97 % | HEART RATE: 73 BPM | SYSTOLIC BLOOD PRESSURE: 150 MMHG | DIASTOLIC BLOOD PRESSURE: 84 MMHG

## 2020-08-24 DIAGNOSIS — M54.16 LUMBAR RADICULOPATHY: ICD-10-CM

## 2020-08-24 DIAGNOSIS — M54.16 LUMBAR RADICULOPATHY: Primary | ICD-10-CM

## 2020-08-24 DIAGNOSIS — I26.99 PULMONARY EMBOLI (H): ICD-10-CM

## 2020-08-24 LAB
CAPILLARY BLOOD COLLECTION: NORMAL
INR PPP: 1.3 (ref 0.86–1.14)

## 2020-08-24 PROCEDURE — 85610 PROTHROMBIN TIME: CPT | Performed by: PSYCHIATRY & NEUROLOGY

## 2020-08-24 PROCEDURE — 36416 COLLJ CAPILLARY BLOOD SPEC: CPT | Performed by: PSYCHIATRY & NEUROLOGY

## 2020-08-24 PROCEDURE — 62323 NJX INTERLAMINAR LMBR/SAC: CPT | Performed by: PSYCHIATRY & NEUROLOGY

## 2020-08-24 ASSESSMENT — PAIN SCALES - GENERAL: PAINLEVEL: SEVERE PAIN (7)

## 2020-08-24 NOTE — PROGRESS NOTES
Pre procedure Diagnosis: lumbar radiculopathy    Post procedure Diagnosis: Same  Procedure performed: L5-S1 interlaminar epidural steroid injection   Anesthesia: none  Complications: none  Operators: Mavis Currie MD; Mack Fraire MD Pain Medicine Fellow      Indications:   Chelo Brooks is a 75 year old female was sent by SMITA Aguilar  for epidural steroid injection.  They have a history of Low back pain with radiation into to left > right leg, with numbness and tingling .  Exam shows tenderness to palpation lumbar region with mild antalgic gait and they have tried conservative treatment including medications and PT.    MRI was done on 8/10/20 which showed   1. Multilevel degenerative disc and facet disease most advanced at  L4-L5 where there is severe central canal stenosis. This is more  pronounced than the prior exam in 2017.  2. Left posterolateral L2-L3 disc protrusion with secondary moderate  left-sided foraminal stenosis as well as some impingement upon the  left L3 nerve root proximal to the lateral recess. This is new since  the prior exam.    Options/alternatives, benefits and risks were discussed with the patient including bleeding, infection, no pain relief, tissue trauma, exposure to radiation, reaction to medications, spinal cord injury, dural puncture, weakness, numbness and headache.  Questions were answered to her satisfaction and she agrees to proceed. Voluntary informed consent was obtained and signed.     Vitals were reviewed: Yes  Allergies were reviewed:  Yes   Medications were reviewed:  Yes   Pre-procedure pain score: 7/10  INR 1.3    Procedure:  After getting informed consent, patient was brought into the procedure suite and was placed in a prone position on the procedure table.   A Pause for the Cause was performed.  Patient was prepped and draped in sterile fashion.     The L5-S1 interspace was identified with AP fluoroscopy.  A total of 6ml of 1% lidocaine was used to anesthetize  "the skin for a left paramedian approach.    A 20gauge 6inch Touhy needle (4.5\" would be adequate) was advanced under intermittent fluoroscopy.  A JOSY syringe was used to advance the needle into the epidural space.   After loss of resistance, there was no evidence of blood or CSF on aspiration. Location was verified with both AP and lateral fluoroscopic imaging.    A total of 2ml of Omnipaque 300 was injected demonstrating appropriate epidural spread and was checked in both the AP and lateral views. 8ml was wasted. There was no evidence of intravascular or intrathecal spread.    2 ml of 0.2% ropivacaine  with 10mg of dexamethasone and 2ml of preservative free saline was injected.  The needle was removed from the epidural space, flushed with 1% lidocaine and removed.     Hemostasis was achieved, the area was cleaned, and bandaids were placed when appropriate.  The patient tolerated the procedure well, and was taken to the recovery room.    Images were saved to PACS.    Post-procedure pain score: 0/10  Follow-up includes:   -f/u phone call in one week  -f/u with referring provider  Restart coumadin corrie Currie MD  [facility]       "

## 2020-08-24 NOTE — PATIENT INSTRUCTIONS
[facility]   Procedure Discharge Instructions    Today you saw: Dr. Chelsy Currie    You had an:  Lumbar Epidural steroid injection               Please restart taking your coumadin 6 hours after your procedure and work closely with the INR clinic for monitoring and adjusting.    Be cautious when walking. Numbness and/or weakness in the lower extremities may occur for up to 6-8 hours after the procedure due to effect of the local anesthetic    Do not drive for 6 hours. The effect of the local anesthetic could slow your reflexes.     You may resume your regular activities after 24 hours    Avoid strenuous activity for the first 24 hours    You may shower, however avoid swimming, tub baths or hot tubs for 24 hours following your procedure    You may have a mild to moderate increase in pain for several days following the injection.    It may take up to 14 days for the steroid medication to start working although you may feel the effect as early as a few days after the procedure.       You may use ice packs for 10-15 minutes, 3 to 4 times a day at the injection site for comfort    Do not use heat to painful areas for 6 to 8 hours. This will give the local anesthetic time to wear off and prevent you from accidentally burning your skin.     Unless you have been directed to avoid the use of anti-inflammatory medications (NSAIDS), you may use medications such as ibuprofen, Aleve or Tylenol for pain control if needed.     Possible side effects of steroids that you may experience include flushing, elevated blood pressure, increased appetite, mild headaches and restlessness.  All of these symptoms will get better with time.    If you experience any of the following, call the Pain Clinic during work hours (Mon-Friday 8-4:30 pm) at [phone] or the Provider Line after hours at [phone]:  -Fever over 100 degree F  -Swelling, bleeding, redness, drainage, warmth at the injection  site  -Progressive weakness or numbness in your legs  -Loss of bowel or bladder function  -Unusual new onset of pain that is not improving

## 2020-08-24 NOTE — PROGRESS NOTES
ANTICOAGULATION FOLLOW-UP CLINIC VISIT    Patient Name:  Chelo Brooks  Date:  2020  Contact Type:  Telephone    SUBJECTIVE:  Patient Findings     Comments:   Had 5 day hold for steroid injection today she will take double dose tonight and recheck in 4 days.  Assessed for S/S bleeding, clotting, medication, diet, health, activity and alcohol changes          Clinical Outcomes     Negatives:   Major bleeding event, Thromboembolic event, Anticoagulation-related hospital admission, Anticoagulation-related ED visit, Anticoagulation-related fatality    Comments:   Had 5 day hold for steroid injection today she will take double dose tonight and recheck in 4 days.  Assessed for S/S bleeding, clotting, medication, diet, health, activity and alcohol changes             OBJECTIVE    Recent labs: (last 7 days)     20  1340   INR 1.30*       ASSESSMENT / PLAN  INR assessment SUB intentional hold   Recheck INR In: 4 DAYS    INR Location Clinic      Anticoagulation Summary  As of 2020    INR goal:   2.0-3.0   TTR:   69.9 % (9.5 mo)   INR used for dosin.30! (2020)   Warfarin maintenance plan:   2.5 mg (5 mg x 0.5) every Mon; 5 mg (5 mg x 1) all other days   Full warfarin instructions:   : 5 mg; Otherwise 2.5 mg every Mon; 5 mg all other days   Weekly warfarin total:   32.5 mg   Plan last modified:   Meghan Devine RN (2020)   Next INR check:   2020   Priority:   High   Target end date:   Indefinite    Indications    Pulmonary emboli (H) [I26.99]  Deep vein thrombosis (DVT) (H) (Resolved) [I82.409]             Anticoagulation Episode Summary     INR check location:   Anticoagulation Clinic    Preferred lab:   [place] [facility]    Send INR reminders to:   CASTILLO RIVERA    Comments:   Takes warfarin in the morning      Anticoagulation Care Providers     Provider Role Specialty Phone number    Vita Zavala MD [facility] [phone]            See the  Encounter Report to view Anticoagulation Flowsheet and Dosing Calendar (Go to Encounters tab in chart review, and find the Anticoagulation Therapy Visit)        Amanda Meadows RN

## 2020-08-24 NOTE — NURSING NOTE
Pre-procedure Intake    Have you been fasting? NA    If yes, for how long? No     Are you taking a prescribed blood thinner such as coumadin, Plavix, Xarelto?    Yes -   Coumadin    If yes, when did you take your last dose? 8/19/2020    Do you take aspirin?  No    If cervical procedure, have you held aspirin for 6 days?   No     Do you have any allergies to contrast dye, iodine, steroid and/or numbing medications?  NO    Are you currently taking antibiotics or have an active infection?  NO    Have you had a fever/elevated temperature within the past week? NO    Are you currently taking oral steroids? NO    Do you have a ? Yes       Are you pregnant or breastfeeding?  NO    Are the vital signs normal?  Yes  Heron Adame MA

## 2020-08-24 NOTE — NURSING NOTE
Discharge Information    IV Discontiued Time:  NA    Amount of Fluid Infused:  NA    Discharge Criteria = When patient returns to baseline or as per MD order    Consciousness:  Pt is fully awake    Circulation:  BP +/- 20% of pre-procedure level    Respiration:  Patient is able to breathe deeply    O2 Sat:  Patient is able to maintain O2 Sat >92% on room air    Activity:  Moves 4 extremities on command    Ambulation:  Patient is able to stand and walk or stand and pivot into wheelchair    Dressing:  Clean/dry or No Dressing    Notes:   Discharge instructions and AVS given to patient    Patient meets criteria for discharge?  YES    Admitted to PCU?  No    Responsible adult present to accompany patient home?  Yes    Signature/Title:    Hans Dumont RN  RN Care Coordinator  [facility]

## 2020-08-27 ENCOUNTER — ANTICOAGULATION THERAPY VISIT (OUTPATIENT)
Dept: NURSING | Facility: CLINIC | Age: 75
End: 2020-08-27

## 2020-08-27 DIAGNOSIS — I26.99 ACUTE PULMONARY EMBOLISM, UNSPECIFIED PULMONARY EMBOLISM TYPE, UNSPECIFIED WHETHER ACUTE COR PULMONALE PRESENT (H): ICD-10-CM

## 2020-08-27 DIAGNOSIS — I26.99 PULMONARY EMBOLI (H): ICD-10-CM

## 2020-08-27 LAB
CAPILLARY BLOOD COLLECTION: NORMAL
INR PPP: 1.6 (ref 0.86–1.14)

## 2020-08-27 PROCEDURE — 36416 COLLJ CAPILLARY BLOOD SPEC: CPT | Performed by: FAMILY MEDICINE

## 2020-08-27 PROCEDURE — 85610 PROTHROMBIN TIME: CPT | Performed by: FAMILY MEDICINE

## 2020-08-27 NOTE — PROGRESS NOTES
ANTICOAGULATION MANAGEMENT     Patient Name:  Chelo Brooks  Date:  2020    ASSESSMENT /SUBJECTIVE:    Today's INR result of 1.6 is subtherapeutic. Goal INR of 2.0-3.0      Warfarin dose taken: Warfarin taken as previously instructed    Diet: No new diet changes affecting INR    Medication changes/ interactions: No new medications/supplements affecting INR    Previous INR: Subtherapeutic     S/S of bleeding or thromboembolism: No    New injury or illness: No    Upcoming surgery, procedure or cardioversion: No    Additional findings: None      PLAN:    Spoke with Chelo regarding INR result and instructed:     Warfarin Dosing Instructions: Continue your current warfarin dose 2.5 mg Mon and 5 mg ROW    Instructed patient to follow up no later than: 1 week  Lab visit scheduled    Education provided: Target INR goal and significance of current INR result      Chelo verbalizes understanding and agrees to warfarin dosing plan.    Instructed to call the Anticoagulation Clinic for any changes, questions or concerns. (#[phone])        Meghan Devine RN      OBJECTIVE:  Recent labs: (last 7 days)     20  1340 20  1059   INR 1.30* 1.60*         No question data found.  Anticoagulation Summary  As of 2020    INR goal:   2.0-3.0   TTR:   69.2 % (9.6 mo)   INR used for dosin.60! (2020)   Warfarin maintenance plan:   2.5 mg (5 mg x 0.5) every Mon; 5 mg (5 mg x 1) all other days   Full warfarin instructions:   2.5 mg every Mon; 5 mg all other days   Weekly warfarin total:   32.5 mg   Plan last modified:   Meghan Devine, RN (2020)   Next INR check:   9/3/2020   Priority:   High   Target end date:   Indefinite    Indications    Pulmonary emboli (H) [I26.99]  Deep vein thrombosis (DVT) (H) (Resolved) [I82.409]             Anticoagulation Episode Summary     INR check location:   Anticoagulation Clinic    Preferred lab:   [facility]    Send INR reminders to:   CASTILLO RIVERA     Comments:   Takes warfarin in the morning      Anticoagulation Care Providers     Provider Role Specialty Phone number    Vita Zavala MD [facility] [phone]

## 2020-08-28 ENCOUNTER — TELEPHONE (OUTPATIENT)
Dept: FAMILY MEDICINE | Facility: CLINIC | Age: 75
End: 2020-08-28

## 2020-08-28 ENCOUNTER — VIRTUAL VISIT (OUTPATIENT)
Dept: NEPHROLOGY | Facility: CLINIC | Age: 75
End: 2020-08-28
Payer: COMMERCIAL

## 2020-08-28 ENCOUNTER — PRE VISIT (OUTPATIENT)
Dept: NEPHROLOGY | Facility: CLINIC | Age: 75
End: 2020-08-28

## 2020-08-28 DIAGNOSIS — G47.33 OSA (OBSTRUCTIVE SLEEP APNEA): Chronic | ICD-10-CM

## 2020-08-28 DIAGNOSIS — N18.30 CKD (CHRONIC KIDNEY DISEASE) STAGE 3, GFR 30-59 ML/MIN (H): ICD-10-CM

## 2020-08-28 DIAGNOSIS — R31.21 ASYMPTOMATIC MICROSCOPIC HEMATURIA: Primary | ICD-10-CM

## 2020-08-28 DIAGNOSIS — I12.9 RENAL HYPERTENSION: ICD-10-CM

## 2020-08-28 NOTE — PROGRESS NOTES
"Nephrology Clinic    Telephone Visit    CC: elevated creatinine and elevated PTH    HPI:  Chelo Brooks is a 75 year old female with pmh of HTN, FLOR, GERD, obesity, PE in 2019 who is referred by Dr. Zavala to nephrology due to elevated creatinine and elevated PTH.    The patient had eGFR around 60 ml/min for dating back to 2012 (and was in the 70s in the mid-2000s). It decreased to the high 40s to low 50s in 2019 and has now decreased to ~40 ml/min.     She notes that she rarely drinks fluids. She states she drinks no water and only 1 bottle of Coke per day. UAs are regularly with specific gravity >1.030 despite no documented CT contrast.     The patient is concerned about swelling in the in the legs and sometimes in the \"stomach\" going back years. Her BP lately has been 120-130s systolic. She thinks she has had HTN for less than 10 years. She is on hydrochlorothiazide 12.5mg daily and olmesartan 20mg daily. She does eat a diet high in salt from processed foods.     For GERD, she former took Prilosec she states for years but switched to famotidine for about a year.     NSAIDS: the patient experiences back pain and was taking Aleve and ibuprofen (often 800mg per dose) until a year ago when she had her PE and was told to avoid NSAIDs. She now rare takes ibuprofen.     No kidney stones. Remote hx of rare UTIs. She has had hematuria on repeat UAs over the years. Never smoker and no gross hematuria. She is without proteinuria.     The patient previously had vit D deficiency but vitamin D has normalized. PTH has been elevated 130-160 since then. Corrected calcium and phos are normal.       Past Medical History:   Diagnosis Date     Acquired renal cyst of left kidney      Adrenal nodule (H)     left - needs yearly CT     CKD (chronic kidney disease) stage 3, GFR 30-59 ml/min (H)      Degenerative joint disease (DJD) of hip      Diagnostic skin and sensitization tests 4/5/12     RAST pos. only to cat mildly     DJD " (degenerative joint disease) 10/21/2005     Eczema, unspecified type 4/5/2018     Elevated parathyroid hormone 6/18/2019     Gallstones      Hepatitis B childhood     resolved, patient is not a carrier      HTN (hypertension)      Hyperlipidemia 11/27/2012     Hypertension goal BP (blood pressure) < 140/90      Lumbar disc herniation      Lumbar spinal stenosis 07/17/2017     Mild persistent asthma without complication 5/30/2017     Morbid obesity due to excess calories (H) 11/23/2015    Surgical referral declined '16      FLOR (obstructive sleep apnea)      Pulmonary emboli (H) 10/28/2019     Shingles 2014    scalp     Stasis dermatitis of both legs 4/5/2018     Thyroid nodule      Umbilical hernia      Vitamin D deficiency        Past Surgical History:   Procedure Laterality Date     C TOTAL KNEE ARTHROPLASTY  3/2000    right     C TOTAL KNEE ARTHROPLASTY  6/8/12    left     C VAGINAL HYSTERECTOMY  1977    benign, prolapse, ovaries remain      IR PAROTID BIOPSY  5/21/2020        Family History   Problem Relation Age of Onset     Circulatory Father         d. DVT     Heart Disease Father         pacer     Diabetes Mother      Hypertension Mother      C.A.D. Paternal Uncle         MI      Heart Disease Brother 48        pacer      Diabetes Maternal Grandmother      Hypertension Maternal Grandmother      Diabetes Maternal Aunt      Hypertension Maternal Aunt      Cancer - colorectal Maternal Grandfather      C.A.D. Maternal Aunt         MI     Glaucoma No family hx of      Macular Degeneration No family hx of    No family hx of kidney disease.  She does have family hx of HTN.    Social History     Tobacco Use     Smoking status: Never Smoker     Smokeless tobacco: Never Used   Substance Use Topics     Alcohol use: Yes     Alcohol/week: 0.0 standard drinks     Comment: very rare       Drug use: No         Medications:  Current Outpatient Medications   Medication Sig Dispense Refill     albuterol (PROAIR HFA/PROVENTIL  HFA/VENTOLIN HFA) 108 (90 Base) MCG/ACT inhaler Inhale 1-2 puffs into the lungs every 4 hours as needed for shortness of breath / dyspnea 1 Inhaler 3     atorvastatin (LIPITOR) 40 MG tablet Take 1 tablet (40 mg) by mouth daily 90 tablet 3     Cholecalciferol (VITAMIN D) 2000 UNITS tablet Take 2,000 Units by mouth daily       famotidine (PEPCID) 40 MG tablet TAKE 1 TABLET(40 MG) BY MOUTH DAILY 90 tablet 3     fluticasone (FLONASE) 50 MCG/ACT spray Spray 2 sprays into both nostrils daily 1 Bottle 11     Hypertonic Nasal Wash (SINUS RINSE BOTTLE KIT) PACK Spray 1 Bottle in nostril daily as needed.       loratadine (CLARITIN) 10 MG tablet Take 1 tablet (10 mg) by mouth daily       mometasone (ELOCON) 0.1 % external cream Apply to affected area on legs twice daily as needed 50 g 0     mometasone furoate (ASMANEX HFA) 200 MCG/ACT inhaler Inhale 2 puffs into the lungs 2 times daily 1 Inhaler 3     olmesartan-hydrochlorothiazide (BENICAR) 20-12.5 MG tablet TAKE 1 TABLET BY MOUTH DAILY 90 tablet 1     order for DME Equipment being ordered: Auto CPAP 11-18 1 Units 0     warfarin ANTICOAGULANT (COUMADIN) 5 MG tablet Take 1 tablet (5 mg) by mouth daily Or as instructed 90 tablet 0       Review Of Systems:  Constitutional: Fatigue  Eyes: negative  Ears/Nose/Throat: negative  Cardiovascular: negative  Respiratory: No shortness of breath, dyspnea on exertion, cough, or hemoptysis  Gastrointestinal: negative  Genitourinary: as above  Musculoskeletal: back pain  Skin: negative  Neurologic: negative  Endocrine: negative  Hematologic/Lymphatic/Immunologic: negative  Psychiatric: negative    OBJECTIVE:  Vitals and exam deferred due to phone visit.     Labs reviewed.     Recent Labs   Lab Test 08/06/20  1017 07/20/20  1154    141   POTASSIUM 3.9 3.9   CHLORIDE 107 110*   CO2 30 24   ANIONGAP 5 7   GLC 89 109*   BUN 25 29   CR 1.32* 1.30*   HECTOR 8.4* 8.7       Lab Results   Component Value Date    WBC 4.0 08/06/2020     Lab Results    Component Value Date    RBC 4.26 08/06/2020     Lab Results   Component Value Date    HGB 12.3 08/06/2020     Lab Results   Component Value Date    HCT 37.3 08/06/2020     No components found for: MCT  Lab Results   Component Value Date    MCV 88 08/06/2020     Lab Results   Component Value Date    MCH 28.9 08/06/2020     Lab Results   Component Value Date    MCHC 33.0 08/06/2020     Lab Results   Component Value Date    RDW 13.9 08/06/2020     Lab Results   Component Value Date     08/06/2020       Color Urine (no units)   Date Value   08/06/2020 Yellow     Appearance Urine (no units)   Date Value   08/06/2020 Clear     Glucose Urine (mg/dL)   Date Value   08/06/2020 Negative     Bilirubin Urine (no units)   Date Value   08/06/2020 Negative     Ketones Urine (mg/dL)   Date Value   08/06/2020 Negative     Specific Gravity Urine (no units)   Date Value   08/06/2020 >1.030     pH Urine (pH)   Date Value   08/06/2020 5.0     Protein Albumin Urine (mg/dL)   Date Value   08/06/2020 Negative     Urobilinogen Urine (EU/dL)   Date Value   08/06/2020 0.2     Nitrite Urine (no units)   Date Value   08/06/2020 Negative     Leukocyte Esterase Urine (no units)   Date Value   08/06/2020 Small (A)     UPCR 0.09 g/g        ASSESSMENT/PLAN:  Pt is a 75 year old female here to establish care.    Chelo was seen today for telephone.    Diagnoses and all orders for this visit:    CKD (chronic kidney disease) stage 3, GFR 30-59 ml/min (H)  The patient has several possible causes of CKD, including her prior regular NSAID use and what appears to be chronically low fluid intake. Her HTN may also contribute. She is without diabetes. CT scans are without obstruction. Reassuringly, she is without proteinuria, ruling out many GN processes. Hematuria alone, though not necessarily glomerular in origin, could suggest thin basement membrane (which doesn't cause significant GFR loss) or IgA nephropathy, though typically proteinuria is an  important prognosticator and again she has none. GN from paraproteinemia also not fitting without proteinuria.   Warfarin nephropathy could be possible but appears unlikely given that her hematuria predate her warfarin start and her clinical picture better fits CKD than the more usual RODGER / acute picture seen with warfarin. Interstitial disease is possible but she is off NSAIDs and PPI as two major causes. Thus appears low yield to pursue kidney biopsy (or other serologic workup) but will repeat urine labs and serum creatinine again upon follow-up to continue to guide work-up. Discussed increasing fluid intake (16 oz per day only currently) between now and follow-up. Patient agrees with this plan.     Asymptomatic microscopic hematuria  Will refer to urology for persistent hematuria that is not clearly glomerular in origin.   -     UROLOGY ADULT REFERRAL; Future    Renal hypertension  FLOR (obstructive sleep apnea)- severe (AHI 89)  Discussed low salt diet. Pt reports adherence to her CPAP. BPs controlled on current medications, though she does report what appears to be mild edema. Will follow-up on edema symptoms after implementation of a diet lower in salt.     Hemoglobin  Hgb WNL in 12.     Electrolytes, acid-base  No urgent issues.     Secondary hyperparathyroidism due to CKD stage 3B  MDB-CKD  Patient with not unexpected PTH elevation to still acceptable levels () with eGFR below 45 now. Corrected calcium, phos, and vit D WNL.       Return to clinic in 4 months.     Александр Moreau MD  Instructor  Nephrology  Pager [phone]

## 2020-08-28 NOTE — TELEPHONE ENCOUNTER
Patient states she would like to know if you want to see her after all of her tests are done and resulted.  Please call.    Thank you.

## 2020-08-28 NOTE — TELEPHONE ENCOUNTER
Patient was due to follow up this month per last visit, virtual appointment scheduled.   Cielo Paige RN

## 2020-08-28 NOTE — PATIENT INSTRUCTIONS
Increase fluid intake (such as doubling what you are currently consuming with water and Coke - would recommend the additional fluid be all water or low calorie options).     Attempt to reduce salt (sodium) intake with a goal of less than 2500 mg per day (or even < 2000 mg per day if able).

## 2020-09-03 ENCOUNTER — ANTICOAGULATION THERAPY VISIT (OUTPATIENT)
Dept: NURSING | Facility: CLINIC | Age: 75
End: 2020-09-03

## 2020-09-03 DIAGNOSIS — I26.99 PULMONARY EMBOLI (H): ICD-10-CM

## 2020-09-03 DIAGNOSIS — I26.99 ACUTE PULMONARY EMBOLISM, UNSPECIFIED PULMONARY EMBOLISM TYPE, UNSPECIFIED WHETHER ACUTE COR PULMONALE PRESENT (H): ICD-10-CM

## 2020-09-03 LAB
CAPILLARY BLOOD COLLECTION: NORMAL
INR PPP: 2.5 (ref 0.86–1.14)

## 2020-09-03 PROCEDURE — 85610 PROTHROMBIN TIME: CPT | Performed by: FAMILY MEDICINE

## 2020-09-03 PROCEDURE — 36416 COLLJ CAPILLARY BLOOD SPEC: CPT | Performed by: FAMILY MEDICINE

## 2020-09-03 NOTE — PROGRESS NOTES
Anticoagulation Management    Unable to reach Chelo today.    Today's INR result of 2.5 is therapeutic (goal INR of 2.0-3.0).  Result received from: Clinic Lab    Follow up required to confirm warfarin dose taken and assess for changes    Left message to call [phone]   Plan to continue maintenance dose and recheck INR in 3 weeks    Anticoagulation clinic to follow up    Meghan Devine RN

## 2020-09-03 NOTE — PROGRESS NOTES
ANTICOAGULATION MANAGEMENT     Patient Name:  Chelo Brooks  Date:  9/3/2020    ASSESSMENT /SUBJECTIVE:    Today's INR result of 2.5 is therapeutic. Goal INR of 2.0-3.0      Warfarin dose taken: Warfarin taken as previously instructed    Diet: No new diet changes affecting INR    Medication changes/ interactions: No new medications/supplements affecting INR    Previous INR: Subtherapeutic     S/S of bleeding or thromboembolism: No    New injury or illness: No    Upcoming surgery, procedure or cardioversion: No    Additional findings: None      PLAN:    Spoke with Chelo regarding INR result and instructed:     Warfarin Dosing Instructions: Continue your current warfarin dose 2.5 mg Mon and 5 mg ROW    Instructed patient to follow up no later than: 3 weeks  Lab visit scheduled    Education provided: Target INR goal and significance of current INR result      Chelo verbalizes understanding and agrees to warfarin dosing plan.    Instructed to call the Anticoagulation Clinic for any changes, questions or concerns. (#[phone])        Meghan Devine RN      OBJECTIVE:  Recent labs: (last 7 days)     20  1051   INR 2.50*         No question data found.  Anticoagulation Summary  As of 9/3/2020    INR goal:   2.0-3.0   TTR:   68.8 % (9.8 mo)   INR used for dosin.50 (9/3/2020)   Warfarin maintenance plan:   2.5 mg (5 mg x 0.5) every Mon; 5 mg (5 mg x 1) all other days   Full warfarin instructions:   2.5 mg every Mon; 5 mg all other days   Weekly warfarin total:   32.5 mg   No change documented:   Meghan Devine RN   Plan last modified:   Meghan Devine RN (2020)   Next INR check:   2020   Priority:   High   Target end date:   Indefinite    Indications    Pulmonary emboli (H) [I26.99]  Deep vein thrombosis (DVT) (H) (Resolved) [I82.409]             Anticoagulation Episode Summary     INR check location:   Anticoagulation Clinic    Preferred lab:   [facility]    Send INR reminders to:    CASTILLO RIVERA    Comments:   Takes warfarin in the morning      Anticoagulation Care Providers     Provider Role Specialty Phone number    Vita Zavala MD [facility] [phone]

## 2020-09-14 ENCOUNTER — TELEPHONE (OUTPATIENT)
Dept: FAMILY MEDICINE | Facility: CLINIC | Age: 75
End: 2020-09-14

## 2020-09-14 ENCOUNTER — VIRTUAL VISIT (OUTPATIENT)
Dept: FAMILY MEDICINE | Facility: CLINIC | Age: 75
End: 2020-09-14
Payer: COMMERCIAL

## 2020-09-14 DIAGNOSIS — E27.9 ADRENAL NODULE (H): ICD-10-CM

## 2020-09-14 DIAGNOSIS — G47.33 OSA (OBSTRUCTIVE SLEEP APNEA): Chronic | ICD-10-CM

## 2020-09-14 DIAGNOSIS — H81.10 BENIGN PAROXYSMAL POSITIONAL VERTIGO, UNSPECIFIED LATERALITY: Primary | ICD-10-CM

## 2020-09-14 DIAGNOSIS — M48.061 SPINAL STENOSIS OF LUMBAR REGION, UNSPECIFIED WHETHER NEUROGENIC CLAUDICATION PRESENT: ICD-10-CM

## 2020-09-14 DIAGNOSIS — J45.30 MILD PERSISTENT ASTHMA WITHOUT COMPLICATION: ICD-10-CM

## 2020-09-14 DIAGNOSIS — N18.30 CKD (CHRONIC KIDNEY DISEASE) STAGE 3, GFR 30-59 ML/MIN (H): ICD-10-CM

## 2020-09-14 DIAGNOSIS — R79.89 ELEVATED PARATHYROID HORMONE: ICD-10-CM

## 2020-09-14 DIAGNOSIS — E78.5 HYPERLIPIDEMIA LDL GOAL <100: ICD-10-CM

## 2020-09-14 DIAGNOSIS — I12.9 RENAL HYPERTENSION: ICD-10-CM

## 2020-09-14 DIAGNOSIS — K42.9 UMBILICAL HERNIA WITHOUT OBSTRUCTION AND WITHOUT GANGRENE: ICD-10-CM

## 2020-09-14 DIAGNOSIS — E66.01 MORBID OBESITY DUE TO EXCESS CALORIES (H): Chronic | ICD-10-CM

## 2020-09-14 PROBLEM — I26.99 PULMONARY EMBOLI (H): Status: RESOLVED | Noted: 2019-10-28 | Resolved: 2020-09-14

## 2020-09-14 PROCEDURE — 99214 OFFICE O/P EST MOD 30 MIN: CPT | Mod: 95 | Performed by: FAMILY MEDICINE

## 2020-09-14 RX ORDER — ALBUTEROL SULFATE 90 UG/1
1-2 AEROSOL, METERED RESPIRATORY (INHALATION) EVERY 4 HOURS PRN
Qty: 1 INHALER | Refills: 3 | Status: SHIPPED | OUTPATIENT
Start: 2020-09-14

## 2020-09-14 NOTE — TELEPHONE ENCOUNTER
Patient has not received walker as previously prescribed and needs info on how to obtain through medical supply

## 2020-09-14 NOTE — PROGRESS NOTES
"Chelo Brooks is a 75 year old female who is being evaluated via a billable telephone visit.      The patient has been notified of following:     \"This telephone visit will be conducted via a call between you and your physician/provider. We have found that certain health care needs can be provided without the need for a physical exam.  This service lets us provide the care you need with a short phone conversation.  If a prescription is necessary we can send it directly to your pharmacy.  If lab work is needed we can place an order for that and you can then stop by our lab to have the test done at a later time.    Telephone visits are billed at different rates depending on your insurance coverage. During this emergency period, for some insurers they may be billed the same as an in-person visit.  Please reach out to your insurance provider with any questions.    If during the course of the call the physician/provider feels a telephone visit is not appropriate, you will not be charged for this service.\"    Patient has given verbal consent for Telephone visit?  Yes    What phone number would you like to be contacted at? [phone]    How would you like to obtain your AVS? MyChart    Subjective     Chelo Brooks is a 75 year old female who presents via phone visit today for the following health issues:    HPI    Chief Complaint   Patient presents with     Results     Chelo Brooks is a 75 year old female who presents with dizziness, transcient. Symptom onset has been sudden, lasted for a few minutes, resolved for a time period of days. Severity is described as moderate, transient and felt like imbalance or spinning sensation, without other neurologic symptoms or presyncope. Course of her symptoms over time is improved. She stopped taking atorvastatin due to fear of side effects.     She has since our last visit seen the nephrology and neurosurgery specialists; lab and imaging studies are also reviewed with " patient today by phone.     She is non compliant with CPAP.     Patient also has asthma, mild persistent.  Patient has had asthma for years.  Occasional wheezing and shortness of breath are triggered by colds and relieved by albuterol.     Review of Systems   CONSTITUTIONAL: NEGATIVE for fever, chills, change in weight  RESP:Hx asthma  CV: NEGATIVE for chest pain, palpitations or peripheral edema  MUSCULOSKELETAL:back pain and radicular pain    NEURO: vertigo  PSYCHIATRIC: NEGATIVE for changes in mood or affect       Objective          Vitals:  No vitals were obtained today due to virtual visit.    alert and no distress  PSYCH: Alert and oriented times 3; coherent speech, normal   rate and volume, able to articulate logical thoughts, able   to abstract reason, no tangential thoughts, no hallucinations   or delusions  Her affect is normal  RESP: No cough, no audible wheezing, able to talk in full sentences  Remainder of exam unable to be completed due to telephone visits    Orders Only on 09/03/2020   Component Date Value Ref Range Status     INR 09/03/2020 2.50* 0.86 - 1.14 Final    Comment: This test is intended for monitoring Coumadin therapy.  Results are not   accurate in patients with prolonged INR due to factor deficiency.       Capillary Blood Collection 09/03/2020 Capillary collection performed   Final           Assessment/Plan:    Assessment & Plan     Benign paroxysmal positional vertigo, unspecified laterality  -follow-up as needed to consider examination and physical therapy     CKD (chronic kidney disease) stage 3, GFR 30-59 ml/min (H)  Elevated parathyroid hormone  Renal hypertension  -appreciate nephrology consult; follow-up recommended as planned     Hyperlipidemia LDL goal <100  Morbid obesity due to excess calories (H)  -resume statin and follow-up labs in 6 weeks     FLOR (obstructive sleep apnea)- severe (AHI 89)  -use CPAP     Mild persistent asthma without complication  -Well controlled with  medications without side effects.     Spinal stenosis of lumbar region, unspecified whether neurogenic claudication present  -poor response to injection   -DME for walker previously signed; will have my care team follow-up   -follow-up with neurosurgery as planned    Umbilical hernia without obstruction and without gangrene  -offered surgical consult, but emphasized lifestyle strategies for weight loss     Adrenal nodule (H)  -benign, discussed with patient          See Patient Instructions    Return in about 6 weeks (around 10/26/2020) for Wellness visit (fasting labs up to one week prior).    Vita Zavala MD  [facility]    Phone call duration:  22 minutes

## 2020-09-14 NOTE — TELEPHONE ENCOUNTER
DME walker script printed and placed in TC box. Called [facility] at [phone] and they did not receive script. Please call pt to see if she wants to use [facility]. If so, please fax to either [place] fax [phone] of [place] fax [phone].    Laurie Cassidy RN  [facility]

## 2020-09-15 ENCOUNTER — TELEPHONE (OUTPATIENT)
Dept: NEUROSURGERY | Facility: CLINIC | Age: 75
End: 2020-09-15

## 2020-09-15 NOTE — TELEPHONE ENCOUNTER
Patient calling stating that her injection did not work and wants to know next steps. Please call the patient back @ [phone]

## 2020-09-16 NOTE — TELEPHONE ENCOUNTER
Attempted to reach out to patient, no answer. Left voice message asking patient to call clinic back to further discuss.

## 2020-09-21 ENCOUNTER — TELEPHONE (OUTPATIENT)
Dept: NEUROSURGERY | Facility: CLINIC | Age: 75
End: 2020-09-21

## 2020-09-21 NOTE — TELEPHONE ENCOUNTER
Patient had injection on 8/24/20 and had maybe 2 days relief and her pain has returned. Low back bilateral groin pain. Left leg is pretty mild right now. Has intermittent numbness in left leg when sitting and feet up for an extended time and once she shifts positions it goes away.     Taking tylenol for pain daily and applying ice for pain. Also reports, Muscle stiffness in low back is bad in the mornings. Patient sees Mary Resendiz PA-C . Informed her that Mary is no longer with the practice but will get a message sent out to care team to discuss next steps. Patient verbalized understanding.

## 2020-09-21 NOTE — TELEPHONE ENCOUNTER
"Per Valery Crocker, DNP : \"Lets have her follow up with Dr. Isabel. \"      Spoke to patient. reviewed above. She verbalized understanding and appt scheduled with Dr Isabel on 10/1.       "

## 2020-10-01 ENCOUNTER — TELEPHONE (OUTPATIENT)
Dept: FAMILY MEDICINE | Facility: CLINIC | Age: 75
End: 2020-10-01

## 2020-10-01 ENCOUNTER — OFFICE VISIT (OUTPATIENT)
Dept: NEUROSURGERY | Facility: CLINIC | Age: 75
End: 2020-10-01
Payer: COMMERCIAL

## 2020-10-01 VITALS
BODY MASS INDEX: 45.99 KG/M2 | OXYGEN SATURATION: 96 % | HEART RATE: 72 BPM | DIASTOLIC BLOOD PRESSURE: 60 MMHG | HEIGHT: 67 IN | WEIGHT: 293 LBS | TEMPERATURE: 98.2 F | SYSTOLIC BLOOD PRESSURE: 118 MMHG

## 2020-10-01 DIAGNOSIS — M48.061 SPINAL STENOSIS OF LUMBAR REGION WITHOUT NEUROGENIC CLAUDICATION: Primary | ICD-10-CM

## 2020-10-01 PROCEDURE — 99213 OFFICE O/P EST LOW 20 MIN: CPT | Performed by: NEUROLOGICAL SURGERY

## 2020-10-01 ASSESSMENT — PAIN SCALES - GENERAL: PAINLEVEL: EXTREME PAIN (8)

## 2020-10-01 ASSESSMENT — MIFFLIN-ST. JEOR: SCORE: 2052.64

## 2020-10-01 NOTE — LETTER
"    10/1/2020         RE: Chelo Brooks  [address] [place] [address] [address]  [facility] [address]        Dear Colleague,    Thank you for referring your patient, Chelo Brooks, to the [facility]. Please see a copy of my visit note below.    [facility]  Neurosurgery followup:    HPI:   1/25/18   Chelo Brooks is a 72 year old female with low back pain, \"for years.\"  She states that it has continued to bother her, \"But I just never did anything about it.\"  She describes her pain as a constant dull pain that increases with walking, prolonged standing, lifting and bending.  She states the pain had gone into her right leg previously but after a lumbar BRII (right L4-5) in 8/2017 the RLE pain improved.  She states she had an incident with pain in her right foot this summer and the pain \"shot into my lower leg\" but that hasn't happened again.  For the most part her low back pain is better while seated.  She also takes antiinflammatories with mild benefit.  She denies falls or foot drop.  She denies BLE weakness.  She denies changes to bowel or bladder.   8/13/20  Returns today for follow up. Continued chronic low back and intermittent bilateral groin pain. She has had about 2 months of left leg pain. Pain located in left low back and radiates down lateral aspect of left leg to the shin. She describes pain as a cold sensation. She has had increased difficulty walking any distance. She is working to get a walker. She has done therapy and injections in the past through [facility]. Had RFA with no relief. She is on anticoagulation for prior bilateral PEs. Denies bladder or bowel incontinence.     Returns for follow up.  Left leg pain now resolved.  Predominant symptoms are now low back pain and bilateral groin pain, worse when she sits.  No radiation to the legs.    Exam:  Constitutional:  Alert, morbidly obese, NAD.  HEENT: Normocephalic, atraumatic.   Pulm:  Without " "shortness of breath   CV:  No pitting edema of BLE.      /60   Pulse 72   Temp 98.2  F (36.8  C)   Ht 1.702 m (5' 7\")   Wt (!) 151.5 kg (334 lb)   SpO2 96%   BMI 52.31 kg/m      Neurological:  Awake  Alert  Oriented x 3  Motor exam:        IP Q DF PF EHL  R   5  5   5   5    5  L   5  5   5   5    5     Reflexes are 2+ in the patellar and Achilles. There is no clonus. Downgoing Babinski.    Able to spontaneously move L/E bilaterally  Sensation intact throughout all L/E dermatomes     Antalgic gait. Tenderness to palpation of spine and bilateral Paraspinous muscles in lumbar region.     Imaging: Lumbar MRI from 8/10/20 reviewed in office. 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 New left L2-3 disc protrusion impinging the left L3 nerve root.  A/P:    Returns today for follow up. Continued chronic low back and intermittent bilateral groin pain.    Leg pain now resolved  Discussed that her groin pain is not obviously consistent with L4-5 stenosis  At this point, recommend that we start PT/MedX and emphasize weight loss  She should contact us if she develops worsening leg/gait symptoms      Again, thank you for allowing me to participate in the care of your patient.        Sincerely,        Luciano Isabel MD    "

## 2020-10-01 NOTE — NURSING NOTE
"Chelo Brooks is a 75 year old female who presents for:  Chief Complaint   Patient presents with     Back Pain     Failed injection LBP        Initial Vitals:  /60   Pulse 72   Temp 98.2  F (36.8  C)   Ht 5' 7\" (1.702 m)   Wt (!) 334 lb (151.5 kg)   SpO2 96%   BMI 52.31 kg/m   Estimated body mass index is 52.31 kg/m  as calculated from the following:    Height as of this encounter: 5' 7\" (1.702 m).    Weight as of this encounter: 334 lb (151.5 kg).. Body surface area is 2.68 meters squared. BP completed using cuff size: regular left forearm  Extreme Pain (8)    Nursing Comments: Patient presents w/ failed injection here to discuss options    Hadley Kunz MA  "

## 2020-10-01 NOTE — TELEPHONE ENCOUNTER
Per med rec patient's Coumadin was discontinued on 9/14. I don't see anything in OV notes indicating it was to be stopped. Wanted to confirm that patient is able to discontinue warfarin and be discharged from [facility].    Meghan Devine RN - [facility]

## 2020-10-01 NOTE — PROGRESS NOTES
"[facility]  Neurosurgery followup:    HPI:   1/25/18   Chelo Brooks is a 72 year old female with low back pain, \"for years.\"  She states that it has continued to bother her, \"But I just never did anything about it.\"  She describes her pain as a constant dull pain that increases with walking, prolonged standing, lifting and bending.  She states the pain had gone into her right leg previously but after a lumbar BRII (right L4-5) in 8/2017 the RLE pain improved.  She states she had an incident with pain in her right foot this summer and the pain \"shot into my lower leg\" but that hasn't happened again.  For the most part her low back pain is better while seated.  She also takes antiinflammatories with mild benefit.  She denies falls or foot drop.  She denies BLE weakness.  She denies changes to bowel or bladder.   8/13/20  Returns today for follow up. Continued chronic low back and intermittent bilateral groin pain. She has had about 2 months of left leg pain. Pain located in left low back and radiates down lateral aspect of left leg to the shin. She describes pain as a cold sensation. She has had increased difficulty walking any distance. She is working to get a walker. She has done therapy and injections in the past through [facility]. Had RFA with no relief. She is on anticoagulation for prior bilateral PEs. Denies bladder or bowel incontinence.     Returns for follow up.  Left leg pain now resolved.  Predominant symptoms are now low back pain and bilateral groin pain, worse when she sits.  No radiation to the legs.    Exam:  Constitutional:  Alert, morbidly obese, NAD.  HEENT: Normocephalic, atraumatic.   Pulm:  Without shortness of breath   CV:  No pitting edema of BLE.      /60   Pulse 72   Temp 98.2  F (36.8  C)   Ht 1.702 m (5' 7\")   Wt (!) 151.5 kg (334 lb)   SpO2 96%   BMI 52.31 kg/m      Neurological:  Awake  Alert  Oriented x 3  Motor exam:        IP Q DF PF EHL  R   5  5   5   5  "   5  L   5  5   5   5    5     Reflexes are 2+ in the patellar and Achilles. There is no clonus. Downgoing Babinski.    Able to spontaneously move L/E bilaterally  Sensation intact throughout all L/E dermatomes     Antalgic gait. Tenderness to palpation of spine and bilateral Paraspinous muscles in lumbar region.     Imaging: Lumbar MRI from 8/10/20 reviewed in office. 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 New left L2-3 disc protrusion impinging the left L3 nerve root.  A/P:    Returns today for follow up. Continued chronic low back and intermittent bilateral groin pain.    Leg pain now resolved  Discussed that her groin pain is not obviously consistent with L4-5 stenosis  At this point, recommend that we start PT/MedX and emphasize weight loss  She should contact us if she develops worsening leg/gait symptoms

## 2020-10-05 ENCOUNTER — AMBULATORY - HEALTHEAST (OUTPATIENT)
Dept: ADMINISTRATIVE | Facility: REHABILITATION | Age: 75
End: 2020-10-05

## 2020-10-05 DIAGNOSIS — M48.061 SPINAL STENOSIS OF LUMBAR REGION WITHOUT NEUROGENIC CLAUDICATION: ICD-10-CM

## 2020-10-06 ENCOUNTER — OFFICE VISIT (OUTPATIENT)
Dept: OPTOMETRY | Facility: CLINIC | Age: 75
End: 2020-10-06
Payer: COMMERCIAL

## 2020-10-06 DIAGNOSIS — H52.223 REGULAR ASTIGMATISM OF BOTH EYES: ICD-10-CM

## 2020-10-06 DIAGNOSIS — H25.13 NUCLEAR AGE-RELATED CATARACT, BOTH EYES: ICD-10-CM

## 2020-10-06 DIAGNOSIS — H52.4 PRESBYOPIA: ICD-10-CM

## 2020-10-06 DIAGNOSIS — H52.03 HYPERMETROPIA, BILATERAL: ICD-10-CM

## 2020-10-06 DIAGNOSIS — Z01.01 ENCOUNTER FOR EXAMINATION OF EYES AND VISION WITH ABNORMAL FINDINGS: Primary | ICD-10-CM

## 2020-10-06 DIAGNOSIS — H43.813 PVD (POSTERIOR VITREOUS DETACHMENT), BILATERAL: ICD-10-CM

## 2020-10-06 PROCEDURE — 92015 DETERMINE REFRACTIVE STATE: CPT | Performed by: OPTOMETRIST

## 2020-10-06 PROCEDURE — 92004 COMPRE OPH EXAM NEW PT 1/>: CPT | Performed by: OPTOMETRIST

## 2020-10-06 ASSESSMENT — REFRACTION_MANIFEST
OD_ADD: +2.75
OS_AXIS: 163
OS_CYLINDER: +0.75
OS_SPHERE: +1.75
OD_CYLINDER: +0.50
OS_ADD: +2.75
OD_AXIS: 146
OD_SPHERE: +1.75

## 2020-10-06 ASSESSMENT — VISUAL ACUITY
OD_CC: 20/30
OS_SC+: -1
OD_CC+: -2
OD_SC+: +1
OD_SC: 20/100
METHOD: SNELLEN - LINEAR
OS_CC: 20/30
OS_CC: 20/30
OS_SC: 20/70
OD_CC: 20/40
CORRECTION_TYPE: GLASSES

## 2020-10-06 ASSESSMENT — TONOMETRY
OS_IOP_MMHG: 21
OD_IOP_MMHG: 19
IOP_METHOD: APPLANATION

## 2020-10-06 ASSESSMENT — SLIT LAMP EXAM - LIDS: COMMENTS: NORMAL

## 2020-10-06 ASSESSMENT — REFRACTION_WEARINGRX
OS_ADD: +2.75
OD_CYLINDER: +0.75
OS_SPHERE: +2.00
OS_CYLINDER: SPHERE
OD_SPHERE: +2.00
OD_ADD: +2.75
OS_ADD: +2.50
OD_AXIS: 140
OS_AXIS: 158
OS_SPHERE: +2.00
OS_CYLINDER: +0.75
SPECS_TYPE: PAL
OD_AXIS: 163
OD_CYLINDER: +0.25
OD_ADD: +2.50
OD_SPHERE: +2.00

## 2020-10-06 ASSESSMENT — EXTERNAL EXAM - LEFT EYE: OS_EXAM: NORMAL

## 2020-10-06 ASSESSMENT — EXTERNAL EXAM - RIGHT EYE: OD_EXAM: NORMAL

## 2020-10-06 ASSESSMENT — CUP TO DISC RATIO
OD_RATIO: 0.25
OS_RATIO: 0.35

## 2020-10-06 ASSESSMENT — CONF VISUAL FIELD
OS_NORMAL: 1
OD_NORMAL: 1
METHOD: COUNTING FINGERS

## 2020-10-06 NOTE — PROGRESS NOTES
"Chief Complaint   Patient presents with     Annual Eye Exam         Last Eye Exam: 07/2017  Dilated Previously: Yes    What are you currently using to see?  glasses       Distance Vision Acuity: Noticed gradual change in both eyes    Near Vision Acuity: Not satisfied - If writing is really little her eyes hurt.    Eye Comfort: watery - Patient is suffering from seasonal allergies  Do you use eye drops? : No  Occupation or Hobbies: read a lot, sews, play cards and drink coffee    Donna [facility]  [employer]    Medical, surgical and family histories reviewed and updated 10/6/2020.       OBJECTIVE: See Ophthalmology exam    ASSESSMENT:    ICD-10-CM    1. Encounter for examination of eyes and vision with abnormal findings  Z01.01    2. Nuclear age-related cataract, both eyes  H25.13    3. PVD (posterior vitreous detachment), bilateral  H43.813    4. Hypermetropia, bilateral  H52.03    5. Regular astigmatism of both eyes  H52.223    6. Presbyopia  H52.4       PLAN:     Patient Instructions   You have the start of mild cataracts.  You may notice some blurred vision or glare with night driving.  It is important that you wear good sunglasses to protect your eyes from the ultraviolet light from the sun.     You have a PVD- posterior vitreous detachment which is due to the gel of the eye shrinking and clumping together.  This can sometimes cause holes or tears in the retina.  The signs of a retinal detachment are flashes of light or a \"curtain veil\" coming over your vision. If you notice any of these changes return to clinic for re-evaluation.    Chelo was advised of today's exam findings.  Fill glasses prescription  Allow 2 weeks to adapt to change in glasses  Wear glasses full time  Copy of glasses Rx provided today.    Return in 1 year for eye exam, or sooner if needed.    The effects of the dilating drops last for 4- 6 hours.  You will be more sensitive to light and vision will be blurry up close.  Mydriatic " sunglasses were given if needed.      Luis Amor O.D.  [facility] [address]  [place]  [address]    [phone]

## 2020-10-06 NOTE — PATIENT INSTRUCTIONS
"You have the start of mild cataracts.  You may notice some blurred vision or glare with night driving.  It is important that you wear good sunglasses to protect your eyes from the ultraviolet light from the sun.     You have a PVD- posterior vitreous detachment which is due to the gel of the eye shrinking and clumping together.  This can sometimes cause holes or tears in the retina.  The signs of a retinal detachment are flashes of light or a \"curtain veil\" coming over your vision. If you notice any of these changes return to clinic for re-evaluation.    Chelo was advised of today's exam findings.  Fill glasses prescription  Allow 2 weeks to adapt to change in glasses  Wear glasses full time  Copy of glasses Rx provided today.    Return in 1 year for eye exam, or sooner if needed.    The effects of the dilating drops last for 4- 6 hours.  You will be more sensitive to light and vision will be blurry up close.  Mydriatic sunglasses were given if needed.      Luis Amor O.D.  [facility] [place]  [address] [place] [address]. [place]  [address]    [phone]    "

## 2020-10-06 NOTE — LETTER
"    10/6/2020         RE: Chelo Brooks  [address] [place] [address] [address]  [facility] [address]        Dear Colleague,    Thank you for referring your patient, Chelo Brooks, to the [facility]. Please see a copy of my visit note below.    Chief Complaint   Patient presents with     Annual Eye Exam         Last Eye Exam: 07/2017  Dilated Previously: Yes    What are you currently using to see?  glasses       Distance Vision Acuity: Noticed gradual change in both eyes    Near Vision Acuity: Not satisfied - If writing is really little her eyes hurt.    Eye Comfort: watery - Patient is suffering from seasonal allergies  Do you use eye drops? : No  Occupation or Hobbies: read a lot, sews, play cards and drink coffee    Donna Carl  [employer]    Medical, surgical and family histories reviewed and updated 10/6/2020.       OBJECTIVE: See Ophthalmology exam    ASSESSMENT:    ICD-10-CM    1. Encounter for examination of eyes and vision with abnormal findings  Z01.01    2. Nuclear age-related cataract, both eyes  H25.13    3. PVD (posterior vitreous detachment), bilateral  H43.813    4. Hypermetropia, bilateral  H52.03    5. Regular astigmatism of both eyes  H52.223    6. Presbyopia  H52.4       PLAN:     Patient Instructions   You have the start of mild cataracts.  You may notice some blurred vision or glare with night driving.  It is important that you wear good sunglasses to protect your eyes from the ultraviolet light from the sun.     You have a PVD- posterior vitreous detachment which is due to the gel of the eye shrinking and clumping together.  This can sometimes cause holes or tears in the retina.  The signs of a retinal detachment are flashes of light or a \"curtain veil\" coming over your vision. If you notice any of these changes return to clinic for re-evaluation.    Chelo was advised of today's exam findings.  Fill glasses prescription  Allow 2 weeks to adapt to change in " glasses  Wear glasses full time  Copy of glasses Rx provided today.    Return in 1 year for eye exam, or sooner if needed.    The effects of the dilating drops last for 4- 6 hours.  You will be more sensitive to light and vision will be blurry up close.  Mydriatic sunglasses were given if needed.      Luis Amor O.D.  [address]. [facility] [place]  [address]    [phone]           Again, thank you for allowing me to participate in the care of your patient.        Sincerely,        Luis Amor, OD

## 2020-10-09 ENCOUNTER — TELEPHONE (OUTPATIENT)
Dept: FAMILY MEDICINE | Facility: CLINIC | Age: 75
End: 2020-10-09

## 2020-10-09 DIAGNOSIS — M48.061 SPINAL STENOSIS, LUMBAR REGION, WITHOUT NEUROGENIC CLAUDICATION: ICD-10-CM

## 2020-10-09 DIAGNOSIS — M48.061 SPINAL STENOSIS OF LUMBAR REGION, UNSPECIFIED WHETHER NEUROGENIC CLAUDICATION PRESENT: Primary | ICD-10-CM

## 2020-10-09 DIAGNOSIS — M16.0 PRIMARY OSTEOARTHRITIS OF BOTH HIPS: ICD-10-CM

## 2020-10-09 NOTE — TELEPHONE ENCOUNTER
DME order placed, printed and placed in TC bin. Please mail to pt. Thanks.    Laurie Cassidy RN  [facility]

## 2020-10-09 NOTE — TELEPHONE ENCOUNTER
Patient josué tavares was given an order for a walker but when she took it to the medical supply company- they told her she needs a 4 wheel walker with a seat. Needs new order sent to home address and she will take it to medical supply.

## 2020-10-19 ENCOUNTER — OFFICE VISIT - HEALTHEAST (OUTPATIENT)
Dept: PHYSICAL THERAPY | Facility: CLINIC | Age: 75
End: 2020-10-19

## 2020-10-19 DIAGNOSIS — M62.81 GENERALIZED MUSCLE WEAKNESS: ICD-10-CM

## 2020-10-19 DIAGNOSIS — M54.50 LOW BACK PAIN POTENTIALLY ASSOCIATED WITH SPINAL STENOSIS: ICD-10-CM

## 2020-10-26 ENCOUNTER — OFFICE VISIT - HEALTHEAST (OUTPATIENT)
Dept: PHYSICAL THERAPY | Facility: CLINIC | Age: 75
End: 2020-10-26

## 2020-10-26 DIAGNOSIS — M54.50 LOW BACK PAIN POTENTIALLY ASSOCIATED WITH SPINAL STENOSIS: ICD-10-CM

## 2020-10-26 DIAGNOSIS — M62.81 GENERALIZED MUSCLE WEAKNESS: ICD-10-CM

## 2020-10-27 ENCOUNTER — OFFICE VISIT (OUTPATIENT)
Dept: FAMILY MEDICINE | Facility: CLINIC | Age: 75
End: 2020-10-27
Payer: COMMERCIAL

## 2020-10-27 ENCOUNTER — ANCILLARY PROCEDURE (OUTPATIENT)
Dept: ULTRASOUND IMAGING | Facility: CLINIC | Age: 75
End: 2020-10-27
Attending: FAMILY MEDICINE
Payer: COMMERCIAL

## 2020-10-27 VITALS
BODY MASS INDEX: 45.99 KG/M2 | OXYGEN SATURATION: 96 % | RESPIRATION RATE: 18 BRPM | HEIGHT: 67 IN | SYSTOLIC BLOOD PRESSURE: 130 MMHG | TEMPERATURE: 98 F | DIASTOLIC BLOOD PRESSURE: 68 MMHG | HEART RATE: 94 BPM | WEIGHT: 293 LBS

## 2020-10-27 DIAGNOSIS — I12.9 RENAL HYPERTENSION: ICD-10-CM

## 2020-10-27 DIAGNOSIS — E78.5 HYPERLIPIDEMIA LDL GOAL <100: ICD-10-CM

## 2020-10-27 DIAGNOSIS — J45.30 MILD PERSISTENT ASTHMA WITHOUT COMPLICATION: ICD-10-CM

## 2020-10-27 DIAGNOSIS — Z12.11 SPECIAL SCREENING FOR MALIGNANT NEOPLASMS, COLON: ICD-10-CM

## 2020-10-27 DIAGNOSIS — Z00.00 ENCOUNTER FOR MEDICARE ANNUAL WELLNESS EXAM: Primary | ICD-10-CM

## 2020-10-27 DIAGNOSIS — E66.01 MORBID OBESITY DUE TO EXCESS CALORIES (H): Chronic | ICD-10-CM

## 2020-10-27 DIAGNOSIS — N18.30 STAGE 3 CHRONIC KIDNEY DISEASE, UNSPECIFIED WHETHER STAGE 3A OR 3B CKD (H): ICD-10-CM

## 2020-10-27 DIAGNOSIS — M48.061 SPINAL STENOSIS OF LUMBAR REGION, UNSPECIFIED WHETHER NEUROGENIC CLAUDICATION PRESENT: ICD-10-CM

## 2020-10-27 DIAGNOSIS — M79.661 PAIN OF RIGHT LOWER LEG: ICD-10-CM

## 2020-10-27 LAB
CHOLEST SERPL-MCNC: 165 MG/DL
HDLC SERPL-MCNC: 58 MG/DL
LDLC SERPL CALC-MCNC: 86 MG/DL
NONHDLC SERPL-MCNC: 107 MG/DL
TRIGL SERPL-MCNC: 107 MG/DL

## 2020-10-27 PROCEDURE — 99214 OFFICE O/P EST MOD 30 MIN: CPT | Mod: 25 | Performed by: FAMILY MEDICINE

## 2020-10-27 PROCEDURE — 36415 COLL VENOUS BLD VENIPUNCTURE: CPT | Performed by: FAMILY MEDICINE

## 2020-10-27 PROCEDURE — 99397 PER PM REEVAL EST PAT 65+ YR: CPT | Mod: 25 | Performed by: FAMILY MEDICINE

## 2020-10-27 PROCEDURE — G0008 ADMIN INFLUENZA VIRUS VAC: HCPCS | Performed by: FAMILY MEDICINE

## 2020-10-27 PROCEDURE — 80061 LIPID PANEL: CPT | Performed by: FAMILY MEDICINE

## 2020-10-27 PROCEDURE — 90662 IIV NO PRSV INCREASED AG IM: CPT | Performed by: FAMILY MEDICINE

## 2020-10-27 RX ORDER — OLMESARTAN MEDOXOMIL AND HYDROCHLOROTHIAZIDE 20/12.5 20; 12.5 MG/1; MG/1
1 TABLET ORAL DAILY
Qty: 90 TABLET | Refills: 3 | Status: SHIPPED | OUTPATIENT
Start: 2020-10-27 | End: 2021-12-07

## 2020-10-27 ASSESSMENT — ACTIVITIES OF DAILY LIVING (ADL): CURRENT_FUNCTION: NO ASSISTANCE NEEDED

## 2020-10-27 ASSESSMENT — MIFFLIN-ST. JEOR: SCORE: 2048.11

## 2020-10-27 NOTE — LETTER
October 28, 2020      Chelo Brooks  [address] [place] [address] [address]   [facility] [address]        Dear ,    We are writing to inform you of your test results.    Your results are normal.       Resulted Orders   Lipid panel reflex to direct LDL Fasting   Result Value Ref Range    Cholesterol 165 <200 mg/dL    Triglycerides 107 <150 mg/dL      Comment:      Fasting specimen    HDL Cholesterol 58 >49 mg/dL    LDL Cholesterol Calculated 86 <100 mg/dL      Comment:      Desirable:       <100 mg/dl    Non HDL Cholesterol 107 <130 mg/dL       If you have any questions or concerns, please call the clinic at the number listed above.       Sincerely,        Vita Zavala MD/fallon

## 2020-10-27 NOTE — PROGRESS NOTES
The patient was provided with suggestions to help her develop a healthy physical lifestyle.  The patient was counseled and encouraged to consider modifying their diet and eating habits. She was provided with information on recommended healthy diet options.

## 2020-10-27 NOTE — PATIENT INSTRUCTIONS
It is recommended that you get a vaccination for shingles called Shingrix (given as 2 shots, 2 to 6 months apart), even if you have already had the Zostavax vaccine. Discuss getting the Shingix vaccine from your pharmacist, or schedule an ancillary shot visit here. Some insurances do not cover the cost of these vaccines.      Patient Education   Personalized Prevention Plan  You are due for the preventive services outlined below.  Your care team is available to assist you in scheduling these services.  If you have already completed any of these items, please share that information with your care team to update in your medical record.  Health Maintenance Due   Topic Date Due     Zoster (Shingles) Vaccine (1 of 2) 03/09/1997     Colorectal Cancer Screening  06/05/2013     Your Health Risk Assessment indicates you feel you are not in good health    A healthy lifestyle helps keep the body fit and the mind alert. It helps protect you from disease, helps you fight disease, and helps prevent chronic disease (disease that doesn't go away) from getting worse. This is important as you get older and begin to notice twinges in muscles and joints and a decline in the strength and stamina you once took for granted. A healthy lifestyle includes good healthcare, good nutrition, weight control, recreation, and regular exercise. Avoid harmful substances and do what you can to keep safe. Another part of a healthy lifestyle is stay mentally active and socially involved.    Good healthcare     Have a wellness visit every year.     If you have new symptoms, let us know right away. Don't wait until the next checkup.     Take medicines exactly as prescribed and keep your medicines in a safe place. Tell us if your medicine causes problems.   Healthy diet and weight control     Eat 3 or 4 small, nutritious, low-fat, high-fiber meals a day. Include a variety of fruits, vegetables, and whole-grain foods.     Make sure you get enough calcium  in your diet. Calcium, vitamin D, and exercise help prevent osteoporosis (bone thinning).     If you live alone, try eating with others when you can. That way you get a good meal and have company while you eat it.     Try to keep a healthy weight. If you eat more calories than your body uses for energy, it will be stored as fat and you will gain weight.     Recreation   Recreation is not limited to sports and team events. It includes any activity that provides relaxation, interest, enjoyment, and exercise. Recreation provides an outlet for physical, mental, and social energy. It can give a sense of worth and achievement. It can help you stay healthy.    Mental Exercise and Social Involvement  Mental and emotional health is as important as physical health. Keep in touch with friends and family. Stay as active as possible. Continue to learn and challenge yourself.   Things you can do to stay mentally active are:    Learn something new, like a foreign language or musical instrument.     Play SCRABBLE or do crossword puzzles. If you cannot find people to play these games with you at home, you can play them with others on your computer through the Internet.     Join a games club--anything from card games to chess or checkers or lawn bowling.     Start a new hobby.     Go back to school.     Volunteer.     Read.   Keep up with world events.    Understanding USDA MyPlate  The USDA (U.S. Department of Agriculture) has guidelines to help you make healthy food choices. These are called MyPlate. MyPlate shows the food groups that make up healthy meals using the image of a place setting. Before you eat, think about the healthiest choices for what to put onto your plate or into your cup or bowl. To learn more about building a healthy plate, visit www.choosemyplate.gov.    The food groups    Fruits. Any fruit or 100% fruit juice counts as part of the Fruit Group. Fruits may be fresh, canned, frozen, or dried, and may be whole,  cut-up, or pureed. Make half your plate fruits and vegetables.    Vegetables. Any vegetable or 100% vegetable juice counts as a member of the Vegetable Group. Vegetables may be fresh, frozen, canned, or dried. They can be served raw or cooked and may be whole, cut-up, or mashed. Make half your plate fruits and vegetables.    Grains. All foods made from grains are part of the Grains Group. These include wheat, rice, oats, cornmeal, and barley such as bread, pasta, oatmeal, cereal, tortillas, and grits. Grains should be no more than a quarter of your plate. At least half of your grains should be whole grains.    Protein. This group includes meat, poultry, seafood, beans and peas, eggs, processed soy products (like tofu), nuts (including nut butters), and seeds. Make protein choices no more than a quarter of your plate. Meat and poultry choices should be lean or low fat.    Dairy. All fluid milk products and foods made from milk that contain calcium, like yogurt and cheese, are part of the Dairy Group. (Foods that have little calcium, such as cream, butter, and cream cheese, are not part of the group.) Most dairy choices should be low-fat or fat-free.    Oils. These are fats that are liquid at room temperature. They include canola, corn, olive, soybean, and sunflower oil. Foods that are mainly oil include mayonnaise, certain salad dressings, and soft margarines. You should have only 5 to 7 teaspoons of oils a day. You probably already get this much from the food you eat.  Date Last Reviewed: 8/1/2017 2000-2019 [employer]. [address] [place] [address]. All rights reserved. This information is not intended as a substitute for professional medical care. Always follow your healthcare professional's instructions.

## 2020-10-27 NOTE — PROGRESS NOTES
"SUBJECTIVE:   Chelo Brooks is a 75 year old female  who presents for Preventive Visit.    Patient has been advised of split billing requirements and indicates understanding: Yes   Are you in the first 12 months of your Medicare coverage?  No    Hypertension well controlled on current medications without side effects, chest pain, or dyspnea. Hypercholesterolemia well controlled with current treatment plan without side effects.     Patient also presents with low back pain and radicular pain for months. Patient also presents with right leg pain for one week associated with Hx of DVT/PE and chronic swelling due to venous insufficiency. Symptoms are improving.     Patient also has asthma, persistent.  Patient has had asthma for years.  Occasional wheezing and shortness of breath are triggered by colds and allergies and relieved by albuterol. She does not take a controller inhaler due to cost.     Healthy Habits:     In general, how would you rate your overall health?  Fair    Frequency of exercise:  2-3 days/week    Duration of exercise:  15-30 minutes    Do you usually eat at least 4 servings of fruit and vegetables a day, include whole grains    & fiber and avoid regularly eating high fat or \"junk\" foods?  No    Taking medications regularly:  Yes    Barriers to taking medications:  None    Medication side effects:  None    Ability to successfully perform activities of daily living:  No assistance needed    Home Safety:  No safety concerns identified    Hearing Impairment:  No hearing concerns    In the past 6 months, have you been bothered by leaking of urine?  No    In general, how would you rate your overall mental or emotional health?  Good      PHQ-2 Total Score: 1    Additional concerns today:  No    Do you feel safe in your environment? Yes    Have you ever done Advance Care Planning? (For example, a Health Directive, POLST, or a discussion with a medical provider or your loved ones about your wishes): " No, advance care planning information given to patient to review.  Advanced care planning was discussed at today's visit.      Fall risk  Fallen 2 or more times in the past year?: No  Any fall with injury in the past year?: No    Cognitive Screening   1) Repeat 3 items (Leader, Season, Table)    2) Clock draw: NORMAL  3) 3 item recall: Recalls 3 objects  Results: 3 items recalled: COGNITIVE IMPAIRMENT LESS LIKELY    Mini-CogTM Copyright NEPTALI Martinez. Licensed by the author for use in [facility]; reprinted with permission ([email or web address]@[email or web address]). All rights reserved.      Do you have sleep apnea, excessive snoring or daytime drowsiness?: yes    Reviewed and updated as needed this visit by clinical staff  Tobacco  Allergies  Meds  Problems  Med Hx  Surg Hx  Fam Hx  Soc Hx          Reviewed and updated as needed this visit by Provider  Tobacco  Allergies  Meds  Problems  Med Hx  Surg Hx  Fam Hx         Social History     Tobacco Use     Smoking status: Never Smoker     Smokeless tobacco: Never Used   Substance Use Topics     Alcohol use: Yes     Alcohol/week: 0.0 standard drinks     Comment: very rare       If you drink alcohol do you typically have >3 drinks per day or >7 drinks per week? No    Alcohol Use 10/27/2020   Prescreen: >3 drinks/day or >7 drinks/week? No               Current providers sharing in care for this patient include:   Patient Care Team:  Vita Zavala MD as PCP - General (Family Practice)  Vita Zavala MD as Assigned PCP    The following health maintenance items are reviewed in Epic and correct as of today:  Health Maintenance   Topic Date Due     ZOSTER IMMUNIZATION (1 of 2) 03/09/1997     COLORECTAL CANCER SCREENING  06/05/2013     ASTHMA CONTROL TEST  01/20/2021     LIPID  07/20/2021     ASTHMA ACTION PLAN  07/20/2021     BMP  08/06/2021     MICROALBUMIN  08/06/2021     FALL RISK ASSESSMENT  09/14/2021     MEDICARE ANNUAL WELLNESS VISIT  10/27/2021      DEXA  02/02/2022     MAMMO SCREENING  08/17/2022     ADVANCE CARE PLANNING  10/27/2025     DTAP/TDAP/TD IMMUNIZATION (3 - Td) 01/25/2027     HEPATITIS C SCREENING  Completed     PHQ-2  Completed     INFLUENZA VACCINE  Completed     Pneumococcal Vaccine: 65+ Years  Completed     Pneumococcal Vaccine: Pediatrics (0 to 5 Years) and At-Risk Patients (6 to 64 Years)  Aged Out     IPV IMMUNIZATION  Aged Out     MENINGITIS IMMUNIZATION  Aged Out     HEPATITIS B IMMUNIZATION  Aged Out     Patient Active Problem List   Diagnosis     Reflux esophagitis     Advanced directives, counseling/discussion     Allergic rhinitis due to animal dander     Health Care Home     Hyperlipidemia LDL goal <100     Renal hypertension     Morbid obesity due to excess calories (H)     Osteopenia     FOLR (obstructive sleep apnea)- severe (AHI 89)     Vitamin D deficiency     Lumbar spinal stenosis     Combined forms of age-related cataract of both eyes     Choroidal nevus, left eye     S/P knee replacements     Eczema, unspecified type     Stasis dermatitis of both legs     CKD (chronic kidney disease) stage 3, GFR 30-59 ml/min     Mild persistent asthma     Elevated parathyroid hormone     Gallstones     Adrenal nodule (H)     Thyroid nodule     Degenerative joint disease (DJD) of hip     Lumbar disc herniation     Umbilical hernia     Past Surgical History:   Procedure Laterality Date     C TOTAL KNEE ARTHROPLASTY  3/2000    right     C TOTAL KNEE ARTHROPLASTY  6/8/12    left     C VAGINAL HYSTERECTOMY  1977    benign, prolapse, ovaries remain      IR PAROTID BIOPSY  5/21/2020       Social History     Tobacco Use     Smoking status: Never Smoker     Smokeless tobacco: Never Used   Substance Use Topics     Alcohol use: Yes     Alcohol/week: 0.0 standard drinks     Comment: very rare       Family History   Problem Relation Age of Onset     Circulatory Father         d. DVT     Heart Disease Father         pacer     Diabetes Mother       Hypertension Mother      Deep Vein Thrombosis Brother      Coronary Artery Disease Brother      Myocardial Infarction Brother      Deep Vein Thrombosis Brother      C.A.D. Paternal Uncle         MI      Heart Disease Brother 48        pacer      Diabetes Maternal Grandmother      Hypertension Maternal Grandmother      Diabetes Maternal Aunt      Hypertension Maternal Aunt      Cancer - colorectal Maternal Grandfather      C.A.D. Maternal Aunt         MI     Glaucoma No family hx of      Macular Degeneration No family hx of          Current Outpatient Medications   Medication Sig Dispense Refill     albuterol (PROAIR HFA/PROVENTIL HFA/VENTOLIN HFA) 108 (90 Base) MCG/ACT inhaler Inhale 1-2 puffs into the lungs every 4 hours as needed for shortness of breath / dyspnea 1 Inhaler 3     atorvastatin (LIPITOR) 40 MG tablet Take 1 tablet (40 mg) by mouth daily 90 tablet 3     Cholecalciferol (VITAMIN D) 2000 UNITS tablet Take 2,000 Units by mouth daily       Cyanocobalamin (VITAMIN B-12 PO) Take by mouth daily       famotidine (PEPCID) 40 MG tablet TAKE 1 TABLET(40 MG) BY MOUTH DAILY 90 tablet 3     fluticasone (FLONASE) 50 MCG/ACT spray Spray 2 sprays into both nostrils daily (Patient taking differently: Spray 2 sprays into both nostrils daily as needed ) 1 Bottle 11     Hypertonic Nasal Wash (SINUS RINSE BOTTLE KIT) PACK Spray 1 Bottle in nostril daily as needed.       loratadine (CLARITIN) 10 MG tablet Take 1 tablet (10 mg) by mouth daily       mometasone (ELOCON) 0.1 % external cream Apply to affected area on legs twice daily as needed 50 g 0     olmesartan-hydrochlorothiazide (BENICAR HCT) 20-12.5 MG tablet Take 1 tablet by mouth daily 90 tablet 3     order for DME Equipment being ordered: Auto CPAP 11-18 1 Units 0     Allergies   Allergen Reactions     Adhesive Tape          Review of Systems  CONSTITUTIONAL: NEGATIVE for fever, chills, change in weight  INTEGUMENTARY/SKIN: NEGATIVE for worrisome rashes, moles or  "lesions  EYES: NEGATIVE for vision changes or irritation  ENT/MOUTH: NEGATIVE for ear, mouth and throat problems  RESP: NEGATIVE for significant cough or SOB  BREAST: NEGATIVE for masses, tenderness or discharge  CV: NEGATIVE for chest pain   GI: NEGATIVE for nausea, abdominal pain, heartburn, or change in bowel habits  : NEGATIVE for frequency, dysuria, or hematuria  MUSCULOSKELETAL:as above   NEURO: as above   ENDOCRINE: NEGATIVE for temperature intolerance, skin/hair changes  HEME: NEGATIVE for bleeding problems  PSYCHIATRIC: NEGATIVE for changes in mood or affect    OBJECTIVE:   /68   Pulse 94   Temp 98  F (36.7  C) (Oral)   Resp 18   Ht 1.702 m (5' 7\")   Wt (!) 151 kg (333 lb)   SpO2 96%   Breastfeeding No   BMI 52.16 kg/m   Estimated body mass index is 52.16 kg/m  as calculated from the following:    Height as of this encounter: 1.702 m (5' 7\").    Weight as of this encounter: 151 kg (333 lb).  Physical Exam  GENERAL: alert and no distress  EYES: Eyes grossly normal to inspection, PERRL and conjunctivae and sclerae normal  HENT: ear canals and TM's normal, nose and mouth without ulcers or lesions  NECK: no adenopathy, no asymmetry, masses, or scars and thyroid normal to palpation  RESP: lungs clear to auscultation - no rales, rhonchi or wheezes  CV: regular rates and rhythm, normal S1 S2, no S3 or S4, no murmur, click or rub and woody bipedal edema   ABDOMEN: soft, nontender, no hepatosplenomegaly, no masses and bowel sounds normal  MS: extremities normal- no gross deformities noted  SKIN: venous stasis changes of lower extremities bilaterally   NEURO: Normal strength and tone, mentation intact and speech normal  PSYCH: mentation appears normal, affect normal/bright    Diagnostic Test Results:  Labs reviewed in Epic    ASSESSMENT / PLAN:   (Z00.00) Encounter for Medicare annual wellness exam  (primary encounter diagnosis)    (N18.30) Stage 3 chronic kidney disease, unspecified whether stage 3a " or 3b CKD  (I12.9) Renal hypertension  Comment: Well controlled with medications without side effects.   Plan: olmesartan-hydrochlorothiazide (BENICAR HCT)         20-12.5 MG tablet, OFFICE/OUTPT VISIT,EST,LEVL         IV          (E78.5) Hyperlipidemia LDL goal <100  (E66.01) Morbid obesity due to excess calories (H)  Plan: OFFICE/OUTPT VISIT,EST,LEVL IV        Labs today     (M48.061) Spinal stenosis of lumbar region, unspecified whether neurogenic claudication present  Plan: OFFICE/OUTPT VISIT,EST,LEVL IV        Follow-up with specialist as planned     (M14.661) Pain of right lower leg  Comment: most likely due to above but given Hx of PE and patient anxiety, would favor further investigation   Plan: US Lower Extremity Venous Duplex Right,         OFFICE/OUTPT VISIT,EST,LEVL IV        Offered hematology consult to discuss decision to stay off anticoagulation     (J45.30) Mild persistent asthma without complication  Comment: controller medication is cost prohibitive; symptoms are controlled today   Plan: OFFICE/OUTPT VISIT,EST,LEVL IV        Follow-up as needed - consider use of [facility] pharmacy, coupon     (Z12.11) Special screening for malignant neoplasms, colon  Plan: Fecal colorectal cancer screen (FIT)            Patient has been advised of split billing requirements and indicates understanding: Yes  COUNSELING:  Reviewed preventive health counseling, as reflected in patient instructions  Special attention given to:       Regular exercise       Healthy diet/nutrition       Osteoporosis Prevention/Bone Health       Colon cancer screening       Hepatitis C screening       HIV screening for high risk patient       The 10-year ASCVD risk score (Dawn BOBO Jr., et al., 2013) is: 18.4%    Values used to calculate the score:      Age: 75 years      Sex: Female      Is Non- : No      Diabetic: No      Tobacco smoker: No      Systolic Blood Pressure: 130 mmHg      Is BP treated: Yes      HDL  "Cholesterol: 46 mg/dL      Total Cholesterol: 240 mg/dL    Estimated body mass index is 52.16 kg/m  as calculated from the following:    Height as of this encounter: 1.702 m (5' 7\").    Weight as of this encounter: 151 kg (333 lb).    Weight management plan: Discussed healthy diet and exercise guidelines    She reports that she has never smoked. She has never used smokeless tobacco.      Appropriate preventive services were discussed with this patient, including applicable screening as appropriate for cardiovascular disease, diabetes, osteopenia/osteoporosis, and glaucoma.  As appropriate for age/gender, discussed screening for colorectal cancer, prostate cancer, breast cancer, and cervical cancer. Checklist reviewing preventive services available has been given to the patient.    Reviewed patients plan of care and provided an AVS. The Intermediate Care Plan ( asthma action plan, low back pain action plan, and migraine action plan) for Chelo meets the Care Plan requirement. This Care Plan has been established and reviewed with the Patient.    Counseling Resources:  ATP IV Guidelines  Pooled Cohorts Equation Calculator  Breast Cancer Risk Calculator  Breast Cancer: Medication to Reduce Risk  FRAX Risk Assessment  ICSI Preventive Guidelines  Dietary Guidelines for Americans, 2010  [employer]'s MyPlate  ASA Prophylaxis  Lung CA Screening    Vita Zavala MD  [facility]    Identified Health Risks:  "

## 2020-10-29 ENCOUNTER — OFFICE VISIT - HEALTHEAST (OUTPATIENT)
Dept: PHYSICAL THERAPY | Facility: CLINIC | Age: 75
End: 2020-10-29

## 2020-10-29 DIAGNOSIS — M54.50 LOW BACK PAIN POTENTIALLY ASSOCIATED WITH SPINAL STENOSIS: ICD-10-CM

## 2020-10-29 DIAGNOSIS — M62.81 GENERALIZED MUSCLE WEAKNESS: ICD-10-CM

## 2020-11-10 DIAGNOSIS — R05.3 CHRONIC COUGH: ICD-10-CM

## 2020-11-10 DIAGNOSIS — J30.81 CHRONIC ALLERGIC RHINITIS DUE TO ANIMAL HAIR AND DANDER: ICD-10-CM

## 2020-11-11 RX ORDER — FLUTICASONE PROPIONATE 50 MCG
2 SPRAY, SUSPENSION (ML) NASAL DAILY
Qty: 48 G | Refills: 3 | Status: SHIPPED | OUTPATIENT
Start: 2020-11-11 | End: 2022-01-04

## 2020-11-12 ENCOUNTER — OFFICE VISIT - HEALTHEAST (OUTPATIENT)
Dept: PHYSICAL THERAPY | Facility: CLINIC | Age: 75
End: 2020-11-12

## 2020-11-12 DIAGNOSIS — M54.50 LOW BACK PAIN POTENTIALLY ASSOCIATED WITH SPINAL STENOSIS: ICD-10-CM

## 2020-11-12 DIAGNOSIS — M62.81 GENERALIZED MUSCLE WEAKNESS: ICD-10-CM

## 2020-11-16 ENCOUNTER — OFFICE VISIT - HEALTHEAST (OUTPATIENT)
Dept: PHYSICAL THERAPY | Facility: CLINIC | Age: 75
End: 2020-11-16

## 2020-11-16 DIAGNOSIS — M62.81 GENERALIZED MUSCLE WEAKNESS: ICD-10-CM

## 2020-11-16 DIAGNOSIS — M54.50 LOW BACK PAIN POTENTIALLY ASSOCIATED WITH SPINAL STENOSIS: ICD-10-CM

## 2020-11-19 ENCOUNTER — OFFICE VISIT - HEALTHEAST (OUTPATIENT)
Dept: PHYSICAL THERAPY | Facility: CLINIC | Age: 75
End: 2020-11-19

## 2020-11-19 DIAGNOSIS — M62.81 GENERALIZED MUSCLE WEAKNESS: ICD-10-CM

## 2020-11-19 DIAGNOSIS — M54.50 LOW BACK PAIN POTENTIALLY ASSOCIATED WITH SPINAL STENOSIS: ICD-10-CM

## 2020-12-04 ENCOUNTER — VIRTUAL VISIT (OUTPATIENT)
Dept: URGENT CARE | Facility: CLINIC | Age: 75
End: 2020-12-04
Payer: COMMERCIAL

## 2020-12-04 ENCOUNTER — TELEPHONE (OUTPATIENT)
Dept: FAMILY MEDICINE | Facility: CLINIC | Age: 75
End: 2020-12-04

## 2020-12-04 DIAGNOSIS — Z20.822 SUSPECTED COVID-19 VIRUS INFECTION: Primary | ICD-10-CM

## 2020-12-04 DIAGNOSIS — R05.9 COUGH: ICD-10-CM

## 2020-12-04 DIAGNOSIS — R06.02 SHORTNESS OF BREATH: ICD-10-CM

## 2020-12-04 DIAGNOSIS — Z20.822 EXPOSURE TO COVID-19 VIRUS: ICD-10-CM

## 2020-12-04 PROCEDURE — 99214 OFFICE O/P EST MOD 30 MIN: CPT | Mod: 95 | Performed by: FAMILY MEDICINE

## 2020-12-04 NOTE — PATIENT INSTRUCTIONS
Instructions for Patients  It is recommended that you have a test for coronavirus (COVID-19). This illness can cause fever, cough and trouble breathing. Many people get a mild case and get better on their own. Some people can get very sick.     Please follow these steps:    1. We will call to schedule your test.  2. A member of our care team will ask you some questions. Then, they will use a swab to collect samples from your nose and throat.     Our testing team will send you your test results.    How can I protect others?    Stay home and away from others (self-isolate) until:    You ve had no fever--and no medicine that reduces fever--for 1 full day (24 hours). And      Your other symptoms have resolved (gotten better). For example, your cough or breathing has improved. And     At least 10 days have passed since your symptoms started.    Stay at least 6 feet away from others. (If someone will drive you to your test, stay in the backseat, as far away from the  as you can.)     Don t go to work, school or anywhere else. When it s time for your test, go straight to the testing site. Don t make any stops on the way there or back.     Wash your hands and face often. Use soap and water.     Cover your mouth and nose with a mask, tissue or washcloth.     Don t touch anyone. No hugging, kissing or handshakes.    How can I take care of myself?    1. Get lots of rest. Drink extra fluids (unless a doctor has told you not to).     2. Take Tylenol (acetaminophen) for fever or pain. If you have liver or kidney problems, ask your family doctor if it's okay to take Tylenol.     Adults can take either:     650 mg (two 325 mg pills) every 4 to 6 hours, or     1,000 mg (two 500 mg pills) every 8 hours as needed.     Note: Don't take more than 3,000 mg in one day.   Acetaminophen is found in many medicines (both prescribed and over-the-counter medicines). Read all labels to be sure you don't take too much.   For children,  check the Tylenol bottle for the right dose. The dose is based on  the child's age or weight.    3. If you have other health problems (like cancer, heart failure, an organ transplant or severe kidney disease): Call your specialty clinic if you don't feel better in the next 2 days.    4. Know when to call 911: If your breathing is so bad that it keeps you from doing normal activities, call 911 or go to the emergency room. Tell them that you've been staying home and may have COVID-19.      Thank you for limiting contact with others, wearing a simple mask to cover your cough, practice good hand hygiene habits and accessing our virtual services where possible to limit the spread of this virus.    For more information about COVID19 and options for caring for yourself at home, please visit the CDC website at https://www.cdc.gov/coronavirus/2019-ncov/about/steps-when-sick.html  For more options for care at [facility], please visit our website at [email or web address]

## 2020-12-04 NOTE — TELEPHONE ENCOUNTER
Spoke with pt. Has trouble with her breathing, and this is normal for her because she has asthma.  Noticed a couple of days ago while walking downstairs was out of breath. Over the last couple of days had a slight headache. Is able to taste. Has had blood clots in the past and was discontinued from warfain. Had blood clots in her lung 1 year ago. Was given a test 2-3 days ago and only her right leg was done. 3 people in her senior building have covid. Pt is requesting a test for covid. Recommended [email or web address] and she said she doesn't have a computer, but can do a telephone visit. Scheduled pt for a telephone visit with [employer] .    Laurie Cassidy RN  [facility]

## 2020-12-04 NOTE — NURSING NOTE
"Chief Complaint   Patient presents with     RECHECK     follow up from last visit     Results     lab       Initial /80  Pulse 89  Temp 96.5  F (35.8  C)  Resp 16  Ht 5' 7\" (1.702 m)  Wt (!) 327 lb (148.3 kg)  SpO2 96%  BMI 51.22 kg/m2 Estimated body mass index is 51.22 kg/(m^2) as calculated from the following:    Height as of this encounter: 5' 7\" (1.702 m).    Weight as of this encounter: 327 lb (148.3 kg).  Medication Reconciliation: complete     Elda Carroll. MA      " [employer] Postcare Instructions: The HEMIGARD strips are to remain completely dry for at least 5-7 days.

## 2020-12-04 NOTE — TELEPHONE ENCOUNTER
Patient states she is having some breathing issues when walking and has a slight headache.  Just got back from [facility] with friends.  Lives in high [facility] with 3 cases of COVID.  Would like an order for a COVID test.    Thank you.

## 2020-12-04 NOTE — PROGRESS NOTES
"  The patient has been notified of following at scheduling:    Telephone visits are billed at different rates depending on your insurance coverage. During this emergency period, for some insurers they may be billed the same as an in-person visit.  Please reach out to your insurance provider with any questions.\"    Patient has given verbal consent for Telephone visit?  Yes      Subjective   CC: Chelo Brooks  is a 75 year old female who presents via phone visit today for the following health issues:   Chief Complaint   Patient presents with     Infection        Concern for COVID-19  About how many days ago did these symptoms start? 3-4 days  Is this your first visit for this illness? Yes  In the 14 days before your symptoms started, have you had close contact with someone with COVID-19 (Coronavirus)? 3 people in the building she lives in have confirmed covid (not all in one apartment - spread in the building). She does not know who but was told she has to be quarantined for 14 days.   Do you have a fever or chills? No  Are you having new or worsening difficulty breathing? Yes   Please describe what kind of difficulty you are having breathing:Moderate dyspnea (short of breath with ADLs, shortness of breath that limits the ability to walk up one flight of stairs without needing to rest)  Do you have new or worsening cough? Yes, it's a dry cough.   Have you had any new or unexplained body aches? No    Have you experienced any of the following NEW symptoms?    Headache: YES    Sore throat: No    Loss of taste or smell: No    Chest pain: No    Diarrhea: No    Rash: No  What treatments have you tried? Resting - hydrating  Who do you live with? Alone in her apartment  Are you, or a household member, a healthcare worker or a ? No  Do you live in a nursing home, group home, or shelter? assisted living  Do you have a way to get food/medications if quarantined? Yes, I have a friend or family member who can help " me.                Reviewed and updated as needed this visit by Provider                 Review of Systems         Objective    Gen: Patient is alert, oriented  Resp: Speaking full sentence, no audible shortness of breath.  No cough of wheeze.              Assessment/Plan:  1. Suspected COVID-19 virus infection with exposure in the building (shared spaces used), cough, SOB on exertion. The other diagnosis of potential concern is that she has a history of DVT and is no longer on coumadin. She was evaluated for that 3 days ago with lower extremity ultrasound and is not having worsening symptoms nor unilateral leg swelling (reports pedal edema bilaterally). At this time, concern is higher for covid and she had ultrasound already for DVT during this episode so will arrange covid testing and cautious monitoring of her symptoms for any worsening.   - Symptomatic COVID-19 Virus (Coronavirus) by PCR; Future      Phone call duration:  8 minutes    Carolina Javier MD

## 2020-12-05 DIAGNOSIS — Z20.822 SUSPECTED COVID-19 VIRUS INFECTION: ICD-10-CM

## 2020-12-05 PROCEDURE — U0003 INFECTIOUS AGENT DETECTION BY NUCLEIC ACID (DNA OR RNA); SEVERE ACUTE RESPIRATORY SYNDROME CORONAVIRUS 2 (SARS-COV-2) (CORONAVIRUS DISEASE [COVID-19]), AMPLIFIED PROBE TECHNIQUE, MAKING USE OF HIGH THROUGHPUT TECHNOLOGIES AS DESCRIBED BY CMS-2020-01-R: HCPCS | Performed by: FAMILY MEDICINE

## 2020-12-06 LAB
SARS-COV-2 RNA SPEC QL NAA+PROBE: NOT DETECTED
SPECIMEN SOURCE: NORMAL

## 2021-01-27 NOTE — PATIENT INSTRUCTIONS
Your BMI is There is no height or weight on file to calculate BMI.  Weight management is a personal decision.  If you are interested in exploring weight loss strategies, the following discussion covers the approaches that may be successful. Body mass index (BMI) is one way to tell whether you are at a healthy weight, overweight, or obese. It measures your weight in relation to your height.  A BMI of 18.5 to 24.9 is in the healthy range. A person with a BMI of 25 to 29.9 is considered overweight, and someone with a BMI of 30 or greater is considered obese. More than two-thirds of American adults are considered overweight or obese.  Being overweight or obese increases the risk for further weight gain. Excess weight may lead to heart disease and diabetes.  Creating and following plans for healthy eating and physical activity may help you improve your health.  Weight control is part of healthy lifestyle and includes exercise, emotional health, and healthy eating habits. Careful eating habits lifelong are the mainstay of weight control. Though there are significant health benefits from weight loss, long-term weight loss with diet alone may be very difficult to achieve- studies show long-term success with dietary management in less than 10% of people. Attaining a healthy weight may be especially difficult to achieve in those with severe obesity. In some cases, medications, devices and surgical management might be considered.  What can you do?  If you are overweight or obese and are interested in methods for weight loss, you should discuss this with your provider.     Consider reducing daily calorie intake by 500 calories.     Keep a food journal.     Avoiding skipping meals, consider cutting portions instead.    Diet combined with exercise helps maintain muscle while optimizing fat loss. Strength training is particularly important for building and maintaining muscle mass. Exercise helps reduce stress, increase energy,  and improves fitness. Increasing exercise without diet control, however, may not burn enough calories to loose weight.       Start walking three days a week 10-20 minutes at a time    Work towards walking thirty minutes five days a week     Eventually, increase the speed of your walking for 1-2 minutes at time    In addition, we recommend that you review healthy lifestyles and methods for weight loss available through the National Institutes of Health patient information sites:  http://win.niddk.nih.gov/publications/index.htm    And look into health and wellness programs that may be available through your health insurance provider, employer, local community center, or lien club.    Weight management plan: Patient was referred to their PCP to discuss a diet and exercise plan.     Sleep restriction: Bedtime should be set based on wake up time and number of hours you feel you sleep per night on average.  Keep the same wake up time daily. Set alarm every day at the same time.  Do not rely on your insomnia to wake you up for the day.  Adjust bedtime by 15 minutes each week depending on percentage of night you are able to sleep (out of time you are allowed in bed). If you sleep >90% of the night, you may go to bed 15 minutes earlier. If you sleep less than 85% of your time in bed, go to bed 15 minutes later.  If between 85 and 90%, keep the same bed time.   Stimulus control: Only go to bed when sleepy. No TV in bed.  Leave room if cannot fall asleep within 20 minutes. While out of bed in the middle of the night, avoid bright lights, physical activity, work activities and chores. Do relaxing activities like reading or listening to relaxing music. Return to bed if starting to feel drowsy or after 30 minutes.        Exercise during the day, no strenuous exercise 4 hours before bed        Literature provided for Sleep Hygiene and Insomnia (American Academy of Sleep Medicine)

## 2021-01-28 ENCOUNTER — VIRTUAL VISIT (OUTPATIENT)
Dept: SLEEP MEDICINE | Facility: CLINIC | Age: 76
End: 2021-01-28
Payer: COMMERCIAL

## 2021-01-28 DIAGNOSIS — G47.33 OSA (OBSTRUCTIVE SLEEP APNEA): ICD-10-CM

## 2021-01-28 DIAGNOSIS — E66.01 MORBID OBESITY DUE TO EXCESS CALORIES (H): ICD-10-CM

## 2021-01-28 PROCEDURE — 99212 OFFICE O/P EST SF 10 MIN: CPT | Mod: 95 | Performed by: PHYSICIAN ASSISTANT

## 2021-01-28 NOTE — PROGRESS NOTES
Chelo is a 75 year old who is being evaluated via a billable telephone visit.      What phone number would you like to be contacted at? [phone]  How would you like to obtain your AVS? Radha      Phone call duration: 15 minutes    Obstructive Sleep Apnea - PAP Follow-Up Visit:    Chief Complaint   Patient presents with     CPAP Follow Up       Chelo Brooks comes in today for follow-up of their severe sleep apnea, managed with CPAP.     Initial sleep study done on 2/14/17 at the [facility] for loud, socially disruptive snoring, witnessed apneas, excessive daytime sleepiness (ESS 11), sleep maintenance difficulties, obesity, narrowed oropharynx oropharynx. Sleep onset << maintenance difficulties, some psychophysiologic insomnia suspected.    Study Date: 2/14/2017- (329.0 lbs) The total sleep time was 96.0 minutes. The combined apnea/hypopnea index was 89.4 events per hour.  The REM AHI was n/a.  The supine AHI was 93.9 events per hour. RDI was 92.5 events per hour.  Lowest oxygen saturation was 77%.  Time spent less than or equal to 88% was 27.5 minutes.  Time spent less than or equal to 89% was 34.4 minutes.    Overall, the patient rates her experience with PAP as good (0 poor, 10 great). The mask is comfortable. The mask is leaking when it is old.  She is not snoring with the mask on. She is not having gasp arousals. She is having rare dryness. The pressure settings are comfortable     Her PAP interface is Full Face Mask.    Bedtime is typically 11 PM. Usually it takes about >60 minutes to fall asleep with the mask on. Wake time is typically 9 AM.  Patient is using PAP therapy 6-7 hours per night. The patient is usually getting 6-7 hours of sleep per night.    She does feel rested in the morning.    Total score - [place]: 3 (1/27/2021 11:00 AM)      SELENE Total Score: 12      ResMed   Auto-PAP 11.0 - 18.0 cmH2O 30 day usage data:    90% of days with > 4 hours of use. 0/30 days with no  use.   Average use 393 minutes per day.   95%ile Leak 16.03 L/min.   CPAP 95% pressure 15 cm.   AHI 1.47 events per hour.         Past medical/surgical history, family history, social history, medications and allergies were reviewed.      Problem List:  Patient Active Problem List    Diagnosis Date Noted     Hyperlipidemia LDL goal <100 11/27/2012     Priority: High     Lumbar disc herniation      Priority: Medium     Umbilical hernia      Priority: Medium     Degenerative joint disease (DJD) of hip      Priority: Medium     Thyroid nodule      Priority: Medium     plan US thyroid in 1 year        Gallstones      Priority: Medium     Adrenal nodule (H)      Priority: Medium     Mild persistent asthma 06/18/2019     Priority: Medium     Elevated parathyroid hormone 06/18/2019     Priority: Medium     CKD (chronic kidney disease) stage 3, GFR 30-59 ml/min      Priority: Medium     Eczema, unspecified type 04/05/2018     Priority: Medium     Stasis dermatitis of both legs 04/05/2018     Priority: Medium     Combined forms of age-related cataract of both eyes 07/18/2017     Priority: Medium     Choroidal nevus, left eye 07/18/2017     Priority: Medium     Lumbar spinal stenosis 07/17/2017     Priority: Medium     Vitamin D deficiency      Priority: Medium     FLOR (obstructive sleep apnea)- severe (AHI 89) 02/15/2017     Priority: Medium     Study Date: 2/14/2017- (329.0 lbs) The total sleep time was 96.0 minutes. The combined apnea/hypopnea index was 89.4 events per hour.  The REM AHI was n/a.  The supine AHI was 93.9 events per hour. RDI was 92.5 events per hour.  Lowest oxygen saturation was 77%.  Time spent less than or equal to 88% was 27.5 minutes.  Time spent less than or equal to 89% was 34.4 minutes.       Osteopenia      Priority: Medium     Morbid obesity due to excess calories (H) 11/23/2015     Priority: Medium     Surgical referral declined '16       Renal hypertension      Priority: Medium     S/P knee  replacements 10/23/2013     Priority: Medium     Health Care Home 05/08/2012     Priority: Medium     [facility] .  Oscar Smith RN, C-BC    [phone]       Allergic rhinitis due to animal dander      Priority: Medium     4/5/12 RAST pos. only to cat mildly       Advanced directives, counseling/discussion 03/15/2012     Priority: Medium     Discussed advance care planning with patient; information given to patient to review. 3/15/2012        Reflux esophagitis 06/17/2003     Priority: Medium        There were no vitals taken for this visit.    Impression/Plan:    Severe Sleep apnea-  Tolerating PAP well. Daytime symptoms are improved.   Continue current CPAP therapy.  Comprehensive DME    Insomnia-  Reviewed sleep restriction and stimulus control.    Chelo Brooks will follow up in about 1 year(s).     Quang Dowell PA-C

## 2021-06-12 NOTE — PROGRESS NOTES
[facility] Rehabilitation Daily Progress     Patient Name: Chelo Brooks  Date: 10/26/2020  Visit #: 2/16  Auth: thru 1/19/21  Referral Diagnosis: low back pain/abdominal pain  Referring provider: Vita Zavala MD  Visit Diagnosis:     ICD-10-CM    1. Low back pain potentially associated with spinal stenosis  M54.5    2. Generalized muscle weakness  M62.81        Assessment:     Today patient reported with increased symptoms in her lower back, noting point tenderness primarily on the left side. The patient responded well today to manual therapy, specifically soft tissue release, and reported a decrease in her symptoms upon leaving.     From Eval:  Patient is a 75 y.o. 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 Patient demonstrates impairments including decreased lumbar motion and flexibility, with poor postural awareness and core stability, with generalized muscle weakness, leading to impaired functional mobility. Patient's functional limitations include sit to stand and stand to sit, standing for longer periods of time, and any household activity or walking without increased pain. Today patient responded well to manual therapy, therapeutic exercise and patient education - patient with inc swelling and loss of circulation in bilateral feet - patient stating this is under control.      HEP/POC compliance is  fair .  Patient demonstrates understanding/independence with home program.  Patient is appropriate to continue with skilled physical therapy intervention, as indicated by initial plan of care.    Goal Status:  Pt. will be independent with home exercise program in : 6 weeks    Pt will: be able to go from sitting to standing in her chair at home without increased pain symptoms or difficulty by 6 weeks.  Pt will: be able to roll over in bed  without increased low back pain or difficulty by 6 weeks.  Pt will: be able to  line to check into her PT appt without increased low back symptoms by 6 weeks.        Plan / Patient Education:     Continue with initial plan of care.  Progress with home program as tolerated.    Plan for next visit: review HEP, add sit to stands, standing hip abd, Nustep warm up, bridging, SLR, clamshells, seated postural review, sit to stand testing and/or 2 min walk test, QL and HF stretching    Subjective:     Patient reports with increased symptoms. The increase in symptoms began on Saturday after vacuuming and have persisted since. The symptoms prevented her from being active on Sunday and required her to take Aleve several times throughout the day, but didn't experience much pain relief with it.  The symptoms are primarily located on the left side with some tingling down into the legs. The patient experienced no increases in symptoms after evaluation and has been implementing therapeutic exercises slowly into her daily life.        Objective:       Treatment Today       Patient Instruction:  EDU on activity modification and exercise consistency in order to see progress and change     Manual Therapy:  SL on R - MFR of L piriformis, distal lumbar paraspinals, gluteals and ITB    Exercises:  Exercise #1: supine knee rocks- 20 reps  Comment #1: supine piriformis stretch - hold 20 sec  Exercise #2: supine lumbar rotation stretch - hold 20 sec  Comment #2: seated hamstring stretch - hold 20 sec        TREATMENT MINUTES COMMENTS   Evaluation     Self-care/ Home management     Manual therapy 15 SL on R - MFR of L piriformis, distal lumbar paraspinals, gluteals and ITB   Neuromuscular Re-education     Therapeutic Activity     Therapeutic Exercises 10 See above flowsheet   Gait training     Modality__________________                Total 25    Blank areas are intentional and mean the treatment did not include these items.      Luc Serra, SPT  I attest that I was present in the room, making skilled assessments and directing the service provided today.  I was responsible for the assessment and treatment of the patient.  During this time, I was not engaged in treating another patient or doing other tasks.    Gricelda Caruso, PT  10/26/2020

## 2021-06-12 NOTE — PROGRESS NOTES
[facility] Certification Request    October 19, 2020      Patient: Chelo Brooks  MR Number: [id number]  YOB: 1945  Date of Visit: 10/19/2020      Dear Dr. Isabel,    Thank you for this referral.   We are seeing Chelo Brooks for Physical Therapy of spinal stenosis. Patient was sent for [facility] referral, however due to patient's generalized deconditioning and lack of activity/motivation, morbid obesity and increased pain symptoms, patient is not appropriate for this program at this time. Patient will continue with traditional physical therapy treatment at this time.     Medicare and/or Medicaid requires physician review and approval of the treatment plan. Please review the plan of care and verify that you agree with the therapy plan of care by co-signing this note.      Plan of Care  Authorization / Certification Start Date: 10/19/20  Authorization / Certification End Date: 01/19/21  Authorization / Certification Number of Visits: 12  Communication with: Referral Source  Patient Related Instruction: Nature of Condition;Treatment plan and rationale;Self Care instruction;Basis of treatment;Body mechanics;Posture;Next steps;Expected outcome  Times per Week: 1-2  Number of Weeks: 6-8  Number of Visits: 16 [facility]  Discharge Planning: independent HEP and/or plateau of progress  Therapeutic Exercise: Stretching;Strengthening;ROM  Neuromuscular Reeducation: kinesio tape;posture;TNE;core  Manual Therapy: soft tissue mobilization;myofascial release;joint mobilization;muscle energy  Modalities: TENS  Gait Training: as indicated      Goals:  Pt. will be independent with home exercise program in : 6 weeks    Pt will: be able to go from sitting to standing in her chair at home without increased pain symptoms or difficulty by 6 weeks.  Pt will: be able to roll over in bed without increased low back pain or difficulty by 6 weeks.  Pt will: be able to  line to check into her PT appt without increased low  "back symptoms by 6 weeks.        If you have any questions or concerns, please don't hesitate to call.    Sincerely,      Gricelda DAYNE Caruso, PT  [phone]      Physician recommendation:     ___ Follow therapist's recommendation        ___ Modify therapy      *Physician co-signature indicates they certify the need for these services furnished within this plan and while under their care.        [facility] Rehabilitation   [facility]-Pelvic Initial Evaluation    Patient Name: Chelo Brooks  Date of evaluation: 10/19/2020  Referral Diagnosis: Spinal stenosis of lumbar region without neurogenic claudication  Referring provider: Luciano Isabel MD  Visit Diagnosis:     ICD-10-CM    1. Low back pain potentially associated with spinal stenosis  M54.5    2. Generalized muscle weakness  M62.81        Assessment:        Patient is a 75 y.o. 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 Patient demonstrates impairments including decreased lumbar motion and flexibility, with poor postural awareness and core stability, with generalized muscle weakness, leading to impaired functional mobility. Patient's functional limitations include sit to stand and stand to sit, standing for longer periods of time, and any household activity or walking without increased pain. Today patient responded well to manual therapy, therapeutic exercise and patient education - patient with inc swelling and loss of circulation in bilateral feet - patient stating this is under control. Patient left treatment today with ability to ambulate better and stating she \"feels good!\"    Patient educated, demonstrated understanding and is in agreement with nature of impairment, plan of care, patient role and HEP. Patient compliant with PT and prognosis is good. Patient would benefit from skilled PT to " progress and improve above impairments.    The POC is dynamic and will be modified on an ongoing basis.  Patient will return to clinic if symptoms persist.  Barriers to achieving goals as noted in the assessment section may affect outcome.  Prognosis to achieve goals is  fair   Pt. is appropriate for skilled PT intervention as outlined in the Plan of Care (POC).  Pt. is a good candidate for skilled PT services to improve pain levels and function.    Goals:  Pt. will be independent with home exercise program in : 6 weeks    Pt will: be able to go from sitting to standing in her chair at home without increased pain symptoms or difficulty by 6 weeks.  Pt will: be able to roll over in bed without increased low back pain or difficulty by 6 weeks.  Pt will: be able to  line to check into her PT appt without increased low back symptoms by 6 weeks.      Patient's expectations/goals are realistic.    Barriers to Learning or Achieving Goals:  Non- adherence to the home exercise program.  Chronicity of the problem.  Co-morbidities or other medical factors.  morbid obesity       Plan / Patient Instructions:        Plan of Care:   Authorization / Certification Start Date: 10/19/20  Authorization / Certification End Date: 01/19/21  Authorization / Certification Number of Visits: 12  Communication with: Referral Source  Patient Related Instruction: Nature of Condition;Treatment plan and rationale;Self Care instruction;Basis of treatment;Body mechanics;Posture;Next steps;Expected outcome  Times per Week: 1-2  Number of Weeks: 6-8  Number of Visits: 16 [facility]  Discharge Planning: independent HEP and/or plateau of progress  Therapeutic Exercise: Stretching;Strengthening;ROM  Neuromuscular Reeducation: kinesio tape;posture;TNE;core  Manual Therapy: soft tissue mobilization;myofascial release;joint mobilization;muscle energy  Modalities: TENS  Gait Training: as indicated      POC and pathology of condition were reviewed with  patient.  Pt. is in agreement with the Plan of Care  A Home Exercise Program (HEP) was initiated today.  Pt. was instructed in exercises by PT and patient was given a handout with detailed instructions.    Plan for next visit: review HEP, add sit to stands, standing hip abd, Nustep warm up, bridging, SLR, clamshells, seated postural review, sit to stand testing and/or 2 min walk test, QL and HF stretching     Subjective:         History of Present Illness:    Chelo is a 75 y.o. female who presents to therapy today with complaints of low back pain, not severe pain. Date of onset is October 2020 and onset was gradual and acute on chronic - it is now affecting her function. Symptoms are constant and not improving. Patient reports she can't  one spot for longer periods of time, what really bothers her is the transfer from sit to stand and vice versa. Patient has to sit in a comfortable chair to have no pain. Patient has had injections that haven't really seemed to help her either. Patient is feeling pain in her lower abdomen, it feels embarrassing for her too. She reports  a constant  history of similar symptoms. She describes their previous level of function as not limited. Patient reporting she has to hold onto the cart when she goes shopping. Patient states if she sits too long she gets purple toes due to poor circulation, she feels her leg gets cold and swelling too. Patient lives in senior living facility and this year has been a bad year due to COVID, she isn't really motivated to get up and do HEP, patient has exercise programs at the facility but doesn't do them, or the exercise machines. Patient does help her brother at his house too, 1-3 hours/day. Patient feels unsteady in the morning with balance, she feels stiffness too. Patient does do a lot of activity in sitting, playing cards, knitting, reading and watching TV. Patient feeling really weak in her legs, wants to work on strengthening - Patient  feeling R shoulder pain too, due to RTC. Patient sleeps on her side or on her stomach.     Pain Ratin  Pain rating at best: 5  Pain rating at worst: 9  Pain description: sharp shooting pain in the lower area, in her back she feels like a knot    Functional limitations are described as occurring with:   bending  lifting  performing routine daily activities  sitting on hard surfaces  standing for longer periods of time, in one position  transitional movements getting in  bed and chair and getting out of  bed and chair  walking for longer periods of time, walking without assistive device  vacuuming or housework    Patient reports benefit from:  rest  , anti-inflammatory, ice helps after increased activity         Objective:      Note: Items left blank indicates the item was not performed or not indicated at the time of the evaluation.    Patient Outcome Measures :    Modified Oswestry Low Back Pain Disablity Questionnaire  in %: 48     Scores range from 0-100%, where a score of 0% represents minimal pain and maximal function. The minimal clinically important difference is a score reduction of 12%.    Examination  1. Low back pain potentially associated with spinal stenosis     2. Generalized muscle weakness       Involved side: Left and Bilateral  Posture Observation:      General sitting posture is  normal.  General standing posture is fair.    Lumbar ROM:  WFL no inc of pain  Date: 10/19/2020     *Indicate scale AROM AROM AROM   Lumbar Flexion Limited due to body habitus     Lumbar Extension       Right Left Right Left Right Left   Lumbar Sidebending         Lumbar Rotation         Thoracic Flexion      Thoracic Extension      Thoracic Sidebending         Thoracic Rotation           Lower Extremity Strength:   WFL no increase of pain    Lumbar Special Tests:     Lumbar Special Tests Right Left SI Tests Right  Left   Quadrant test   SI Compression     Straight leg raise - + SI Distraction     Crossover response + -  POSH Test     Slump - - Sacral Thrust     Sit-up test  FADIR - -   Trunk extensor endurance test  Resisted Abduction     Prone instability test  Other:     Pubic shotgun  Other:         Palpation: TTP at R>L gluteal, piriformis, low lumbar paraspinals  Flexibility: decreased of HF, hamstrings, QL      Treatment Today       Patient Instruction:  EDU on importance of exercise consistency and activity modification.     Manual Therapy:  SL on L - MFR of R piriformis, distal lumbar paraspinals, gluteals and ITB    Exercises:  Exercise #1: supine knee rocks- 20 reps  Comment #1: supine piriformis stretch - hold 20 sec  Exercise #2: supine lumbar rotation stretch - hold 20 sec  Comment #2: seated hamstring stretch - hold 20 sec          TREATMENT MINUTES COMMENTS   Evaluation 20    Self-care/ Home management     Manual therapy 15 SL on L - MFR of R piriformis, distal lumbar paraspinals, gluteals and ITB     Neuromuscular Re-education     Therapeutic Activity     Therapeutic Exercises 25 See flowsheet   Gait training     Modality__________________                Total 60    Blank areas are intentional and mean the treatment did not include these items.     PT Evaluation Code: (Please list factors)  Patient History/Comorbidities: increased low back pain  Examination: decreased mobility  Clinical Presentation: stable  Clinical Decision Making: low    Patient History/  Comorbidities Examination  (body structures and functions, activity limitations, and/or participation restrictions) Clinical Presentation Clinical Decision Making (Complexity)   No documented Comorbidities or personal factors 1-2 Elements Stable and/or uncomplicated Low   1-2 documented comorbidities or personal factor 3 Elements Evolving clinical presentation with changing characteristics Moderate   3-4 documented comorbidities or personal factors 4 or more Unstable and unpredictable High                Gricelda Caruso, PT  10/19/2020  12:49 PM

## 2021-06-12 NOTE — PROGRESS NOTES
[facility] Rehabilitation Daily Progress     Patient Name: Chelo Brooks  Date: 10/29/2020  Visit #: 3/16  Auth: thru 1/19/21  Referral Diagnosis: low back pain/abdominal pain  Referring provider: Vita Zavala MD  Visit Diagnosis:     ICD-10-CM    1. Low back pain potentially associated with spinal stenosis  M54.5    2. Generalized muscle weakness  M62.81        Assessment:     Today patient reported with increased symptoms in her lower back after doing laundry this morning. The patient responded well today to additional HEP, manual therapy, specifically soft tissue release, and reported a decrease in her symptoms upon leaving.     From Eval:  Patient is a 75 y.o. 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 Patient demonstrates impairments including decreased lumbar motion and flexibility, with poor postural awareness and core stability, with generalized muscle weakness, leading to impaired functional mobility. Patient's functional limitations include sit to stand and stand to sit, standing for longer periods of time, and any household activity or walking without increased pain. Today patient responded well to manual therapy, therapeutic exercise and patient education - patient with inc swelling and loss of circulation in bilateral feet - patient stating this is under control.      HEP/POC compliance is  fair .  Patient demonstrates understanding/independence with home program.  Patient is appropriate to continue with skilled physical therapy intervention, as indicated by initial plan of care.    Goal Status:  Pt. will be independent with home exercise program in : 6 weeks    Pt will: be able to go from sitting to standing in her chair at home without increased pain symptoms or difficulty by 6 weeks.  Pt will: be able to roll over in bed  without increased low back pain or difficulty by 6 weeks.  Pt will: be able to  line to check into her PT appt without increased low back symptoms by 6 weeks.        Plan / Patient Education:     Continue with initial plan of care.  Progress with home program as tolerated.    Plan for next visit: review HEP, add sit to stands, standing hip abd, Nustep warm up, bridging, SLR, clamshells, seated postural review, sit to stand testing and/or 2 min walk test, QL and HF stretching    Subjective:     Patient was doing laundry this morning and provoked symptoms in her back. Patient did feel better after manual therapy. The symptoms became severe enough to cause her to take an Advil. The symptoms are still primarily located on the left side with some tingling down into the legs. Patient reports that standing feels better than sitting but can't handle for standing for very long.    Objective:       Treatment Today       Patient Instruction:  EDU on activity modification and exercise consistency in order to see progress and change     Manual Therapy:  SL on R - MFR of L piriformis, distal lumbar paraspinals, gluteals and ITB    Exercises:  Exercise #1: supine knee rocks- 20 reps  Comment #1: supine piriformis stretch - hold 20 sec  Exercise #2: supine lumbar rotation stretch - hold 20 sec  Comment #2: seated hamstring stretch - hold 20 sec  Exercise #3: standing hip abd/ext - not to HEP yet  Comment #3: sit to stands - 10 reps - not to HEP yet        TREATMENT MINUTES COMMENTS   Evaluation     Self-care/ Home management     Manual therapy 15 SL on R - MFR of L piriformis and gluteals    Neuromuscular Re-education     Therapeutic Activity     Therapeutic Exercises 10 See above flowsheet   Gait training     Modality__________________                Total 25    Blank areas are intentional and mean the treatment did not include these items.     Luc Serra, SPT  I attest that I was present in the room, making skilled  assessments and directing the service provided today.  I was responsible for the assessment and treatment of the patient.  During this time, I was not engaged in treating another patient or doing other tasks.    Gricelda Caruso, PT  10/29/2020

## 2021-06-13 NOTE — PROGRESS NOTES
[facility] Rehabilitation Discharge Summary  Patient Name: Chelo Brooks  Date: 12/28/2020  Referral Diagnosis: low back pain  Referring provider: Vita Zavala MD  Visit Diagnosis:   1. Low back pain potentially associated with spinal stenosis     2. Generalized muscle weakness         Goals:  Pt. will be independent with home exercise program in : 6 weeks    Pt will: be able to go from sitting to standing in her chair at home without increased pain symptoms or difficulty by 6 weeks.  Pt will: be able to roll over in bed without increased low back pain or difficulty by 6 weeks.  Pt will: be able to  line to check into her PT appt without increased low back symptoms by 6 weeks.      Patient was seen for 6 visits for physical therapy of low back pain from 10/19/2020 to 11/19/2020 with no follow up appointments.   The patient discontinued therapy, did not return.  No further therapy is required at this time.    Therapy will be discontinued at this time.  The patient will need a new referral to resume physical therapy treatment. Please see below for patient's current status.    Thank you for your referral.  Gricelda Caruso, PT, DPT  12/28/2020   11:41 AM  1    [facility] Daily Progress     Patient Name: Chelo Brooks  Date: 11/19/2020  Visit #: 6/16  Auth: thru 1/19/21  Referral Diagnosis: low back pain/abdominal pain  Referring provider: Vita Zavala MD  Visit Diagnosis:     ICD-10-CM    1. Low back pain potentially associated with spinal stenosis  M54.5    2. Generalized muscle weakness  M62.81        Assessment:     Patient reports to therapy with a decrease in symptoms. She hasn't been as consistent with her exercises as she'd like but she's trying to incorporate them into her daily routine. PT reviewed HEP and helped patient organize HEP and break up exercises into a couple a day. She tolerated therapeutic exercise well today and she feels comfortable with today  being her last therapy session; however, if she does experience an increase in symptoms she plans on returning to therapy.     From Eval:  Patient is a 75 y.o. 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 Patient demonstrates impairments including decreased lumbar motion and flexibility, with poor postural awareness and core stability, with generalized muscle weakness, leading to impaired functional mobility. Patient's functional limitations include sit to stand and stand to sit, standing for longer periods of time, and any household activity or walking without increased pain. Today patient responded well to manual therapy, therapeutic exercise and patient education - patient with inc swelling and loss of circulation in bilateral feet - patient stating this is under control.      HEP/POC compliance is  fair .  Patient demonstrates understanding/independence with home program.  Patient is appropriate to continue with skilled physical therapy intervention, as indicated by initial plan of care.    Goal Status:  Pt. will be independent with home exercise program in : 6 weeks    Pt will: be able to go from sitting to standing in her chair at home without increased pain symptoms or difficulty by 6 weeks.  Pt will: be able to roll over in bed without increased low back pain or difficulty by 6 weeks.  Pt will: be able to  line to check into her PT appt without increased low back symptoms by 6 weeks.        Plan / Patient Education:     Continue with initial plan of care.  Progress with home program as tolerated.    Plan for next visit: review HEP, add sit to stands, standing hip abd, Nustep warm up, bridging, SLR, clamshells, seated postural review, sit to stand testing and/or 2 min walk test, QL and HF stretching    Subjective:     Patient reports she's  been doing okay. She hasn't been able to complete her theraband exercises. Sleeping has been going well. She's had no increase in symptoms and actually reports her back has been feeling better.    Objective:     APTA score: 8  used hand, experienced a lot of fatigue after completing testing     Treatment Today     Patient Instruction:  EDU on activity modification and exercise consistency in order to see progress and change     Manual Therapy:  SL on L - MFR of R QL and serratus anterior     Exercises:  Exercise #1: supine knee rocks- 20 reps  Comment #1: supine piriformis stretch - hold 20 sec  Exercise #2: supine lumbar rotation stretch - hold 20 sec  Comment #2: seated hamstring stretch - hold 20 sec  Exercise #3: standing hip abd/ext - added abd to HEP but not ext yet  Comment #3: sit to stands - 10 reps - added to HEP  Exercise #4: supine SLR- 10 reps  Comment #4: supine ab march-10 reps  Exercise #5: theraband rows- 10  Comment #5: theraband low rows- 10 reps        TREATMENT MINUTES COMMENTS   Evaluation     Self-care/ Home management     Manual therapy Not today SL on R - MFR of L QL and serratus anterior    Neuromuscular Re-education     Therapeutic Activity     Therapeutic Exercises 30 See above flowsheet  Nu Step 5'   Gait training     Modality__________________                Total 30    Blank areas are intentional and mean the treatment did not include these items.     Luc Serra, SPT  I attest that I was present in the room, making skilled assessments and directing the service provided today.  I was responsible for the assessment and treatment of the patient.  During this time, I was not engaged in treating another patient or doing other tasks.    Gricelda Caruso, PT  11/19/2020

## 2021-06-13 NOTE — PROGRESS NOTES
[facility] Daily Progress     Patient Name: Chelo Brooks  Date: 11/16/2020  Visit #: 5/16  Auth: thru 1/19/21  Referral Diagnosis: low back pain/abdominal pain  Referring provider: Vita Zavala MD  Visit Diagnosis:     ICD-10-CM    1. Low back pain potentially associated with spinal stenosis  M54.5    2. Generalized muscle weakness  M62.81        Assessment:     Patient reports to therapy once again feeling okay. She did report some new symptoms when standing, noting fatigue of the upper back and postural muscles after standing for a longer period of time. PT provided additional exercises in HEP to address this issue. She tolerated therapeutic exercise well today and responded well to manual therapy, reporting a decrease in symptoms at the end of the session.    From Eval:  Patient is a 75 y.o. 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 Patient demonstrates impairments including decreased lumbar motion and flexibility, with poor postural awareness and core stability, with generalized muscle weakness, leading to impaired functional mobility. Patient's functional limitations include sit to stand and stand to sit, standing for longer periods of time, and any household activity or walking without increased pain. Today patient responded well to manual therapy, therapeutic exercise and patient education - patient with inc swelling and loss of circulation in bilateral feet - patient stating this is under control.      HEP/POC compliance is  fair .  Patient demonstrates understanding/independence with home program.  Patient is appropriate to continue with skilled physical therapy intervention, as indicated by initial plan of care.    Goal Status:  Pt. will be independent with home exercise program in : 6 weeks    Pt will: be able to  go from sitting to standing in her chair at home without increased pain symptoms or difficulty by 6 weeks.  Pt will: be able to roll over in bed without increased low back pain or difficulty by 6 weeks.  Pt will: be able to  line to check into her PT appt without increased low back symptoms by 6 weeks.        Plan / Patient Education:     Continue with initial plan of care.  Progress with home program as tolerated.    Plan for next visit: review HEP, add sit to stands, standing hip abd, Nustep warm up, bridging, SLR, clamshells, seated postural review, sit to stand testing and/or 2 min walk test, QL and HF stretching    Subjective:     Patient reports she's been feeling pretty good in therapy. Sleeping has been going well. Her exercises are going okay but not as good as she'd like. She's doing a few a day but not able to do them all, which PT told her was fine.     Objective:     APTA score: 8  used hand, experienced a lot of fatigue after completing testing     Treatment Today     Patient Instruction:  EDU on activity modification and exercise consistency in order to see progress and change     Manual Therapy:  SL on L - MFR of R QL and serratus anterior     Exercises:  Exercise #1: supine knee rocks- 20 reps  Comment #1: supine piriformis stretch - hold 20 sec  Exercise #2: supine lumbar rotation stretch - hold 20 sec  Comment #2: seated hamstring stretch - hold 20 sec  Exercise #3: standing hip abd/ext - added abd to HEP but not ext yet  Comment #3: sit to stands - 10 reps - added to HEP  Exercise #4: supine SLR- 10 reps  Comment #4: supine ab march-10 reps  Exercise #5: theraband rows- 10  Comment #5: theraband low rows- 10 reps        TREATMENT MINUTES COMMENTS   Evaluation     Self-care/ Home management     Manual therapy 9 SL on R - MFR of L QL and serratus anterior    Neuromuscular Re-education     Therapeutic Activity     Therapeutic Exercises 21 See above flowsheet  Nu Step 5'   Gait training      Modality__________________                Total 30    Blank areas are intentional and mean the treatment did not include these items.     Luc Serra, SPT  I attest that I was present in the room, making skilled assessments and directing the service provided today.  I was responsible for the assessment and treatment of the patient.  During this time, I was not engaged in treating another patient or doing other tasks.    Gricelda Caruso, PT  11/16/2020

## 2021-06-13 NOTE — PROGRESS NOTES
[facility] Daily Progress     Patient Name: Chelo Brooks  Date: 11/12/2020  Visit #: 4/16  Auth: thru 1/19/21  Referral Diagnosis: low back pain/abdominal pain  Referring provider: Vita Zavala MD  Visit Diagnosis:     ICD-10-CM    1. Low back pain potentially associated with spinal stenosis  M54.5    2. Generalized muscle weakness  M62.81        Assessment:     Today patient hasn't been seen in two weeks. Patient reports with a slight decrease in her original symptoms. Patient tolerated APTA testing well today. She responded well to manual therapy, reporting a decrease in symptoms at the end of the session.    From Eval:  Patient is a 75 y.o. 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 Patient demonstrates impairments including decreased lumbar motion and flexibility, with poor postural awareness and core stability, with generalized muscle weakness, leading to impaired functional mobility. Patient's functional limitations include sit to stand and stand to sit, standing for longer periods of time, and any household activity or walking without increased pain. Today patient responded well to manual therapy, therapeutic exercise and patient education - patient with inc swelling and loss of circulation in bilateral feet - patient stating this is under control.      HEP/POC compliance is  fair .  Patient demonstrates understanding/independence with home program.  Patient is appropriate to continue with skilled physical therapy intervention, as indicated by initial plan of care.    Goal Status:  Pt. will be independent with home exercise program in : 6 weeks    Pt will: be able to go from sitting to standing in her chair at home without increased pain symptoms or difficulty by 6 weeks.  Pt will: be able to roll over in bed without  increased low back pain or difficulty by 6 weeks.  Pt will: be able to  line to check into her PT appt without increased low back symptoms by 6 weeks.        Plan / Patient Education:     Continue with initial plan of care.  Progress with home program as tolerated.    Plan for next visit: review HEP, add sit to stands, standing hip abd, Nustep warm up, bridging, SLR, clamshells, seated postural review, sit to stand testing and/or 2 min walk test, QL and HF stretching    Subjective:     Patient reports that she wasn't in much pain after sleeping on a firm mattress while on vacation. She did some cleaning yesterday and she has a slight back ache but her symptoms have overall decreased. She is still on quarantine in her apartment for another week so she isn't able to get out much. She feels like the MFR has been really helpful for decreasing her symptoms.     Objective:     APTA score: 8  used hand, experienced a lot of fatigue after completing testing     Treatment Today     Patient Instruction:  EDU on activity modification and exercise consistency in order to see progress and change     Manual Therapy:  SL on L - MFR of R QL and serratus anterior     Exercises:  Exercise #1: supine knee rocks- 20 reps  Comment #1: supine piriformis stretch - hold 20 sec  Exercise #2: supine lumbar rotation stretch - hold 20 sec  Comment #2: seated hamstring stretch - hold 20 sec  Exercise #3: standing hip abd/ext - not to HEP yet  Comment #3: sit to stands - 10 reps - not to HEP yet        TREATMENT MINUTES COMMENTS   Evaluation     Self-care/ Home management     Manual therapy 18 SL on R - MFR of R QL and serratus anterior    Neuromuscular Re-education     Therapeutic Activity     Therapeutic Exercises 10 See above flowsheet  Nu Step 5'   Gait training     Modality__________________                Total 28    Blank areas are intentional and mean the treatment did not include these items.     Luc Serra, SPT  I attest  that I was present in the room, making skilled assessments and directing the service provided today.  I was responsible for the assessment and treatment of the patient.  During this time, I was not engaged in treating another patient or doing other tasks.    Gricelda Caruso, PT  11/12/2020

## 2021-08-03 ENCOUNTER — ANCILLARY PROCEDURE (OUTPATIENT)
Dept: ULTRASOUND IMAGING | Facility: CLINIC | Age: 76
End: 2021-08-03
Attending: SURGERY
Payer: COMMERCIAL

## 2021-08-03 DIAGNOSIS — E04.1 THYROID NODULE: ICD-10-CM

## 2021-08-03 PROCEDURE — 76536 US EXAM OF HEAD AND NECK: CPT | Performed by: RADIOLOGY

## 2021-08-05 DIAGNOSIS — E04.1 THYROID NODULE: Primary | ICD-10-CM

## 2021-08-06 ENCOUNTER — TELEPHONE (OUTPATIENT)
Dept: FAMILY MEDICINE | Facility: CLINIC | Age: 76
End: 2021-08-06

## 2021-08-06 NOTE — TELEPHONE ENCOUNTER
Reason for Call:  Request for results:    Name of test or procedure: Ultra sound    Date of test of procedure: 8/3/2021    Location of the test or procedure: [facility] to leave the result message on voice mail or with a family member? 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 syncope in setting of UTI    - Syncope is most likely vagal in setting of urinary tract infection vs prostatic infection/abscess  - remains orthostatic  - Hold diuretics as not vol ol  - Midodrine increased to 30mg q8 but unclear how much more efficacy he will get from this dose.   - I doubt this was arrhythmic but his prolonged QTC has been concerning. This was seen on his previous admission as well  - Avoid QTC prolonging drugs  - qtc ok as of 3/13  - Monitor and replete electrolytes. Keep K>4.0 and Mg>2.0. Replete Mg today  - cont abx    - No signs of significant ischemia. Cath with non obs disease  - echo in 2/2020 with normal LV function . no need to repeat.   - F/U Gi   - urology for possible prostate abscess    - Further cardiac workup will depend on clinical course.   - All other workup per primary team. Will followup.

## 2021-08-06 NOTE — TELEPHONE ENCOUNTER
Called patient and discussed US results as noted below by Dr. Toledo:    Unchanged, which is great news, so as long as it is not bothering you we can repeat the US in 1 yr    Ruddy MORROW RN....8/6/2021 12:01 PM

## 2021-08-11 DIAGNOSIS — G47.33 OBSTRUCTIVE SLEEP APNEA (ADULT) (PEDIATRIC): Primary | ICD-10-CM

## 2021-09-04 ENCOUNTER — HEALTH MAINTENANCE LETTER (OUTPATIENT)
Age: 76
End: 2021-09-04

## 2021-09-19 DIAGNOSIS — E78.5 HYPERLIPIDEMIA LDL GOAL <100: ICD-10-CM

## 2021-09-19 DIAGNOSIS — K21.00 REFLUX ESOPHAGITIS: Chronic | ICD-10-CM

## 2021-09-21 RX ORDER — ATORVASTATIN CALCIUM 40 MG/1
TABLET, FILM COATED ORAL
Qty: 90 TABLET | Refills: 0 | Status: SHIPPED | OUTPATIENT
Start: 2021-09-21 | End: 2022-01-04

## 2021-09-21 RX ORDER — FAMOTIDINE 40 MG/1
TABLET, FILM COATED ORAL
Qty: 90 TABLET | Refills: 0 | Status: SHIPPED | OUTPATIENT
Start: 2021-09-21 | End: 2022-01-04

## 2021-09-21 NOTE — TELEPHONE ENCOUNTER
Prescription approved per [facility].  Patient due for office visit for further refills.   Cielo Ellsworth RN

## 2021-10-19 DIAGNOSIS — N18.31 STAGE 3A CHRONIC KIDNEY DISEASE (H): Primary | ICD-10-CM

## 2021-11-15 ENCOUNTER — TELEPHONE (OUTPATIENT)
Dept: NEPHROLOGY | Facility: CLINIC | Age: 76
End: 2021-11-15
Payer: COMMERCIAL

## 2021-11-15 DIAGNOSIS — N18.31 STAGE 3A CHRONIC KIDNEY DISEASE (H): Primary | ICD-10-CM

## 2021-11-15 NOTE — TELEPHONE ENCOUNTER
[facility] Call Center    Phone Message    May a detailed message be left on voicemail: yes     Reason for Call: Order(s): Other:   Reason for requested: follow up nephrology appt  Date needed: 5/2/2022  Provider name:     Pt originally saw  once @ [facility], pt does not want to drive there anymore, we scheduled with  in [facility], please place any labs necessary , thank you      Action Taken: Message routed to:  Other: neph    Travel Screening: Not Applicable

## 2021-12-05 DIAGNOSIS — I12.9 RENAL HYPERTENSION: ICD-10-CM

## 2021-12-07 RX ORDER — OLMESARTAN MEDOXOMIL AND HYDROCHLOROTHIAZIDE 20/12.5 20; 12.5 MG/1; MG/1
1 TABLET ORAL DAILY
Qty: 90 TABLET | Refills: 0 | Status: SHIPPED | OUTPATIENT
Start: 2021-12-07 | End: 2022-01-04 | Stop reason: DRUGHIGH

## 2021-12-25 ENCOUNTER — HEALTH MAINTENANCE LETTER (OUTPATIENT)
Age: 76
End: 2021-12-25

## 2022-01-04 ENCOUNTER — OFFICE VISIT (OUTPATIENT)
Dept: FAMILY MEDICINE | Facility: CLINIC | Age: 77
End: 2022-01-04
Payer: COMMERCIAL

## 2022-01-04 VITALS
OXYGEN SATURATION: 94 % | SYSTOLIC BLOOD PRESSURE: 146 MMHG | WEIGHT: 293 LBS | HEART RATE: 72 BPM | TEMPERATURE: 98.5 F | HEIGHT: 67 IN | BODY MASS INDEX: 45.99 KG/M2 | DIASTOLIC BLOOD PRESSURE: 64 MMHG

## 2022-01-04 DIAGNOSIS — N95.9 MENOPAUSAL AND PERIMENOPAUSAL DISORDER: ICD-10-CM

## 2022-01-04 DIAGNOSIS — E27.9 ADRENAL NODULE (H): ICD-10-CM

## 2022-01-04 DIAGNOSIS — E78.5 HYPERLIPIDEMIA LDL GOAL <100: ICD-10-CM

## 2022-01-04 DIAGNOSIS — Z12.11 SPECIAL SCREENING FOR MALIGNANT NEOPLASMS, COLON: ICD-10-CM

## 2022-01-04 DIAGNOSIS — R60.9 DEPENDENT EDEMA: ICD-10-CM

## 2022-01-04 DIAGNOSIS — N18.32 STAGE 3B CHRONIC KIDNEY DISEASE (H): ICD-10-CM

## 2022-01-04 DIAGNOSIS — E66.01 MORBID OBESITY DUE TO EXCESS CALORIES (H): Chronic | ICD-10-CM

## 2022-01-04 DIAGNOSIS — M85.80 OSTEOPENIA, UNSPECIFIED LOCATION: ICD-10-CM

## 2022-01-04 DIAGNOSIS — Z12.31 VISIT FOR SCREENING MAMMOGRAM: ICD-10-CM

## 2022-01-04 DIAGNOSIS — J30.81 CHRONIC ALLERGIC RHINITIS DUE TO ANIMAL HAIR AND DANDER: ICD-10-CM

## 2022-01-04 DIAGNOSIS — I12.9 RENAL HYPERTENSION: ICD-10-CM

## 2022-01-04 DIAGNOSIS — N18.31 STAGE 3A CHRONIC KIDNEY DISEASE (H): ICD-10-CM

## 2022-01-04 DIAGNOSIS — Z00.00 ENCOUNTER FOR MEDICARE ANNUAL WELLNESS EXAM: Primary | ICD-10-CM

## 2022-01-04 DIAGNOSIS — E55.9 VITAMIN D DEFICIENCY: ICD-10-CM

## 2022-01-04 DIAGNOSIS — K21.00 GASTROESOPHAGEAL REFLUX DISEASE WITH ESOPHAGITIS, UNSPECIFIED WHETHER HEMORRHAGE: ICD-10-CM

## 2022-01-04 LAB
ALBUMIN SERPL-MCNC: 3.4 G/DL (ref 3.4–5)
ALP SERPL-CCNC: 75 U/L (ref 40–150)
ALT SERPL W P-5'-P-CCNC: 23 U/L (ref 0–50)
ANION GAP SERPL CALCULATED.3IONS-SCNC: 6 MMOL/L (ref 3–14)
AST SERPL W P-5'-P-CCNC: 16 U/L (ref 0–45)
BILIRUB SERPL-MCNC: 0.7 MG/DL (ref 0.2–1.3)
BUN SERPL-MCNC: 27 MG/DL (ref 7–30)
CALCIUM SERPL-MCNC: 8.9 MG/DL (ref 8.5–10.1)
CHLORIDE BLD-SCNC: 105 MMOL/L (ref 94–109)
CHOLEST SERPL-MCNC: 157 MG/DL
CO2 SERPL-SCNC: 29 MMOL/L (ref 20–32)
CREAT SERPL-MCNC: 1.29 MG/DL (ref 0.52–1.04)
ERYTHROCYTE [DISTWIDTH] IN BLOOD BY AUTOMATED COUNT: 14 % (ref 10–15)
FASTING STATUS PATIENT QL REPORTED: YES
GFR SERPL CREATININE-BSD FRML MDRD: 43 ML/MIN/1.73M2
GLUCOSE BLD-MCNC: 95 MG/DL (ref 70–99)
HCT VFR BLD AUTO: 37.5 % (ref 35–47)
HDLC SERPL-MCNC: 53 MG/DL
HGB BLD-MCNC: 11.7 G/DL (ref 11.7–15.7)
LDLC SERPL CALC-MCNC: 80 MG/DL
MCH RBC QN AUTO: 28.2 PG (ref 26.5–33)
MCHC RBC AUTO-ENTMCNC: 31.2 G/DL (ref 31.5–36.5)
MCV RBC AUTO: 90 FL (ref 78–100)
NONHDLC SERPL-MCNC: 104 MG/DL
PHOSPHATE SERPL-MCNC: 3.1 MG/DL (ref 2.5–4.5)
PLATELET # BLD AUTO: 170 10E3/UL (ref 150–450)
POTASSIUM BLD-SCNC: 3.8 MMOL/L (ref 3.4–5.3)
PROT SERPL-MCNC: 6.7 G/DL (ref 6.8–8.8)
PTH-INTACT SERPL-MCNC: 143 PG/ML (ref 18–80)
RBC # BLD AUTO: 4.15 10E6/UL (ref 3.8–5.2)
SODIUM SERPL-SCNC: 140 MMOL/L (ref 133–144)
TRIGL SERPL-MCNC: 119 MG/DL
WBC # BLD AUTO: 4 10E3/UL (ref 4–11)

## 2022-01-04 PROCEDURE — 83970 ASSAY OF PARATHORMONE: CPT | Performed by: FAMILY MEDICINE

## 2022-01-04 PROCEDURE — 82306 VITAMIN D 25 HYDROXY: CPT | Performed by: FAMILY MEDICINE

## 2022-01-04 PROCEDURE — 99214 OFFICE O/P EST MOD 30 MIN: CPT | Mod: 25 | Performed by: FAMILY MEDICINE

## 2022-01-04 PROCEDURE — 84100 ASSAY OF PHOSPHORUS: CPT | Performed by: FAMILY MEDICINE

## 2022-01-04 PROCEDURE — 80061 LIPID PANEL: CPT | Performed by: FAMILY MEDICINE

## 2022-01-04 PROCEDURE — 80053 COMPREHEN METABOLIC PANEL: CPT | Performed by: FAMILY MEDICINE

## 2022-01-04 PROCEDURE — 85027 COMPLETE CBC AUTOMATED: CPT | Performed by: FAMILY MEDICINE

## 2022-01-04 PROCEDURE — 36415 COLL VENOUS BLD VENIPUNCTURE: CPT | Performed by: FAMILY MEDICINE

## 2022-01-04 PROCEDURE — 99397 PER PM REEVAL EST PAT 65+ YR: CPT | Performed by: FAMILY MEDICINE

## 2022-01-04 RX ORDER — OLMESARTAN MEDOXOMIL AND HYDROCHLOROTHIAZIDE 20/12.5 20; 12.5 MG/1; MG/1
1 TABLET ORAL DAILY
Qty: 90 TABLET | Refills: 0 | Status: CANCELLED | OUTPATIENT
Start: 2022-01-04

## 2022-01-04 RX ORDER — ATORVASTATIN CALCIUM 40 MG/1
40 TABLET, FILM COATED ORAL DAILY
Qty: 90 TABLET | Refills: 3 | Status: SHIPPED | OUTPATIENT
Start: 2022-01-04 | End: 2023-03-03

## 2022-01-04 RX ORDER — FLUTICASONE PROPIONATE 50 MCG
2 SPRAY, SUSPENSION (ML) NASAL DAILY
Qty: 48 G | Refills: 3 | Status: SHIPPED | OUTPATIENT
Start: 2022-01-04

## 2022-01-04 RX ORDER — OLMESARTAN MEDOXOMIL AND HYDROCHLOROTHIAZIDE 40/25 40; 25 MG/1; MG/1
1 TABLET ORAL DAILY
Qty: 90 TABLET | Refills: 0 | Status: SHIPPED | OUTPATIENT
Start: 2022-01-04 | End: 2022-04-18

## 2022-01-04 RX ORDER — FAMOTIDINE 40 MG/1
40 TABLET, FILM COATED ORAL DAILY
Qty: 90 TABLET | Refills: 3 | Status: SHIPPED | OUTPATIENT
Start: 2022-01-04 | End: 2023-03-17

## 2022-01-04 ASSESSMENT — ENCOUNTER SYMPTOMS
FREQUENCY: 0
DIZZINESS: 0
CHILLS: 0
DYSURIA: 0
NERVOUS/ANXIOUS: 0
JOINT SWELLING: 0
EYE PAIN: 0
HEMATOCHEZIA: 0
SHORTNESS OF BREATH: 1
BREAST MASS: 0
FEVER: 0
UNEXPECTED WEIGHT CHANGE: 1
NAUSEA: 0
MYALGIAS: 0
SORE THROAT: 0
PARESTHESIAS: 0
WEAKNESS: 0
ARTHRALGIAS: 0
DIARRHEA: 0
CONSTIPATION: 0
PALPITATIONS: 0
HEMATURIA: 0
COUGH: 0
ABDOMINAL PAIN: 0
HEARTBURN: 0
HEADACHES: 0

## 2022-01-04 ASSESSMENT — ASTHMA QUESTIONNAIRES
ACT_TOTALSCORE: 21
QUESTION_3 LAST FOUR WEEKS HOW OFTEN DID YOUR ASTHMA SYMPTOMS (WHEEZING, COUGHING, SHORTNESS OF BREATH, CHEST TIGHTNESS OR PAIN) WAKE YOU UP AT NIGHT OR EARLIER THAN USUAL IN THE MORNING: NOT AT ALL
QUESTION_4 LAST FOUR WEEKS HOW OFTEN HAVE YOU USED YOUR RESCUE INHALER OR NEBULIZER MEDICATION (SUCH AS ALBUTEROL): ONCE A WEEK OR LESS
QUESTION_5 LAST FOUR WEEKS HOW WOULD YOU RATE YOUR ASTHMA CONTROL: COMPLETELY CONTROLLED
QUESTION_2 LAST FOUR WEEKS HOW OFTEN HAVE YOU HAD SHORTNESS OF BREATH: ONCE A DAY
QUESTION_1 LAST FOUR WEEKS HOW MUCH OF THE TIME DID YOUR ASTHMA KEEP YOU FROM GETTING AS MUCH DONE AT WORK, SCHOOL OR AT HOME: NONE OF THE TIME

## 2022-01-04 ASSESSMENT — MIFFLIN-ST. JEOR: SCORE: 2093.57

## 2022-01-04 ASSESSMENT — ACTIVITIES OF DAILY LIVING (ADL): CURRENT_FUNCTION: NO ASSISTANCE NEEDED

## 2022-01-04 NOTE — PROGRESS NOTES
"SUBJECTIVE:   Chelo Brooks is a 76 year old female  who presents for Preventive Visit.    Hypercholesterolemia well controlled with current treatment plan without side effects. Hypertension well controlled on current medications without side effects or chest pain. She has lower extremity edema and feels short of breath at times. Also presents for follow-up of GERD controlled with medication. Patient  has allergy symptoms such as runny nose and congestion.      Healthy Habits:    In general, how would you rate your overall health?  Fair    Frequency of exercise:  None    Do you usually eat at least 4 servings of fruit and vegetables a day, include whole grains    & fiber and avoid regularly eating high fat or \"junk\" foods?  No    Taking medications regularly:  Yes    Barriers to taking medications:  None    Medication side effects:  None    Ability to successfully perform activities of daily living:  No assistance needed    Home Safety:  No safety concerns identified    Hearing Impairment:  No hearing concerns    In the past 6 months, have you been bothered by leaking of urine?  No    In general, how would you rate your overall mental or emotional health?  Good      PHQ-2 Total Score:    Additional concerns today:  No    Do you feel safe in your environment? Yes    Have you ever done Advance Care Planning? (For example, a Health Directive, POLST, or a discussion with a medical provider or your loved ones about your wishes): No, advance care planning information given to patient to review.  Patient declined advance care planning discussion at this time.       Fall risk  Fallen 2 or more times in the past year?: No  Any fall with injury in the past year?: No    Cognitive Screening   1) Repeat 3 items (Leader, Season, Table)    2) Clock draw: NORMAL  3) 3 item recall: Recalls 3 objects  Results: 3 items recalled: COGNITIVE IMPAIRMENT LESS LIKELY    Mini-CogTM Copyright S Juan. Licensed by the author for use " in [facility]; reprinted with permission (socarolina@[facility]). All rights reserved.      Do you have sleep apnea, excessive snoring or daytime drowsiness?: yes    Reviewed and updated as needed this visit by clinical staff                Reviewed and updated as needed this visit by Provider               Social History     Tobacco Use     Smoking status: Never Smoker     Smokeless tobacco: Never Used   Substance Use Topics     Alcohol use: Yes     Alcohol/week: 0.0 standard drinks     Comment: very rare       If you drink alcohol do you typically have >3 drinks per day or >7 drinks per week? No    Alcohol Use 10/27/2020   Prescreen: >3 drinks/day or >7 drinks/week? No               Current providers sharing in care for this patient include:   Patient Care Team:  Vita Zavala MD as PCP - General (Family Practice)  Vita Zavala MD as Assigned PCP  Luciano Isabel MD as Assigned Neuroscience Provider  Александр Moreau MD as Assigned Nephrology Provider  Danielle Mcrae MD as MD (Nephrology)  Luis Amor OD as Assigned Surgical Provider    The following health maintenance items are reviewed in Epic and correct as of today:  Health Maintenance Due   Topic Date Due     ZOSTER IMMUNIZATION (1 of 2) Never done     COLORECTAL CANCER SCREENING  06/05/2013     ASTHMA CONTROL TEST  01/20/2021     ASTHMA ACTION PLAN  07/20/2021     BMP  08/06/2021     MICROALBUMIN  08/06/2021     MEDICARE ANNUAL WELLNESS VISIT  10/27/2021     LIPID  10/27/2021     FALL RISK ASSESSMENT  10/27/2021     PHQ-2  01/01/2022     HEMOGLOBIN  08/06/2021     DEXA  02/02/2022     Patient Active Problem List   Diagnosis     Reflux esophagitis     Dependent edema     Advanced directives, counseling/discussion     Allergic rhinitis due to animal dander     Health Care Home     Hyperlipidemia LDL goal <100     Renal hypertension     Morbid obesity due to excess calories (H)     Osteopenia     FLOR  (obstructive sleep apnea)- severe (AHI 89)     Vitamin D deficiency     Lumbar spinal stenosis     Combined forms of age-related cataract of both eyes     Choroidal nevus, left eye     S/P knee replacements     Eczema, unspecified type     Stasis dermatitis of both legs     CKD (chronic kidney disease) stage 3, GFR 30-59 ml/min (H)     Mild persistent asthma     Elevated parathyroid hormone     Gallstones     Adrenal nodule (H)     Thyroid nodule     Degenerative joint disease (DJD) of hip     Lumbar disc herniation     Umbilical hernia     Past Surgical History:   Procedure Laterality Date     IR PAROTID BIOPSY  5/21/2020     ZZC TOTAL KNEE ARTHROPLASTY  3/2000    right     ZZC TOTAL KNEE ARTHROPLASTY  6/8/12    left     ZZC VAGINAL HYSTERECTOMY  1977    benign, prolapse, ovaries remain        Social History     Tobacco Use     Smoking status: Never Smoker     Smokeless tobacco: Never Used   Substance Use Topics     Alcohol use: Yes     Alcohol/week: 0.0 standard drinks     Comment: very rare       Family History   Problem Relation Age of Onset     Circulatory Father         d. DVT     Heart Disease Father         pacer     Diabetes Mother      Hypertension Mother      Deep Vein Thrombosis Brother      Coronary Artery Disease Brother      Myocardial Infarction Brother      Deep Vein Thrombosis Brother      C.A.D. Paternal Uncle         MI      Heart Disease Brother 48        pacer      Diabetes Maternal Grandmother      Hypertension Maternal Grandmother      Diabetes Maternal Aunt      Hypertension Maternal Aunt      Cancer - colorectal Maternal Grandfather      C.A.D. Maternal Aunt         MI     Glaucoma No family hx of      Macular Degeneration No family hx of          Current Outpatient Medications   Medication Sig Dispense Refill     albuterol (PROAIR HFA/PROVENTIL HFA/VENTOLIN HFA) 108 (90 Base) MCG/ACT inhaler Inhale 1-2 puffs into the lungs every 4 hours as needed for shortness of breath / dyspnea 1 Inhaler  3     atorvastatin (LIPITOR) 40 MG tablet Take 1 tablet (40 mg) by mouth daily 90 tablet 3     Cholecalciferol (VITAMIN D) 2000 UNITS tablet Take 2,000 Units by mouth daily       Cyanocobalamin (VITAMIN B-12 PO) Take by mouth daily       famotidine (PEPCID) 40 MG tablet Take 1 tablet (40 mg) by mouth daily 90 tablet 3     fluticasone (FLONASE) 50 MCG/ACT nasal spray Spray 2 sprays into both nostrils daily 48 g 3     loratadine (CLARITIN) 10 MG tablet Take 1 tablet (10 mg) by mouth daily       mometasone (ELOCON) 0.1 % external cream Apply to affected area on legs twice daily as needed 50 g 0     olmesartan-hydrochlorothiazide (BENICAR HCT) 40-25 MG tablet Take 1 tablet by mouth daily 90 tablet 0     order for DME Equipment being ordered: Auto CPAP 11-18 1 Units 0     Allergies   Allergen Reactions     Adhesive Tape              Review of Systems   Constitutional: Positive for unexpected weight change. Negative for chills and fever.   HENT: Negative for congestion, ear pain, hearing loss and sore throat.    Eyes: Negative for pain and visual disturbance.   Respiratory: Positive for shortness of breath. Negative for cough.    Cardiovascular: Positive for peripheral edema. Negative for chest pain and palpitations.   Gastrointestinal: Negative for abdominal pain, constipation, diarrhea, heartburn, hematochezia and nausea.   Breasts:  Negative for tenderness, breast mass and discharge.   Genitourinary: Negative for dysuria, frequency, genital sores, hematuria, pelvic pain, urgency, vaginal bleeding and vaginal discharge.   Musculoskeletal: Positive for gait problem. Negative for arthralgias, joint swelling and myalgias.   Skin: Negative for rash.   Neurological: Negative for dizziness, weakness, headaches and paresthesias.   Psychiatric/Behavioral: Negative for mood changes. The patient is not nervous/anxious.          OBJECTIVE:   There were no vitals taken for this visit. Estimated body mass index is 52.16 kg/m  as  "calculated from the following:    Height as of 10/27/20: 1.702 m (5' 7\").    Weight as of 10/27/20: 151 kg (333 lb).  Physical Exam  GENERAL: alert, no distress, obese and elderly  EYES: Eyes grossly normal to inspection, PERRL and conjunctivae and sclerae normal  HENT: ear canals and TM's normal, nose and mouth without ulcers or lesions  NECK: no adenopathy, no asymmetry, masses, or scars and thyroid normal to palpation  RESP: lungs clear to auscultation - no rales, rhonchi or wheezes  CV: regular rates and rhythm, normal S1 S2, no S3 or S4, no murmur, click or rub and 2+ bilateral lower extremity woody edema to proximal calves     MS: no gross deformity; normal gait   SKIN: venous stasis changes of lower extremities without ulcers   NEURO: Normal strength and tone, mentation intact and speech normal  PSYCH: mentation appears normal, affect normal/bright    Diagnostic Test Results:  Labs reviewed in Epic    ASSESSMENT / PLAN:   (Z00.00) Encounter for Medicare annual wellness exam  (primary encounter diagnosis)    (E66.01) Morbid obesity due to excess calories (H)  Plan: OFFICE/OUTPT VISIT,EST,LEVL IV, Lipid panel         reflex to direct LDL Fasting, Comprehensive         metabolic panel (BMP + Alb, Alk Phos, ALT, AST,        Total. Bili, TP), Basic metabolic panel        Offered bariatric referral. Counseled to make better food choices, exercise as tolerated, and lose weight.     (E78.5) Hyperlipidemia LDL goal <100  Comment: Well controlled with medications without side effects.   Plan: atorvastatin (LIPITOR) 40 MG tablet,         OFFICE/OUTPT VISIT,EST,LEVL IV, Lipid panel         reflex to direct LDL Fasting, Comprehensive         metabolic panel (BMP + Alb, Alk Phos, ALT, AST,        Total. Bili, TP)          (N18.32) Stage 3b chronic kidney disease (H)  (I12.9) Renal hypertension  Plan: OFFICE/OUTPT VISIT,EST,LEVL IV, Comprehensive         metabolic panel (BMP + Alb, Alk Phos, ALT, AST,        Total. Bili, " TP), Basic metabolic panel,         olmesartan-hydrochlorothiazide (BENICAR HCT)         40-25 MG tablet        Follow-up with nephrology advised. Increase benicar hematocrit dose and follow-up for BMP and blood pressure in 2 to 4 weeks.     (R60.9) Dependent edema  Comment: venous insufficiency; rule out HF   Plan: OFFICE/OUTPT VISIT,EST,LEVL IV, Comprehensive         metabolic panel (BMP + Alb, Alk Phos, ALT, AST,        Total. Bili, TP), Echocardiogram Complete,         Basic metabolic panel        Follow-up pending results     (E27.8) Adrenal nodule (H)  Comment: likely benign   Plan: OFFICE/OUTPT VISIT,EST,LEVL IV        Follow     (K21.00) Gastroesophageal reflux disease with esophagitis, unspecified whether hemorrhage  Comment: Well controlled with medications without side effects.   Plan: famotidine (PEPCID) 40 MG tablet          (J30.81) Chronic allergic rhinitis due to animal hair and dander  Comment: Well controlled with medications without side effects.   Plan: fluticasone (FLONASE) 50 MCG/ACT nasal spray,         OFFICE/OUTPT VISIT,EST,LEVL IV          (M85.80) Osteopenia, unspecified location  (E55.9) Vitamin D deficiency  Plan: DX Hip/Pelvis/Spine          (Z12.31) Visit for screening mammogram  Plan: *MA Screening Digital Bilateral          (Z12.11) Special screening for malignant neoplasms, colon  Plan: Fecal colorectal cancer screen FIT           Patient has been advised of split billing requirements and indicates understanding: Yes  COUNSELING:  Reviewed preventive health counseling, as reflected in patient instructions  Special attention given to:       Regular exercise       Healthy diet/nutrition       Fall risk prevention       Osteoporosis prevention/bone health       Colon cancer screening       Hepatitis C screening       (Nicole)menopause management       The 10-year ASCVD risk score ([place] Springschasity BOBO Jr., et al., 2013) is: 23.5%    Values used to calculate the score:      Age: 76 years      Sex:  "Female      Is Non- : No      Diabetic: No      Tobacco smoker: No      Systolic Blood Pressure: 146 mmHg      Is BP treated: Yes      HDL Cholesterol: 58 mg/dL      Total Cholesterol: 165 mg/dL    Estimated body mass index is 52.16 kg/m  as calculated from the following:    Height as of 10/27/20: 1.702 m (5' 7\").    Weight as of 10/27/20: 151 kg (333 lb).    Weight management plan: Discussed healthy diet and exercise guidelines    She reports that she has never smoked. She has never used smokeless tobacco.      Appropriate preventive services were discussed with this patient, including applicable screening as appropriate for cardiovascular disease, diabetes, osteopenia/osteoporosis, and glaucoma.  As appropriate for age/gender, discussed screening for colorectal cancer, prostate cancer, breast cancer, and cervical cancer. Checklist reviewing preventive services available has been given to the patient.    Reviewed patients plan of care and provided an AVS. The Basic Care Plan (routine screening as documented in Health Maintenance) for Chelo meets the Care Plan requirement. This Care Plan has been established and reviewed with the Patient.    Counseling Resources:  ATP IV Guidelines  Pooled Cohorts Equation Calculator  Breast Cancer Risk Calculator  Breast Cancer: Medication to Reduce Risk  FRAX Risk Assessment  ICSI Preventive Guidelines  Dietary Guidelines for Americans, 2010  [employer]'s MyPlate  ASA Prophylaxis  Lung CA Screening    Vita Zavala MD  [facility]    Identified Health Risks:    The patient was provided with suggestions to help her develop a healthy physical lifestyle.  She is at risk for lack of exercise and has been provided with information to increase physical activity for the benefit of her well-being.  The patient was counseled and encouraged to consider modifying their diet and eating habits. She was provided with information on recommended healthy " diet options.

## 2022-01-04 NOTE — PATIENT INSTRUCTIONS
Call the imaging center at [phone] or  [phone] to schedule your echocardiogram test.    Did you know you can lower your blood pressure with your daily habits?    *Losing 20 pounds of weight lowers blood pressure 5 to 20 points.  *Eating a low-fat diet rich in fruits, vegetables and low-fat dairy lowers blood pressure 8 to 14 points.  *Eating a low-salt diet lowers blood pressure 2 to 8 points.  *Exercising regularly lowers blood pressure 4 to 9 points.  *Reducing alcohol use lowers blood pressure 2 to 4 points.    Get the shingles vaccine, called Shingrix (given as 2 shots, 2 to 6 months apart), even if you have already had the Zostavax vaccine. Discuss getting the Shingix vaccine from your pharmacist, or schedule an ancillary shot visit here. Some insurances do not cover the cost of these vaccines.      Please schedule a DEXA bone scan for osteoporosis testing at your convenience.  Call our appointment line at [phone] or go to [email or web address].    Vita Zavala MD        Patient Education   Personalized Prevention Plan  You are due for the preventive services outlined below.  Your care team is available to assist you in scheduling these services.  If you have already completed any of these items, please share that information with your care team to update in your medical record.  Health Maintenance Due   Topic Date Due     Zoster (Shingles) Vaccine (1 of 2) Never done     Colorectal Cancer Screening  06/05/2013     Asthma Control Test  01/20/2021     Asthma Action Plan - yearly  07/20/2021     Basic Metabolic Panel  08/06/2021     Kidney Microalbumin Urine Test  08/06/2021     Cholesterol Lab  10/27/2021     FALL RISK ASSESSMENT  10/27/2021     Hemoglobin  08/06/2021     Osteoporosis Screening  02/02/2022     Your Health Risk Assessment indicates you feel you are not in good health    A healthy lifestyle helps keep the body fit and the mind alert. It helps protect you from disease, helps you fight  disease, and helps prevent chronic disease (disease that doesn't go away) from getting worse. This is important as you get older and begin to notice twinges in muscles and joints and a decline in the strength and stamina you once took for granted. A healthy lifestyle includes good healthcare, good nutrition, weight control, recreation, and regular exercise. Avoid harmful substances and do what you can to keep safe. Another part of a healthy lifestyle is stay mentally active and socially involved.    Good healthcare     Have a wellness visit every year.     If you have new symptoms, let us know right away. Don't wait until the next checkup.     Take medicines exactly as prescribed and keep your medicines in a safe place. Tell us if your medicine causes problems.   Healthy diet and weight control     Eat 3 or 4 small, nutritious, low-fat, high-fiber meals a day. Include a variety of fruits, vegetables, and whole-grain foods.     Make sure you get enough calcium in your diet. Calcium, vitamin D, and exercise help prevent osteoporosis (bone thinning).     If you live alone, try eating with others when you can. That way you get a good meal and have company while you eat it.     Try to keep a healthy weight. If you eat more calories than your body uses for energy, it will be stored as fat and you will gain weight.     Recreation   Recreation is not limited to sports and team events. It includes any activity that provides relaxation, interest, enjoyment, and exercise. Recreation provides an outlet for physical, mental, and social energy. It can give a sense of worth and achievement. It can help you stay healthy.    Mental Exercise and Social Involvement  Mental and emotional health is as important as physical health. Keep in touch with friends and family. Stay as active as possible. Continue to learn and challenge yourself.   Things you can do to stay mentally active are:    Learn something new, like a foreign language or  musical instrument.     Play SCRABBLE or do crossword puzzles. If you cannot find people to play these games with you at home, you can play them with others on your computer through the Internet.     Join a games club--anything from card games to chess or checkers or lawn bowling.     Start a new hobby.     Go back to school.     Volunteer.     Read.   Keep up with world events.    Exercise for a Healthier Heart  You may wonder how you can improve the health of your heart. If you re thinking about exercise, you re on the right track. You don t need to become an athlete. But you do need a certain amount of brisk exercise to help strengthen your heart. If you have been diagnosed with a heart condition, your healthcare provider may advise exercise to help stabilize your condition. To help make exercise a habit, choose safe, fun activities.      Exercise with a friend. When activity is fun, you're more likely to stick with it.   Before you start  Check with your healthcare provider before starting an exercise program. This is especially important if you have not been active for a while. It's also important if you have a long-term (chronic) health problem such as heart disease, diabetes, or obesity. Or if you are at high risk for having these problems.   Why exercise?  Exercising regularly offers many healthy rewards. It can help you do all of the following:     Improve your blood cholesterol level to help prevent further heart trouble    Lower your blood pressure to help prevent a stroke or heart attack    Control diabetes, or reduce your risk of getting this disease    Improve your heart and lung function    Reach and stay at a healthy weight    Make your muscles stronger so you can stay active    Prevent falls and fractures by slowing the loss of bone mass (osteoporosis)    Manage stress better    Reduce your blood pressure    Improve your sense of self and your body image  Exercise tips      Ease into your routine.  Set small goals. Then build on them. If you are not sure what your activity level should be, talk with your healthcare provider first before starting an exercise routine.    Exercise on most days. Aim for a total of 150 minutes (2 hours and 30 minutes) or more of moderate-intensity aerobic activity each week. Or 75 minutes (1 hour and 15 minutes) or more of vigorous-intensity aerobic activity each week. Or try for a combination of both. Moderate activity means that you breathe heavier and your heart rate increases but you can still talk. Think about doing 40 minutes of moderate exercise, 3 to 4 times a week. For best results, activity should last for about 40 minutes to lower blood pressure and cholesterol. It's OK to work up to the 40-minute period over time. Examples of moderate-intensity activity are walking 1 mile in 15 minutes. Or doing 30 to 45 minutes of yard work.    Step up your daily activity level.  Along with your exercise program, try being more active the whole day. Walk instead of drive. Or park further away so that you take more steps each day. Do more household tasks or yard work. You may not be able to meet the advised mount of physical activity. But doing some moderate- or vigorous-intensity aerobic activity can help reduce your risk for heart disease. Your healthcare provider can help you figure out what is best for you.    Choose 1 or more activities you enjoy.  Walking is one of the easiest things you can do. You can also try swimming, riding a bike, dancing, or taking an exercise class.    When to call your healthcare provider  Call your healthcare provider if you have any of these:     Chest pain or feel dizzy or lightheaded    Burning, tightness, pressure, or heaviness in your chest, neck, shoulders, back, or arms    Abnormal shortness of breath    More joint or muscle pain    A very fast or irregular heartbeat (palpitations)  Priscila last reviewed this educational content on 7/1/2019     [id number] [employer]. All rights reserved. This information is not intended as a substitute for professional medical care. Always follow your healthcare professional's instructions.          Understanding USDA MyPlate  The USDA has guidelines to help you make healthy food choices. These are called MyPlate. MyPlate shows the food groups that make up healthy meals using the image of a place setting. Before you eat, think about the healthiest choices for what to put on your plate or in your cup or bowl. To learn more about building a healthy plate, visit www.choosemyplate.gov.    The food groups    Fruits. Any fruit or 100% fruit juice counts as part of the Fruit Group. Fruits may be fresh, canned, frozen, or dried, and may be whole, cut-up, or pureed. Make 1/2 of your plate fruits and vegetables.    Vegetables. Any vegetable or 100% vegetable juice counts as a member of the Vegetable Group. Vegetables may be fresh, frozen, canned, or dried. They can be served raw or cooked and may be whole, cut-up, or mashed. Make 1/2 of your plate fruits and vegetables.    Grains. All foods made from grains are part of the Grains Group. These include wheat, rice, oats, cornmeal, and barley. Grains are often used to make foods such as bread, pasta, oatmeal, cereal, tortillas, and grits. Grains should be no more than 1/4 of your plate. At least half of your grains should be whole grains.    Protein. This group includes meat, poultry, seafood, beans and peas, eggs, processed soy products (such as tofu), nuts (including nut butters), and seeds. Make protein choices no more than 1/4 of your plate. Meat and poultry choices should be lean or low fat.    Dairy. The Dairy Group includes all fluid milk products and foods made from milk that contain calcium, such as yogurt and cheese. (Foods that have little calcium, such as cream, butter, and cream cheese, are not part of this group.) Most dairy choices should be low-fat or  fat-free.    Oils. Oils aren't a food group, but they do contain essential nutrients. However it's important to watch your intake of oils. These are fats that are liquid at room temperature. They include canola, corn, olive, soybean, vegetable, and sunflower oil. Foods that are mainly oil include mayonnaise, certain salad dressings, and soft margarines. You likely already get your daily oil allowance from the foods you eat.  Things to limit  Eating healthy also means limiting these things in your diet:       Salt (sodium). Many processed foods have a lot of sodium. To keep sodium intake down, eat fresh vegetables, meats, poultry, and seafood when possible. Purchase low-sodium, reduced-sodium, or no-salt-added food products at the store. And don't add salt to your meals at home. Instead, season them with herbs and spices such as dill, oregano, cumin, and paprika. Or try adding flavor with lemon or lime zest and juice.    Saturated fat. Saturated fats are most often found in animal products such as beef, pork, and chicken. They are often solid at room temperature, such as butter. To reduce your saturated fat intake, choose leaner cuts of meat and poultry. And try healthier cooking methods such as grilling, broiling, roasting, or baking. For a simple lower-fat swap, use plain nonfat yogurt instead of mayonnaise when making potato salad or macaroni salad.    Added sugars. These are sugars added to foods. They are in foods such as ice cream, candy, soda, fruit drinks, sports drinks, energy drinks, cookies, pastries, jams, and syrups. Cut down on added sugars by sharing sweet treats with a family member or friend. You can also choose fruit for dessert, and drink water or other unsweetened beverages.     [employer] last reviewed this educational content on 6/1/2020 2000-2021 [employer]. All rights reserved. This information is not intended as a substitute for professional medical care. Always follow your  healthcare professional's instructions.

## 2022-01-04 NOTE — LETTER
My Asthma Action Plan    Name: Chelo Brooks   YOB: 1945  Date: 1/4/2022   My doctor: Vita Zavala MD   My clinic: [facility]        My Rescue Medicine:   Albuterol inhaler (Proair/Ventolin/Proventil HFA)  2-4 puffs EVERY 4 HOURS as needed. Use a spacer if recommended by your provider.   My Asthma Severity:   Intermittent / Exercise Induced  Know your asthma triggers: upper respiratory infections  allergies          GREEN ZONE   Good Control    I feel good    No cough or wheeze    Can work, sleep and play without asthma symptoms       Take your asthma control medicine every day.     1. If exercise triggers your asthma, take your rescue medication    15 minutes before exercise or sports, and    During exercise if you have asthma symptoms  2. Spacer to use with inhaler: If you have a spacer, make sure to use it with your inhaler             YELLOW ZONE Getting Worse  I have ANY of these:    I do not feel good    Cough or wheeze    Chest feels tight    Wake up at night   1. Keep taking your Green Zone medications  2. Start taking your rescue medicine:    every 20 minutes for up to 1 hour. Then every 4 hours for 24-48 hours.  3. If you stay in the Yellow Zone for more than 12-24 hours, contact your doctor.  4. If you do not return to the Green Zone in 12-24 hours or you get worse, start taking your oral steroid medicine if prescribed by your provider.           RED ZONE Medical Alert - Get Help  I have ANY of these:    I feel awful    Medicine is not helping    Breathing getting harder    Trouble walking or talking    Nose opens wide to breathe       1. Take your rescue medicine NOW  2. If your provider has prescribed an oral steroid medicine, start taking it NOW  3. Call your doctor NOW  4. If you are still in the Red Zone after 20 minutes and you have not reached your doctor:    Take your rescue medicine again and    Call 911 or go to the emergency room right away    See your  regular doctor within 2 weeks of an Emergency Room or Urgent Care visit for follow-up treatment.          Annual Reminders:  Meet with Asthma Educator,  Flu Shot in the Fall, consider Pneumonia Vaccination for patients with asthma (aged 19 and older).    Pharmacy:    [id number] - [place], [place] - [address] AT [facility] & [facility] [place] - [address]  [id number] - [place], [place]  [address] AT [facility] & [address]  [id number] - [place], [place] - [address] [facility] - [place], [place] [address] AT [facility] & [address]    Electronically signed by Vita Zavala MD   Date: 01/04/22                    Asthma Triggers  How To Control Things That Make Your Asthma Worse    Triggers are things that make your asthma worse.  Look at the list below to help you find your triggers and   what you can do about them. You can help prevent asthma flare-ups by staying away from your triggers.      Trigger                                                          What you can do   Cigarette Smoke  Tobacco smoke can make asthma worse. Do not allow smoking in your home, car or around you.  Be sure no one smokes at a child s day care or school.  If you smoke, ask your health care provider for ways to help you quit.  Ask family members to quit too.  Ask your health care provider for a referral to Quit Plan to help you quit smoking, or call 2-058-597-PLAN.     Colds, Flu, Bronchitis  These are common triggers of asthma. Wash your hands often.  Don t touch your eyes, nose or mouth.  Get a flu shot every year.     Dust Mites  These are tiny bugs that live in cloth or carpet. They are too small to see. Wash sheets and blankets in hot water every week.   Encase pillows and mattress in dust mite proof covers.  Avoid having carpet if you can. If you have carpet, vacuum weekly.   Use a dust mask and HEPA vacuum.   Pollen and Outdoor Mold  Some people  are allergic to trees, grass, or weed pollen, or molds. Try to keep your windows closed.  Limit time out doors when pollen count is high.   Ask you health care provider about taking medicine during allergy season.     Animal Dander  Some people are allergic to skin flakes, urine or saliva from pets with fur or feathers. Keep pets with fur or feathers out of your home.    If you can t keep the pet outdoors, then keep the pet out of your bedroom.  Keep the bedroom door closed.  Keep pets off cloth furniture and away from stuffed toys.     Mice, Rats, and Cockroaches  Some people are allergic to the waste from these pests.   Cover food and garbage.  Clean up spills and food crumbs.  Store grease in the refrigerator.   Keep food out of the bedroom.   Indoor Mold  This can be a trigger if your home has high moisture. Fix leaking faucets, pipes, or other sources of water.   Clean moldy surfaces.  Dehumidify basement if it is damp and smelly.   Smoke, Strong Odors, and Sprays  These can reduce air quality. Stay away from strong odors and sprays, such as perfume, powder, hair spray, paints, smoke incense, paint, cleaning products, candles and new carpet.   Exercise or Sports  Some people with asthma have this trigger. Be active!  Ask your doctor about taking medicine before sports or exercise to prevent symptoms.    Warm up for 5-10 minutes before and after sports or exercise.     Other Triggers of Asthma  Cold air:  Cover your nose and mouth with a scarf.  Sometimes laughing or crying can be a trigger.  Some medicines and food can trigger asthma.

## 2022-01-05 LAB — DEPRECATED CALCIDIOL+CALCIFEROL SERPL-MC: 26 UG/L (ref 20–75)

## 2022-01-05 ASSESSMENT — ASTHMA QUESTIONNAIRES: ACT_TOTALSCORE: 21

## 2022-02-01 ENCOUNTER — LAB (OUTPATIENT)
Dept: LAB | Facility: CLINIC | Age: 77
End: 2022-02-01

## 2022-02-01 ENCOUNTER — ALLIED HEALTH/NURSE VISIT (OUTPATIENT)
Dept: FAMILY MEDICINE | Facility: CLINIC | Age: 77
End: 2022-02-01
Payer: COMMERCIAL

## 2022-02-01 VITALS — DIASTOLIC BLOOD PRESSURE: 74 MMHG | SYSTOLIC BLOOD PRESSURE: 108 MMHG

## 2022-02-01 DIAGNOSIS — I12.9 RENAL HYPERTENSION: ICD-10-CM

## 2022-02-01 DIAGNOSIS — E66.01 MORBID OBESITY DUE TO EXCESS CALORIES (H): Chronic | ICD-10-CM

## 2022-02-01 DIAGNOSIS — R60.9 DEPENDENT EDEMA: ICD-10-CM

## 2022-02-01 DIAGNOSIS — Z01.30 BLOOD PRESSURE CHECK: Primary | ICD-10-CM

## 2022-02-01 DIAGNOSIS — N18.32 STAGE 3B CHRONIC KIDNEY DISEASE (H): ICD-10-CM

## 2022-02-01 LAB
ALBUMIN UR-MCNC: NEGATIVE MG/DL
ANION GAP SERPL CALCULATED.3IONS-SCNC: 6 MMOL/L (ref 3–14)
APPEARANCE UR: CLEAR
BILIRUB UR QL STRIP: NEGATIVE
BUN SERPL-MCNC: 32 MG/DL (ref 7–30)
CALCIUM SERPL-MCNC: 9.4 MG/DL (ref 8.5–10.1)
CHLORIDE BLD-SCNC: 106 MMOL/L (ref 94–109)
CO2 SERPL-SCNC: 28 MMOL/L (ref 20–32)
COLOR UR AUTO: YELLOW
CREAT SERPL-MCNC: 1.38 MG/DL (ref 0.52–1.04)
CREAT UR-MCNC: 103 MG/DL
GFR SERPL CREATININE-BSD FRML MDRD: 40 ML/MIN/1.73M2
GLUCOSE BLD-MCNC: 92 MG/DL (ref 70–99)
GLUCOSE UR STRIP-MCNC: NEGATIVE MG/DL
HGB UR QL STRIP: NEGATIVE
KETONES UR STRIP-MCNC: NEGATIVE MG/DL
LEUKOCYTE ESTERASE UR QL STRIP: NEGATIVE
NITRATE UR QL: NEGATIVE
PH UR STRIP: 5.5 [PH] (ref 5–7)
POTASSIUM BLD-SCNC: 4 MMOL/L (ref 3.4–5.3)
PROT UR-MCNC: 0.07 G/L
PROT/CREAT 24H UR: 0.07 G/G CR (ref 0–0.2)
RBC #/AREA URNS AUTO: NORMAL /HPF
SODIUM SERPL-SCNC: 140 MMOL/L (ref 133–144)
SP GR UR STRIP: 1.02 (ref 1–1.03)
UROBILINOGEN UR STRIP-ACNC: 0.2 E.U./DL
WBC #/AREA URNS AUTO: NORMAL /HPF

## 2022-02-01 PROCEDURE — 80048 BASIC METABOLIC PNL TOTAL CA: CPT

## 2022-02-01 PROCEDURE — 36415 COLL VENOUS BLD VENIPUNCTURE: CPT

## 2022-02-01 PROCEDURE — 81001 URINALYSIS AUTO W/SCOPE: CPT

## 2022-02-01 PROCEDURE — 84156 ASSAY OF PROTEIN URINE: CPT

## 2022-02-01 NOTE — PROGRESS NOTES
Chelo Brooks is a 76 year old patient who comes in today for a Blood Pressure check.  Initial BP:  /74 (BP Location: Other (Comment), Patient Position: Sitting, Cuff Size: Adult Large)      Data Unavailable  Disposition: results routed to provider    Elda Carroll. MA

## 2022-02-01 NOTE — RESULT ENCOUNTER NOTE
Your results are normal.  Your final test results are pending.  Please check your chart again within 2 - 3 days. You will receive further instruction when your full test result panel is complete.     Vita Zavala MD

## 2022-02-03 ENCOUNTER — ANCILLARY PROCEDURE (OUTPATIENT)
Dept: CARDIOLOGY | Facility: CLINIC | Age: 77
End: 2022-02-03
Attending: FAMILY MEDICINE
Payer: COMMERCIAL

## 2022-02-03 DIAGNOSIS — R60.9 DEPENDENT EDEMA: ICD-10-CM

## 2022-02-03 LAB — LVEF ECHO: NORMAL

## 2022-02-03 PROCEDURE — 93306 TTE W/DOPPLER COMPLETE: CPT | Performed by: STUDENT IN AN ORGANIZED HEALTH CARE EDUCATION/TRAINING PROGRAM

## 2022-02-04 ENCOUNTER — ANCILLARY PROCEDURE (OUTPATIENT)
Dept: MAMMOGRAPHY | Facility: CLINIC | Age: 77
End: 2022-02-04
Attending: FAMILY MEDICINE
Payer: COMMERCIAL

## 2022-02-04 ENCOUNTER — ANCILLARY PROCEDURE (OUTPATIENT)
Dept: BONE DENSITY | Facility: CLINIC | Age: 77
End: 2022-02-04
Attending: FAMILY MEDICINE
Payer: COMMERCIAL

## 2022-02-04 DIAGNOSIS — E55.9 VITAMIN D DEFICIENCY: ICD-10-CM

## 2022-02-04 DIAGNOSIS — M85.80 OSTEOPENIA, UNSPECIFIED LOCATION: ICD-10-CM

## 2022-02-04 DIAGNOSIS — N95.9 MENOPAUSAL AND PERIMENOPAUSAL DISORDER: ICD-10-CM

## 2022-02-04 DIAGNOSIS — Z12.31 VISIT FOR SCREENING MAMMOGRAM: ICD-10-CM

## 2022-02-04 PROBLEM — I77.810 ASCENDING AORTA DILATATION (H): Status: ACTIVE | Noted: 2022-02-04

## 2022-02-04 PROCEDURE — 77080 DXA BONE DENSITY AXIAL: CPT | Performed by: INTERNAL MEDICINE

## 2022-02-04 PROCEDURE — 77067 SCR MAMMO BI INCL CAD: CPT | Mod: TC | Performed by: RADIOLOGY

## 2022-02-04 NOTE — RESULT ENCOUNTER NOTE
Chelo Brooks    Echocardiogram shows basically normal structure and function of heart.  No heart failure    Sincerely,       SARAH SADLER M.D.  (For Dr Zavala)

## 2022-02-07 NOTE — RESULT ENCOUNTER NOTE
Chelo,    Your bone mass is mildly low, called osteopenia. This is stable over time. We can continue to follow this by repeating the DEXA test in 2 to 5 years.  Get several servings of dairy daily (or calcium 1000 - 1200 mg daily split into 2 doses), take vitamin D 1000 units daily over-the-counter, exercise regularly, and avoid falls.      Vita Zavala MD

## 2022-03-21 ENCOUNTER — TELEPHONE (OUTPATIENT)
Dept: NEPHROLOGY | Facility: CLINIC | Age: 77
End: 2022-03-21
Payer: COMMERCIAL

## 2022-03-21 NOTE — TELEPHONE ENCOUNTER
Health Call Center    Phone Message    May a detailed message be left on voicemail: yes     Reason for Call: Patient calling because she received a message to reschedule her July appt with Dr Mcrae. Patient already had to move her appt from 3/21 to 7/11 and writer now cannot get her in until October 2022. Patient is not happy about that. She wants to know if there is something that can be done sooner? Please reach out to patient asap. Thank you    Action Taken: Message routed to:  [facility] ([facility] Neph    Travel Screening: Not Applicable

## 2022-03-25 NOTE — TELEPHONE ENCOUNTER
Pt was andi for a phone visit.  3/26/22    Sent request in for labs. Will she needs new labs?  Last set was done Jan/Feb    Suzan Rose CMA

## 2022-03-29 ENCOUNTER — VIRTUAL VISIT (OUTPATIENT)
Dept: NEPHROLOGY | Facility: CLINIC | Age: 77
End: 2022-03-29
Attending: FAMILY MEDICINE
Payer: COMMERCIAL

## 2022-03-29 DIAGNOSIS — N18.32 STAGE 3B CHRONIC KIDNEY DISEASE (H): ICD-10-CM

## 2022-03-29 DIAGNOSIS — I77.810 ASCENDING AORTA DILATATION (H): ICD-10-CM

## 2022-03-29 DIAGNOSIS — N25.81 SECONDARY RENAL HYPERPARATHYROIDISM (H): ICD-10-CM

## 2022-03-29 DIAGNOSIS — I89.0 LYMPHEDEMA: Primary | ICD-10-CM

## 2022-03-29 PROBLEM — J96.01 ACUTE RESPIRATORY FAILURE WITH HYPOXIA (H): Status: ACTIVE | Noted: 2019-10-31

## 2022-03-29 PROBLEM — E87.70 VOLUME OVERLOAD: Status: ACTIVE | Noted: 2019-10-31

## 2022-03-29 PROBLEM — R60.0 BILATERAL LOWER EXTREMITY EDEMA: Status: ACTIVE | Noted: 2019-10-28

## 2022-03-29 PROBLEM — I82.4Z2 ACUTE DEEP VEIN THROMBOSIS (DVT) OF DISTAL END OF LEFT LOWER EXTREMITY (H): Status: ACTIVE | Noted: 2019-10-31

## 2022-03-29 PROCEDURE — 99215 OFFICE O/P EST HI 40 MIN: CPT | Mod: 95 | Performed by: INTERNAL MEDICINE

## 2022-03-29 NOTE — LETTER
3/29/2022     RE: Chelo Brooks  [address] [place] [address] [address]  [facility] [id number]     Dear Colleague,    Thank you for referring your patient, Chelo Brooks, to the [facility]. Please see a copy of my visit note below.    Nephrology Clinic    Telephone Visit  Total time: 19 minutes  Total time on day of visit: 40 minutes + in coordination of care and reviewing       Reason for visit:      HPI:  Cheol Brooks is a 77 year old female with pmh of HTN, FLOR, GERD, obesity, PE in 2019 who was referred by Dr. Zavala to nephrology due to elevated creatinine and elevated PTH. She was seen by Dr Moreau, last in 2020 and now transferring care to me to be in [place] location    The patient had eGFR around 60 ml/min for dating back to 2012 (and was in the 70s in the mid-2000s). It decreased to the high 40s to low 50s in 2019 and has now decreased to ~40 ml/min in the last few years.    She was advised to hydrate better by Dr Moreau  Her creatinine seems to have increased with addition of hydrochlorothiazide for her blood pressure a few years back.  It was increased to 25mg with olmesartan for BP and swelling control. She lost 11 lbs at that time.  She has significant lymphedema which seems to run in her family. A recent cardiac echo showed normal EF, low IVC/ >50% collapse suggesting relative hypovolemia.    For GERD, she former took Prilosec she states for years but switched to famotidine  She denies NSAIDs since she had her PE and was adivised to avoid.  She has had hematuria but no proteinuria in the past.    The patient previously had vit D deficiency but vitamin D has normalized. PTH has been elevated 130-160 since then. Corrected calcium and phos are normal.     Dr Zavala increased her olmesartan/ hydrochlorothiazide and she lost 11 lbs but ow likely has gained it back  She states she has a lot edema and her thumb imprints  on her ankles    Past Medical History:   Diagnosis Date     Acquired renal cyst of left kidney      Adrenal nodule (H)     left - needs yearly CT     Ascending aorta dilatation (H)      CKD (chronic kidney disease) stage 3, GFR 30-59 ml/min (H)      Degenerative joint disease (DJD) of hip      Diagnostic skin and sensitization tests 04/05/2012     RAST pos. only to cat mildly     DJD (degenerative joint disease) 10/21/2005     Eczema, unspecified type 04/05/2018     Elevated parathyroid hormone 06/18/2019     Gallstones      Hepatitis B childhood     resolved, patient is not a carrier      HTN (hypertension)      Hyperlipidemia 11/27/2012     Hypertension goal BP (blood pressure) < 140/90      Lumbar disc herniation      Lumbar spinal stenosis 07/17/2017     Mild persistent asthma without complication 05/30/2017     Morbid obesity due to excess calories (H) 11/23/2015    Surgical referral declined '16      FLOR (obstructive sleep apnea)      Osteopenia      Pulmonary emboli (H) 10/28/2019     Shingles 2014    scalp     Stasis dermatitis of both legs 04/05/2018     Thyroid nodule      Umbilical hernia      Vitamin D deficiency        Past Surgical History:   Procedure Laterality Date     IR PAROTID BIOPSY  5/21/2020     [facility] TOTAL KNEE ARTHROPLASTY  3/2000    right     [facility] TOTAL KNEE ARTHROPLASTY  6/8/12    left     [facility] VAGINAL HYSTERECTOMY  1977    benign, prolapse, ovaries remain         Family History   Problem Relation Age of Onset     Circulatory Father         d. DVT     Heart Disease Father         pacer     Diabetes Mother      Hypertension Mother      Deep Vein Thrombosis Brother      Coronary Artery Disease Brother      Myocardial Infarction Brother      Deep Vein Thrombosis Brother      C.A.D. Paternal Uncle         MI      Heart Disease Brother 48        pacer      Diabetes Maternal Grandmother      Hypertension Maternal Grandmother      Diabetes Maternal Aunt      Hypertension Maternal Aunt      Cancer -  colorectal Maternal Grandfather      ALLY. Maternal Aunt         MI     Glaucoma No family hx of      Macular Degeneration No family hx of    No family hx of kidney disease.  She does have family hx of HTN.    Social History     Tobacco Use     Smoking status: Never Smoker     Smokeless tobacco: Never Used   Substance Use Topics     Alcohol use: Yes     Alcohol/week: 0.0 standard drinks     Comment: very rare       Drug use: No         Medications:  Current Outpatient Medications   Medication Sig Dispense Refill     albuterol (PROAIR HFA/PROVENTIL HFA/VENTOLIN HFA) 108 (90 Base) MCG/ACT inhaler Inhale 1-2 puffs into the lungs every 4 hours as needed for shortness of breath / dyspnea 1 Inhaler 3     atorvastatin (LIPITOR) 40 MG tablet Take 1 tablet (40 mg) by mouth daily 90 tablet 3     Cholecalciferol (VITAMIN D) 2000 UNITS tablet Take 2,000 Units by mouth daily       Cyanocobalamin (VITAMIN B-12 PO) Take by mouth daily       famotidine (PEPCID) 40 MG tablet Take 1 tablet (40 mg) by mouth daily 90 tablet 3     fluticasone (FLONASE) 50 MCG/ACT nasal spray Spray 2 sprays into both nostrils daily 48 g 3     loratadine (CLARITIN) 10 MG tablet Take 1 tablet (10 mg) by mouth daily       mometasone (ELOCON) 0.1 % external cream Apply to affected area on legs twice daily as needed 50 g 0     olmesartan-hydrochlorothiazide (BENICAR HCT) 40-25 MG tablet Take 1 tablet by mouth daily 90 tablet 0     order for DME Equipment being ordered: Auto CPAP 11-18 1 Units 0           OBJECTIVE:  Exam:  General: alert, oriented, conversant  Speaks in full sentences without audible dyspnea or wheeze  Neuro: normal speech  Psych: conversant, normal affect and thought process      Labs reviewed.     Recent Labs   Lab Test 08/06/20  1017 07/20/20  1154    141   POTASSIUM 3.9 3.9   CHLORIDE 107 110*   CO2 30 24   ANIONGAP 5 7   GLC 89 109*   BUN 25 29   CR 1.32* 1.30*   HECTOR 8.4* 8.7       Lab Results   Component Value Date    WBC 4.0  08/06/2020     Lab Results   Component Value Date    RBC 4.26 08/06/2020     Lab Results   Component Value Date    HGB 12.3 08/06/2020     Lab Results   Component Value Date    HCT 37.3 08/06/2020     No components found for: MCT  Lab Results   Component Value Date    MCV 88 08/06/2020     Lab Results   Component Value Date    MCH 28.9 08/06/2020     Lab Results   Component Value Date    MCHC 33.0 08/06/2020     Lab Results   Component Value Date    RDW 13.9 08/06/2020     Lab Results   Component Value Date     08/06/2020       Color Urine (no units)   Date Value   02/01/2022 Yellow   08/06/2020 Yellow     Appearance Urine (no units)   Date Value   02/01/2022 Clear   08/06/2020 Clear     Glucose Urine (mg/dL)   Date Value   02/01/2022 Negative   08/06/2020 Negative     Bilirubin Urine (no units)   Date Value   02/01/2022 Negative   08/06/2020 Negative     Ketones Urine (mg/dL)   Date Value   02/01/2022 Negative   08/06/2020 Negative     Specific Gravity Urine (no units)   Date Value   02/01/2022 1.020   08/06/2020 >1.030     pH Urine   Date Value   02/01/2022 5.5   08/06/2020 5.0 pH     Protein Albumin Urine (mg/dL)   Date Value   02/01/2022 Negative   08/06/2020 Negative     Urobilinogen Urine   Date Value   02/01/2022 0.2 E.U./dL   08/06/2020 0.2 EU/dL     Nitrite Urine (no units)   Date Value   02/01/2022 Negative   08/06/2020 Negative     Leukocyte Esterase Urine (no units)   Date Value   02/01/2022 Negative   08/06/2020 Small (A)     UPCR 0.09 g/g    Interpretation Summary     Technically difficult study limited views obtained due to body habitus     Global and regional left ventricular function is normal with an EF of 55-60%.  The right ventricle is normal size.Global right ventricular function is  normal.  Proximal ascending aorta is mildly dilated measuring 4cm  IVC diameter <2.1 cm collapsing >50% with sniff suggests a normal RA pressure  of 3 mmHg.     This study was compared with the study from  2/2/17 the Aorta is dilated  measured at 4cm .  ______________________________________________________________________________  Left Ventricle  Global and regional left ventricular function is normal with an EF of 55-60%.  Left ventricular wall thickness is normal. Left ventricular size is normal.  Left ventricular diastolic function is normal. No regional wall motion  abnormalities are seen.     Right Ventricle  The right ventricle is normal size. Global right ventricular function is  normal.     Atria  Both atria appear normal.     Mitral Valve  The mitral valve is normal.     Aortic Valve  The aortic valve is tricuspid.     Tricuspid Valve  Trace tricuspid insufficiency is present. The peak velocity of the tricuspid  regurgitant jet is not obtainable.     Pulmonic Valve  The valve leaflets are not well visualized. On Doppler interrogation, there is  no significant stenosis or regurgitation.     Vessels  proximal ascending aorta is mildly dilated measuring 4cm. IVC diameter <2.1 cm  collapsing >50% with sniff suggests a normal RA pressure of 3 mmHg.     Pericardium  No pericardial effusion is present.     Compared to Previous Study  This study was compared with the study from 2/2/17 the Aorta is dilated  measured at 4cm     CT scan 2020:  FINDINGS: The exam is limited by low-dose technique.     Lower chest: Lung bases are clear.     Abdomen/pelvis: Limited evaluation of the abdominal organs due to lack  of IV contrast. There is approximately 1.2 cm left adrenal nodule with  low Hounsfield units on the unenhanced contrast measuring -8, likely  represent adrenal adenoma. No right adrenal nodule.     Cholelithiasis without CT evidence of acute cholecystitis. Mild fatty  infiltration of the pancreas. No radiodense kidney stones or  hydronephrosis in either kidney. There is 3.5 cm partially exophytic  hypoattenuating cystic focus arising from the anterior aspect of the  interpolar region of the left kidney (series 2  image 106), likely  represents renal cyst. No radiodense kidney stone is visualized.     No abnormally dilated bowel loops. No significant free fluid in the  abdomen or pelvis. No free peritoneal or portal venous gas.     Bones and soft tissue: No suspicious osseous lesion. Small  fat-containing umbilical hernia. No significant inguinal hernias.  Small soft tissue attenuation in the subcutaneous tissue of the right  lower quadrant anterior abdominal wall (series 2 image 177),  indeterminate, could be related to medication injection.                                                                      IMPRESSION: Exam is limited by low-dose technique and patient's body  habitus.  1. 1.2 cm left adrenal nodule with imaging characteristics most  consistent with lipid rich adenoma.  2. Cholelithiasis without CT evidence of acute cholecystitis.  3. 3.5 cm left renal cyst.  4. Small fat-containing umbilical hernia. No evidence of inguinal  hernias.  5. Approximately 1 cm soft tissue nodule in the subcutaneous tissue  right lower quadrant anterior abdominal wall, indeterminate, may be  related to medication injection.    ASSESSMENT/PLAN:  77 year old female with history of CKD stage 3b with eGFR 40-43ml/min, lymphedema, FLOR, GERD, obesity, PE in 2019, who presents for followup of CKD.     CKD (chronic kidney disease) stage 3, GFR 30-59 ml/min (H)  The patient has several possible causes of CKD, including her prior regular NSAID use and what appears to be chronically low fluid intake and hypertensive nephrosclerosis. Her Scr increased after starting hydrochlorothiazide which suggests hemodynamic effect/ ? Mild hypovolemia corroborated by echo showing small IVC.    -seen previously by Dr Moreau and advised to hydrate better- has not done that, will try  - may need to cut down on diuretic and try more PT/ lymphedema to manage her edema.  May need BID dosing of diuretics if swelling is worse despite lifestyle changes and  lymphedema clinic  - in person visit in next couple of months, she will weigh herself, get a BP cuff  - has had hematuria in the past, non proteinuric.    #Hypertension- on olmesartan / hydrochlorothiazide 25mg- has chronic swelling/ lymphedema  - BP low normal- may need to cut back on BP regimen     Hemoglobin- not anemic     Electrolytes, acid-base- stable    Secondary hyperparathyroidism due to CKD stage 3B  MDB-CKD  Patient with not unexpected PTH elevation to still acceptable levels () with eGFR below 45 now. Corrected calcium, phos, and vit D at goal      Danielle MD Thor   of Medicine  Department of Nephrology  [facility]

## 2022-03-29 NOTE — PROGRESS NOTES
Nephrology Clinic    Telephone Visit  Total time: 19 minutes  Total time on day of visit: 40 minutes + in coordination of care and reviewing       Reason for visit:      HPI:  Chelo Brooks is a 77 year old female with pmh of HTN, FLOR, GERD, obesity, PE in 2019 who was referred by Dr. Zavala to nephrology due to elevated creatinine and elevated PTH. She was seen by Dr Moreau, last in 2020 and now transferring care to me to be in [place] location    The patient had eGFR around 60 ml/min for dating back to 2012 (and was in the 70s in the mid-2000s). It decreased to the high 40s to low 50s in 2019 and has now decreased to ~40 ml/min in the last few years.    She was advised to hydrate better by Dr Moreau  Her creatinine seems to have increased with addition of hydrochlorothiazide for her blood pressure a few years back.  It was increased to 25mg with olmesartan for BP and swelling control. She lost 11 lbs at that time.  She has significant lymphedema which seems to run in her family. A recent cardiac echo showed normal EF, low IVC/ >50% collapse suggesting relative hypovolemia.    For GERD, she former took Prilosec she states for years but switched to famotidine  She denies NSAIDs since she had her PE and was adivised to avoid.  She has had hematuria but no proteinuria in the past.    The patient previously had vit D deficiency but vitamin D has normalized. PTH has been elevated 130-160 since then. Corrected calcium and phos are normal.     Dr Zavala increased her olmesartan/ hydrochlorothiazide and she lost 11 lbs but ow likely has gained it back  She states she has a lot edema and her thumb imprints on her ankles    Past Medical History:   Diagnosis Date     Acquired renal cyst of left kidney      Adrenal nodule (H)     left - needs yearly CT     Ascending aorta dilatation (H)      CKD (chronic kidney disease) stage 3, GFR 30-59 ml/min (H)      Degenerative joint disease (DJD) of hip      Diagnostic skin and  sensitization tests 04/05/2012     RAST pos. only to cat mildly     DJD (degenerative joint disease) 10/21/2005     Eczema, unspecified type 04/05/2018     Elevated parathyroid hormone 06/18/2019     Gallstones      Hepatitis B childhood     resolved, patient is not a carrier      HTN (hypertension)      Hyperlipidemia 11/27/2012     Hypertension goal BP (blood pressure) < 140/90      Lumbar disc herniation      Lumbar spinal stenosis 07/17/2017     Mild persistent asthma without complication 05/30/2017     Morbid obesity due to excess calories (H) 11/23/2015    Surgical referral declined '16      FLOR (obstructive sleep apnea)      Osteopenia      Pulmonary emboli (H) 10/28/2019     Shingles 2014    scalp     Stasis dermatitis of both legs 04/05/2018     Thyroid nodule      Umbilical hernia      Vitamin D deficiency        Past Surgical History:   Procedure Laterality Date     IR PAROTID BIOPSY  5/21/2020     Z TOTAL KNEE ARTHROPLASTY  3/2000    right     ZC TOTAL KNEE ARTHROPLASTY  6/8/12    left     ZC VAGINAL HYSTERECTOMY  1977    benign, prolapse, ovaries remain         Family History   Problem Relation Age of Onset     Circulatory Father         d. DVT     Heart Disease Father         pacer     Diabetes Mother      Hypertension Mother      Deep Vein Thrombosis Brother      Coronary Artery Disease Brother      Myocardial Infarction Brother      Deep Vein Thrombosis Brother      C.A.D. Paternal Uncle         MI      Heart Disease Brother 48        pacer      Diabetes Maternal Grandmother      Hypertension Maternal Grandmother      Diabetes Maternal Aunt      Hypertension Maternal Aunt      Cancer - colorectal Maternal Grandfather      C.A.D. Maternal Aunt         MI     Glaucoma No family hx of      Macular Degeneration No family hx of    No family hx of kidney disease.  She does have family hx of HTN.    Social History     Tobacco Use     Smoking status: Never Smoker     Smokeless tobacco: Never Used    Substance Use Topics     Alcohol use: Yes     Alcohol/week: 0.0 standard drinks     Comment: very rare       Drug use: No         Medications:  Current Outpatient Medications   Medication Sig Dispense Refill     albuterol (PROAIR HFA/PROVENTIL HFA/VENTOLIN HFA) 108 (90 Base) MCG/ACT inhaler Inhale 1-2 puffs into the lungs every 4 hours as needed for shortness of breath / dyspnea 1 Inhaler 3     atorvastatin (LIPITOR) 40 MG tablet Take 1 tablet (40 mg) by mouth daily 90 tablet 3     Cholecalciferol (VITAMIN D) 2000 UNITS tablet Take 2,000 Units by mouth daily       Cyanocobalamin (VITAMIN B-12 PO) Take by mouth daily       famotidine (PEPCID) 40 MG tablet Take 1 tablet (40 mg) by mouth daily 90 tablet 3     fluticasone (FLONASE) 50 MCG/ACT nasal spray Spray 2 sprays into both nostrils daily 48 g 3     loratadine (CLARITIN) 10 MG tablet Take 1 tablet (10 mg) by mouth daily       mometasone (ELOCON) 0.1 % external cream Apply to affected area on legs twice daily as needed 50 g 0     olmesartan-hydrochlorothiazide (BENICAR HCT) 40-25 MG tablet Take 1 tablet by mouth daily 90 tablet 0     order for DME Equipment being ordered: Auto CPAP 11-18 1 Units 0           OBJECTIVE:  Exam:  General: alert, oriented, conversant  Speaks in full sentences without audible dyspnea or wheeze  Neuro: normal speech  Psych: conversant, normal affect and thought process      Labs reviewed.     Recent Labs   Lab Test 08/06/20  1017 07/20/20  1154    141   POTASSIUM 3.9 3.9   CHLORIDE 107 110*   CO2 30 24   ANIONGAP 5 7   GLC 89 109*   BUN 25 29   CR 1.32* 1.30*   HECTOR 8.4* 8.7       Lab Results   Component Value Date    WBC 4.0 08/06/2020     Lab Results   Component Value Date    RBC 4.26 08/06/2020     Lab Results   Component Value Date    HGB 12.3 08/06/2020     Lab Results   Component Value Date    HCT 37.3 08/06/2020     No components found for: MCT  Lab Results   Component Value Date    MCV 88 08/06/2020     Lab Results    Component Value Date    MCH 28.9 08/06/2020     Lab Results   Component Value Date    MCHC 33.0 08/06/2020     Lab Results   Component Value Date    RDW 13.9 08/06/2020     Lab Results   Component Value Date     08/06/2020       Color Urine (no units)   Date Value   02/01/2022 Yellow   08/06/2020 Yellow     Appearance Urine (no units)   Date Value   02/01/2022 Clear   08/06/2020 Clear     Glucose Urine (mg/dL)   Date Value   02/01/2022 Negative   08/06/2020 Negative     Bilirubin Urine (no units)   Date Value   02/01/2022 Negative   08/06/2020 Negative     Ketones Urine (mg/dL)   Date Value   02/01/2022 Negative   08/06/2020 Negative     Specific Gravity Urine (no units)   Date Value   02/01/2022 1.020   08/06/2020 >1.030     pH Urine   Date Value   02/01/2022 5.5   08/06/2020 5.0 pH     Protein Albumin Urine (mg/dL)   Date Value   02/01/2022 Negative   08/06/2020 Negative     Urobilinogen Urine   Date Value   02/01/2022 0.2 E.U./dL   08/06/2020 0.2 EU/dL     Nitrite Urine (no units)   Date Value   02/01/2022 Negative   08/06/2020 Negative     Leukocyte Esterase Urine (no units)   Date Value   02/01/2022 Negative   08/06/2020 Small (A)     UPCR 0.09 g/g    Interpretation Summary     Technically difficult study limited views obtained due to body habitus     Global and regional left ventricular function is normal with an EF of 55-60%.  The right ventricle is normal size.Global right ventricular function is  normal.  Proximal ascending aorta is mildly dilated measuring 4cm  IVC diameter <2.1 cm collapsing >50% with sniff suggests a normal RA pressure  of 3 mmHg.     This study was compared with the study from 2/2/17 the Aorta is dilated  measured at 4cm .  ______________________________________________________________________________  Left Ventricle  Global and regional left ventricular function is normal with an EF of 55-60%.  Left ventricular wall thickness is normal. Left ventricular size is normal.  Left  ventricular diastolic function is normal. No regional wall motion  abnormalities are seen.     Right Ventricle  The right ventricle is normal size. Global right ventricular function is  normal.     Atria  Both atria appear normal.     Mitral Valve  The mitral valve is normal.     Aortic Valve  The aortic valve is tricuspid.     Tricuspid Valve  Trace tricuspid insufficiency is present. The peak velocity of the tricuspid  regurgitant jet is not obtainable.     Pulmonic Valve  The valve leaflets are not well visualized. On Doppler interrogation, there is  no significant stenosis or regurgitation.     Vessels  proximal ascending aorta is mildly dilated measuring 4cm. IVC diameter <2.1 cm  collapsing >50% with sniff suggests a normal RA pressure of 3 mmHg.     Pericardium  No pericardial effusion is present.     Compared to Previous Study  This study was compared with the study from 2/2/17 the Aorta is dilated  measured at 4cm     CT scan 2020:  FINDINGS: The exam is limited by low-dose technique.     Lower chest: Lung bases are clear.     Abdomen/pelvis: Limited evaluation of the abdominal organs due to lack  of IV contrast. There is approximately 1.2 cm left adrenal nodule with  low Hounsfield units on the unenhanced contrast measuring -8, likely  represent adrenal adenoma. No right adrenal nodule.     Cholelithiasis without CT evidence of acute cholecystitis. Mild fatty  infiltration of the pancreas. No radiodense kidney stones or  hydronephrosis in either kidney. There is 3.5 cm partially exophytic  hypoattenuating cystic focus arising from the anterior aspect of the  interpolar region of the left kidney (series 2 image 106), likely  represents renal cyst. No radiodense kidney stone is visualized.     No abnormally dilated bowel loops. No significant free fluid in the  abdomen or pelvis. No free peritoneal or portal venous gas.     Bones and soft tissue: No suspicious osseous lesion. Small  fat-containing umbilical  hernia. No significant inguinal hernias.  Small soft tissue attenuation in the subcutaneous tissue of the right  lower quadrant anterior abdominal wall (series 2 image 177),  indeterminate, could be related to medication injection.                                                                      IMPRESSION: Exam is limited by low-dose technique and patient's body  habitus.  1. 1.2 cm left adrenal nodule with imaging characteristics most  consistent with lipid rich adenoma.  2. Cholelithiasis without CT evidence of acute cholecystitis.  3. 3.5 cm left renal cyst.  4. Small fat-containing umbilical hernia. No evidence of inguinal  hernias.  5. Approximately 1 cm soft tissue nodule in the subcutaneous tissue  right lower quadrant anterior abdominal wall, indeterminate, may be  related to medication injection.    ASSESSMENT/PLAN:  77 year old female with history of CKD stage 3b with eGFR 40-43ml/min, lymphedema, FLOR, GERD, obesity, PE in 2019, who presents for followup of CKD.     CKD (chronic kidney disease) stage 3, GFR 30-59 ml/min (H)  The patient has several possible causes of CKD, including her prior regular NSAID use and what appears to be chronically low fluid intake and hypertensive nephrosclerosis. Her Scr increased after starting hydrochlorothiazide which suggests hemodynamic effect/ ? Mild hypovolemia corroborated by echo showing small IVC.    -seen previously by Dr Moreau and advised to hydrate better- has not done that, will try  - may need to cut down on diuretic and try more PT/ lymphedema to manage her edema.  May need BID dosing of diuretics if swelling is worse despite lifestyle changes and lymphedema clinic  - in person visit in next couple of months, she will weigh herself, get a BP cuff  - has had hematuria in the past, non proteinuric.    #Hypertension- on olmesartan / hydrochlorothiazide 25mg- has chronic swelling/ lymphedema  - BP low normal- may need to cut back on BP regimen      Hemoglobin- not anemic     Electrolytes, acid-base- stable    Secondary hyperparathyroidism due to CKD stage 3B  MDB-CKD  Patient with not unexpected PTH elevation to still acceptable levels () with eGFR below 45 now. Corrected calcium, phos, and vit D at goal      Danielle MD Thor   of Medicine  Department of Nephrology  [facility]

## 2022-03-29 NOTE — PATIENT INSTRUCTIONS
Eat healthy  Monitor salt intake  Elevate legs  Lymphedema clinic  Drink about a liter of water a day if possible

## 2022-04-12 ENCOUNTER — HOSPITAL ENCOUNTER (OUTPATIENT)
Dept: OCCUPATIONAL THERAPY | Facility: CLINIC | Age: 77
Setting detail: THERAPIES SERIES
Discharge: HOME | End: 2022-04-12
Attending: INTERNAL MEDICINE
Payer: COMMERCIAL

## 2022-04-12 PROCEDURE — 97165 OT EVAL LOW COMPLEX 30 MIN: CPT | Mod: GO

## 2022-04-12 PROCEDURE — 97140 MANUAL THERAPY 1/> REGIONS: CPT | Mod: GO

## 2022-04-14 NOTE — PROGRESS NOTES
Rehabilitation Services        OUTPATIENT OCCUPATIONAL THERAPY EDEMA EVALUATION  PLAN OF TREATMENT FOR OUTPATIENT REHABILITATION  (COMPLETE FOR INITIAL CLAIMS ONLY)  Patient's Last Name, First Name, M.ISamira  CeciliaChelo licona  DAYNE                           Provider s Name:   Marsha Tamez OT Medical Record No.  [id number]     Start of Care Date:  04/12/22   Onset Date:   (pt reports chronic edema with worsening over the past 2 years)   Type:  OT   Medical Diagnosis:  Lymphedema, CKD stage 3b   Therapy Diagnosis:  BLE lymphedema Visits from SOC:  1                                     __________________________________________________________________________________   Plan of Treatment/Functional Goals:    Manual lymph drainage, Gradient compression bandaging, Fit for compression garment, Exercises, Precautions to prevent infection / exacerbation, Education, Manual therapy, ADL training, Skin care / precautions, Soft tissue mobilization, Home management program development        GOALS  1. Goal description: Pt will demonstrate a decrease of at least 200 mL in BLE in order to improve skin integrity and safety with mobility       Target date: 07/11/22  2. Goal description: Pt will be fit compression garments for long term edema management and verbalize understanding of donning techniques, wear schedule and garment care.       Target date: 07/11/22  3. Goal description: Pt will verbalize understanding of long term edema management techniques including use of compression, exercise, elevation, skin care and manual lymph drainage massage to maintain edema reduction.       Target date: 07/11/22    Treatment Frequency: 2x/week   Treatment duration: 6 weeks    Marsha Tamez OT                                    I CERTIFY THE NEED FOR THESE SERVICES FURNISHED UNDER        THIS PLAN OF TREATMENT AND WHILE UNDER MY CARE      (Physician co-signature of this document indicates review and certification of the therapy plan).                   Certification date from: 04/12/22       Certification date to: 07/11/22           Referring physician: Dr Danielle Mcrae   Initial Assessment  See Epic Evaluation- Start of care: 04/12/22

## 2022-04-14 NOTE — PROGRESS NOTES
04/12/22 1300   Quick Adds   Quick Adds Certification   Rehab Discipline   Discipline OT   Type of Visit   Type of visit Initial Edema Evaluation       present No   General Information   Start of care 04/12/22   Referring physician Dr Danielle Mcrae   Orders Evaluate and treat as indicated   Order date 03/29/22   Medical diagnosis stage 3b chronic kidney disease   Onset of illness / date of surgery 04/12/22   Edema onset   (pt reports chronic edema with worsening over the past 2 years)   Affected body parts LLE;RLE   Edema etiology comments pt reports a family hx of lymphedema with her mother and brother, PMH includes CKD   Pertinent history of current problem (PT: include personal factors and/or comorbidities that impact the POC; OT: include additional occupational profile info) Pt is a 78 yo female referred to lymphedema therapy to address worsening BLE edema. PMH includes   Surgical / medical history reviewed Yes   Prior level of functional mobility indep   Prior treatment Elevation;Exercise;Diuretics   Community support Family / friend caregiver   Patient role / employment history Retired   Living environment Apartment / condo   Living environment comments pt lives alone   Fall Risk Screen   Fall screen completed by OT   Have you fallen 2 or more times in the past year? No   Have you fallen and had an injury in the past year? No   Is patient a fall risk? No   Abuse Screen (yes response referral indicated)   Feels Unsafe at Home or Work/School no   Feels Threatened by Someone no   Does Anyone Try to Keep You From Having Contact with Others or Doing Things Outside Your Home? no   Physical Signs of Abuse Present no   Subjective Report   Patient report of symptoms pt reports a worsening of BLE edema over the past two years stating her legs are currently the most swollen they have been   Patient / Family Goals   Patient / family goals statement manage LE edema   Cognitive Status   Orientation  Orientation to person, place and time   Edema Exam / Assessment   Skin condition Pitting;Non-pitting;Dryness;Intact   Skin condition comments BLE with skin intact, mild dry skin throughout. B shins with areas of discolored/red raised, firmer tissue. BLE with mild to moderate combo pitting/non pitting edema from MTP joints to knee crease, pt does not feel edema extends into the thighs.   Scar Yes   Location B knees from h/o TKA   Mobility flat and healed   Stemmer sign Negative   Ulceration No   Girth Measurements   Girth Measurements Refer to separate girth measurement flowsheet   Volume LE   Right LE (mL) 9814   Left LE (mL) 9866   LE volume comparison LLE volume greater than RLE volume   % difference .5   Range of Motion   ROM No deficits were identified   Strength   Strength No deficits were identified   Posture   Posture Normal   Activities of Daily Living   Activities of Daily Living unable to don compression stockings independently   Bed Mobility   Bed mobility indep   Transfers   Transfers indep   Planned Edema Interventions   Planned edema interventions Manual lymph drainage;Gradient compression bandaging;Fit for compression garment;Exercises;Precautions to prevent infection / exacerbation;Education;Manual therapy;ADL training;Skin care / precautions;Soft tissue mobilization;Home management program development   Clinical Impression   Criteria for skilled therapeutic intervention met Yes   Therapy diagnosis BLE lymphedema   Influenced by the following impairments / conditions Edema;Stage 2   Assessment of Occupational Performance 1-3 Performance Deficits   Identified Performance Deficits risk of skin break down, wounds and progression of edema if not addressed, edema affects safety with mobility and ADLs   Clinical Decision Making (Complexity) Low complexity   Treatment Frequency 2x/week   Treatment duration 6 weeks   Patient / family and/or staff in agreement with plan of care Yes   Risks and benefits of  therapy have been explained Yes   Clinical impression comments Pt is a 76 yo female referred to lymphedema therapy to address worsening BLE edema. Pt reports a long personal history and family history of lymphedema, but within the past 2 years she reports a steady worsening of swelling and skin changes in LEs. Pt has tried elevation and exercise and has been prescribed diuretics with no significant improvements. In the past 2 years she reported that she moved out of her house and into an apartment where she is much less physically active. Prior to Covid she was using the ellipitcal machine in her gym but has yet to get back in that routine. Pt lives alone and has not been able to apply compression stockings herself. She is able to apply lotion but only to the ankles. Pt would benefit from skilled treatment to reduce BLE edema using gradient compression bandaging and to fit for velcro compression garments for long term edema management. Pt is interested in trying stocking donners so she can be more independent with dressing.   Goals   Edema Eval Goals 1;2;3   Goal 1   Goal identifier Volume   Goal description Pt will demonstrate a decrease of at least 200 mL in BLE in order to improve skin integrity and safety with mobility   Goal Progress Initial measurements taken   Target date 07/11/22   Goal 2   Goal identifier Garments   Goal description Pt will be fit compression garments for long term edema management and verbalize understanding of donning techniques, wear schedule and garment care.   Goal Progress CLT educated pt on the recommendation for velcro compression garments, pt is able to reach her ankles to be able to secure all straps. Pt will need to trial donning aids for liners.   Target date 07/11/22   Goal 3   Goal identifier Home program   Goal description Pt will verbalize understanding of long term edema management techniques including use of compression, exercise, elevation, skin care and manual lymph  drainage massage to maintain edema reduction.   Goal Progress CLT educated pt on HEP for LE edema and to complete daily, will begin GCB next visit   Target date 07/11/22   Total Evaluation Time   OT Heladio, Low Complexity Minutes (78127) 15   Certification   Certification date from 04/12/22   Certification date to 07/11/22   Medical Diagnosis Lymphedema, CKD stage 3b   Certification I certify the need for these services furnished under this plan of treatment and while under my care.  (Physician co-signature of this document indicates review and certification of the therapy plan).

## 2022-04-15 DIAGNOSIS — I12.9 RENAL HYPERTENSION: ICD-10-CM

## 2022-04-15 DIAGNOSIS — N18.32 STAGE 3B CHRONIC KIDNEY DISEASE (H): ICD-10-CM

## 2022-04-16 NOTE — TELEPHONE ENCOUNTER
"Routing refill request to provider for review/approval because:  Labs out of range:  Cr.       Requested Prescriptions   Pending Prescriptions Disp Refills     olmesartan-hydrochlorothiazide (BENICAR HCT) 40-25 MG tablet [Pharmacy Med Name: OLMESARTAN MEDOX/HCTZ 40-25MG TAB] 90 tablet 0     Sig: TAKE 1 TABLET BY MOUTH DAILY       Angiotensin-II Receptors Failed - 4/15/2022  2:38 PM        Failed - Normal serum creatinine on file in past 12 months     Recent Labs   Lab Test 02/01/22  1036   CR 1.38*       Ok to refill medication if creatinine is low          Passed - Last blood pressure under 140/90 in past 12 months     BP Readings from Last 3 Encounters:   02/01/22 108/74   01/04/22 (!) 146/64   10/27/20 130/68                 Passed - Recent (12 mo) or future (30 days) visit within the authorizing provider's specialty     Patient has had an office visit with the authorizing provider or a provider within the authorizing providers department within the previous 12 mos or has a future within next 30 days. See \"Patient Info\" tab in inbasket, or \"Choose Columns\" in Meds & Orders section of the refill encounter.              Passed - Medication is active on med list        Passed - Patient is age 18 or older        Passed - No active pregnancy on record        Passed - Normal serum potassium on file in past 12 months     Recent Labs   Lab Test 02/01/22  1036   POTASSIUM 4.0                    Passed - No positive pregnancy test in past 12 months           Cielo Ellsworth RN    "

## 2022-04-18 RX ORDER — OLMESARTAN MEDOXOMIL AND HYDROCHLOROTHIAZIDE 40/25 40; 25 MG/1; MG/1
1 TABLET ORAL DAILY
Qty: 90 TABLET | Refills: 3 | Status: SHIPPED | OUTPATIENT
Start: 2022-04-18 | End: 2023-03-27

## 2022-04-25 ENCOUNTER — TELEPHONE (OUTPATIENT)
Dept: NEPHROLOGY | Facility: CLINIC | Age: 77
End: 2022-04-25
Payer: COMMERCIAL

## 2022-04-26 NOTE — TELEPHONE ENCOUNTER
Patient called back.  Patient will be leaving in the next 15 minutes and will be available after 11am.

## 2022-05-06 ENCOUNTER — HOSPITAL ENCOUNTER (OUTPATIENT)
Dept: OCCUPATIONAL THERAPY | Facility: CLINIC | Age: 77
Setting detail: THERAPIES SERIES
Discharge: HOME | End: 2022-05-06
Attending: INTERNAL MEDICINE
Payer: COMMERCIAL

## 2022-05-06 PROCEDURE — 97140 MANUAL THERAPY 1/> REGIONS: CPT | Mod: GO

## 2022-05-16 ENCOUNTER — VIRTUAL VISIT (OUTPATIENT)
Dept: NEPHROLOGY | Facility: CLINIC | Age: 77
End: 2022-05-16
Payer: COMMERCIAL

## 2022-05-16 DIAGNOSIS — N18.32 STAGE 3B CHRONIC KIDNEY DISEASE (H): Primary | ICD-10-CM

## 2022-05-16 PROCEDURE — 99214 OFFICE O/P EST MOD 30 MIN: CPT | Mod: 95 | Performed by: INTERNAL MEDICINE

## 2022-05-16 NOTE — PROGRESS NOTES
Nephrology Clinic    Chelo is a 77 year old who is being evaluated via a billable telephone visit.      What phone number would you like to be contacted at? home  How would you like to obtain your AVS? Radha      Phone call duration: 16 minutes  Total time spent: 25 minutes on day of service    Reason for visit:      HPI:  Chelo Brooks is a 77 year old female with pmh of HTN, FLOR, GERD, obesity, PE in 2019 who was referred by Dr. Zavala to nephrology due to elevated creatinine and elevated PTH. She was seen by Dr Moreau, last in 2020 and now transferring care to me to be in [place] location    The patient had eGFR around 60 ml/min for dating back to 2012 (and was in the 70s in the mid-2000s). It decreased to the high 40s to low 50s in 2019 and has now decreased to ~40 ml/min in the last few years.    She was advised to hydrate better by Dr Moreau  Her creatinine seems to have increased with addition of hydrochlorothiazide for her blood pressure a few years back.  It was increased to 25mg with olmesartan for BP and swelling control. She lost 11 lbs at that time.  She has significant lymphedema which seems to run in her family. A recent cardiac echo showed normal EF, low IVC/ >50% collapse suggesting relative hypovolemia.    For GERD, she former took Prilosec she states for years but switched to famotidine  She denies NSAIDs since she had her PE and was adivised to avoid.  She has had hematuria but no proteinuria in the past.    The patient previously had vit D deficiency but vitamin D has normalized. PTH has been elevated 130-160 since then. Corrected calcium and phos are normal.     Dr Zavala increased her olmesartan/ hydrochlorothiazide and she lost some weight but it has likely increased back.     She had her consultation with lymphedema, and the wraps were too restrictive, and she will not be able to tolerate this.  She has not monitored her weight, and she is not sure she can afford weight watchers or  some other program.  As far as swelling it is hard to say, but she is less active now that she moved into her new place, and does not have yard work - she helps her brother once a week and has a ride on .  She is in senior independent living.   She does fairly well with hydration with water.  She will try to drink out of a glass and will switch from coffee.  She denies lightheadedness , last BP in the office in February was 108. She feels like she is off balance at times, but does not think this is related to blood pressure    Past Medical History:   Diagnosis Date     Acquired renal cyst of left kidney      Adrenal nodule (H)     left - needs yearly CT     Ascending aorta dilatation (H)      CKD (chronic kidney disease) stage 3, GFR 30-59 ml/min (H)      Degenerative joint disease (DJD) of hip      Diagnostic skin and sensitization tests 04/05/2012     RAST pos. only to cat mildly     DJD (degenerative joint disease) 10/21/2005     Eczema, unspecified type 04/05/2018     Elevated parathyroid hormone 06/18/2019     Gallstones      Hepatitis B childhood     resolved, patient is not a carrier      HTN (hypertension)      Hyperlipidemia 11/27/2012     Hypertension goal BP (blood pressure) < 140/90      Lumbar disc herniation      Lumbar spinal stenosis 07/17/2017     Mild persistent asthma without complication 05/30/2017     Morbid obesity due to excess calories (H) 11/23/2015    Surgical referral declined '16      FLOR (obstructive sleep apnea)      Osteopenia      Pulmonary emboli (H) 10/28/2019     Shingles 2014    scalp     Stasis dermatitis of both legs 04/05/2018     Thyroid nodule      Umbilical hernia      Vitamin D deficiency        Past Surgical History:   Procedure Laterality Date     IR PAROTID BIOPSY  5/21/2020     ZZC TOTAL KNEE ARTHROPLASTY  3/2000    right     ZZC TOTAL KNEE ARTHROPLASTY  6/8/12    left     ZZC VAGINAL HYSTERECTOMY  1977    benign, prolapse, ovaries remain         Family History    Problem Relation Age of Onset     Circulatory Father         d. DVT     Heart Disease Father         pacer     Diabetes Mother      Hypertension Mother      Deep Vein Thrombosis Brother      Coronary Artery Disease Brother      Myocardial Infarction Brother      Deep Vein Thrombosis Brother      C.A.D. Paternal Uncle         MI      Heart Disease Brother 48        pacer      Diabetes Maternal Grandmother      Hypertension Maternal Grandmother      Diabetes Maternal Aunt      Hypertension Maternal Aunt      Cancer - colorectal Maternal Grandfather      C.A.D. Maternal Aunt         MI     Glaucoma No family hx of      Macular Degeneration No family hx of    No family hx of kidney disease.  She does have family hx of HTN.    Social History     Tobacco Use     Smoking status: Never Smoker     Smokeless tobacco: Never Used   Vaping Use     Vaping Use: Never used   Substance Use Topics     Alcohol use: Yes     Alcohol/week: 0.0 standard drinks     Comment: very rare       Drug use: No         Medications:  Current Outpatient Medications   Medication Sig Dispense Refill     albuterol (PROAIR HFA/PROVENTIL HFA/VENTOLIN HFA) 108 (90 Base) MCG/ACT inhaler Inhale 1-2 puffs into the lungs every 4 hours as needed for shortness of breath / dyspnea 1 Inhaler 3     atorvastatin (LIPITOR) 40 MG tablet Take 1 tablet (40 mg) by mouth daily 90 tablet 3     Cholecalciferol (VITAMIN D) 2000 UNITS tablet Take 2,000 Units by mouth daily       Cyanocobalamin (VITAMIN B-12 PO) Take by mouth daily       famotidine (PEPCID) 40 MG tablet Take 1 tablet (40 mg) by mouth daily 90 tablet 3     fluticasone (FLONASE) 50 MCG/ACT nasal spray Spray 2 sprays into both nostrils daily 48 g 3     loratadine (CLARITIN) 10 MG tablet Take 1 tablet (10 mg) by mouth daily       mometasone (ELOCON) 0.1 % external cream Apply to affected area on legs twice daily as needed 50 g 0     olmesartan-hydrochlorothiazide (BENICAR HCT) 40-25 MG tablet TAKE 1 TABLET BY  MOUTH DAILY 90 tablet 3     order for DME Equipment being ordered: Auto CPAP 11-18 1 Units 0           OBJECTIVE:  Exam:  General: alert, oriented, conversant  Speaks in full sentences without audible dyspnea or wheeze  Neuro: normal speech  Psych: conversant, normal affect and thought process      Labs reviewed.     Recent Labs   Lab Test 08/06/20  1017 07/20/20  1154    141   POTASSIUM 3.9 3.9   CHLORIDE 107 110*   CO2 30 24   ANIONGAP 5 7   GLC 89 109*   BUN 25 29   CR 1.32* 1.30*   HECTOR 8.4* 8.7       Lab Results   Component Value Date    WBC 4.0 08/06/2020     Lab Results   Component Value Date    RBC 4.26 08/06/2020     Lab Results   Component Value Date    HGB 12.3 08/06/2020     Lab Results   Component Value Date    HCT 37.3 08/06/2020     No components found for: MCT  Lab Results   Component Value Date    MCV 88 08/06/2020     Lab Results   Component Value Date    MCH 28.9 08/06/2020     Lab Results   Component Value Date    MCHC 33.0 08/06/2020     Lab Results   Component Value Date    RDW 13.9 08/06/2020     Lab Results   Component Value Date     08/06/2020       Color Urine (no units)   Date Value   02/01/2022 Yellow   08/06/2020 Yellow     Appearance Urine (no units)   Date Value   02/01/2022 Clear   08/06/2020 Clear     Glucose Urine (mg/dL)   Date Value   02/01/2022 Negative   08/06/2020 Negative     Bilirubin Urine (no units)   Date Value   02/01/2022 Negative   08/06/2020 Negative     Ketones Urine (mg/dL)   Date Value   02/01/2022 Negative   08/06/2020 Negative     Specific Gravity Urine (no units)   Date Value   02/01/2022 1.020   08/06/2020 >1.030     pH Urine   Date Value   02/01/2022 5.5   08/06/2020 5.0 pH     Protein Albumin Urine (mg/dL)   Date Value   02/01/2022 Negative   08/06/2020 Negative     Urobilinogen Urine   Date Value   02/01/2022 0.2 E.U./dL   08/06/2020 0.2 EU/dL     Nitrite Urine (no units)   Date Value   02/01/2022 Negative   08/06/2020 Negative     Leukocyte  Esterase Urine (no units)   Date Value   02/01/2022 Negative   08/06/2020 Small (A)     UPCR 0.09 g/g    Interpretation Summary     Technically difficult study limited views obtained due to body habitus     Global and regional left ventricular function is normal with an EF of 55-60%.  The right ventricle is normal size.Global right ventricular function is  normal.  Proximal ascending aorta is mildly dilated measuring 4cm  IVC diameter <2.1 cm collapsing >50% with sniff suggests a normal RA pressure  of 3 mmHg.     This study was compared with the study from 2/2/17 the Aorta is dilated  measured at 4cm .  ______________________________________________________________________________  Left Ventricle  Global and regional left ventricular function is normal with an EF of 55-60%.  Left ventricular wall thickness is normal. Left ventricular size is normal.  Left ventricular diastolic function is normal. No regional wall motion  abnormalities are seen.     Right Ventricle  The right ventricle is normal size. Global right ventricular function is  normal.     Atria  Both atria appear normal.     Mitral Valve  The mitral valve is normal.     Aortic Valve  The aortic valve is tricuspid.     Tricuspid Valve  Trace tricuspid insufficiency is present. The peak velocity of the tricuspid  regurgitant jet is not obtainable.     Pulmonic Valve  The valve leaflets are not well visualized. On Doppler interrogation, there is  no significant stenosis or regurgitation.     Vessels  proximal ascending aorta is mildly dilated measuring 4cm. IVC diameter <2.1 cm  collapsing >50% with sniff suggests a normal RA pressure of 3 mmHg.     Pericardium  No pericardial effusion is present.     Compared to Previous Study  This study was compared with the study from 2/2/17 the Aorta is dilated  measured at 4cm     CT scan 2020:  FINDINGS: The exam is limited by low-dose technique.     Lower chest: Lung bases are clear.     Abdomen/pelvis: Limited  evaluation of the abdominal organs due to lack  of IV contrast. There is approximately 1.2 cm left adrenal nodule with  low Hounsfield units on the unenhanced contrast measuring -8, likely  represent adrenal adenoma. No right adrenal nodule.     Cholelithiasis without CT evidence of acute cholecystitis. Mild fatty  infiltration of the pancreas. No radiodense kidney stones or  hydronephrosis in either kidney. There is 3.5 cm partially exophytic  hypoattenuating cystic focus arising from the anterior aspect of the  interpolar region of the left kidney (series 2 image 106), likely  represents renal cyst. No radiodense kidney stone is visualized.     No abnormally dilated bowel loops. No significant free fluid in the  abdomen or pelvis. No free peritoneal or portal venous gas.     Bones and soft tissue: No suspicious osseous lesion. Small  fat-containing umbilical hernia. No significant inguinal hernias.  Small soft tissue attenuation in the subcutaneous tissue of the right  lower quadrant anterior abdominal wall (series 2 image 177),  indeterminate, could be related to medication injection.                                                                      IMPRESSION: Exam is limited by low-dose technique and patient's body  habitus.  1. 1.2 cm left adrenal nodule with imaging characteristics most  consistent with lipid rich adenoma.  2. Cholelithiasis without CT evidence of acute cholecystitis.  3. 3.5 cm left renal cyst.  4. Small fat-containing umbilical hernia. No evidence of inguinal  hernias.  5. Approximately 1 cm soft tissue nodule in the subcutaneous tissue  right lower quadrant anterior abdominal wall, indeterminate, may be  related to medication injection.    ASSESSMENT/PLAN:  77 year old female with history of CKD stage 3b with eGFR 40-43ml/min, lymphedema, FLOR, GERD, obesity, PE in 2019, who presents for followup of CKD.     CKD (chronic kidney disease) stage 3, GFR 30-59 ml/min (H)  The patient has  several possible causes of CKD, including her prior regular NSAID use and what appears to be chronically low fluid intake and hypertensive nephrosclerosis. Her Scr increased after starting hydrochlorothiazide which suggests hemodynamic effect/ ? Mild hypovolemia corroborated by echo showing small IVC.    -seen previously by Dr Moreau and advised to hydrate better- has been trying to get at least one bottle in a day  - may need to cut down on diuretic and try more PT/ lymphedema to manage her edema.  May need BID dosing of diuretics if swelling is worse despite lifestyle changes and lymphedema clinic- her weight is up but hard to say if swelling or lymphedema  - in person visit in next couple of months, she will weigh herself, does not have BP cuff  - has had hematuria in the past, non proteinuric.    #Hypertension- on olmesartan / hydrochlorothiazide 25mg- has chronic swelling/ lymphedema  - BP low normal- may need to cut back on BP regimen - check orthostatics at next visit.    Hemoglobin- not anemic     Electrolytes, acid-base- stable    Secondary hyperparathyroidism due to CKD stage 3B  MDB-CKD  () with eGFR below 45 now. Corrected calcium, phos, and vit D at goal      Danielle MD Thor   of Medicine  Department of Nephrology  [facility]

## 2022-05-16 NOTE — LETTER
5/16/2022       RE: Chelo Brooks  [address] [place] [address] [address]  [facility] [address]     Dear Colleague,    Thank you for referring your patient, Chelo Brooks, to the [facility] at [facility]. Please see a copy of my visit note below.    Nephrology Clinic    Chelo is a 77 year old who is being evaluated via a billable telephone visit.      What phone number would you like to be contacted at? home  How would you like to obtain your AVS? MyChart      Phone call duration: 16 minutes  Total time spent: 25 minutes on day of service    Reason for visit:      HPI:  Chelo Brooks is a 77 year old female with pmh of HTN, FLOR, GERD, obesity, PE in 2019 who was referred by Dr. Zavala to nephrology due to elevated creatinine and elevated PTH. She was seen by Dr Moreau, last in 2020 and now transferring care to me to be in [place] location    The patient had eGFR around 60 ml/min for dating back to 2012 (and was in the 70s in the mid-2000s). It decreased to the high 40s to low 50s in 2019 and has now decreased to ~40 ml/min in the last few years.    She was advised to hydrate better by Dr Moreau  Her creatinine seems to have increased with addition of hydrochlorothiazide for her blood pressure a few years back.  It was increased to 25mg with olmesartan for BP and swelling control. She lost 11 lbs at that time.  She has significant lymphedema which seems to run in her family. A recent cardiac echo showed normal EF, low IVC/ >50% collapse suggesting relative hypovolemia.    For GERD, she former took Prilosec she states for years but switched to famotidine  She denies NSAIDs since she had her PE and was adivised to avoid.  She has had hematuria but no proteinuria in the past.    The patient previously had vit D deficiency but vitamin D has normalized. PTH has been elevated 130-160 since then. Corrected calcium and phos are normal.     Dr Zavala  increased her olmesartan/ hydrochlorothiazide and she lost some weight but it has likely increased back.     She had her consultation with lymphedema, and the wraps were too restrictive, and she will not be able to tolerate this.  She has not monitored her weight, and she is not sure she can afford weight watchers or some other program.  As far as swelling it is hard to say, but she is less active now that she moved into her new place, and does not have yard work - she helps her brother once a week and has a ride on .  She is in senior independent living.   She does fairly well with hydration with water.  She will try to drink out of a glass and will switch from coffee.  She denies lightheadedness , last BP in the office in February was 108. She feels like she is off balance at times, but does not think this is related to blood pressure    Past Medical History:   Diagnosis Date     Acquired renal cyst of left kidney      Adrenal nodule (H)     left - needs yearly CT     Ascending aorta dilatation (H)      CKD (chronic kidney disease) stage 3, GFR 30-59 ml/min (H)      Degenerative joint disease (DJD) of hip      Diagnostic skin and sensitization tests 04/05/2012     RAST pos. only to cat mildly     DJD (degenerative joint disease) 10/21/2005     Eczema, unspecified type 04/05/2018     Elevated parathyroid hormone 06/18/2019     Gallstones      Hepatitis B childhood     resolved, patient is not a carrier      HTN (hypertension)      Hyperlipidemia 11/27/2012     Hypertension goal BP (blood pressure) < 140/90      Lumbar disc herniation      Lumbar spinal stenosis 07/17/2017     Mild persistent asthma without complication 05/30/2017     Morbid obesity due to excess calories (H) 11/23/2015    Surgical referral declined '16      FLOR (obstructive sleep apnea)      Osteopenia      Pulmonary emboli (H) 10/28/2019     Shingles 2014    scalp     Stasis dermatitis of both legs 04/05/2018     Thyroid nodule      Umbilical  hernia      Vitamin D deficiency        Past Surgical History:   Procedure Laterality Date     IR PAROTID BIOPSY  5/21/2020     Z TOTAL KNEE ARTHROPLASTY  3/2000    right     ZZC TOTAL KNEE ARTHROPLASTY  6/8/12    left     ZZC VAGINAL HYSTERECTOMY  1977    benign, prolapse, ovaries remain         Family History   Problem Relation Age of Onset     Circulatory Father         d. DVT     Heart Disease Father         pacer     Diabetes Mother      Hypertension Mother      Deep Vein Thrombosis Brother      Coronary Artery Disease Brother      Myocardial Infarction Brother      Deep Vein Thrombosis Brother      C.A.D. Paternal Uncle         MI      Heart Disease Brother 48        pacer      Diabetes Maternal Grandmother      Hypertension Maternal Grandmother      Diabetes Maternal Aunt      Hypertension Maternal Aunt      Cancer - colorectal Maternal Grandfather      C.A.D. Maternal Aunt         MI     Glaucoma No family hx of      Macular Degeneration No family hx of    No family hx of kidney disease.  She does have family hx of HTN.    Social History     Tobacco Use     Smoking status: Never Smoker     Smokeless tobacco: Never Used   Vaping Use     Vaping Use: Never used   Substance Use Topics     Alcohol use: Yes     Alcohol/week: 0.0 standard drinks     Comment: very rare       Drug use: No         Medications:  Current Outpatient Medications   Medication Sig Dispense Refill     albuterol (PROAIR HFA/PROVENTIL HFA/VENTOLIN HFA) 108 (90 Base) MCG/ACT inhaler Inhale 1-2 puffs into the lungs every 4 hours as needed for shortness of breath / dyspnea 1 Inhaler 3     atorvastatin (LIPITOR) 40 MG tablet Take 1 tablet (40 mg) by mouth daily 90 tablet 3     Cholecalciferol (VITAMIN D) 2000 UNITS tablet Take 2,000 Units by mouth daily       Cyanocobalamin (VITAMIN B-12 PO) Take by mouth daily       famotidine (PEPCID) 40 MG tablet Take 1 tablet (40 mg) by mouth daily 90 tablet 3     fluticasone (FLONASE) 50 MCG/ACT nasal spray  Spray 2 sprays into both nostrils daily 48 g 3     loratadine (CLARITIN) 10 MG tablet Take 1 tablet (10 mg) by mouth daily       mometasone (ELOCON) 0.1 % external cream Apply to affected area on legs twice daily as needed 50 g 0     olmesartan-hydrochlorothiazide (BENICAR HCT) 40-25 MG tablet TAKE 1 TABLET BY MOUTH DAILY 90 tablet 3     order for DME Equipment being ordered: Auto CPAP 11-18 1 Units 0           OBJECTIVE:  Exam:  General: alert, oriented, conversant  Speaks in full sentences without audible dyspnea or wheeze  Neuro: normal speech  Psych: conversant, normal affect and thought process      Labs reviewed.     Recent Labs   Lab Test 08/06/20  1017 07/20/20  1154    141   POTASSIUM 3.9 3.9   CHLORIDE 107 110*   CO2 30 24   ANIONGAP 5 7   GLC 89 109*   BUN 25 29   CR 1.32* 1.30*   HECTOR 8.4* 8.7       Lab Results   Component Value Date    WBC 4.0 08/06/2020     Lab Results   Component Value Date    RBC 4.26 08/06/2020     Lab Results   Component Value Date    HGB 12.3 08/06/2020     Lab Results   Component Value Date    HCT 37.3 08/06/2020     No components found for: MCT  Lab Results   Component Value Date    MCV 88 08/06/2020     Lab Results   Component Value Date    MCH 28.9 08/06/2020     Lab Results   Component Value Date    MCHC 33.0 08/06/2020     Lab Results   Component Value Date    RDW 13.9 08/06/2020     Lab Results   Component Value Date     08/06/2020       Color Urine (no units)   Date Value   02/01/2022 Yellow   08/06/2020 Yellow     Appearance Urine (no units)   Date Value   02/01/2022 Clear   08/06/2020 Clear     Glucose Urine (mg/dL)   Date Value   02/01/2022 Negative   08/06/2020 Negative     Bilirubin Urine (no units)   Date Value   02/01/2022 Negative   08/06/2020 Negative     Ketones Urine (mg/dL)   Date Value   02/01/2022 Negative   08/06/2020 Negative     Specific Gravity Urine (no units)   Date Value   02/01/2022 1.020   08/06/2020 >1.030     pH Urine   Date Value    02/01/2022 5.5   08/06/2020 5.0 pH     Protein Albumin Urine (mg/dL)   Date Value   02/01/2022 Negative   08/06/2020 Negative     Urobilinogen Urine   Date Value   02/01/2022 0.2 E.U./dL   08/06/2020 0.2 EU/dL     Nitrite Urine (no units)   Date Value   02/01/2022 Negative   08/06/2020 Negative     Leukocyte Esterase Urine (no units)   Date Value   02/01/2022 Negative   08/06/2020 Small (A)     UPCR 0.09 g/g    Interpretation Summary     Technically difficult study limited views obtained due to body habitus     Global and regional left ventricular function is normal with an EF of 55-60%.  The right ventricle is normal size.Global right ventricular function is  normal.  Proximal ascending aorta is mildly dilated measuring 4cm  IVC diameter <2.1 cm collapsing >50% with sniff suggests a normal RA pressure  of 3 mmHg.     This study was compared with the study from 2/2/17 the Aorta is dilated  measured at 4cm .  ______________________________________________________________________________  Left Ventricle  Global and regional left ventricular function is normal with an EF of 55-60%.  Left ventricular wall thickness is normal. Left ventricular size is normal.  Left ventricular diastolic function is normal. No regional wall motion  abnormalities are seen.     Right Ventricle  The right ventricle is normal size. Global right ventricular function is  normal.     Atria  Both atria appear normal.     Mitral Valve  The mitral valve is normal.     Aortic Valve  The aortic valve is tricuspid.     Tricuspid Valve  Trace tricuspid insufficiency is present. The peak velocity of the tricuspid  regurgitant jet is not obtainable.     Pulmonic Valve  The valve leaflets are not well visualized. On Doppler interrogation, there is  no significant stenosis or regurgitation.     Vessels  proximal ascending aorta is mildly dilated measuring 4cm. IVC diameter <2.1 cm  collapsing >50% with sniff suggests a normal RA pressure of 3 mmHg.      Pericardium  No pericardial effusion is present.     Compared to Previous Study  This study was compared with the study from 2/2/17 the Aorta is dilated  measured at 4cm     CT scan 2020:  FINDINGS: The exam is limited by low-dose technique.     Lower chest: Lung bases are clear.     Abdomen/pelvis: Limited evaluation of the abdominal organs due to lack  of IV contrast. There is approximately 1.2 cm left adrenal nodule with  low Hounsfield units on the unenhanced contrast measuring -8, likely  represent adrenal adenoma. No right adrenal nodule.     Cholelithiasis without CT evidence of acute cholecystitis. Mild fatty  infiltration of the pancreas. No radiodense kidney stones or  hydronephrosis in either kidney. There is 3.5 cm partially exophytic  hypoattenuating cystic focus arising from the anterior aspect of the  interpolar region of the left kidney (series 2 image 106), likely  represents renal cyst. No radiodense kidney stone is visualized.     No abnormally dilated bowel loops. No significant free fluid in the  abdomen or pelvis. No free peritoneal or portal venous gas.     Bones and soft tissue: No suspicious osseous lesion. Small  fat-containing umbilical hernia. No significant inguinal hernias.  Small soft tissue attenuation in the subcutaneous tissue of the right  lower quadrant anterior abdominal wall (series 2 image 177),  indeterminate, could be related to medication injection.                                                                      IMPRESSION: Exam is limited by low-dose technique and patient's body  habitus.  1. 1.2 cm left adrenal nodule with imaging characteristics most  consistent with lipid rich adenoma.  2. Cholelithiasis without CT evidence of acute cholecystitis.  3. 3.5 cm left renal cyst.  4. Small fat-containing umbilical hernia. No evidence of inguinal  hernias.  5. Approximately 1 cm soft tissue nodule in the subcutaneous tissue  right lower quadrant anterior abdominal wall,  indeterminate, may be  related to medication injection.    ASSESSMENT/PLAN:  77 year old female with history of CKD stage 3b with eGFR 40-43ml/min, lymphedema, FLOR, GERD, obesity, PE in 2019, who presents for followup of CKD.     CKD (chronic kidney disease) stage 3, GFR 30-59 ml/min (H)  The patient has several possible causes of CKD, including her prior regular NSAID use and what appears to be chronically low fluid intake and hypertensive nephrosclerosis. Her Scr increased after starting hydrochlorothiazide which suggests hemodynamic effect/ ? Mild hypovolemia corroborated by echo showing small IVC.    -seen previously by Dr Moreau and advised to hydrate better- has been trying to get at least one bottle in a day  - may need to cut down on diuretic and try more PT/ lymphedema to manage her edema.  May need BID dosing of diuretics if swelling is worse despite lifestyle changes and lymphedema clinic- her weight is up but hard to say if swelling or lymphedema  - in person visit in next couple of months, she will weigh herself, does not have BP cuff  - has had hematuria in the past, non proteinuric.    #Hypertension- on olmesartan / hydrochlorothiazide 25mg- has chronic swelling/ lymphedema  - BP low normal- may need to cut back on BP regimen - check orthostatics at next visit.    Hemoglobin- not anemic     Electrolytes, acid-base- stable    Secondary hyperparathyroidism due to CKD stage 3B  MDB-CKD  () with eGFR below 45 now. Corrected calcium, phos, and vit D at goal      Danielle MD Thor   of Medicine  Department of Nephrology  [facility]

## 2022-05-24 ENCOUNTER — HOSPITAL ENCOUNTER (OUTPATIENT)
Dept: OCCUPATIONAL THERAPY | Facility: CLINIC | Age: 77
Setting detail: THERAPIES SERIES
Discharge: HOME | End: 2022-05-24
Attending: INTERNAL MEDICINE
Payer: COMMERCIAL

## 2022-05-24 PROCEDURE — 97140 MANUAL THERAPY 1/> REGIONS: CPT | Mod: GO

## 2022-07-12 ENCOUNTER — LAB (OUTPATIENT)
Dept: LAB | Facility: CLINIC | Age: 77
End: 2022-07-12
Payer: COMMERCIAL

## 2022-07-12 DIAGNOSIS — N18.32 STAGE 3B CHRONIC KIDNEY DISEASE (H): ICD-10-CM

## 2022-07-12 LAB
ALBUMIN SERPL-MCNC: 3.5 G/DL (ref 3.4–5)
ANION GAP SERPL CALCULATED.3IONS-SCNC: 4 MMOL/L (ref 3–14)
BUN SERPL-MCNC: 38 MG/DL (ref 7–30)
CALCIUM SERPL-MCNC: 8.9 MG/DL (ref 8.5–10.1)
CHLORIDE BLD-SCNC: 108 MMOL/L (ref 94–109)
CO2 SERPL-SCNC: 29 MMOL/L (ref 20–32)
CREAT SERPL-MCNC: 1.35 MG/DL (ref 0.52–1.04)
GFR SERPL CREATININE-BSD FRML MDRD: 41 ML/MIN/1.73M2
GLUCOSE BLD-MCNC: 105 MG/DL (ref 70–99)
HGB BLD-MCNC: 11.7 G/DL (ref 11.7–15.7)
PHOSPHATE SERPL-MCNC: 3.5 MG/DL (ref 2.5–4.5)
POTASSIUM BLD-SCNC: 4.2 MMOL/L (ref 3.4–5.3)
PTH-INTACT SERPL-MCNC: 117 PG/ML (ref 15–65)
SODIUM SERPL-SCNC: 141 MMOL/L (ref 133–144)

## 2022-07-12 PROCEDURE — 85018 HEMOGLOBIN: CPT

## 2022-07-12 PROCEDURE — 80069 RENAL FUNCTION PANEL: CPT

## 2022-07-12 PROCEDURE — 83970 ASSAY OF PARATHORMONE: CPT

## 2022-07-12 PROCEDURE — 36415 COLL VENOUS BLD VENIPUNCTURE: CPT

## 2022-07-12 PROCEDURE — 82306 VITAMIN D 25 HYDROXY: CPT

## 2022-07-13 LAB — DEPRECATED CALCIDIOL+CALCIFEROL SERPL-MC: 31 UG/L (ref 20–75)

## 2022-08-09 ENCOUNTER — ANCILLARY PROCEDURE (OUTPATIENT)
Dept: ULTRASOUND IMAGING | Facility: CLINIC | Age: 77
End: 2022-08-09
Attending: SURGERY
Payer: COMMERCIAL

## 2022-08-09 DIAGNOSIS — E04.1 THYROID NODULE: ICD-10-CM

## 2022-08-09 PROCEDURE — 76536 US EXAM OF HEAD AND NECK: CPT | Mod: TC | Performed by: RADIOLOGY

## 2022-09-08 DIAGNOSIS — E04.1 THYROID NODULE: Primary | ICD-10-CM

## 2022-10-16 ENCOUNTER — HEALTH MAINTENANCE LETTER (OUTPATIENT)
Age: 77
End: 2022-10-16

## 2022-10-26 DIAGNOSIS — G47.33 OBSTRUCTIVE SLEEP APNEA (ADULT) (PEDIATRIC): Primary | ICD-10-CM

## 2022-12-12 NOTE — TELEPHONE ENCOUNTER
"Routing refill request to provider for review/approval because:  Labs not current:      Requested Prescriptions   Pending Prescriptions Disp Refills     olmesartan-hydrochlorothiazide (BENICAR) 20-12.5 MG tablet [Pharmacy Med Name: OLMESARTAN MEDOX/HCTZ 20-12.5MG TAB]  Last Written Prescription Date:  4/8/19  Last Fill Quantity: 30,  # refills: 0   Last office visit: 6/14/2018 with prescribing provider:     Future Office Visit:   Next 5 appointments (look out 90 days)    Jun 18, 2019  9:40 AM CDT  PHYSICAL with Vita Zavala MD  [facility] [address] [facility] [id number] [phone]        30 tablet 0     Sig: TAKE 1 TABLET BY MOUTH DAILY       Angiotensin-II Receptors Failed - 5/28/2019  7:58 AM        Failed - Normal serum creatinine on file in past 12 months     Recent Labs   Lab Test 04/05/18  1125   CR 0.94             Failed - Normal serum potassium on file in past 12 months     Recent Labs   Lab Test 04/05/18  1125   POTASSIUM 4.3                    Passed - Blood pressure under 140/90 in past 12 months     BP Readings from Last 3 Encounters:   06/14/18 136/88   04/05/18 (!) 160/96   02/13/18 (!) 160/97                 Passed - Recent (12 mo) or future (30 days) visit within the authorizing provider's specialty     Patient had office visit in the last 12 months or has a visit in the next 30 days with authorizing provider or within the authorizing provider's specialty.  See \"Patient Info\" tab in inbasket, or \"Choose Columns\" in Meds & Orders section of the refill encounter.              Passed - Medication is active on med list        Passed - Patient is age 18 or older        Passed - No active pregnancy on record        Passed - No positive pregnancy test in past 12 months        Meghan Devine RN - BC      " Trilobed Flap Text: The defect edges were debeveled with a #15 scalpel blade.  Given the location of the defect and the proximity to free margins a trilobed flap was deemed most appropriate.  Using a sterile surgical marker, an appropriate trilobed flap drawn around the defect.    The area thus outlined was incised deep to adipose tissue with a #15 scalpel blade.  The skin margins were undermined to an appropriate distance in all directions utilizing iris scissors.

## 2023-02-08 ENCOUNTER — OFFICE VISIT (OUTPATIENT)
Dept: OPTOMETRY | Facility: CLINIC | Age: 78
End: 2023-02-08
Payer: COMMERCIAL

## 2023-02-08 DIAGNOSIS — H52.223 REGULAR ASTIGMATISM OF BOTH EYES: ICD-10-CM

## 2023-02-08 DIAGNOSIS — H43.813 PVD (POSTERIOR VITREOUS DETACHMENT), BILATERAL: ICD-10-CM

## 2023-02-08 DIAGNOSIS — H52.4 PRESBYOPIA: ICD-10-CM

## 2023-02-08 DIAGNOSIS — H25.13 NUCLEAR AGE-RELATED CATARACT, BOTH EYES: ICD-10-CM

## 2023-02-08 DIAGNOSIS — Z01.01 ENCOUNTER FOR EXAMINATION OF EYES AND VISION WITH ABNORMAL FINDINGS: Primary | ICD-10-CM

## 2023-02-08 DIAGNOSIS — D31.32 CHOROIDAL NEVUS OF LEFT EYE: ICD-10-CM

## 2023-02-08 DIAGNOSIS — H52.03 HYPERMETROPIA, BILATERAL: ICD-10-CM

## 2023-02-08 PROCEDURE — 92014 COMPRE OPH EXAM EST PT 1/>: CPT | Performed by: OPTOMETRIST

## 2023-02-08 PROCEDURE — 92015 DETERMINE REFRACTIVE STATE: CPT | Performed by: OPTOMETRIST

## 2023-02-08 ASSESSMENT — REFRACTION_WEARINGRX
OS_AXIS: 163
OD_AXIS: 146
OS_ADD: +2.75
SPECS_TYPE: PAL
OS_CYLINDER: +0.75
OD_CYLINDER: +0.50
OD_SPHERE: +1.75
OS_SPHERE: +1.75
OD_ADD: +2.75

## 2023-02-08 ASSESSMENT — CONF VISUAL FIELD
OD_INFERIOR_TEMPORAL_RESTRICTION: 0
OD_SUPERIOR_TEMPORAL_RESTRICTION: 0
OS_SUPERIOR_TEMPORAL_RESTRICTION: 0
OS_NORMAL: 1
OD_NORMAL: 1
OD_INFERIOR_NASAL_RESTRICTION: 0
OS_INFERIOR_NASAL_RESTRICTION: 0
OD_SUPERIOR_NASAL_RESTRICTION: 0
METHOD: COUNTING FINGERS
OS_INFERIOR_TEMPORAL_RESTRICTION: 0
OS_SUPERIOR_NASAL_RESTRICTION: 0

## 2023-02-08 ASSESSMENT — TONOMETRY
OS_IOP_MMHG: 20
OD_IOP_MMHG: 16
IOP_METHOD: APPLANATION

## 2023-02-08 ASSESSMENT — REFRACTION_MANIFEST
OS_CYLINDER: +1.25
OD_ADD: +2.75
OS_SPHERE: +1.25
OS_AXIS: 151
OD_AXIS: 150
OD_SPHERE: +1.00
OD_CYLINDER: +0.50
OS_ADD: +2.75

## 2023-02-08 ASSESSMENT — VISUAL ACUITY
OS_SC+: -1
OD_CC: 20/40
OS_SC: 20/60
OD_PH_CC: 20/25
OD_SC: 20/200
OD_PH_CC+: -1
OS_CC+: -2
METHOD: SNELLEN - LINEAR
OD_SC+: +1
OD_CC: 20/40-1
OD_SC: 20/70
OS_CC: 20/20
OS_CC: 20/20
CORRECTION_TYPE: GLASSES
OS_SC: 20/200

## 2023-02-08 ASSESSMENT — EXTERNAL EXAM - LEFT EYE: OS_EXAM: NORMAL

## 2023-02-08 ASSESSMENT — SLIT LAMP EXAM - LIDS: COMMENTS: NORMAL

## 2023-02-08 ASSESSMENT — EXTERNAL EXAM - RIGHT EYE: OD_EXAM: NORMAL

## 2023-02-08 ASSESSMENT — CUP TO DISC RATIO
OS_RATIO: 0.35
OD_RATIO: 0.25

## 2023-02-08 NOTE — PROGRESS NOTES
"Chief Complaint   Patient presents with     Annual Eye Exam         Last Eye Exam: 10/6/2020  Dilated Previously: Yes, side effects of dilation explained today    What are you currently using to see?  Glasses - PAL's - wears full time        Distance Vision Acuity: Noticed gradual change in both eyes - street signs harder to read     Near Vision Acuity: Not satisfied - has to adjust glasses to find sweet spot for reading fine print     Eye Comfort: watery - worse after reading a lot   Do you use eye drops? : No  Occupation or Hobbies: Reading    Ignacia Harper       Medical, surgical and family histories reviewed and updated 2/8/2023.       OBJECTIVE: See Ophthalmology exam    ASSESSMENT:    ICD-10-CM    1. Encounter for examination of eyes and vision with abnormal findings  Z01.01       2. Nuclear age-related cataract, both eyes  H25.13       3. PVD (posterior vitreous detachment), bilateral  H43.813       4. 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 Hypermetropia, bilateral  H52.03       6. Regular astigmatism of both eyes  H52.223       7. Presbyopia  H52.4           PLAN:     Patient Instructions   You have the start of mild cataracts.  You may notice some blurred vision or glare with night driving.  It is important that you wear good sunglasses to protect your eyes from the ultraviolet light from the sun.     You have a PVD- posterior vitreous detachment which is due to the gel of the eye shrinking and clumping together.  This can sometimes cause holes or tears in the retina.  The signs of a retinal detachment are flashes of light or a \"curtain veil\" coming over your vision. If you notice any of these changes return to clinic for re-evaluation.     Chelo was advised of today's exam findings.  Fill glasses prescription  Allow 2 weeks to adapt to change in glasses  Wear glasses full time  Copy of glasses Rx provided today.    Return in 1 year for eye exam, or sooner if needed.    The effects of the dilating " drops last for 4- 6 hours.  You will be more sensitive to light and vision will be blurry up close.  Mydriatic sunglasses were given if needed.       Luis Amor O.D.  [address]  [place]  [address]    [phone]

## 2023-02-08 NOTE — LETTER
"    2/8/2023         RE: Chelo Brooks  [address] [place] [address] [address]  [facility] [address]        Dear Colleague,    Thank you for referring your patient, Chelo Brooks, to the [facility]. Please see a copy of my visit note below.    Chief Complaint   Patient presents with     Annual Eye Exam         Last Eye Exam: 10/6/2020  Dilated Previously: Yes, side effects of dilation explained today    What are you currently using to see?  Glasses - PAL's - wears full time        Distance Vision Acuity: Noticed gradual change in both eyes - street signs harder to read     Near Vision Acuity: Not satisfied - has to adjust glasses to find sweet spot for reading fine print     Eye Comfort: watery - worse after reading a lot   Do you use eye drops? : No  Occupation or Hobbies: Reading    Ignacia [employer] [facility]       Medical, surgical and family histories reviewed and updated 2/8/2023.       OBJECTIVE: See Ophthalmology exam    ASSESSMENT:    ICD-10-CM    1. Encounter for examination of eyes and vision with abnormal findings  Z01.01       2. Nuclear age-related cataract, both eyes  H25.13       3. PVD (posterior vitreous detachment), bilateral  H43.813       4. 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 Hypermetropia, bilateral  H52.03       6. Regular astigmatism of both eyes  H52.223       7. Presbyopia  H52.4           PLAN:     Patient Instructions   You have the start of mild cataracts.  You may notice some blurred vision or glare with night driving.  It is important that you wear good sunglasses to protect your eyes from the ultraviolet light from the sun.     You have a PVD- posterior vitreous detachment which is due to the gel of the eye shrinking and clumping together.  This can sometimes cause holes or tears in the retina.  The signs of a retinal detachment are flashes of light or a \"curtain veil\" coming over your vision. If you notice any of these changes return to clinic for re-evaluation. "     Chelo was advised of today's exam findings.  Fill glasses prescription  Allow 2 weeks to adapt to change in glasses  Wear glasses full time  Copy of glasses Rx provided today.    Return in 1 year for eye exam, or sooner if needed.    The effects of the dilating drops last for 4- 6 hours.  You will be more sensitive to light and vision will be blurry up close.  Mydriatic sunglasses were given if needed.       Luis Amor O.D.  [address] [facility] [place]  [address]    [phone]             Again, thank you for allowing me to participate in the care of your patient.        Sincerely,        Luis Amor, OD     [Colposcopy] : Colposcopy  [Time out performed] : Pre-procedure time out performed.  Patient's name, date of birth and procedure confirmed. [Consent Obtained] : Consent obtained [Risks] : risks [Benefits] : benefits [Alternatives] : alternatives [Patient] : patient [Infection] : infection [Bleeding] : bleeding [Allergic Reaction] : allergic reaction [ASCUS] : ASCUS [HPV High Risk] : HPV high risk [No Premedication] : no premedication [Colposcopy Adequate] : colposcopy adequate [ECC Performed] : ECC performed [No Abnormalities] : no abnormalities [Lesion] : lesion seen [Biopsy] : biopsy taken [Hemostasis Obtained] : Hemostasis obtained [Tolerated Well] : the patient tolerated the procedure well [de-identified] : 1 [de-identified] : acetowhite at 9 oclock [de-identified] : 9 oclock

## 2023-02-08 NOTE — PATIENT INSTRUCTIONS
"You have the start of mild cataracts.  You may notice some blurred vision or glare with night driving.  It is important that you wear good sunglasses to protect your eyes from the ultraviolet light from the sun.     You have a PVD- posterior vitreous detachment which is due to the gel of the eye shrinking and clumping together.  This can sometimes cause holes or tears in the retina.  The signs of a retinal detachment are flashes of light or a \"curtain veil\" coming over your vision. If you notice any of these changes return to clinic for re-evaluation.     Chelo was advised of today's exam findings.  Fill glasses prescription  Allow 2 weeks to adapt to change in glasses  Wear glasses full time  Copy of glasses Rx provided today.    Return in 1 year for eye exam, or sooner if needed.    The effects of the dilating drops last for 4- 6 hours.  You will be more sensitive to light and vision will be blurry up close.  Mydriatic sunglasses were given if needed.       Luis Amor O.D.  [facility] [place]  [address] [place] [address]. [place]  [address]    [phone]    "

## 2023-03-02 DIAGNOSIS — E78.5 HYPERLIPIDEMIA LDL GOAL <100: ICD-10-CM

## 2023-03-03 RX ORDER — ATORVASTATIN CALCIUM 40 MG/1
TABLET, FILM COATED ORAL
Qty: 90 TABLET | Refills: 0 | Status: SHIPPED | OUTPATIENT
Start: 2023-03-03 | End: 2023-03-27

## 2023-03-16 DIAGNOSIS — K21.00 GASTROESOPHAGEAL REFLUX DISEASE WITH ESOPHAGITIS, UNSPECIFIED WHETHER HEMORRHAGE: ICD-10-CM

## 2023-03-17 RX ORDER — FAMOTIDINE 40 MG/1
TABLET, FILM COATED ORAL
Qty: 90 TABLET | Refills: 3 | Status: SHIPPED | OUTPATIENT
Start: 2023-03-17 | End: 2024-07-01

## 2023-03-27 ENCOUNTER — OFFICE VISIT (OUTPATIENT)
Dept: FAMILY MEDICINE | Facility: CLINIC | Age: 78
End: 2023-03-27
Payer: COMMERCIAL

## 2023-03-27 VITALS
SYSTOLIC BLOOD PRESSURE: 108 MMHG | HEIGHT: 66 IN | DIASTOLIC BLOOD PRESSURE: 76 MMHG | OXYGEN SATURATION: 94 % | HEART RATE: 90 BPM | WEIGHT: 293 LBS | BODY MASS INDEX: 47.09 KG/M2 | TEMPERATURE: 98.3 F | RESPIRATION RATE: 16 BRPM

## 2023-03-27 DIAGNOSIS — E04.1 THYROID NODULE: ICD-10-CM

## 2023-03-27 DIAGNOSIS — Z00.00 ENCOUNTER FOR MEDICARE ANNUAL WELLNESS EXAM: Primary | ICD-10-CM

## 2023-03-27 DIAGNOSIS — I77.810 ASCENDING AORTA DILATATION (H): ICD-10-CM

## 2023-03-27 DIAGNOSIS — I87.2 STASIS DERMATITIS OF BOTH LEGS: ICD-10-CM

## 2023-03-27 DIAGNOSIS — E78.5 HYPERLIPIDEMIA LDL GOAL <100: ICD-10-CM

## 2023-03-27 DIAGNOSIS — N18.32 STAGE 3B CHRONIC KIDNEY DISEASE (H): ICD-10-CM

## 2023-03-27 DIAGNOSIS — N25.81 SECONDARY HYPERPARATHYROIDISM OF RENAL ORIGIN (H): ICD-10-CM

## 2023-03-27 DIAGNOSIS — I12.9 RENAL HYPERTENSION: ICD-10-CM

## 2023-03-27 DIAGNOSIS — E66.01 MORBID OBESITY DUE TO EXCESS CALORIES (H): ICD-10-CM

## 2023-03-27 PROBLEM — E87.70 VOLUME OVERLOAD: Status: RESOLVED | Noted: 2019-10-31 | Resolved: 2023-03-27

## 2023-03-27 PROBLEM — J96.01 ACUTE RESPIRATORY FAILURE WITH HYPOXIA (H): Status: RESOLVED | Noted: 2019-10-31 | Resolved: 2023-03-27

## 2023-03-27 PROBLEM — K21.9 GERD (GASTROESOPHAGEAL REFLUX DISEASE): Status: ACTIVE | Noted: 2019-10-28

## 2023-03-27 PROBLEM — J45.20 ASTHMA, MILD INTERMITTENT, WELL-CONTROLLED: Status: ACTIVE | Noted: 2019-06-18

## 2023-03-27 PROBLEM — I82.4Z2 ACUTE DEEP VEIN THROMBOSIS (DVT) OF DISTAL END OF LEFT LOWER EXTREMITY (H): Status: RESOLVED | Noted: 2019-10-31 | Resolved: 2023-03-27

## 2023-03-27 PROBLEM — R60.0 BILATERAL LOWER EXTREMITY EDEMA: Status: RESOLVED | Noted: 2019-10-28 | Resolved: 2023-03-27

## 2023-03-27 LAB — HGB BLD-MCNC: 11.9 G/DL (ref 11.7–15.7)

## 2023-03-27 PROCEDURE — 80061 LIPID PANEL: CPT | Performed by: FAMILY MEDICINE

## 2023-03-27 PROCEDURE — 85018 HEMOGLOBIN: CPT | Performed by: FAMILY MEDICINE

## 2023-03-27 PROCEDURE — G0439 PPPS, SUBSEQ VISIT: HCPCS | Performed by: FAMILY MEDICINE

## 2023-03-27 PROCEDURE — 99213 OFFICE O/P EST LOW 20 MIN: CPT | Mod: 25 | Performed by: FAMILY MEDICINE

## 2023-03-27 PROCEDURE — 82570 ASSAY OF URINE CREATININE: CPT | Performed by: FAMILY MEDICINE

## 2023-03-27 PROCEDURE — 36415 COLL VENOUS BLD VENIPUNCTURE: CPT | Performed by: FAMILY MEDICINE

## 2023-03-27 PROCEDURE — 82043 UR ALBUMIN QUANTITATIVE: CPT | Performed by: FAMILY MEDICINE

## 2023-03-27 PROCEDURE — 80069 RENAL FUNCTION PANEL: CPT | Performed by: FAMILY MEDICINE

## 2023-03-27 RX ORDER — OLMESARTAN MEDOXOMIL AND HYDROCHLOROTHIAZIDE 40/25 40; 25 MG/1; MG/1
1 TABLET ORAL DAILY
Qty: 90 TABLET | Refills: 3 | Status: SHIPPED | OUTPATIENT
Start: 2023-03-27 | End: 2023-03-27

## 2023-03-27 RX ORDER — MOMETASONE FUROATE 1 MG/G
CREAM TOPICAL
Qty: 50 G | Refills: 3 | Status: SHIPPED | OUTPATIENT
Start: 2023-03-27 | End: 2023-11-15

## 2023-03-27 RX ORDER — OLMESARTAN MEDOXOMIL AND HYDROCHLOROTHIAZIDE 40/25 40; 25 MG/1; MG/1
1 TABLET ORAL DAILY
Qty: 90 TABLET | Refills: 3 | Status: SHIPPED | OUTPATIENT
Start: 2023-03-27 | End: 2023-11-15 | Stop reason: DRUGHIGH

## 2023-03-27 RX ORDER — ATORVASTATIN CALCIUM 40 MG/1
40 TABLET, FILM COATED ORAL DAILY
Qty: 90 TABLET | Refills: 3 | Status: SHIPPED | OUTPATIENT
Start: 2023-03-27 | End: 2023-11-15

## 2023-03-27 ASSESSMENT — ASTHMA QUESTIONNAIRES
ACT_TOTALSCORE: 17
QUESTION_1 LAST FOUR WEEKS HOW MUCH OF THE TIME DID YOUR ASTHMA KEEP YOU FROM GETTING AS MUCH DONE AT WORK, SCHOOL OR AT HOME: MOST OF THE TIME
QUESTION_3 LAST FOUR WEEKS HOW OFTEN DID YOUR ASTHMA SYMPTOMS (WHEEZING, COUGHING, SHORTNESS OF BREATH, CHEST TIGHTNESS OR PAIN) WAKE YOU UP AT NIGHT OR EARLIER THAN USUAL IN THE MORNING: NOT AT ALL
QUESTION_5 LAST FOUR WEEKS HOW WOULD YOU RATE YOUR ASTHMA CONTROL: WELL CONTROLLED
ACT_TOTALSCORE: 17
QUESTION_4 LAST FOUR WEEKS HOW OFTEN HAVE YOU USED YOUR RESCUE INHALER OR NEBULIZER MEDICATION (SUCH AS ALBUTEROL): NOT AT ALL
QUESTION_2 LAST FOUR WEEKS HOW OFTEN HAVE YOU HAD SHORTNESS OF BREATH: MORE THAN ONCE A DAY

## 2023-03-27 ASSESSMENT — ENCOUNTER SYMPTOMS
EYE PAIN: 0
NAUSEA: 0
HEMATURIA: 0
SHORTNESS OF BREATH: 1
FEVER: 0
DIARRHEA: 0
HEADACHES: 0
ABDOMINAL PAIN: 0
BREAST MASS: 0
CHILLS: 1
SORE THROAT: 0
DYSURIA: 0
JOINT SWELLING: 1
HEARTBURN: 1
PARESTHESIAS: 0
NERVOUS/ANXIOUS: 1
CONSTIPATION: 0
MYALGIAS: 1
DIZZINESS: 1
WEAKNESS: 0
FREQUENCY: 0
ARTHRALGIAS: 1
PALPITATIONS: 0
COUGH: 0
HEMATOCHEZIA: 0

## 2023-03-27 ASSESSMENT — ACTIVITIES OF DAILY LIVING (ADL): CURRENT_FUNCTION: NO ASSISTANCE NEEDED

## 2023-03-27 NOTE — LETTER
April 3, 2023      Chelo Brooks  [facility] [facility] [address]   [facility] [address]        Dear ,    We are writing to inform you of your test results. Kidney panel is stable.  See you at next visit       Resulted Orders   Lipid panel reflex to direct LDL Non-fasting   Result Value Ref Range    Cholesterol 145 <200 mg/dL    Triglycerides 99 <150 mg/dL    Direct Measure HDL 54 >=50 mg/dL    LDL Cholesterol Calculated 71 <=100 mg/dL    Non HDL Cholesterol 91 <130 mg/dL    Patient Fasting > 8hrs? Unknown     Narrative    Cholesterol  Desirable:  <200 mg/dL    Triglycerides  Normal:  Less than 150 mg/dL  Borderline High:  150-199 mg/dL  High:  200-499 mg/dL  Very High:  Greater than or equal to 500 mg/dL    Direct Measure HDL  Female:  Greater than or equal to 50 mg/dL   Male:  Greater than or equal to 40 mg/dL    LDL Cholesterol  Desirable:  <100mg/dL  Above Desirable:  100-129 mg/dL   Borderline High:  130-159 mg/dL   High:  160-189 mg/dL   Very High:  >= 190 mg/dL    Non HDL Cholesterol  Desirable:  130 mg/dL  Above Desirable:  130-159 mg/dL  Borderline High:  160-189 mg/dL  High:  190-219 mg/dL  Very High:  Greater than or equal to 220 mg/dL   Basic metabolic panel  (Ca, Cl, CO2, Creat, Gluc, K, Na, BUN)   Result Value Ref Range    Sodium 141 133 - 144 mmol/L    Potassium 4.5 3.4 - 5.3 mmol/L    Chloride 108 94 - 109 mmol/L    Carbon Dioxide (CO2) 30 20 - 32 mmol/L    Anion Gap 3 3 - 14 mmol/L    Urea Nitrogen 26 7 - 30 mg/dL    Creatinine 1.28 (H) 0.52 - 1.04 mg/dL    Calcium 8.7 8.5 - 10.1 mg/dL    Glucose 89 70 - 99 mg/dL    GFR Estimate 43 (L) >60 mL/min/1.73m2      Comment:      eGFR calculated using 2021 CKD-EPI equation.   Renal panel   Result Value Ref Range    Sodium 140 133 - 144 mmol/L    Potassium 4.5 3.4 - 5.3 mmol/L    Chloride 108 94 - 109 mmol/L    Carbon Dioxide (CO2) 28 20 - 32 mmol/L    Anion Gap 4 3 - 14 mmol/L    Urea Nitrogen 28 7 - 30 mg/dL    Creatinine 1.28 (H) 0.52 - 1.04  mg/dL    Calcium 9.1 8.5 - 10.1 mg/dL    Glucose 92 70 - 99 mg/dL    Albumin 3.7 3.4 - 5.0 g/dL    Phosphorus 3.7 2.5 - 4.5 mg/dL    GFR Estimate 43 (L) >60 mL/min/1.73m2      Comment:      eGFR calculated using 2021 CKD-EPI equation.   Hemoglobin   Result Value Ref Range    Hemoglobin 11.9 11.7 - 15.7 g/dL   Albumin Random Urine Quantitative with Creat Ratio   Result Value Ref Range    Creatinine Urine mg/dL 196 mg/dL    Albumin Urine mg/L 15 mg/L    Albumin Urine mg/g Cr 7.65 0.00 - 25.00 mg/g Cr       If you have any questions or concerns, please call the clinic at the number listed above.       Sincerely,      Danielle Mcrae MD

## 2023-03-27 NOTE — LETTER
March 29, 2023    Chelo Brooks  [facility] [facility] [address]   [facility] [address]          Dear ,    We are writing to inform you of your test results.  Your cholesterol is controlled. Your blood glucose is normal. Your kidney tests are stable.          Resulted Orders   Lipid panel reflex to direct LDL Non-fasting   Result Value Ref Range    Cholesterol 145 <200 mg/dL    Triglycerides 99 <150 mg/dL    Direct Measure HDL 54 >=50 mg/dL    LDL Cholesterol Calculated 71 <=100 mg/dL    Non HDL Cholesterol 91 <130 mg/dL    Patient Fasting > 8hrs? Unknown     Narrative    Cholesterol  Desirable:  <200 mg/dL    Triglycerides  Normal:  Less than 150 mg/dL  Borderline High:  150-199 mg/dL  High:  200-499 mg/dL  Very High:  Greater than or equal to 500 mg/dL    Direct Measure HDL  Female:  Greater than or equal to 50 mg/dL   Male:  Greater than or equal to 40 mg/dL    LDL Cholesterol  Desirable:  <100mg/dL  Above Desirable:  100-129 mg/dL   Borderline High:  130-159 mg/dL   High:  160-189 mg/dL   Very High:  >= 190 mg/dL    Non HDL Cholesterol  Desirable:  130 mg/dL  Above Desirable:  130-159 mg/dL  Borderline High:  160-189 mg/dL  High:  190-219 mg/dL  Very High:  Greater than or equal to 220 mg/dL   Basic metabolic panel  (Ca, Cl, CO2, Creat, Gluc, K, Na, BUN)   Result Value Ref Range    Sodium 141 133 - 144 mmol/L    Potassium 4.5 3.4 - 5.3 mmol/L    Chloride 108 94 - 109 mmol/L    Carbon Dioxide (CO2) 30 20 - 32 mmol/L    Anion Gap 3 3 - 14 mmol/L    Urea Nitrogen 26 7 - 30 mg/dL    Creatinine 1.28 (H) 0.52 - 1.04 mg/dL    Calcium 8.7 8.5 - 10.1 mg/dL    Glucose 89 70 - 99 mg/dL    GFR Estimate 43 (L) >60 mL/min/1.73m2      Comment:      eGFR calculated using 2021 CKD-EPI equation.       If you have any questions or concerns, please call the clinic at the number listed above.       Sincerely,      Vita Zavala MD

## 2023-03-27 NOTE — LETTER
My Asthma Action Plan    Name: Chelo Brooks   YOB: 1945  Date: 3/27/2023   My doctor: Vita Zavala MD   My clinic: [facility]        My Rescue Medicine:   Albuterol inhaler (Proair/Ventolin/Proventil HFA)  2-4 puffs EVERY 4 HOURS as needed. Use a spacer if recommended by your provider.   My Asthma Severity:   Intermittent / Exercise Induced  Know your asthma triggers: upper respiratory infections  allergies          GREEN ZONE   Good Control    I feel good    No cough or wheeze    Can work, sleep and play without asthma symptoms       Take your asthma control medicine every day.     1. If exercise triggers your asthma, take your rescue medication    15 minutes before exercise or sports, and    During exercise if you have asthma symptoms  2. Spacer to use with inhaler: If you have a spacer, make sure to use it with your inhaler             YELLOW ZONE Getting Worse  I have ANY of these:    I do not feel good    Cough or wheeze    Chest feels tight    Wake up at night   1. Keep taking your Green Zone medications  2. Start taking your rescue medicine:    every 20 minutes for up to 1 hour. Then every 4 hours for 24-48 hours.  3. If you stay in the Yellow Zone for more than 12-24 hours, contact your doctor.  4. If you do not return to the Green Zone in 12-24 hours or you get worse, start taking your oral steroid medicine if prescribed by your provider.           RED ZONE Medical Alert - Get Help  I have ANY of these:    I feel awful    Medicine is not helping    Breathing getting harder    Trouble walking or talking    Nose opens wide to breathe       1. Take your rescue medicine NOW  2. If your provider has prescribed an oral steroid medicine, start taking it NOW  3. Call your doctor NOW  4. If you are still in the Red Zone after 20 minutes and you have not reached your doctor:    Take your rescue medicine again and    Call 911 or go to the emergency room right away    See your  regular doctor within 2 weeks of an Emergency Room or Urgent Care visit for follow-up treatment.          Annual Reminders:  Meet with Asthma Educator,  Flu Shot in the Fall, consider Pneumonia Vaccination for patients with asthma (aged 19 and older).    Pharmacy:    [id number] - [place], [place] - [address] AT [facility] & [facility] [place] [address]  [id number] - [place], [place]  [address] AT [facility] & [address]  [facility] #[id number] - [place], [place] - [address] [facility] [address] AT [facility] & [address]    Electronically signed by Vita Zavala MD   Date: 03/27/23                    Asthma Triggers  How To Control Things That Make Your Asthma Worse    Triggers are things that make your asthma worse.  Look at the list below to help you find your triggers and   what you can do about them. You can help prevent asthma flare-ups by staying away from your triggers.      Trigger                                                          What you can do   Cigarette Smoke  Tobacco smoke can make asthma worse. Do not allow smoking in your home, car or around you.  Be sure no one smokes at a child s day care or school.  If you smoke, ask your health care provider for ways to help you quit.  Ask family members to quit too.  Ask your health care provider for a referral to Quit Plan to help you quit smoking, or call 2-772-475-PLAN.     Colds, Flu, Bronchitis  These are common triggers of asthma. Wash your hands often.  Don t touch your eyes, nose or mouth.  Get a flu shot every year.     Dust Mites  These are tiny bugs that live in cloth or carpet. They are too small to see. Wash sheets and blankets in hot water every week.   Encase pillows and mattress in dust mite proof covers.  Avoid having carpet if you can. If you have carpet, vacuum weekly.   Use a dust mask and HEPA vacuum.   Pollen and Outdoor Mold  Some people  are allergic to trees, grass, or weed pollen, or molds. Try to keep your windows closed.  Limit time out doors when pollen count is high.   Ask you health care provider about taking medicine during allergy season.     Animal Dander  Some people are allergic to skin flakes, urine or saliva from pets with fur or feathers. Keep pets with fur or feathers out of your home.    If you can t keep the pet outdoors, then keep the pet out of your bedroom.  Keep the bedroom door closed.  Keep pets off cloth furniture and away from stuffed toys.     Mice, Rats, and Cockroaches  Some people are allergic to the waste from these pests.   Cover food and garbage.  Clean up spills and food crumbs.  Store grease in the refrigerator.   Keep food out of the bedroom.   Indoor Mold  This can be a trigger if your home has high moisture. Fix leaking faucets, pipes, or other sources of water.   Clean moldy surfaces.  Dehumidify basement if it is damp and smelly.   Smoke, Strong Odors, and Sprays  These can reduce air quality. Stay away from strong odors and sprays, such as perfume, powder, hair spray, paints, smoke incense, paint, cleaning products, candles and new carpet.   Exercise or Sports  Some people with asthma have this trigger. Be active!  Ask your doctor about taking medicine before sports or exercise to prevent symptoms.    Warm up for 5-10 minutes before and after sports or exercise.     Other Triggers of Asthma  Cold air:  Cover your nose and mouth with a scarf.  Sometimes laughing or crying can be a trigger.  Some medicines and food can trigger asthma.

## 2023-03-27 NOTE — PATIENT INSTRUCTIONS
Get the shingles vaccine, called Shingrix (given as 2 shots, 2 to 6 months apart), even if you have already had the Zostavax vaccine. Discuss getting the Shingix vaccine from your pharmacist.       Patient Education   Personalized Prevention Plan  You are due for the preventive services outlined below.  Your care team is available to assist you in scheduling these services.  If you have already completed any of these items, please share that information with your care team to update in your medical record.  Health Maintenance Due   Topic Date Due    Kidney Microalbumin Urine Test  Never done    Zoster (Shingles) Vaccine (1 of 2) Never done    ANNUAL REVIEW OF HM ORDERS  12/07/2022    Annual Wellness Visit  01/04/2023    Cholesterol Lab  01/04/2023    Asthma Action Plan - yearly  01/04/2023     Your Health Risk Assessment indicates you feel you are not in good health    A healthy lifestyle helps keep the body fit and the mind alert. It helps protect you from disease, helps you fight disease, and helps prevent chronic disease (disease that doesn't go away) from getting worse. This is important as you get older and begin to notice twinges in muscles and joints and a decline in the strength and stamina you once took for granted. A healthy lifestyle includes good healthcare, good nutrition, weight control, recreation, and regular exercise. Avoid harmful substances and do what you can to keep safe. Another part of a healthy lifestyle is stay mentally active and socially involved.    Good healthcare   Have a wellness visit every year.   If you have new symptoms, let us know right away. Don't wait until the next checkup.   Take medicines exactly as prescribed and keep your medicines in a safe place. Tell us if your medicine causes problems.   Healthy diet and weight control   Eat 3 or 4 small, nutritious, low-fat, high-fiber meals a day. Include a variety of fruits, vegetables, and whole-grain foods.   Make sure you get  enough calcium in your diet. Calcium, vitamin D, and exercise help prevent osteoporosis (bone thinning).   If you live alone, try eating with others when you can. That way you get a good meal and have company while you eat it.   Try to keep a healthy weight. If you eat more calories than your body uses for energy, it will be stored as fat and you will gain weight.     Recreation   Recreation is not limited to sports and team events. It includes any activity that provides relaxation, interest, enjoyment, and exercise. Recreation provides an outlet for physical, mental, and social energy. It can give a sense of worth and achievement. It can help you stay healthy.    Mental Exercise and Social Involvement  Mental and emotional health is as important as physical health. Keep in touch with friends and family. Stay as active as possible. Continue to learn and challenge yourself.   Things you can do to stay mentally active are:  Learn something new, like a foreign language or musical instrument.   Play SCRABBLE or do crossword puzzles. If you cannot find people to play these games with you at home, you can play them with others on your computer through the Internet.   Join a games club--anything from card games to chess or checkers or lawn bowling.   Start a new hobby.   Go back to school.   Volunteer.   Read.   Keep up with world events.    Understanding USDA MyPlate  The USDA has guidelines to help you make healthy food choices. These are called MyPlate. MyPlate shows the food groups that make up healthy meals using the image of a place setting. Before you eat, think about the healthiest choices for what to put on your plate or in your cup or bowl. To learn more about building a healthy plate, visit www.choosemyplate.gov.     The food groups  Fruits. Any fruit or 100% fruit juice counts as part of the Fruit Group. Fruits may be fresh, canned, frozen, or dried, and may be whole, cut-up, or pureed. Make 1/2 of your plate  fruits and vegetables.  Vegetables. Any vegetable or 100% vegetable juice counts as a member of the Vegetable Group. Vegetables may be fresh, frozen, canned, or dried. They can be served raw or cooked and may be whole, cut-up, or mashed. Make 1/2 of your plate fruits and vegetables.  Grains. All foods made from grains are part of the Grains Group. These include wheat, rice, oats, cornmeal, and barley. Grains are often used to make foods such as bread, pasta, oatmeal, cereal, tortillas, and grits. Grains should be no more than 1/4 of your plate. At least half of your grains should be whole grains.  Protein. This group includes meat, poultry, seafood, beans and peas, eggs, processed soy products (such as tofu), nuts (including nut butters), and seeds. Make protein choices no more than 1/4 of your plate. Meat and poultry choices should be lean or low fat.  Dairy. The Dairy Group includes all fluid milk products and foods made from milk that contain calcium, such as yogurt and cheese. (Foods that have little calcium, such as cream, butter, and cream cheese, are not part of this group.) Most dairy choices should be low-fat or fat-free.  Oils. Oils aren't a food group, but they do contain essential nutrients. However it's important to watch your intake of oils. These are fats that are liquid at room temperature. They include canola, corn, olive, soybean, vegetable, and sunflower oil. Foods that are mainly oil include mayonnaise, certain salad dressings, and soft margarines. You likely already get your daily oil allowance from the foods you eat.  Things to limit  Eating healthy also means limiting these things in your diet:  Salt (sodium). Many processed foods have a lot of sodium. To keep sodium intake down, eat fresh vegetables, meats, poultry, and seafood when possible. Purchase low-sodium, reduced-sodium, or no-salt-added food products at the store. And don't add salt to your meals at home. Instead, season them with  herbs and spices such as dill, oregano, cumin, and paprika. Or try adding flavor with lemon or lime zest and juice.  Saturated fat. Saturated fats are most often found in animal products such as beef, pork, and chicken. They are often solid at room temperature, such as butter. To reduce your saturated fat intake, choose leaner cuts of meat and poultry. And try healthier cooking methods such as grilling, broiling, roasting, or baking. For a simple lower-fat swap, use plain nonfat yogurt instead of mayonnaise when making potato salad or macaroni salad.  Added sugars. These are sugars added to foods. They are in foods such as ice cream, candy, soda, fruit drinks, sports drinks, energy drinks, cookies, pastries, jams, and syrups. Cut down on added sugars by sharing sweet treats with a family member or friend. You can also choose fruit for dessert, and drink water or other unsweetened beverages.  [employer] last reviewed this educational content on 6/1/2020 2000-2022 [employer]. All rights reserved. This information is not intended as a substitute for professional medical care. Always follow your healthcare professional's instructions.

## 2023-03-27 NOTE — PROGRESS NOTES
"SUBJECTIVE:   Chelo is a 78 year old who presents for Preventive Visit.  No flowsheet data found.  Patient has been advised of split billing requirements and indicates understanding: Yes  Are you in the first 12 months of your Medicare coverage?  No    Hypertension well controlled on current medications without side effects, chest pain, or dyspnea. Hypercholesterolemia well controlled with current treatment plan without side effects. Patient also reports skin rash on legs for months.     Healthy Habits:     In general, how would you rate your overall health?  Fair    Duration of exercise:  Less than 15 minutes    Do you usually eat at least 4 servings of fruit and vegetables a day, include whole grains    & fiber and avoid regularly eating high fat or \"junk\" foods?  No    Ability to successfully perform activities of daily living:  No assistance needed    Home Safety:  No safety concerns identified    Hearing Impairment:  No hearing concerns    In the past 6 months, have you been bothered by leaking of urine?  No    In general, how would you rate your overall mental or emotional health?  Good      PHQ-2 Total Score: 0    Additional concerns today:  No      Have you ever done Advance Care Planning? (For example, a Health Directive, POLST, or a discussion with a medical provider or your loved ones about your wishes): No, advance care planning information given to patient to review.  Advanced care planning was discussed at today's visit.       Fall risk  Fallen 2 or more times in the past year?: No  Any fall with injury in the past year?: No    Cognitive Screening   1) Repeat 3 items (Leader, Season, Table)    2) Clock draw: NORMAL  3) 3 item recall: Recalls 3 objects  Results: 3 items recalled: COGNITIVE IMPAIRMENT LESS LIKELY    Mini-CogTM Copyright NEPTALI Martinez. Licensed by the author for use in [facility]; reprinted with permission (danilo@.[email or web address]). All rights reserved.      Do you have sleep apnea, " excessive snoring or daytime drowsiness?: yes    Reviewed and updated as needed this visit by clinical staff   Tobacco  Allergies  Meds  Problems  Med Hx  Surg Hx  Fam Hx          Reviewed and updated as needed this visit by Provider   Tobacco  Allergies  Meds  Problems  Med Hx  Surg Hx  Fam Hx         Social History     Tobacco Use     Smoking status: Never     Smokeless tobacco: Never   Substance Use Topics     Alcohol use: Yes     Alcohol/week: 0.0 standard drinks     Comment: very rare           Alcohol Use 3/27/2023   Prescreen: >3 drinks/day or >7 drinks/week? No     Do you have a current opioid prescription? No  Do you use any other controlled substances or medications that are not prescribed by a provider? None     Current providers sharing in care for this patient include:   Patient Care Team:  Vita Zavala MD as PCP - General (Family Practice)  Vita Zavala MD as Assigned PCP  Danielle Mcrae MD as MD (Nephrology)  Danielle Mcrae MD as Assigned Nephrology Provider  Luis Amor OD as MD (Optometrist)  Luis Amor OD as Assigned Surgical Provider    The following health maintenance items are reviewed in Epic and correct as of today:  Health Maintenance   Topic Date Due     MICROALBUMIN  Never done     ZOSTER IMMUNIZATION (1 of 2) Never done     LIPID  01/04/2023     ASTHMA ACTION PLAN  01/04/2023     COVID-19 Vaccine (5 - Booster for Moderna series) 04/04/2023     BMP  07/12/2023     PARATHYROID  07/12/2023     ASTHMA CONTROL TEST  09/27/2023     MEDICARE ANNUAL WELLNESS VISIT  03/27/2024     ANNUAL REVIEW OF HM ORDERS  03/27/2024     FALL RISK ASSESSMENT  03/27/2024     HEMOGLOBIN  03/27/2024     DTAP/TDAP/TD IMMUNIZATION (3 - Td or Tdap) 01/25/2027     DEXA  02/04/2027     ADVANCE CARE PLANNING  03/27/2028     HEPATITIS C SCREENING  Completed     PHQ-2 (once per calendar year)  Completed     INFLUENZA VACCINE  Completed      Pneumococcal Vaccine: 65+ Years  Completed     URINALYSIS  Completed     IPV IMMUNIZATION  Aged Out     MENINGITIS IMMUNIZATION  Aged Out     MAMMO SCREENING  Discontinued     COLORECTAL CANCER SCREENING  Discontinued     Patient Active Problem List   Diagnosis     Reflux esophagitis     Dependent edema     Allergic rhinitis due to animal dander     Hyperlipidemia LDL goal <40     Renal hypertension     Morbid obesity due to excess calories (H)     Osteopenia     FLOR (obstructive sleep apnea)- severe (AHI 89)     Vitamin D deficiency     Lumbar spinal stenosis     Combined forms of age-related cataract of both eyes     Choroidal nevus, left eye     S/P knee replacements     Eczema, unspecified type     Stasis dermatitis of both legs     CKD (chronic kidney disease) stage 3, GFR 30-59 ml/min (H)     Asthma, mild intermittent, well-controlled     Elevated parathyroid hormone     Gallstones     Thyroid nodule     Degenerative joint disease (DJD) of hip     Lumbar disc herniation     Umbilical hernia     Ascending aorta dilatation (H)     Morbid obesity with BMI of 50.0-59.9, adult (H)     GERD (gastroesophageal reflux disease)     Past Surgical History:   Procedure Laterality Date     IR PAROTID BIOPSY  5/21/2020     ZZ TOTAL KNEE ARTHROPLASTY  3/2000    right     ZZC TOTAL KNEE ARTHROPLASTY  6/8/12    left     ZZC VAGINAL HYSTERECTOMY  1977    benign, prolapse, ovaries remain        Social History     Tobacco Use     Smoking status: Never     Smokeless tobacco: Never   Substance Use Topics     Alcohol use: Yes     Alcohol/week: 0.0 standard drinks     Comment: very rare       Family History   Problem Relation Age of Onset     Circulatory Father         d. DVT     Heart Disease Father         pacer     Diabetes Mother      Hypertension Mother      Deep Vein Thrombosis Brother      Coronary Artery Disease Brother      Myocardial Infarction Brother      Deep Vein Thrombosis Brother      C.A.D. Paternal Uncle         MI       Heart Disease Brother 48        pacer      Diabetes Maternal Grandmother      Hypertension Maternal Grandmother      Diabetes Maternal Aunt      Hypertension Maternal Aunt      Cancer - colorectal Maternal Grandfather      C.A.D. Maternal Aunt         MI     Glaucoma No family hx of      Macular Degeneration No family hx of          Current Outpatient Medications   Medication Sig Dispense Refill     albuterol (PROAIR HFA/PROVENTIL HFA/VENTOLIN HFA) 108 (90 Base) MCG/ACT inhaler Inhale 1-2 puffs into the lungs every 4 hours as needed for shortness of breath / dyspnea 1 Inhaler 3     atorvastatin (LIPITOR) 40 MG tablet Take 1 tablet (40 mg) by mouth daily 90 tablet 3     Cholecalciferol (VITAMIN D) 2000 UNITS tablet Take 2,000 Units by mouth daily       Cyanocobalamin (VITAMIN B-12 PO) Take by mouth daily       famotidine (PEPCID) 40 MG tablet TAKE 1 TABLET(40 MG) BY MOUTH DAILY 90 tablet 3     fluticasone (FLONASE) 50 MCG/ACT nasal spray Spray 2 sprays into both nostrils daily 48 g 3     loratadine (CLARITIN) 10 MG tablet Take 1 tablet (10 mg) by mouth daily       mometasone (ELOCON) 0.1 % external cream Apply to affected area on legs twice daily as needed 50 g 3     olmesartan-hydrochlorothiazide (BENICAR HCT) 40-25 MG tablet Take 1 tablet by mouth daily 90 tablet 3     order for DME Equipment being ordered: Auto CPAP 11-18 1 Units 0     Allergies   Allergen Reactions     Adhesive Tape       Pertinent mammograms are reviewed under the imaging tab.    Review of Systems   Constitutional: Positive for chills. Negative for fever.   HENT: Negative for congestion, ear pain, hearing loss and sore throat.    Eyes: Positive for visual disturbance. Negative for pain.   Respiratory: Positive for shortness of breath. Negative for cough.    Cardiovascular: Positive for peripheral edema. Negative for chest pain and palpitations.   Gastrointestinal: Positive for heartburn. Negative for abdominal pain, constipation, diarrhea,  "hematochezia and nausea.   Breasts:  Negative for tenderness, breast mass and discharge.   Genitourinary: Positive for urgency. Negative for dysuria, frequency, genital sores, hematuria, pelvic pain, vaginal bleeding and vaginal discharge.   Musculoskeletal: Positive for arthralgias, joint swelling and myalgias.   Skin: Negative for rash.   Neurological: Positive for dizziness. Negative for weakness, headaches and paresthesias.   Psychiatric/Behavioral: Negative for mood changes. The patient is nervous/anxious.          OBJECTIVE:   /76 (BP Location: Right arm, Patient Position: Sitting, Cuff Size: Thigh)   Pulse 90   Temp 98.3  F (36.8  C) (Oral)   Resp 16   Ht 1.687 m (5' 6.42\")   Wt (!) 152.4 kg (336 lb)   SpO2 94%   BMI 53.55 kg/m   Estimated body mass index is 53.55 kg/m  as calculated from the following:    Height as of this encounter: 1.687 m (5' 6.42\").    Weight as of this encounter: 152.4 kg (336 lb).  Physical Exam  GENERAL: alert, no distress, obese and elderly  EYES: Eyes grossly normal to inspection, PERRL and conjunctivae and sclerae normal  NECK: no adenopathy, no asymmetry, masses, or scars and thyroid normal to palpation  RESP: lungs clear to auscultation - no rales, rhonchi or wheezes  CV: regular rates and rhythm and normal S1 S2, no S3 or S4  MS: unstable gait with use of walker; woody bipedal edema   SKIN: erythema and scale in gator distribution consistent with venous stasis  NEURO: Normal strength and tone, mentation intact and speech normal  PSYCH: mentation appears normal, affect normal/bright    Diagnostic Test Results:  Labs reviewed in Epic    ASSESSMENT / PLAN:   (Z00.00) Encounter for Medicare annual wellness exam  (primary encounter diagnosis)    (E66.01) Morbid obesity due to excess calories (H)  Plan: Lipid panel reflex to direct LDL Non-fasting,         Basic metabolic panel  (Ca, Cl, CO2, Creat,         Gluc, K, Na, BUN), OFFICE/OUTPT VISIT,EST,LEVL         IV       " Counseled to make better food choices, exercise as tolerated, and lose weight.     (I12.9) Renal hypertension  (N18.32) Stage 3b chronic kidney disease (H)  Comment: Well controlled with medications without side effects.   Plan: Lipid panel reflex to direct LDL Non-fasting,         Basic metabolic panel  (Ca, Cl, CO2, Creat,         Gluc, K, Na, BUN),         olmesartan-hydrochlorothiazide (BENICAR HCT)         40-25 MG tablet, OFFICE/OUTPT VISIT,EST,LEVL         IV, DISCONTINUED:         olmesartan-hydrochlorothiazide (BENICAR HCT)         40-25 MG tablet          (N25.81) Secondary hyperparathyroidism of renal origin (H)  Comment: mild, stable  Plan: OFFICE/OUTPT VISIT,EST,LEVL IV        Follow-up with nephrology as planned     (I77.810) Ascending aorta dilatation (H)  (E78.5) Hyperlipidemia LDL goal <40  Comment: Well controlled with medications without side effects.   Plan: Lipid panel reflex to direct LDL Non-fasting,         atorvastatin (LIPITOR) 40 MG tablet,         OFFICE/OUTPT VISIT,EST,LEVL IV          (I87.2) Stasis dermatitis of both legs  Plan: mometasone (ELOCON) 0.1 % external cream,         OFFICE/OUTPT VISIT,EST,LEVL IV        Lower extremity elevation, low-salt diet, compression stockings, and call or return to clinic as needed if these symptoms worsen or fail to improve as anticipated.     (E04.1) Thyroid nodule  Plan: OFFICE/OUTPT VISIT,EST,LEVL IV,  Thyroid              Patient has been advised of split billing requirements and indicates understanding: Yes      COUNSELING:  Reviewed preventive health counseling, as reflected in patient instructions  Special attention given to:       Regular exercise       Healthy diet/nutrition       Osteoporosis prevention/bone health       The 10-year ASCVD risk score (Aris BARNARD, et al., 2019) is: 12.9%    Values used to calculate the score:      Age: 78 years      Sex: Female      Is Non- : No      Diabetic: No      Tobacco smoker:  No      Systolic Blood Pressure: 108 mmHg      Is BP treated: No      HDL Cholesterol: 53 mg/dL      Total Cholesterol: 157 mg/dL        She reports that she has never smoked. She has never used smokeless tobacco.      Appropriate preventive services were discussed with this patient, including applicable screening as appropriate for cardiovascular disease, diabetes, osteopenia/osteoporosis, and glaucoma.  As appropriate for age/gender, discussed screening for colorectal cancer, prostate cancer, breast cancer, and cervical cancer. Checklist reviewing preventive services available has been given to the patient.    Reviewed patients plan of care and provided an AVS. The Intermediate Care Plan ( asthma action plan, low back pain action plan, and migraine action plan) for Chelo meets the Care Plan requirement. This Care Plan has been established and reviewed with the Patient.          Vita Zavala MD  [facility]    Identified Health Risks:    I have reviewed Opioid Use Disorder and Substance Use Disorder risk factors and made any needed referrals.       The patient was provided with suggestions to help her develop a healthy physical lifestyle.  The patient was counseled and encouraged to consider modifying their diet and eating habits. She was provided with information on recommended healthy diet options.

## 2023-03-28 LAB
ALBUMIN SERPL-MCNC: 3.7 G/DL (ref 3.4–5)
ANION GAP SERPL CALCULATED.3IONS-SCNC: 3 MMOL/L (ref 3–14)
ANION GAP SERPL CALCULATED.3IONS-SCNC: 4 MMOL/L (ref 3–14)
BUN SERPL-MCNC: 26 MG/DL (ref 7–30)
BUN SERPL-MCNC: 28 MG/DL (ref 7–30)
CALCIUM SERPL-MCNC: 8.7 MG/DL (ref 8.5–10.1)
CALCIUM SERPL-MCNC: 9.1 MG/DL (ref 8.5–10.1)
CHLORIDE BLD-SCNC: 108 MMOL/L (ref 94–109)
CHLORIDE BLD-SCNC: 108 MMOL/L (ref 94–109)
CHOLEST SERPL-MCNC: 145 MG/DL
CO2 SERPL-SCNC: 28 MMOL/L (ref 20–32)
CO2 SERPL-SCNC: 30 MMOL/L (ref 20–32)
CREAT SERPL-MCNC: 1.28 MG/DL (ref 0.52–1.04)
CREAT SERPL-MCNC: 1.28 MG/DL (ref 0.52–1.04)
CREAT UR-MCNC: 196 MG/DL
FASTING STATUS PATIENT QL REPORTED: NORMAL
GFR SERPL CREATININE-BSD FRML MDRD: 43 ML/MIN/1.73M2
GFR SERPL CREATININE-BSD FRML MDRD: 43 ML/MIN/1.73M2
GLUCOSE BLD-MCNC: 89 MG/DL (ref 70–99)
GLUCOSE BLD-MCNC: 92 MG/DL (ref 70–99)
HDLC SERPL-MCNC: 54 MG/DL
LDLC SERPL CALC-MCNC: 71 MG/DL
MICROALBUMIN UR-MCNC: 15 MG/L
MICROALBUMIN/CREAT UR: 7.65 MG/G CR (ref 0–25)
NONHDLC SERPL-MCNC: 91 MG/DL
PHOSPHATE SERPL-MCNC: 3.7 MG/DL (ref 2.5–4.5)
POTASSIUM BLD-SCNC: 4.5 MMOL/L (ref 3.4–5.3)
POTASSIUM BLD-SCNC: 4.5 MMOL/L (ref 3.4–5.3)
SODIUM SERPL-SCNC: 140 MMOL/L (ref 133–144)
SODIUM SERPL-SCNC: 141 MMOL/L (ref 133–144)
TRIGL SERPL-MCNC: 99 MG/DL

## 2023-03-29 NOTE — RESULT ENCOUNTER NOTE
Chelo,    Your cholesterol is controlled. Your blood glucose is normal. Your kidney tests are stable.     Vita Zavala MD

## 2023-07-25 ENCOUNTER — NURSE TRIAGE (OUTPATIENT)
Dept: FAMILY MEDICINE | Facility: CLINIC | Age: 78
End: 2023-07-25
Payer: COMMERCIAL

## 2023-07-25 NOTE — TELEPHONE ENCOUNTER
Patient notified of provider message as written. Patient verbalized good understanding. She states she will wait and see how she is doing; was given UC location if she decides to go that route.    aSndra MONTALVO, RN  [facility]

## 2023-07-25 NOTE — TELEPHONE ENCOUNTER
Provider Response to 2nd Level Triage Request    I have reviewed the RN documentation. My recommendation is:  Face To Face Visit. Could be virtual if able to do video.  Not acute as it has been going on a week so would be ok to wait 1-3 days to get an appointment.  Also would be appropriate to go to   Alicia Adame PA-C

## 2023-07-25 NOTE — TELEPHONE ENCOUNTER
Received call from patient. She reports L leg swelling and blisters. The swelling is from the knee down to the foot, only slight swelling which has been ongoing for quite some time (intermittent). She noticed 5 blisters on her L leg 1 week ago which have been draining fluid. The fluid is not clear or bloody, but she is unable to describe characteristics right now. She has Vaseline on each blister. She also has shortness of breath which has been longstanding. She does not have shortness of breath at rest, only with exertion. Talking in full sentences on the phone. No fevers. Tender to the touch. Leg is red. She is very concerned that she has cellulitis and would like treatment over the phone because it is too hot out; writer did state this is something that would need in-person evaluation same day (ED/U[facility]/office with PCP approval) but she would like to hear from a provider regarding this.    Callback: [phone]  Okay to leave detailed voicemail    Reason for Disposition   Thigh, calf, or ankle swelling in only one leg    Additional Information   Negative: Sounds like a life-threatening emergency to the triager   Negative: Chest pain   Negative: Small area of swelling and followed an insect bite to the area   Negative: Followed a knee injury   Negative: Ankle or foot injury   Negative: Pregnant with leg swelling or edema   Negative: Entire foot is cool or blue in comparison to other side   Negative: Difficulty breathing at rest   Negative: SEVERE swelling (e.g., swelling extends above knee, entire leg is swollen, weeping fluid)   Negative: Thigh or calf pain and only 1 side and present > 1 hour    Protocols used: Leg Swelling and Edema-A-OH    KATIA Hutson RN  [facility]

## 2023-09-05 ENCOUNTER — TELEPHONE (OUTPATIENT)
Dept: FAMILY MEDICINE | Facility: CLINIC | Age: 78
End: 2023-09-05
Payer: COMMERCIAL

## 2023-09-05 DIAGNOSIS — M48.062 SPINAL STENOSIS OF LUMBAR REGION WITH NEUROGENIC CLAUDICATION: Primary | ICD-10-CM

## 2023-09-05 NOTE — TELEPHONE ENCOUNTER
Patient notified of provider message as written. Patient verbalized good understanding.     Provided scheduling ph#: [phone]     Sandra MONTALVO, RN  [facility]

## 2023-09-05 NOTE — TELEPHONE ENCOUNTER
Patient calling with lower back pain.  Had this issue in the past   Was seen by ortho, neurosurgery, pain clinic, and PT.  PT was helpful.  Back injection was not helpful and not willing to do that again.  Last seen for this issue in 2020 by neurosurgery.  Has not had this back pain for the past few years.  For the past month, has been having lower back pain that radiates down the left leg.    Patient would like to know who she should see again    Ok to leave detailed message on patient's VM    Luis Angel Raya RN  [facility]

## 2023-09-05 NOTE — TELEPHONE ENCOUNTER
Ok for physical therapy referral   Recommend follow-up visit as needed to consider repeat MRI for possible back referral     Vita Zavala MD

## 2023-09-21 ENCOUNTER — THERAPY VISIT (OUTPATIENT)
Dept: PHYSICAL THERAPY | Facility: CLINIC | Age: 78
End: 2023-09-21
Attending: FAMILY MEDICINE
Payer: COMMERCIAL

## 2023-09-21 DIAGNOSIS — M54.41 CHRONIC BILATERAL LOW BACK PAIN WITH BILATERAL SCIATICA: Primary | ICD-10-CM

## 2023-09-21 DIAGNOSIS — M54.42 CHRONIC BILATERAL LOW BACK PAIN WITH BILATERAL SCIATICA: Primary | ICD-10-CM

## 2023-09-21 DIAGNOSIS — G89.29 CHRONIC BILATERAL LOW BACK PAIN WITH BILATERAL SCIATICA: Primary | ICD-10-CM

## 2023-09-21 DIAGNOSIS — M48.062 SPINAL STENOSIS OF LUMBAR REGION WITH NEUROGENIC CLAUDICATION: ICD-10-CM

## 2023-09-21 PROCEDURE — 97110 THERAPEUTIC EXERCISES: CPT | Mod: GP | Performed by: PHYSICAL THERAPIST

## 2023-09-21 PROCEDURE — 97162 PT EVAL MOD COMPLEX 30 MIN: CPT | Mod: GP | Performed by: PHYSICAL THERAPIST

## 2023-09-21 NOTE — PROGRESS NOTES
PHYSICAL THERAPY EVALUATION  Type of Visit: Evaluation    See electronic medical record for Abuse and Falls Screening details.    Subjective       Presenting condition or subjective complaint: Back pain.  Last two months have been bad.  Has chronic  issues that go away with meds, but this time nothing has helped.  This round possibly from lifting something. Was also without her walker for 1 week.  Pain down to knees on both sides.    Date of onset: 07/21/23 (approx)    Relevant medical history:     Dates & types of surgery: Knee    Prior diagnostic imaging/testing results: MRI     Prior therapy history for the same diagnosis, illness or injury: Yes Not sure    Prior Level of Function  Transfers: Independent  Ambulation:  uses 4 wheeled walker  ADL: Independent  IADL:     Living Environment  Social support: Alone   Type of home: Apartment/condo   Stairs to enter the home: No       Ramp: No   Stairs inside the home: No       Help at home: None  Equipment owned: Walker with wheels     Employment: No    Hobbies/Interests: Reading, sewing, cards, tv    Patient goals for therapy: do everything i used to do    Pain assessment:      Objective   LUMBAR SPINE EVALUATION  PAIN: Pain Level at Rest: 0/10  Pain Level with Use: 7/10  Pain Location: lumbar spine  Pain Quality: Aching, Sharp, and Shooting  Pain Frequency: constant  Pain is Worst: daytime  Pain is Exacerbated By: prolong sitting in erect posture;  bending;  in/ out car;  in/ out of bed  Pain is Relieved By: walking  Pain Progression: Unchanged  INTEGUMENTARY (edema, incisions): WNL  POSTURE:  slightly flexed lumbar posture in standing  GAIT:   Weightbearing Status: WBAT  Assistive Device(s): Walker (four wheeled)  Gait Deviations: WNL  BALANCE/PROPRIOCEPTION:   WEIGHTBEARING ALIGNMENT:   NON-WEIGHTBEARING ALIGNMENT:    ROM:   (Degrees) Left AROM Left PROM  Right AROM Right PROM   Hip Flexion       Hip Extension       Hip Abduction       Hip Adduction       Hip  Patient calling back regarding below -   Patient was coughing a lot during time of call, and requesting to hear back from clinical today.  States in the meantime, he will be faxing over records to [phone]  Please advise    Internal Rotation       Hip External Rotation       Knee Flexion       Knee Extension       Lumbar Side glide     Lumbar Flexion Hands to shins   Lumbar Extension Decreased 50%     Bilat Lumbar ROT increases pain in LB    Pain:   End feel:   PELVIC/SI SCREEN:   STRENGTH:  fair trunk strength    MYOTOMES: WNL  DTR S: WNL  CORD SIGNS:   DERMATOMES: WNL  NEURAL TENSION:   FLEXIBILITY:   LUMBAR/HIP Special Tests:    Left Right   SCARLETT Positive Negative    FADIR/Labrum/HARPREET Negative  Negative    Femoral Nerve     Moses's     Piriformis     Quadrant Testing     SLR Negative  Negative    Slump Negative  Negative    Stork with Extension     Ravindra             PELVIS/SI SPECIAL TESTS:   FUNCTIONAL TESTS:   PALPATION:   + Tenderness At Location Left Right   Quadratus Lumborum     Erector Spinae + +   Piriformis  + +   PSIS     ASIS     Iliac Crest     Glut Medius + +   Greater Trochanter     Ischial Tuberosity     Hamstrings     Hip Flexors     Vertebral        SPINAL SEGMENTAL CONCLUSIONS:  not assessed as pt unable to go prone      Assessment & Plan   CLINICAL IMPRESSIONS  Medical Diagnosis: chronic LBP    Treatment Diagnosis: chronic LBP   Impression/Assessment: Patient is a 78 year old female with LBP complaints.  The following significant findings have been identified: Pain, Decreased ROM/flexibility, Decreased strength, Impaired muscle performance, and Decreased activity tolerance. These impairments interfere with their ability to perform self care tasks, recreational activities, household chores, driving , household mobility, and community mobility as compared to previous level of function.     Clinical Decision Making (Complexity):  Clinical Presentation: Evolving/Changing  Clinical Presentation Rationale: based on medical and personal factors listed in PT evaluation  Clinical Decision Making (Complexity): Moderate complexity    PLAN OF CARE  Treatment Interventions:  Interventions: Manual Therapy, Neuromuscular  Re-education, Therapeutic Activity, Therapeutic Exercise    Long Term Goals     PT Goal 1  Goal Identifier: LB  Goal Description: Pt able to ambulate x 15 min without pain  Rationale: to maximize safety and independence with performance of ADLs and functional tasks;to maximize safety and independence within the home;to maximize safety and independence within the community;to maximize safety and independence with transportation;to maximize safety and independence with self cares  Target Date: 11/16/23      Frequency of Treatment: 1x/ week  Duration of Treatment: 8 weeks    Recommended Referrals to Other Professionals:   Education Assessment:   Learner/Method: Patient;Demonstration;Pictures/Video    Risks and benefits of evaluation/treatment have been explained.   Patient/Family/caregiver agrees with Plan of Care.     Evaluation Time:     PT Eval, Moderate Complexity Minutes (95810): 20       Signing Clinician: Ravindra Gay PT      [facility]                                                                                   OUTPATIENT PHYSICAL THERAPY      PLAN OF TREATMENT FOR OUTPATIENT REHABILITATION   Patient's Last Name, First Name, Chelo Langley YOB: 1945   Provider's Name   [facility]   Medical Record No.  [id number]     Onset Date: 07/21/23 (approx)  Start of Care Date: 09/21/23     Medical Diagnosis:  chronic LBP      PT Treatment Diagnosis:  chronic LBP Plan of Treatment  Frequency/Duration: 1x/ week/ 8 weeks    Certification date from 09/21/23 to 11/19/23         See note for plan of treatment details and functional goals     Ravindra Gay, PT                         I CERTIFY THE NEED FOR THESE SERVICES FURNISHED UNDER        THIS PLAN OF TREATMENT AND WHILE UNDER MY CARE     (Physician attestation of this document indicates review and certification of the therapy plan).                Referring Provider:  Vita  Kathryn Zavala      Initial Assessment  See Epic Evaluation- Start of Care Date: 09/21/23

## 2023-09-28 ENCOUNTER — THERAPY VISIT (OUTPATIENT)
Dept: PHYSICAL THERAPY | Facility: CLINIC | Age: 78
End: 2023-09-28
Attending: FAMILY MEDICINE
Payer: COMMERCIAL

## 2023-09-28 DIAGNOSIS — G89.29 CHRONIC BILATERAL LOW BACK PAIN WITH BILATERAL SCIATICA: Primary | ICD-10-CM

## 2023-09-28 DIAGNOSIS — M54.42 CHRONIC BILATERAL LOW BACK PAIN WITH BILATERAL SCIATICA: Primary | ICD-10-CM

## 2023-09-28 DIAGNOSIS — M54.41 CHRONIC BILATERAL LOW BACK PAIN WITH BILATERAL SCIATICA: Primary | ICD-10-CM

## 2023-09-28 PROCEDURE — 97140 MANUAL THERAPY 1/> REGIONS: CPT | Mod: GP | Performed by: PHYSICAL THERAPIST

## 2023-09-28 PROCEDURE — 97110 THERAPEUTIC EXERCISES: CPT | Mod: GP | Performed by: PHYSICAL THERAPIST

## 2023-10-03 ENCOUNTER — THERAPY VISIT (OUTPATIENT)
Dept: PHYSICAL THERAPY | Facility: CLINIC | Age: 78
End: 2023-10-03
Payer: COMMERCIAL

## 2023-10-03 ENCOUNTER — NURSE TRIAGE (OUTPATIENT)
Dept: FAMILY MEDICINE | Facility: CLINIC | Age: 78
End: 2023-10-03

## 2023-10-03 ENCOUNTER — TELEPHONE (OUTPATIENT)
Dept: SLEEP MEDICINE | Facility: CLINIC | Age: 78
End: 2023-10-03

## 2023-10-03 DIAGNOSIS — G47.33 OSA (OBSTRUCTIVE SLEEP APNEA): Primary | ICD-10-CM

## 2023-10-03 DIAGNOSIS — M54.41 CHRONIC BILATERAL LOW BACK PAIN WITH BILATERAL SCIATICA: Primary | ICD-10-CM

## 2023-10-03 DIAGNOSIS — M54.42 CHRONIC BILATERAL LOW BACK PAIN WITH BILATERAL SCIATICA: Primary | ICD-10-CM

## 2023-10-03 DIAGNOSIS — G89.29 CHRONIC BILATERAL LOW BACK PAIN WITH BILATERAL SCIATICA: Primary | ICD-10-CM

## 2023-10-03 PROCEDURE — 97110 THERAPEUTIC EXERCISES: CPT | Mod: GP | Performed by: PHYSICAL THERAPIST

## 2023-10-03 PROCEDURE — 97140 MANUAL THERAPY 1/> REGIONS: CPT | Mod: GP | Performed by: PHYSICAL THERAPIST

## 2023-10-03 NOTE — TELEPHONE ENCOUNTER
Reason for Call:  Appointment Request    Patient requesting this type of appt:  other     Requested provider: Vita Zavala    Reason patient unable to be scheduled: Not within requested timeframe    When does patient want to be seen/preferred time: 3-7 days    Comments: back pain and left leg numbness     Okay to leave a detailed message?: Yes at Cell number on file:    Telephone Information:   Mobile [phone]       Call taken on 10/3/2023 at 1:26 PM by Nilam Love

## 2023-10-03 NOTE — TELEPHONE ENCOUNTER
Left message on answering machine for patient to call back to the nurse at [phone].    Laurie Cassidy RN  [facility]

## 2023-10-03 NOTE — TELEPHONE ENCOUNTER
Pt stopped in at clinic.She went to order supplies for her cpap and was told she needed to be seen. Pt has an appointment on the books for 1/12/24

## 2023-10-04 NOTE — TELEPHONE ENCOUNTER
"Received call from patient. She is reporting lower back pain, rated 8/10 radiating to L leg. At times, L leg is also \"numb\". She has been taking Tylenol/Ibuprofen but reports no improvement in symptoms. Able to walk with her walker. No other symptoms. Wishes to be seen tomorrow, 10/5/23 for evaluation.     Per protocol patient was advised same-day appointment. She declined disposition; is worn out from a meeting today. There are no openings tomorrow in clinic (declined UC)- routing to provider to please review and advise.     Reason for Disposition   SEVERE back pain (e.g., excruciating, unable to do any normal activities) and not improved after pain medicine and CARE ADVICE    Additional Information   Negative: Passed out (i.e., fainted, collapsed and was not responding)   Negative: Shock suspected (e.g., cold/pale/clammy skin, too weak to stand, low BP, rapid pulse)   Negative: Sounds like a life-threatening emergency to the triager   Negative: Major injury to the back (e.g., MVA, fall > 10 feet or 3 meters, penetrating injury, etc.)   Negative: Pain in the upper back over the ribs (rib cage) that radiates (travels) into the chest   Negative: Pain in the upper back over the ribs (rib cage) and worsened by coughing (or clearly increases with breathing)   Negative: Back pain during pregnancy   Negative: SEVERE back pain of sudden onset and age > 60 years   Negative: SEVERE abdominal pain (e.g., excruciating)   Negative: Abdominal pain and age > 60 years   Negative: Unable to urinate (or only a few drops) and bladder feels very full   Negative: Loss of bladder or bowel control (urine or bowel incontinence; wetting self, leaking stool) of new-onset   Negative: Numbness (loss of sensation) in groin or rectal area   Negative: Pain radiates into groin, scrotum   Negative: Blood in urine (red, pink, or tea-colored)   Negative: Vomiting and pain over lower ribs of back (i.e., flank - kidney area)   Negative: Weakness of a " "leg or foot (e.g., unable to bear weight, dragging foot)   Negative: Patient sounds very sick or weak to the triager   Negative: Fever > 100.4 F (38.0 C) and flank pain   Negative: Pain or burning with passing urine (urination)    Answer Assessment - Initial Assessment Questions  1. ONSET: \"When did the pain begin?\"       2.5 mo ago  2. LOCATION: \"Where does it hurt?\" (upper, mid or lower back)      Lower back into buttocks and down legs  3. SEVERITY: \"How bad is the pain?\"  (e.g., Scale 1-10; mild, moderate, or severe)    - MILD (1-3): Doesn't interfere with normal activities.     - MODERATE (4-7): Interferes with normal activities or awakens from sleep.     - SEVERE (8-10): Excruciating pain, unable to do any normal activities.       Able to walk; right now 8/10  4. PATTERN: \"Is the pain constant?\" (e.g., yes, no; constant, intermittent)       Constant  5. RADIATION: \"Does the pain shoot into your legs or somewhere else?\"      Legs  6. CAUSE:  \"What do you think is causing the back pain?\"       Unsure  7. BACK OVERUSE:  \"Any recent lifting of heavy objects, strenuous work or exercise?\"      No  8. MEDICINES: \"What have you taken so far for the pain?\" (e.g., nothing, acetaminophen, NSAIDS)      Tylenol/NSAIDS  9. NEUROLOGIC SYMPTOMS: \"Do you have any weakness, numbness, or problems with bowel/bladder control?\"      Numbness in the L leg  10. OTHER SYMPTOMS: \"Do you have any other symptoms?\" (e.g., fever, abdomen pain, burning with urination, blood in urine)        No  11. PREGNANCY: \"Is there any chance you are pregnant?\" \"When was your last menstrual period?\"        No    Protocols used: Back Pain-A-[facility]    KATIA Hutson RN  [facility], [facility]  "

## 2023-10-04 NOTE — TELEPHONE ENCOUNTER
Called patient   She stated that she is unable to go to an appointment today  Scheduled patient for an appointment with Dieter Roland tomorrow    Luis Angel Raya RN  [facility] [place]

## 2023-10-05 ENCOUNTER — OFFICE VISIT (OUTPATIENT)
Dept: FAMILY MEDICINE | Facility: CLINIC | Age: 78
End: 2023-10-05
Payer: COMMERCIAL

## 2023-10-05 VITALS
BODY MASS INDEX: 45.99 KG/M2 | TEMPERATURE: 97.9 F | DIASTOLIC BLOOD PRESSURE: 55 MMHG | SYSTOLIC BLOOD PRESSURE: 92 MMHG | OXYGEN SATURATION: 94 % | WEIGHT: 293 LBS | HEIGHT: 67 IN | HEART RATE: 103 BPM

## 2023-10-05 DIAGNOSIS — N18.32 STAGE 3B CHRONIC KIDNEY DISEASE (CKD) (H): ICD-10-CM

## 2023-10-05 DIAGNOSIS — E66.01 MORBID OBESITY WITH BMI OF 50.0-59.9, ADULT (H): ICD-10-CM

## 2023-10-05 DIAGNOSIS — M54.41 CHRONIC BILATERAL LOW BACK PAIN WITH BILATERAL SCIATICA: Primary | ICD-10-CM

## 2023-10-05 DIAGNOSIS — M54.42 CHRONIC BILATERAL LOW BACK PAIN WITH BILATERAL SCIATICA: Primary | ICD-10-CM

## 2023-10-05 DIAGNOSIS — I12.9 RENAL HYPERTENSION: ICD-10-CM

## 2023-10-05 DIAGNOSIS — G89.29 CHRONIC BILATERAL LOW BACK PAIN WITH BILATERAL SCIATICA: Primary | ICD-10-CM

## 2023-10-05 PROCEDURE — 99214 OFFICE O/P EST MOD 30 MIN: CPT | Performed by: FAMILY MEDICINE

## 2023-10-05 RX ORDER — CYCLOBENZAPRINE HCL 10 MG
10 TABLET ORAL 3 TIMES DAILY PRN
Qty: 30 TABLET | Refills: 1 | Status: SHIPPED | OUTPATIENT
Start: 2023-10-05 | End: 2024-02-13

## 2023-10-05 ASSESSMENT — ASTHMA QUESTIONNAIRES
QUESTION_3 LAST FOUR WEEKS HOW OFTEN DID YOUR ASTHMA SYMPTOMS (WHEEZING, COUGHING, SHORTNESS OF BREATH, CHEST TIGHTNESS OR PAIN) WAKE YOU UP AT NIGHT OR EARLIER THAN USUAL IN THE MORNING: NOT AT ALL
ACT_TOTALSCORE: 24
QUESTION_4 LAST FOUR WEEKS HOW OFTEN HAVE YOU USED YOUR RESCUE INHALER OR NEBULIZER MEDICATION (SUCH AS ALBUTEROL): NOT AT ALL
ACT_TOTALSCORE: 24
QUESTION_2 LAST FOUR WEEKS HOW OFTEN HAVE YOU HAD SHORTNESS OF BREATH: ONCE OR TWICE A WEEK
QUESTION_5 LAST FOUR WEEKS HOW WOULD YOU RATE YOUR ASTHMA CONTROL: COMPLETELY CONTROLLED
QUESTION_1 LAST FOUR WEEKS HOW MUCH OF THE TIME DID YOUR ASTHMA KEEP YOU FROM GETTING AS MUCH DONE AT WORK, SCHOOL OR AT HOME: NONE OF THE TIME

## 2023-10-05 ASSESSMENT — PAIN SCALES - GENERAL: PAINLEVEL: MODERATE PAIN (4)

## 2023-10-05 NOTE — PROGRESS NOTES
Assessment & Plan       ICD-10-CM    1. Chronic bilateral low back pain with bilateral sciatica  M54.42 cyclobenzaprine (FLEXERIL) 10 MG tablet    M54.41     G89.29       2. Stage 3b chronic kidney disease (CKD) (H)  N18.32       3. Renal hypertension  I12.9       4. Morbid obesity with BMI of 50.0-59.9, adult (H)  E66.01     Z68.43         She has chronic low back pain related to spinal stenosis and other degenerative changes, as well as deconditioning and morbid obesity  I think it would be reasonable to try a muscle relaxer, mainly at night to help her sleep, so that was prescribed for her  I encouraged her to use heat and activity and stretching exercises during the day  Use acetaminophen as needed for pain  We will avoid NSAIDs given her CKD  Continue with physical therapy    Given her low blood pressure and associated symptoms, I suggested that she cut her olmesartan HCT medicine in half and try that over the next month or so  She has a follow-up appointment with her PCP scheduled for November 8 and I advised her to keep that to follow-up on the above items        Dieter Bailey MD  [facility] NICOLE Whitney is a 78 year old, presenting for the following health issues:  Pain (Lower back and leg pain 2 1/2 months)         No data to display                Musculoskeletal Problem       Pain History:  When did you first notice your pain? 2 1/2 months   Have you seen anyone else for your pain? No  How has your pain affected your ability to work? Not applicable  Where in your body do you have pain? Back Pain  Onset/Duration: 2 1/2 months  Description:   Location of pain: low back bilateral  Character of pain: burning  Pain radiation: radiates into the right buttocks, radiates into the right leg, radiates into the left buttocks, and radiates into the left leg  New numbness or weakness in legs, not attributed to pain: YES  Intensity: Currently 8/10  Progression of Symptoms:  worsening  History:   Specific cause: none  Pain interferes with job: N/A  History of back problems: recurrent self limited episodes of low back pain in the past  Any previous MRI or X-rays: Yes- at [facility].  Date 2020  Sees a specialist for back pain: Dr. snider  Alleviating factors:   Improved by: advil helps    Precipitating factors:  Worsened by: Lifting, Bending, Standing, and Lying Flat  Therapies tried and outcome:     She has had longstanding low back and leg pain for a number of years.  She has had a few cortisone injections for this in the past, but she does not feel like they have been helpful.  She has had some increased discomfort in the last 2 to 3 months and has gone to a few sessions of physical therapy.  She is still having a fair amount of low back discomfort with radiation into the left leg.  She wonders if she could try a muscle relaxer.  She often has trouble sleeping at night and getting comfortable because of her low back pain.    We also noted that her blood pressure was fairly low today.  She does get lightheaded and dizzy at times.  She is on olmesartan HCTZ 40-25 mg daily for renal hypertension and CKD.  She sees nephrology for that.  She is on other baseline medication as below.    Patient Active Problem List   Diagnosis    Reflux esophagitis    Dependent edema    Allergic rhinitis due to animal dander    Hyperlipidemia LDL goal <40    Renal hypertension    Morbid obesity due to excess calories (H)    Osteopenia    FLOR (obstructive sleep apnea)- severe (AHI 89)    Vitamin D deficiency    Lumbar spinal stenosis    Combined forms of age-related cataract of both eyes    Choroidal nevus, left eye    History of bilateral knee replacement    Eczema, unspecified type    Stasis dermatitis of both legs    Stage 3b chronic kidney disease (CKD) (H)    Asthma, mild intermittent, well-controlled    Elevated parathyroid hormone    Gallstones    Thyroid nodule    Degenerative joint disease (DJD) of  "hip    Umbilical hernia    Ascending aorta dilatation (H24)    Morbid obesity with BMI of 50.0-59.9, adult (H)    GERD (gastroesophageal reflux disease)    Chronic bilateral low back pain with bilateral sciatica     Current Outpatient Medications   Medication    atorvastatin (LIPITOR) 40 MG tablet    Cholecalciferol (VITAMIN D) 2000 UNITS tablet    Cyanocobalamin (VITAMIN B-12 PO)        famotidine (PEPCID) 40 MG tablet    loratadine (CLARITIN) 10 MG tablet    mometasone (ELOCON) 0.1 % external cream    olmesartan-hydrochlorothiazide (BENICAR HCT) 40-25 MG tablet    order for DME    albuterol (PROAIR HFA/PROVENTIL HFA/VENTOLIN HFA) 108 (90 Base) MCG/ACT inhaler    fluticasone (FLONASE) 50 MCG/ACT nasal spray     No current facility-administered medications for this visit.         Review of Systems   Noncontributory except as above.      Objective    BP 92/55 (BP Location: Right arm, Patient Position: Sitting, Cuff Size: Adult Large)   Pulse 103   Temp 97.9  F (36.6  C) (Temporal)   Ht 1.694 m (5' 6.7\")   Wt (!) 151.5 kg (334 lb)   SpO2 94%   BMI 52.78 kg/m    Body mass index is 52.78 kg/m .  Physical Exam   GENERAL: alert, no distress, and obese  BACK: She has some mild discomfort to palpation across the lower back.  Straight leg raising on the left is mildly positive.  It is negative on the right.    Previous office notes were reviewed in her chart as well as the MRI report from 2020 of her lumbar spine.              "

## 2023-10-09 ENCOUNTER — TELEPHONE (OUTPATIENT)
Dept: FAMILY MEDICINE | Facility: CLINIC | Age: 78
End: 2023-10-09
Payer: COMMERCIAL

## 2023-10-09 NOTE — TELEPHONE ENCOUNTER
Patient Quality Outreach    Patient is due for the following:   Asthma  -  ACT needed    Next Steps:   See below    Type of outreach:    Phone, spoke to patient/parent. Discussed with patient at her visit HM due , completed the ACT questionnaire.    Next Steps:  Reach out within 90 days via  done .    Max number of attempts reached: Yes. Will try again in 90 days if patient still on fail list.    Questions for provider review:    None           Jill Sandy, [facility]  Chart routed to closing encounter.

## 2023-10-17 ENCOUNTER — THERAPY VISIT (OUTPATIENT)
Dept: PHYSICAL THERAPY | Facility: CLINIC | Age: 78
End: 2023-10-17
Payer: COMMERCIAL

## 2023-10-17 DIAGNOSIS — M54.41 CHRONIC BILATERAL LOW BACK PAIN WITH BILATERAL SCIATICA: Primary | ICD-10-CM

## 2023-10-17 DIAGNOSIS — G89.29 CHRONIC BILATERAL LOW BACK PAIN WITH BILATERAL SCIATICA: Primary | ICD-10-CM

## 2023-10-17 DIAGNOSIS — M54.42 CHRONIC BILATERAL LOW BACK PAIN WITH BILATERAL SCIATICA: Primary | ICD-10-CM

## 2023-10-17 PROCEDURE — 97110 THERAPEUTIC EXERCISES: CPT | Mod: GP | Performed by: PHYSICAL THERAPIST

## 2023-10-17 PROCEDURE — 97140 MANUAL THERAPY 1/> REGIONS: CPT | Mod: GP | Performed by: PHYSICAL THERAPIST

## 2023-10-17 NOTE — PROGRESS NOTES
10/17/23 0500   Appointment Info   Signing clinician's name / credentials Ravindra Gay PT   Total/Authorized Visits 8   Visits Used 4   Medical Diagnosis chronic LBP   PT Tx Diagnosis chronic LBP   Quick Adds Certification   Progress Note/Certification   Start of Care Date 09/21/23   Onset of illness/injury or Date of Surgery 07/21/23  (approx)   Therapy Frequency 1x/ week   Predicted Duration 8 weeks   Certification date from 09/21/23   Certification date to 11/19/23   PT Goal 1   Goal Identifier LB   Goal Description Pt able to ambulate x 15 min without pain   Rationale to maximize safety and independence with performance of ADLs and functional tasks;to maximize safety and independence within the home;to maximize safety and independence within the community;to maximize safety and independence with transportation;to maximize safety and independence with self cares   Goal Progress has not attempted recently   Target Date 11/16/23   Subjective Report   Subjective Report was given a muscle relaxor which didn't seem to help.  Only thing that really helps her sleep is advil.  Today will be her last visit until she sees the MD on the 8th   Objective Measures   Objective Measures Objective Measure 1;Objective Measure 2   Objective Measure 1   Objective Measure palpation   Details tenderness continues with palpation R > L gluteal   Objective Measure 2   Objective Measure transfers   Details distress and difficulty with transfer to [facility]   Treatment Interventions (PT)   Interventions Therapeutic Procedure/Exercise   Therapeutic Procedure/Exercise   Therapeutic Procedures: strength, endurance, ROM, flexibillity minutes (23788) 12   PTRx Ther Proc 1 Supine Lumbar Hip Roll   PTRx Ther Proc 1 - Details 3 x 10 sec in clinic    PTRx Ther Proc 2 Sitting Hip Adduction Squeeze   PTRx Ther Proc 2 - Details x 25 in clinic - speed cues/ reminders to hold   PTRx Ther Proc 3 Seated Hip Flexion   PTRx Ther Proc 3 - Details x 15 in  clinic with speed cues   PTRx Ther Proc 4 Roll Outs   PTRx Ther Proc 4 - Details red x 20 with short hold   Skilled Intervention speed cues and reminders to hold ex   Patient Response/Progress fatigues with exercises.   PTRx Ther Proc 5 Front Knee Strengthening   PTRx Ther Proc 5 - Details LAQ x 15 bilat -progress as able   PTRx Ther Proc 6 Hip AROM Standing Extension   PTRx Ther Proc 6 - Details bilat x 10 prior to fatigue   Therapeutic Activity   PTRx Ther Act 1 Education Sheet Lumbar   PTRx Ther Act 1 - Details reviewed for HEP   Manual Therapy   Manual Therapy: Mobilization, MFR, MLD, friction massage minutes (25909) 20   Manual Therapy 1 STM/ trigger point to bilat gluteals with pt SLY   Manual Therapy 1 - Details Tender R > L gluteal   Skilled Intervention for pain relief   Patient Response/Progress decreased pain   Education   Learner/Method Patient;Pictures/Video   Plan   Home program PTRX HO   Plan for next session DC pt .  F/U with MD   Total Session Time   Timed Code Treatment Minutes 32   Total Treatment Time (sum of timed and untimed services) 32         DISCHARGE  Reason for Discharge: pt to F/U with MD    Equipment Issued:     Discharge Plan: Patient to continue home program.    Referring Provider:  Vita Zavala

## 2023-11-14 PROBLEM — E66.01 MORBID OBESITY WITH BMI OF 50.0-59.9, ADULT (H): Status: RESOLVED | Noted: 2019-10-31 | Resolved: 2023-11-14

## 2023-11-14 PROBLEM — L30.9 ECZEMA, UNSPECIFIED TYPE: Status: RESOLVED | Noted: 2018-04-05 | Resolved: 2023-11-14

## 2023-11-15 ENCOUNTER — ANCILLARY PROCEDURE (OUTPATIENT)
Dept: GENERAL RADIOLOGY | Facility: CLINIC | Age: 78
End: 2023-11-15
Attending: FAMILY MEDICINE
Payer: COMMERCIAL

## 2023-11-15 ENCOUNTER — OFFICE VISIT (OUTPATIENT)
Dept: FAMILY MEDICINE | Facility: CLINIC | Age: 78
End: 2023-11-15
Payer: COMMERCIAL

## 2023-11-15 VITALS
BODY MASS INDEX: 47.09 KG/M2 | OXYGEN SATURATION: 95 % | RESPIRATION RATE: 16 BRPM | TEMPERATURE: 97.6 F | DIASTOLIC BLOOD PRESSURE: 71 MMHG | HEART RATE: 99 BPM | SYSTOLIC BLOOD PRESSURE: 108 MMHG | HEIGHT: 66 IN | WEIGHT: 293 LBS

## 2023-11-15 DIAGNOSIS — I12.9 RENAL HYPERTENSION: ICD-10-CM

## 2023-11-15 DIAGNOSIS — E04.1 THYROID NODULE: ICD-10-CM

## 2023-11-15 DIAGNOSIS — I48.20 CHRONIC ATRIAL FIBRILLATION (H): Primary | ICD-10-CM

## 2023-11-15 DIAGNOSIS — I87.2 STASIS DERMATITIS OF BOTH LEGS: ICD-10-CM

## 2023-11-15 DIAGNOSIS — M48.062 SPINAL STENOSIS OF LUMBAR REGION WITH NEUROGENIC CLAUDICATION: ICD-10-CM

## 2023-11-15 DIAGNOSIS — R79.89 ELEVATED PARATHYROID HORMONE: ICD-10-CM

## 2023-11-15 DIAGNOSIS — E66.01 MORBID OBESITY DUE TO EXCESS CALORIES (H): Chronic | ICD-10-CM

## 2023-11-15 DIAGNOSIS — Z29.11 NEED FOR VACCINATION AGAINST RESPIRATORY SYNCYTIAL VIRUS: ICD-10-CM

## 2023-11-15 DIAGNOSIS — E78.5 HYPERLIPIDEMIA LDL GOAL <100: ICD-10-CM

## 2023-11-15 DIAGNOSIS — M85.80 OSTEOPENIA, UNSPECIFIED LOCATION: Chronic | ICD-10-CM

## 2023-11-15 DIAGNOSIS — N18.32 STAGE 3B CHRONIC KIDNEY DISEASE (CKD) (H): ICD-10-CM

## 2023-11-15 DIAGNOSIS — Z23 NEED FOR SHINGLES VACCINE: ICD-10-CM

## 2023-11-15 DIAGNOSIS — I48.20 CHRONIC ATRIAL FIBRILLATION (H): ICD-10-CM

## 2023-11-15 PROBLEM — I77.810 ASCENDING AORTA DILATATION (H): Status: RESOLVED | Noted: 2022-02-04 | Resolved: 2023-11-15

## 2023-11-15 PROBLEM — D31.32 CHOROIDAL NEVUS, LEFT EYE: Status: RESOLVED | Noted: 2017-07-18 | Resolved: 2023-11-15

## 2023-11-15 LAB
ANION GAP SERPL CALCULATED.3IONS-SCNC: 12 MMOL/L (ref 7–15)
BASOPHILS # BLD AUTO: 0 10E3/UL (ref 0–0.2)
BASOPHILS NFR BLD AUTO: 1 %
BUN SERPL-MCNC: 32.1 MG/DL (ref 8–23)
CALCIUM SERPL-MCNC: 9.2 MG/DL (ref 8.8–10.2)
CHLORIDE SERPL-SCNC: 110 MMOL/L (ref 98–107)
CREAT SERPL-MCNC: 1.39 MG/DL (ref 0.51–0.95)
CREAT UR-MCNC: 218 MG/DL
DEPRECATED HCO3 PLAS-SCNC: 25 MMOL/L (ref 22–29)
EGFRCR SERPLBLD CKD-EPI 2021: 39 ML/MIN/1.73M2
EOSINOPHIL # BLD AUTO: 0.2 10E3/UL (ref 0–0.7)
EOSINOPHIL NFR BLD AUTO: 4 %
ERYTHROCYTE [DISTWIDTH] IN BLOOD BY AUTOMATED COUNT: 13.5 % (ref 10–15)
GLUCOSE SERPL-MCNC: 110 MG/DL (ref 70–99)
HCT VFR BLD AUTO: 39.4 % (ref 35–47)
HGB BLD-MCNC: 12.7 G/DL (ref 11.7–15.7)
IMM GRANULOCYTES # BLD: 0 10E3/UL
IMM GRANULOCYTES NFR BLD: 0 %
LYMPHOCYTES # BLD AUTO: 1 10E3/UL (ref 0.8–5.3)
LYMPHOCYTES NFR BLD AUTO: 22 %
MCH RBC QN AUTO: 28.9 PG (ref 26.5–33)
MCHC RBC AUTO-ENTMCNC: 32.2 G/DL (ref 31.5–36.5)
MCV RBC AUTO: 90 FL (ref 78–100)
MICROALBUMIN UR-MCNC: 12.9 MG/L
MICROALBUMIN/CREAT UR: 5.92 MG/G CR (ref 0–25)
MONOCYTES # BLD AUTO: 0.4 10E3/UL (ref 0–1.3)
MONOCYTES NFR BLD AUTO: 8 %
NEUTROPHILS # BLD AUTO: 2.8 10E3/UL (ref 1.6–8.3)
NEUTROPHILS NFR BLD AUTO: 64 %
PLATELET # BLD AUTO: 193 10E3/UL (ref 150–450)
POTASSIUM SERPL-SCNC: 3.9 MMOL/L (ref 3.4–5.3)
PTH-INTACT SERPL-MCNC: 87 PG/ML (ref 15–65)
RBC # BLD AUTO: 4.4 10E6/UL (ref 3.8–5.2)
SODIUM SERPL-SCNC: 147 MMOL/L (ref 135–145)
WBC # BLD AUTO: 4.3 10E3/UL (ref 4–11)

## 2023-11-15 PROCEDURE — 91320 SARSCV2 VAC 30MCG TRS-SUC IM: CPT | Performed by: FAMILY MEDICINE

## 2023-11-15 PROCEDURE — 71046 X-RAY EXAM CHEST 2 VIEWS: CPT | Mod: TC | Performed by: RADIOLOGY

## 2023-11-15 PROCEDURE — 82570 ASSAY OF URINE CREATININE: CPT | Performed by: FAMILY MEDICINE

## 2023-11-15 PROCEDURE — 93000 ELECTROCARDIOGRAM COMPLETE: CPT | Performed by: FAMILY MEDICINE

## 2023-11-15 PROCEDURE — 99215 OFFICE O/P EST HI 40 MIN: CPT | Mod: 25 | Performed by: FAMILY MEDICINE

## 2023-11-15 PROCEDURE — 85025 COMPLETE CBC W/AUTO DIFF WBC: CPT | Performed by: FAMILY MEDICINE

## 2023-11-15 PROCEDURE — 80048 BASIC METABOLIC PNL TOTAL CA: CPT | Performed by: FAMILY MEDICINE

## 2023-11-15 PROCEDURE — 36415 COLL VENOUS BLD VENIPUNCTURE: CPT | Performed by: FAMILY MEDICINE

## 2023-11-15 PROCEDURE — 83970 ASSAY OF PARATHORMONE: CPT | Performed by: FAMILY MEDICINE

## 2023-11-15 PROCEDURE — 90480 ADMN SARSCOV2 VAC 1/ONLY CMP: CPT | Performed by: FAMILY MEDICINE

## 2023-11-15 PROCEDURE — 82043 UR ALBUMIN QUANTITATIVE: CPT | Performed by: FAMILY MEDICINE

## 2023-11-15 RX ORDER — OLMESARTAN MEDOXOMIL AND HYDROCHLOROTHIAZIDE 40/12.5 40; 12.5 MG/1; MG/1
1 TABLET ORAL DAILY
Qty: 90 TABLET | Refills: 3 | Status: SHIPPED | OUTPATIENT
Start: 2023-11-15 | End: 2023-12-06

## 2023-11-15 RX ORDER — MOMETASONE FUROATE 1 MG/G
CREAM TOPICAL
Qty: 100 G | Refills: 3 | Status: SHIPPED | OUTPATIENT
Start: 2023-11-15 | End: 2023-11-15

## 2023-11-15 RX ORDER — ATORVASTATIN CALCIUM 40 MG/1
40 TABLET, FILM COATED ORAL DAILY
Qty: 90 TABLET | Refills: 3 | Status: SHIPPED | OUTPATIENT
Start: 2023-11-15 | End: 2024-02-02

## 2023-11-15 RX ORDER — MOMETASONE FUROATE 1 MG/G
CREAM TOPICAL
Qty: 100 G | Refills: 3 | Status: SHIPPED | OUTPATIENT
Start: 2023-11-15

## 2023-11-15 RX ORDER — RESPIRATORY SYNCYTIAL VIRUS VACCINE 120MCG/0.5
0.5 KIT INTRAMUSCULAR ONCE
Qty: 1 EACH | Refills: 0 | Status: SHIPPED | OUTPATIENT
Start: 2023-11-15 | End: 2023-11-15

## 2023-11-15 ASSESSMENT — ANXIETY QUESTIONNAIRES
4. TROUBLE RELAXING: SEVERAL DAYS
IF YOU CHECKED OFF ANY PROBLEMS ON THIS QUESTIONNAIRE, HOW DIFFICULT HAVE THESE PROBLEMS MADE IT FOR YOU TO DO YOUR WORK, TAKE CARE OF THINGS AT HOME, OR GET ALONG WITH OTHER PEOPLE: VERY DIFFICULT
1. FEELING NERVOUS, ANXIOUS, OR ON EDGE: NOT AT ALL
GAD7 TOTAL SCORE: 4
GAD7 TOTAL SCORE: 4
5. BEING SO RESTLESS THAT IT IS HARD TO SIT STILL: MORE THAN HALF THE DAYS
3. WORRYING TOO MUCH ABOUT DIFFERENT THINGS: NOT AT ALL
2. NOT BEING ABLE TO STOP OR CONTROL WORRYING: NOT AT ALL
7. FEELING AFRAID AS IF SOMETHING AWFUL MIGHT HAPPEN: NOT AT ALL
6. BECOMING EASILY ANNOYED OR IRRITABLE: SEVERAL DAYS

## 2023-11-15 ASSESSMENT — PATIENT HEALTH QUESTIONNAIRE - PHQ9: SUM OF ALL RESPONSES TO PHQ QUESTIONS 1-9: 4

## 2023-11-15 ASSESSMENT — ENCOUNTER SYMPTOMS: BACK PAIN: 1

## 2023-11-15 NOTE — LETTER
November 16, 2023    Chelo Brooks  [address]   [place] [address]          Dear ,    We are writing to inform you of your test results.  Your blood count and blood glucose tests are fine. Your kidney tests are stable. Your parathyroid test is improving.         Resulted Orders   Parathyroid Hormone Intact   Result Value Ref Range    Parathyroid Hormone Intact 87 (H) 15 - 65 pg/mL    Narrative    This result was obtained with the Roche Elecsys PTH STAT assay.   This reference range differs from PTH assays used in other [facility] laboratories.   Albumin Random Urine Quantitative with Creat Ratio   Result Value Ref Range    Creatinine Urine mg/dL 218.0 mg/dL      Comment:      The reference ranges have not been established in urine creatinine. The results should be integrated into the clinical context for interpretation.    Albumin Urine mg/L 12.9 mg/L      Comment:      The reference ranges have not been established in urine albumin. The results should be integrated into the clinical context for interpretation.    Albumin Urine mg/g Cr 5.92 0.00 - 25.00 mg/g Cr      Comment:      Microalbuminuria is defined as an albumin:creatinine ratio of 17 to 299 for males and 25 to 299 for females. A ratio of albumin:creatinine of 300 or higher is indicative of overt proteinuria.  Due to biologic variability, positive results should be confirmed by a second, first-morning random or 24-hour timed urine specimen. If there is discrepancy, a third specimen is recommended. When 2 out of 3 results are in the microalbuminuria range, this is evidence for incipient nephropathy and warrants increased efforts at glucose control, blood pressure control, and institution of therapy with an angiotensin-converting-enzyme (ACE) inhibitor (if the patient can tolerate it).     Basic metabolic panel  (Ca, Cl, CO2, Creat, Gluc, K, Na, BUN)   Result Value Ref Range    Sodium 147 (H) 135 - 145 mmol/L       Comment:      Reference intervals for this test were updated on 09/26/2023 to more accurately reflect our healthy population. There may be differences in the flagging of prior results with similar values performed with this method. Interpretation of those prior results can be made in the context of the updated reference intervals.     Potassium 3.9 3.4 - 5.3 mmol/L    Chloride 110 (H) 98 - 107 mmol/L    Carbon Dioxide (CO2) 25 22 - 29 mmol/L    Anion Gap 12 7 - 15 mmol/L    Urea Nitrogen 32.1 (H) 8.0 - 23.0 mg/dL    Creatinine 1.39 (H) 0.51 - 0.95 mg/dL    GFR Estimate 39 (L) >60 mL/min/1.73m2    Calcium 9.2 8.8 - 10.2 mg/dL    Glucose 110 (H) 70 - 99 mg/dL   CBC with platelets and differential   Result Value Ref Range    WBC Count 4.3 4.0 - 11.0 10e3/uL    RBC Count 4.40 3.80 - 5.20 10e6/uL    Hemoglobin 12.7 11.7 - 15.7 g/dL    Hematocrit 39.4 35.0 - 47.0 %    MCV 90 78 - 100 fL    MCH 28.9 26.5 - 33.0 pg    MCHC 32.2 31.5 - 36.5 g/dL    RDW 13.5 10.0 - 15.0 %    Platelet Count 193 150 - 450 10e3/uL    % Neutrophils 64 %    % Lymphocytes 22 %    % Monocytes 8 %    % Eosinophils 4 %    % Basophils 1 %    % Immature Granulocytes 0 %    Absolute Neutrophils 2.8 1.6 - 8.3 10e3/uL    Absolute Lymphocytes 1.0 0.8 - 5.3 10e3/uL    Absolute Monocytes 0.4 0.0 - 1.3 10e3/uL    Absolute Eosinophils 0.2 0.0 - 0.7 10e3/uL    Absolute Basophils 0.0 0.0 - 0.2 10e3/uL    Absolute Immature Granulocytes 0.0 <=0.4 10e3/uL       If you have any questions or concerns, please call the clinic at the number listed above.       Sincerely,      Vita Zavala MD

## 2023-11-15 NOTE — LETTER
November 16, 2023    Chelo Brooks  [address]   [place] [address]          Dear ,    We are writing to inform you of your test results.  Your results are normal.       Resulted Orders   XR Chest 2 Views    Narrative    CHEST TWO VIEWS  11/15/2023 11:41 AM     HISTORY: Chronic atrial fibrillation (H).    COMPARISON: 1/25/2017.      Impression    IMPRESSION: No acute cardiopulmonary disease.    GLORIA MATA MD         SYSTEM ID:  [id number]       If you have any questions or concerns, please call the clinic at the number listed above.       Sincerely,      Vita Zavala MD

## 2023-11-15 NOTE — RESULT ENCOUNTER NOTE
Mail letter:  Your blood count and blood glucose tests are fine. Your kidney tests are stable. Your parathyroid test is improving.     Vita Zavala MD

## 2023-11-15 NOTE — PROGRESS NOTES
"  Assessment & Plan     (I48.20) Chronic atrial fibrillation (H)  (primary encounter diagnosis)  Comment: new diagnosis - symptomatic with shortness of breath for months   Plan: CBC with platelets and differential, XR Chest 2        Views, EKG 12-lead complete w/read - Clinics,         TSH with free T4 reflex, Echocardiogram         Complete, rivaroxaban ANTICOAGULANT (XARELTO)         20 MG TABS tablet, Adult Cardiology Eval          Referral,          Discussed risks and benefits of this medication.     (M48.062) Spinal stenosis of lumbar region with neurogenic claudication  Plan: MR Lumbar Spine w/o Contrast, Pain Management          Referral        Follow-up with neurosurgery pending results     (E66.01) Morbid obesity due to excess calories (H)  Plan: continue lifestyle strategies, exercise     (I87.2) Stasis dermatitis of both legs  Plan: mometasone (ELOCON) 0.1 % external cream,      Lower extremity elevation, low-salt diet, compression stockings, and call or return to clinic as needed if these symptoms worsen or fail to improve as anticipated.     (N18.32) Stage 3b chronic kidney disease (CKD) (H)  (I12.9) Renal hypertension  Plan: olmesartan-hydrochlorothiazide (BENICAR HCT)         40-12.5 MG tablet        Reduce dose due to hypotension     (E78.5) Hyperlipidemia LDL goal <40  Plan: atorvastatin (LIPITOR) 40 MG tablet          (R79.89) Elevated parathyroid hormone  (M85.80) Osteopenia, unspecified location  Plan: Basic metabolic panel  (Ca, Cl, CO2, Creat,         Gluc, K, Na, BUN)        Consider Prolia - discuss at next appointment     (E04.1) Thyroid nodule  Plan: US Thyroid          BMI:   Estimated body mass index is 53.39 kg/m  as calculated from the following:    Height as of this encounter: 1.687 m (5' 6.42\").    Weight as of this encounter: 152 kg (335 lb).   Weight management plan: Discussed healthy diet and exercise guidelines    See Patient Instructions    Vita Zavala, " MD BASS [facility] NICOLE Whitney is a 78 year old, presenting for the following health issues:  Back Pain (Left leg numbness)        11/15/2023     9:55 AM   Additional Questions   Roomed by Faby JOHNSON CMA   Accompanied by Self       History of Present Illness       Reason for visit:  Lower back pain and leg pain    She eats 0-1 servings of fruits and vegetables daily.She consumes 1 sweetened beverage(s) daily.She exercises with enough effort to increase her heart rate 10 to 19 minutes per day.  She exercises with enough effort to increase her heart rate 4 days per week. She is missing 1 dose(s) of medications per week.  She is not taking prescribed medications regularly due to remembering to take.         Pain History:  When did you first notice your pain? Couple months   Have you seen this provider for your pain in the past? Yes   Where in your body do you have pain? Low back and buttocks  Are you seeing anyone else for your pain? Yes - physical therapy                1/25/2017    12:58 PM 11/15/2023    10:08 AM   PHQ-9 SCORE   PHQ-9 Total Score 4 4           1/25/2017    12:58 PM 11/15/2023    10:07 AM   MICHAEL-7 SCORE   Total Score 5 4           11/15/2023    10:03 AM   PEG Score   PEG Total Score 6       Chronic Pain Follow Up:    Location of pain: back, legs   Analgesia/pain control:    - Recent changes:  none     - Overall control: Inadequate pain control    - Current treatments: physical therapy    Adherence:     - Do you ever take more pain medicine than prescribed? No   Adverse effects: No   PDMP Review       None          Last CSA Agreement:   CSA -- Patient Level:    CSA: None found at the patient level.       Last UDS:                 Review of Systems   Musculoskeletal:  Positive for back pain.      CONSTITUTIONAL: NEGATIVE for fever, chills, change in weight  INTEGUMENTARY/SKIN: red, scaly, vesicular rash on shins   ENT/MOUTH: NEGATIVE for ear, mouth and throat  "problems  RESP:SOB/dyspnea  CV: POSITIVE for lower extremity edema and NEGATIVE for chest pain/chest pressure, orthopnea, palpitations, and paroxysmal nocturnal dyspnea  : NEGATIVE for frequency, dysuria, or hematuria  MUSCULOSKELETAL: back and radicular pain, gait disturbance   NEURO: lower extremity weakness   ENDOCRINE: NEGATIVE for temperature intolerance, skin/hair changes      Objective    /71 (BP Location: Left arm, Patient Position: Sitting, Cuff Size: Adult Regular)   Pulse 99   Temp 97.6  F (36.4  C) (Oral)   Resp 16   Ht 1.687 m (5' 6.42\")   Wt (!) 152 kg (335 lb)   SpO2 95%   BMI 53.39 kg/m    Body mass index is 53.39 kg/m .  Physical Exam   GENERAL: alert, no distress, and obese  NECK: no adenopathy, no asymmetry, masses, or scars and thyroid normal to palpation  RESP: lungs clear to auscultation - no rales, rhonchi or wheezes  CV: irregularly irregular rhythm, normal S1 S2, no S3 or S4, and woody bipedal edema   MS: no deformity; uses walker   SKIN: pink scaly rash on anterior shins   NEURO: Normal strength and tone, mentation intact and speech normal  PSYCH: mentation appears normal, affect normal/bright    Office Visit on 03/27/2023   Component Date Value Ref Range Status    Cholesterol 03/27/2023 145  <200 mg/dL Final    Triglycerides 03/27/2023 99  <150 mg/dL Final    Direct Measure HDL 03/27/2023 54  >=50 mg/dL Final    LDL Cholesterol Calculated 03/27/2023 71  <=100 mg/dL Final    Non HDL Cholesterol 03/27/2023 91  <130 mg/dL Final    Patient Fasting > 8hrs? 03/27/2023 Unknown   Final    Sodium 03/27/2023 141  133 - 144 mmol/L Final    Potassium 03/27/2023 4.5  3.4 - 5.3 mmol/L Final    Chloride 03/27/2023 108  94 - 109 mmol/L Final    Carbon Dioxide (CO2) 03/27/2023 30  20 - 32 mmol/L Final    Anion Gap 03/27/2023 3  3 - 14 mmol/L Final    Urea Nitrogen 03/27/2023 26  7 - 30 mg/dL Final    Creatinine 03/27/2023 1.28 (H)  0.52 - 1.04 mg/dL Final    Calcium 03/27/2023 8.7  8.5 - " 10.1 mg/dL Final    Glucose 03/27/2023 89  70 - 99 mg/dL Final    GFR Estimate 03/27/2023 43 (L)  >60 mL/min/1.73m2 Final    eGFR calculated using 2021 CKD-EPI equation.    Sodium 03/27/2023 140  133 - 144 mmol/L Final    Potassium 03/27/2023 4.5  3.4 - 5.3 mmol/L Final    Chloride 03/27/2023 108  94 - 109 mmol/L Final    Carbon Dioxide (CO2) 03/27/2023 28  20 - 32 mmol/L Final    Anion Gap 03/27/2023 4  3 - 14 mmol/L Final    Urea Nitrogen 03/27/2023 28  7 - 30 mg/dL Final    Creatinine 03/27/2023 1.28 (H)  0.52 - 1.04 mg/dL Final    Calcium 03/27/2023 9.1  8.5 - 10.1 mg/dL Final    Glucose 03/27/2023 92  70 - 99 mg/dL Final    Albumin 03/27/2023 3.7  3.4 - 5.0 g/dL Final    Phosphorus 03/27/2023 3.7  2.5 - 4.5 mg/dL Final    GFR Estimate 03/27/2023 43 (L)  >60 mL/min/1.73m2 Final    eGFR calculated using 2021 CKD-EPI equation.    Hemoglobin 03/27/2023 11.9  11.7 - 15.7 g/dL Final    Creatinine Urine mg/dL 03/27/2023 196  mg/dL Final    Albumin Urine mg/L 03/27/2023 15  mg/L Final    Albumin Urine mg/g Cr 03/27/2023 7.65  0.00 - 25.00 mg/g Cr Final     Results for orders placed or performed in visit on 11/15/23 (from the past 24 hour(s))   CBC with platelets and differential    Narrative    The following orders were created for panel order CBC with platelets and differential.  Procedure                               Abnormality         Status                     ---------                               -----------         ------                     CBC with platelets and d...[[id number]]                      Final result                 Please view results for these tests on the individual orders.   CBC with platelets and differential   Result Value Ref Range    WBC Count 4.3 4.0 - 11.0 10e3/uL    RBC Count 4.40 3.80 - 5.20 10e6/uL    Hemoglobin 12.7 11.7 - 15.7 g/dL    Hematocrit 39.4 35.0 - 47.0 %    MCV 90 78 - 100 fL    MCH 28.9 26.5 - 33.0 pg    MCHC 32.2 31.5 - 36.5 g/dL    RDW 13.5 10.0 - 15.0 %    Platelet  Count 193 150 - 450 10e3/uL    % Neutrophils 64 %    % Lymphocytes 22 %    % Monocytes 8 %    % Eosinophils 4 %    % Basophils 1 %    % Immature Granulocytes 0 %    Absolute Neutrophils 2.8 1.6 - 8.3 10e3/uL    Absolute Lymphocytes 1.0 0.8 - 5.3 10e3/uL    Absolute Monocytes 0.4 0.0 - 1.3 10e3/uL    Absolute Eosinophils 0.2 0.0 - 0.7 10e3/uL    Absolute Basophils 0.0 0.0 - 0.2 10e3/uL    Absolute Immature Granulocytes 0.0 <=0.4 10e3/uL

## 2023-11-28 ENCOUNTER — ANCILLARY PROCEDURE (OUTPATIENT)
Dept: CARDIOLOGY | Facility: CLINIC | Age: 78
End: 2023-11-28
Attending: FAMILY MEDICINE
Payer: COMMERCIAL

## 2023-11-28 DIAGNOSIS — I48.20 CHRONIC ATRIAL FIBRILLATION (H): ICD-10-CM

## 2023-11-28 LAB — LVEF ECHO: NORMAL

## 2023-11-28 PROCEDURE — 93306 TTE W/DOPPLER COMPLETE: CPT | Performed by: STUDENT IN AN ORGANIZED HEALTH CARE EDUCATION/TRAINING PROGRAM

## 2023-11-28 NOTE — RESULT ENCOUNTER NOTE
Chelo,    Here are your echocardiogram results. No new findings. See the cardiologist as we discussed.     Vita Zavala MD

## 2023-11-28 NOTE — LETTER
2023    Chelo Brooks  [address] [place] [address]   [facility] [id number]          Dear ,    We are writing to inform you of your test results.  Here are your echocardiogram results. No new findings. See the cardiologist as we discussed.       Resulted Orders   Echocardiogram Complete   Result Value Ref Range    LVEF  55-60%     Narrative    [id number]  [id number]  [id number]  [id number]^CHILO^ESTRADA^KATHRYN     [facility], Echocardiography Laboratory  [address] [place] [id number]     Name: CHELO BROOKS  MRN: [id number]  : 1945  Study Date: 2023 10:52 AM  Age: 78 yrs  Gender: Female  Patient Location: [facility]  Reason For Study: Chronic atrial fibrillation (H)  Ordering Physician: ESTRADA WOO  Referring Physician: ESTRADA WOO  Performed By: Kathryn Guerrero BOO     BSA: 2.5 m2  Height: 66 in  Weight: 335 lb  BP: 131/83 mmHg  ______________________________________________________________________________  Procedure  Echocardiogram with two-dimensional, color and spectral Doppler performed.  Technically difficult study.Extremely poor acoustic windows.  ______________________________________________________________________________  Interpretation Summary     Technically difficult study.Extremely poor acoustic windows.  Left ventricular size, wall motion and function are normal. The ejection  fraction is 55-60%.  Global right ventricular function is normal.  IVC diameter <2.1 cm collapsing >50% with sniff suggests a normal RA pressure  of 3 mmHg.  No pericardial effusion is present.  ______________________________________________________________________________  Left Ventricle  Left ventricular size, wall motion and function are normal. The ejection  fraction is 55-60%. Left ventricular diastolic function is indeterminate.     Right Ventricle  The right ventricle is normal size. Global right ventricular function is  normal.      Atria  Both atria appear normal.     Mitral Valve  The mitral valve is normal. Trace mitral insufficiency is present.     Aortic Valve  The valve leaflets are not well visualized. On Doppler interrogation, there is  no significant stenosis or regurgitation.     Tricuspid Valve  The valve leaflets are not well visualized. Mild tricuspid insufficiency is  present. Pulmonary artery systolic pressure cannot be assessed.     Pulmonic Valve  The valve leaflets are not well visualized. On Doppler interrogation, there is  no significant stenosis or regurgitation.     Vessels  The thoracic aorta cannot be assessed. IVC diameter <2.1 cm collapsing >50%  with sniff suggests a normal RA pressure of 3 mmHg.     Pericardium  No pericardial effusion is present.     Compared to Previous Study  No significant changes noted.  ______________________________________________________________________________  MMode/2D Measurements & Calculations     Ao root diam: 3.3 cm  asc Aorta Diam: 3.4 cm  LVOT diam: 2.0 cm  LVOT area: 3.2 cm2  Ao root diam index Ht(cm/m): 2.0  Ao root diam index BSA (cm/m2): 1.3  Asc Ao diam index BSA (cm/m2): 1.4  Asc Ao diam index Ht(cm/m): 2.0  LA Volume (BP): 70.4 ml  LA Volume Index (BP): 28.3 ml/m2  RV Base: 3.1 cm     TAPSE: 1.7 cm     Doppler Measurements & Calculations  MV E max ela: 102.0 cm/sec  MV max P.9 mmHg  MV mean P.4 mmHg  MV V2 VTI: 20.9 cm  MV dec time: 0.20 sec  PA acc time: 0.10 sec  E/E' av.5  Lateral E/e': 7.3  Medial E/e': 11.7     ______________________________________________________________________________  Report approved by: MD Mack Anne 2023 12:25 PM             If you have any questions or concerns, please call the clinic at the number listed above.       Sincerely,      Vita Zavala MD

## 2023-12-06 ENCOUNTER — TELEPHONE (OUTPATIENT)
Dept: CARDIOLOGY | Facility: CLINIC | Age: 78
End: 2023-12-06

## 2023-12-06 ENCOUNTER — OFFICE VISIT (OUTPATIENT)
Dept: CARDIOLOGY | Facility: CLINIC | Age: 78
End: 2023-12-06
Attending: FAMILY MEDICINE
Payer: COMMERCIAL

## 2023-12-06 VITALS
HEART RATE: 91 BPM | WEIGHT: 293 LBS | BODY MASS INDEX: 53.64 KG/M2 | OXYGEN SATURATION: 99 % | DIASTOLIC BLOOD PRESSURE: 86 MMHG | SYSTOLIC BLOOD PRESSURE: 139 MMHG

## 2023-12-06 DIAGNOSIS — E66.01 MORBID OBESITY (H): ICD-10-CM

## 2023-12-06 DIAGNOSIS — I48.20 CHRONIC ATRIAL FIBRILLATION (H): ICD-10-CM

## 2023-12-06 DIAGNOSIS — I48.19 PERSISTENT ATRIAL FIBRILLATION (H): Primary | ICD-10-CM

## 2023-12-06 DIAGNOSIS — I50.30 HEART FAILURE WITH PRESERVED EJECTION FRACTION, NYHA CLASS II (H): ICD-10-CM

## 2023-12-06 PROCEDURE — 99204 OFFICE O/P NEW MOD 45 MIN: CPT | Performed by: INTERNAL MEDICINE

## 2023-12-06 PROCEDURE — 93000 ELECTROCARDIOGRAM COMPLETE: CPT | Performed by: INTERNAL MEDICINE

## 2023-12-06 RX ORDER — BUMETANIDE 1 MG/1
1 TABLET ORAL DAILY
Qty: 90 TABLET | Refills: 3 | Status: SHIPPED | OUTPATIENT
Start: 2023-12-06 | End: 2023-12-06

## 2023-12-06 RX ORDER — OLMESARTAN MEDOXOMIL 40 MG/1
40 TABLET ORAL DAILY
Qty: 90 TABLET | Refills: 3 | Status: SHIPPED | OUTPATIENT
Start: 2023-12-06 | End: 2024-06-07

## 2023-12-06 RX ORDER — MAGNESIUM SULFATE HEPTAHYDRATE 40 MG/ML
2 INJECTION, SOLUTION INTRAVENOUS
Status: CANCELLED | OUTPATIENT
Start: 2023-12-06

## 2023-12-06 RX ORDER — OLMESARTAN MEDOXOMIL 40 MG/1
40 TABLET ORAL DAILY
Qty: 90 TABLET | Refills: 3 | Status: SHIPPED | OUTPATIENT
Start: 2023-12-06 | End: 2023-12-06

## 2023-12-06 RX ORDER — POTASSIUM CHLORIDE 1500 MG/1
40 TABLET, EXTENDED RELEASE ORAL
Status: CANCELLED | OUTPATIENT
Start: 2023-12-06

## 2023-12-06 RX ORDER — BUMETANIDE 1 MG/1
1 TABLET ORAL DAILY
Qty: 90 TABLET | Refills: 3 | Status: SHIPPED | OUTPATIENT
Start: 2023-12-06 | End: 2024-01-10

## 2023-12-06 RX ORDER — AMIODARONE HYDROCHLORIDE 200 MG/1
TABLET ORAL
Qty: 180 TABLET | Refills: 3 | Status: SHIPPED | OUTPATIENT
Start: 2023-12-06 | End: 2024-06-07

## 2023-12-06 RX ORDER — AMIODARONE HYDROCHLORIDE 200 MG/1
TABLET ORAL
Qty: 180 TABLET | Refills: 3 | Status: SHIPPED | OUTPATIENT
Start: 2023-12-06 | End: 2023-12-06

## 2023-12-06 RX ORDER — POTASSIUM CHLORIDE 1500 MG/1
20 TABLET, EXTENDED RELEASE ORAL
Status: CANCELLED | OUTPATIENT
Start: 2023-12-06

## 2023-12-06 RX ORDER — LIDOCAINE 40 MG/G
CREAM TOPICAL
Status: CANCELLED | OUTPATIENT
Start: 2023-12-06

## 2023-12-06 NOTE — PATIENT INSTRUCTIONS
Thank you for coming to the [facility] at [place]; please note the following instructions:    1. EKG today    2. Cardioversion CANDE     3. Stop hydrochlorothiazide    4. Start Bumex 1 mg daily    5. Start Olmesartan 40 mg daily    6. Start Amiodarone 200 mg twice daily for 2 weeks then reduce dose to once daily    7. Labs in 1 week    8. Follow up with Dr. Montano in 1 month after Cardioversion      You are scheduled for a Cardioversion with Transesophageal Echocardiogram (CANDE), at [facility] [place]. The hospital is located at [address] [place] [address] on the [place] of the [place].  If you need to cancel this procedure, please call [phone].       Visitor Policy: Two visitors.    Date: TBD  Time:  TBD_To the Gold Waiting Room at the [facility]  Cardioversion    Please do not eat anything for 8 hours prior to your procedure. You may have sips of water up until 2 hours prior to your arrival.  2. Medications to continue:  - Anticoagulant (Example: Eliquis, Xarelto, Pradaxa, Warfarin)  - Take all meds as prescribed, except for those noted below.  3. Medications to hold:    - Morning of: diuretic, oral diabetic meds, [ertugliflozin (Steglatro), canaglilozin (Invokana), dapagliflozin (Farxiga), empagliflozin (Jardiance)], short acting insulin, mixed insulin: take 66% of NPH.  - Day prior: Take 80% of long acting insulin.  - Day prior & day of, if daily or BID dosing: [liraglutide (Victoza, Saxenda), exenatide (Byetta)].   - 1 week prior, if weekly dosing: [semaglutide (Rybelsus, Ozempic, Wegovy), dulaglutide (Trulicity), exenatide ER (Bydureon), tirzepatide (Mounjaro)].  4. You will discharge same day. You will need a .    If you have further questions, please utilize [employer] to contact us.   If your question concerns the above instructions, contact:  Bekah JACOB RN  Nurse Coordinator.  [phone]    If your question concerns the schedule/appointment times,  contact:  HANANE [facility] Procedure   [phone]         If you have any questions regarding your visit, please contact your care team:     CARDIOLOGY  TELEPHONE NUMBER   Katty MORROWSamira, Registered Nurse  Bekah PEREZ, Registered Nurse  Eileen RICO, Registered Medical Assistant  Vita PRADHAN, Certified Medical Assistant  Mavis STANLEY, Clinic Assistant [phone] (select option 1)    *After hours: [phone]   For Scheduling Appts:     [phone] (select option 1)    *After hours: [phone]   For the Device Clinic (Pacemakers and ICD's)  Estella DONAHUE Registered Nurse   During business hours: [phone]    *After business hours:  [phone] (select option 4)      Normal test result notifications will be released via [facility] [employer] or mailed within 7 business days.  All other test results, will be communicated via telephone once reviewed by your cardiologist.    If you need a medication refill, please contact your pharmacy.  Please allow 3 business days for your refill to be completed.    As always, thank you for trusting us with your health care needs!

## 2023-12-06 NOTE — NURSING NOTE
Labs: Patient was given results of the laboratory testing obtained today. Patient was instructed to return for the next laboratory testing in Patient to have labs in 1 week. . Patient demonstrated understanding of this information and agreed to call with further questions or concerns.     Med Reconcile: Reviewed and verified all current medications with the patient. The updated medication list was printed and given to the patient.    New Medication: Patient was educated regarding newly prescribed medication, including discussion of  the indication, administration, side effects, and when to report to MD or RN. Patient demonstrated understanding of this information and agreed to call with further questions or concerns. Patient to start bumex 1 mg daily. Patient to start amiodarone 200 mg twice daily for 2 weeks then reduce dose to 1 tablet daily.    Patient requested [facility] as she feels they will be better covered. Informed patient to notify clinic if she needs them transferred to a different location or if she cannot get medication in a timely manner. Patient verbalized understanding.    EP Procedure: Patient given instructions regarding  cardioversion Discussed purpose, preparation, and procedure with the patient. Patient demonstrated understanding of this information and agreed to call with further questions or concerns. Patient to have Cardioversion with CANDE in OR.  Patient to follow up with Dr. Montano 1 month after cardioversion.    Medication Change: Patient was educated regarding prescribed medication change, including discussion of the indication, administration, side effects, and when to report to MD or RN. Patient demonstrated understanding of this information and agreed to call with further questions or concerns. Patient to stop olmesartan-hydrochlorothiazide. Patient to start olmesartan 40 mg daily.    Below cardioversion instructions reviewed with patient.      You are  scheduled for a Cardioversion with Transesophageal Echocardiogram (CANDE), at [facility] [place]. The hospital is located at [address] [place] [address] on the [place].  If you need to cancel this procedure, please call [phone].       Visitor Policy: Two visitors.    Date:_TBD  Time: _TBD_To the Gold Waiting Room at the [facility]  Cardioversion    Please do not eat anything for 8 hours prior to your procedure. You may have sips of water up until 2 hours prior to your arrival.  2. Medications to continue:  - Anticoagulant (Example: Eliquis, Xarelto, Pradaxa, Warfarin)  - Take all meds as prescribed, except for those noted below.  3. Medications to hold:    - Morning of: diuretic, oral diabetic meds, [ertugliflozin (Steglatro), canaglilozin (Invokana), dapagliflozin (Farxiga), empagliflozin (Jardiance)], short acting insulin, mixed insulin: take 66% of NPH.  - Day prior: Take 80% of long acting insulin.  - Day prior & day of, if daily or BID dosing: [liraglutide (Victoza, Saxenda), exenatide (Byetta)].   - 1 week prior, if weekly dosing: [semaglutide (Rybelsus, Ozempic, Wegovy), dulaglutide (Trulicity), exenatide ER (Bydureon), tirzepatide (Mounjaro)].  4. You will discharge same day. You will need a .    If you have further questions, please utilize [employer] to contact us.   If your question concerns the above instructions, contact:  [phone]    If your question concerns the schedule/appointment times, contact:  HANANE Baxter Procedure   [phone]     Patient stated she understood all health information given and agreed to call with further questions or concerns.    Bekah Zuniga, RN, BSN  Cardiology RN Care Coordinator   [place]   Phone: [phone]  Fax: [phone] ([place]) [phone] ([place])

## 2023-12-06 NOTE — PROGRESS NOTES
[facility]      Referring provider:Vita Zavala MD     HPI: Ms. Chelo Brooks is a 78 year old  female with PMH significant for    -Morbid obesity BMI 53  -Hypertension.   -Persistent atrial fibrillation  -CKD stage III  -Obstructive sleep apnea    Patient is being seen today for worsening shortness of breath over the last 1 year.  She also reports worsening lower extremity edema sometimes weeping from her legs.  She has noticed that when she goes to shopping she has to stop multiple times over the last 1 year.  She feels exhausted with activity.  No chest pain, palpitations, dizziness, or syncope.    Patient was recently seen in primary care clinic for the symptoms and found to be in atrial fibrillation.  Started on Xarelto.  Referred to cardiology for further management of A-fib.  A-fib is a new diagnosis for the patient.    Prior cardiac work-up in 2017 shows normal cardiac MRI and sinus rhythm EKG.  Recent echocardiogram on 11/28/2023 shows normal biventricular function with no significant valve disease.    Medications: Atorvastatin 40 mg, olmesartan hydrochlorothiazide 40/12.5, Xarelto 20 mg.    Medications, personal, family, and social history reviewed with patient and revised.    PAST MEDICAL HISTORY:  Past Medical History:   Diagnosis Date    Acquired renal cyst of left kidney     Adrenal nodule (H24)     left - needs yearly CT    Ascending aorta dilatation (H24)     Chronic atrial fibrillation (H) 11/15/2023    CKD (chronic kidney disease) stage 3, GFR 30-59 ml/min (H)     Degenerative joint disease (DJD) of hip     DJD (degenerative joint disease) 10/21/2005    Eczema, unspecified type 04/05/2018    Elevated parathyroid hormone 06/18/2019    Gallstones     Hepatitis B childhood     resolved, patient is not a carrier     Hyperlipidemia 11/27/2012    Hypertension goal  BP (blood pressure) < 140/90     Lumbar spinal stenosis 07/17/2017    Mild persistent asthma without complication 05/30/2017    Morbid obesity due to excess calories (H) 11/23/2015    Surgical referral declined '16     FLOR (obstructive sleep apnea)     Osteopenia     Pulmonary emboli (H) 10/28/2019    Shingles 2014    scalp    Stasis dermatitis of both legs 04/05/2018    Thyroid nodule     Umbilical hernia     Vitamin D deficiency        CURRENT MEDICATIONS:  Current Outpatient Medications   Medication Sig Dispense Refill    albuterol (PROAIR HFA/PROVENTIL HFA/VENTOLIN HFA) 108 (90 Base) MCG/ACT inhaler Inhale 1-2 puffs into the lungs every 4 hours as needed for shortness of breath / dyspnea 1 Inhaler 3    atorvastatin (LIPITOR) 40 MG tablet Take 1 tablet (40 mg) by mouth daily 90 tablet 3    Cholecalciferol (VITAMIN D) 2000 UNITS tablet Take 2,000 Units by mouth daily      Cyanocobalamin (VITAMIN B-12 PO) Take by mouth daily      cyclobenzaprine (FLEXERIL) 10 MG tablet Take 1 tablet (10 mg) by mouth 3 times daily as needed for muscle spasms 30 tablet 1    famotidine (PEPCID) 40 MG tablet TAKE 1 TABLET(40 MG) BY MOUTH DAILY 90 tablet 3    fluticasone (FLONASE) 50 MCG/ACT nasal spray Spray 2 sprays into both nostrils daily 48 g 3    loratadine (CLARITIN) 10 MG tablet Take 1 tablet (10 mg) by mouth daily      mometasone (ELOCON) 0.1 % external cream Apply to affected area on legs twice daily as needed 100 g 3    olmesartan-hydrochlorothiazide (BENICAR HCT) 40-12.5 MG tablet Take 1 tablet by mouth daily 90 tablet 3    rivaroxaban ANTICOAGULANT (XARELTO) 20 MG TABS tablet Take 1 tablet (20 mg) by mouth daily (with dinner) 90 tablet 3    order for DME Equipment being ordered: Auto CPAP 11-18 1 Units 0       PAST SURGICAL HISTORY:  Past Surgical History:   Procedure Laterality Date    IR PAROTID BIOPSY  5/21/2020    ZZC TOTAL KNEE ARTHROPLASTY  3/2000    right    ZZC TOTAL KNEE ARTHROPLASTY  6/8/12    left    ZZC VAGINAL  HYSTERECTOMY  1977    benign, prolapse, ovaries remain        ALLERGIES:     Allergies   Allergen Reactions    Adhesive Tape        FAMILY HISTORY:  Family History   Problem Relation Age of Onset    Circulatory Father         d. DVT    Heart Disease Father         pacer    Diabetes Mother     Hypertension Mother     Deep Vein Thrombosis Brother     Coronary Artery Disease Brother     Myocardial Infarction Brother     Deep Vein Thrombosis Brother     C.A.D. Paternal Uncle         MI     Heart Disease Brother 48        pacer     Diabetes Maternal Grandmother     Hypertension Maternal Grandmother     Diabetes Maternal Aunt     Hypertension Maternal Aunt     Cancer - colorectal Maternal Grandfather     C.A.D. Maternal Aunt         MI    Glaucoma No family hx of     Macular Degeneration No family hx of          SOCIAL HISTORY:  Social History     Tobacco Use    Smoking status: Never    Smokeless tobacco: Never   Vaping Use    Vaping Use: Never used   Substance Use Topics    Alcohol use: Yes     Alcohol/week: 0.0 standard drinks of alcohol     Comment: very rare      Drug use: No       ROS:   Constitutional: No fever, chills, or sweats. Weight stable.   Cardiovascular: As per HPI.       Exam:  /86 (BP Location: Left arm, Patient Position: Sitting, Cuff Size: Adult Regular)   Pulse 91   Wt (!) 152.7 kg (336 lb 9.6 oz)   SpO2 99%   BMI 53.64 kg/m    GENERAL APPEARANCE: alert and no distress  HEENT: no icterus, no central cyanosis  LYMPH/NECK: no adenopathy, no asymmetry, JVP not elevated  RESPIRATORY: lungs clear to auscultation - no rales, rhonchi or wheezes, no use of accessory muscles, no retractions, respirations are unlabored, normal respiratory rate  CARDIOVASCULAR: irregular rhythm, normal S1, S2, no S3 or S4 and no murmur, click or rub, precordium quiet with normal PMI.  GI: soft, non tender  EXTREMITIES: 1+ bilateral pretibial edema  NEURO: alert, normal speech,and affect  SKIN: no ecchymoses, no  bautista     I have reviewed the labs and personally reviewed the imaging below and made my comment in the assessment and plan.    Labs:  CBC RESULTS:   Lab Results   Component Value Date    WBC 4.3 11/15/2023    WBC 4.0 08/06/2020    RBC 4.40 11/15/2023    RBC 4.26 08/06/2020    HGB 12.7 11/15/2023    HGB 12.3 08/06/2020    HCT 39.4 11/15/2023    HCT 37.3 08/06/2020    MCV 90 11/15/2023    MCV 88 08/06/2020    MCH 28.9 11/15/2023    MCH 28.9 08/06/2020    MCHC 32.2 11/15/2023    MCHC 33.0 08/06/2020    RDW 13.5 11/15/2023    RDW 13.9 08/06/2020     11/15/2023     08/06/2020       BMP RESULTS:  Lab Results   Component Value Date     (H) 11/15/2023     08/06/2020    POTASSIUM 3.9 11/15/2023    POTASSIUM 4.5 03/27/2023    POTASSIUM 3.9 08/06/2020    CHLORIDE 110 (H) 11/15/2023    CHLORIDE 108 03/27/2023    CHLORIDE 107 08/06/2020    CO2 25 11/15/2023    CO2 28 03/27/2023    CO2 30 08/06/2020    ANIONGAP 12 11/15/2023    ANIONGAP 4 03/27/2023    ANIONGAP 5 08/06/2020     (H) 11/15/2023    GLC 92 03/27/2023    GLC 89 08/06/2020    BUN 32.1 (H) 11/15/2023    BUN 28 03/27/2023    BUN 25 08/06/2020    CR 1.39 (H) 11/15/2023    CR 1.32 (H) 08/06/2020    GFRESTIMATED 39 (L) 11/15/2023    GFRESTIMATED 40 (L) 08/06/2020    GFRESTBLACK 46 (L) 08/06/2020    HECTOR 9.2 11/15/2023    HECTOR 8.4 (L) 08/06/2020      Echocardiogram 11/28/2023  Technically difficult study.Extremely poor acoustic windows.  Left ventricular size, wall motion and function are normal. The ejection  fraction is 55-60%.  Global right ventricular function is normal.  IVC diameter <2.1 cm collapsing >50% with sniff suggests a normal RA pressure  of 3 mmHg.  No pericardial effusion is present.    EKG 11/15/2023 atrial fibrillation.  Heart rate well controlled.            Cardiac MRI 2017  IMPRESSION:  1.  Regadenoson stress perfusion: Normal perfusion at rest and  post-stress without evidence of inducible ischemia.  2.  Normal left  ventricular size and systolic function with a  calculated ejection fraction of  62 %.  3.  Normal right ventricular size and systolic function with a  calculated ejection fraction of 60%.   4.  On delayed enhancement imaging, there is no abnormal  hyperenhancement to suggest myocardial scar/inflammation/infiltration.     Assessment:    #New onset persistent atrial fibrillation  #Morbid obesity  #UMANA and worsening functional capacity over the last 1 year  #Signs and symptoms consistent with HFpEF functional class II  -Patient is hypervolemic on exam.  EKG in clinic shows atrial fibrillation.  Heart rate well controlled without any AV alma blocking agent.  The onset of A-fib is unclear but at least it appears persistent over the last 1 month or so.  Her symptoms suggest that probably she has A-fib over the last 1 year given worsening UMANA.  I discussed rhythm versus rate control.  Her heart rate is not fast but I think she might benefit from rhythm control.  I discussed initiation of amiodarone because she will require cardioversion which will facilitate the success rate.  She tells me that she frequently forgets to take Xarelto.    Plan:  -Continue Xarelto 20 mg.  Can take during after lunch as well.    -Start amiodarone 200 mg twice daily for 2 weeks and then reduce the dose to 100 mg daily  -She is planning to undergo lab testing tomorrow with her primary care physician including thyroid tests  -CANDE cardioversion in the operating room given morbid obesity  -Start Bumex 1 mg daily  -Stop hydrochlorothiazide  -Continue olmesartan 40 mg daily  -BMP in a week    Return to clinic after cardioversion    Total time spent today for this visit is 57 minutes including precharting, face-to-face clinic visit, review of labs/imaging and medical documentation.    Patito LOAIZA MD  [facility] Division of Cardiology  Pager [phone]

## 2023-12-06 NOTE — NURSING NOTE
"Chief Complaint   Patient presents with    New Patient     Reason for visit: New general cardiology for Chronic atrial fibrillation       Initial /86 (BP Location: Left arm, Patient Position: Sitting, Cuff Size: Adult Regular)   Pulse 91   Wt (!) 152.7 kg (336 lb 9.6 oz)   SpO2 99%   BMI 53.64 kg/m   Estimated body mass index is 53.64 kg/m  as calculated from the following:    Height as of 11/15/23: 1.687 m (5' 6.42\").    Weight as of this encounter: 152.7 kg (336 lb 9.6 oz)..  BP completed using cuff size: regular (forearm measurement per patient)    CARLOS Sahu    "

## 2023-12-06 NOTE — LETTER
12/6/2023      RE: Chelo Brooks  [facility] [facility] [address] [facility] [address]  [place] [address]       Dear Colleague,    Thank you for the opportunity to participate in the care of your patient, Chelo Brooks, at the [facility]. Please see a copy of my visit note below.                                                                                                             General Cardiology Clinic-[place]      Referring provider:Vita Zavala MD     HPI: Ms. Chelo Brooks is a 78 year old  female with PMH significant for    -Morbid obesity BMI 53  -Hypertension.   -Persistent atrial fibrillation  -CKD stage III  -Obstructive sleep apnea    Patient is being seen today for worsening shortness of breath over the last 1 year.  She also reports worsening lower extremity edema sometimes weeping from her legs.  She has noticed that when she goes to shopping she has to stop multiple times over the last 1 year.  She feels exhausted with activity.  No chest pain, palpitations, dizziness, or syncope.    Patient was recently seen in primary care clinic for the symptoms and found to be in atrial fibrillation.  Started on Xarelto.  Referred to cardiology for further management of A-fib.  A-fib is a new diagnosis for the patient.    Prior cardiac work-up in 2017 shows normal cardiac MRI and sinus rhythm EKG.  Recent echocardiogram on 11/28/2023 shows normal biventricular function with no significant valve disease.    Medications: Atorvastatin 40 mg, olmesartan hydrochlorothiazide 40/12.5, Xarelto 20 mg.    Medications, personal, family, and social history reviewed with patient and revised.    PAST MEDICAL HISTORY:  Past Medical History:   Diagnosis Date    Acquired renal cyst of left kidney     Adrenal nodule (H24)     left - needs yearly CT    Ascending aorta dilatation (H24)     Chronic atrial fibrillation (H) 11/15/2023    CKD  (chronic kidney disease) stage 3, GFR 30-59 ml/min (H)     Degenerative joint disease (DJD) of hip     DJD (degenerative joint disease) 10/21/2005    Eczema, unspecified type 04/05/2018    Elevated parathyroid hormone 06/18/2019    Gallstones     Hepatitis B childhood     resolved, patient is not a carrier     Hyperlipidemia 11/27/2012    Hypertension goal BP (blood pressure) < 140/90     Lumbar spinal stenosis 07/17/2017    Mild persistent asthma without complication 05/30/2017    Morbid obesity due to excess calories (H) 11/23/2015    Surgical referral declined '16     FLOR (obstructive sleep apnea)     Osteopenia     Pulmonary emboli (H) 10/28/2019    Shingles 2014    scalp    Stasis dermatitis of both legs 04/05/2018    Thyroid nodule     Umbilical hernia     Vitamin D deficiency        CURRENT MEDICATIONS:  Current Outpatient Medications   Medication Sig Dispense Refill    albuterol (PROAIR HFA/PROVENTIL HFA/VENTOLIN HFA) 108 (90 Base) MCG/ACT inhaler Inhale 1-2 puffs into the lungs every 4 hours as needed for shortness of breath / dyspnea 1 Inhaler 3    atorvastatin (LIPITOR) 40 MG tablet Take 1 tablet (40 mg) by mouth daily 90 tablet 3    Cholecalciferol (VITAMIN D) 2000 UNITS tablet Take 2,000 Units by mouth daily      Cyanocobalamin (VITAMIN B-12 PO) Take by mouth daily      cyclobenzaprine (FLEXERIL) 10 MG tablet Take 1 tablet (10 mg) by mouth 3 times daily as needed for muscle spasms 30 tablet 1    famotidine (PEPCID) 40 MG tablet TAKE 1 TABLET(40 MG) BY MOUTH DAILY 90 tablet 3    fluticasone (FLONASE) 50 MCG/ACT nasal spray Spray 2 sprays into both nostrils daily 48 g 3    loratadine (CLARITIN) 10 MG tablet Take 1 tablet (10 mg) by mouth daily      mometasone (ELOCON) 0.1 % external cream Apply to affected area on legs twice daily as needed 100 g 3    olmesartan-hydrochlorothiazide (BENICAR HCT) 40-12.5 MG tablet Take 1 tablet by mouth daily 90 tablet 3    rivaroxaban ANTICOAGULANT (XARELTO) 20 MG TABS  tablet Take 1 tablet (20 mg) by mouth daily (with dinner) 90 tablet 3    order for DME Equipment being ordered: Auto CPAP 11-18 1 Units 0       PAST SURGICAL HISTORY:  Past Surgical History:   Procedure Laterality Date    IR PAROTID BIOPSY  5/21/2020    ZZC TOTAL KNEE ARTHROPLASTY  3/2000    right    ZZC TOTAL KNEE ARTHROPLASTY  6/8/12    left    ZZC VAGINAL HYSTERECTOMY  1977    benign, prolapse, ovaries remain        ALLERGIES:     Allergies   Allergen Reactions    Adhesive Tape        FAMILY HISTORY:  Family History   Problem Relation Age of Onset    Circulatory Father         d. DVT    Heart Disease Father         pacer    Diabetes Mother     Hypertension Mother     Deep Vein Thrombosis Brother     Coronary Artery Disease Brother     Myocardial Infarction Brother     Deep Vein Thrombosis Brother     C.A.D. Paternal Uncle         MI     Heart Disease Brother 48        pacer     Diabetes Maternal Grandmother     Hypertension Maternal Grandmother     Diabetes Maternal Aunt     Hypertension Maternal Aunt     Cancer - colorectal Maternal Grandfather     C.A.D. Maternal Aunt         MI    Glaucoma No family hx of     Macular Degeneration No family hx of          SOCIAL HISTORY:  Social History     Tobacco Use    Smoking status: Never    Smokeless tobacco: Never   Vaping Use    Vaping Use: Never used   Substance Use Topics    Alcohol use: Yes     Alcohol/week: 0.0 standard drinks of alcohol     Comment: very rare      Drug use: No       ROS:   Constitutional: No fever, chills, or sweats. Weight stable.   Cardiovascular: As per HPI.       Exam:  /86 (BP Location: Left arm, Patient Position: Sitting, Cuff Size: Adult Regular)   Pulse 91   Wt (!) 152.7 kg (336 lb 9.6 oz)   SpO2 99%   BMI 53.64 kg/m    GENERAL APPEARANCE: alert and no distress  HEENT: no icterus, no central cyanosis  LYMPH/NECK: no adenopathy, no asymmetry, JVP not elevated  RESPIRATORY: lungs clear to auscultation - no rales, rhonchi or wheezes,  no use of accessory muscles, no retractions, respirations are unlabored, normal respiratory rate  CARDIOVASCULAR: irregular rhythm, normal S1, S2, no S3 or S4 and no murmur, click or rub, precordium quiet with normal PMI.  GI: soft, non tender  EXTREMITIES: 1+ bilateral pretibial edema  NEURO: alert, normal speech,and affect  SKIN: no ecchymoses, no rashes     I have reviewed the labs and personally reviewed the imaging below and made my comment in the assessment and plan.    Labs:  CBC RESULTS:   Lab Results   Component Value Date    WBC 4.3 11/15/2023    WBC 4.0 08/06/2020    RBC 4.40 11/15/2023    RBC 4.26 08/06/2020    HGB 12.7 11/15/2023    HGB 12.3 08/06/2020    HCT 39.4 11/15/2023    HCT 37.3 08/06/2020    MCV 90 11/15/2023    MCV 88 08/06/2020    MCH 28.9 11/15/2023    MCH 28.9 08/06/2020    MCHC 32.2 11/15/2023    MCHC 33.0 08/06/2020    RDW 13.5 11/15/2023    RDW 13.9 08/06/2020     11/15/2023     08/06/2020       BMP RESULTS:  Lab Results   Component Value Date     (H) 11/15/2023     08/06/2020    POTASSIUM 3.9 11/15/2023    POTASSIUM 4.5 03/27/2023    POTASSIUM 3.9 08/06/2020    CHLORIDE 110 (H) 11/15/2023    CHLORIDE 108 03/27/2023    CHLORIDE 107 08/06/2020    CO2 25 11/15/2023    CO2 28 03/27/2023    CO2 30 08/06/2020    ANIONGAP 12 11/15/2023    ANIONGAP 4 03/27/2023    ANIONGAP 5 08/06/2020     (H) 11/15/2023    GLC 92 03/27/2023    GLC 89 08/06/2020    BUN 32.1 (H) 11/15/2023    BUN 28 03/27/2023    BUN 25 08/06/2020    CR 1.39 (H) 11/15/2023    CR 1.32 (H) 08/06/2020    GFRESTIMATED 39 (L) 11/15/2023    GFRESTIMATED 40 (L) 08/06/2020    GFRESTBLACK 46 (L) 08/06/2020    HECTOR 9.2 11/15/2023    HECTOR 8.4 (L) 08/06/2020      Echocardiogram 11/28/2023  Technically difficult study.Extremely poor acoustic windows.  Left ventricular size, wall motion and function are normal. The ejection  fraction is 55-60%.  Global right ventricular function is normal.  IVC diameter <2.1 cm  collapsing >50% with sniff suggests a normal RA pressure  of 3 mmHg.  No pericardial effusion is present.    EKG 11/15/2023 atrial fibrillation.  Heart rate well controlled.            Cardiac MRI 2017  IMPRESSION:  1.  Regadenoson stress perfusion: Normal perfusion at rest and  post-stress without evidence of inducible ischemia.  2.  Normal left ventricular size and systolic function with a  calculated ejection fraction of  62 %.  3.  Normal right ventricular size and systolic function with a  calculated ejection fraction of 60%.   4.  On delayed enhancement imaging, there is no abnormal  hyperenhancement to suggest myocardial scar/inflammation/infiltration.     Assessment:    #New onset persistent atrial fibrillation  #Morbid obesity  #UMANA and worsening functional capacity over the last 1 year  #Signs and symptoms consistent with HFpEF functional class II  -Patient is hypervolemic on exam.  EKG in clinic shows atrial fibrillation.  Heart rate well controlled without any AV alma blocking agent.  The onset of A-fib is unclear but at least it appears persistent over the last 1 month or so.  Her symptoms suggest that probably she has A-fib over the last 1 year given worsening UMANA.  I discussed rhythm versus rate control.  Her heart rate is not fast but I think she might benefit from rhythm control.  I discussed initiation of amiodarone because she will require cardioversion which will facilitate the success rate.  She tells me that she frequently forgets to take Xarelto.    Plan:  -Continue Xarelto 20 mg.  Can take during after lunch as well.    -Start amiodarone 200 mg twice daily for 2 weeks and then reduce the dose to 100 mg daily  -She is planning to undergo lab testing tomorrow with her primary care physician including thyroid tests  -CANDE cardioversion in the operating room given morbid obesity  -Start Bumex 1 mg daily  -Stop hydrochlorothiazide  -Continue olmesartan 40 mg daily  -BMP in a week    Return to  clinic after cardioversion    Total time spent today for this visit is 57 minutes including precharting, face-to-face clinic visit, review of labs/imaging and medical documentation.    Patito LOAIZA MD  [facility] Division of Cardiology  Pager [phone]

## 2023-12-06 NOTE — TELEPHONE ENCOUNTER
EP Scheduling called the patient to schedule Cardioversion. There wasn't a vm to leave a message.     Rosalinda Figueroa  Periop Electrophysiology   [phone]

## 2023-12-07 ENCOUNTER — ANCILLARY PROCEDURE (OUTPATIENT)
Dept: MRI IMAGING | Facility: CLINIC | Age: 78
End: 2023-12-07
Attending: FAMILY MEDICINE
Payer: COMMERCIAL

## 2023-12-07 ENCOUNTER — ANCILLARY PROCEDURE (OUTPATIENT)
Dept: ULTRASOUND IMAGING | Facility: CLINIC | Age: 78
End: 2023-12-07
Attending: FAMILY MEDICINE
Payer: COMMERCIAL

## 2023-12-07 DIAGNOSIS — M48.062 SPINAL STENOSIS OF LUMBAR REGION WITH NEUROGENIC CLAUDICATION: ICD-10-CM

## 2023-12-07 DIAGNOSIS — E04.1 THYROID NODULE: ICD-10-CM

## 2023-12-07 LAB
ATRIAL RATE - MUSE: 89 BPM
DIASTOLIC BLOOD PRESSURE - MUSE: NORMAL MMHG
INTERPRETATION ECG - MUSE: NORMAL
P AXIS - MUSE: NORMAL DEGREES
PR INTERVAL - MUSE: NORMAL MS
QRS DURATION - MUSE: 92 MS
QT - MUSE: 372 MS
QTC - MUSE: 450 MS
R AXIS - MUSE: 2 DEGREES
SYSTOLIC BLOOD PRESSURE - MUSE: NORMAL MMHG
T AXIS - MUSE: 14 DEGREES
VENTRICULAR RATE- MUSE: 88 BPM

## 2023-12-07 PROCEDURE — 72148 MRI LUMBAR SPINE W/O DYE: CPT | Mod: TC | Performed by: RADIOLOGY

## 2023-12-07 PROCEDURE — 76536 US EXAM OF HEAD AND NECK: CPT | Mod: TC | Performed by: STUDENT IN AN ORGANIZED HEALTH CARE EDUCATION/TRAINING PROGRAM

## 2023-12-07 NOTE — LETTER
December 11, 2023    Chelo Nullnd  [facility]   [facility] [address]          Dear ,    We are writing to inform you of your test results.    Here are your MRI results. Your back problem (lumbar spinal stenosis) has worsened since your last scan in 2020.     I recommend follow-up with your neurosurgeon. Let me know if you need a referral or would like to try an epidural injection first.       Resulted Orders   MR Lumbar Spine w/o Contrast    Narrative    EXAM: MR LUMBAR SPINE W/O CONTRAST  LOCATION: [facility]  DATE: 12/7/2023    INDICATION:  Spinal stenosis of lumbar region with neurogenic claudication  COMPARISON: MRI lumbar spine without contrast 08/10/2020.  TECHNIQUE: Routine Lumbar Spine MRI without IV contrast.    FINDINGS:   Nomenclature is based on 5 lumbar type vertebral bodies. Exaggerated lumbar spine lordosis. Lumbar vertebral body heights are maintained. Grade 1 anterolisthesis of L3 on L4 and L4 on L5. Vertebral body hemangiomas involving the T11-L5 vertebral bodies.   Modic type II degenerative endplate changes involving the apposing endplates at L5-S1. The lower lumbar spinal canal is narrowed on a developmental basis. Normal distal spinal cord and cauda equina with conus medullaris at L1-L2. Multiple parapelvic   renal cysts. Unremarkable visualized bony pelvis.    T12-L1: Normal disc height and signal. No herniation. Normal facets. No spinal canal or neural foraminal stenosis.     L1-L2: Normal disc height and signal. No herniation. Normal facets. No spinal canal or neural foraminal stenosis.    L2-L3: Normal disc signal and disc space height. No disc herniation. Mild facet arthropathy. No spinal canal stenosis or neural foraminal narrowing.     L3-L4: Disc desiccation and mild loss of disc space height. Circumferential disc osteophyte complex. Severe degenerative facet changes. Mild spinal canal stenosis. Mild bilateral neural foraminal  stenosis.    L4-L5: Disc desiccation and minimal loss of disc space height. Circumferential disc osteophyte complex with superimposed central cranially directed disc extrusion.. Severe degenerative facet changes. Severe spinal canal stenosis. No neural foraminal   stenosis.    L5-S1: Partial fusion across the disc space. Minimal circumferential disc osteophyte complex. Fusion of the bilateral facet joints. No spinal canal stenosis or neural foraminal narrowing.      Impression    IMPRESSION:  1.  Mild lumbar spondylosis superimposed on developmental narrowing of the lower lumbar spinal canal.  2.  At L4-L5, there is progressed severe spinal canal stenosis and severe right and left lateral recess narrowing.  3.  At L3-L4, there is mild spinal canal stenosis and mild bilateral neural foraminal narrowing..       If you have any questions or concerns, please call the clinic at the number listed above.       Sincerely,      Vita Zavala MD

## 2023-12-09 NOTE — RESULT ENCOUNTER NOTE
Please call patient:    Your thyroid nodules have increased in size so consultation with a specialist is advised, to consider biopsy. I've referred you to endocrinology. [facility] will call you to coordinate care as prescribed your provider. If you don't hear from a representative within 2 business days, please call [phone].    Vita Zavala MD  
details…

## 2023-12-09 NOTE — RESULT ENCOUNTER NOTE
Mail letter:    Here are your MRI results. Your back problem (lumbar spinal stenosis) has worsened since your last scan in 2020.     I recommend follow-up with your neurosurgeon. Let me know if you need a referral or would like to try an epidural injection first.     Vita Zavala MD    
English

## 2023-12-11 ENCOUNTER — TELEPHONE (OUTPATIENT)
Dept: CARDIOLOGY | Facility: CLINIC | Age: 78
End: 2023-12-11
Payer: COMMERCIAL

## 2023-12-11 NOTE — TELEPHONE ENCOUNTER
Called and LVM for patient to return call to clinic scheduler to schedule follow-up with Dr. Montano toward the end of January.    CARLOS Terry

## 2023-12-11 NOTE — TELEPHONE ENCOUNTER
----- Message from Bekah Zuniga RN sent at 2023  3:52 PM CST -----  Regardin month follow up after cardioversion  This patient is scheduled for cardioversion on 23. Can we get her scheduled for a follow up 1 month after cardioversion with Dr. Montano?

## 2023-12-11 NOTE — TELEPHONE ENCOUNTER
[facility] Call Center    Phone Message    May a detailed message be left on voicemail: yes     Reason for Call: Other: Pt would like  a call back to discuss if she needs to reschedule her lab appt as she still has not received her medication yet     Action Taken: Other: Cardio    Travel Screening: Not Applicable

## 2023-12-11 NOTE — TELEPHONE ENCOUNTER
Checked patient chart. Patient returned call and scheduled a follow-up with Dr. Montano for January 10th.    CARLOS Terry

## 2023-12-12 NOTE — TELEPHONE ENCOUNTER
Attempted to call patient. Unable to reach patient. Unable to leave message.    Bekah Zuniga, RN, BSN  Cardiology RN Care Coordinator   [place]   Phone: [phone]  Fax: [phone] ([place]) [phone] ([place])

## 2023-12-13 ENCOUNTER — MYC MEDICAL ADVICE (OUTPATIENT)
Dept: CARDIOLOGY | Facility: CLINIC | Age: 78
End: 2023-12-13
Payer: COMMERCIAL

## 2023-12-13 ENCOUNTER — TELEPHONE (OUTPATIENT)
Dept: FAMILY MEDICINE | Facility: CLINIC | Age: 78
End: 2023-12-13
Payer: COMMERCIAL

## 2023-12-13 NOTE — TELEPHONE ENCOUNTER
"Left message on voicemail advising patient to return call at [phone].    \"Your thyroid nodules have increased in size so consultation with a specialist is advised, to consider biopsy. I've referred you to endocrinology. [facility] will call you to coordinate care as prescribed your provider. If you don't hear from a representative within 2 business days, please call [phone].     Vita Zavala MD\"    KATIA Hutson RN  [facility] [place]  Primary Care  "

## 2023-12-13 NOTE — TELEPHONE ENCOUNTER
General Call      Reason for Call:  Pt would like a bit more clarification on what to do in regards to her back.     What are your questions or concerns:  Pt would like a bit more clarification on what to do in regards to her back.    Date of last appointment with provider: 11/15/23    Could we send this information to you in [employer] or would you prefer to receive a phone call?:   Patient would prefer a phone call   Okay to leave a detailed message?: Yes at Home number on file [phone] (home)

## 2023-12-13 NOTE — TELEPHONE ENCOUNTER
See telephone encounter. Patient wondering if labs were needing to be rescheduled if not having started medication yet. [employer] message sent to patient.    Bekah Zuniga, RN, BSN  Cardiology RN Care Coordinator   [place]   Phone: [phone]  Fax: [phone] ([place]) [phone] ([place])

## 2023-12-13 NOTE — TELEPHONE ENCOUNTER
Second attempt to call patient. Unable to reach patient. [employer] message sent to patient. See encounter.    Bekah Zuniga, RN, BSN  Cardiology RN Care Coordinator   [place]   Phone: [phone]  Fax: [phone] ([place]) [phone] ([place])

## 2023-12-14 NOTE — TELEPHONE ENCOUNTER
Called patient and relayed result message. She verbalized understanding. She has upcoming appointment with endocrinology in July 2024 and was put on their wait list in case something opens up. She is waiting to see if she might be able to get in sooner. Writer is able to see a note from TE 12/14/23 from endo and informed patient that it looks like they are reviewing this.    Tara Vázquez RN   [facility]

## 2023-12-14 NOTE — TELEPHONE ENCOUNTER
Attempted to call patient. Left message for patient.    Bekah Zuniga, RN, BSN  Cardiology RN Care Coordinator   [place]   Phone: [phone]  Fax: [phone] ([place]) [phone] ([place])

## 2023-12-15 NOTE — TELEPHONE ENCOUNTER
Patient cancelled appointment on 12/14/23 and postponed lab.    Bekah Zuniga, RN, BSN  Cardiology RN Care Coordinator   [place]   Phone: [phone]  Fax: [phone] ([place]) [phone] ([place])

## 2023-12-18 ENCOUNTER — TELEPHONE (OUTPATIENT)
Dept: ENDOCRINOLOGY | Facility: CLINIC | Age: 78
End: 2023-12-18
Payer: COMMERCIAL

## 2023-12-18 NOTE — TELEPHONE ENCOUNTER
Spoke with pt and scheduled sooner appt than July with Dr. Bety Mcgregor on 12/18/2023 at 4:42 PM  To schedulers : please offer to move forward on schedule with either  Dr López, Yasmin, Max, Delvin Cabrera, Giovani Brady Radulescu using NON-CALL week open new/MEGHAN (60 minutes) or 2 back to back 30 minute MEGHAN spaces.       Nola Sandhu MD  Endocrine triage

## 2023-12-19 ENCOUNTER — LAB (OUTPATIENT)
Dept: LAB | Facility: CLINIC | Age: 78
End: 2023-12-19
Payer: COMMERCIAL

## 2023-12-19 DIAGNOSIS — E78.5 HYPERLIPIDEMIA LDL GOAL <100: ICD-10-CM

## 2023-12-19 DIAGNOSIS — I50.30 HEART FAILURE WITH PRESERVED EJECTION FRACTION (H): Primary | ICD-10-CM

## 2023-12-19 DIAGNOSIS — I48.20 CHRONIC ATRIAL FIBRILLATION (H): ICD-10-CM

## 2023-12-19 LAB — NT-PROBNP SERPL-MCNC: 1113 PG/ML (ref 0–1800)

## 2023-12-19 PROCEDURE — 84443 ASSAY THYROID STIM HORMONE: CPT

## 2023-12-19 PROCEDURE — 83880 ASSAY OF NATRIURETIC PEPTIDE: CPT

## 2023-12-19 PROCEDURE — 36415 COLL VENOUS BLD VENIPUNCTURE: CPT

## 2023-12-19 PROCEDURE — 80048 BASIC METABOLIC PNL TOTAL CA: CPT

## 2023-12-19 PROCEDURE — 84460 ALANINE AMINO (ALT) (SGPT): CPT

## 2023-12-20 ENCOUNTER — TELEPHONE (OUTPATIENT)
Dept: CARDIOLOGY | Facility: CLINIC | Age: 78
End: 2023-12-20
Payer: COMMERCIAL

## 2023-12-20 LAB
ALT SERPL W P-5'-P-CCNC: 9 U/L (ref 0–50)
ANION GAP SERPL CALCULATED.3IONS-SCNC: 14 MMOL/L (ref 7–15)
BUN SERPL-MCNC: 34.2 MG/DL (ref 8–23)
CALCIUM SERPL-MCNC: 8.9 MG/DL (ref 8.8–10.2)
CHLORIDE SERPL-SCNC: 104 MMOL/L (ref 98–107)
CREAT SERPL-MCNC: 1.41 MG/DL (ref 0.51–0.95)
DEPRECATED HCO3 PLAS-SCNC: 25 MMOL/L (ref 22–29)
EGFRCR SERPLBLD CKD-EPI 2021: 38 ML/MIN/1.73M2
GLUCOSE SERPL-MCNC: 105 MG/DL (ref 70–99)
POTASSIUM SERPL-SCNC: 4 MMOL/L (ref 3.4–5.3)
SODIUM SERPL-SCNC: 143 MMOL/L (ref 135–145)
TSH SERPL DL<=0.005 MIU/L-ACNC: 2.5 UIU/ML (ref 0.3–4.2)

## 2023-12-20 NOTE — TELEPHONE ENCOUNTER
Patient saw Dr. Montano on 12/6/23. Patient to have Cardioversion with CANDE. Below instructions reviewed at time of visit.     Attempted to call patient to follow up on instructions and make sure patient does not have any questions prior to upcoming Cardioversion on 12/28/23. Left message for patient.         You are scheduled for a Cardioversion with Transesophageal Echocardiogram (CANDE), at [facility] [place]. The hospital is located at [address] [place] [address] [place].  If you need to cancel this procedure, please call [phone].       Visitor Policy: Two visitors.    Date:_12/28/23  Time: 11:30 am_To the [facility] Waiting Room at the [facility]  Cardioversion    Please do not eat anything for 8 hours prior to your procedure. You may have sips of water up until 2 hours prior to your arrival.  2. Medications to continue:  - Anticoagulant (Example: Eliquis, Xarelto, Pradaxa, Warfarin)  - Take all meds as prescribed, except for those noted below.  3. Medications to hold:    - Morning of: diuretic, oral diabetic meds, [ertugliflozin (Steglatro), canaglilozin (Invokana), dapagliflozin (Farxiga), empagliflozin (Jardiance)], short acting insulin, mixed insulin: take 66% of NPH.  - Day prior: Take 80% of long acting insulin.  - Day prior & day of, if daily or BID dosing: [liraglutide (Victoza, Saxenda), exenatide (Byetta), insulin glargine/lixisenatide (Soliqua)].   - 1 week prior, if weekly dosing: [semaglutide (Rybelsus, Ozempic, Wegovy), dulaglutide (Trulicity), exenatide ER (Bydureon), tirzepatide (Mounjaro)].  4. You will discharge same day. You will need a .    If you have further questions, please utilize [employer] to contact us.   If your question concerns the above instructions, contact:  [phone]    If your question concerns the schedule/appointment times, contact:  [facility]   [phone]     Bekah Zuniga, RN, BSN  Cardiology RN Care  Coordinator   [place]   Phone: [phone]  Fax: [phone] ([place]) [phone] ([place])

## 2023-12-21 ENCOUNTER — MYC MEDICAL ADVICE (OUTPATIENT)
Dept: CARDIOLOGY | Facility: CLINIC | Age: 78
End: 2023-12-21
Payer: COMMERCIAL

## 2023-12-21 NOTE — TELEPHONE ENCOUNTER
Spoke to patient and reviewed pre-procedure instructions.  Patient verbalized understanding.    Also sent via [employer].    Katty Solis, RN  Cardiology Care Coordinator  [facility]  [phone] option 1

## 2023-12-26 ENCOUNTER — TELEPHONE (OUTPATIENT)
Dept: CARDIOLOGY | Facility: CLINIC | Age: 78
End: 2023-12-26
Payer: COMMERCIAL

## 2023-12-26 NOTE — TELEPHONE ENCOUNTER
"Called and spoke to patient. She reports she has a slight sinus cold with mild congestion resulting in infrequent coughing. Denies fever or \"feeling sick.\" She reports she has had this in the past and she does not become ill. Writer recommended that patient can come to CANDE/DCCV on 12/28 as scheduled if condition remains the same or improves. If worsens, writer recommended at home covid test and if positive, patient to call the clinic so we can reschedule CANDE/DCCV for 10 days out. Patient verbalized understanding and is in agreement to the plan.    "

## 2023-12-26 NOTE — TELEPHONE ENCOUNTER
Health Call Center    Phone Message    May a detailed message be left on voicemail: yes     Reason for Call: Other: The patient is scheduled for a cardioversion on 12/28 and she is a little sick. She has post nasal drip that makes her have a cough. She said she does not have a fever and she does not feel sick. Can she still have the procedure? Please call to discus.      Action Taken: Other: Cardiology    Travel Screening: Not Applicable    Thank you!  [facility]

## 2023-12-28 ENCOUNTER — HOSPITAL ENCOUNTER (OUTPATIENT)
Dept: MEDSURG UNIT | Facility: CLINIC | Age: 78
Discharge: HOME | End: 2023-12-28
Payer: COMMERCIAL

## 2023-12-28 ENCOUNTER — HOSPITAL ENCOUNTER (OUTPATIENT)
Dept: CARDIOLOGY | Facility: CLINIC | Age: 78
Discharge: HOME | End: 2023-12-28
Attending: INTERNAL MEDICINE | Admitting: INTERNAL MEDICINE
Payer: COMMERCIAL

## 2023-12-28 ENCOUNTER — ANESTHESIA EVENT (OUTPATIENT)
Dept: SURGERY | Facility: CLINIC | Age: 78
End: 2023-12-28
Payer: COMMERCIAL

## 2023-12-28 ENCOUNTER — HOSPITAL ENCOUNTER (OUTPATIENT)
Facility: CLINIC | Age: 78
Discharge: HOME | End: 2023-12-28
Attending: INTERNAL MEDICINE | Admitting: INTERNAL MEDICINE
Payer: COMMERCIAL

## 2023-12-28 ENCOUNTER — HOSPITAL ENCOUNTER (OUTPATIENT)
Dept: CARDIOLOGY | Facility: CLINIC | Age: 78
Discharge: HOME | End: 2023-12-28
Attending: INTERNAL MEDICINE
Payer: COMMERCIAL

## 2023-12-28 ENCOUNTER — APPOINTMENT (OUTPATIENT)
Dept: LAB | Facility: CLINIC | Age: 78
End: 2023-12-28
Payer: COMMERCIAL

## 2023-12-28 ENCOUNTER — ANESTHESIA (OUTPATIENT)
Dept: SURGERY | Facility: CLINIC | Age: 78
End: 2023-12-28
Payer: COMMERCIAL

## 2023-12-28 VITALS
HEART RATE: 60 BPM | SYSTOLIC BLOOD PRESSURE: 134 MMHG | DIASTOLIC BLOOD PRESSURE: 83 MMHG | RESPIRATION RATE: 14 BRPM | OXYGEN SATURATION: 97 %

## 2023-12-28 VITALS
HEART RATE: 86 BPM | OXYGEN SATURATION: 97 % | DIASTOLIC BLOOD PRESSURE: 83 MMHG | RESPIRATION RATE: 17 BRPM | SYSTOLIC BLOOD PRESSURE: 132 MMHG

## 2023-12-28 DIAGNOSIS — I48.19 PERSISTENT ATRIAL FIBRILLATION (H): ICD-10-CM

## 2023-12-28 DIAGNOSIS — I48.20 CHRONIC ATRIAL FIBRILLATION (H): ICD-10-CM

## 2023-12-28 DIAGNOSIS — I50.30 HEART FAILURE WITH PRESERVED EJECTION FRACTION, NYHA CLASS II (H): ICD-10-CM

## 2023-12-28 DIAGNOSIS — I48.20 CHRONIC ATRIAL FIBRILLATION (H): Primary | ICD-10-CM

## 2023-12-28 DIAGNOSIS — I12.9 RENAL HYPERTENSION: ICD-10-CM

## 2023-12-28 DIAGNOSIS — E66.01 MORBID OBESITY (H): ICD-10-CM

## 2023-12-28 LAB
LVEF ECHO: NORMAL
MAGNESIUM SERPL-MCNC: 1.4 MG/DL (ref 1.7–2.3)
POTASSIUM SERPL-SCNC: 3.9 MMOL/L (ref 3.4–5.3)

## 2023-12-28 PROCEDURE — 92960 CARDIOVERSION ELECTRIC EXT: CPT

## 2023-12-28 PROCEDURE — 999N000054 HC STATISTIC EKG NON-CHARGEABLE

## 2023-12-28 PROCEDURE — 93010 ELECTROCARDIOGRAM REPORT: CPT | Performed by: INTERNAL MEDICINE

## 2023-12-28 PROCEDURE — 84132 ASSAY OF SERUM POTASSIUM: CPT | Performed by: INTERNAL MEDICINE

## 2023-12-28 PROCEDURE — 76376 3D RENDER W/INTRP POSTPROCES: CPT | Mod: 26 | Performed by: STUDENT IN AN ORGANIZED HEALTH CARE EDUCATION/TRAINING PROGRAM

## 2023-12-28 PROCEDURE — 93320 DOPPLER ECHO COMPLETE: CPT | Mod: 26 | Performed by: STUDENT IN AN ORGANIZED HEALTH CARE EDUCATION/TRAINING PROGRAM

## 2023-12-28 PROCEDURE — 250N000011 HC RX IP 250 OP 636: Performed by: INTERNAL MEDICINE

## 2023-12-28 PROCEDURE — 83735 ASSAY OF MAGNESIUM: CPT | Performed by: INTERNAL MEDICINE

## 2023-12-28 PROCEDURE — 250N000009 HC RX 250: Performed by: STUDENT IN AN ORGANIZED HEALTH CARE EDUCATION/TRAINING PROGRAM

## 2023-12-28 PROCEDURE — 93325 DOPPLER ECHO COLOR FLOW MAPG: CPT

## 2023-12-28 PROCEDURE — 370N000017 HC ANESTHESIA TECHNICAL FEE, PER MIN

## 2023-12-28 PROCEDURE — 93325 DOPPLER ECHO COLOR FLOW MAPG: CPT | Mod: 26 | Performed by: STUDENT IN AN ORGANIZED HEALTH CARE EDUCATION/TRAINING PROGRAM

## 2023-12-28 PROCEDURE — 250N000011 HC RX IP 250 OP 636: Performed by: STUDENT IN AN ORGANIZED HEALTH CARE EDUCATION/TRAINING PROGRAM

## 2023-12-28 PROCEDURE — 93312 ECHO TRANSESOPHAGEAL: CPT | Mod: 26 | Performed by: STUDENT IN AN ORGANIZED HEALTH CARE EDUCATION/TRAINING PROGRAM

## 2023-12-28 PROCEDURE — 76376 3D RENDER W/INTRP POSTPROCES: CPT

## 2023-12-28 PROCEDURE — 99152 MOD SED SAME PHYS/QHP 5/>YRS: CPT

## 2023-12-28 PROCEDURE — 250N000009 HC RX 250: Performed by: NURSE ANESTHETIST, CERTIFIED REGISTERED

## 2023-12-28 PROCEDURE — 93005 ELECTROCARDIOGRAM TRACING: CPT

## 2023-12-28 RX ORDER — FENTANYL CITRATE 50 UG/ML
25 INJECTION, SOLUTION INTRAMUSCULAR; INTRAVENOUS
Status: DISCONTINUED | OUTPATIENT
Start: 2023-12-28 | End: 2023-12-29 | Stop reason: HOSPADM

## 2023-12-28 RX ORDER — POTASSIUM CHLORIDE 750 MG/1
40 TABLET, EXTENDED RELEASE ORAL
Status: DISCONTINUED | OUTPATIENT
Start: 2023-12-28 | End: 2023-12-29 | Stop reason: HOSPADM

## 2023-12-28 RX ORDER — NALOXONE HYDROCHLORIDE 0.4 MG/ML
0.2 INJECTION, SOLUTION INTRAMUSCULAR; INTRAVENOUS; SUBCUTANEOUS
Status: DISCONTINUED | OUTPATIENT
Start: 2023-12-28 | End: 2023-12-29 | Stop reason: HOSPADM

## 2023-12-28 RX ORDER — ACYCLOVIR 200 MG/1
9.5 CAPSULE ORAL
Status: DISCONTINUED | OUTPATIENT
Start: 2023-12-28 | End: 2023-12-29 | Stop reason: HOSPADM

## 2023-12-28 RX ORDER — MULTIVITAMIN WITH IRON
1 TABLET ORAL DAILY
Qty: 90 TABLET | Refills: 1 | Status: SHIPPED | OUTPATIENT
Start: 2023-12-28

## 2023-12-28 RX ORDER — LIDOCAINE 40 MG/G
CREAM TOPICAL
Status: DISCONTINUED | OUTPATIENT
Start: 2023-12-28 | End: 2023-12-29 | Stop reason: HOSPADM

## 2023-12-28 RX ORDER — SODIUM CHLORIDE 9 MG/ML
INJECTION, SOLUTION INTRAVENOUS CONTINUOUS PRN
Status: DISCONTINUED | OUTPATIENT
Start: 2023-12-28 | End: 2023-12-29 | Stop reason: HOSPADM

## 2023-12-28 RX ORDER — POTASSIUM CHLORIDE 750 MG/1
20 TABLET, EXTENDED RELEASE ORAL
Status: DISCONTINUED | OUTPATIENT
Start: 2023-12-28 | End: 2023-12-29 | Stop reason: HOSPADM

## 2023-12-28 RX ORDER — LIDOCAINE HYDROCHLORIDE 20 MG/ML
15 SOLUTION OROPHARYNGEAL ONCE
Status: COMPLETED | OUTPATIENT
Start: 2023-12-28 | End: 2023-12-28

## 2023-12-28 RX ORDER — NALOXONE HYDROCHLORIDE 0.4 MG/ML
0.4 INJECTION, SOLUTION INTRAMUSCULAR; INTRAVENOUS; SUBCUTANEOUS
Status: DISCONTINUED | OUTPATIENT
Start: 2023-12-28 | End: 2023-12-29 | Stop reason: HOSPADM

## 2023-12-28 RX ORDER — MAGNESIUM SULFATE HEPTAHYDRATE 40 MG/ML
2 INJECTION, SOLUTION INTRAVENOUS
Status: COMPLETED | OUTPATIENT
Start: 2023-12-28 | End: 2023-12-28

## 2023-12-28 RX ORDER — FLUMAZENIL 0.1 MG/ML
0.2 INJECTION, SOLUTION INTRAVENOUS
Status: DISCONTINUED | OUTPATIENT
Start: 2023-12-28 | End: 2023-12-29 | Stop reason: HOSPADM

## 2023-12-28 RX ADMIN — METHOHEXITAL SODIUM 5 MG: 500 INJECTION, POWDER, LYOPHILIZED, FOR SOLUTION INTRAMUSCULAR; INTRAVENOUS; RECTAL at 13:13

## 2023-12-28 RX ADMIN — METHOHEXITAL SODIUM 35 MG: 500 INJECTION, POWDER, LYOPHILIZED, FOR SOLUTION INTRAMUSCULAR; INTRAVENOUS; RECTAL at 13:12

## 2023-12-28 RX ADMIN — TOPICAL ANESTHETIC 0.5 ML: 200 SPRAY DENTAL; PERIODONTAL at 12:33

## 2023-12-28 RX ADMIN — MAGNESIUM SULFATE HEPTAHYDRATE 2 G: 40 INJECTION, SOLUTION INTRAVENOUS at 12:15

## 2023-12-28 RX ADMIN — LIDOCAINE HYDROCHLORIDE 30 ML: 20 SOLUTION ORAL; TOPICAL at 12:29

## 2023-12-28 RX ADMIN — FENTANYL CITRATE 50 MCG: 50 INJECTION, SOLUTION INTRAMUSCULAR; INTRAVENOUS at 12:45

## 2023-12-28 RX ADMIN — MIDAZOLAM 2 MG: 1 INJECTION INTRAMUSCULAR; INTRAVENOUS at 12:42

## 2023-12-28 ASSESSMENT — ACTIVITIES OF DAILY LIVING (ADL)
ADLS_ACUITY_SCORE: 35

## 2023-12-28 ASSESSMENT — ENCOUNTER SYMPTOMS: DYSRHYTHMIAS: 1

## 2023-12-28 NOTE — ANESTHESIA CARE TRANSFER NOTE
Patient: Chelo Brooks    Procedure: Procedure(s):  Anesthesia cardioversion @1330       Diagnosis: Persistent atrial fibrillation (H) [I48.19]  Diagnosis Additional Information: No value filed.    Anesthesia Type:   General     Note:    Oropharynx: oropharynx clear of all foreign objects and spontaneously breathing  Level of Consciousness: drowsy  Oxygen Supplementation: nasal cannula  Level of Supplemental Oxygen (L/min / FiO2): 4  Independent Airway: airway patency satisfactory and stable    Vital Signs Stable: post-procedure vital signs reviewed and stable  Report to RN Given: handoff report given  Destination: EKG department.          Vitals:  Vitals Value Taken Time   BP     Temp     Pulse     Resp     SpO2         Electronically Signed By: MARIE Elizalde CRNA  December 28, 2023  1:17 PM

## 2023-12-28 NOTE — DISCHARGE INSTRUCTIONS
GOING HOME AFTER YOUR TRANSESOPHAGEAL ECHOCARDIOGRAM AND CARDIOVERSION    FOR NEXT 24 HOURS:  An adult should stay with you.  Relax and take it easy.  DO NOT make any important legal decisions.  DO NOT drive or operate machines at home or at work.  DO NOT consume any alcohol today.  Resume your regular diet 2 HOURS after procedure @ __________ and drink plenty of fluids.  If your throat is sore, eat cold, bland, soft foods.  If you have any redness/skin sorness where the patches were placed, you may use aloe vera gel or 1% hydrocortisone cream to the skin (sold at drug stores)  Take Tylenol (Acetaminophen) per your provider's recommendations as needed to help relieve local pain.     CALL YOUR HEALTHCARE PROVIDER IF:  New pain or trouble swallowing  New stomach or chest pain  You develop nausea or vomiting  Fever above 100.6 F or greater  You develop hives or a rash or any unexplained itching  Have irregular heartbeat or fast pulse  Feel faint, dizzy, or lightheaded  Have chest pain with increased activity  Have bleeding issues from blood-thinning medicines (vomiting that looks like coffee grounds or contains blood OR black, bloody stools).    CALL 911 RIGHT AWAY IF YOU HAVE:  Pain in your chest, arm, shoulder, neck, or upper back  Shortness of breath  Loss of vision, speech, or strength or coordination in any body part  Weakness in your arm or leg  Uncontrolled bleeding  Feel unstable in any way     FOLLOW UP APPOINTMENT:    Follow up as scheduled with EP/Cardiology Provider     ADDITIONAL INFORMATION:    If you have any questions:        Call the Echo Lab Nurse at [phone], press 4 (Monday-Friday 7:00 am - 4:00 pm)        Call the hospital: [phone] and ask them to page [phone] (after 4:00 pm and on   weekends or holidays)      Cardiovascular Clinic:   [address] [place] [address]. [place] [address]  Your Care Team:  EP Cardiology   Telephone Number     Raquel Adan RN [phone]    After  business hours: [phone], select option 4, ask for EP Fellow on call to be paged.     For scheduling appts or procedures:    Rosalinda Figueroa   [phone]   For the Device Clinic (Pacemakers and ICD's)   RN's  During business hours: [phone]     After business hours:   [phone]- select option 4 and ask for job code [id number].       As always, Thank you for trusting us with your health care needs!

## 2023-12-28 NOTE — PROCEDURES
[facility]    Procedure: Cardioversion    Date/Time: 12/28/2023 2:16 PM    Performed by: Desire Mensah PA-C  Authorized by: Patito Montano MD      UNIVERSAL PROTOCOL   Site Marked: NA  Prior Images Obtained and Reviewed:  NA  Required items: Required blood products, implants, devices and special equipment available    Patient identity confirmed:  Verbally with patient  Patient was reevaluated immediately before administering moderate or deep sedation or anesthesia  Confirmation Checklist:  Patient's identity using two indicators, procedure was appropriate and matched the consent or emergent situation, correct equipment/implants were available and relevant allergies  Time out: Immediately prior to the procedure a time out was called    Universal Protocol: the Joint Commission Universal Protocol was followed       ANESTHESIA    Anesthesia was administered and monitored by anesthesiology.  See anesthesia documentation for details.    PROCEDURE DETAILS  Pre-procedure rhythm: atrial fibrillation  Patient position: patient was placed in a supine position  Chest area: chest area exposed  Electrodes: pads  Electrodes placed: anterior-posterior    Details of Attempts:  One, 200 J biphasic synchronized shock delivered with conversion to sinus.   Post-procedure rhythm: normal sinus rhythm      PROCEDURE    Patient Tolerance:  Patient tolerated the procedure well with no immediate complications    Anticoagulation: missed eliquis doses, CANDE prior without DAMION thrombus. Continue eliquis 5 mg bid.       Desire Mensah PA-C  [facility]  Electrophysiology Consult Service  Pager: [phone]

## 2023-12-28 NOTE — SEDATION DOCUMENTATION
Pt arrived in ECHO department for scheduled cardioversion but is found to have missed a dose of her Xarelto.   Procedure explained, questions answered and consent signed. Discharge instructions discussed with patient and daughter Shiloh.  Pt's throat sprayed at 1230, therefore pt will not be able to eat or drink until 2 hours after at 1430. Informed pt of this time and encouraged to start with warm fluids and soft foods.    Pt tolerated procedure well, and was given a total of 50 mcg IV fentanyl and 2 mg IV versed for conscious sedation. Pt denied throat or chest pain after CANDE complete.   CANDE probe 63 used for procedure.  No clots visualized on CANDE so proceeded with DCCV. Anesthesia gave pt 40 mg IV brevitol for sedation and pt was DCCV at 200 Joules to a SR with PVC's.  Pt denied chest or throat pain after procedure and was D/C home after awake and VSS. Escorted out to front lobby by staff in w/c to meet pt's ride home.

## 2023-12-28 NOTE — H&P
H&P from office visit with Dr Montano on 12/6/23 reviewed. Following today's exam there are no interval changes.       Desire Mensah PA-C  [facility]  Electrophysiology Consult Service  Pager: [phone]

## 2023-12-28 NOTE — DISCHARGE INSTRUCTIONS
GOING HOME AFTER YOUR CARDIOVERSION    FOR NEXT 24 HOURS:  An adult should stay with you.  Relax and take it easy.  DO NOT make any important legal decisions.  DO NOT drive or operate machines at home or at work.  DO NOT consume alcohol.   Resume your regular diet and drink plenty of fluids.  If you have any redness/skin sorness where the patches were placed, you may use aloe vera gel or 1% hydrocortisone cream to the skin (sold at drug stores)  Take Tylenol (Acetaminophen) per your provider's recommendations as needed to help relieve local pain.     CALL YOUR HEALTHCARE PROVIDER IF:  You develop nausea or vomiting  You develop hives or a rash or any unexplained itching  Have irregular heartbeat or fast pulse  Feel faint, dizzy, or lightheaded  Have chest pain with increased activity  Have bleeding issues from blood-thinning medicines    CALL 911 RIGHT AWAY IF YOU HAVE:  Pain in your chest, arm, shoulder, neck, or upper back  Shortness of breath  Loss of vision, speech, or strength or coordination in any body part  Weakness in your arm or leg  Uncontrolled bleeding  Feel unstable in any way     FOLLOW UP APPOINTMENT:    Follow up as scheduled with EP/Cardiology Provider in 4 weeks    ADDITIONAL INFORMATION:  Cardiovascular Clinic:   [address] [place] [address]. [place] [address]  Your Care Team:  EP Cardiology   Telephone Number     Raquel Torres RN [phone]    After business hours: [phone], select option 4, ask for EP Fellow on call to be paged.     For scheduling appts or procedures:    Rosalinda Figueroa   [phone]   For the Device Clinic (Pacemakers and ICD's)   RN's :   Estella Real  During business hours: [phone]     After business hours:   [phone]- select option 4 and ask for job code [id number].       As always, Thank you for trusting us with your health care needs!

## 2023-12-28 NOTE — LETTER
2023      Chelo Brooks  [address] [place] [address]   [facility] [address]        Dear ,    We are writing to inform you of your test results.    No clots have been found.  DR. LOAIZA    Resulted Orders   Transesophageal Echocardiogram   Result Value Ref Range    LVEF  55-60%     [facility]    [id number]  [id number]  [id number]  [id number]^JOSSE^MATTHIEU     [facility]  Echocardiography Laboratory  [address] [place] [address]     Name: CHELO BROOKS  MRN: [id number]  : 1945  Study Date: 2023 12:40 PM  Age: 78 yrs  Gender: Female  Patient Location: [facility]  Reason For Study: Chronic atrial fibrillation (H), Persistent atrial  fibrillation  History: Atrial Fibrillation  Ordering Physician: MATTHIEU LOAIZA  Performed By: Jorge Luis Reyes Castro, MD     BSA: 2.5 m2  Height: 66 in  Weight: 337 lb  HR: 89  BP: 122/87 mmHg  Attestation  I was present during CANDE probe placement by the fellow, Jorge Luis Reyes Castro. I personally viewed the imaging and agree with the interpretation and  report as documented by the fellow.  ______________________________________________________________________________  Interpretation Summary  Global and regional left ventricular function is normal with an EF of 55-60%.  Global right ventricular function is normal. The right ventricle is normal  size.  Mild to moderate mitral insufficiency is present.  The left atrial appendage is normal. It is free of spontaneous echo contrast  and thrombus.  No pericardial effusion is present.     This study was compared with the study from 2023 .There has been no  change.  ______________________________________________________________________________  Procedure  Transesophageal Echocardiogram with color and spectral Doppler performed. 3D  image acquisition, reconstruction, and real-time interpretation was performed.  I was present during CANDE probe placement by the Fellow. I  personally viewed  the imaging and agree with the interpretation and report as documented by the  Fellow. Jorge Luis Reyes Castro. Procedure location Echo Lab. The procedure  was performed in the Echo Lab. Informed consent for Transesophegeal echo  obtained. CANDE Probe #63 was used during the procedure. Patient was sedated  using Fentanyl 50 mcg. Patient was sedated using Versed 2 mg. The heart rate,  respiratory rate, oxygen saturations, blood pressure, and response to care  were monitored throughout the procedure with the assistance of the nurse. I  determined this patient to be an appropriate candidate for the planned  sedation and procedure and have reassessed the patient immediately prior to  sedation and procedure. Total sedation time: 13 minutes of continuous bedside  1:1 monitoring. The Transducer was inserted without difficulty . The patient  tolerated the procedure well. Complications None. The patient's rhythm is  atrial fibrillation. Good quality two-dimensional was performed and  interpreted.     Left Ventricle  Global and regional left ventricular function is normal with an EF of 55-60%.     Right Ventricle  Global right ventricular function is normal. The right ventricle is normal  size.     Atria  The left atrial appendage is normal. It is free of spontaneous echo contrast  and thrombus. The left atrial appendage Doppler velocities are normal. Unable  to assess LA volume. The atrial septum is intact as assessed by color  Doppler . Lipomatous infiltration of the interatrial septm is present.     Mitral Valve  Mild to moderate mitral insufficiency is present.     Aortic Valve  The aortic valve is tricuspid. Trace aortic insufficiency is present.     Tricuspid Valve  Mild tricuspid insufficiency is present.     Pulmonic Valve  The pulmonic valve is normal.     Vessels  Mild atheroma of the aorta are present in the descending thoracic aorta.     Pericardium  No pericardial effusion is present.      Compared to Previous Study  This study was compared with the study from 12/28/2023 . There has been no  change.     ______________________________________________________________________________  Report approved by: MD Mack Anne 12/28/2023 01:39 PM     ______________________________________________________________________________          If you have any questions or concerns, please call the clinic at the number listed above.       Sincerely,      Patito Montano MD

## 2023-12-28 NOTE — ANESTHESIA POSTPROCEDURE EVALUATION
Patient: Chelo Brooks    Procedure: Procedure(s):  Anesthesia cardioversion @1330       Anesthesia Type:  General    Note:  Disposition: Outpatient   Postop Pain Control: Uneventful            Sign Out: Well controlled pain   PONV: No   Neuro/Psych: Uneventful            Sign Out: Acceptable/Baseline neuro status   Airway/Respiratory: Uneventful            Sign Out: Acceptable/Baseline resp. status   CV/Hemodynamics: Uneventful            Sign Out: Acceptable CV status; No obvious hypovolemia; No obvious fluid overload   Other NRE: NONE   DID A NON-ROUTINE EVENT OCCUR? No           Last vitals:  There were no vitals filed for this visit.    Electronically Signed By: Joshua Barger MD  December 28, 2023  3:39 PM

## 2023-12-28 NOTE — ANESTHESIA PREPROCEDURE EVALUATION
Anesthesia Pre-Procedure Evaluation    Patient: Chelo Brooks   MRN: [id number] : 1945        Procedure : Procedure(s):  Anesthesia cardioversion @1330          Past Medical History:   Diagnosis Date    Acquired renal cyst of left kidney     Adrenal nodule (H24)     left - needs yearly CT    Ascending aorta dilatation (H24)     Chronic atrial fibrillation (H) 11/15/2023    CKD (chronic kidney disease) stage 3, GFR 30-59 ml/min (H)     Degenerative joint disease (DJD) of hip     DJD (degenerative joint disease) 10/21/2005    Eczema, unspecified type 2018    Elevated parathyroid hormone 2019    Gallstones     Hepatitis B childhood     resolved, patient is not a carrier     Hyperlipidemia 2012    Hypertension goal BP (blood pressure) < 140/90     Lumbar spinal stenosis 2017    Mild persistent asthma without complication 2017    Morbid obesity due to excess calories (H) 2015    Surgical referral declined '16     FLOR (obstructive sleep apnea)     Osteopenia     Pulmonary emboli (H) 10/28/2019    Shingles 2014    scalp    Stasis dermatitis of both legs 2018    Thyroid nodule     Umbilical hernia     Vitamin D deficiency       Past Surgical History:   Procedure Laterality Date    IR PAROTID BIOPSY  2020    ZZC TOTAL KNEE ARTHROPLASTY  3/2000    right    ZZC TOTAL KNEE ARTHROPLASTY  12    left    ZZC VAGINAL HYSTERECTOMY      benign, prolapse, ovaries remain       Allergies   Allergen Reactions    Adhesive Tape       Social History     Tobacco Use    Smoking status: Never    Smokeless tobacco: Never   Substance Use Topics    Alcohol use: Yes     Alcohol/week: 0.0 standard drinks of alcohol     Comment: very rare        Wt Readings from Last 1 Encounters:   23 (!) 152.7 kg (336 lb 9.6 oz)        Anesthesia Evaluation            ROS/MED HX  ENT/Pulmonary:     (+)                     Mild Persistent, asthma                  Neurologic:       Cardiovascular:   "   (+)  hypertension- -   -  - -                        dysrhythmias, a-fib,             METS/Exercise Tolerance:     Hematologic:       Musculoskeletal:       GI/Hepatic:     (+) GERD,                   Renal/Genitourinary:       Endo:     (+)               Obesity,       Psychiatric/Substance Use:       Infectious Disease:       Malignancy:       Other:            Physical Exam    Airway        Mallampati: III   TM distance: > 3 FB   Neck ROM: full   Mouth opening: > 3 cm    Respiratory Devices and Support         Dental       (+) Modest Abnormalities - crowns, retainers, 1 or 2 missing teeth      Cardiovascular          Rhythm and rate: irregular     Pulmonary   pulmonary exam normal                OUTSIDE LABS:  CBC:   Lab Results   Component Value Date    WBC 4.3 11/15/2023    WBC 4.0 01/04/2022    HGB 12.7 11/15/2023    HGB 11.9 03/27/2023    HCT 39.4 11/15/2023    HCT 37.5 01/04/2022     11/15/2023     01/04/2022     BMP:   Lab Results   Component Value Date     12/19/2023     (H) 11/15/2023    POTASSIUM 4.0 12/19/2023    POTASSIUM 3.9 11/15/2023    CHLORIDE 104 12/19/2023    CHLORIDE 110 (H) 11/15/2023    CO2 25 12/19/2023    CO2 25 11/15/2023    BUN 34.2 (H) 12/19/2023    BUN 32.1 (H) 11/15/2023    CR 1.41 (H) 12/19/2023    CR 1.39 (H) 11/15/2023     (H) 12/19/2023     (H) 11/15/2023     COAGS:   Lab Results   Component Value Date    INR 2.50 (H) 09/03/2020     POC: No results found for: \"BGM\", \"HCG\", \"HCGS\"  HEPATIC:   Lab Results   Component Value Date    ALBUMIN 3.7 03/27/2023    PROTTOTAL 6.7 (L) 01/04/2022    ALT 9 12/19/2023    AST 16 01/04/2022    ALKPHOS 75 01/04/2022    BILITOTAL 0.7 01/04/2022     OTHER:   Lab Results   Component Value Date    HECTOR 8.9 12/19/2023    PHOS 3.7 03/27/2023    TSH 2.50 12/19/2023    SED 18 03/06/2020       Anesthesia Plan    ASA Status:  3       Anesthesia Type: General.     - Airway: Native airway   Induction: Intravenous. "   Maintenance: N/A.        Consents    Anesthesia Plan(s) and associated risks, benefits, and realistic alternatives discussed. Questions answered and patient/representative(s) expressed understanding.     - Discussed:     - Discussed with:  Patient      - Extended Intubation/Ventilatory Support Discussed: No.      - Patient is DNR/DNI Status: No     Use of blood products discussed: No .     Postoperative Care            Comments:               Joshua Barger MD    I have reviewed the pertinent notes and labs in the chart from the past 30 days and (re)examined the patient.  Any updates or changes from those notes are reflected in this note.

## 2023-12-29 LAB
ATRIAL RATE - MUSE: 68 BPM
ATRIAL RATE - MUSE: NORMAL BPM
DIASTOLIC BLOOD PRESSURE - MUSE: NORMAL MMHG
DIASTOLIC BLOOD PRESSURE - MUSE: NORMAL MMHG
INTERPRETATION ECG - MUSE: NORMAL
INTERPRETATION ECG - MUSE: NORMAL
P AXIS - MUSE: 86 DEGREES
P AXIS - MUSE: NORMAL DEGREES
PR INTERVAL - MUSE: 154 MS
PR INTERVAL - MUSE: NORMAL MS
QRS DURATION - MUSE: 80 MS
QRS DURATION - MUSE: 92 MS
QT - MUSE: 322 MS
QT - MUSE: 412 MS
QTC - MUSE: 415 MS
QTC - MUSE: 438 MS
R AXIS - MUSE: 3 DEGREES
R AXIS - MUSE: 7 DEGREES
SYSTOLIC BLOOD PRESSURE - MUSE: NORMAL MMHG
SYSTOLIC BLOOD PRESSURE - MUSE: NORMAL MMHG
T AXIS - MUSE: 3 DEGREES
T AXIS - MUSE: 56 DEGREES
VENTRICULAR RATE- MUSE: 100 BPM
VENTRICULAR RATE- MUSE: 68 BPM

## 2023-12-29 ASSESSMENT — ACTIVITIES OF DAILY LIVING (ADL)
ADLS_ACUITY_SCORE: 35

## 2023-12-30 ASSESSMENT — ACTIVITIES OF DAILY LIVING (ADL)
ADLS_ACUITY_SCORE: 35

## 2023-12-31 ASSESSMENT — ACTIVITIES OF DAILY LIVING (ADL)
ADLS_ACUITY_SCORE: 35

## 2024-01-01 ASSESSMENT — ACTIVITIES OF DAILY LIVING (ADL)
ADLS_ACUITY_SCORE: 35

## 2024-01-10 ENCOUNTER — OFFICE VISIT (OUTPATIENT)
Dept: CARDIOLOGY | Facility: CLINIC | Age: 79
End: 2024-01-10
Payer: COMMERCIAL

## 2024-01-10 VITALS
BODY MASS INDEX: 54.03 KG/M2 | SYSTOLIC BLOOD PRESSURE: 137 MMHG | HEART RATE: 115 BPM | OXYGEN SATURATION: 95 % | DIASTOLIC BLOOD PRESSURE: 87 MMHG | WEIGHT: 293 LBS

## 2024-01-10 DIAGNOSIS — I48.19 PERSISTENT ATRIAL FIBRILLATION (H): Primary | ICD-10-CM

## 2024-01-10 DIAGNOSIS — E66.01 MORBID OBESITY (H): ICD-10-CM

## 2024-01-10 DIAGNOSIS — I50.30 HEART FAILURE WITH PRESERVED EJECTION FRACTION, NYHA CLASS II (H): ICD-10-CM

## 2024-01-10 PROCEDURE — 93000 ELECTROCARDIOGRAM COMPLETE: CPT | Performed by: INTERNAL MEDICINE

## 2024-01-10 PROCEDURE — 99215 OFFICE O/P EST HI 40 MIN: CPT | Performed by: INTERNAL MEDICINE

## 2024-01-10 PROCEDURE — 99417 PROLNG OP E/M EACH 15 MIN: CPT | Performed by: INTERNAL MEDICINE

## 2024-01-10 RX ORDER — BUMETANIDE 1 MG/1
TABLET ORAL
Qty: 90 TABLET | Refills: 3 | Status: SHIPPED | OUTPATIENT
Start: 2024-01-10

## 2024-01-10 RX ORDER — METOPROLOL SUCCINATE 50 MG/1
50 TABLET, EXTENDED RELEASE ORAL DAILY
Qty: 90 TABLET | Refills: 3 | Status: SHIPPED | OUTPATIENT
Start: 2024-01-10 | End: 2024-02-02

## 2024-01-10 NOTE — LETTER
1/10/2024      RE: Chelo Brooks  [address]  [place] [address]       Dear Colleague,    Thank you for the opportunity to participate in the care of your patient, Chelo Brooks, at the [facility]. Please see a copy of my visit note below.                                                                                                             [facility]iology [facility]      Referring provider:Vita Zavala MD     HPI: Ms. Chelo Brooks is a 78 year old  female with PMH significant for    -Morbid obesity BMI 53  -Hypertension.   -Persistent atrial fibrillation  -CKD stage III  -Obstructive sleep apnea    Patient is being seen today for worsening shortness of breath over the last 1 year.  She also reports worsening lower extremity edema sometimes weeping from her legs.  She has noticed that when she goes to shopping she has to stop multiple times over the last 1 year.  She feels exhausted with activity.  No chest pain, palpitations, dizziness, or syncope.    Patient was recently seen in primary care clinic for the symptoms and found to be in atrial fibrillation.  Started on Xarelto.  Referred to cardiology for further management of A-fib.  A-fib is a new diagnosis for the patient.    Prior cardiac work-up in 2017 shows normal cardiac MRI and sinus rhythm EKG.  Recent echocardiogram on 11/28/2023 shows normal biventricular function with no significant valve disease.    Medications: Atorvastatin 40 mg, olmesartan hydrochlorothiazide 40/12.5, Xarelto 20 mg.    Medications, personal, family, and social history reviewed with patient and revised.    Interval history 1/10/2024:  Patient is being seen today for follow-up after recent cardioversion on 12/28/2023.  Patient tells me that she felt great after cardioversion which was successful.  UMANA has improved.  Denies chest pain,  palpitations, dizziness.  She still has significant edema on both legs.  As you know I have started on Bumex a month ago and stopped hydrochlorothiazide.  Her weight is the same.  She tells me she has to go to the bathroom a lot.    PAST MEDICAL HISTORY:  Past Medical History:   Diagnosis Date    Acquired renal cyst of left kidney     Adrenal nodule (H24)     left - needs yearly CT    Ascending aorta dilatation (H24)     Chronic atrial fibrillation (H) 11/15/2023    CKD (chronic kidney disease) stage 3, GFR 30-59 ml/min (H)     Degenerative joint disease (DJD) of hip     DJD (degenerative joint disease) 10/21/2005    Eczema, unspecified type 04/05/2018    Elevated parathyroid hormone 06/18/2019    Gallstones     Hepatitis B childhood     resolved, patient is not a carrier     Hyperlipidemia 11/27/2012    Hypertension goal BP (blood pressure) < 140/90     Lumbar spinal stenosis 07/17/2017    Mild persistent asthma without complication 05/30/2017    Morbid obesity due to excess calories (H) 11/23/2015    Surgical referral declined '16     FLOR (obstructive sleep apnea)     Osteopenia     Pulmonary emboli (H) 10/28/2019    Shingles 2014    scalp    Stasis dermatitis of both legs 04/05/2018    Thyroid nodule     Umbilical hernia     Vitamin D deficiency        CURRENT MEDICATIONS:  Current Outpatient Medications   Medication Sig Dispense Refill    albuterol (PROAIR HFA/PROVENTIL HFA/VENTOLIN HFA) 108 (90 Base) MCG/ACT inhaler Inhale 1-2 puffs into the lungs every 4 hours as needed for shortness of breath / dyspnea 1 Inhaler 3    amiodarone (PACERONE) 200 MG tablet Twice daily for 2 weeks then reduce dose to once a day 180 tablet 3    atorvastatin (LIPITOR) 40 MG tablet Take 1 tablet (40 mg) by mouth daily 90 tablet 3    bumetanide (BUMEX) 1 MG tablet Take 1 tablet (1 mg) by mouth daily 90 tablet 3    Cholecalciferol (VITAMIN D) 2000 UNITS tablet Take 2,000 Units by mouth daily      Cyanocobalamin (VITAMIN B-12 PO) Take  by mouth daily      cyclobenzaprine (FLEXERIL) 10 MG tablet Take 1 tablet (10 mg) by mouth 3 times daily as needed for muscle spasms 30 tablet 1    famotidine (PEPCID) 40 MG tablet TAKE 1 TABLET(40 MG) BY MOUTH DAILY 90 tablet 3    fluticasone (FLONASE) 50 MCG/ACT nasal spray Spray 2 sprays into both nostrils daily 48 g 3    loratadine (CLARITIN) 10 MG tablet Take 1 tablet (10 mg) by mouth daily      magnesium 250 MG tablet Take 1 tablet (250 mg) by mouth daily 90 tablet 1    mometasone (ELOCON) 0.1 % external cream Apply to affected area on legs twice daily as needed 100 g 3    olmesartan (BENICAR) 40 MG tablet Take 1 tablet (40 mg) by mouth daily 90 tablet 3    order for DME Equipment being ordered: Auto CPAP 11-18 1 Units 0    rivaroxaban ANTICOAGULANT (XARELTO) 20 MG TABS tablet Take 1 tablet (20 mg) by mouth daily (with dinner) 90 tablet 3       PAST SURGICAL HISTORY:  Past Surgical History:   Procedure Laterality Date    ANESTHESIA CARDIOVERSION N/A 12/28/2023    Procedure: Anesthesia cardioversion @1330;  Surgeon: GENERIC ANESTHESIA PROVIDER;  Location: [facility] OR    IR PAROTID BIOPSY  5/21/2020    [facility] TOTAL KNEE ARTHROPLASTY  3/2000    right    [facility] TOTAL KNEE ARTHROPLASTY  6/8/12    left    [facility] VAGINAL HYSTERECTOMY  1977    benign, prolapse, ovaries remain        ALLERGIES:     Allergies   Allergen Reactions    Adhesive Tape        FAMILY HISTORY:  Family History   Problem Relation Age of Onset    Circulatory Father         d. DVT    Heart Disease Father         pacer    Diabetes Mother     Hypertension Mother     Deep Vein Thrombosis Brother     Coronary Artery Disease Brother     Myocardial Infarction Brother     Deep Vein Thrombosis Brother     C.A.D. Paternal Uncle         MI     Heart Disease Brother 48        pacer     Diabetes Maternal Grandmother     Hypertension Maternal Grandmother     Diabetes Maternal Aunt     Hypertension Maternal Aunt     Cancer - colorectal Maternal Grandfather     C.A.D. Maternal  Aunt         MI    Glaucoma No family hx of     Macular Degeneration No family hx of          SOCIAL HISTORY:  Social History     Tobacco Use    Smoking status: Never    Smokeless tobacco: Never   Vaping Use    Vaping Use: Never used   Substance Use Topics    Alcohol use: Yes     Alcohol/week: 0.0 standard drinks of alcohol     Comment: very rare      Drug use: No       ROS:   Constitutional: No fever, chills, or sweats. Weight stable.   Cardiovascular: As per HPI.       Exam:  /87 (BP Location: Right arm, Patient Position: Sitting, Cuff Size: Adult Regular)   Pulse 115   Wt (!) 153.8 kg (339 lb)   SpO2 95%   BMI 54.03 kg/m    GENERAL APPEARANCE: alert and no distress  HEENT: no icterus, no central cyanosis  LYMPH/NECK: no adenopathy, no asymmetry  CARDIOVASCULAR: irregular rhythm, normal S1, S2, no S3 or S4 and no murmur, click or rub, precordium quiet with normal PMI.  EXTREMITIES: 2+ pitting bilateral pretibial edema  NEURO: alert, normal speech,and affect  SKIN: no ecchymoses, no rashes     I have reviewed the labs and personally reviewed the imaging below and made my comment in the assessment and plan.    Labs:  CBC RESULTS:   Lab Results   Component Value Date    WBC 4.3 11/15/2023    WBC 4.0 08/06/2020    RBC 4.40 11/15/2023    RBC 4.26 08/06/2020    HGB 12.7 11/15/2023    HGB 12.3 08/06/2020    HCT 39.4 11/15/2023    HCT 37.3 08/06/2020    MCV 90 11/15/2023    MCV 88 08/06/2020    MCH 28.9 11/15/2023    MCH 28.9 08/06/2020    MCHC 32.2 11/15/2023    MCHC 33.0 08/06/2020    RDW 13.5 11/15/2023    RDW 13.9 08/06/2020     11/15/2023     08/06/2020       BMP RESULTS:  Lab Results   Component Value Date     12/19/2023     08/06/2020    POTASSIUM 3.9 12/28/2023    POTASSIUM 4.5 03/27/2023    POTASSIUM 3.9 08/06/2020    CHLORIDE 104 12/19/2023    CHLORIDE 108 03/27/2023    CHLORIDE 107 08/06/2020    CO2 25 12/19/2023    CO2 28 03/27/2023    CO2 30 08/06/2020    ANIONGAP 14  12/19/2023    ANIONGAP 4 03/27/2023    ANIONGAP 5 08/06/2020     (H) 12/19/2023    GLC 92 03/27/2023    GLC 89 08/06/2020    BUN 34.2 (H) 12/19/2023    BUN 28 03/27/2023    BUN 25 08/06/2020    CR 1.41 (H) 12/19/2023    CR 1.32 (H) 08/06/2020    GFRESTIMATED 38 (L) 12/19/2023    GFRESTIMATED 40 (L) 08/06/2020    GFRESTBLACK 46 (L) 08/06/2020    HECTOR 8.9 12/19/2023    HECTOR 8.4 (L) 08/06/2020      DC cardioversion 12/28/2023: 1 200 J biphasic synchronized shock delivered with conversion to sinus rhythm.  CANDE 12/28/2023: Normal biventricular function with no significant valve disease.  No LA appendage thrombus.  Echocardiogram 11/28/2023  Technically difficult study.Extremely poor acoustic windows.  Left ventricular size, wall motion and function are normal. The ejection  fraction is 55-60%.  Global right ventricular function is normal.  IVC diameter <2.1 cm collapsing >50% with sniff suggests a normal RA pressure  of 3 mmHg.  No pericardial effusion is present.    EKG 11/15/2023 atrial fibrillation.  Heart rate well controlled.            Cardiac MRI 2017  IMPRESSION:  1.  Regadenoson stress perfusion: Normal perfusion at rest and  post-stress without evidence of inducible ischemia.  2.  Normal left ventricular size and systolic function with a  calculated ejection fraction of  62 %.  3.  Normal right ventricular size and systolic function with a  calculated ejection fraction of 60%.   4.  On delayed enhancement imaging, there is no abnormal  hyperenhancement to suggest myocardial scar/inflammation/infiltration.     Assessment:    #New onset persistent atrial fibrillation status post cardioversion 12/28/2023 with early recurrence within 10 days.  #Morbid obesity  #UMANA and worsening functional capacity over the last 1 year which has improved after cardioversion  #HFpEF functional class II  -Patient underwent cardioversion on 12/28 which was successful and she felt significant change in her UMANA and how she felt.   Over the last 4 days she tells me that she felt little dizzy but no palpitations, syncope, chest pain.  She does not check her pulse regularly.  She is in A-fib in clinic today.  She has been on amiodarone all along which I started prior to cardioversion.  I discussed with the patient that she would benefit from catheter ablation.  She would like to discuss this with her daughter and will give us a call.  In the meantime I will start her on metoprolol as she is in the RVR.  Doing well on Xarelto.      Plan:  -Continue Xarelto 20 mg.   -Continue amiodarone 200 mg daily  -Cut down on Bumex dose from 1 mg to half milligrams daily  -Continue olmesartan 40 mg daily  -Start metoprolol 50 mg daily  -Will refer to EP for catheter ablation after she gets back to us.  (She needs to confirm dates with her daughter)    Return to clinic 6 months after cardioversion.    Total time spent today for this visit is 56 minutes including precharting, face-to-face clinic visit, review of labs/imaging and medical documentation.    Patito LOAIZA MD  [facility] Division of Cardiology  Pager [phone]

## 2024-01-10 NOTE — PATIENT INSTRUCTIONS
Thank you for coming to the [facility] at [place]; please note the following instructions:    1. Decrease your Bumex to 0.5 mg daily.    2. START: Metoprolol succinate 50 mg daily    3. EKG today.    4. Let us know what dates work for your ablation if you decide to go ahead with it. You can reach us either on [employer] or by calling the number below.    5. Follow up with Dr. Montano in 6 months.         If you have any questions regarding your visit, please contact your care team:     CARDIOLOGY  TELEPHONE NUMBER   Katty MORROWSamira, Registered Nurse  Bekah PEREZ, Registered Nurse  Eileen RICO, Registered Medical Assistant  Vita PRADHAN, Certified Medical Assistant  Mavis STANLEY, Clinic Assistant [phone] (select option 1)    *After hours: [phone]   For Scheduling Appts:     [phone] (select option 1)    *After hours: [phone]   For the Device Clinic (Pacemakers and ICD's)  Estella DONAHUE, Registered Nurse   During business hours: [phone]    *After business hours:  [phone] (select option 4)      Normal test result notifications will be released via [employer] or mailed within 7 business days.  All other test results, will be communicated via telephone once reviewed by your cardiologist.    If you need a medication refill, please contact your pharmacy.  Please allow 3 business days for your refill to be completed.    As always, thank you for trusting us with your health care needs!

## 2024-01-10 NOTE — NURSING NOTE
"Chief Complaint   Patient presents with    Follow Up     Reason for visit: Return general cardiology for s/p cardioversion       Initial /87 (BP Location: Right arm, Patient Position: Sitting, Cuff Size: Adult Regular)   Pulse 115   Wt (!) 153.8 kg (339 lb)   SpO2 95%   BMI 54.03 kg/m   Estimated body mass index is 54.03 kg/m  as calculated from the following:    Height as of 11/15/23: 1.687 m (5' 6.42\").    Weight as of this encounter: 153.8 kg (339 lb)..  BP completed using cuff size: regular - forearm    CARLOS Terry    "

## 2024-01-10 NOTE — PROGRESS NOTES
[facility]      Referring provider:Vita Zavala MD     HPI: Ms. Chelo Brooks is a 78 year old  female with PMH significant for    -Morbid obesity BMI 53  -Hypertension.   -Persistent atrial fibrillation  -CKD stage III  -Obstructive sleep apnea    Patient is being seen today for worsening shortness of breath over the last 1 year.  She also reports worsening lower extremity edema sometimes weeping from her legs.  She has noticed that when she goes to shopping she has to stop multiple times over the last 1 year.  She feels exhausted with activity.  No chest pain, palpitations, dizziness, or syncope.    Patient was recently seen in primary care clinic for the symptoms and found to be in atrial fibrillation.  Started on Xarelto.  Referred to cardiology for further management of A-fib.  A-fib is a new diagnosis for the patient.    Prior cardiac work-up in 2017 shows normal cardiac MRI and sinus rhythm EKG.  Recent echocardiogram on 11/28/2023 shows normal biventricular function with no significant valve disease.    Medications: Atorvastatin 40 mg, olmesartan hydrochlorothiazide 40/12.5, Xarelto 20 mg.    Medications, personal, family, and social history reviewed with patient and revised.    Interval history 1/10/2024:  Patient is being seen today for follow-up after recent cardioversion on 12/28/2023.  Patient tells me that she felt great after cardioversion which was successful.  UMANA has improved.  Denies chest pain, palpitations, dizziness.  She still has significant edema on both legs.  As you know I have started on Bumex a month ago and stopped hydrochlorothiazide.  Her weight is the same.  She tells me she has to go to the bathroom a lot.    PAST MEDICAL HISTORY:  Past Medical History:   Diagnosis Date    Acquired renal cyst of left kidney     Adrenal nodule (H24)     left -  needs yearly CT    Ascending aorta dilatation (H24)     Chronic atrial fibrillation (H) 11/15/2023    CKD (chronic kidney disease) stage 3, GFR 30-59 ml/min (H)     Degenerative joint disease (DJD) of hip     DJD (degenerative joint disease) 10/21/2005    Eczema, unspecified type 04/05/2018    Elevated parathyroid hormone 06/18/2019    Gallstones     Hepatitis B childhood     resolved, patient is not a carrier     Hyperlipidemia 11/27/2012    Hypertension goal BP (blood pressure) < 140/90     Lumbar spinal stenosis 07/17/2017    Mild persistent asthma without complication 05/30/2017    Morbid obesity due to excess calories (H) 11/23/2015    Surgical referral declined '16     FLOR (obstructive sleep apnea)     Osteopenia     Pulmonary emboli (H) 10/28/2019    Shingles 2014    scalp    Stasis dermatitis of both legs 04/05/2018    Thyroid nodule     Umbilical hernia     Vitamin D deficiency        CURRENT MEDICATIONS:  Current Outpatient Medications   Medication Sig Dispense Refill    albuterol (PROAIR HFA/PROVENTIL HFA/VENTOLIN HFA) 108 (90 Base) MCG/ACT inhaler Inhale 1-2 puffs into the lungs every 4 hours as needed for shortness of breath / dyspnea 1 Inhaler 3    amiodarone (PACERONE) 200 MG tablet Twice daily for 2 weeks then reduce dose to once a day 180 tablet 3    atorvastatin (LIPITOR) 40 MG tablet Take 1 tablet (40 mg) by mouth daily 90 tablet 3    bumetanide (BUMEX) 1 MG tablet Take 1 tablet (1 mg) by mouth daily 90 tablet 3    Cholecalciferol (VITAMIN D) 2000 UNITS tablet Take 2,000 Units by mouth daily      Cyanocobalamin (VITAMIN B-12 PO) Take by mouth daily      cyclobenzaprine (FLEXERIL) 10 MG tablet Take 1 tablet (10 mg) by mouth 3 times daily as needed for muscle spasms 30 tablet 1    famotidine (PEPCID) 40 MG tablet TAKE 1 TABLET(40 MG) BY MOUTH DAILY 90 tablet 3    fluticasone (FLONASE) 50 MCG/ACT nasal spray Spray 2 sprays into both nostrils daily 48 g 3    loratadine (CLARITIN) 10 MG tablet Take 1  tablet (10 mg) by mouth daily      magnesium 250 MG tablet Take 1 tablet (250 mg) by mouth daily 90 tablet 1    mometasone (ELOCON) 0.1 % external cream Apply to affected area on legs twice daily as needed 100 g 3    olmesartan (BENICAR) 40 MG tablet Take 1 tablet (40 mg) by mouth daily 90 tablet 3    order for DME Equipment being ordered: Auto CPAP 11-18 1 Units 0    rivaroxaban ANTICOAGULANT (XARELTO) 20 MG TABS tablet Take 1 tablet (20 mg) by mouth daily (with dinner) 90 tablet 3       PAST SURGICAL HISTORY:  Past Surgical History:   Procedure Laterality Date    ANESTHESIA CARDIOVERSION N/A 12/28/2023    Procedure: Anesthesia cardioversion @1330;  Surgeon: GENERIC ANESTHESIA PROVIDER;  Location: [facility] OR    IR PAROTID BIOPSY  5/21/2020    [facility] TOTAL KNEE ARTHROPLASTY  3/2000    right    [facility] TOTAL KNEE ARTHROPLASTY  6/8/12    left    [facility] VAGINAL HYSTERECTOMY  1977    benign, prolapse, ovaries remain        ALLERGIES:     Allergies   Allergen Reactions    Adhesive Tape        FAMILY HISTORY:  Family History   Problem Relation Age of Onset    Circulatory Father         d. DVT    Heart Disease Father         pacer    Diabetes Mother     Hypertension Mother     Deep Vein Thrombosis Brother     Coronary Artery Disease Brother     Myocardial Infarction Brother     Deep Vein Thrombosis Brother     C.A.D. Paternal Uncle         MI     Heart Disease Brother 48        pacer     Diabetes Maternal Grandmother     Hypertension Maternal Grandmother     Diabetes Maternal Aunt     Hypertension Maternal Aunt     Cancer - colorectal Maternal Grandfather     C.A.D. Maternal Aunt         MI    Glaucoma No family hx of     Macular Degeneration No family hx of          SOCIAL HISTORY:  Social History     Tobacco Use    Smoking status: Never    Smokeless tobacco: Never   Vaping Use    Vaping Use: Never used   Substance Use Topics    Alcohol use: Yes     Alcohol/week: 0.0 standard drinks of alcohol     Comment: very rare      Drug use: No        ROS:   Constitutional: No fever, chills, or sweats. Weight stable.   Cardiovascular: As per HPI.       Exam:  /87 (BP Location: Right arm, Patient Position: Sitting, Cuff Size: Adult Regular)   Pulse 115   Wt (!) 153.8 kg (339 lb)   SpO2 95%   BMI 54.03 kg/m    GENERAL APPEARANCE: alert and no distress  HEENT: no icterus, no central cyanosis  LYMPH/NECK: no adenopathy, no asymmetry  CARDIOVASCULAR: irregular rhythm, normal S1, S2, no S3 or S4 and no murmur, click or rub, precordium quiet with normal PMI.  EXTREMITIES: 2+ pitting bilateral pretibial edema  NEURO: alert, normal speech,and affect  SKIN: no ecchymoses, no rashes     I have reviewed the labs and personally reviewed the imaging below and made my comment in the assessment and plan.    Labs:  CBC RESULTS:   Lab Results   Component Value Date    WBC 4.3 11/15/2023    WBC 4.0 08/06/2020    RBC 4.40 11/15/2023    RBC 4.26 08/06/2020    HGB 12.7 11/15/2023    HGB 12.3 08/06/2020    HCT 39.4 11/15/2023    HCT 37.3 08/06/2020    MCV 90 11/15/2023    MCV 88 08/06/2020    MCH 28.9 11/15/2023    MCH 28.9 08/06/2020    MCHC 32.2 11/15/2023    MCHC 33.0 08/06/2020    RDW 13.5 11/15/2023    RDW 13.9 08/06/2020     11/15/2023     08/06/2020       BMP RESULTS:  Lab Results   Component Value Date     12/19/2023     08/06/2020    POTASSIUM 3.9 12/28/2023    POTASSIUM 4.5 03/27/2023    POTASSIUM 3.9 08/06/2020    CHLORIDE 104 12/19/2023    CHLORIDE 108 03/27/2023    CHLORIDE 107 08/06/2020    CO2 25 12/19/2023    CO2 28 03/27/2023    CO2 30 08/06/2020    ANIONGAP 14 12/19/2023    ANIONGAP 4 03/27/2023    ANIONGAP 5 08/06/2020     (H) 12/19/2023    GLC 92 03/27/2023    GLC 89 08/06/2020    BUN 34.2 (H) 12/19/2023    BUN 28 03/27/2023    BUN 25 08/06/2020    CR 1.41 (H) 12/19/2023    CR 1.32 (H) 08/06/2020    GFRESTIMATED 38 (L) 12/19/2023    GFRESTIMATED 40 (L) 08/06/2020    GFRESTBLACK 46 (L) 08/06/2020    HECTOR 8.9 12/19/2023     HECTOR 8.4 (L) 08/06/2020      DC cardioversion 12/28/2023: 1 200 J biphasic synchronized shock delivered with conversion to sinus rhythm.  CANDE 12/28/2023: Normal biventricular function with no significant valve disease.  No LA appendage thrombus.  Echocardiogram 11/28/2023  Technically difficult study.Extremely poor acoustic windows.  Left ventricular size, wall motion and function are normal. The ejection  fraction is 55-60%.  Global right ventricular function is normal.  IVC diameter <2.1 cm collapsing >50% with sniff suggests a normal RA pressure  of 3 mmHg.  No pericardial effusion is present.    EKG 11/15/2023 atrial fibrillation.  Heart rate well controlled.            Cardiac MRI 2017  IMPRESSION:  1.  Regadenoson stress perfusion: Normal perfusion at rest and  post-stress without evidence of inducible ischemia.  2.  Normal left ventricular size and systolic function with a  calculated ejection fraction of  62 %.  3.  Normal right ventricular size and systolic function with a  calculated ejection fraction of 60%.   4.  On delayed enhancement imaging, there is no abnormal  hyperenhancement to suggest myocardial scar/inflammation/infiltration.     Assessment:    #New onset persistent atrial fibrillation status post cardioversion 12/28/2023 with early recurrence within 10 days.  #Morbid obesity  #UMANA and worsening functional capacity over the last 1 year which has improved after cardioversion  #HFpEF functional class II  -Patient underwent cardioversion on 12/28 which was successful and she felt significant change in her UMANA and how she felt.  Over the last 4 days she tells me that she felt little dizzy but no palpitations, syncope, chest pain.  She does not check her pulse regularly.  She is in A-fib in clinic today.  She has been on amiodarone all along which I started prior to cardioversion.  I discussed with the patient that she would benefit from catheter ablation.  She would like to discuss this with her  daughter and will give us a call.  In the meantime I will start her on metoprolol as she is in the RVR.  Doing well on Xarelto.      Plan:  -Continue Xarelto 20 mg.   -Continue amiodarone 200 mg daily  -Cut down on Bumex dose from 1 mg to half milligrams daily  -Continue olmesartan 40 mg daily  -Start metoprolol 50 mg daily  -Will refer to EP for catheter ablation after she gets back to us.  (She needs to confirm dates with her daughter)    Return to clinic 6 months after cardioversion.    Total time spent today for this visit is 56 minutes including precharting, face-to-face clinic visit, review of labs/imaging and medical documentation.    Patito LOAIZA MD  [facility] Division of Cardiology  Pager [phone]

## 2024-01-11 LAB
ATRIAL RATE - MUSE: 125 BPM
DIASTOLIC BLOOD PRESSURE - MUSE: NORMAL MMHG
INTERPRETATION ECG - MUSE: NORMAL
P AXIS - MUSE: NORMAL DEGREES
PR INTERVAL - MUSE: NORMAL MS
QRS DURATION - MUSE: 86 MS
QT - MUSE: 366 MS
QTC - MUSE: 455 MS
R AXIS - MUSE: -1 DEGREES
SYSTOLIC BLOOD PRESSURE - MUSE: NORMAL MMHG
T AXIS - MUSE: 21 DEGREES
VENTRICULAR RATE- MUSE: 93 BPM

## 2024-01-12 ENCOUNTER — OFFICE VISIT (OUTPATIENT)
Dept: SLEEP MEDICINE | Facility: CLINIC | Age: 79
End: 2024-01-12
Payer: COMMERCIAL

## 2024-01-12 VITALS
HEIGHT: 66 IN | HEART RATE: 89 BPM | WEIGHT: 293 LBS | OXYGEN SATURATION: 96 % | SYSTOLIC BLOOD PRESSURE: 130 MMHG | DIASTOLIC BLOOD PRESSURE: 81 MMHG | BODY MASS INDEX: 47.09 KG/M2

## 2024-01-12 DIAGNOSIS — E66.01 MORBID OBESITY DUE TO EXCESS CALORIES (H): Chronic | ICD-10-CM

## 2024-01-12 DIAGNOSIS — G47.33 OSA (OBSTRUCTIVE SLEEP APNEA): Primary | Chronic | ICD-10-CM

## 2024-01-12 PROCEDURE — 99213 OFFICE O/P EST LOW 20 MIN: CPT | Performed by: PHYSICIAN ASSISTANT

## 2024-01-12 ASSESSMENT — SLEEP AND FATIGUE QUESTIONNAIRES
HOW LIKELY ARE YOU TO NOD OFF OR FALL ASLEEP WHILE SITTING AND TALKING TO SOMEONE: WOULD NEVER DOZE
HOW LIKELY ARE YOU TO NOD OFF OR FALL ASLEEP WHILE WATCHING TV: SLIGHT CHANCE OF DOZING
HOW LIKELY ARE YOU TO NOD OFF OR FALL ASLEEP WHEN YOU ARE A PASSENGER IN A CAR FOR AN HOUR WITHOUT A BREAK: WOULD NEVER DOZE
HOW LIKELY ARE YOU TO NOD OFF OR FALL ASLEEP WHILE SITTING QUIETLY AFTER LUNCH WITHOUT ALCOHOL: WOULD NEVER DOZE
HOW LIKELY ARE YOU TO NOD OFF OR FALL ASLEEP WHILE SITTING INACTIVE IN A PUBLIC PLACE: WOULD NEVER DOZE
HOW LIKELY ARE YOU TO NOD OFF OR FALL ASLEEP IN A CAR, WHILE STOPPED FOR A FEW MINUTES IN TRAFFIC: WOULD NEVER DOZE
HOW LIKELY ARE YOU TO NOD OFF OR FALL ASLEEP WHILE LYING DOWN TO REST IN THE AFTERNOON WHEN CIRCUMSTANCES PERMIT: SLIGHT CHANCE OF DOZING
HOW LIKELY ARE YOU TO NOD OFF OR FALL ASLEEP WHILE SITTING AND READING: MODERATE CHANCE OF DOZING

## 2024-01-12 NOTE — PATIENT INSTRUCTIONS
Your Body mass index is 53.39 kg/m .  Weight management is a personal decision.  If you are interested in exploring weight loss strategies, the following discussion covers the approaches that may be successful. Body mass index (BMI) is one way to tell whether you are at a healthy weight, overweight, or obese. It measures your weight in relation to your height.  A BMI of 18.5 to 24.9 is in the healthy range. A person with a BMI of 25 to 29.9 is considered overweight, and someone with a BMI of 30 or greater is considered obese. More than two-thirds of American adults are considered overweight or obese.  Being overweight or obese increases the risk for further weight gain. Excess weight may lead to heart disease and diabetes.  Creating and following plans for healthy eating and physical activity may help you improve your health.  Weight control is part of healthy lifestyle and includes exercise, emotional health, and healthy eating habits. Careful eating habits lifelong are the mainstay of weight control. Though there are significant health benefits from weight loss, long-term weight loss with diet alone may be very difficult to achieve- studies show long-term success with dietary management in less than 10% of people. Attaining a healthy weight may be especially difficult to achieve in those with severe obesity. In some cases, medications, devices and surgical management might be considered.  What can you do?  If you are overweight or obese and are interested in methods for weight loss, you should discuss this with your provider.   Consider reducing daily calorie intake by 500 calories.   Keep a food journal.   Avoiding skipping meals, consider cutting portions instead.    Diet combined with exercise helps maintain muscle while optimizing fat loss. Strength training is particularly important for building and maintaining muscle mass. Exercise helps reduce stress, increase energy, and improves fitness. Increasing  exercise without diet control, however, may not burn enough calories to loose weight.     Start walking three days a week 10-20 minutes at a time  Work towards walking thirty minutes five days a week   Eventually, increase the speed of your walking for 1-2 minutes at time    In addition, we recommend that you review healthy lifestyles and methods for weight loss available through the National Institutes of Health patient information sites:  http://win.niddk.nih.gov/publications/index.htm    And look into health and wellness programs that may be available through your health insurance provider, employer, local community center, or lien club.

## 2024-01-12 NOTE — PROGRESS NOTES
Sleep Apnea - Follow-up Visit:    Impression/Plan:  Severe Sleep apnea.  Tolerating PAP well. Daytime symptoms are improved markedly.   AHI is well controlled on CPAP 11-18 cm/H20. Daytime symptoms are improved.   PAP 95% pressure 15.7 cm. Continue current treatment.   Comprehensive DME order placed.     Chelo Brooks will follow up in about 1 year or sooner if any concerns    25 minutes spent on day of encounter doing chart review,  history and exam, counseling, coordinating plan of care, documentation and further activities as noted above.       Quang Dowell PA-C  Sleep Medicine     History of Present Illness:  Chief Complaint   Patient presents with    CPAP Follow Up     Patient states the straps on the masks has caused pain and bumps on her face.        Chelo Brooks presents for follow-up of their severe sleep apnea, managed with CPAP.     Initial sleep study done on 2/14/17 at the [facility] for loud, socially disruptive snoring, witnessed apneas, excessive daytime sleepiness (ESS 11), sleep maintenance difficulties, obesity, narrowed oropharynx oropharynx. Sleep onset << maintenance difficulties, some psychophysiologic insomnia suspected.    Study Date: 2/14/2017- (329.0 lbs) The total sleep time was 96.0 minutes. The combined apnea/hypopnea index was 89.4 events per hour.  The REM AHI was n/a.  The supine AHI was 93.9 events per hour. RDI was 92.5 events per hour.  Lowest oxygen saturation was 77%.  Time spent less than or equal to 88% was 27.5 minutes.  Time spent less than or equal to 89% was 34.4 minutes.    Do you use a CPAP Machine at home: Yes  Overall, on a scale of 0-10 how would you rate your CPAP (0 poor, 10 great): 8    What type of mask do you use: Full Face Mask  Is your mask comfortable: Yes  If not, why:      Is your mask leaking: Yes  If yes, where do you feel it:    How many night per week does the mask leak (0-7):      Do you notice snoring with mask on: No  Do  you notice gasping arousals with mask on: No  Are you having significant oral or nasal dryness: Yes  Is the pressure setting comfortable: Yes  If not, why:      What is your typical bedtime: 11  How long does it take you to go to sleep on PAP therapy: [id number]???  What time do you typically get out of bed for the day: 830  How many hours on average per night are you using PAP therapy: 8  How many hours are you sleeping per night: 8  Do you feel well rested in the morning: No      [employer]   Auto-PAP 11.0 - 18.0 cmH2O 30 day usage data:    93% of days with > 4 hours of use. 1/30 days with no use.   Average use 7 hours and 50 minutes per day.   95%ile Leak 37.5 L/min.   CPAP 95% pressure 15.7 cm.   AHI 1.25 events per hour.       EPWORTH SLEEPINESS SCALE         1/12/2024     1:47 PM    [place] Sleepiness Scale ( CHARLEY Coffey  0400-1835<br>ESS - [place]/English - Final version - 21 Nov 07 - [facility] [place].)   Sitting and reading Moderate chance of dozing   Watching TV Slight chance of dozing   Sitting, inactive in a public place (e.g. a theatre or a meeting) Would never doze   As a passenger in a car for an hour without a break Would never doze   Lying down to rest in the afternoon when circumstances permit Slight chance of dozing   Sitting and talking to someone Would never doze   Sitting quietly after a lunch without alcohol Would never doze   In a car, while stopped for a few minutes in traffic Would never doze   [place] Score (MC) 4   [place] Score (Sleep) 4       INSOMNIA SEVERITY INDEX (SELENE)          1/12/2024     1:41 PM   Insomnia Severity Index (SELENE)   Difficulty falling asleep 1   Difficulty staying asleep 1   Problems waking up too early 2   How SATISFIED/DISSATISFIED are you with your CURRENT sleep pattern? 1   How NOTICEABLE to others do you think your sleep problem is in terms of impairing the quality of your life? 2   How WORRIED/DISTRESSED are you about your current sleep problem? 0   To what extent do  you consider your sleep problem to INTERFERE with your daily functioning (e.g. daytime fatigue, mood, ability to function at work/daily chores, concentration, memory, mood, etc.) CURRENTLY? 1   SELENE Total Score 8       Guidelines for Scoring/Interpretation:  Total score categories:  0-7 = No clinically significant insomnia   8-14 = Subthreshold insomnia   15-21 = Clinical insomnia (moderate severity)  22-28 = Clinical insomnia (severe)  Used via courtesy of www.[employer].va.gov with permission from Luciano Lake PhD., [facility]        Past medical/surgical history, family history, social history, medications and allergies were reviewed.        Problem List:  Patient Active Problem List    Diagnosis Date Noted    Hyperlipidemia LDL goal <40 11/27/2012     Priority: High    Chronic atrial fibrillation (H) 11/15/2023     Priority: Medium    Umbilical hernia      Priority: Medium    Degenerative joint disease (DJD) of hip      Priority: Medium    Thyroid nodule      Priority: Medium    GERD (gastroesophageal reflux disease) 10/28/2019     Priority: Medium    Asthma, mild intermittent, well-controlled 06/18/2019     Priority: Medium    Elevated parathyroid hormone 06/18/2019     Priority: Medium    Stage 3b chronic kidney disease (CKD) (H)      Priority: Medium    Stasis dermatitis of both legs 04/05/2018     Priority: Medium    Combined forms of age-related cataract of both eyes 07/18/2017     Priority: Medium    Lumbar spinal stenosis 07/17/2017     Priority: Medium    FLOR (obstructive sleep apnea)- severe (AHI 89) 02/15/2017     Priority: Medium     Study Date: 2/14/2017- (329.0 lbs) The total sleep time was 96.0 minutes. The combined apnea/hypopnea index was 89.4 events per hour.  The REM AHI was n/a.  The supine AHI was 93.9 events per hour. RDI was 92.5 events per hour.  Lowest oxygen saturation was 77%.  Time spent less than or equal to 88% was 27.5 minutes.  Time spent less than or equal to 89% was 34.4  "minutes.      Osteopenia      Priority: Medium    Morbid obesity due to excess calories (H) 11/23/2015     Priority: Medium     Surgical referral declined '16      Renal hypertension      Priority: Medium    History of bilateral knee replacement 10/23/2013     Priority: Medium    Allergic rhinitis due to animal dander      Priority: Medium     4/5/12 RAST pos. only to cat mildly      Dependent edema 06/17/2003     Priority: Medium        Pulse 89   Ht 1.687 m (5' 6.42\")   Wt (!) 152 kg (335 lb)   SpO2 96%   BMI 53.39 kg/m     "

## 2024-01-12 NOTE — NURSING NOTE
1 year appointment reminder message was created and will be sent out 2 - 3 months prior to next appointment due date. [employer]

## 2024-01-12 NOTE — NURSING NOTE
"Chief Complaint   Patient presents with    CPAP Follow Up     Patient states the straps on the masks has caused pain and bumps on her face.        Initial Pulse 89   Ht 1.687 m (5' 6.42\")   Wt (!) 152 kg (335 lb)   SpO2 96%   BMI 53.39 kg/m   Estimated body mass index is 53.39 kg/m  as calculated from the following:    Height as of this encounter: 1.687 m (5' 6.42\").    Weight as of this encounter: 152 kg (335 lb).    Medication Reconciliation: complete    Neck circumference: 17 inches / 43 centimeters.    DME: Abraham Sarah CMA on 1/12/2024 at 2:01 PM     "

## 2024-01-30 DIAGNOSIS — I48.19 PERSISTENT ATRIAL FIBRILLATION (H): ICD-10-CM

## 2024-02-02 ENCOUNTER — TELEPHONE (OUTPATIENT)
Dept: CARDIOLOGY | Facility: CLINIC | Age: 79
End: 2024-02-02
Payer: COMMERCIAL

## 2024-02-02 DIAGNOSIS — I48.19 PERSISTENT ATRIAL FIBRILLATION (H): ICD-10-CM

## 2024-02-02 DIAGNOSIS — E78.5 HYPERLIPIDEMIA LDL GOAL <100: ICD-10-CM

## 2024-02-02 RX ORDER — METOPROLOL SUCCINATE 50 MG/1
50 TABLET, EXTENDED RELEASE ORAL DAILY
Qty: 90 TABLET | Refills: 3 | Status: SHIPPED | OUTPATIENT
Start: 2024-02-02

## 2024-02-02 RX ORDER — METOPROLOL SUCCINATE 50 MG/1
50 TABLET, EXTENDED RELEASE ORAL DAILY
Qty: 90 TABLET | Refills: 3 | OUTPATIENT
Start: 2024-02-02

## 2024-02-02 RX ORDER — ATORVASTATIN CALCIUM 40 MG/1
40 TABLET, FILM COATED ORAL DAILY
Qty: 90 TABLET | Refills: 3 | Status: SHIPPED | OUTPATIENT
Start: 2024-02-02 | End: 2024-07-01

## 2024-02-02 NOTE — PROGRESS NOTES
[facility]    Date of visit: 2/5/2024    Primary Care Provider: Dr. Vita Zavala    Reason for consultation:    Chelo Brooks is a 78 year old female who is seen in consultation today at the request of her primary care physician, Vita Zavala .       Chief Complaint:    Lbp rad to her le    Pain history: Chronic lbp  Recent cardioversion   Was sent for injection but never scheduled  Never followed up with neuro  Of note thought she was here for chronic pain consult  Chelo Brooks is a 78 year old female with a PMH significant for lbp rad  to her le with LBP radiating to her LE  Pain is now intermittent   Restarted 4 months ago   Denies any specific inciting event  The pain has been gradually improving  The pt reports  pain is bilat  The pain was worse l>R  The pain rad to post thigh and foot  She notes deep pressure  No longer have numbness/ting/burning  Pain is worse with walking  Pain is worse bending   Can not do normal daily activity   Benefit with rest  Benefit with PHYSICAL THERAPY    Benefit with walker bending forward   Has not been doing home regimen   Denies recent falls  Denies over t weakness  Denies incontinence     Benefit quickly after stopping walking    Denies red flags including: bowel or bladder symptoms, fever, chills, saddle anesthesia, profound motor loss, history of cancer, history of immune compromise, weight loss.     Impact of Pain: There is significant limitation     Current medication treatments include:  apap  Pain medications are being prescribed by .    Previous medication treatments included:n/a    Other treatments have included:  Chelo Brooks has been seen at a pain clinic in the past.      PT: yes with benefit not consistent with regimen       Interventional: On anticoag   Acupuncture/chiro: n  TENs Unit: n  Injections: L5-S1 ILESI benefit 2020   No benefit with lumbar RFA     Past Medical History:  Past  Medical History:   Diagnosis Date    Acquired renal cyst of left kidney     Adrenal nodule (H24)     left - needs yearly CT    Ascending aorta dilatation (H24)     Chronic atrial fibrillation (H) 11/15/2023    CKD (chronic kidney disease) stage 3, GFR 30-59 ml/min (H)     Degenerative joint disease (DJD) of hip     DJD (degenerative joint disease) 10/21/2005    Eczema, unspecified type 04/05/2018    Elevated parathyroid hormone 06/18/2019    Gallstones     Hepatitis B childhood     resolved, patient is not a carrier     Hyperlipidemia 11/27/2012    Hypertension goal BP (blood pressure) < 140/90     Lumbar spinal stenosis 07/17/2017    Mild persistent asthma without complication 05/30/2017    Morbid obesity due to excess calories (H) 11/23/2015    Surgical referral declined '16     FLOR (obstructive sleep apnea)     Osteopenia     Pulmonary emboli (H) 10/28/2019    Shingles 2014    scalp    Stasis dermatitis of both legs 04/05/2018    Thyroid nodule     Umbilical hernia     Vitamin D deficiency      Past Surgical History:  Past Surgical History:   Procedure Laterality Date    ANESTHESIA CARDIOVERSION N/A 12/28/2023    Procedure: Anesthesia cardioversion @1330;  Surgeon: GENERIC ANESTHESIA PROVIDER;  Location: [facility] OR    IR PAROTID BIOPSY  5/21/2020    [facility] TOTAL KNEE ARTHROPLASTY  3/2000    right    [facility] TOTAL KNEE ARTHROPLASTY  6/8/12    left    [facility] VAGINAL HYSTERECTOMY  1977    benign, prolapse, ovaries remain      Medications:  Current Outpatient Medications   Medication Sig Dispense Refill    albuterol (PROAIR HFA/PROVENTIL HFA/VENTOLIN HFA) 108 (90 Base) MCG/ACT inhaler Inhale 1-2 puffs into the lungs every 4 hours as needed for shortness of breath / dyspnea 1 Inhaler 3    amiodarone (PACERONE) 200 MG tablet Twice daily for 2 weeks then reduce dose to once a day 180 tablet 3    atorvastatin (LIPITOR) 40 MG tablet TAKE 1 TABLET(40 MG) BY MOUTH DAILY 90 tablet 3    bumetanide (BUMEX) 1 MG tablet TAKE HALF DAILY 90  tablet 3    Cholecalciferol (VITAMIN D) 2000 UNITS tablet Take 2,000 Units by mouth daily      Cyanocobalamin (VITAMIN B-12 PO) Take by mouth daily      fluticasone (FLONASE) 50 MCG/ACT nasal spray Spray 2 sprays into both nostrils daily 48 g 3    gabapentin (NEURONTIN) 100 MG capsule Take 1 capsule (100 mg) by mouth 2 times daily as needed for neuropathic pain 60 capsule 1    loratadine (CLARITIN) 10 MG tablet Take 1 tablet (10 mg) by mouth daily      magnesium 250 MG tablet Take 1 tablet (250 mg) by mouth daily 90 tablet 1    metoprolol succinate ER (TOPROL XL) 50 MG 24 hr tablet Take 1 tablet (50 mg) by mouth daily 90 tablet 3    mometasone (ELOCON) 0.1 % external cream Apply to affected area on legs twice daily as needed 100 g 3    olmesartan (BENICAR) 40 MG tablet Take 1 tablet (40 mg) by mouth daily 90 tablet 3    order for DME Equipment being ordered: Auto CPAP 11-18 1 Units 0    rivaroxaban ANTICOAGULANT (XARELTO) 20 MG TABS tablet Take 1 tablet (20 mg) by mouth daily (with dinner) 90 tablet 3    cyclobenzaprine (FLEXERIL) 10 MG tablet Take 1 tablet (10 mg) by mouth 3 times daily as needed for muscle spasms (Patient not taking: Reported on 2/5/2024) 30 tablet 1    famotidine (PEPCID) 40 MG tablet TAKE 1 TABLET(40 MG) BY MOUTH DAILY (Patient not taking: Reported on 2/5/2024) 90 tablet 3     Allergies:     Allergies   Allergen Reactions    Adhesive Tape          Family history:  Family History   Problem Relation Age of Onset    Circulatory Father         d. DVT    Heart Disease Father         pacer    Diabetes Mother     Hypertension Mother     Deep Vein Thrombosis Brother     Coronary Artery Disease Brother     Myocardial Infarction Brother     Deep Vein Thrombosis Brother     C.A.D. Paternal Uncle         MI     Heart Disease Brother 48        pacer     Diabetes Maternal Grandmother     Hypertension Maternal Grandmother     Diabetes Maternal Aunt     Hypertension Maternal Aunt     Cancer - colorectal  Maternal Grandfather     ALLY. Maternal Aunt         MI    Glaucoma No family hx of     Macular Degeneration No family hx of              Diagnostic Tests:  MRI  T12-L1: Normal disc height and signal. No herniation. Normal facets. No spinal canal or neural foraminal stenosis.      L1-L2: Normal disc height and signal. No herniation. Normal facets. No spinal canal or neural foraminal stenosis.     L2-L3: Normal disc signal and disc space height. No disc herniation. Mild facet arthropathy. No spinal canal stenosis or neural foraminal narrowing.      L3-L4: Disc desiccation and mild loss of disc space height. Circumferential disc osteophyte complex. Severe degenerative facet changes. Mild spinal canal stenosis. Mild bilateral neural foraminal stenosis.     L4-L5: Disc desiccation and minimal loss of disc space height. Circumferential disc osteophyte complex with superimposed central cranially directed disc extrusion.. Severe degenerative facet changes. Severe spinal canal stenosis. No neural foraminal   stenosis.     L5-S1: Partial fusion across the disc space. Minimal circumferential disc osteophyte complex. Fusion of the bilateral facet joints. No spinal canal stenosis or neural foraminal narrowing.                                                                      IMPRESSION:  1.  Mild lumbar spondylosis superimposed on developmental narrowing of the lower lumbar spinal canal.  2.  At L4-L5, there is progressed severe spinal canal stenosis and severe right and left lateral recess narrowing.  3.  At L3-L4, there is mild spinal canal stenosis and mild bilateral neural foraminal narrowing..      Physical Exam:  Vitals:    02/05/24 1454   BP: (!) 160/83   Pulse: 89     Exam:  Constitutional: healthy, alert, and no distress  Head: normocephalic. Atraumatic.   Skin: no suspicious lesions or rashes  Psychiatric: mentation appears normal and affect normal/bright    Musculoskeletal exam:  Gait/Station/Posture: uses  walker  Cervical spine: wnl  Thoracic spine:  Normal wnl  Lumbar spine: dec  Myofascial tenderness:  ++  Straight leg exam: neg slump  Sacroiliac (SI) joint tenderness: n  Greater trochanteric tenderness: n  Piriformis tenderness: n  SCARLETT/FADIR    Neurologic exam:  Motor:  5/5 UE and LE strength    Reflexes:        Achilles:  R:  2/4 L: 2/4   Sensory:  (upper and lower extremities):   Light touch: normal    Allodynia: absent    Hyperalgesia: absent         Assessment:  Chelo Brooks is a 78 year old female with a past medical history significant for lbp radiating to her LE  presenting with gradually improving acute on chronic lbp  Chelo was seen today for pain.    Diagnoses and all orders for this visit:    Lumbar radiculopathy  -     gabapentin (NEURONTIN) 100 MG capsule; Take 1 capsule (100 mg) by mouth 2 times daily as needed for neuropathic pain    Myofascial muscle pain    Spinal stenosis of lumbar region with neurogenic claudication              Plan:  Diagnosis reviewed, treatment options addressed, and risks/benefits discussed. The patient was involved in shared decision making regarding the plan as laid out below.    - Further procedures recommended: On anticoagulation    - would not recommend at this time could repeat L5-S1 ILESI in the future if symptoms progress  - Medication Management:    - consider low dose gabapentin if symptoms return. 100mg twice a day.   - Physical Therapy: continue.   - Clinical Health Psychologist to address issues of relaxation, behavioral change, coping style, and other factors important to improvement: n  - Diagnostic Studies: reviewed   - Urine toxicology screen today: no   - Follow up: as needed    - discussed red flags of progressive weakness or incontinence           The total TIME spent on this patient on the day of the appointment was 35 minutes.   Time spent preparing to see the patient (reviewing records and tests)  Time spend face to face with the patient  Time  spent ordering tests, medications, procedures and referrals  Time spent Referring and communicating with other healthcare professionals  Documenting clinical information in Epic  Benny Batres MD.  Medical Spine Specialist  [facility]

## 2024-02-02 NOTE — TELEPHONE ENCOUNTER
Patient saw Dr. Montano on 1/10/24. Patient was to start metoprolol succinate 50 mg daily. Prescription sent to requested pharmacy.    Called and spoke with patient. Updated patient on prescription being sent. Patient stated that she is still working with daughter about dates to get setup with Dr. Albarado to discuss ablation.  Provided number for patient to call when she is ready to schedule.    Bekah Zuniga, RN, BSN  Cardiology RN Care Coordinator   [place]   Phone: [phone]  Fax: [phone] ([place]) [phone] ([place])

## 2024-02-02 NOTE — TELEPHONE ENCOUNTER
[facility] Call Center    Phone Message    May a detailed message be left on voicemail: yes     Reason for Call: Medication Refill Request    Has the patient contacted the pharmacy for the refill? Yes   Name of medication being requested: metoprolol succinate ER   Provider who prescribed the medication: can ilknur   Pharmacy:      [id number] - [place], [address]    Date medication is needed: asap    Patient wasn't able to get through [facility] due to them not having an account under her name she would like it sent to the above pharmacy, thank you.      Action Taken: Other: cardiology    Travel Screening: Not Applicable  Thank you!  [facility]

## 2024-02-05 ENCOUNTER — OFFICE VISIT (OUTPATIENT)
Dept: PALLIATIVE MEDICINE | Facility: CLINIC | Age: 79
End: 2024-02-05
Payer: COMMERCIAL

## 2024-02-05 VITALS — SYSTOLIC BLOOD PRESSURE: 160 MMHG | HEART RATE: 89 BPM | DIASTOLIC BLOOD PRESSURE: 83 MMHG

## 2024-02-05 DIAGNOSIS — M54.16 LUMBAR RADICULOPATHY: Primary | ICD-10-CM

## 2024-02-05 DIAGNOSIS — M79.18 MYOFASCIAL MUSCLE PAIN: ICD-10-CM

## 2024-02-05 DIAGNOSIS — M48.062 SPINAL STENOSIS OF LUMBAR REGION WITH NEUROGENIC CLAUDICATION: ICD-10-CM

## 2024-02-05 PROCEDURE — 99204 OFFICE O/P NEW MOD 45 MIN: CPT | Performed by: PAIN MEDICINE

## 2024-02-05 RX ORDER — GABAPENTIN 100 MG/1
100 CAPSULE ORAL 2 TIMES DAILY PRN
Qty: 60 CAPSULE | Refills: 1 | Status: SHIPPED | OUTPATIENT
Start: 2024-02-05

## 2024-02-05 ASSESSMENT — PAIN SCALES - GENERAL: PAINLEVEL: NO PAIN (1)

## 2024-02-05 NOTE — PATIENT INSTRUCTIONS
- Further procedures recommended: On anticoagulation    - would not recommend at this time could repeat L5-S1 ILESI in the future if symptoms progress  - Medication Management:    - consider low dose gabapentin if symptoms return. 100mg twice a day.   - Physical Therapy: continue.   - Clinical Health Psychologist to address issues of relaxation, behavioral change, coping style, and other factors important to improvement: n  - Diagnostic Studies: reviewed   - Urine toxicology screen today: no   - Follow up: as needed    - discussed red flags of progressive weakness or incontinence     ----------------------------------------------------------------  Clinic Number:  [phone]   Call with any questions about your care and for scheduling assistance.   Calls are returned Monday through Friday between 8 AM and 4:30 PM. We usually get back to you within 2 business days depending on the issue/request.    If we are prescribing your medications:  For opioid medication refills, call the clinic or send a [facility] message 7 days in advance.  Please include:  Name of requested medication  Name of the pharmacy.  For non-opioid medications, call your pharmacy directly to request a refill. Please allow 3-4 days to be processed.   Per [place] State Law:  All controlled substance prescriptions must be filled within 30 days of being written.    For those controlled substances allowing refills, pickup must occur within 30 days of last fill.      We believe regular attendance is key to your success in our program!    Any time you are unable to keep your appointment we ask that you call us at least 24 hours in advance to cancel.This will allow us to offer the appointment time to another patient.   Multiple missed appointments may lead to dismissal from the clinic.

## 2024-02-12 NOTE — PROGRESS NOTES
=======================================================  Assessment     Chelo Brooks is a 78 year old female with a past medical history significant for obesity, hypertension, obstructive sleep apnea, A-fib, CKD stage III, secondary hyperparathyroidism, PE, seen for evaluation of a multinodular goiter.    1.  Multinodular goiter  Clinically and biochemically, the patient is euthyroid.  She does not endorse compressive neck symptoms.  The goiter was initially diagnosed in 2019 and, over the years, the patient has had annual follow-up utrasounds.  The left dominant thyroid nodule, benign on the biopsy from 2020, has increased in size on the most recent ultrasound.   Advised the patient to have it rebiopsied.  Counseled the patient on the risk of cancer and the option of pursuing molecular analysis for indeterminate cytopathology results.  Of note that she is treated with amiodarone and this can unravel hyperthyroidism in patients with multinodular goiter.  Unclear at this time whether or not amiodarone is considered as a long-term treatment.  Given the size of the left thyroid lobe, considering thyroidectomy is a reasonable option.    2.  Left adrenal gland adenoma, stable on imaging from 6009-9523.  Clinically, the patient does not endorse signs or symptoms suggestive of Cushing syndrome.  Recommended to screen for MACS with 1 mg dexamethasone suppression test.  Advised the patient to take 1 mg dexamethasone at 11 PM and then schedule lab work the following morning, at 8 in the morning.     Orders Placed This Encounter   Procedures    US Biopsy Thyroid Fine Needle Aspiration    Cortisol    Fine Needle Aspirate     =======================================================  The patient is seen in consultation at the request of Dr. Vita Zavala for evaluation of thyroid nodules.     History of Present Illness:  Chelo Brooks is a 78 year old female with a past medical history significant for aortic  dilatation, A-fib, CKD stage III, DJD, hyperparathyroidism, hypertension, obstructive sleep apnea, osteopenia, pulmonary embolism.     According to the patient, the thyroid nodules were incidentally discovered on MRI, in 2019.  She has not been aware of any neck enlargement.  Occasionally, if she does not carefully chew her food, it might get stuck in her throat.  She denies voice hoarseness, cough, lightheadedness.  Of note that she does have a history of heartburns, currently treated with Pepcid.    Chelo has no prior history of radiation exposure or a family history of thyroid disease.  Since 12/6/2023, she has been medicated with amiodarone.    Most recent TSH was normal at 2.5, on 12/19/2020.    Thyroid ultrasounds were done in 2019, 2020, 2021, 2022 and, most recently, on December 7, 2023.  I reviewed the ultrasound images.  Overall, there is a gradual enlargement of the left thyroid nodule which occupies the whole left thyroid lobe and extends inferiorly in the mediastinum.  On the CT neck images from January 2022, the trachea was patent.  There was mild rightward deviation of the lower trachea.  The nodule was benign on the biopsy from February 2020.  The same year, the patient underwent a right parotid gland biopsy which revealed a benign neoplasm.   On the most recent ultrasound, the left dominant nodule measured 8.1 x 5.4 x 5.1 cm, while it measured 6.1 x 4.7 x 4.1 cm on prior ultrasounds.  Of note that the nodule was not always measured using the same sections.     The abdominal CT from August 2020 identified a 1.2 cm left adrenal nodule.  The nodule was stable in size compared with the prior abdominal CT from October 2019, done at [facility].  Hounsfield unit density on the noncontrast CT from 2020 was consistent with a benign adenoma.  I reviewed the CT images.    Past Medical History   Past Medical History:   Diagnosis Date    Acquired renal cyst of left kidney     Adrenal nodule (H24)     left - needs  yearly CT    Ascending aorta dilatation (H24)     Chronic atrial fibrillation (H) 11/15/2023    CKD (chronic kidney disease) stage 3, GFR 30-59 ml/min (H)     Degenerative joint disease (DJD) of hip     DJD (degenerative joint disease) 10/21/2005    Eczema, unspecified type 04/05/2018    Elevated parathyroid hormone 06/18/2019    Gallstones     Hepatitis B childhood     resolved, patient is not a carrier     Hyperlipidemia 11/27/2012    Hypertension goal BP (blood pressure) < 140/90     Lumbar spinal stenosis 07/17/2017    Mild persistent asthma without complication 05/30/2017    Morbid obesity due to excess calories (H) 11/23/2015    Surgical referral declined '16     FLOR (obstructive sleep apnea)     Osteopenia     Pulmonary emboli (H) 10/28/2019    Shingles 2014    scalp    Stasis dermatitis of both legs 04/05/2018    Thyroid nodule     Umbilical hernia     Vitamin D deficiency    No fractures   Pulmonary embolism     Past Surgical History   Past Surgical History:   Procedure Laterality Date    ANESTHESIA CARDIOVERSION N/A 12/28/2023    Procedure: Anesthesia cardioversion @1330;  Surgeon: GENERIC ANESTHESIA PROVIDER;  Location: [facility] OR    IR PAROTID BIOPSY  5/21/2020    [facility] TOTAL KNEE ARTHROPLASTY  3/2000    right    [facility] TOTAL KNEE ARTHROPLASTY  6/8/12    left    [facility] VAGINAL HYSTERECTOMY  1977    benign, prolapse, ovaries remain        Current Medications    Current Outpatient Medications:     albuterol (PROAIR HFA/PROVENTIL HFA/VENTOLIN HFA) 108 (90 Base) MCG/ACT inhaler, Inhale 1-2 puffs into the lungs every 4 hours as needed for shortness of breath / dyspnea, Disp: 1 Inhaler, Rfl: 3    amiodarone (PACERONE) 200 MG tablet, Twice daily for 2 weeks then reduce dose to once a day, Disp: 180 tablet, Rfl: 3    atorvastatin (LIPITOR) 40 MG tablet, TAKE 1 TABLET(40 MG) BY MOUTH DAILY, Disp: 90 tablet, Rfl: 3    bumetanide (BUMEX) 1 MG tablet, TAKE HALF DAILY, Disp: 90 tablet, Rfl: 3    Cholecalciferol (VITAMIN D) 2000  UNITS tablet, Take 2,000 Units by mouth daily, Disp: , Rfl:     Cyanocobalamin (VITAMIN B-12 PO), Take by mouth daily, Disp: , Rfl:     cyclobenzaprine (FLEXERIL) 10 MG tablet, Take 1 tablet (10 mg) by mouth 3 times daily as needed for muscle spasms (Patient not taking: Reported on 2/5/2024), Disp: 30 tablet, Rfl: 1    famotidine (PEPCID) 40 MG tablet, TAKE 1 TABLET(40 MG) BY MOUTH DAILY (Patient not taking: Reported on 2/5/2024), Disp: 90 tablet, Rfl: 3    fluticasone (FLONASE) 50 MCG/ACT nasal spray, Spray 2 sprays into both nostrils daily, Disp: 48 g, Rfl: 3    gabapentin (NEURONTIN) 100 MG capsule, Take 1 capsule (100 mg) by mouth 2 times daily as needed for neuropathic pain, Disp: 60 capsule, Rfl: 1    loratadine (CLARITIN) 10 MG tablet, Take 1 tablet (10 mg) by mouth daily, Disp: , Rfl:     magnesium 250 MG tablet, Take 1 tablet (250 mg) by mouth daily, Disp: 90 tablet, Rfl: 1    metoprolol succinate ER (TOPROL XL) 50 MG 24 hr tablet, Take 1 tablet (50 mg) by mouth daily, Disp: 90 tablet, Rfl: 3    mometasone (ELOCON) 0.1 % external cream, Apply to affected area on legs twice daily as needed, Disp: 100 g, Rfl: 3    olmesartan (BENICAR) 40 MG tablet, Take 1 tablet (40 mg) by mouth daily, Disp: 90 tablet, Rfl: 3    order for DME, Equipment being ordered: Auto CPAP 11-18, Disp: 1 Units, Rfl: 0    rivaroxaban ANTICOAGULANT (XARELTO) 20 MG TABS tablet, Take 1 tablet (20 mg) by mouth daily (with dinner), Disp: 90 tablet, Rfl: 3    Family History   Problem Relation Age of Onset    Circulatory Father         d. DVT    Heart Disease Father         pacer    Diabetes Mother     Hypertension Mother     Deep Vein Thrombosis Brother     Coronary Artery Disease Brother     Myocardial Infarction Brother     Deep Vein Thrombosis Brother     C.A.D. Paternal Uncle         MI     Heart Disease Brother 48        pacer     Diabetes Maternal Grandmother     Hypertension Maternal Grandmother     Diabetes Maternal Aunt      Hypertension Maternal Aunt     Cancer - colorectal Maternal Grandfather     ALLY. Maternal Aunt         MI   Maternal grandfather - rectal cancer.     Social History  . She has 2 children. She denies smoking, drinking alcohol or using illicit drugs. Occupation: retired.     Review of Systems   Systemic:              fatigue present for the last few years; weight stable for the last couple of years   Eye:                      No eye symptoms   Levi-Laryngeal:     as above   Breast:                  No breast symptoms  Cardiovascular:    No cardiovascular symptoms, no CP or palpitations   Pulmonary:           SOB with minimal exertion     Gastrointestinal:   mild constipation - of recent onset   Genitourinary:       No genitourinary symptoms, no increased thirst or urination   Endocrine:            some heat intolerance - longstanding   Neurological:        No neurological symptoms, no headaches, no tremor, no numbness or tingling sensation, she has a hard time maintaining her balance - no lightheadedness    Musculoskeletal:  chronic back pain   Skin:                     easy bruising - on a blood thinner; no facial hair, no acne; no hair falling out; no new stretch marks   Psychological:     No psychological symptoms                 Vital Signs     Previous Weights:    Wt Readings from Last 10 Encounters:   02/13/24 (!) 152.9 kg (337 lb)   01/12/24 (!) 152 kg (335 lb)   01/10/24 (!) 153.8 kg (339 lb)   12/06/23 (!) 152.7 kg (336 lb 9.6 oz)   11/15/23 (!) 152 kg (335 lb)   10/05/23 (!) 151.5 kg (334 lb)   03/27/23 (!) 152.4 kg (336 lb)   01/04/22 (!) 156.5 kg (345 lb)   10/27/20 (!) 151 kg (333 lb)   10/01/20 (!) 151.5 kg (334 lb)        BP (!) 140/90 (BP Location: Left arm, Patient Position: Sitting, Cuff Size: Adult Large)   Pulse 75   Resp 20   Wt (!) 152.9 kg (337 lb)   SpO2 99%   BMI 53.71 kg/m      Physical Exam  General Appearance: general obesity.  Shortness of breath noticed with minimal  exertion  Eyes:  conjutivae and extra-ocular motions are normal.                                    pupils round and reactive to light, no lid lag, no stare    HEENT:   oropharynx clear and moist, no JVD, no bruits      no thyromegaly, no palpable nodules.  Thyroid difficult to palpate due to neck fullness.  Cardiovascular:  irregular rhythm, no murmurs, distant heart sounds, bilateral lower extremities edema  Respiratory:        chest clear, no rales, no rhonchi   Gastrointestinal:  abdomen soft, non-tender, non-distended  Musculoskeletal:  normal tone  Psychological:          affect and judgment normal  Skin: No stretch marks, no bruises, no acne or hirsutism, no facial plethora; stasis dermatitis bilateral legs with some subcutaneous vesiculae   Skin tag on the back  Neurological: Unable to elicit knee reflexes, normal bicipital knee reflexes, no resting tremor.       Lab Results  I reviewed prior lab results documented in Epic.  TSH   Date Value Ref Range Status   12/19/2023 2.50 0.30 - 4.20 uIU/mL Final   04/05/2018 1.61 0.40 - 4.00 mU/L Final   01/25/2017 1.35 0.40 - 4.00 mU/L Final   11/23/2015 1.53 0.40 - 4.00 mU/L Final   10/23/2013 1.61 0.4 - 5.0 mU/L Final   02/23/2007 1.67 0.4 - 5.0 mU/L Final

## 2024-02-13 ENCOUNTER — OFFICE VISIT (OUTPATIENT)
Dept: ENDOCRINOLOGY | Facility: CLINIC | Age: 79
End: 2024-02-13
Attending: FAMILY MEDICINE
Payer: COMMERCIAL

## 2024-02-13 VITALS
OXYGEN SATURATION: 99 % | HEART RATE: 75 BPM | DIASTOLIC BLOOD PRESSURE: 90 MMHG | RESPIRATION RATE: 20 BRPM | WEIGHT: 293 LBS | BODY MASS INDEX: 53.71 KG/M2 | SYSTOLIC BLOOD PRESSURE: 140 MMHG

## 2024-02-13 DIAGNOSIS — D35.02 ADENOMA OF LEFT ADRENAL GLAND: ICD-10-CM

## 2024-02-13 DIAGNOSIS — E04.2 NON-TOXIC MULTINODULAR GOITER: Primary | ICD-10-CM

## 2024-02-13 PROCEDURE — 99204 OFFICE O/P NEW MOD 45 MIN: CPT | Performed by: INTERNAL MEDICINE

## 2024-02-13 RX ORDER — DEXAMETHASONE 1 MG
TABLET ORAL
Qty: 1 TABLET | Refills: 0 | Status: SHIPPED | OUTPATIENT
Start: 2024-02-13 | End: 2024-10-02

## 2024-02-13 NOTE — LETTER
2/13/2024         RE: Chelo Brooks  [facility]  [place] [address]        Dear Colleague,    Thank you for referring your patient, Chelo Brooks, to the [facility]. Please see a copy of my visit note below.    =======================================================  Assessment     Chelo Brooks is a 78 year old female with a past medical history significant for obesity, hypertension, obstructive sleep apnea, A-fib, CKD stage III, secondary hyperparathyroidism, PE, seen for evaluation of a multinodular goiter.    1.  Multinodular goiter  Clinically and biochemically, the patient is euthyroid.  She does not endorse compressive neck symptoms.  The goiter was initially diagnosed in 2019 and, over the years, the patient has had annual follow-up utrasounds.  The left dominant thyroid nodule, benign on the biopsy from 2020, has increased in size on the most recent ultrasound.   Advised the patient to have it rebiopsied.  Counseled the patient on the risk of cancer and the option of pursuing molecular analysis for indeterminate cytopathology results.  Of note that she is treated with amiodarone and this can unravel hyperthyroidism in patients with multinodular goiter.  Unclear at this time whether or not amiodarone is considered as a long-term treatment.  Given the size of the left thyroid lobe, considering thyroidectomy is a reasonable option.    2.  Left adrenal gland nodule, stable on imaging from [id number].  The nodule has a low density with the caveat that the imaging was done with IV contrast.  Clinically, the patient does not endorse signs or symptoms suggestive of Cushing syndrome.  Recommended to screen for MACS with 1 mg dexamethasone suppression test.  Advised the patient to take 1 mg dexamethasone at 11 PM and then schedule lab work the following morning, at 8 in the morning.     Orders Placed This Encounter   Procedures     US Biopsy Thyroid Fine  Needle Aspiration     Cortisol     Fine Needle Aspirate     =======================================================  The patient is seen in consultation at the request of Dr. Vita Zavala for evaluation of thyroid nodules.     History of Present Illness:  Chelo Brooks is a 78 year old female with a past medical history significant for aortic dilatation, A-fib, CKD stage III, DJD, hyperparathyroidism, hypertension, obstructive sleep apnea, osteopenia, pulmonary embolism.     According to the patient, the thyroid nodules were incidentally discovered on MRI, in 2019.  She has not been aware of any neck enlargement.  Occasionally, if she does not carefully chew her food, it might get stuck in her throat.  She denies voice hoarseness, cough, lightheadedness.  Of note that she does have a history of heartburns, currently treated with Pepcid.    Chelo has no prior history of radiation exposure or a family history of thyroid disease.  Since 12/6/2023, she has been medicated with amiodarone.    Most recent TSH was normal at 2.5, on 12/19/2020.    Thyroid ultrasounds were done in 2019, 2020, 2021, 2022 and, most recently, on December 7, 2023.  I reviewed the ultrasound images.  Overall, there is a gradual enlargement of the left thyroid nodule which occupies the whole left thyroid lobe and extends inferiorly in the mediastinum.  On the CT neck images from January 2022, the trachea was patent.  There was mild rightward deviation of the lower trachea.  The nodule was benign on the biopsy from February 2020.  The same year, the patient underwent a right parotid gland biopsy which revealed a benign neoplasm.   On the most recent ultrasound, the left dominant nodule measured 8.1 x 5.4 x 5.1 cm, while it measured 6.1 x 4.7 x 4.1 cm on prior ultrasounds.  Of note that the nodule was not always measured using the same sections.     The abdominal CT from August 2020 identified a 1.2 cm left adrenal nodule.  The nodule  was stable in size compared with the prior abdominal CT from October 2019, done at [facility].  Both CTs were done with IV contrast.  I reviewed the most recent CT images.    Past Medical History   Past Medical History:   Diagnosis Date     Acquired renal cyst of left kidney      Adrenal nodule (H24)     left - needs yearly CT     Ascending aorta dilatation (H24)      Chronic atrial fibrillation (H) 11/15/2023     CKD (chronic kidney disease) stage 3, GFR 30-59 ml/min (H)      Degenerative joint disease (DJD) of hip      DJD (degenerative joint disease) 10/21/2005     Eczema, unspecified type 04/05/2018     Elevated parathyroid hormone 06/18/2019     Gallstones      Hepatitis B childhood     resolved, patient is not a carrier      Hyperlipidemia 11/27/2012     Hypertension goal BP (blood pressure) < 140/90      Lumbar spinal stenosis 07/17/2017     Mild persistent asthma without complication 05/30/2017     Morbid obesity due to excess calories (H) 11/23/2015    Surgical referral declined '16      FLOR (obstructive sleep apnea)      Osteopenia      Pulmonary emboli (H) 10/28/2019     Shingles 2014    scalp     Stasis dermatitis of both legs 04/05/2018     Thyroid nodule      Umbilical hernia      Vitamin D deficiency    No fractures   Pulmonary embolism     Past Surgical History   Past Surgical History:   Procedure Laterality Date     ANESTHESIA CARDIOVERSION N/A 12/28/2023    Procedure: Anesthesia cardioversion @1330;  Surgeon: GENERIC ANESTHESIA PROVIDER;  Location: [facility] OR     IR PAROTID BIOPSY  5/21/2020     [facility] TOTAL KNEE ARTHROPLASTY  3/2000    right     [facility] TOTAL KNEE ARTHROPLASTY  6/8/12    left     [facility] VAGINAL HYSTERECTOMY  1977    benign, prolapse, ovaries remain        Current Medications    Current Outpatient Medications:      albuterol (PROAIR HFA/PROVENTIL HFA/VENTOLIN HFA) 108 (90 Base) MCG/ACT inhaler, Inhale 1-2 puffs into the lungs every 4 hours as needed for shortness of breath / dyspnea, Disp: 1 Inhaler,  Rfl: 3     amiodarone (PACERONE) 200 MG tablet, Twice daily for 2 weeks then reduce dose to once a day, Disp: 180 tablet, Rfl: 3     atorvastatin (LIPITOR) 40 MG tablet, TAKE 1 TABLET(40 MG) BY MOUTH DAILY, Disp: 90 tablet, Rfl: 3     bumetanide (BUMEX) 1 MG tablet, TAKE HALF DAILY, Disp: 90 tablet, Rfl: 3     Cholecalciferol (VITAMIN D) 2000 UNITS tablet, Take 2,000 Units by mouth daily, Disp: , Rfl:      Cyanocobalamin (VITAMIN B-12 PO), Take by mouth daily, Disp: , Rfl:      cyclobenzaprine (FLEXERIL) 10 MG tablet, Take 1 tablet (10 mg) by mouth 3 times daily as needed for muscle spasms (Patient not taking: Reported on 2/5/2024), Disp: 30 tablet, Rfl: 1     famotidine (PEPCID) 40 MG tablet, TAKE 1 TABLET(40 MG) BY MOUTH DAILY (Patient not taking: Reported on 2/5/2024), Disp: 90 tablet, Rfl: 3     fluticasone (FLONASE) 50 MCG/ACT nasal spray, Spray 2 sprays into both nostrils daily, Disp: 48 g, Rfl: 3     gabapentin (NEURONTIN) 100 MG capsule, Take 1 capsule (100 mg) by mouth 2 times daily as needed for neuropathic pain, Disp: 60 capsule, Rfl: 1     loratadine (CLARITIN) 10 MG tablet, Take 1 tablet (10 mg) by mouth daily, Disp: , Rfl:      magnesium 250 MG tablet, Take 1 tablet (250 mg) by mouth daily, Disp: 90 tablet, Rfl: 1     metoprolol succinate ER (TOPROL XL) 50 MG 24 hr tablet, Take 1 tablet (50 mg) by mouth daily, Disp: 90 tablet, Rfl: 3     mometasone (ELOCON) 0.1 % external cream, Apply to affected area on legs twice daily as needed, Disp: 100 g, Rfl: 3     olmesartan (BENICAR) 40 MG tablet, Take 1 tablet (40 mg) by mouth daily, Disp: 90 tablet, Rfl: 3     order for DME, Equipment being ordered: Auto CPAP 11-18, Disp: 1 Units, Rfl: 0     rivaroxaban ANTICOAGULANT (XARELTO) 20 MG TABS tablet, Take 1 tablet (20 mg) by mouth daily (with dinner), Disp: 90 tablet, Rfl: 3    Family History   Problem Relation Age of Onset     Circulatory Father         d. DVT     Heart Disease Father         pacer     Diabetes  Mother      Hypertension Mother      Deep Vein Thrombosis Brother      Coronary Artery Disease Brother      Myocardial Infarction Brother      Deep Vein Thrombosis Brother      C.A.D. Paternal Uncle         MI      Heart Disease Brother 48        pacer      Diabetes Maternal Grandmother      Hypertension Maternal Grandmother      Diabetes Maternal Aunt      Hypertension Maternal Aunt      Cancer - colorectal Maternal Grandfather      C.A.D. Maternal Aunt         MI   Maternal grandfather - rectal cancer.     Social History  . She has 2 children. She denies smoking, drinking alcohol or using illicit drugs. Occupation: retired.     Review of Systems   Systemic:              fatigue present for the last few years; weight stable for the last couple of years   Eye:                      No eye symptoms   Levi-Laryngeal:     as above   Breast:                  No breast symptoms  Cardiovascular:    No cardiovascular symptoms, no CP or palpitations   Pulmonary:           SOB with minimal exertion     Gastrointestinal:   mild constipation - of recent onset   Genitourinary:       No genitourinary symptoms, no increased thirst or urination   Endocrine:            some heat intolerance - longstanding   Neurological:        No neurological symptoms, no headaches, no tremor, no numbness or tingling sensation, she has a hard time maintaining her balance - no lightheadedness    Musculoskeletal:  chronic back pain   Skin:                     easy bruising - on a blood thinner; no facial hair, no acne; no hair falling out; no new stretch marks   Psychological:     No psychological symptoms                 Vital Signs     Previous Weights:    Wt Readings from Last 10 Encounters:   02/13/24 (!) 152.9 kg (337 lb)   01/12/24 (!) 152 kg (335 lb)   01/10/24 (!) 153.8 kg (339 lb)   12/06/23 (!) 152.7 kg (336 lb 9.6 oz)   11/15/23 (!) 152 kg (335 lb)   10/05/23 (!) 151.5 kg (334 lb)   03/27/23 (!) 152.4 kg (336 lb)   01/04/22 (!)  156.5 kg (345 lb)   10/27/20 (!) 151 kg (333 lb)   10/01/20 (!) 151.5 kg (334 lb)        BP (!) 140/90 (BP Location: Left arm, Patient Position: Sitting, Cuff Size: Adult Large)   Pulse 75   Resp 20   Wt (!) 152.9 kg (337 lb)   SpO2 99%   BMI 53.71 kg/m      Physical Exam  General Appearance: general obesity.  Shortness of breath noticed with minimal exertion  Eyes:  conjutivae and extra-ocular motions are normal.                                    pupils round and reactive to light, no lid lag, no stare    HEENT:   oropharynx clear and moist, no JVD, no bruits      no thyromegaly, no palpable nodules.  Thyroid difficult to palpate due to neck fullness.  Cardiovascular:  irregular rhythm, no murmurs, distant heart sounds, bilateral lower extremities edema  Respiratory:        chest clear, no rales, no rhonchi   Gastrointestinal:  abdomen soft, non-tender, non-distended  Musculoskeletal:  normal tone  Psychological:          affect and judgment normal  Skin: No stretch marks, no bruises, no acne or hirsutism, no facial plethora; stasis dermatitis bilateral legs with some subcutaneous vesiculae   Skin tag on the back  Neurological: Unable to elicit knee reflexes, normal bicipital knee reflexes, no resting tremor.       Lab Results  I reviewed prior lab results documented in Epic.  TSH   Date Value Ref Range Status   12/19/2023 2.50 0.30 - 4.20 uIU/mL Final   04/05/2018 1.61 0.40 - 4.00 mU/L Final   01/25/2017 1.35 0.40 - 4.00 mU/L Final   11/23/2015 1.53 0.40 - 4.00 mU/L Final   10/23/2013 1.61 0.4 - 5.0 mU/L Final   02/23/2007 1.67 0.4 - 5.0 mU/L Final                               Again, thank you for allowing me to participate in the care of your patient.        Sincerely,        Nimisha Albert MD

## 2024-02-13 NOTE — PATIENT INSTRUCTIONS
Welcome to the [facility] Endocrinology and Diabetes Clinics     Our Endocrinology Clinics are here to provide you with a team-based, collaborative approach in the diagnosis and treatment of patients with diabetes and endocrine disorders. The team is made up of Physicians, Physician Assistants, Certified Diabetes Educators, Registered Nurses, Medical Assistants, Emergency Medical Technicians, and many others, all of whom have the unified goal of providing our patients with high quality care.     Please see below for some helpful tips to best navigate and use the [facility] Endocrinology clinic:     [place] Respect: At [facility], we are committed to a respectful and safe space for all patients, visitors, and staff.  We believe that mutual respect between patients and their care team is the foundation of quality care.  It is our expectation that you will be treated with respect by your care team.  In turn, we ask that all communication with the care team (written and verbal) be respectful and free from profanity, threatening, or abusive language.  Disrespectful communication undermines our therapeutic relationship with you and may result in us being unable to continue to provide your care.    Refills: A provider must see you at least annually to prescribe and refill medications. This is to ensure your safety as well as meet insurance and compliance regulations.    Scheduling: Many of our Providers offer both in-person or video visits. Please call to schedule any needed follow ups as soon as possible because our provider schedules fill up very quickly. Our care team has the right to require an in-person visit when they believe that it is medically necessary. Please remember that for any virtual visits, you must be in the [facility] at the time of the visit, otherwise we are unable to see you and you will need to be rescheduled.    Missed Appointments: If you need to cancel or miss your  scheduled appointment, please call the clinic at [phone] to reschedule.  Please note if you repeatedly miss appointments or repeatedly miss appointments without calling to inform us ahead of time (no-show), the clinic may elect to not allow you to reschedule without speaking to a manager, may require a Partnership In Care Agreement prior to rescheduling, or could result in you no longer being able to receive care from the clinic. Providing the clinic with timely notification if you have to miss an appointment, allows us to better serve the needs of all of our patients.    Primary Care Provider: Our Endocrinologists are Specialists in their field. We expect you to have a Primary Care Provider established to handle any needs outside of your diabetes and endocrine care.  We would be happy to assist you find a Primary Care Provider, if you do not have one.    [employer]: [employer] is a wonderful resource that allows you access to your Care Team via online or the remington. Please ask a member of the team if you would like help creating an account. Please note that it may take up to 2 business days for a response. [employer] messages are not reviewed on weekends or after business hours.  Emergent or urgent care needs should never be communicated via [employer].  If you experience a medical emergency call 911 or go to the nearest emergency room.    Labs: It is recommended that you stay within the [facility] for labs but you are welcome to obtain ordered labs (with some exceptions) from any location of your choice as long as they are able to complete and process the needed labs. If you need us to fax orders to your preferred lab, please provide us the name and fax number of the lab you would like to go to so we can fax the orders. If your labs are drawn outside of the [facility], please have them fax the results to [phone] ([place]) or [phone] ([place]) or via [facility]. It is your  responsibility to ensure that outside lab results are sent to us.    We look forward to working with you. Please do not hesitate to reach out with any questions.    Thank you,    The Endocrine Team    [facility] Address:   [place] Address:     [address] [place] [address]    Phone: [phone]  Fax: [phone] [address] [place] [address]    Phone: [phone]  Fax: [phone]     [facility] Cost Estimate Phone Number: [phone]    General Lab and Imaging Scheduling Phone Number: [phone]

## 2024-02-13 NOTE — NURSING NOTE
Cheol Brooks's goals for this visit include:   Chief Complaint   Patient presents with    New Patient     Thyroid Nodule       She requests these members of her care team be copied on today's visit information: yes    PCP: Vita Zavala    Referring Provider:  Vita Zavala MD  [address] [place] [address]    BP (!) 140/90 (BP Location: Left arm, Patient Position: Sitting, Cuff Size: Adult Large)   Pulse 75   Resp 20   Wt (!) 152.9 kg (337 lb)   SpO2 99%   BMI 53.71 kg/m      Do you need any medication refills at today's visit? None    Marcelo Vivas, EMT

## 2024-02-14 ENCOUNTER — TELEPHONE (OUTPATIENT)
Dept: ENDOCRINOLOGY | Facility: CLINIC | Age: 79
End: 2024-02-14
Payer: COMMERCIAL

## 2024-02-14 NOTE — TELEPHONE ENCOUNTER
Left Voicemail (1st Attempt) for the patient to call back and schedule the following:    Appointment type: return  Provider: dr. olivo  Return date: 8/13/2024  Specialty phone number: [phone]   Additional appointment(s) needed: thyroid biopsy and fasting labs   Additonal Notes: : Return in about 6 months (around 8/13/2024) for thyroid biopsy, non-fasting morning labs     [facility]   Orthopedics, Podiatry, Sports Medicine, Ent ,Eye , Audiology, Adult Endocrine & Diabetes, Nutrition & Medication Therapy Management Specialties   [facility]

## 2024-02-20 ENCOUNTER — LAB (OUTPATIENT)
Dept: LAB | Facility: CLINIC | Age: 79
End: 2024-02-20
Payer: COMMERCIAL

## 2024-02-20 DIAGNOSIS — E04.2 NON-TOXIC MULTINODULAR GOITER: ICD-10-CM

## 2024-02-20 DIAGNOSIS — D35.02 ADENOMA OF LEFT ADRENAL GLAND: ICD-10-CM

## 2024-02-20 LAB — CORTIS SERPL-MCNC: 1.1 UG/DL

## 2024-02-20 PROCEDURE — 36415 COLL VENOUS BLD VENIPUNCTURE: CPT

## 2024-02-20 PROCEDURE — 82533 TOTAL CORTISOL: CPT

## 2024-02-26 ENCOUNTER — PATIENT OUTREACH (OUTPATIENT)
Dept: CARE COORDINATION | Facility: CLINIC | Age: 79
End: 2024-02-26
Payer: COMMERCIAL

## 2024-02-27 ENCOUNTER — ANCILLARY PROCEDURE (OUTPATIENT)
Dept: ULTRASOUND IMAGING | Facility: CLINIC | Age: 79
End: 2024-02-27
Attending: INTERNAL MEDICINE
Payer: COMMERCIAL

## 2024-02-27 DIAGNOSIS — E04.2 NON-TOXIC MULTINODULAR GOITER: ICD-10-CM

## 2024-02-27 PROCEDURE — 10005 FNA BX W/US GDN 1ST LES: CPT | Performed by: STUDENT IN AN ORGANIZED HEALTH CARE EDUCATION/TRAINING PROGRAM

## 2024-02-27 PROCEDURE — 88173 CYTOPATH EVAL FNA REPORT: CPT | Performed by: PATHOLOGY

## 2024-02-28 LAB
PATH REPORT.COMMENTS IMP SPEC: NORMAL
PATH REPORT.FINAL DX SPEC: NORMAL
PATH REPORT.GROSS SPEC: NORMAL
PATH REPORT.MICROSCOPIC SPEC OTHER STN: NORMAL
PATH REPORT.RELEVANT HX SPEC: NORMAL

## 2024-03-11 ENCOUNTER — PATIENT OUTREACH (OUTPATIENT)
Dept: CARE COORDINATION | Facility: CLINIC | Age: 79
End: 2024-03-11
Payer: COMMERCIAL

## 2024-04-03 ENCOUNTER — MYC MEDICAL ADVICE (OUTPATIENT)
Dept: ENDOCRINOLOGY | Facility: CLINIC | Age: 79
End: 2024-04-03
Payer: COMMERCIAL

## 2024-04-09 ENCOUNTER — TRANSFERRED RECORDS (OUTPATIENT)
Dept: HEALTH INFORMATION MANAGEMENT | Facility: CLINIC | Age: 79
End: 2024-04-09

## 2024-04-29 NOTE — TELEPHONE ENCOUNTER
Patient called back. Advised patient of recommendations from Dr. Albert. Patient verbalizes understanding and agrees to plan.     Araceli Mata RN  Endocrine Care Coordinator  [facility]

## 2024-04-29 NOTE — TELEPHONE ENCOUNTER
Appears patient has not viewed results [employer] sent in regard to 2/27/24 Thyroid Biopsy. Writer attempted to contact patient. No answer. Left voicemail for patient to contact our office.       Araceli Mata RN  Endocrine Care Coordinator  [facility]

## 2024-05-14 ENCOUNTER — TELEPHONE (OUTPATIENT)
Dept: FAMILY MEDICINE | Facility: CLINIC | Age: 79
End: 2024-05-14
Payer: COMMERCIAL

## 2024-05-14 NOTE — TELEPHONE ENCOUNTER
Patient Quality Outreach    Patient is due for the following:   Asthma  -  ACT needed  Physical Annual Wellness Visit      Topic Date Due    Zoster (Shingles) Vaccine (1 of 2) Never done    COVID-19 Vaccine (6 - 2023-24 season) 03/15/2024       Next Steps:   Schedule a Annual Wellness Visit  Patient was assigned appropriate questionnaire to complete, ACT    Type of outreach:    Sent letter.    Next Steps:  Reach out within 90 days via Letter.    Max number of attempts reached: Yes. Will try again in 90 days if patient still on fail list.    Questions for provider review:    None           [facility]  Chart routed to Care Team.

## 2024-05-14 NOTE — LETTER
May 14, 2024      Chelo Brooks  [address]   [facility] [address]      Your team at [facility] cares about your health. We have reviewed your chart and based on our findings; we are making the following recommendations to better manage your health.     You are in particular need of attention regarding the following:     Asthma Control Test     This screening tool helps us to assess how well your asthma is controlled.Good asthma control leads to fewer asthma symptoms and greater health. If your asthma is not in good control (score is 19 or less) or you have been to the ER or urgent care for your asthma, it is recommended you be seen by your provider for medication and lifestyle adjustments.      Please complete and return the attached Asthma Control Test respond below with you answers for each question: Adult ACT     This is valuable information that is requested by your Care Team.    PREVENTATIVE VISIT: Annual Medicare Wellness:Schedule an Annual Medicare Wellness Exam. Please call your [facility] clinic to set up your appointment.    If you have already completed these items, please contact the clinic via phone or   MyChart so your care team can review and update your records. Thank you for   choosing [facility] for your healthcare needs. For any questions,   concerns, or to schedule an appointment please contact our clinic.    Healthy Regards,      Your [facility] Care Team

## 2024-05-21 NOTE — PROGRESS NOTES
Patient came to [facility] for a CPAP troubleshoot appointment on February 24, 2020  because of leaking from water chamber.  Patient was not sure if it was the water chamber or the humidifier.  Patient's water chamber (or tubing and filters) had not been replaced since 2017.  There was a fine crack in the side of the water chamber that was not visible by visual inspection, but became evident once water was added.  Patient received new water chamber, tubing, disposable filters and Full Face cushion (Resmed Airfit F20 For Her, size Medium) today.  Reviewed and provided supply replacement schedule and re-education on how to change the filter.     The patient has been examined and the H&P has been reviewed:    I concur with the findings and no changes have occurred since H&P was written.    Surgery risks, benefits and alternative options discussed and understood by patient/family.          There are no hospital problems to display for this patient.

## 2024-05-24 ENCOUNTER — TELEPHONE (OUTPATIENT)
Dept: FAMILY MEDICINE | Facility: CLINIC | Age: 79
End: 2024-05-24
Payer: COMMERCIAL

## 2024-05-24 ASSESSMENT — ASTHMA QUESTIONNAIRES: ACT_TOTALSCORE: 12

## 2024-05-24 NOTE — TELEPHONE ENCOUNTER
Patient Quality Outreach    Patient is due for the following:   Asthma  -  ACT needed    Next Steps:   Patient was assigned appropriate questionnaire to complete  Act was returned and updated in pt''s chart  Type of outreach:    Chart review performed, no outreach needed.    Next Steps:  Reach out within 90 days via Letter.    Max number of attempts reached: No. Will try again in 90 days if patient still on fail list.    Questions for provider review:    None           Jenny James MA

## 2024-06-01 ENCOUNTER — HEALTH MAINTENANCE LETTER (OUTPATIENT)
Age: 79
End: 2024-06-01

## 2024-06-04 DIAGNOSIS — I12.9 RENAL HYPERTENSION: ICD-10-CM

## 2024-06-04 DIAGNOSIS — I48.20 CHRONIC ATRIAL FIBRILLATION (H): ICD-10-CM

## 2024-06-04 DIAGNOSIS — N18.32 STAGE 3B CHRONIC KIDNEY DISEASE (H): ICD-10-CM

## 2024-06-04 RX ORDER — OLMESARTAN MEDOXOMIL AND HYDROCHLOROTHIAZIDE 40/25 40; 25 MG/1; MG/1
1 TABLET ORAL DAILY
OUTPATIENT
Start: 2024-06-04

## 2024-06-04 NOTE — TELEPHONE ENCOUNTER
[facility] Call Center    Phone Message    May a detailed message be left on voicemail: yes     Reason for Call: Medication Refill Request    Has the patient contacted the pharmacy for the refill? Yes   Name of medication being requested: amiodarone (PACERONE) 200 MG tablet   olmesartan (BENICAR) 40 MG tablet   Provider who prescribed the medication: Dr. Montano  Pharmacy:   [id number] - [address] [place] [address]     Date medication is needed: ASAP     NOTE:  PT states her hair is falling out and is concerned that it may be a side effect of the meds and she is very constipated lately (for the last 2- 3 months)     Action Taken: Message routed to:  [facility] ([facility] cardio    Travel Screening: Not Applicable

## 2024-06-05 NOTE — TELEPHONE ENCOUNTER
Attempted to call patient. Phone rang but no answer.    Bekah Zuniga, RN, BSN  Cardiology RN Care Coordinator   [place]   Phone: [phone]  Fax: [phone] ([place]) [phone] ([place])

## 2024-06-07 RX ORDER — OLMESARTAN MEDOXOMIL 40 MG/1
40 TABLET ORAL DAILY
Qty: 90 TABLET | Refills: 0 | Status: SHIPPED | OUTPATIENT
Start: 2024-06-07 | End: 2024-09-20

## 2024-06-07 RX ORDER — AMIODARONE HYDROCHLORIDE 200 MG/1
200 TABLET ORAL DAILY
Qty: 90 TABLET | Refills: 0 | Status: SHIPPED | OUTPATIENT
Start: 2024-06-07 | End: 2024-09-27

## 2024-06-07 NOTE — TELEPHONE ENCOUNTER
amiodarone (PACERONE) 200 MG tablet          Sig: Twice daily for 2 weeks then reduce dose to once a day    Disp: 180 tablet    Refills: 3    Start: 6/7/2024    Class: E-Prescribe    For: Chronic atrial fibrillation (H)    Last ordered: 6 months ago (12/6/2023) by Patito Montano MD        olmesartan (BENICAR) 40 MG tablet         Sig: Take 1 tablet (40 mg) by mouth daily    Disp: 90 tablet    Refills: 3    Start: 6/7/2024    Class: E-Prescribe    For: Chronic atrial fibrillation (H)    Last ordered: 6 months ago (12/6/2023) by Patito Montano MD    Angiotensin-II Receptors [id number]/07/2024 04:15 PM   Protocol Details Last blood pressure under 140/90 in past 12 months    Has GFR on file in past 12 months and most recent value is normal    Medication indicated for associated diagnosis    Medication is active on med list    Recent (12 mo) or future (90days) visit within the authorizing provider's specialty    Patient is age 18 or older    No active pregnancy on record    Normal serum potassium on file in past 12 months    No positive pregnancy test in past 12 months      To be filled at: None     Patient saw Dr. Montano on 1/10/24. Patient was to continue amiodarone 200 mg daily. Patient was to continue olmesartan 40 mg daily. Patient was to follow up in 6 months.  Patient had lab on 12/19 and GFR was 38. Previously was 39. Patient is coming in to clinic in July. Patient reports that she has been having constipation as of late and has noticed her hair falling out. Patient reports that hair loss is not heavily significant but notes that she has noticed it. Jenny refill sent in for patient so that patient is not without. Provider to be updated on patient symptoms.     Bekah Zuniga, RN, BSN  Cardiology RN Care Coordinator   [place]   Phone: [phone]  Fax: [phone] ([place]) [phone] ([place])

## 2024-06-07 NOTE — TELEPHONE ENCOUNTER
Pending Prescriptions:                       Disp   Refills    amiodarone (PACERONE) 200 MG tablet       180 ta*3            Sig: Twice daily for 2 weeks then reduce dose to once           a day    olmesartan (BENICAR) 40 MG tablet         90 tab*3            Sig: Take 1 tablet (40 mg) by mouth daily

## 2024-06-07 NOTE — TELEPHONE ENCOUNTER
Patito Loaiza MD Krist, Chana, RN  Caller: Unspecified (3 days ago,  3:22 PM)  Lets order tsh,  alt and ast (liver enzymes) before she sees me.    Dr LOAIZA    Lab orders entered.     Bekah Zuniga, RN, BSN  Cardiology RN Care Coordinator   [place]   Phone: [phone]  Fax: [phone] ([place]) [phone] ([place])

## 2024-06-10 NOTE — TELEPHONE ENCOUNTER
Called and LVM for patient to return call to clinic scheduler to schedule lab work for Dr. Montano prior to her appointment with Dr. Montano in July.    CARLOS Terry

## 2024-06-26 ENCOUNTER — LAB (OUTPATIENT)
Dept: LAB | Facility: CLINIC | Age: 79
End: 2024-06-26
Payer: COMMERCIAL

## 2024-06-26 DIAGNOSIS — I48.20 CHRONIC ATRIAL FIBRILLATION (H): ICD-10-CM

## 2024-06-26 DIAGNOSIS — N18.32 STAGE 3B CHRONIC KIDNEY DISEASE (CKD) (H): ICD-10-CM

## 2024-06-26 DIAGNOSIS — E78.5 HYPERLIPIDEMIA LDL GOAL <100: Primary | ICD-10-CM

## 2024-06-26 PROCEDURE — 80048 BASIC METABOLIC PNL TOTAL CA: CPT

## 2024-06-26 PROCEDURE — 82570 ASSAY OF URINE CREATININE: CPT

## 2024-06-26 PROCEDURE — 80061 LIPID PANEL: CPT

## 2024-06-26 PROCEDURE — 84450 TRANSFERASE (AST) (SGOT): CPT

## 2024-06-26 PROCEDURE — 84460 ALANINE AMINO (ALT) (SGPT): CPT

## 2024-06-26 PROCEDURE — 82043 UR ALBUMIN QUANTITATIVE: CPT

## 2024-06-26 PROCEDURE — 36415 COLL VENOUS BLD VENIPUNCTURE: CPT

## 2024-06-26 PROCEDURE — 84443 ASSAY THYROID STIM HORMONE: CPT

## 2024-06-26 NOTE — LETTER
June 27, 2024      Chelo A Cecilia  [address]   [place] [facility] [place] [address]        Dear MsSamiraCecilia,    We are writing to inform you of your test results.    Your thyroid test and liver tests are normal.    DR JOSSE Rodriguez Orders   TSH with free T4 reflex   Result Value Ref Range    TSH 0.40 0.30 - 4.20 uIU/mL   ALT   Result Value Ref Range    ALT 12 0 - 50 U/L   AST   Result Value Ref Range    AST 20 0 - 45 U/L       If you have any questions or concerns, please call the clinic at the number listed above.       Sincerely,      Patito Montano MD

## 2024-06-26 NOTE — TELEPHONE ENCOUNTER
RECORDS RECEIVED FROM:    DATE RECEIVED:    NOTES STATUS DETAILS   OFFICE NOTE from referring provider  Internal    OFFICE NOTE from other cardiologists  Internal Dr. Montano 1-10-24   RECORDS from hospital/ED Internal    MEDICATION LIST Internal    GENERAL CARDIO RECORDS   (ALL APPOINTMENT TYPES)     LABS (CBC,BMP,CMP, TSH) Internal    EKG (STRIPS & REPORTS) Internal 1-10-24   MONITORS (STRIPS & REPORTS) N/A    ECHOS (IMAGES AND REPORTS) Internal 12-28-23   STRESS TESTS (IMAGES AND REPORTS) N/A    cMRI (IMAGES AND REPORTS) N/A    Cardiac cath (IMAGES AND REPORTS) N/A    CT/CTA (IMAGES AND REPORTS) N/A    NEW EP     ICD/PACEMAKER IMPLANT No    CARDIOVERSION Internal 12-28-23   TILT TABLE STUDIES N/A

## 2024-06-27 LAB
ALT SERPL W P-5'-P-CCNC: 12 U/L (ref 0–50)
ANION GAP SERPL CALCULATED.3IONS-SCNC: 11 MMOL/L (ref 7–15)
AST SERPL W P-5'-P-CCNC: 20 U/L (ref 0–45)
BUN SERPL-MCNC: 28.7 MG/DL (ref 8–23)
CALCIUM SERPL-MCNC: 9.2 MG/DL (ref 8.8–10.2)
CHLORIDE SERPL-SCNC: 106 MMOL/L (ref 98–107)
CHOLEST SERPL-MCNC: 131 MG/DL
CREAT SERPL-MCNC: 1.39 MG/DL (ref 0.51–0.95)
CREAT UR-MCNC: 202 MG/DL
DEPRECATED HCO3 PLAS-SCNC: 25 MMOL/L (ref 22–29)
EGFRCR SERPLBLD CKD-EPI 2021: 39 ML/MIN/1.73M2
FASTING STATUS PATIENT QL REPORTED: YES
FASTING STATUS PATIENT QL REPORTED: YES
GLUCOSE SERPL-MCNC: 98 MG/DL (ref 70–99)
HDLC SERPL-MCNC: 51 MG/DL
LDLC SERPL CALC-MCNC: 64 MG/DL
MICROALBUMIN UR-MCNC: 51.4 MG/L
MICROALBUMIN/CREAT UR: 25.45 MG/G CR (ref 0–25)
NONHDLC SERPL-MCNC: 80 MG/DL
POTASSIUM SERPL-SCNC: 4.2 MMOL/L (ref 3.4–5.3)
SODIUM SERPL-SCNC: 142 MMOL/L (ref 135–145)
TRIGL SERPL-MCNC: 80 MG/DL
TSH SERPL DL<=0.005 MIU/L-ACNC: 0.4 UIU/ML (ref 0.3–4.2)

## 2024-06-27 NOTE — RESULT ENCOUNTER NOTE
Chelo,    Your kidney tests are stable. Cholesterol is controlled. Your blood glucose is normal.     Vita Zavala MD

## 2024-06-30 NOTE — PROGRESS NOTES
[facility]      Referring provider:Vita Zavala MD     HPI: Ms. Chelo Brooks is a 78 year old  female with PMH significant for    -Morbid obesity BMI 53  -Hypertension.   -Persistent atrial fibrillation  -CKD stage III  -Obstructive sleep apnea    Patient is being seen today for worsening shortness of breath over the last 1 year.  She also reports worsening lower extremity edema sometimes weeping from her legs.  She has noticed that when she goes to shopping she has to stop multiple times over the last 1 year.  She feels exhausted with activity.  No chest pain, palpitations, dizziness, or syncope.    Patient was recently seen in primary care clinic for the symptoms and found to be in atrial fibrillation.  Started on Xarelto.  Referred to cardiology for further management of A-fib.  A-fib is a new diagnosis for the patient.    Prior cardiac work-up in 2017 shows normal cardiac MRI and sinus rhythm EKG.  Recent echocardiogram on 11/28/2023 shows normal biventricular function with no significant valve disease.    Medications: Atorvastatin 40 mg, olmesartan hydrochlorothiazide 40/12.5, Xarelto 20 mg.    Medications, personal, family, and social history reviewed with patient and revised.    Interval history 1/10/2024:  Patient is being seen today for follow-up after recent cardioversion on 12/28/2023.  Patient tells me that she felt great after cardioversion which was successful.  UMANA has improved.  Denies chest pain, palpitations, dizziness.  She still has significant edema on both legs.  As you know I have started on Bumex a month ago and stopped hydrochlorothiazide.  Her weight is the same.  She tells me she has to go to the bathroom a lot.    Interval history 7/1/2024:  Patient returns for follow-up.  Due to my previous visit I referred patient to catheter ablation.  Patient  is scheduled to see EP on 7/17.    Patient continues to report shortness of breath with activity.  She feels very tired.  She is on CPAP.  Denies chest pain, palpitations.  She tells me that she has swelling on the lower extremity on both sides.  She has some leaky spots.  Has been avoiding diuretic for a long time.  She used as needed because of the concerns to use the restroom frequently.  She reports some hair loss.    PAST MEDICAL HISTORY:  Past Medical History:   Diagnosis Date    Acquired renal cyst of left kidney     Adrenal nodule (H24)     left - needs yearly CT    Ascending aorta dilatation (H24)     Chronic atrial fibrillation (H) 11/15/2023    CKD (chronic kidney disease) stage 3, GFR 30-59 ml/min (H)     Degenerative joint disease (DJD) of hip     DJD (degenerative joint disease) 10/21/2005    Eczema, unspecified type 04/05/2018    Elevated parathyroid hormone 06/18/2019    Gallstones     Hepatitis B childhood     resolved, patient is not a carrier     Hyperlipidemia 11/27/2012    Hypertension goal BP (blood pressure) < 140/90     Lumbar spinal stenosis 07/17/2017    Mild persistent asthma without complication 05/30/2017    Morbid obesity due to excess calories (H) 11/23/2015    Surgical referral declined '16     FLOR (obstructive sleep apnea)     Osteopenia     Pulmonary emboli (H) 10/28/2019    Shingles 2014    scalp    Stasis dermatitis of both legs 04/05/2018    Thyroid nodule     Umbilical hernia     Vitamin D deficiency        CURRENT MEDICATIONS:  Current Outpatient Medications   Medication Sig Dispense Refill    albuterol (PROAIR HFA/PROVENTIL HFA/VENTOLIN HFA) 108 (90 Base) MCG/ACT inhaler Inhale 1-2 puffs into the lungs every 4 hours as needed for shortness of breath / dyspnea 1 Inhaler 3    amiodarone (PACERONE) 200 MG tablet Take 1 tablet (200 mg) by mouth daily 90 tablet 0    atorvastatin (LIPITOR) 40 MG tablet TAKE 1 TABLET(40 MG) BY MOUTH DAILY 90 tablet 3    bumetanide (BUMEX) 1 MG tablet  TAKE HALF DAILY 90 tablet 3    Cholecalciferol (VITAMIN D) 2000 UNITS tablet Take 2,000 Units by mouth daily      Cyanocobalamin (VITAMIN B-12 PO) Take by mouth daily      dexAMETHasone (DECADRON) 1 MG tablet Take one tablet at 11 PM and have labwork scheduled the following morning at 8 AM. 1 tablet 0    famotidine (PEPCID) 40 MG tablet TAKE 1 TABLET(40 MG) BY MOUTH DAILY (Patient not taking: Reported on 2/5/2024) 90 tablet 3    fluticasone (FLONASE) 50 MCG/ACT nasal spray Spray 2 sprays into both nostrils daily 48 g 3    gabapentin (NEURONTIN) 100 MG capsule Take 1 capsule (100 mg) by mouth 2 times daily as needed for neuropathic pain 60 capsule 1    loratadine (CLARITIN) 10 MG tablet Take 1 tablet (10 mg) by mouth daily      magnesium 250 MG tablet Take 1 tablet (250 mg) by mouth daily 90 tablet 1    metoprolol succinate ER (TOPROL XL) 50 MG 24 hr tablet Take 1 tablet (50 mg) by mouth daily 90 tablet 3    mometasone (ELOCON) 0.1 % external cream Apply to affected area on legs twice daily as needed 100 g 3    olmesartan (BENICAR) 40 MG tablet Take 1 tablet (40 mg) by mouth daily 90 tablet 0    order for DME Equipment being ordered: Auto CPAP 11-18 1 Units 0    rivaroxaban ANTICOAGULANT (XARELTO) 20 MG TABS tablet Take 1 tablet (20 mg) by mouth daily (with dinner) 90 tablet 3       PAST SURGICAL HISTORY:  Past Surgical History:   Procedure Laterality Date    ANESTHESIA CARDIOVERSION N/A 12/28/2023    Procedure: Anesthesia cardioversion @1330;  Surgeon: GENERIC ANESTHESIA PROVIDER;  Location: [facility] OR    IR PAROTID BIOPSY  5/21/2020    [facility] TOTAL KNEE ARTHROPLASTY  3/2000    right    [facility] TOTAL KNEE ARTHROPLASTY  6/8/12    left    [facility] VAGINAL HYSTERECTOMY  1977    benign, prolapse, ovaries remain        ALLERGIES:     Allergies   Allergen Reactions    Adhesive Tape        FAMILY HISTORY:  Family History   Problem Relation Age of Onset    Circulatory Father         d. DVT    Heart Disease Father         pacer    Diabetes  Mother     Hypertension Mother     Deep Vein Thrombosis Brother     Coronary Artery Disease Brother     Myocardial Infarction Brother     Deep Vein Thrombosis Brother     C.A.D. Paternal Uncle         MI     Heart Disease Brother 48        pacer     Diabetes Maternal Grandmother     Hypertension Maternal Grandmother     Diabetes Maternal Aunt     Hypertension Maternal Aunt     Cancer - colorectal Maternal Grandfather     C.A.D. Maternal Aunt         MI    Glaucoma No family hx of     Macular Degeneration No family hx of          SOCIAL HISTORY:  Social History     Tobacco Use    Smoking status: Never    Smokeless tobacco: Never   Vaping Use    Vaping status: Never Used   Substance Use Topics    Alcohol use: Yes     Alcohol/week: 0.0 standard drinks of alcohol     Comment: very rare      Drug use: No       ROS:   Constitutional: No fever, chills, or sweats. Weight stable.   Cardiovascular: As per HPI.       Exam:  BP (!) 145/76 (BP Location: Left arm, Patient Position: Chair, Cuff Size: Adult Large)   Pulse 80   Wt (!) 156.9 kg (346 lb)   SpO2 94%   BMI 55.14 kg/m    GENERAL APPEARANCE: alert and no distress  HEENT: no icterus, no central cyanosis  LYMPH/NECK: no adenopathy, no asymmetry  CARDIOVASCULAR: irregular rhythm, normal S1, S2, no S3 or S4 and no murmur, click or rub, precordium quiet with normal PMI.  EXTREMITIES: 2+ pitting bilateral pretibial edema  NEURO: alert, normal speech,and affect  SKIN: no ecchymoses, no rashes     I have reviewed the labs and personally reviewed the imaging below and made my comment in the assessment and plan.    Labs:  CBC RESULTS:   Lab Results   Component Value Date    WBC 4.3 11/15/2023    WBC 4.0 08/06/2020    RBC 4.40 11/15/2023    RBC 4.26 08/06/2020    HGB 12.7 11/15/2023    HGB 12.3 08/06/2020    HCT 39.4 11/15/2023    HCT 37.3 08/06/2020    MCV 90 11/15/2023    MCV 88 08/06/2020    MCH 28.9 11/15/2023    MCH 28.9 08/06/2020    MCHC 32.2 11/15/2023    MCHC 33.0  08/06/2020    RDW 13.5 11/15/2023    RDW 13.9 08/06/2020     11/15/2023     08/06/2020       BMP RESULTS:  Lab Results   Component Value Date     06/26/2024     08/06/2020    POTASSIUM 4.2 06/26/2024    POTASSIUM 4.5 03/27/2023    POTASSIUM 3.9 08/06/2020    CHLORIDE 106 06/26/2024    CHLORIDE 108 03/27/2023    CHLORIDE 107 08/06/2020    CO2 25 06/26/2024    CO2 28 03/27/2023    CO2 30 08/06/2020    ANIONGAP 11 06/26/2024    ANIONGAP 4 03/27/2023    ANIONGAP 5 08/06/2020    GLC 98 06/26/2024    GLC 92 03/27/2023    GLC 89 08/06/2020    BUN 28.7 (H) 06/26/2024    BUN 28 03/27/2023    BUN 25 08/06/2020    CR 1.39 (H) 06/26/2024    CR 1.32 (H) 08/06/2020    GFRESTIMATED 39 (L) 06/26/2024    GFRESTIMATED 40 (L) 08/06/2020    GFRESTBLACK 46 (L) 08/06/2020    HECTOR 9.2 06/26/2024    HECTOR 8.4 (L) 08/06/2020      DC cardioversion 12/28/2023: 1 200 J biphasic synchronized shock delivered with conversion to sinus rhythm.  CANDE 12/28/2023: Normal biventricular function with no significant valve disease.  No LA appendage thrombus.  Echocardiogram 11/28/2023  Technically difficult study.Extremely poor acoustic windows.  Left ventricular size, wall motion and function are normal. The ejection  fraction is 55-60%.  Global right ventricular function is normal.  IVC diameter <2.1 cm collapsing >50% with sniff suggests a normal RA pressure  of 3 mmHg.  No pericardial effusion is present.    EKG 11/15/2023 atrial fibrillation.  Heart rate well controlled.            Cardiac MRI 2017  IMPRESSION:  1.  Regadenoson stress perfusion: Normal perfusion at rest and  post-stress without evidence of inducible ischemia.  2.  Normal left ventricular size and systolic function with a  calculated ejection fraction of  62 %.  3.  Normal right ventricular size and systolic function with a  calculated ejection fraction of 60%.   4.  On delayed enhancement imaging, there is no abnormal  hyperenhancement to suggest myocardial  scar/inflammation/infiltration.     Assessment:    #New onset persistent atrial fibrillation status post cardioversion 12/28/2023 with early recurrence within 10 days.  #Morbid obesity  #UMANA and worsening functional capacity over the last 1 year which has improved after cardioversion 6 months ago but symptoms came back once she converted back to A-fib.  #HFpEF functional class II  # Morbid obesity  -Reviewed EKG in clinic today.  Shows atrial fibrillation with well-controlled heart rate.  She has 2 + bilateral pretibial edema both legs.  She has not been using diuretic consistently.  She does not check her weight every day.  I think her worsening HFpEF symptoms are likely related to persistent atrial fibrillation.  I discussed with patient that she would benefit from catheter ablation.  She has an appointment with EP on 7/17.  I did emphasize the importance of daily weights and daily diuretic.  Patient reports  hair loss today.  I will check TSH.  Recent LFTs are normal.    Plan:  -Continue Xarelto 20 mg.   -Continue amiodarone 200 mg daily  -Take Bumex 1 mg daily every day rather than as needed.  -Continue olmesartan 40 mg daily  -Continue metoprolol 50 mg daily  -Keep EP appointment on 7/27 to discuss catheter ablation.  -Check labs in a week (BMP and TSH)    Return to clinic 6 months after cardioversion.    Total time spent today for this visit is 40 minutes including precharting, face-to-face clinic visit, review of labs/imaging and medical documentation.    Patito LOAIZA MD  [facility] Division of Cardiology  Pager [phone]

## 2024-07-01 ENCOUNTER — OFFICE VISIT (OUTPATIENT)
Dept: CARDIOLOGY | Facility: CLINIC | Age: 79
End: 2024-07-01
Payer: COMMERCIAL

## 2024-07-01 VITALS
WEIGHT: 293 LBS | DIASTOLIC BLOOD PRESSURE: 74 MMHG | BODY MASS INDEX: 55.14 KG/M2 | OXYGEN SATURATION: 94 % | SYSTOLIC BLOOD PRESSURE: 127 MMHG | HEART RATE: 66 BPM

## 2024-07-01 DIAGNOSIS — I48.19 PERSISTENT ATRIAL FIBRILLATION (H): Primary | ICD-10-CM

## 2024-07-01 DIAGNOSIS — E78.5 HYPERLIPIDEMIA LDL GOAL <100: ICD-10-CM

## 2024-07-01 DIAGNOSIS — G47.33 OBSTRUCTIVE SLEEP APNEA SYNDROME: ICD-10-CM

## 2024-07-01 DIAGNOSIS — E66.01 MORBID OBESITY (H): ICD-10-CM

## 2024-07-01 PROCEDURE — 99215 OFFICE O/P EST HI 40 MIN: CPT | Performed by: INTERNAL MEDICINE

## 2024-07-01 PROCEDURE — 93000 ELECTROCARDIOGRAM COMPLETE: CPT | Performed by: INTERNAL MEDICINE

## 2024-07-01 RX ORDER — ATORVASTATIN CALCIUM 40 MG/1
40 TABLET, FILM COATED ORAL DAILY
Qty: 90 TABLET | Refills: 3 | Status: SHIPPED | OUTPATIENT
Start: 2024-07-01

## 2024-07-01 NOTE — PATIENT INSTRUCTIONS
[facility] you [facility] coming [facility] [place]; please note the following instructions:    1. Labs in 1 week    2. Take Bumex everyday    3. Dr. Can follow up in 6 months          If you have any questions regarding your visit please contact your care team:     Cardiology  Telephone Number   Katty MORROW., RN  Bekah PEREZ, RN  Linn BURKS, RN  Donna JOHNSON, RMA  Eileen RICO, RMA  Mavis STANLEY, Visit Facilitator  [facility] [phone] (option 1)   For scheduling appts:     [phone] (select option 1)       For the Device Clinic (Pacemakers and ICD's)  RN's :  Estella Real   During business hours: [phone]    *After business hours:  [phone] (select option 4)      Normal test result notifications will be released via [employer] or mailed within 7 business days.  All other test results, will be communicated via telephone once reviewed by your cardiologist.    If you need a medication refill please contact your pharmacy.  Please allow 3 business days for your refill to be completed.    As always, thank you for trusting us with your health care needs!

## 2024-07-01 NOTE — LETTER
7/1/2024      RE: Chelo Brooks  [facility] [address]  [facility] [address]       Dear Colleague,    Thank you for the opportunity to participate in the care of your patient, Chelo Brooks, at the [facility]. Please see a copy of my visit note below.                                                                                                             General Cardiology [facility]-[place]      Referring provider:Vita Zavala MD     HPI: Ms. Chelo Brooks is a 78 year old  female with PMH significant for    -Morbid obesity BMI 53  -Hypertension.   -Persistent atrial fibrillation  -CKD stage III  -Obstructive sleep apnea    Patient is being seen today for worsening shortness of breath over the last 1 year.  She also reports worsening lower extremity edema sometimes weeping from her legs.  She has noticed that when she goes to shopping she has to stop multiple times over the last 1 year.  She feels exhausted with activity.  No chest pain, palpitations, dizziness, or syncope.    Patient was recently seen in primary care clinic for the symptoms and found to be in atrial fibrillation.  Started on Xarelto.  Referred to cardiology for further management of A-fib.  A-fib is a new diagnosis for the patient.    Prior cardiac work-up in 2017 shows normal cardiac MRI and sinus rhythm EKG.  Recent echocardiogram on 11/28/2023 shows normal biventricular function with no significant valve disease.    Medications: Atorvastatin 40 mg, olmesartan hydrochlorothiazide 40/12.5, Xarelto 20 mg.    Medications, personal, family, and social history reviewed with patient and revised.    Interval history 1/10/2024:  Patient is being seen today for follow-up after recent cardioversion on 12/28/2023.  Patient tells me that she felt great after cardioversion which was successful.  UMANA has improved.  Denies chest pain,  palpitations, dizziness.  She still has significant edema on both legs.  As you know I have started on Bumex a month ago and stopped hydrochlorothiazide.  Her weight is the same.  She tells me she has to go to the bathroom a lot.    Interval history 7/1/2024:  Patient returns for follow-up.  Due to my previous visit I referred patient to catheter ablation.  Patient is scheduled to see EP on 7/17.    Patient continues to report shortness of breath with activity.  She feels very tired.  She is on CPAP.  Denies chest pain, palpitations.  She tells me that she has swelling on the lower extremity on both sides.  She has some leaky spots.  Has been avoiding diuretic for a long time.  She used as needed because of the concerns to use the restroom frequently.  She reports some hair loss.    PAST MEDICAL HISTORY:  Past Medical History:   Diagnosis Date    Acquired renal cyst of left kidney     Adrenal nodule (H24)     left - needs yearly CT    Ascending aorta dilatation (H24)     Chronic atrial fibrillation (H) 11/15/2023    CKD (chronic kidney disease) stage 3, GFR 30-59 ml/min (H)     Degenerative joint disease (DJD) of hip     DJD (degenerative joint disease) 10/21/2005    Eczema, unspecified type 04/05/2018    Elevated parathyroid hormone 06/18/2019    Gallstones     Hepatitis B childhood     resolved, patient is not a carrier     Hyperlipidemia 11/27/2012    Hypertension goal BP (blood pressure) < 140/90     Lumbar spinal stenosis 07/17/2017    Mild persistent asthma without complication 05/30/2017    Morbid obesity due to excess calories (H) 11/23/2015    Surgical referral declined '16     FLOR (obstructive sleep apnea)     Osteopenia     Pulmonary emboli (H) 10/28/2019    Shingles 2014    scalp    Stasis dermatitis of both legs 04/05/2018    Thyroid nodule     Umbilical hernia     Vitamin D deficiency        CURRENT MEDICATIONS:  Current Outpatient Medications   Medication Sig Dispense Refill    albuterol (PROAIR  HFA/PROVENTIL HFA/VENTOLIN HFA) 108 (90 Base) MCG/ACT inhaler Inhale 1-2 puffs into the lungs every 4 hours as needed for shortness of breath / dyspnea 1 Inhaler 3    amiodarone (PACERONE) 200 MG tablet Take 1 tablet (200 mg) by mouth daily 90 tablet 0    atorvastatin (LIPITOR) 40 MG tablet TAKE 1 TABLET(40 MG) BY MOUTH DAILY 90 tablet 3    bumetanide (BUMEX) 1 MG tablet TAKE HALF DAILY 90 tablet 3    Cholecalciferol (VITAMIN D) 2000 UNITS tablet Take 2,000 Units by mouth daily      Cyanocobalamin (VITAMIN B-12 PO) Take by mouth daily      dexAMETHasone (DECADRON) 1 MG tablet Take one tablet at 11 PM and have labwork scheduled the following morning at 8 AM. 1 tablet 0    famotidine (PEPCID) 40 MG tablet TAKE 1 TABLET(40 MG) BY MOUTH DAILY (Patient not taking: Reported on 2/5/2024) 90 tablet 3    fluticasone (FLONASE) 50 MCG/ACT nasal spray Spray 2 sprays into both nostrils daily 48 g 3    gabapentin (NEURONTIN) 100 MG capsule Take 1 capsule (100 mg) by mouth 2 times daily as needed for neuropathic pain 60 capsule 1    loratadine (CLARITIN) 10 MG tablet Take 1 tablet (10 mg) by mouth daily      magnesium 250 MG tablet Take 1 tablet (250 mg) by mouth daily 90 tablet 1    metoprolol succinate ER (TOPROL XL) 50 MG 24 hr tablet Take 1 tablet (50 mg) by mouth daily 90 tablet 3    mometasone (ELOCON) 0.1 % external cream Apply to affected area on legs twice daily as needed 100 g 3    olmesartan (BENICAR) 40 MG tablet Take 1 tablet (40 mg) by mouth daily 90 tablet 0    order for DME Equipment being ordered: Auto CPAP 11-18 1 Units 0    rivaroxaban ANTICOAGULANT (XARELTO) 20 MG TABS tablet Take 1 tablet (20 mg) by mouth daily (with dinner) 90 tablet 3       PAST SURGICAL HISTORY:  Past Surgical History:   Procedure Laterality Date    ANESTHESIA CARDIOVERSION N/A 12/28/2023    Procedure: Anesthesia cardioversion @1330;  Surgeon: GENERIC ANESTHESIA PROVIDER;  Location: [facility] OR    IR PAROTID BIOPSY  5/21/2020    ZZC TOTAL KNEE  ARTHROPLASTY  3/2000    right    ZC TOTAL KNEE ARTHROPLASTY  6/8/12    left    Z VAGINAL HYSTERECTOMY  1977    benign, prolapse, ovaries remain        ALLERGIES:     Allergies   Allergen Reactions    Adhesive Tape        FAMILY HISTORY:  Family History   Problem Relation Age of Onset    Circulatory Father         d. DVT    Heart Disease Father         pacer    Diabetes Mother     Hypertension Mother     Deep Vein Thrombosis Brother     Coronary Artery Disease Brother     Myocardial Infarction Brother     Deep Vein Thrombosis Brother     C.A.D. Paternal Uncle         MI     Heart Disease Brother 48        pacer     Diabetes Maternal Grandmother     Hypertension Maternal Grandmother     Diabetes Maternal Aunt     Hypertension Maternal Aunt     Cancer - colorectal Maternal Grandfather     C.A.D. Maternal Aunt         MI    Glaucoma No family hx of     Macular Degeneration No family hx of          SOCIAL HISTORY:  Social History     Tobacco Use    Smoking status: Never    Smokeless tobacco: Never   Vaping Use    Vaping status: Never Used   Substance Use Topics    Alcohol use: Yes     Alcohol/week: 0.0 standard drinks of alcohol     Comment: very rare      Drug use: No       ROS:   Constitutional: No fever, chills, or sweats. Weight stable.   Cardiovascular: As per HPI.       Exam:  BP (!) 145/76 (BP Location: Left arm, Patient Position: Chair, Cuff Size: Adult Large)   Pulse 80   Wt (!) 156.9 kg (346 lb)   SpO2 94%   BMI 55.14 kg/m    GENERAL APPEARANCE: alert and no distress  HEENT: no icterus, no central cyanosis  LYMPH/NECK: no adenopathy, no asymmetry  CARDIOVASCULAR: irregular rhythm, normal S1, S2, no S3 or S4 and no murmur, click or rub, precordium quiet with normal PMI.  EXTREMITIES: 2+ pitting bilateral pretibial edema  NEURO: alert, normal speech,and affect  SKIN: no ecchymoses, no rashes     I have reviewed the labs and personally reviewed the imaging below and made my comment in the assessment and  plan.    Labs:  CBC RESULTS:   Lab Results   Component Value Date    WBC 4.3 11/15/2023    WBC 4.0 08/06/2020    RBC 4.40 11/15/2023    RBC 4.26 08/06/2020    HGB 12.7 11/15/2023    HGB 12.3 08/06/2020    HCT 39.4 11/15/2023    HCT 37.3 08/06/2020    MCV 90 11/15/2023    MCV 88 08/06/2020    MCH 28.9 11/15/2023    MCH 28.9 08/06/2020    MCHC 32.2 11/15/2023    MCHC 33.0 08/06/2020    RDW 13.5 11/15/2023    RDW 13.9 08/06/2020     11/15/2023     08/06/2020       BMP RESULTS:  Lab Results   Component Value Date     06/26/2024     08/06/2020    POTASSIUM 4.2 06/26/2024    POTASSIUM 4.5 03/27/2023    POTASSIUM 3.9 08/06/2020    CHLORIDE 106 06/26/2024    CHLORIDE 108 03/27/2023    CHLORIDE 107 08/06/2020    CO2 25 06/26/2024    CO2 28 03/27/2023    CO2 30 08/06/2020    ANIONGAP 11 06/26/2024    ANIONGAP 4 03/27/2023    ANIONGAP 5 08/06/2020    GLC 98 06/26/2024    GLC 92 03/27/2023    GLC 89 08/06/2020    BUN 28.7 (H) 06/26/2024    BUN 28 03/27/2023    BUN 25 08/06/2020    CR 1.39 (H) 06/26/2024    CR 1.32 (H) 08/06/2020    GFRESTIMATED 39 (L) 06/26/2024    GFRESTIMATED 40 (L) 08/06/2020    GFRESTBLACK 46 (L) 08/06/2020    HECTOR 9.2 06/26/2024    HECTOR 8.4 (L) 08/06/2020      DC cardioversion 12/28/2023: 1 200 J biphasic synchronized shock delivered with conversion to sinus rhythm.  CANDE 12/28/2023: Normal biventricular function with no significant valve disease.  No LA appendage thrombus.  Echocardiogram 11/28/2023  Technically difficult study.Extremely poor acoustic windows.  Left ventricular size, wall motion and function are normal. The ejection  fraction is 55-60%.  Global right ventricular function is normal.  IVC diameter <2.1 cm collapsing >50% with sniff suggests a normal RA pressure  of 3 mmHg.  No pericardial effusion is present.    EKG 11/15/2023 atrial fibrillation.  Heart rate well controlled.            Cardiac MRI 2017  IMPRESSION:  1.  Regadenoson stress perfusion: Normal perfusion  at rest and  post-stress without evidence of inducible ischemia.  2.  Normal left ventricular size and systolic function with a  calculated ejection fraction of  62 %.  3.  Normal right ventricular size and systolic function with a  calculated ejection fraction of 60%.   4.  On delayed enhancement imaging, there is no abnormal  hyperenhancement to suggest myocardial scar/inflammation/infiltration.     Assessment:    #New onset persistent atrial fibrillation status post cardioversion 12/28/2023 with early recurrence within 10 days.  #Morbid obesity  #UMANA and worsening functional capacity over the last 1 year which has improved after cardioversion 6 months ago but symptoms came back once she converted back to A-fib.  #HFpEF functional class II  # Morbid obesity  -Reviewed EKG in clinic today.  Shows atrial fibrillation with well-controlled heart rate.  She has 2 + bilateral pretibial edema both legs.  She has not been using diuretic inconsistently.  She does not check her weight every day.  I think her worsening HFpEF symptoms are likely related to persistent atrial fibrillation.  I discussed with patient that she would benefit from catheter ablation.  She has an appointment with EP on 7/17.  I did emphasize the importance of daily weights and daily diuretic.  Patient reports  hair loss today.  I will check TSH.  Recent LFTs are normal.    Plan:  -Continue Xarelto 20 mg.   -Continue amiodarone 200 mg daily  -Take Bumex 1 mg daily every day rather than as needed.  -Continue olmesartan 40 mg daily  -Continue metoprolol 50 mg daily  -Keep EP appointment on 7/27 to discuss catheter ablation.  -Check labs in a week (BMP and TSH)    Return to clinic 6 months after cardioversion.    Total time spent today for this visit is 40 minutes including precharting, face-to-face clinic visit, review of labs/imaging and medical documentation.    Patito LOAIZA MD  [facility] Division of Cardiology  Pager [phone]

## 2024-07-01 NOTE — NURSING NOTE
"Chief Complaint   Patient presents with    Follow Up     Return general cardiology for 6 month follow-up    Shortness of Breath    Fatigue    Off balance       Initial BP (!) 145/76 (BP Location: Left arm, Patient Position: Chair, Cuff Size: Adult Large)   Pulse 80   Wt (!) 156.9 kg (346 lb)   SpO2 94%   BMI 55.14 kg/m   Estimated body mass index is 55.14 kg/m  as calculated from the following:    Height as of 1/12/24: 1.687 m (5' 6.42\").    Weight as of this encounter: 156.9 kg (346 lb)..  BP completed using cuff size: large/forearm    TORY Salazar    "

## 2024-07-05 LAB
ATRIAL RATE - MUSE: 340 BPM
DIASTOLIC BLOOD PRESSURE - MUSE: NORMAL MMHG
INTERPRETATION ECG - MUSE: NORMAL
P AXIS - MUSE: NORMAL DEGREES
PR INTERVAL - MUSE: NORMAL MS
QRS DURATION - MUSE: 96 MS
QT - MUSE: 436 MS
QTC - MUSE: 477 MS
R AXIS - MUSE: -9 DEGREES
SYSTOLIC BLOOD PRESSURE - MUSE: NORMAL MMHG
T AXIS - MUSE: -3 DEGREES
VENTRICULAR RATE- MUSE: 72 BPM

## 2024-07-10 ENCOUNTER — LAB (OUTPATIENT)
Dept: LAB | Facility: CLINIC | Age: 79
End: 2024-07-10
Payer: COMMERCIAL

## 2024-07-10 DIAGNOSIS — E78.5 HYPERLIPIDEMIA LDL GOAL <100: ICD-10-CM

## 2024-07-10 LAB
ANION GAP SERPL CALCULATED.3IONS-SCNC: 8 MMOL/L (ref 7–15)
BUN SERPL-MCNC: 28.2 MG/DL (ref 8–23)
CALCIUM SERPL-MCNC: 9 MG/DL (ref 8.8–10.2)
CHLORIDE SERPL-SCNC: 105 MMOL/L (ref 98–107)
CREAT SERPL-MCNC: 1.46 MG/DL (ref 0.51–0.95)
DEPRECATED HCO3 PLAS-SCNC: 30 MMOL/L (ref 22–29)
EGFRCR SERPLBLD CKD-EPI 2021: 37 ML/MIN/1.73M2
GLUCOSE SERPL-MCNC: 93 MG/DL (ref 70–99)
POTASSIUM SERPL-SCNC: 4.5 MMOL/L (ref 3.4–5.3)
SODIUM SERPL-SCNC: 143 MMOL/L (ref 135–145)
TSH SERPL DL<=0.005 MIU/L-ACNC: 0.56 UIU/ML (ref 0.3–4.2)

## 2024-07-10 PROCEDURE — 36415 COLL VENOUS BLD VENIPUNCTURE: CPT

## 2024-07-10 PROCEDURE — 80048 BASIC METABOLIC PNL TOTAL CA: CPT

## 2024-07-10 PROCEDURE — 84443 ASSAY THYROID STIM HORMONE: CPT

## 2024-07-12 ENCOUNTER — MYC MEDICAL ADVICE (OUTPATIENT)
Dept: CARDIOLOGY | Facility: CLINIC | Age: 79
End: 2024-07-12
Payer: COMMERCIAL

## 2024-07-12 NOTE — TELEPHONE ENCOUNTER
From: Patito Loaiza MD   Sent: 7/11/2024   7:14 PM CDT   To: [facility]     Chelo     Your kidney test is stable.  Your thyroid test is normal.  Have you been taking the Bumex that I prescribed you every day?  How is the weight?  How is the legs are looking?  Did they improve?  Please let us know.     DR LOAIZA     Cardiology     [email or web address] message sent to patient.    Bekah Zuniga, RN, BSN  Cardiology RN Care Coordinator   [place]   Phone: [phone]  Fax: [phone] ([place]) [phone] ([place])

## 2024-07-15 NOTE — TELEPHONE ENCOUNTER
Attempted to call patient. Left message for patient.    Bekah Zuniga, RN, BSN  Cardiology RN Care Coordinator   [place]   Phone: [phone]  Fax: [phone] ([place]) [phone] ([place])

## 2024-07-16 NOTE — TELEPHONE ENCOUNTER
Called and spoke with patient. Reviewed lab results. Patient states that she has not been checking her weight but she does feel overall better. Patient reports that her swelling has improved. She reports some swelling in her left leg still but it has improved. Patient reports that she has been trying to take bumex every day and feels it is helping.     Bekah Zuniga, RN, BSN  Cardiology RN Care Coordinator   [place]   Phone: [phone]  Fax: [phone] ([place]) [phone] ([place])

## 2024-07-16 NOTE — TELEPHONE ENCOUNTER
[facility] Call Center    Phone Message    May a detailed message be left on voicemail: yes     Reason for Call: Other: Pt is returning Bekah's call and requesting a call back after 11 am today.     Action Taken: Other: cardio    Travel Screening: Not Applicable    Thank you!  [facility]       Date of Service:

## 2024-07-16 NOTE — TELEPHONE ENCOUNTER
Thank you     You  Can, MD Patito3 hours ago (11:48 AM)     CK  Patient has been taking bumex every day. She does state her swelling has improved. Still present in Left leg but better than it was. Patient has not been checking her weight. She states she is planning on getting a scale to monitor. Overall feels better since on the bumex.     [employer] message sent to patient.    Bekah Zuniga RN, BSN  Cardiology RN Care Coordinator   [place]   Phone: [phone]  Fax: [phone] ([place]) [phone] ([place])

## 2024-07-17 ENCOUNTER — OFFICE VISIT (OUTPATIENT)
Dept: CARDIOLOGY | Facility: CLINIC | Age: 79
End: 2024-07-17
Attending: INTERNAL MEDICINE
Payer: COMMERCIAL

## 2024-07-17 ENCOUNTER — PRE VISIT (OUTPATIENT)
Dept: CARDIOLOGY | Facility: CLINIC | Age: 79
End: 2024-07-17
Payer: COMMERCIAL

## 2024-07-17 VITALS
BODY MASS INDEX: 53.98 KG/M2 | DIASTOLIC BLOOD PRESSURE: 87 MMHG | SYSTOLIC BLOOD PRESSURE: 148 MMHG | HEART RATE: 70 BPM | WEIGHT: 293 LBS | OXYGEN SATURATION: 95 %

## 2024-07-17 DIAGNOSIS — I48.19 PERSISTENT ATRIAL FIBRILLATION (H): Primary | ICD-10-CM

## 2024-07-17 PROCEDURE — 99215 OFFICE O/P EST HI 40 MIN: CPT | Mod: GC | Performed by: INTERNAL MEDICINE

## 2024-07-17 PROCEDURE — G0463 HOSPITAL OUTPT CLINIC VISIT: HCPCS | Performed by: INTERNAL MEDICINE

## 2024-07-17 PROCEDURE — 93010 ELECTROCARDIOGRAM REPORT: CPT | Performed by: INTERNAL MEDICINE

## 2024-07-17 PROCEDURE — 93005 ELECTROCARDIOGRAM TRACING: CPT

## 2024-07-17 RX ORDER — SODIUM CHLORIDE 9 MG/ML
INJECTION, SOLUTION INTRAVENOUS CONTINUOUS
Status: DISCONTINUED | OUTPATIENT
Start: 2024-07-17 | End: 2024-10-02

## 2024-07-17 ASSESSMENT — PAIN SCALES - GENERAL: PAINLEVEL: NO PAIN (0)

## 2024-07-17 NOTE — PATIENT INSTRUCTIONS
Plan:    Cardioversion in ~ 2 weeks        You are scheduled for a Cardioversion, at [facility], [place]. The hospital is located at [address] [place] [address] on the [place] bank of the campus.  If you need to cancel this procedure, please call [phone].       Visitor Policy: Two visitors.    Date:______  Time: _______________To the [facility] Waiting Room at the [facility]  Cardioversion    Please do not eat anything for 8 hours prior to your procedure. You may have sips of water up until 2 hours prior to your arrival.  2. Medications to continue:  - Anticoagulant (Xarelto)  - Take all meds as prescribed, except for those noted below.  3. Medications to hold:    - Morning of: bumex  4. You will discharge same day. You will need a .    If you have further questions, please utilize [employer] to contact us.   If your question concerns the above instructions, contact:  Raquel Dalton RN   Electrophysiology Nurse Coordinator.  [phone]    If your question concerns the schedule/appointment times, contact:  [facility]   [phone]

## 2024-07-17 NOTE — NURSING NOTE
Medication Reconciliation:  Rooming staff reviewed and verified all current medications with patient. An updated med list was included in the AVS.    Test Results Reviewed:  EKG results were reviewed by provider with patient today.     EP Procedure:  Patient was given instructions regarding cardioversion. Patient received an instruction letter today for reference. Discussed purpose, preparation, and procedure with patient. Patient verbalized understanding and agreed to call with further questions or concerns. The EP  was notified of need to schedule procedure and post op follow up appointments.    Return appointment:   Patient given instructions regarding scheduling next clinic visit. Patient verbalized understanding.       Raquel Dalton RN on 7/17/2024 at 11:52 AM

## 2024-07-17 NOTE — PROGRESS NOTES
HPI: Patient is a 79 year old femlae with a history of persistent atrial fibrillation, CKD, essential HTN, morbid obesity, who presenst for evlauation of atrial fibrillation. Patient presents as a referral from Dr. Montano for evaluation of atrial fibrillation and possible ablation.    Patient notes that she has had shortness of breath over the past 1 year; notes that when she is shopping she has to stop frequently and take breaks. Her primary care noted she was in atrial fibrillation and started her on Xarelto. Dr. Montano started her on Bumex and performed a cardioversion. She felt great after cardioversion but shortly thereafter went back into atrial fibrillation.    She re-iterates this today and notes she is still feeling unwell. Would like to pursue ablation. No fevers, chills, chest pain, SOB, abdominal pain, nausea, vomiting, or diarrhea.    PAST MEDICAL HISTORY:  Past Medical History:   Diagnosis Date    Acquired renal cyst of left kidney     Adrenal nodule (H24)     left - needs yearly CT    Ascending aorta dilatation (H24)     Chronic atrial fibrillation (H) 11/15/2023    CKD (chronic kidney disease) stage 3, GFR 30-59 ml/min (H)     Degenerative joint disease (DJD) of hip     DJD (degenerative joint disease) 10/21/2005    Eczema, unspecified type 04/05/2018    Elevated parathyroid hormone 06/18/2019    Gallstones     Hepatitis B childhood     resolved, patient is not a carrier     Hyperlipidemia 11/27/2012    Hypertension goal BP (blood pressure) < 140/90     Lumbar spinal stenosis 07/17/2017    Mild persistent asthma without complication 05/30/2017    Morbid obesity due to excess calories (H) 11/23/2015    Surgical referral declined '16     FLOR (obstructive sleep apnea)     Osteopenia     Pulmonary emboli (H) 10/28/2019    Shingles 2014    scalp    Stasis dermatitis of both legs 04/05/2018    Thyroid nodule     Umbilical hernia     Vitamin D deficiency        CURRENT MEDICATIONS:  Current Outpatient Medications    Medication Sig Dispense Refill    albuterol (PROAIR HFA/PROVENTIL HFA/VENTOLIN HFA) 108 (90 Base) MCG/ACT inhaler Inhale 1-2 puffs into the lungs every 4 hours as needed for shortness of breath / dyspnea 1 Inhaler 3    amiodarone (PACERONE) 200 MG tablet Take 1 tablet (200 mg) by mouth daily 90 tablet 0    atorvastatin (LIPITOR) 40 MG tablet Take 1 tablet (40 mg) by mouth daily 90 tablet 3    bumetanide (BUMEX) 1 MG tablet TAKE HALF DAILY (Patient taking differently: as needed TAKE HALF DAILY) 90 tablet 3    Cholecalciferol (VITAMIN D) 2000 UNITS tablet Take 2,000 Units by mouth daily      Cyanocobalamin (VITAMIN B-12 PO) Take by mouth daily      dexAMETHasone (DECADRON) 1 MG tablet Take one tablet at 11 PM and have labwork scheduled the following morning at 8 AM. (Patient not taking: Reported on 7/1/2024) 1 tablet 0    fluticasone (FLONASE) 50 MCG/ACT nasal spray Spray 2 sprays into both nostrils daily 48 g 3    gabapentin (NEURONTIN) 100 MG capsule Take 1 capsule (100 mg) by mouth 2 times daily as needed for neuropathic pain 60 capsule 1    loratadine (CLARITIN) 10 MG tablet Take 1 tablet (10 mg) by mouth daily      magnesium 250 MG tablet Take 1 tablet (250 mg) by mouth daily 90 tablet 1    metoprolol succinate ER (TOPROL XL) 50 MG 24 hr tablet Take 1 tablet (50 mg) by mouth daily 90 tablet 3    mometasone (ELOCON) 0.1 % external cream Apply to affected area on legs twice daily as needed 100 g 3    olmesartan (BENICAR) 40 MG tablet Take 1 tablet (40 mg) by mouth daily 90 tablet 0    order for DME Equipment being ordered: Auto CPAP 11-18 1 Units 0    rivaroxaban ANTICOAGULANT (XARELTO) 20 MG TABS tablet Take 1 tablet (20 mg) by mouth daily (with dinner) 90 tablet 3       PAST SURGICAL HISTORY:  Past Surgical History:   Procedure Laterality Date    ANESTHESIA CARDIOVERSION N/A 12/28/2023    Procedure: Anesthesia cardioversion @1330;  Surgeon: GENERIC ANESTHESIA PROVIDER;  Location: [facility] OR    IR PAROTID BIOPSY   5/21/2020    [facility] TOTAL KNEE ARTHROPLASTY  3/2000    right    [facility] TOTAL KNEE ARTHROPLASTY  6/8/12    left    [facility] VAGINAL HYSTERECTOMY  1977    benign, prolapse, ovaries remain        ALLERGIES:     Allergies   Allergen Reactions    Adhesive Tape        FAMILY HISTORY:  No Premature coronary artery disease  Yes Atrial fibrillation  No Sudden cardiac death     SOCIAL HISTORY:  Social History     Tobacco Use    Smoking status: Never    Smokeless tobacco: Never   Vaping Use    Vaping status: Never Used   Substance Use Topics    Alcohol use: Yes     Alcohol/week: 0.0 standard drinks of alcohol     Comment: very rare      Drug use: No       ROS:   Constitutional: No fever, chills, or sweats. Weight stable.   ENT: No visual disturbance, ear ache, epistaxis, sore throat.   Cardiovascular: As per HPI.   Respiratory: No cough, hemoptysis.    GI: No nausea, vomiting, hematemesis, melena, or hematochezia.   : No hematuria.   Integument: Negative.   Psychiatric: Negative.   Hematologic:  Easy bruising, no easy bleeding.  Neuro: Negative.   Endocrinology: No significant heat or cold intolerance   Musculoskeletal: No myalgia.    Exam:  BP (!) 148/87 (BP Location: Left arm, Patient Position: Chair, Cuff Size: Adult Regular)   Pulse 70   Wt (!) 153.6 kg (338 lb 11.2 oz)   SpO2 95%   BMI 53.98 kg/m      General Appearance: Well appearing, no distress  Eyes: ALTAF, EOMI  HEENT: NC, AT. MMM.   Respiratory: CTAB, normal work of breathing  Cardiovascular: Regular Rate and Rhythm, normal S1, S2. No murmurs, rubs, gallops  GI: Soft, non-tender, non-distedned  Lymph/Hematologic: No asymetric swelling, edema. No bruising.  Genitourinary: Deferred  Skin: No rashes, lesions, wounds.  Musculoskeletal: Warm, well perfused  Neurologic: AOx4, CNII-XII intact.  Psychiatric: Euthymic. Mood appropriate.     Labs:  CBC RESULTS:   Lab Results   Component Value Date    WBC 4.3 11/15/2023    WBC 4.0 08/06/2020    RBC 4.40 11/15/2023    RBC 4.26  08/06/2020    HGB 12.7 11/15/2023    HGB 12.3 08/06/2020    HCT 39.4 11/15/2023    HCT 37.3 08/06/2020    MCV 90 11/15/2023    MCV 88 08/06/2020    MCH 28.9 11/15/2023    MCH 28.9 08/06/2020    MCHC 32.2 11/15/2023    MCHC 33.0 08/06/2020    RDW 13.5 11/15/2023    RDW 13.9 08/06/2020     11/15/2023     08/06/2020       BMP RESULTS:  Lab Results   Component Value Date     07/10/2024     08/06/2020    POTASSIUM 4.5 07/10/2024    POTASSIUM 4.5 03/27/2023    POTASSIUM 3.9 08/06/2020    CHLORIDE 105 07/10/2024    CHLORIDE 108 03/27/2023    CHLORIDE 107 08/06/2020    CO2 30 (H) 07/10/2024    CO2 28 03/27/2023    CO2 30 08/06/2020    ANIONGAP 8 07/10/2024    ANIONGAP 4 03/27/2023    ANIONGAP 5 08/06/2020    GLC 93 07/10/2024    GLC 92 03/27/2023    GLC 89 08/06/2020    BUN 28.2 (H) 07/10/2024    BUN 28 03/27/2023    BUN 25 08/06/2020    CR 1.46 (H) 07/10/2024    CR 1.32 (H) 08/06/2020    GFRESTIMATED 37 (L) 07/10/2024    GFRESTIMATED 40 (L) 08/06/2020    GFRESTBLACK 46 (L) 08/06/2020    HECTOR 9.0 07/10/2024    HECTOR 8.4 (L) 08/06/2020        INR RESULTS:  Lab Results   Component Value Date    INR 2.50 (H) 09/03/2020    INR 1.60 (H) 08/27/2020    INR 1.30 (H) 08/24/2020    INR 2.60 (H) 08/13/2020       Procedures:    Echo from 11/28/2023    Interpretation Summary     Technically difficult study.Extremely poor acoustic windows.  Left ventricular size, wall motion and function are normal. The ejection  fraction is 55-60%.  Global right ventricular function is normal.  IVC diameter <2.1 cm collapsing >50% with sniff suggests a normal RA pressure  of 3 mmHg.  No pericardial effusion is present.    EKG from 7/17 and 7/1; Atrial Fibrillation    Assessment and Plan:     Impression:  Persistent Atrial Fibrillation; CHADSVASC 4 (essential HTN; female, age++)  HFpEF  Morbid Obesity  FLOR on CPAP    --Patient is a 79 year old female with persistent atrial fibrillation. She notes she felt better with cardioversion  that only maintained sinus rhythm for a short amount of time. Will attempt to get her back into a normal rhythm.  --Will first pursue cardioversion on amiodarone, and if she does feel better will schedule for ablation with the goal of getting off of amiodarone.    Plan:  Will start patient on amiodarone and schedule for outpatient cardioversion. If she feels better will try to get her off of amiodarone via ablation.    Staffed with Dr. Albarado.    Edison Carias MD PhD  Cardiac Electrophysiology Fellow  P: Text Page     EP STAFF NOTE  Patient seen and examined and management plan personally reviewed with the patient. I agree with the note above by the CV/EP fellow.  Chay Albarado MD [facility]  Cardiology - Electrophysiology  Total time spent on patient visit, reviewing notes, imaging, labs, orders, and completing necessary documentation: 60 minutes.  >50% of visit spent on counseling patient and/or coordination of care.

## 2024-07-17 NOTE — NURSING NOTE
Chief Complaint   Patient presents with    New Patient     PVI consult per Dr Montano. On amio.   Vitals were taken and medications were reconciled.    Cedric Marin, Visit Facilitator Clinic  11:08 AM

## 2024-07-17 NOTE — TELEPHONE ENCOUNTER
Attempted to call patient. Left message for patient.    Bekah Zuniga, RN, BSN  Cardiology RN Care Coordinator   [place]   Phone: [phone]  Fax: [phone] ([place]) [phone] ([place])

## 2024-07-17 NOTE — LETTER
7/17/2024      RE: Chelo Brooks  [facility] [facility] [address]  [place] [address]       Dear Colleague,    Thank you for the opportunity to participate in the care of your patient, Chelo Brooks, at the [facility] [place] [facility]. Please see a copy of my visit note below.    HPI: Patient is a 79 year old femlae with a history of persistent atrial fibrillation, CKD, essential HTN, morbid obesity, who presenst for evlauation of atrial fibrillation. Patient presents as a referral from Dr. Montano for evaluation of atrial fibrillation and possible ablation.    Patient notes that she has had shortness of breath over the past 1 year; notes that when she is shopping she has to stop frequently and take breaks. Her primary care noted she was in atrial fibrillation and started her on Xarelto. Dr. Montano started her on Bumex and performed a cardioversion. She felt great after cardioversion but shortly thereafter went back into atrial fibrillation.    She re-iterates this today and notes she is still feeling unwell. Would like to pursue ablation. No fevers, chills, chest pain, SOB, abdominal pain, nausea, vomiting, or diarrhea.    PAST MEDICAL HISTORY:  Past Medical History:   Diagnosis Date     Acquired renal cyst of left kidney      Adrenal nodule (H24)     left - needs yearly CT     Ascending aorta dilatation (H24)      Chronic atrial fibrillation (H) 11/15/2023     CKD (chronic kidney disease) stage 3, GFR 30-59 ml/min (H)      Degenerative joint disease (DJD) of hip      DJD (degenerative joint disease) 10/21/2005     Eczema, unspecified type 04/05/2018     Elevated parathyroid hormone 06/18/2019     Gallstones      Hepatitis B childhood     resolved, patient is not a carrier      Hyperlipidemia 11/27/2012     Hypertension goal BP (blood pressure) < 140/90      Lumbar spinal stenosis 07/17/2017     Mild persistent asthma without complication  05/30/2017     Morbid obesity due to excess calories (H) 11/23/2015    Surgical referral declined '16      FLOR (obstructive sleep apnea)      Osteopenia      Pulmonary emboli (H) 10/28/2019     Shingles 2014    scalp     Stasis dermatitis of both legs 04/05/2018     Thyroid nodule      Umbilical hernia      Vitamin D deficiency        CURRENT MEDICATIONS:  Current Outpatient Medications   Medication Sig Dispense Refill     albuterol (PROAIR HFA/PROVENTIL HFA/VENTOLIN HFA) 108 (90 Base) MCG/ACT inhaler Inhale 1-2 puffs into the lungs every 4 hours as needed for shortness of breath / dyspnea 1 Inhaler 3     amiodarone (PACERONE) 200 MG tablet Take 1 tablet (200 mg) by mouth daily 90 tablet 0     atorvastatin (LIPITOR) 40 MG tablet Take 1 tablet (40 mg) by mouth daily 90 tablet 3     bumetanide (BUMEX) 1 MG tablet TAKE HALF DAILY (Patient taking differently: as needed TAKE HALF DAILY) 90 tablet 3     Cholecalciferol (VITAMIN D) 2000 UNITS tablet Take 2,000 Units by mouth daily       Cyanocobalamin (VITAMIN B-12 PO) Take by mouth daily       dexAMETHasone (DECADRON) 1 MG tablet Take one tablet at 11 PM and have labwork scheduled the following morning at 8 AM. (Patient not taking: Reported on 7/1/2024) 1 tablet 0     fluticasone (FLONASE) 50 MCG/ACT nasal spray Spray 2 sprays into both nostrils daily 48 g 3     gabapentin (NEURONTIN) 100 MG capsule Take 1 capsule (100 mg) by mouth 2 times daily as needed for neuropathic pain 60 capsule 1     loratadine (CLARITIN) 10 MG tablet Take 1 tablet (10 mg) by mouth daily       magnesium 250 MG tablet Take 1 tablet (250 mg) by mouth daily 90 tablet 1     metoprolol succinate ER (TOPROL XL) 50 MG 24 hr tablet Take 1 tablet (50 mg) by mouth daily 90 tablet 3     mometasone (ELOCON) 0.1 % external cream Apply to affected area on legs twice daily as needed 100 g 3     olmesartan (BENICAR) 40 MG tablet Take 1 tablet (40 mg) by mouth daily 90 tablet 0     order for DME Equipment being  ordered: Auto CPAP 11-18 1 Units 0     rivaroxaban ANTICOAGULANT (XARELTO) 20 MG TABS tablet Take 1 tablet (20 mg) by mouth daily (with dinner) 90 tablet 3       PAST SURGICAL HISTORY:  Past Surgical History:   Procedure Laterality Date     ANESTHESIA CARDIOVERSION N/A 12/28/2023    Procedure: Anesthesia cardioversion @1330;  Surgeon: GENERIC ANESTHESIA PROVIDER;  Location: [facility] OR     IR PAROTID BIOPSY  5/21/2020     Z TOTAL KNEE ARTHROPLASTY  3/2000    right     [facility] TOTAL KNEE ARTHROPLASTY  6/8/12    left     [facility] VAGINAL HYSTERECTOMY  1977    benign, prolapse, ovaries remain        ALLERGIES:     Allergies   Allergen Reactions     Adhesive Tape        FAMILY HISTORY:  No Premature coronary artery disease  Yes Atrial fibrillation  No Sudden cardiac death     SOCIAL HISTORY:  Social History     Tobacco Use     Smoking status: Never     Smokeless tobacco: Never   Vaping Use     Vaping status: Never Used   Substance Use Topics     Alcohol use: Yes     Alcohol/week: 0.0 standard drinks of alcohol     Comment: very rare       Drug use: No       ROS:   Constitutional: No fever, chills, or sweats. Weight stable.   ENT: No visual disturbance, ear ache, epistaxis, sore throat.   Cardiovascular: As per HPI.   Respiratory: No cough, hemoptysis.    GI: No nausea, vomiting, hematemesis, melena, or hematochezia.   : No hematuria.   Integument: Negative.   Psychiatric: Negative.   Hematologic:  Easy bruising, no easy bleeding.  Neuro: Negative.   Endocrinology: No significant heat or cold intolerance   Musculoskeletal: No myalgia.    Exam:  BP (!) 148/87 (BP Location: Left arm, Patient Position: Chair, Cuff Size: Adult Regular)   Pulse 70   Wt (!) 153.6 kg (338 lb 11.2 oz)   SpO2 95%   BMI 53.98 kg/m      General Appearance: Well appearing, no distress  Eyes: ALTAF, EOMI  HEENT: NC, AT. MMM.   Respiratory: CTAB, normal work of breathing  Cardiovascular: Regular Rate and Rhythm, normal S1, S2. No murmurs, rubs, gallops  GI:  Soft, non-tender, non-distedned  Lymph/Hematologic: No asymetric swelling, edema. No bruising.  Genitourinary: Deferred  Skin: No rashes, lesions, wounds.  Musculoskeletal: Warm, well perfused  Neurologic: AOx4, CNII-XII intact.  Psychiatric: Euthymic. Mood appropriate.     Labs:  CBC RESULTS:   Lab Results   Component Value Date    WBC 4.3 11/15/2023    WBC 4.0 08/06/2020    RBC 4.40 11/15/2023    RBC 4.26 08/06/2020    HGB 12.7 11/15/2023    HGB 12.3 08/06/2020    HCT 39.4 11/15/2023    HCT 37.3 08/06/2020    MCV 90 11/15/2023    MCV 88 08/06/2020    MCH 28.9 11/15/2023    MCH 28.9 08/06/2020    MCHC 32.2 11/15/2023    MCHC 33.0 08/06/2020    RDW 13.5 11/15/2023    RDW 13.9 08/06/2020     11/15/2023     08/06/2020       BMP RESULTS:  Lab Results   Component Value Date     07/10/2024     08/06/2020    POTASSIUM 4.5 07/10/2024    POTASSIUM 4.5 03/27/2023    POTASSIUM 3.9 08/06/2020    CHLORIDE 105 07/10/2024    CHLORIDE 108 03/27/2023    CHLORIDE 107 08/06/2020    CO2 30 (H) 07/10/2024    CO2 28 03/27/2023    CO2 30 08/06/2020    ANIONGAP 8 07/10/2024    ANIONGAP 4 03/27/2023    ANIONGAP 5 08/06/2020    GLC 93 07/10/2024    GLC 92 03/27/2023    GLC 89 08/06/2020    BUN 28.2 (H) 07/10/2024    BUN 28 03/27/2023    BUN 25 08/06/2020    CR 1.46 (H) 07/10/2024    CR 1.32 (H) 08/06/2020    GFRESTIMATED 37 (L) 07/10/2024    GFRESTIMATED 40 (L) 08/06/2020    GFRESTBLACK 46 (L) 08/06/2020    HECTOR 9.0 07/10/2024    HECTOR 8.4 (L) 08/06/2020        INR RESULTS:  Lab Results   Component Value Date    INR 2.50 (H) 09/03/2020    INR 1.60 (H) 08/27/2020    INR 1.30 (H) 08/24/2020    INR 2.60 (H) 08/13/2020       Procedures:    Echo from 11/28/2023    Interpretation Summary     Technically difficult study.Extremely poor acoustic windows.  Left ventricular size, wall motion and function are normal. The ejection  fraction is 55-60%.  Global right ventricular function is normal.  IVC diameter <2.1 cm collapsing  >50% with sniff suggests a normal RA pressure  of 3 mmHg.  No pericardial effusion is present.    EKG from 7/17 and 7/1; Atrial Fibrillation    Assessment and Plan:     Impression:  Persistent Atrial Fibrillation; CHADSVASC 4 (essential HTN; female, age++)  HFpEF  Morbid Obesity  FLOR on CPAP    --Patient is a 79 year old female with persistent atrial fibrillation. She notes she felt better with cardioversion that only maintained sinus rhythm for a short amount of time. Will attempt to get her back into a normal rhythm.  --Will first pursue cardioversion on amiodarone, and if she does feel better will schedule for ablation with the goal of getting off of amiodarone.    Plan:  Will start patient on amiodarone and schedule for outpatient cardioversion. If she feels better will try to get her off of amiodarone via ablation.    Staffed with Dr. Albarado.    Edison Carias MD PhD  Cardiac Electrophysiology Fellow  P: Text Page     EP STAFF NOTE  Patient seen and examined and management plan personally reviewed with the patient. I agree with the note above by the CV/EP fellow.  Chay Albarado MD [facility]  Cardiology - Electrophysiology  Total time spent on patient visit, reviewing notes, imaging, labs, orders, and completing necessary documentation: 60 minutes.  >50% of visit spent on counseling patient and/or coordination of care.

## 2024-07-18 LAB
ATRIAL RATE - MUSE: 66 BPM
DIASTOLIC BLOOD PRESSURE - MUSE: NORMAL MMHG
INTERPRETATION ECG - MUSE: NORMAL
P AXIS - MUSE: NORMAL DEGREES
PR INTERVAL - MUSE: NORMAL MS
QRS DURATION - MUSE: 92 MS
QT - MUSE: 424 MS
QTC - MUSE: 460 MS
R AXIS - MUSE: -16 DEGREES
SYSTOLIC BLOOD PRESSURE - MUSE: NORMAL MMHG
T AXIS - MUSE: 0 DEGREES
VENTRICULAR RATE- MUSE: 71 BPM

## 2024-07-19 ENCOUNTER — TELEPHONE (OUTPATIENT)
Dept: CARDIOLOGY | Facility: CLINIC | Age: 79
End: 2024-07-19
Payer: COMMERCIAL

## 2024-07-19 NOTE — TELEPHONE ENCOUNTER
----- Message from Bekah JACOB sent at 7/19/2024 11:50 AM CDT -----  Regarding: RE: DCCV in 2 weeks  From my understanding, it would be for the appointment with Maria Fernanda since it can only a be a virtual appointment so just needs to be done before appointment.  ----- Message -----  From: Tiffanie Gomez  Sent: 7/19/2024  11:48 AM CDT  To: [facility];  Cardiology  Subject: RE: DCCV in 2 weeks                              Yes, we can contact her to schedule EKG. Does this just need to be anytime between now and her 8/30 appointment with Maria Fernanda? Or is it meant to be done sooner?  ----- Message -----  From: Bekah Zuniga, RN  Sent: 7/19/2024  10:05 AM CDT  To: Ada Figueroa; Cardiology Ep Coordinator- ; #  Subject: RE: DCCV in 2 weeks                              Woody Tellez,    I think it would be okay for patient to have EKG completed at [place] but it would have to be on a day that we have a provider on site just in case. Thanks!     Bekah  ----- Message -----  From: Ada Figueroa  Sent: 7/18/2024   3:18 PM CDT  To: Ada Figueroa; [facility] Nurse  Subject: FW: DCCV in 2 weeks                              Can she come to you for the ECG prior to her virtual on visit with Maria Fernanda?  She doesn't want to come to [facility] and [facility] doesn't have any [facility] appts.     Rosalinda

## 2024-07-19 NOTE — TELEPHONE ENCOUNTER
Covocative message sent to patient to reply or call scheduling line to schedule nurse only EKG visit prior to Maria Fernanda Magallanes appointment on 8/30/24.    CARLOS Terry

## 2024-07-23 ENCOUNTER — TELEPHONE (OUTPATIENT)
Dept: CARDIOLOGY | Facility: CLINIC | Age: 79
End: 2024-07-23
Payer: COMMERCIAL

## 2024-07-23 NOTE — TELEPHONE ENCOUNTER
Returned call to Shil[facility]. Made appt with [facility] on 8/26/24 at 11am in [place] for an EKG only.     Vita Rider CMA ([facility])

## 2024-07-23 NOTE — TELEPHONE ENCOUNTER
Called patient to schedule EKG in [place] prior to seeing Maria Fernanda Barger APRN CNP on 8/30/24. No answer. Called daughter Shiloh to schedule. No answer - LVM. Also sending MyChart message.     Vita Rider CMA ([facility])      ----- Message from Bekah JACOB sent at 7/23/2024  2:36 PM CDT -----  Regarding: FW: DCCV in 2 weeks  Can we offer to schedule the EKG to be done at [place] for Maria Fernanda's virtual appointment?  ----- Message -----  From: Vita Rider CMA  Sent: 7/19/2024   3:48 PM CDT  To:  Cardiology Nurse  Subject: FW: DCCV in 2 weeks                                ----- Message -----  From: Bekah Zuniga, BRIDGET  Sent: 7/19/2024   3:43 PM CDT  To: Ada Figueroa;  Cardiology Nurse; #  Subject: FW: DCCV in 2 weeks                              Thanks Rosalinda for clarifying!     It should be fine to then schedule at [place] location prior.  I believe we have a provider in almost every day the week of August 30th so should be okay to schedule. Please let me know if anything else needs done on my end.  ----- Message -----  From: Vita Rider CMA  Sent: 7/19/2024   3:25 PM CDT  To:  Cardiology Nurse  Subject: FW: DCCV in 2 weeks                                ----- Message -----  From: Ada Figueroa  Sent: 7/19/2024   3:02 PM CDT  To: Tiffanie Gomez; Bekah Zuniga, RN; #  Subject: RE: DCCV in 2 weeks                              It's supposed to be right before the Maria Fernanda appointment like a day or so.   Thanks!  ----- Message -----  From: Bekah Zuniga, RN  Sent: 7/19/2024  11:51 AM CDT  To: Tiffanie Gomez; Ada Figueroa; #  Subject: RE: DCCV in 2 weeks                              From my understanding, it would be for the appointment with Maria Fernanda since it can only a be a virtual appointment so just needs to be done before appointment.  ----- Message -----  From: Tiffanie Gomez  Sent: 7/19/2024  11:48 AM CDT  To: [facility] Nurse; [facility]  Subject: RE: DCCV in 2 weeks                              Yes, we can contact  her to schedule EKG. Does this just need to be anytime between now and her 8/30 appointment with Maria Fernanda? Or is it meant to be done sooner?  ----- Message -----  From: Bekah Zuniga, BRIDGET  Sent: 7/19/2024  10:05 AM CDT  To: Ada Figueroa; Cardiology Ep Coordinator- ; #  Subject: RE: DCCV in 2 weeks                              Hi Rosalinda,    I think it would be okay for patient to have EKG completed at [place] but it would have to be on a day that we have a provider on site just in case. Thanks!     Bekah  ----- Message -----  From: Ada Figueroa  Sent: 7/18/2024   3:18 PM CDT  To: Ada Figueroa; [facility] Nurse  Subject: FW: DCCV in 2 weeks                              Can she come to you for the ECG prior to her virtual on visit with Maria Fernanda?  She doesn't want to come to [facility] and Desire doesn't have any MG appts.     Rosalinda  ----- Message -----  From: Ada Figueroa  Sent: 7/18/2024  12:00 AM CDT  To: Ada Figueroa  Subject: FW: DCCV in 2 weeks                              Dr Montano sent her to us for consideration of ablation and we are starting with cardioversion, so it's not quite time to send her back to Dr Montano. Looks like Maria Fernanda has openings 9/18 in the afternoon. Could do virtual, but would need an EKG done prior.     Letter and anes and followup  ----- Message -----  From: Ada Figueroa  Sent: 7/18/2024  12:00 AM CDT  To: Ada Figueroa  Subject: FW: DCCV in 2 weeks                              Letter and anes  ----- Message -----  From: Raquel Dalton, RN  Sent: 7/17/2024  11:52 AM CDT  To: Cardiology Ep Coordinator-   Subject: DCCV in 2 weeks                                  Follow-up with EP RAJI 6-8 wks post.  Instructed in clinic.    Pamela BURDICK

## 2024-07-23 NOTE — TELEPHONE ENCOUNTER
Health Call Center    Phone Message    May a detailed message be left on voicemail: yes     Reason for Call: Other: Patient's daughter Shiloh returning a missed call they received. Please call Shiloh back to further discuss.     Action Taken: Other: Cardiology    Travel Screening: Not Applicable     Thank you!  [facility]

## 2024-07-24 ENCOUNTER — TELEPHONE (OUTPATIENT)
Dept: CARDIOLOGY | Facility: CLINIC | Age: 79
End: 2024-07-24
Payer: COMMERCIAL

## 2024-07-24 DIAGNOSIS — I48.19 PERSISTENT ATRIAL FIBRILLATION (H): Primary | ICD-10-CM

## 2024-07-24 NOTE — LETTER
July 24, 2024      TO: Chelo OSCAR Cecilia  [facility]  [place] [address]         Dear Chelo,    You are scheduled for an Atrial Fibrillation Ablation, at [facility]. The hospital is located at [address] [place] [address] on the [place] bank of the [place].  If you need to cancel this procedure, please call [phone].     Pre-Admission Phone Call will occur 1-2 days prior to procedure date.    Visitor Policy: Two visitors.    Date: _October 17, 2024_____  Time: __9:30am____Arrive to Gold Waiting Room at [facility]  CT Angiogram Instructions:    1. No food and drink restrictions. The day before your exam, drink extra fluids-at least six 8-ounce glasses (unless you follow a fluid-restricted diet). Drink extra water the day after as well.  2. Avoid caffeine, smoking, or strenuous activity on the day of the test.  3. If you take any of the following medications, please HOLD the day of:   * Diuretic the day of. (Bumex)   * NSAIDS (Examples: ibuprofen/Advil/Motrin, naproxen/Aleve) the day of.  4. Please allow 2 hours of time for preparation and testing    Date:  October 17, 2024  AFTER CT SCAN_GO  to the Family & Surgery Waiting Lounge on the 3rd Floor at the [facility]  Atrial Fibrillation Ablation     1. Please review the attached instructions on showering before your procedure at the end of this letter.  2. Your history and physical will be completed by our advanced practice provider when you arrive.  3. Please do not eat anything for 8 hours prior to your procedure. You may have sips of water up until 2 hours prior to your arrival.  4. Medications to continue:  - Anticoagulant (Xarelto): if you take a dose in the morning, please take it before you arrive.  - Take all meds as prescribed, except for those noted below.  5. Medications to hold:    - 2 weeks prior: amiodarone   - Day prior: metoprolol   - Day of: Bumex   6. If the imaging shows a clot in your heart,  the procedure will be canceled.   7. You will receive general anesthesia for this procedure.   8. You will likely discharge the same day and need a .    Post-Procedure Instructions  Care of groin site:   Remove the Band-Aid after 24 hours. If there is minor oozing, apply another Band-aid and remove it after 12 hours.    Do NOT take a bath, use a hot tub, pool, or submerse in water for at least 3 days. You may shower.    It is normal to have a small bruise or lump at the site.   Do not scrub the site.   Do not use lotion or powder near the puncture site for 3 days.    If you start bleeding from the site in your groin: Lie down flat and press firmly on the site. Call your physician immediately, or, come to the emergency room.  Call 911 right away if you have bleeding that is heavy or does not stop.    Call your doctor/provider if:    You have a large or growing hard lump around the site.    The site is red, swollen, hot or tender.    You have chills or a fever greater than 101 F (38 C).    Blood or fluid is draining from the site.    Your leg or arm turns bluish, feels numb or cool.    You have hives, a rash or unusual itching.    Activity Restrictions   For the first 2 days: Do not stoop or squat. When you cough, sneeze or move your bowels, hold your hand over the puncture site and press gently.   Do not lift more than 10 pounds or exertional activity for 10 days.  - No driving for 24 hours after (with or without general anesthesia).       Date: _March 13, 2024   at 11:25am (we will attempt to move this up)__:   Follow up appointment in [place] with Desire Mensah PA-C    Please do not hesitate to utilize [employer] or call us if you have any questions or concerns.    Raquel Dalton, BRIDGET   Electrophysiology Nurse Coordinator  [phone]    [facility] Procedure     [phone]              Showering Before Surgery   Your surgeon has asked you to take 2 showers before surgery.  Why is this  important?  It is normal for bacteria (germs) to be on your skin. The skin protects us from these germs. When you have surgery, we cut the skin. Sometimes germs get into the cuts and cause infection (illness caused by germs). By following the instructions below and using special soap, you will lower the number of germs on your skin. This decreases your chance of infection.  Special soap  Buy or get 8 ounces of antiseptic surgical soap called 4% CHG. Common name brands of this soap are Hibiclens and Exidine.   You can find it at your local pharmacy, clinic or retail store. If you have trouble, ask your pharmacist to help you find the right substitute.   A note about shaving:  Do not shave within 12 inches of your incision (surgical cut) area for at least 3 days before surgery. Shaving can make small cuts in the skin. This puts you at a higher risk of infection.  Items you will need for each shower:   1 newly washed towel   4 ounces of one of the above soaps  Follow these instructions:  The evening before surgery   1. Wash your hair and body with your regular shampoo and soap. Make sure you rinse the shampoo and soap from your hair and body.   2. Using clean hands, apply about 2 ounces of soap gently on your skin from the neck to your toes. Use on your groin area last. Do not use this soap on your face or head. If you get any soap in your eyes, ears or mouth, rinse right away.   3. Repeat step 2. It is very important to let the soap stay on your skin for at least 1 minute.   4. Rinse well and dry off using a clean towel.If you feel any tingling, itching or other irritation, rinse right away. It is normal to feel some coolness on the skin after using the antiseptic soap. Your skin may feel a bit dry after the shower, but do not use any lotions, creams or moisturizers. Do not use hair spray or other products in your hair.  5. Dress in freshly washed clothes or pajamas. Use fresh pillowcases and sheets on your  bed.      The morning of surgery  1. Wash your hair and body with your regular shampoo and soap. Make sure you rinse the shampoo and soap from your hair and body.   2. Using clean hands, apply about 2 ounces of soap gently on your skin from the neck to your toes. Use on your groin area last. Do not use this soap on your face or head. If you get any soap in your eyes, ears or mouth, rinse right away.   3. Repeat step 2. It is very important to let the soap stay on your skin for at least 1 minute.   4. Rinse well and dry off using a clean towel.If you feel any tingling, itching or other irritation, rinse right away. It is normal to feel some coolness on the skin after using the antiseptic soap. Your skin may feel a bit dry after the shower, but do not use any lotions, creams or moisturizers. Do not use hair spray or other products in your hair.  5. Dress in clean clothes.  If you have any questions about showering or an allergy to CHG soap, please call the Preadmissions Nursing Department at the hospital where you are having your surgery.  [facility] ([place]): [phone]  This phone number will be answered between the hours of 8:00 a.m. and 6:30 p.m. Monday through Friday.

## 2024-07-24 NOTE — TELEPHONE ENCOUNTER
Date: 7/24/2024    Time of Call: 4:23 PM     Diagnosis:  paroxysmal atrial fibrillation      [ VORB ] Ordering provider: Chay Albarado MD  Order:   - Proceed as scheduled with DCCV on 8/5/24 for symptom relief  - PVI with CTA prior      Order received by: Raquel Dalton RN      Follow-up/additional notes:   Called and spoke to patient. Agreeable to the above.   Orders placed, letter started. Routing to EP coordinator.

## 2024-07-24 NOTE — TELEPHONE ENCOUNTER
"Patient called Ep  initially to reschedule her cardioversion to a date that the daughter is available to bring her.     She stated that she had talked to friends and they said she should \"go right for the ablation and not waste time on a cardioversion\". She would like to speak to someone on the team to discuss this option. She states that she got overwhelmed and confused when she spoke with Dr. Albarado. She's asking for someone to call her back to discuss her options.     Rosalinda Figueroa  Periop Electrophysiology   [phone]     "

## 2024-08-05 ENCOUNTER — HOSPITAL ENCOUNTER (OUTPATIENT)
Facility: CLINIC | Age: 79
Discharge: HOME | End: 2024-08-05
Attending: INTERNAL MEDICINE | Admitting: INTERNAL MEDICINE
Payer: COMMERCIAL

## 2024-08-05 ENCOUNTER — APPOINTMENT (OUTPATIENT)
Dept: MEDSURG UNIT | Facility: CLINIC | Age: 79
End: 2024-08-05
Attending: INTERNAL MEDICINE
Payer: COMMERCIAL

## 2024-08-05 ENCOUNTER — HOSPITAL ENCOUNTER (OUTPATIENT)
Dept: CARDIOLOGY | Facility: CLINIC | Age: 79
Discharge: HOME | End: 2024-08-05
Attending: INTERNAL MEDICINE
Payer: COMMERCIAL

## 2024-08-05 ENCOUNTER — ANESTHESIA EVENT (OUTPATIENT)
Dept: SURGERY | Facility: CLINIC | Age: 79
End: 2024-08-05
Payer: COMMERCIAL

## 2024-08-05 ENCOUNTER — ANESTHESIA (OUTPATIENT)
Dept: SURGERY | Facility: CLINIC | Age: 79
End: 2024-08-05
Payer: COMMERCIAL

## 2024-08-05 ENCOUNTER — APPOINTMENT (OUTPATIENT)
Dept: LAB | Facility: CLINIC | Age: 79
End: 2024-08-05
Attending: INTERNAL MEDICINE
Payer: COMMERCIAL

## 2024-08-05 VITALS
RESPIRATION RATE: 21 BRPM | OXYGEN SATURATION: 99 % | DIASTOLIC BLOOD PRESSURE: 76 MMHG | SYSTOLIC BLOOD PRESSURE: 127 MMHG | HEART RATE: 63 BPM

## 2024-08-05 DIAGNOSIS — I48.19 PERSISTENT ATRIAL FIBRILLATION (H): ICD-10-CM

## 2024-08-05 LAB
HOLD SPECIMEN: NORMAL
MAGNESIUM SERPL-MCNC: 1.7 MG/DL (ref 1.7–2.3)
POTASSIUM SERPL-SCNC: 4.6 MMOL/L (ref 3.4–5.3)

## 2024-08-05 PROCEDURE — 99100 ANES PT EXTEME AGE<1 YR&>70: CPT | Performed by: NURSE ANESTHETIST, CERTIFIED REGISTERED

## 2024-08-05 PROCEDURE — 36415 COLL VENOUS BLD VENIPUNCTURE: CPT | Performed by: INTERNAL MEDICINE

## 2024-08-05 PROCEDURE — 92960 CARDIOVERSION ELECTRIC EXT: CPT

## 2024-08-05 PROCEDURE — 258N000003 HC RX IP 258 OP 636: Performed by: NURSE ANESTHETIST, CERTIFIED REGISTERED

## 2024-08-05 PROCEDURE — 250N000009 HC RX 250: Performed by: NURSE ANESTHETIST, CERTIFIED REGISTERED

## 2024-08-05 PROCEDURE — 83735 ASSAY OF MAGNESIUM: CPT | Performed by: INTERNAL MEDICINE

## 2024-08-05 PROCEDURE — 92960 CARDIOVERSION ELECTRIC EXT: CPT | Performed by: NURSE ANESTHETIST, CERTIFIED REGISTERED

## 2024-08-05 PROCEDURE — 370N000017 HC ANESTHESIA TECHNICAL FEE, PER MIN

## 2024-08-05 PROCEDURE — 92960 CARDIOVERSION ELECTRIC EXT: CPT | Performed by: ANESTHESIOLOGY

## 2024-08-05 PROCEDURE — 99100 ANES PT EXTEME AGE<1 YR&>70: CPT | Performed by: ANESTHESIOLOGY

## 2024-08-05 PROCEDURE — 92960 CARDIOVERSION ELECTRIC EXT: CPT | Performed by: NURSE PRACTITIONER

## 2024-08-05 PROCEDURE — 84132 ASSAY OF SERUM POTASSIUM: CPT | Performed by: INTERNAL MEDICINE

## 2024-08-05 RX ORDER — SODIUM CHLORIDE 9 MG/ML
INJECTION, SOLUTION INTRAVENOUS CONTINUOUS PRN
Status: DISCONTINUED | OUTPATIENT
Start: 2024-08-05 | End: 2024-08-05

## 2024-08-05 RX ORDER — METHOHEXITAL IN WATER/PF 100MG/10ML
SYRINGE (ML) INTRAVENOUS PRN
Status: DISCONTINUED | OUTPATIENT
Start: 2024-08-05 | End: 2024-08-05

## 2024-08-05 RX ADMIN — Medication 50 MG: at 13:44

## 2024-08-05 RX ADMIN — Medication 30 MG: at 13:46

## 2024-08-05 RX ADMIN — SODIUM CHLORIDE: 0.9 INJECTION, SOLUTION INTRAVENOUS at 13:44

## 2024-08-05 ASSESSMENT — ACTIVITIES OF DAILY LIVING (ADL)
ADLS_ACUITY_SCORE: 35

## 2024-08-05 NOTE — ANESTHESIA POSTPROCEDURE EVALUATION
Patient: Chelo Brooks    Procedure: Procedure(s):  Anesthesia cardioversion@1330       Anesthesia Type:  General    Note:  Disposition: Inpatient   Postop Pain Control: Uneventful            Sign Out: Well controlled pain   PONV: No   Neuro/Psych: Uneventful            Sign Out: Acceptable/Baseline neuro status   Airway/Respiratory: Uneventful            Sign Out: Acceptable/Baseline resp. status   CV/Hemodynamics: Uneventful            Sign Out: Acceptable CV status; No obvious hypovolemia; No obvious fluid overload   Other NRE:    DID A NON-ROUTINE EVENT OCCUR?            Last vitals:  There were no vitals filed for this visit.    Electronically Signed By: Laurie Kaur MD  August 5, 2024  12:16 PM

## 2024-08-05 NOTE — ANESTHESIA PREPROCEDURE EVALUATION
Anesthesia Pre-Procedure Evaluation    Patient: Chelo Brooks   MRN: [id number] : 1945        Procedure : Procedure(s):  Anesthesia cardioversion@1330          Past Medical History:   Diagnosis Date    Acquired renal cyst of left kidney     Adrenal nodule (H24)     left - needs yearly CT    Ascending aorta dilatation (H24)     Chronic atrial fibrillation (H) 11/15/2023    CKD (chronic kidney disease) stage 3, GFR 30-59 ml/min (H)     Degenerative joint disease (DJD) of hip     DJD (degenerative joint disease) 10/21/2005    Eczema, unspecified type 2018    Elevated parathyroid hormone 2019    Gallstones     Hepatitis B childhood     resolved, patient is not a carrier     Hyperlipidemia 2012    Hypertension goal BP (blood pressure) < 140/90     Lumbar spinal stenosis 2017    Mild persistent asthma without complication 2017    Morbid obesity due to excess calories (H) 2015    Surgical referral declined '16     FLOR (obstructive sleep apnea)     Osteopenia     Pulmonary emboli (H) 10/28/2019    Shingles 2014    scalp    Stasis dermatitis of both legs 2018    Thyroid nodule     Umbilical hernia     Vitamin D deficiency       Past Surgical History:   Procedure Laterality Date    ANESTHESIA CARDIOVERSION N/A 2023    Procedure: Anesthesia cardioversion @1330;  Surgeon: GENERIC ANESTHESIA PROVIDER;  Location: [facility] OR    IR PAROTID BIOPSY  2020    [facility] TOTAL KNEE ARTHROPLASTY  3/2000    right    ZC TOTAL KNEE ARTHROPLASTY  12    left    ZZC VAGINAL HYSTERECTOMY  1977    benign, prolapse, ovaries remain       Allergies   Allergen Reactions    Adhesive Tape       Social History     Tobacco Use    Smoking status: Never    Smokeless tobacco: Never   Substance Use Topics    Alcohol use: Yes     Alcohol/week: 0.0 standard drinks of alcohol     Comment: very rare        Wt Readings from Last 1 Encounters:   24 (!) 153.6 kg (338 lb 11.2 oz)           Physical  "Exam    Airway  airway exam normal           Respiratory Devices and Support     Nasal Canula      Dental       (+) Minor Abnormalities - some fillings, tiny chips      Cardiovascular          Rhythm and rate: irregular and tachycardia     Pulmonary   pulmonary exam normal                OUTSIDE LABS:  CBC:   Lab Results   Component Value Date    WBC 4.3 11/15/2023    WBC 4.0 01/04/2022    HGB 12.7 11/15/2023    HGB 11.9 03/27/2023    HCT 39.4 11/15/2023    HCT 37.5 01/04/2022     11/15/2023     01/04/2022     BMP:   Lab Results   Component Value Date     07/10/2024     06/26/2024    POTASSIUM 4.5 07/10/2024    POTASSIUM 4.2 06/26/2024    CHLORIDE 105 07/10/2024    CHLORIDE 106 06/26/2024    CO2 30 (H) 07/10/2024    CO2 25 06/26/2024    BUN 28.2 (H) 07/10/2024    BUN 28.7 (H) 06/26/2024    CR 1.46 (H) 07/10/2024    CR 1.39 (H) 06/26/2024    GLC 93 07/10/2024    GLC 98 06/26/2024     COAGS:   Lab Results   Component Value Date    INR 2.50 (H) 09/03/2020     POC: No results found for: \"BGM\", \"HCG\", \"HCGS\"  HEPATIC:   Lab Results   Component Value Date    ALBUMIN 3.7 03/27/2023    PROTTOTAL 6.7 (L) 01/04/2022    ALT 12 06/26/2024    AST 20 06/26/2024    ALKPHOS 75 01/04/2022    BILITOTAL 0.7 01/04/2022     OTHER:   Lab Results   Component Value Date    HECTOR 9.0 07/10/2024    PHOS 3.7 03/27/2023    MAG 1.4 (L) 12/28/2023    TSH 0.56 07/10/2024    SED 18 03/06/2020       Anesthesia Plan    ASA Status:  3       Anesthesia Type: General.     - Airway: Native airway              Consents            Postoperative Care            Comments:               Laurie Kaur MD    I have reviewed the pertinent notes and labs in the chart from the past 30 days and (re)examined the patient.  Any updates or changes from those notes are reflected in this note.            # Drug Induced Coagulation Defect: home medication list includes an anticoagulant medication       "

## 2024-08-05 NOTE — ANESTHESIA CARE TRANSFER NOTE
Patient: Chelo Brooks    Procedure: Procedure(s):  Anesthesia cardioversion@1330       Diagnosis: Persistent atrial fibrillation (H) [I48.19]  Diagnosis Additional Information: No value filed.    Anesthesia Type:   General     Note:    Oropharynx: oropharynx clear of all foreign objects and spontaneously breathing  Level of Consciousness: drowsy  Oxygen Supplementation: nasal cannula  Level of Supplemental Oxygen (L/min / FiO2): 3  Independent Airway: airway patency satisfactory and stable  Dentition: dentition unchanged  Vital Signs Stable: post-procedure vital signs reviewed and stable  Report to RN Given: handoff report given  Patient transferred to: PACU (EKG/echo)    Handoff Report: Identifed the Patient, Identified the Reponsible Provider, Reviewed the pertinent medical history, Discussed the surgical course, Reviewed Intra-OP anesthesia mangement and issues during anesthesia, Set expectations for post-procedure period and Allowed opportunity for questions and acknowledgement of understanding      Vitals:  Vitals Value Taken Time   BP     Temp     Pulse     Resp     SpO2         Electronically Signed By: MARIE Elizalde CRNA  August 5, 2024  1:52 PM

## 2024-08-05 NOTE — DISCHARGE INSTRUCTIONS
GOING HOME AFTER YOUR CARDIOVERSION    FOR NEXT 24 HOURS:  An adult should stay with you.  Relax and take it easy.  DO NOT make any important legal decisions.  DO NOT drive or operate machines at home or at work.  DO NOT consume alcohol.   Resume your regular diet and drink plenty of fluids.  If you have any redness/skin sorness where the patches were placed, you may use aloe vera gel or 1% hydrocortisone cream to the skin (sold at drug stores)  Take Tylenol (Acetaminophen) per your provider's recommendations as needed to help relieve local pain.     CALL YOUR HEALTHCARE PROVIDER IF:  You develop nausea or vomiting  You develop hives or a rash or any unexplained itching  Have irregular heartbeat or fast pulse  Feel faint, dizzy, or lightheaded  Have chest pain with increased activity  Have bleeding issues from blood-thinning medicines    CALL 911 RIGHT AWAY IF YOU HAVE:  Pain in your chest, arm, shoulder, neck, or upper back  Shortness of breath  Loss of vision, speech, or strength or coordination in any body part  Weakness in your arm or leg  Uncontrolled bleeding  Feel unstable in any way     FOLLOW UP APPOINTMENT:    Follow up as scheduled with EP/Cardiology Provider in 4 weeks    ADDITIONAL INFORMATION:  Cardiovascular Clinic:   [address] [place] [address]. [place] [address]  Your Care Team:  EP Cardiology   Telephone Number     Raquel Torres RN [phone]    After business hours: [phone], select option 4, ask for EP Fellow on call to be paged.     For scheduling appts or procedures:    Rosalinda Figueroa   [phone]   For the Device Clinic (Pacemakers and ICD's)   RN's :   Estella Real  During business hours: [phone]     After business hours:   [phone]- select option 4 and ask for job code [id number].       As always, Thank you for trusting us with your health care needs!

## 2024-08-05 NOTE — SEDATION DOCUMENTATION
Pt arrived in ECHO department for scheduled DCCV.   Procedure explained, questions answered and consent signed. Discharge instructions discussed with patient.  Anesthesia gave pt 80 mg IV brevital for sedation and pt was DCCV three times, each at 200 Joules. She did not convert and remained in a fib.  Pt denied pain after procedure and was D/C home after awake and VSS. Escorted out to front lobby by staff in w/c to meet pt's ride home.  Ting Troncoso RN

## 2024-08-06 LAB
ATRIAL RATE - MUSE: NORMAL BPM
DIASTOLIC BLOOD PRESSURE - MUSE: NORMAL MMHG
INTERPRETATION ECG - MUSE: NORMAL
P AXIS - MUSE: NORMAL DEGREES
PR INTERVAL - MUSE: NORMAL MS
QRS DURATION - MUSE: 84 MS
QT - MUSE: 396 MS
QTC - MUSE: 439 MS
R AXIS - MUSE: -21 DEGREES
SYSTOLIC BLOOD PRESSURE - MUSE: NORMAL MMHG
T AXIS - MUSE: -12 DEGREES
VENTRICULAR RATE- MUSE: 74 BPM

## 2024-08-06 PROCEDURE — 92960 CARDIOVERSION ELECTRIC EXT: CPT | Performed by: NURSE PRACTITIONER

## 2024-08-06 NOTE — PROCEDURES
[facility]    Procedure: Cardioversion    Date/Time: 8/6/2024 6:36 AM    Performed by: Maria Fernanda Barger APRN CNP  Authorized by: Chay Albarado MD      UNIVERSAL PROTOCOL   Site Marked: NA  Prior Images Obtained and Reviewed:  NA  Required items: Required blood products, implants, devices and special equipment available    Patient identity confirmed:  Verbally with patient and arm band  Patient was reevaluated immediately before administering moderate or deep sedation or anesthesia  Confirmation Checklist:  Patient's identity using two indicators, relevant allergies, procedure was appropriate and matched the consent or emergent situation and correct equipment/implants were available  Universal Protocol: the Joint Commission Universal Protocol was followed       ANESTHESIA    Anesthesia was administered and monitored by anesthesiology.  See anesthesia documentation for details.    PROCEDURE DETAILS  Pre-procedure rhythm: atrial fibrillation  Patient position: patient was placed in a supine position  Chest area: chest area exposed  Electrodes: pads  Number of attempts: 3    Details of Attempts:  Patient reported taking uniterrupted anticoagulation for at least 1 month. 200J biphasic synchronized shock delivered and patient remained in AF. 200J biphasic synchronized shock delivered and patient remained in AF. 200J biphasic synchronized shock delivered and patient remained in AF.         Post-procedure rhythm: atrial fibrillation  Complications: no complications      PROCEDURE    Patient Tolerance:  Patient tolerated the procedure well with no immediate complications      MARIE Leiva CNP  Electrophysiology Consult Service  Securely message with [employer]   Text page via [employer]

## 2024-08-20 NOTE — TELEPHONE ENCOUNTER
Ep  called the patient to book her ablation. Patient is restricted by transportation issues and needs to have her CTA the same day as the ablation which is booked for October 17.     She wants to be seen in  for her follow up and the 1st spot open was March 13. That is more than the recommended 3 months after the ablation. The  encouraged her to see if she could find a ride in Jan or early Feb to the [facility] [place] her follow up.     She is to call back with the response.     Rosalinda Figueroa  Periop Electrophysiology   [phone]

## 2024-08-21 DIAGNOSIS — I48.19 PERSISTENT ATRIAL FIBRILLATION (H): Primary | ICD-10-CM

## 2024-08-21 RX ORDER — SODIUM CHLORIDE 9 MG/ML
INJECTION, SOLUTION INTRAVENOUS CONTINUOUS
OUTPATIENT
Start: 2024-08-21

## 2024-08-21 RX ORDER — LIDOCAINE 40 MG/G
CREAM TOPICAL
OUTPATIENT
Start: 2024-08-21

## 2024-08-27 ENCOUNTER — ALLIED HEALTH/NURSE VISIT (OUTPATIENT)
Dept: CARDIOLOGY | Facility: CLINIC | Age: 79
End: 2024-08-27
Payer: COMMERCIAL

## 2024-08-27 DIAGNOSIS — I48.19 PERSISTENT ATRIAL FIBRILLATION (H): Primary | ICD-10-CM

## 2024-08-27 PROCEDURE — 93000 ELECTROCARDIOGRAM COMPLETE: CPT

## 2024-08-27 NOTE — NURSING NOTE
EKG showing afib controlled rate.  Scheduled for virtual visit with Desire Mensah on Monday.  Also scheduled for ablation in Oct with Dr. Albarado.    Pt denies current lightheadedness, dizziness, chest pain, worsening shortness of breath.    Writer explained that EKG results will be finalized at time of virtual visit with Zeinab Mensah PA-C.  Patient verbalized understanding.      Katty Solis RN  Cardiology Care Coordinator  [facility]  [phone] option 1

## 2024-08-28 LAB
ATRIAL RATE - MUSE: 82 BPM
DIASTOLIC BLOOD PRESSURE - MUSE: NORMAL MMHG
INTERPRETATION ECG - MUSE: NORMAL
P AXIS - MUSE: NORMAL DEGREES
PR INTERVAL - MUSE: NORMAL MS
QRS DURATION - MUSE: 94 MS
QT - MUSE: 422 MS
QTC - MUSE: 445 MS
R AXIS - MUSE: -22 DEGREES
SYSTOLIC BLOOD PRESSURE - MUSE: NORMAL MMHG
T AXIS - MUSE: 7 DEGREES
VENTRICULAR RATE- MUSE: 67 BPM

## 2024-08-29 ENCOUNTER — TELEPHONE (OUTPATIENT)
Dept: CARDIOLOGY | Facility: CLINIC | Age: 79
End: 2024-08-29

## 2024-08-29 NOTE — PROGRESS NOTES
Telephone Visit Details    Type of service: Telephone Visit   Phone call duration:30 minutes   Originating Location (pt. Location): Home  Distant Location (provider location):  On-site          ELECTROPHYSIOLOGY TELEPHONE VISIT    Assessment/Recommendations   Assessment/Plan:    Chelo Brooks is a 79 year old female with past medical history significant for persistent atrial fibrillation, CKD, essential HTN, FLOR and morbid obesity.     Persistent Atrial Fibrillation   1. Stroke Prophylaxis: CHADSVASC 4 ++age, +F, +HTN 4, corresponding to a 4.0% annual stroke / systemic embolism event rate. Continnue Xarelto 20 mg daily, no significant bleeding issues.   2. Rate Control: Continue metoprolol.   3. Rhythm Control: Found to be in AF at PCP visit Dec 2023. Echo with preserved LVEF. Given unknown chronicity and worsening UMANA, she was started on amio with successful DCCV done 12/28/23. Dyspnea on exertion did improve in sinus. Back in Afib at follow up in January, referred to EP for ablation evaluation. Saw EP 7/17/24, discussed repeat cardioversion on amio, if improvement in symptoms in sinus, pursuing ablation. She had DCCV x 3 unsuccessful attempts on 8/5/24. She reports ongoing dyspnea on exertion and has since been arranged for ablation.   The procedural risk of EP study and ablation were discussed in detail. Risks discussed include: vascular complications, CVA, AVB, pericardial effusion and tamponade, esophageal fistula, PV stenosis. AF ablation has 70% success at 1 year, 50% success at 5 years, and 30% need another ablation.  Even if ablation is successful anticoagulation may be needed lifelong. Will have her continue amiodarone surrounding ablation given persistence and unknown chronicity prior to initial presentation.  4. Risk Factor Management: Statin, BP control, and FLOR evaluation as indicated.      Follow up 3 months after ablation.      History of Present Illness/Subjective    MsSamira Brooks is a 79  year old female who comes in today for EP follow-up of AF.    Patient has been seen by Dr Montano 12/6/23, she reported worsening shortness of breath over the past year, as well as LE swelling. She was seen by her PCP surrounding this time she was found to be in Afib, this was a new diagnosis. She was started on Xarelto and referred to cardiology. She had prior CMR in 2017 with normal structure and function. Echo done 11/28/2023 with normal biventricular function, normal LA size, no significant valvular disease. She was initially seen by Dr Montano, unknown chronicity given worsening of dyspnea x 1 year, she was started on amio with plans for cardioversion. She had successful DCCV done 12/28/23. Dyspnea improved after DCCV, she still had LE swelling. When seen in follow up 1/10/24 she was back in AF, she was started on metoprolol, continued on amio. Referred to EP for ablation evaluation.   She was seen by Dr Albarado 7/17/24, discussed repeat cardioversion on amio, if unsuccessful, pursuing cardioversion. She had DCCV x 3 unsuccessful attempts on 8/5/24.     She presents today for follow up. She notes dyspnea on exertion and fatigue with AF, she feels dyspnea has been a bit worse the past couple of months. She has not been taking her HR at home. Notes dyspnea just walking down the [place] to have coffee in the morning. She did feel that this improved after the initial cardioversion for a couple weeks. She does note not any palpitations, heart racing, lightheadedness or syncope. She does note some LE swelling, it appears in the past this did not improve in sinus rhythm.     I have reviewed and updated the patient's Past Medical History, Social History, Family History and Medication List.     Cardiographics (Personally Reviewed) :   Echo: 11/28/2023   Left ventricular size, wall motion and function are normal. The ejection  fraction is 55-60%.  Global right ventricular function is normal.  Normal LA, NATHALY 28  IVC diameter <2.1 cm  "collapsing >50% with sniff suggests a normal RA pressure  of 3 mmHg.    Echo: 2/03/2022   Global and regional left ventricular function is normal with an EF of 55-60%.  The right ventricle is normal size.Global right ventricular function is  normal.  Proximal ascending aorta is mildly dilated measuring 4cm  IVC diameter <2.1 cm collapsing >50% with sniff suggests a normal RA pressure  of 3 mmHg.    CMR: 3/22/2017  IMPRESSION:  1.  Regadenoson stress perfusion: Normal perfusion at rest and  post-stress without evidence of inducible ischemia.  2.  Normal left ventricular size and systolic function with a  calculated ejection fraction of  62 %.  3.  Normal right ventricular size and systolic function with a  calculated ejection fraction of 60%.   4.  On delayed enhancement imaging, there is no abnormal  hyperenhancement to suggest myocardial scar/inflammation/infiltration.       Physical Examination   Ht 1.715 m (5' 7.5\")   Wt (!) 152 kg (335 lb)   BMI 51.69 kg/m    Wt Readings from Last 3 Encounters:   09/03/24 (!) 152 kg (335 lb)   07/17/24 (!) 153.6 kg (338 lb 11.2 oz)   07/01/24 (!) 156.9 kg (346 lb)        Video conducted via telephone visit.          Medications  Allergies   Amiodarone 200 mg daily   Lipitor 40 mg daily   Bumex 1 mg prn   Metoprolol succinate 50 mg daily   Olmesartan   Xarelto 20 mg daily     Albuterol   Vitamins   Gabapentin   Claritin    Allergies   Allergen Reactions    Adhesive Tape          Lab Results (Personally Reviewed)    Chemistry/lipid CBC Cardiac Enzymes/BNP/TSH/INR   Lab Results   Component Value Date    BUN 28.2 (H) 07/10/2024     07/10/2024    CO2 30 (H) 07/10/2024     Creatinine   Date Value Ref Range Status   07/10/2024 1.46 (H) 0.51 - 0.95 mg/dL Final   08/06/2020 1.32 (H) 0.52 - 1.04 mg/dL Final       Lab Results   Component Value Date    CHOL 131 06/26/2024    HDL 51 06/26/2024    LDL 64 06/26/2024      Lab Results   Component Value Date    WBC 4.3 11/15/2023    HGB " 12.7 11/15/2023    HCT 39.4 11/15/2023    MCV 90 11/15/2023     11/15/2023    Lab Results   Component Value Date    TSH 0.56 07/10/2024    INR 2.50 (H) 09/03/2020        I have reviewed the note as documented above.  This accurately captures the substance of my conversation with the patient.  The patient states understanding and is agreeable with the plan.     Telephone Visit Duration: 30 minutes       Desire Mensah PA-C  [facility]  Electrophysiology Consult Service  Pager: [phone]

## 2024-09-03 ENCOUNTER — VIRTUAL VISIT (OUTPATIENT)
Dept: CARDIOLOGY | Facility: CLINIC | Age: 79
End: 2024-09-03
Payer: COMMERCIAL

## 2024-09-03 VITALS — HEIGHT: 68 IN | WEIGHT: 293 LBS | BODY MASS INDEX: 44.41 KG/M2

## 2024-09-03 DIAGNOSIS — I48.19 PERSISTENT ATRIAL FIBRILLATION (H): Primary | ICD-10-CM

## 2024-09-03 PROCEDURE — 99443 PR PHYSICIAN TELEPHONE EVALUATION 21-30 MIN: CPT | Mod: 93

## 2024-09-03 RX ORDER — BUMETANIDE 1 MG/1
TABLET ORAL
Qty: 90 TABLET | Refills: 3 | Status: CANCELLED | OUTPATIENT
Start: 2024-09-03

## 2024-09-03 ASSESSMENT — PAIN SCALES - GENERAL: PAINLEVEL: NO PAIN (0)

## 2024-09-03 NOTE — NURSING NOTE
Current patient location: [address] [place] [address]   [facility] [address]    Is the patient currently in the state of [place]? YES    Visit mode:TELEPHONE    If the visit is dropped, the patient can be reconnected by: TELEPHONE VISIT: Phone number: [phone]    Will anyone else be joining the visit? NO  (If patient encounters technical issues they should call [phone]    How would you like to obtain your AVS? Mail a copy    Are changes needed to the allergy or medication list? No    Patient denies any changes and states that all information remains accurate since last reviewed/verified.     Are refills needed on medications prescribed by this physician? YES, Bumex    Rooming Documentation:  Questionnaire(s) not pre-assigned    No other vitals to report today      Reason for visit: DANIEL Cruz MA VVF

## 2024-09-03 NOTE — PATIENT INSTRUCTIONS
You were seen in the Electrophysiology Clinic today by: Desire ORDOÑEZ    Plan:     Follow up Visit:  Proceed with Ablation      If you have further questions, please utilize ETHERA to contact us.     Your Care Team:    EP Cardiology   Telephone Number     Nurse Line  Melissa Wu, RN   Raquel Dalton, RN  [place], [place]   [phone]     For scheduling appointments:   Carlito   [phone]   For procedure scheduling:    Rosalinda Figueroa     [phone]   For the Device Clinic (Pacemakers, ICDs, Loop Recorders)    During business hours: [phone]  After business hours:   [phone]- select option 4 and ask for job code [id number].       On-call cardiologist for after hours or on weekends:   [phone], option #4, and ask to speak to the on-call cardiologist.     Cardiovascular Clinic:   [address] [place] [address]. [place] [address]      As always, Thank you for trusting us with your health care needs!

## 2024-09-03 NOTE — LETTER
9/3/2024      RE: Chelo Brooks  [facility] [address]  [facility] [address]       Dear Colleague,    Thank you for the opportunity to participate in the care of your patient, Chelo Brooks, at the [facility] [place] at [facility]. Please see a copy of my visit note below.    Telephone Visit Details    Type of service: Telephone Visit   Phone call duration:30 minutes   Originating Location (pt. Location): Home  Distant Location (provider location):  On-site          ELECTROPHYSIOLOGY TELEPHONE VISIT    Assessment/Recommendations   Assessment/Plan:    Chelo Brooks is a 79 year old female with past medical history significant for persistent atrial fibrillation, CKD, essential HTN, FLOR and morbid obesity.     Persistent Atrial Fibrillation   1. Stroke Prophylaxis: CHADSVASC 4 ++age, +F, +HTN 4, corresponding to a 4.0% annual stroke / systemic embolism event rate. Continnue Xarelto 20 mg daily, no significant bleeding issues.   2. Rate Control: Continue metoprolol.   3. Rhythm Control: Found to be in AF at PCP visit Dec 2023. Echo with preserved LVEF. Given unknown chronicity and worsening UMANA, she was started on amio with successful DCCV done 12/28/23. Dyspnea on exertion did improve in sinus. Back in Afib at follow up in January, referred to EP for ablation evaluation. Saw EP 7/17/24, discussed repeat cardioversion on amio, if improvement in symptoms in sinus, pursuing ablation. She had DCCV x 3 unsuccessful attempts on 8/5/24. She reports ongoing dyspnea on exertion and has since been arranged for ablation.   The procedural risk of EP study and ablation were discussed in detail. Risks discussed include: vascular complications, CVA, AVB, pericardial effusion and tamponade, esophageal fistula, PV stenosis. AF ablation has 70% success at 1 year, 50% success at 5 years, and 30% need another ablation.  Even if ablation is successful  anticoagulation may be needed lifelong. Will have her continue amiodarone surrounding ablation given persistence and unknown chronicity prior to initial presentation.  4. Risk Factor Management: Statin, BP control, and FLOR evaluation as indicated.      Follow up 3 months after ablation.      History of Present Illness/Subjective    Ms. Chelo Brooks is a 79 year old female who comes in today for EP follow-up of AF.    Patient has been seen by Dr Montano 12/6/23, she reported worsening shortness of breath over the past year, as well as LE swelling. She was seen by her PCP surrounding this time she was found to be in Afib, this was a new diagnosis. She was started on Xarelto and referred to cardiology. She had prior CMR in 2017 with normal structure and function. Echo done 11/28/2023 with normal biventricular function, normal LA size, no significant valvular disease. She was initially seen by Dr Montano, unknown chronicity given worsening of dyspnea x 1 year, she was started on amio with plans for cardioversion. She had successful DCCV done 12/28/23. Dyspnea improved after DCCV, she still had LE swelling. When seen in follow up 1/10/24 she was back in AF, she was started on metoprolol, continued on amio. Referred to EP for ablation evaluation.   She was seen by Dr Albarado 7/17/24, discussed repeat cardioversion on amio, if unsuccessful, pursuing cardioversion. She had DCCV x 3 unsuccessful attempts on 8/5/24.     She presents today for follow up. She notes dyspnea on exertion and fatigue with AF, she feels dyspnea has been a bit worse the past couple of months. She has not been taking her HR at home. Notes dyspnea just walking down the [place] to have coffee in the morning. She did feel that this improved after the initial cardioversion for a couple weeks. She does note not any palpitations, heart racing, lightheadedness or syncope. She does note some LE swelling, it appears in the past this did not improve in sinus rhythm.  "    I have reviewed and updated the patient's Past Medical History, Social History, Family History and Medication List.     Cardiographics (Personally Reviewed) :   Echo: 11/28/2023   Left ventricular size, wall motion and function are normal. The ejection  fraction is 55-60%.  Global right ventricular function is normal.  Normal LA, NATHALY 28  IVC diameter <2.1 cm collapsing >50% with sniff suggests a normal RA pressure  of 3 mmHg.    Echo: 2/03/2022   Global and regional left ventricular function is normal with an EF of 55-60%.  The right ventricle is normal size.Global right ventricular function is  normal.  Proximal ascending aorta is mildly dilated measuring 4cm  IVC diameter <2.1 cm collapsing >50% with sniff suggests a normal RA pressure  of 3 mmHg.    CMR: 3/22/2017  IMPRESSION:  1.  Regadenoson stress perfusion: Normal perfusion at rest and  post-stress without evidence of inducible ischemia.  2.  Normal left ventricular size and systolic function with a  calculated ejection fraction of  62 %.  3.  Normal right ventricular size and systolic function with a  calculated ejection fraction of 60%.   4.  On delayed enhancement imaging, there is no abnormal  hyperenhancement to suggest myocardial scar/inflammation/infiltration.       Physical Examination   Ht 1.715 m (5' 7.5\")   Wt (!) 152 kg (335 lb)   BMI 51.69 kg/m    Wt Readings from Last 3 Encounters:   09/03/24 (!) 152 kg (335 lb)   07/17/24 (!) 153.6 kg (338 lb 11.2 oz)   07/01/24 (!) 156.9 kg (346 lb)        Video conducted via telephone visit.          Medications  Allergies   Amiodarone 200 mg daily   Lipitor 40 mg daily   Bumex 1 mg prn   Metoprolol succinate 50 mg daily   Olmesartan   Xarelto 20 mg daily     Albuterol   Vitamins   Gabapentin   Claritin    Allergies   Allergen Reactions     Adhesive Tape          Lab Results (Personally Reviewed)    Chemistry/lipid CBC Cardiac Enzymes/BNP/TSH/INR   Lab Results   Component Value Date    BUN 28.2 (H) " 07/10/2024     07/10/2024    CO2 30 (H) 07/10/2024     Creatinine   Date Value Ref Range Status   07/10/2024 1.46 (H) 0.51 - 0.95 mg/dL Final   08/06/2020 1.32 (H) 0.52 - 1.04 mg/dL Final       Lab Results   Component Value Date    CHOL 131 06/26/2024    HDL 51 06/26/2024    LDL 64 06/26/2024      Lab Results   Component Value Date    WBC 4.3 11/15/2023    HGB 12.7 11/15/2023    HCT 39.4 11/15/2023    MCV 90 11/15/2023     11/15/2023    Lab Results   Component Value Date    TSH 0.56 07/10/2024    INR 2.50 (H) 09/03/2020        I have reviewed the note as documented above.  This accurately captures the substance of my conversation with the patient.  The patient states understanding and is agreeable with the plan.     Telephone Visit Duration: 30 minutes       Desire Mensah PA-C  [facility]  Electrophysiology Consult Service  Pager: [phone]                          Please do not hesitate to contact me if you have any questions/concerns.     Sincerely,     Desire Mensah PA-C

## 2024-09-10 ENCOUNTER — TELEPHONE (OUTPATIENT)
Dept: CARDIOLOGY | Facility: CLINIC | Age: 79
End: 2024-09-10
Payer: COMMERCIAL

## 2024-09-10 DIAGNOSIS — I48.20 CHRONIC ATRIAL FIBRILLATION (H): ICD-10-CM

## 2024-09-10 DIAGNOSIS — I48.19 PERSISTENT ATRIAL FIBRILLATION (H): Primary | ICD-10-CM

## 2024-09-10 DIAGNOSIS — I50.30 HEART FAILURE WITH PRESERVED EJECTION FRACTION, NYHA CLASS II (H): ICD-10-CM

## 2024-09-10 NOTE — TELEPHONE ENCOUNTER
Spoke with pt who recently fell and broke her wrist. She has repair surgery on Monday. She knows she has to hold the Xarelto but not sure for how long yet. She is wondering if she can still have ablation done in October.    This was discussed with SMITA Worthington. She states it is okay for pt to proceed with ablation as scheduled in October.     Loco Franklin RN   Cardiology Nurse Coordinator

## 2024-09-10 NOTE — TELEPHONE ENCOUNTER
Health Call Center    Phone Message    May a detailed message be left on voicemail: yes     Reason for Call: Other: Patient called because she isn't sure she should go through with the ablation she has scheduled in October since she recently broke her wrist. Also, patient has a surgery coming up on 9/16 and she was told to stop taking rivaroxaban ANTICOAGULANT (XARELTO) 20 MG TABS tablet, but she wants to make sure that safe for her and for how long she shouldn't take it. Please call patient back to further assist.      Action Taken: Other: Cardiology    Travel Screening: Not Applicable     Date of Service:

## 2024-09-12 ENCOUNTER — OFFICE VISIT (OUTPATIENT)
Dept: FAMILY MEDICINE | Facility: CLINIC | Age: 79
End: 2024-09-12
Payer: COMMERCIAL

## 2024-09-12 VITALS
RESPIRATION RATE: 18 BRPM | HEIGHT: 68 IN | HEART RATE: 91 BPM | OXYGEN SATURATION: 95 % | WEIGHT: 293 LBS | SYSTOLIC BLOOD PRESSURE: 112 MMHG | TEMPERATURE: 98 F | DIASTOLIC BLOOD PRESSURE: 75 MMHG | BODY MASS INDEX: 44.41 KG/M2

## 2024-09-12 DIAGNOSIS — N18.32 STAGE 3B CHRONIC KIDNEY DISEASE (CKD) (H): ICD-10-CM

## 2024-09-12 DIAGNOSIS — I48.91 ATRIAL FIBRILLATION, UNSPECIFIED TYPE (H): ICD-10-CM

## 2024-09-12 DIAGNOSIS — S62.101A CLOSED FRACTURE OF RIGHT WRIST, INITIAL ENCOUNTER: ICD-10-CM

## 2024-09-12 DIAGNOSIS — Z01.818 PREOP GENERAL PHYSICAL EXAM: Primary | ICD-10-CM

## 2024-09-12 LAB
ANION GAP SERPL CALCULATED.3IONS-SCNC: 11 MMOL/L (ref 7–15)
BASOPHILS # BLD AUTO: 0 10E3/UL (ref 0–0.2)
BASOPHILS NFR BLD AUTO: 1 %
BUN SERPL-MCNC: 25.2 MG/DL (ref 8–23)
CALCIUM SERPL-MCNC: 9.4 MG/DL (ref 8.8–10.4)
CHLORIDE SERPL-SCNC: 106 MMOL/L (ref 98–107)
CREAT SERPL-MCNC: 1.32 MG/DL (ref 0.51–0.95)
EGFRCR SERPLBLD CKD-EPI 2021: 41 ML/MIN/1.73M2
EOSINOPHIL # BLD AUTO: 0.1 10E3/UL (ref 0–0.7)
EOSINOPHIL NFR BLD AUTO: 3 %
ERYTHROCYTE [DISTWIDTH] IN BLOOD BY AUTOMATED COUNT: 16.2 % (ref 10–15)
GLUCOSE SERPL-MCNC: 129 MG/DL (ref 70–99)
HCO3 SERPL-SCNC: 26 MMOL/L (ref 22–29)
HCT VFR BLD AUTO: 34.5 % (ref 35–47)
HGB BLD-MCNC: 10.6 G/DL (ref 11.7–15.7)
IMM GRANULOCYTES # BLD: 0 10E3/UL
IMM GRANULOCYTES NFR BLD: 1 %
LYMPHOCYTES # BLD AUTO: 0.7 10E3/UL (ref 0.8–5.3)
LYMPHOCYTES NFR BLD AUTO: 14 %
MCH RBC QN AUTO: 26.6 PG (ref 26.5–33)
MCHC RBC AUTO-ENTMCNC: 30.7 G/DL (ref 31.5–36.5)
MCV RBC AUTO: 87 FL (ref 78–100)
MONOCYTES # BLD AUTO: 0.5 10E3/UL (ref 0–1.3)
MONOCYTES NFR BLD AUTO: 10 %
NEUTROPHILS # BLD AUTO: 3.4 10E3/UL (ref 1.6–8.3)
NEUTROPHILS NFR BLD AUTO: 72 %
PLATELET # BLD AUTO: 202 10E3/UL (ref 150–450)
POTASSIUM SERPL-SCNC: 4.2 MMOL/L (ref 3.4–5.3)
RBC # BLD AUTO: 3.98 10E6/UL (ref 3.8–5.2)
SODIUM SERPL-SCNC: 143 MMOL/L (ref 135–145)
WBC # BLD AUTO: 4.8 10E3/UL (ref 4–11)

## 2024-09-12 PROCEDURE — 99214 OFFICE O/P EST MOD 30 MIN: CPT | Performed by: FAMILY MEDICINE

## 2024-09-12 PROCEDURE — 85025 COMPLETE CBC W/AUTO DIFF WBC: CPT | Performed by: FAMILY MEDICINE

## 2024-09-12 PROCEDURE — 36415 COLL VENOUS BLD VENIPUNCTURE: CPT | Performed by: FAMILY MEDICINE

## 2024-09-12 PROCEDURE — 80048 BASIC METABOLIC PNL TOTAL CA: CPT | Performed by: FAMILY MEDICINE

## 2024-09-12 ASSESSMENT — PAIN SCALES - GENERAL: PAINLEVEL: MODERATE PAIN (5)

## 2024-09-12 ASSESSMENT — ASTHMA QUESTIONNAIRES
QUESTION_1 LAST FOUR WEEKS HOW MUCH OF THE TIME DID YOUR ASTHMA KEEP YOU FROM GETTING AS MUCH DONE AT WORK, SCHOOL OR AT HOME: A LITTLE OF THE TIME
ACT_TOTALSCORE: 21
ACT_TOTALSCORE: 21
QUESTION_4 LAST FOUR WEEKS HOW OFTEN HAVE YOU USED YOUR RESCUE INHALER OR NEBULIZER MEDICATION (SUCH AS ALBUTEROL): NOT AT ALL
QUESTION_5 LAST FOUR WEEKS HOW WOULD YOU RATE YOUR ASTHMA CONTROL: WELL CONTROLLED
QUESTION_3 LAST FOUR WEEKS HOW OFTEN DID YOUR ASTHMA SYMPTOMS (WHEEZING, COUGHING, SHORTNESS OF BREATH, CHEST TIGHTNESS OR PAIN) WAKE YOU UP AT NIGHT OR EARLIER THAN USUAL IN THE MORNING: ONCE OR TWICE
QUESTION_2 LAST FOUR WEEKS HOW OFTEN HAVE YOU HAD SHORTNESS OF BREATH: ONCE OR TWICE A WEEK

## 2024-09-12 NOTE — PATIENT INSTRUCTIONS
Hold the blood thinner rivaraxaban day before and day of procedure    Hold olmesartan the day of procedure    Do take the amiodarone, metoprolol, and gabapentin the am of procedure    Take the bumetanide once later this week but not day before or day of procedure

## 2024-09-12 NOTE — PROGRESS NOTES
[facility] Evaluation  [facility]  [address]  [place] [address]  Phone: [phone]  Primary Provider: Vita Zavala MD  Pre-op Performing Provider: Nate Jane MD  Sep 12, 2024              9/12/2024   Surgical Information   What procedure is being done? right wrist   Facility or Hospital where procedure/surgery will be performed: [facility]   Who is doing the procedure / surgery? ?   Date of surgery / procedure: 09/16/2024   Time of surgery / procedure: 09:30am   Where do you plan to recover after surgery? at home alone        Fax number for surgical facility: [phone]        Rima Whitney is a 79 year old, presenting for the following:  Pre-Op Exam          9/12/2024     9:55 AM   Additional Questions   Roomed by Maritza ALLEN related to upcoming procedure: 79 year old female  fell last Thursday Sept 5 and fractured right wrist.  To have surgery next Monday Sept 16.        9/12/2024   Pre-Op Questionnaire   Have you ever had a heart attack or stroke? No   Have you ever had surgery on your heart or blood vessels, such as a stent placement, a coronary artery bypass, or surgery on an artery in your head, neck, heart, or legs? No   Do you have chest pain with activity? No but gets short of breath.  Had that before the fall.    Do you have a history of heart failure? No   Do you currently have a cold, bronchitis or symptoms of other infection? No   Do you have a cough, shortness of breath, or wheezing? No   Do you or anyone in your family have previous history of blood clots? (!) YES patient had a clot in past in leg. On blood thinner.    Do you or does anyone in your family have a serious bleeding problem such as prolonged bleeding following surgeries or cuts? No   Have you ever had problems with anemia or been told to take iron pills? No   Have you had any abnormal blood loss such as black, tarry or bloody stools, or abnormal vaginal bleeding? No   Have  you ever had a blood transfusion? No   Are you willing to have a blood transfusion if it is medically needed before, during, or after your surgery? Yes   Have you or any of your relatives ever had problems with anesthesia? No   Do you have sleep apnea, excessive snoring or daytime drowsiness? (!) YES   Do you have a CPAP machine? Yes   Do you have any artifical heart valves or other implanted medical devices like a pacemaker, defibrillator, or continuous glucose monitor? No   Do you have artificial joints? (!) YES   Are you allergic to latex? No        Health Care Directive  Patient does not have a Health Care Directive or Living Will    Preoperative Review of   Patient not on any blood           Patient Active Problem List    Diagnosis Date Noted    Hyperlipidemia LDL goal <40 11/27/2012     Priority: High    Chronic atrial fibrillation (H) 11/15/2023     Priority: Medium    Umbilical hernia      Priority: Medium    Degenerative joint disease (DJD) of hip      Priority: Medium    Thyroid nodule      Priority: Medium    GERD (gastroesophageal reflux disease) 10/28/2019     Priority: Medium    Asthma, mild intermittent, well-controlled 06/18/2019     Priority: Medium    Elevated parathyroid hormone 06/18/2019     Priority: Medium    Stage 3b chronic kidney disease (CKD) (H)      Priority: Medium    Stasis dermatitis of both legs 04/05/2018     Priority: Medium    Combined forms of age-related cataract of both eyes 07/18/2017     Priority: Medium    Lumbar spinal stenosis 07/17/2017     Priority: Medium    FLOR (obstructive sleep apnea)- severe (AHI 89) 02/15/2017     Priority: Medium     Study Date: 2/14/2017- (329.0 lbs) The total sleep time was 96.0 minutes. The combined apnea/hypopnea index was 89.4 events per hour.  The REM AHI was n/a.  The supine AHI was 93.9 events per hour. RDI was 92.5 events per hour.  Lowest oxygen saturation was 77%.  Time spent less than or equal to 88% was 27.5 minutes.  Time spent  less than or equal to 89% was 34.4 minutes.      Osteopenia      Priority: Medium    Morbid obesity due to excess calories (H) 11/23/2015     Priority: Medium     Surgical referral declined '16      Renal hypertension      Priority: Medium    History of bilateral knee replacement 10/23/2013     Priority: Medium    Allergic rhinitis due to animal dander      Priority: Medium     4/5/12 RAST pos. only to cat mildly      Dependent edema 06/17/2003     Priority: Medium      Past Medical History:   Diagnosis Date    Acquired renal cyst of left kidney     Adrenal nodule (H24)     left - needs yearly CT    Ascending aorta dilatation (H24)     Chronic atrial fibrillation (H) 11/15/2023    CKD (chronic kidney disease) stage 3, GFR 30-59 ml/min (H)     Degenerative joint disease (DJD) of hip     DJD (degenerative joint disease) 10/21/2005    Eczema, unspecified type 04/05/2018    Elevated parathyroid hormone 06/18/2019    Gallstones     Hepatitis B childhood     resolved, patient is not a carrier     Hyperlipidemia 11/27/2012    Hypertension goal BP (blood pressure) < 140/90     Lumbar spinal stenosis 07/17/2017    Mild persistent asthma without complication 05/30/2017    Morbid obesity due to excess calories (H) 11/23/2015    Surgical referral declined '16     FLOR (obstructive sleep apnea)     Osteopenia     Pulmonary emboli (H) 10/28/2019    Shingles 2014    scalp    Stasis dermatitis of both legs 04/05/2018    Thyroid nodule     Umbilical hernia     Vitamin D deficiency      Past Surgical History:   Procedure Laterality Date    ANESTHESIA CARDIOVERSION N/A 12/28/2023    Procedure: Anesthesia cardioversion @1330;  Surgeon: GENERIC ANESTHESIA PROVIDER;  Location: [facility] OR    ANESTHESIA CARDIOVERSION N/A 8/5/2024    Procedure: Anesthesia cardioversion@1330;  Surgeon: GENERIC ANESTHESIA PROVIDER;  Location: [facility] OR    IR PAROTID BIOPSY  5/21/2020    [facility] TOTAL KNEE ARTHROPLASTY  3/2000    right    [facility] TOTAL KNEE ARTHROPLASTY  6/8/12     left    ZZC VAGINAL HYSTERECTOMY  1977    benign, prolapse, ovaries remain      Current Outpatient Medications   Medication Sig Dispense Refill    albuterol (PROAIR HFA/PROVENTIL HFA/VENTOLIN HFA) 108 (90 Base) MCG/ACT inhaler Inhale 1-2 puffs into the lungs every 4 hours as needed for shortness of breath / dyspnea 1 Inhaler 3    amiodarone (PACERONE) 200 MG tablet Take 1 tablet (200 mg) by mouth daily 90 tablet 0    atorvastatin (LIPITOR) 40 MG tablet Take 1 tablet (40 mg) by mouth daily 90 tablet 3    bumetanide (BUMEX) 1 MG tablet TAKE HALF DAILY (Patient taking differently: as needed. TAKE HALF DAILY) 90 tablet 3    Cholecalciferol (VITAMIN D) 2000 UNITS tablet Take 2,000 Units by mouth daily      Cyanocobalamin (VITAMIN B-12 PO) Take by mouth daily      dexAMETHasone (DECADRON) 1 MG tablet Take one tablet at 11 PM and have labwork scheduled the following morning at 8 AM. 1 tablet 0    fluticasone (FLONASE) 50 MCG/ACT nasal spray Spray 2 sprays into both nostrils daily 48 g 3    gabapentin (NEURONTIN) 100 MG capsule Take 1 capsule (100 mg) by mouth 2 times daily as needed for neuropathic pain 60 capsule 1    loratadine (CLARITIN) 10 MG tablet Take 1 tablet (10 mg) by mouth daily      magnesium 250 MG tablet Take 1 tablet (250 mg) by mouth daily 90 tablet 1    metoprolol succinate ER (TOPROL XL) 50 MG 24 hr tablet Take 1 tablet (50 mg) by mouth daily 90 tablet 3    mometasone (ELOCON) 0.1 % external cream Apply to affected area on legs twice daily as needed 100 g 3    olmesartan (BENICAR) 40 MG tablet Take 1 tablet (40 mg) by mouth daily 90 tablet 0    order for DME Equipment being ordered: Auto CPAP 11-18 1 Units 0    rivaroxaban ANTICOAGULANT (XARELTO) 20 MG TABS tablet Take 1 tablet (20 mg) by mouth daily (with dinner) 90 tablet 3       Allergies   Allergen Reactions    Adhesive Tape         Social History     Tobacco Use    Smoking status: Never     Passive exposure: Never    Smokeless tobacco: Never  "  Substance Use Topics    Alcohol use: Yes     Alcohol/week: 0.0 standard drinks of alcohol     Comment: very rare         History   Drug Use No             Review of Systems  Constitutional, HEENT, cardiovascular, pulmonary, GI, , musculoskeletal, neuro, skin, endocrine and psych systems are negative, except as otherwise noted.    No recent illnesses    Has afib and past history of clot, so on the riaraoxaban    No history of anesthesia problems    No history of MI or stroke    Patient is scheduled for ablation in mid October     Has had atrial fibrillation for about a year.  Had two cardioversions.     No bleeding problems    Objective    /75 (BP Location: Left arm, Patient Position: Chair, Cuff Size: Adult Large)   Pulse 91   Temp 98  F (36.7  C) (Temporal)   Resp 18   Ht 1.715 m (5' 7.5\")   Wt (!) 154.2 kg (340 lb)   SpO2 95%   BMI 52.47 kg/m     Estimated body mass index is 52.47 kg/m  as calculated from the following:    Height as of this encounter: 1.715 m (5' 7.5\").    Weight as of this encounter: 154.2 kg (340 lb).  Physical Exam  GENERAL: alert and no distress  EYES: Eyes grossly normal to inspection, PERRL and conjunctivae and sclerae normal  HENT: ear canals and TM's normal, nose and mouth without ulcers or lesions  NECK: no adenopathy, no asymmetry, masses, or scars  RESP: lungs clear to auscultation - no rales, rhonchi or wheezes  CV: irregularly irregular rhythm, normal S1 S2, no S3 or S4, no murmur, click or rub, and peripheral pulses strong  ABDOMEN: soft, nontender, no hepatosplenomegaly, no masses and bowel sounds normal  MS: right arm in sling, short arm splint present. Moves right fingers and thumb okay.  SKIN: patient has stasis dermatitis both lower legs but no pitting edema.  Moderate swelling bilateral.   NEURO: Normal strength and tone, mentation intact and speech normal  PSYCH: mentation appears normal, affect normal/bright    Recent Labs   Lab Test 08/05/24  1210 " 07/10/24  1114 06/26/24  1138 12/19/23  1355 11/15/23  1101   HGB  --   --   --   --  12.7   PLT  --   --   --   --  193   NA  --  143 142   < > 147*   POTASSIUM 4.6 4.5 4.2   < > 3.9   CR  --  1.46* 1.39*   < > 1.39*    < > = values in this interval not displayed.        Diagnostics  See copy of recent EKG    Labs pending, bmp and cbc    Of note, recent GFR has been in 30s       ASSESSMENT / PLAN:  (Z01.818) Preop general physical exam  (primary encounter diagnosis)  Comment: patient should be okay for procedure. Labs pending.  Plan: CBC with Platelets & Differential, Basic         metabolic panel             (S62.101A) Closed fracture of right wrist, initial encounter  Comment: to have ORIF  Plan: as above     (N18.32) Stage 3b chronic kidney disease (CKD) (H)  Comment: baseline GFR in 30s   Plan: recheck today     (I48.91) Atrial fibrillation, unspecified type (H)  Comment: patient anticoagulated and rate controlled.   Plan: as above      Patient will hold the rivaroxaban the day before and the day of procedure    Hold the olmesartan day of procedure    Patient has not been taking the bumetanide much since the fall.  Hold day of procedure.  She will take it once later this week ( surgery next week ).      Patient will take amioarone, gabapentin, and metoprolol the am of surgery         I reviewed the patient's medications, allergies, medical history, family history, and social history.    Nate Jane MD          Revised Cardiac Risk Index (RCRI)  The patient has the following serious cardiovascular risks for perioperative complications:   - No serious cardiac risks = 0 points     RCRI Interpretation: 1 point: Class II (low risk - 0.9% complication rate)         Signed Electronically by: Nate Jane MD  A copy of this evaluation report is provided to the requesting physician.

## 2024-09-13 NOTE — RESULT ENCOUNTER NOTE
The hemoglobin ( red blood count ) is a bit low but otherwise labs are stable.    Okay for procedure.    Nate Jane MD

## 2024-09-18 NOTE — TELEPHONE ENCOUNTER
olmesartan (BENICAR) 40 MG tablet 90 tablet 0 6/7/2024     Last Office Visit: 9/3/24  Future Office visit:   1/6/24      Angiotensin-II Receptors Tyfhja48/10/2024 04:55 PM   Protocol Details Has GFR on file in past 12 months and most recent value is normal    Medication indicated for associated diagnosis        Component      Latest Ref Rng 9/12/2024  10:56 AM   Sodium      135 - 145 mmol/L 143    Potassium      3.4 - 5.3 mmol/L 4.2    Chloride      98 - 107 mmol/L 106    Carbon Dioxide (CO2)      22 - 29 mmol/L 26    Anion Gap      7 - 15 mmol/L 11    Urea Nitrogen      8.0 - 23.0 mg/dL 25.2 (H)    Creatinine      0.51 - 0.95 mg/dL 1.32 (H)    GFR Estimate      >60 mL/min/1.73m2 41 (L)    Calcium      8.8 - 10.4 mg/dL 9.4    Glucose      70 - 99 mg/dL 129 (H)       Routing refill request to provider for review/approval because:  ABNORMAL LAB    Lynsey Gar RN  [facility]/Red Flag Triage & Med Refill Team

## 2024-09-20 RX ORDER — OLMESARTAN MEDOXOMIL 40 MG/1
40 TABLET ORAL DAILY
Qty: 90 TABLET | Refills: 3 | Status: SHIPPED | OUTPATIENT
Start: 2024-09-20

## 2024-09-20 NOTE — TELEPHONE ENCOUNTER
Name from pharmacy: OLMESARTAN MEDOXOMIL 40MG TABLETS          Will file in chart as: olmesartan (BENICAR) 40 MG tablet    Sig: TAKE 1 TABLET(40 MG) BY MOUTH DAILY    Disp: 90 tablet    Refills: 0 (Pharmacy requested: Not specified)    Start: 9/10/2024    Class: E-Prescribe    For: Chronic atrial fibrillation    Last ordered: 3 months ago (6/7/2024) by Patito Montano MD    Last refill: 6/19/2024    Rx #: [id number]    Angiotensin-II Receptors Lrsoiu9309/18/2024 10:02 AM   Protocol Details Has GFR on file in past 12 months and most recent value is normal    Medication indicated for associated diagnosis    Last blood pressure under 140/90 in past 12 months    Medication is active on med list    Recent (12 mo) or future (90days) visit within the authorizing provider's specialty    Patient is age 18 or older    No active pregnancy on record    Normal serum potassium on file in past 12 months    No positive pregnancy test in past 12 months      To be filled at: [id number] [place] [address] AT [address] [address]     Patient saw Dr. Montano on 7/1/24. Patient was to continue olmesartan 40 mg daily. Patient was to follow up in 6 months.  Last BMP on 9/12/24. Prescription sent in for patient.    Bekah Zuniga, RN, BSN  Cardiology RN Care Coordinator   [place]   Phone: [phone]  Fax: [phone] ([place]) [phone] ([place])

## 2024-09-22 DIAGNOSIS — I48.20 CHRONIC ATRIAL FIBRILLATION (H): ICD-10-CM

## 2024-09-23 ENCOUNTER — PATIENT OUTREACH (OUTPATIENT)
Dept: CARE COORDINATION | Facility: CLINIC | Age: 79
End: 2024-09-23
Payer: COMMERCIAL

## 2024-09-25 NOTE — TELEPHONE ENCOUNTER
amiodarone (PACERONE) 200 MG tablet   Last Written Prescription Date:  6/7/2024  Last Fill Quantity: 90,   # refills: 0  Last Office Visit : 7/17/2024  Future Office visit:  1/6/2025  Routing amiodarone (PACERONE) 200 MG tablet refill request to provider for review/approval because: Medication not on the Cardiology refill protocol.

## 2024-09-25 NOTE — TELEPHONE ENCOUNTER
[facility] Call Center    Phone Message    May a detailed message be left on voicemail: yes     Reason for Call: Medication Refill Request    Has the patient contacted the pharmacy for the refill? Yes   Name of medication being requested: amiodarone (PACERONE) 200 MG tablet   Provider who prescribed the medication: Can  Pharmacy:    [id number] - [place], [place] - [address]    Date medication is needed: ASAP   Patient called to get prescription filled. She is completely out and hasn't been taking it for a day or two. Please call patient back with questions and to let her know if prescription can be filled or not. Thank you!    Action Taken: Other: Cardiology    Travel Screening: Not Applicable     Thank you!  [facility]

## 2024-09-27 RX ORDER — AMIODARONE HYDROCHLORIDE 200 MG/1
200 TABLET ORAL DAILY
Qty: 90 TABLET | Refills: 1 | Status: SHIPPED | OUTPATIENT
Start: 2024-09-27

## 2024-09-27 NOTE — TELEPHONE ENCOUNTER
Patient saw Dr. Montano on 7/1/24. Patient was to continue amiodarone 200 mg daily. Patient saw Desire on 9/3/24. Patient was to continue amiodarone 200 mg daily. Patient had lab work and recent EKG. Prescription sent in for patient.    Bekah Zuniga, RN, BSN  Cardiology RN Care Coordinator   [place]   Phone: [phone]  Fax: [phone] ([place]) [phone] ([place])

## 2024-10-01 DIAGNOSIS — K21.00 GASTROESOPHAGEAL REFLUX DISEASE WITH ESOPHAGITIS, UNSPECIFIED WHETHER HEMORRHAGE: ICD-10-CM

## 2024-10-01 NOTE — TELEPHONE ENCOUNTER
Patient confirmed that she was able to  prescription.    Bekah Zuniga, RN, BSN  Cardiology RN Care Coordinator   [place]   Phone: [phone]  Fax: [phone] ([place]) [phone] ([place])

## 2024-10-02 ENCOUNTER — PATIENT OUTREACH (OUTPATIENT)
Dept: CARDIOLOGY | Facility: CLINIC | Age: 79
End: 2024-10-02
Payer: COMMERCIAL

## 2024-10-02 RX ORDER — FAMOTIDINE 40 MG/1
TABLET, FILM COATED ORAL
Qty: 90 TABLET | Refills: 4 | Status: SHIPPED | OUTPATIENT
Start: 2024-10-02

## 2024-10-02 NOTE — PROGRESS NOTES
Called and spoke to patient to review pre-procedure instructions for upcoming AF ablation procedure on 10/17/24. Writer reviewed medication holds in detail. Patient received letter and does not have any questions at this time.

## 2024-10-04 ENCOUNTER — TELEPHONE (OUTPATIENT)
Dept: CARDIOLOGY | Facility: CLINIC | Age: 79
End: 2024-10-04
Payer: COMMERCIAL

## 2024-10-04 NOTE — TELEPHONE ENCOUNTER
Called waitlist patient and offered an appointment with Desire Mensah on this date 12/16/2024 & any open time at this location [facility].     Please note there is no guarantee this appointment will be available as it is first come first serve.    Left a voicemail and sent a Jule Gamet message.    Jill IVEY/Complex Procedure    [facility]   Neurology, NeuroSurgery, NeuroPsychology, Pain Management and Cardiology Specialties  Medical/Surgical Adult Specialties

## 2024-10-17 ENCOUNTER — HOSPITAL ENCOUNTER (OUTPATIENT)
Facility: CLINIC | Age: 79
Setting detail: OBSERVATION
Discharge: HOME | End: 2024-10-18
Attending: INTERNAL MEDICINE | Admitting: INTERNAL MEDICINE
Payer: COMMERCIAL

## 2024-10-17 ENCOUNTER — HOSPITAL ENCOUNTER (OUTPATIENT)
Dept: CT IMAGING | Facility: CLINIC | Age: 79
Discharge: HOME | End: 2024-10-17
Attending: INTERNAL MEDICINE
Payer: COMMERCIAL

## 2024-10-17 ENCOUNTER — ANESTHESIA EVENT (OUTPATIENT)
Dept: CARDIOLOGY | Facility: CLINIC | Age: 79
End: 2024-10-17
Payer: COMMERCIAL

## 2024-10-17 ENCOUNTER — ANESTHESIA (OUTPATIENT)
Dept: CARDIOLOGY | Facility: CLINIC | Age: 79
End: 2024-10-17
Payer: COMMERCIAL

## 2024-10-17 DIAGNOSIS — I48.19 PERSISTENT ATRIAL FIBRILLATION (H): ICD-10-CM

## 2024-10-17 LAB
ACT BLD: 128 SECONDS (ref 74–150)
ACT BLD: 182 SECONDS (ref 74–150)
ACT BLD: 270 SECONDS (ref 74–150)
ACT BLD: 291 SECONDS (ref 74–150)
ACT BLD: 291 SECONDS (ref 74–150)
ACT BLD: 362 SECONDS (ref 74–150)
ACT BLD: 379 SECONDS (ref 74–150)
ACT BLD: 396 SECONDS (ref 74–150)
ANION GAP SERPL CALCULATED.3IONS-SCNC: 12 MMOL/L (ref 7–15)
BUN SERPL-MCNC: 27.5 MG/DL (ref 8–23)
CALCIUM SERPL-MCNC: 9 MG/DL (ref 8.8–10.4)
CHLORIDE SERPL-SCNC: 106 MMOL/L (ref 98–107)
CREAT SERPL-MCNC: 1.55 MG/DL (ref 0.51–0.95)
EGFRCR SERPLBLD CKD-EPI 2021: 34 ML/MIN/1.73M2
ERYTHROCYTE [DISTWIDTH] IN BLOOD BY AUTOMATED COUNT: 16.2 % (ref 10–15)
GLUCOSE SERPL-MCNC: 89 MG/DL (ref 70–99)
HCO3 SERPL-SCNC: 24 MMOL/L (ref 22–29)
HCT VFR BLD AUTO: 40.7 % (ref 35–47)
HGB BLD-MCNC: 12.4 G/DL (ref 11.7–15.7)
MCH RBC QN AUTO: 26.3 PG (ref 26.5–33)
MCHC RBC AUTO-ENTMCNC: 30.5 G/DL (ref 31.5–36.5)
MCV RBC AUTO: 86 FL (ref 78–100)
PLATELET # BLD AUTO: 205 10E3/UL (ref 150–450)
POTASSIUM SERPL-SCNC: 3.8 MMOL/L (ref 3.4–5.3)
RBC # BLD AUTO: 4.71 10E6/UL (ref 3.8–5.2)
SODIUM SERPL-SCNC: 142 MMOL/L (ref 135–145)
WBC # BLD AUTO: 5.2 10E3/UL (ref 4–11)

## 2024-10-17 PROCEDURE — C1759 CATH, INTRA ECHOCARDIOGRAPHY: HCPCS | Performed by: INTERNAL MEDICINE

## 2024-10-17 PROCEDURE — 258N000003 HC RX IP 258 OP 636: Performed by: STUDENT IN AN ORGANIZED HEALTH CARE EDUCATION/TRAINING PROGRAM

## 2024-10-17 PROCEDURE — 85027 COMPLETE CBC AUTOMATED: CPT | Performed by: INTERNAL MEDICINE

## 2024-10-17 PROCEDURE — 36415 COLL VENOUS BLD VENIPUNCTURE: CPT | Performed by: INTERNAL MEDICINE

## 2024-10-17 PROCEDURE — 99100 ANES PT EXTEME AGE<1 YR&>70: CPT | Performed by: ANESTHESIOLOGY

## 2024-10-17 PROCEDURE — C1730 CATH, EP, 19 OR FEW ELECT: HCPCS | Performed by: INTERNAL MEDICINE

## 2024-10-17 PROCEDURE — 250N000011 HC RX IP 250 OP 636: Performed by: INTERNAL MEDICINE

## 2024-10-17 PROCEDURE — 75572 CT HRT W/3D IMAGE: CPT | Mod: 26 | Performed by: INTERNAL MEDICINE

## 2024-10-17 PROCEDURE — 370N000017 HC ANESTHESIA TECHNICAL FEE, PER MIN: Performed by: INTERNAL MEDICINE

## 2024-10-17 PROCEDURE — G0378 HOSPITAL OBSERVATION PER HR: HCPCS

## 2024-10-17 PROCEDURE — 250N000009 HC RX 250: Performed by: STUDENT IN AN ORGANIZED HEALTH CARE EDUCATION/TRAINING PROGRAM

## 2024-10-17 PROCEDURE — C1894 INTRO/SHEATH, NON-LASER: HCPCS | Performed by: INTERNAL MEDICINE

## 2024-10-17 PROCEDURE — 75572 CT HRT W/3D IMAGE: CPT

## 2024-10-17 PROCEDURE — 250N000011 HC RX IP 250 OP 636: Performed by: STUDENT IN AN ORGANIZED HEALTH CARE EDUCATION/TRAINING PROGRAM

## 2024-10-17 PROCEDURE — 85347 COAGULATION TIME ACTIVATED: CPT

## 2024-10-17 PROCEDURE — 999N000054 HC STATISTIC EKG NON-CHARGEABLE: Mod: 59

## 2024-10-17 PROCEDURE — 272N000001 HC OR GENERAL SUPPLY STERILE: Performed by: INTERNAL MEDICINE

## 2024-10-17 PROCEDURE — 999N000248 HC STATISTIC IV INSERT WITH US BY RN

## 2024-10-17 PROCEDURE — 80048 BASIC METABOLIC PNL TOTAL CA: CPT | Performed by: INTERNAL MEDICINE

## 2024-10-17 PROCEDURE — 93656 COMPRE EP EVAL ABLTJ ATR FIB: CPT | Mod: GC | Performed by: INTERNAL MEDICINE

## 2024-10-17 PROCEDURE — 93656 COMPRE EP EVAL ABLTJ ATR FIB: CPT | Performed by: INTERNAL MEDICINE

## 2024-10-17 PROCEDURE — C1732 CATH, EP, DIAG/ABL, 3D/VECT: HCPCS | Performed by: INTERNAL MEDICINE

## 2024-10-17 PROCEDURE — C1733 CATH, EP, OTHR THAN COOL-TIP: HCPCS | Performed by: INTERNAL MEDICINE

## 2024-10-17 PROCEDURE — 99100 ANES PT EXTEME AGE<1 YR&>70: CPT | Performed by: NURSE ANESTHETIST, CERTIFIED REGISTERED

## 2024-10-17 PROCEDURE — 93656 COMPRE EP EVAL ABLTJ ATR FIB: CPT | Performed by: ANESTHESIOLOGY

## 2024-10-17 PROCEDURE — 93656 COMPRE EP EVAL ABLTJ ATR FIB: CPT | Performed by: NURSE ANESTHETIST, CERTIFIED REGISTERED

## 2024-10-17 PROCEDURE — 82310 ASSAY OF CALCIUM: CPT | Performed by: INTERNAL MEDICINE

## 2024-10-17 PROCEDURE — 250N000011 HC RX IP 250 OP 636: Performed by: NURSE ANESTHETIST, CERTIFIED REGISTERED

## 2024-10-17 PROCEDURE — 93005 ELECTROCARDIOGRAM TRACING: CPT | Mod: 59

## 2024-10-17 RX ORDER — ONDANSETRON 2 MG/ML
4 INJECTION INTRAMUSCULAR; INTRAVENOUS EVERY 30 MIN PRN
Status: CANCELLED | OUTPATIENT
Start: 2024-10-17

## 2024-10-17 RX ORDER — PROTAMINE SULFATE 10 MG/ML
INJECTION, SOLUTION INTRAVENOUS PRN
Status: DISCONTINUED | OUTPATIENT
Start: 2024-10-17 | End: 2024-10-17

## 2024-10-17 RX ORDER — PHENYLEPHRINE HCL IN 0.9% NACL 50MG/250ML
PLASTIC BAG, INJECTION (ML) INTRAVENOUS CONTINUOUS PRN
Status: DISCONTINUED | OUTPATIENT
Start: 2024-10-17 | End: 2024-10-17

## 2024-10-17 RX ORDER — NALOXONE HYDROCHLORIDE 0.4 MG/ML
0.2 INJECTION, SOLUTION INTRAMUSCULAR; INTRAVENOUS; SUBCUTANEOUS
Status: DISCONTINUED | OUTPATIENT
Start: 2024-10-17 | End: 2024-10-18 | Stop reason: HOSPADM

## 2024-10-17 RX ORDER — HYDROMORPHONE HYDROCHLORIDE 1 MG/ML
0.2 INJECTION, SOLUTION INTRAMUSCULAR; INTRAVENOUS; SUBCUTANEOUS EVERY 5 MIN PRN
Status: DISCONTINUED | OUTPATIENT
Start: 2024-10-17 | End: 2024-10-17 | Stop reason: HOSPADM

## 2024-10-17 RX ORDER — GLYCOPYRROLATE 0.2 MG/ML
INJECTION, SOLUTION INTRAMUSCULAR; INTRAVENOUS PRN
Status: DISCONTINUED | OUTPATIENT
Start: 2024-10-17 | End: 2024-10-17

## 2024-10-17 RX ORDER — LIDOCAINE 40 MG/G
CREAM TOPICAL
Status: CANCELLED | OUTPATIENT
Start: 2024-10-17

## 2024-10-17 RX ORDER — NALOXONE HYDROCHLORIDE 0.4 MG/ML
0.1 INJECTION, SOLUTION INTRAMUSCULAR; INTRAVENOUS; SUBCUTANEOUS
Status: CANCELLED | OUTPATIENT
Start: 2024-10-17

## 2024-10-17 RX ORDER — HYDROMORPHONE HYDROCHLORIDE 1 MG/ML
0.4 INJECTION, SOLUTION INTRAMUSCULAR; INTRAVENOUS; SUBCUTANEOUS EVERY 5 MIN PRN
Status: DISCONTINUED | OUTPATIENT
Start: 2024-10-17 | End: 2024-10-17 | Stop reason: HOSPADM

## 2024-10-17 RX ORDER — METOPROLOL SUCCINATE 50 MG/1
50 TABLET, EXTENDED RELEASE ORAL DAILY
Status: DISCONTINUED | OUTPATIENT
Start: 2024-10-18 | End: 2024-10-18 | Stop reason: HOSPADM

## 2024-10-17 RX ORDER — ONDANSETRON 2 MG/ML
4 INJECTION INTRAMUSCULAR; INTRAVENOUS EVERY 30 MIN PRN
Status: DISCONTINUED | OUTPATIENT
Start: 2024-10-17 | End: 2024-10-17 | Stop reason: HOSPADM

## 2024-10-17 RX ORDER — ONDANSETRON 4 MG/1
4 TABLET, ORALLY DISINTEGRATING ORAL EVERY 30 MIN PRN
Status: DISCONTINUED | OUTPATIENT
Start: 2024-10-17 | End: 2024-10-17 | Stop reason: HOSPADM

## 2024-10-17 RX ORDER — LIDOCAINE 40 MG/G
CREAM TOPICAL
Status: DISCONTINUED | OUTPATIENT
Start: 2024-10-17 | End: 2024-10-17 | Stop reason: HOSPADM

## 2024-10-17 RX ORDER — SODIUM CHLORIDE, SODIUM LACTATE, POTASSIUM CHLORIDE, CALCIUM CHLORIDE 600; 310; 30; 20 MG/100ML; MG/100ML; MG/100ML; MG/100ML
INJECTION, SOLUTION INTRAVENOUS CONTINUOUS PRN
Status: DISCONTINUED | OUTPATIENT
Start: 2024-10-17 | End: 2024-10-17

## 2024-10-17 RX ORDER — GABAPENTIN 100 MG/1
100 CAPSULE ORAL 2 TIMES DAILY PRN
Status: DISCONTINUED | OUTPATIENT
Start: 2024-10-17 | End: 2024-10-18 | Stop reason: HOSPADM

## 2024-10-17 RX ORDER — NALOXONE HYDROCHLORIDE 0.4 MG/ML
0.4 INJECTION, SOLUTION INTRAMUSCULAR; INTRAVENOUS; SUBCUTANEOUS
Status: DISCONTINUED | OUTPATIENT
Start: 2024-10-17 | End: 2024-10-18 | Stop reason: HOSPADM

## 2024-10-17 RX ORDER — NALOXONE HYDROCHLORIDE 0.4 MG/ML
0.1 INJECTION, SOLUTION INTRAMUSCULAR; INTRAVENOUS; SUBCUTANEOUS
Status: DISCONTINUED | OUTPATIENT
Start: 2024-10-17 | End: 2024-10-17 | Stop reason: HOSPADM

## 2024-10-17 RX ORDER — SODIUM CHLORIDE 9 MG/ML
INJECTION, SOLUTION INTRAVENOUS CONTINUOUS
Status: DISCONTINUED | OUTPATIENT
Start: 2024-10-17 | End: 2024-10-17 | Stop reason: HOSPADM

## 2024-10-17 RX ORDER — OXYCODONE AND ACETAMINOPHEN 5; 325 MG/1; MG/1
1 TABLET ORAL EVERY 4 HOURS PRN
Status: DISCONTINUED | OUTPATIENT
Start: 2024-10-17 | End: 2024-10-18 | Stop reason: HOSPADM

## 2024-10-17 RX ORDER — OXYCODONE HYDROCHLORIDE 10 MG/1
10 TABLET ORAL
Status: CANCELLED | OUTPATIENT
Start: 2024-10-17

## 2024-10-17 RX ORDER — FENTANYL CITRATE 50 UG/ML
50 INJECTION, SOLUTION INTRAMUSCULAR; INTRAVENOUS EVERY 5 MIN PRN
Status: DISCONTINUED | OUTPATIENT
Start: 2024-10-17 | End: 2024-10-17 | Stop reason: HOSPADM

## 2024-10-17 RX ORDER — FAMOTIDINE 20 MG/1
40 TABLET, FILM COATED ORAL DAILY
Status: DISCONTINUED | OUTPATIENT
Start: 2024-10-18 | End: 2024-10-18 | Stop reason: HOSPADM

## 2024-10-17 RX ORDER — DEXAMETHASONE SODIUM PHOSPHATE 4 MG/ML
4 INJECTION, SOLUTION INTRA-ARTICULAR; INTRALESIONAL; INTRAMUSCULAR; INTRAVENOUS; SOFT TISSUE
Status: CANCELLED | OUTPATIENT
Start: 2024-10-17

## 2024-10-17 RX ORDER — DEXAMETHASONE SODIUM PHOSPHATE 4 MG/ML
4 INJECTION, SOLUTION INTRA-ARTICULAR; INTRALESIONAL; INTRAMUSCULAR; INTRAVENOUS; SOFT TISSUE
Status: DISCONTINUED | OUTPATIENT
Start: 2024-10-17 | End: 2024-10-17 | Stop reason: HOSPADM

## 2024-10-17 RX ORDER — HEPARIN SODIUM 1000 [USP'U]/ML
INJECTION, SOLUTION INTRAVENOUS; SUBCUTANEOUS PRN
Status: DISCONTINUED | OUTPATIENT
Start: 2024-10-17 | End: 2024-10-17

## 2024-10-17 RX ORDER — ONDANSETRON 4 MG/1
4 TABLET, ORALLY DISINTEGRATING ORAL EVERY 30 MIN PRN
Status: CANCELLED | OUTPATIENT
Start: 2024-10-17

## 2024-10-17 RX ORDER — FENTANYL CITRATE 50 UG/ML
INJECTION, SOLUTION INTRAMUSCULAR; INTRAVENOUS PRN
Status: DISCONTINUED | OUTPATIENT
Start: 2024-10-17 | End: 2024-10-17

## 2024-10-17 RX ORDER — AMIODARONE HYDROCHLORIDE 200 MG/1
200 TABLET ORAL DAILY
Status: DISCONTINUED | OUTPATIENT
Start: 2024-10-18 | End: 2024-10-18 | Stop reason: HOSPADM

## 2024-10-17 RX ORDER — IOPAMIDOL 755 MG/ML
120 INJECTION, SOLUTION INTRAVASCULAR ONCE
Status: COMPLETED | OUTPATIENT
Start: 2024-10-17 | End: 2024-10-17

## 2024-10-17 RX ORDER — OXYCODONE HYDROCHLORIDE 5 MG/1
5 TABLET ORAL
Status: CANCELLED | OUTPATIENT
Start: 2024-10-17

## 2024-10-17 RX ORDER — METHYLPREDNISOLONE SODIUM SUCCINATE 125 MG/2ML
INJECTION, POWDER, LYOPHILIZED, FOR SOLUTION INTRAMUSCULAR; INTRAVENOUS PRN
Status: DISCONTINUED | OUTPATIENT
Start: 2024-10-17 | End: 2024-10-17

## 2024-10-17 RX ORDER — PROPOFOL 10 MG/ML
INJECTION, EMULSION INTRAVENOUS PRN
Status: DISCONTINUED | OUTPATIENT
Start: 2024-10-17 | End: 2024-10-17

## 2024-10-17 RX ORDER — BUMETANIDE 1 MG/1
1 TABLET ORAL DAILY
Status: DISCONTINUED | OUTPATIENT
Start: 2024-10-18 | End: 2024-10-18 | Stop reason: HOSPADM

## 2024-10-17 RX ORDER — FENTANYL CITRATE 50 UG/ML
25 INJECTION, SOLUTION INTRAMUSCULAR; INTRAVENOUS EVERY 5 MIN PRN
Status: DISCONTINUED | OUTPATIENT
Start: 2024-10-17 | End: 2024-10-17 | Stop reason: HOSPADM

## 2024-10-17 RX ADMIN — PHENYLEPHRINE HYDROCHLORIDE 100 MCG: 10 INJECTION INTRAVENOUS at 13:15

## 2024-10-17 RX ADMIN — PROTAMINE SULFATE 10 MG: 10 INJECTION, SOLUTION INTRAVENOUS at 17:44

## 2024-10-17 RX ADMIN — PROTAMINE SULFATE 10 MG: 10 INJECTION, SOLUTION INTRAVENOUS at 17:43

## 2024-10-17 RX ADMIN — PROTAMINE SULFATE 10 MG: 10 INJECTION, SOLUTION INTRAVENOUS at 17:46

## 2024-10-17 RX ADMIN — PHENYLEPHRINE HYDROCHLORIDE 100 MCG: 10 INJECTION INTRAVENOUS at 13:56

## 2024-10-17 RX ADMIN — GLYCOPYRROLATE 0.2 MG: 0.2 INJECTION, SOLUTION INTRAMUSCULAR; INTRAVENOUS at 18:04

## 2024-10-17 RX ADMIN — PHENYLEPHRINE HYDROCHLORIDE 100 MCG: 10 INJECTION INTRAVENOUS at 14:14

## 2024-10-17 RX ADMIN — HEPARIN SODIUM 18000 UNITS: 1000 INJECTION INTRAVENOUS; SUBCUTANEOUS at 14:01

## 2024-10-17 RX ADMIN — Medication 20 MG: at 14:44

## 2024-10-17 RX ADMIN — PHENYLEPHRINE HYDROCHLORIDE 50 MCG: 10 INJECTION INTRAVENOUS at 14:40

## 2024-10-17 RX ADMIN — PHENYLEPHRINE HYDROCHLORIDE 100 MCG: 10 INJECTION INTRAVENOUS at 14:02

## 2024-10-17 RX ADMIN — PHENYLEPHRINE HYDROCHLORIDE 100 MCG: 10 INJECTION INTRAVENOUS at 14:08

## 2024-10-17 RX ADMIN — PHENYLEPHRINE HYDROCHLORIDE 200 MCG: 10 INJECTION INTRAVENOUS at 13:28

## 2024-10-17 RX ADMIN — PHENYLEPHRINE HYDROCHLORIDE 50 MCG: 10 INJECTION INTRAVENOUS at 13:09

## 2024-10-17 RX ADMIN — NOREPINEPHRINE BITARTRATE 12.8 MCG: 1 INJECTION, SOLUTION, CONCENTRATE INTRAVENOUS at 17:54

## 2024-10-17 RX ADMIN — PROPOFOL 50 MG: 10 INJECTION, EMULSION INTRAVENOUS at 13:36

## 2024-10-17 RX ADMIN — Medication 200 MG: at 18:03

## 2024-10-17 RX ADMIN — FENTANYL CITRATE 200 MCG: 50 INJECTION INTRAMUSCULAR; INTRAVENOUS at 12:40

## 2024-10-17 RX ADMIN — HEPARIN SODIUM 2000 UNITS: 1000 INJECTION INTRAVENOUS; SUBCUTANEOUS at 15:31

## 2024-10-17 RX ADMIN — Medication 0.25 MCG/KG/MIN: at 15:05

## 2024-10-17 RX ADMIN — HYDROMORPHONE HYDROCHLORIDE 0.5 MG: 1 INJECTION, SOLUTION INTRAMUSCULAR; INTRAVENOUS; SUBCUTANEOUS at 13:39

## 2024-10-17 RX ADMIN — IOPAMIDOL 120 ML: 755 INJECTION, SOLUTION INTRAVENOUS at 09:26

## 2024-10-17 RX ADMIN — PHENYLEPHRINE HYDROCHLORIDE 100 MCG: 10 INJECTION INTRAVENOUS at 14:00

## 2024-10-17 RX ADMIN — Medication 100 MG: at 18:08

## 2024-10-17 RX ADMIN — Medication 80 MG: at 16:53

## 2024-10-17 RX ADMIN — AMIODARONE HYDROCHLORIDE 300 MG: 1.5 INJECTION, SOLUTION INTRAVENOUS at 17:50

## 2024-10-17 RX ADMIN — PHENYLEPHRINE HYDROCHLORIDE 50 MCG: 10 INJECTION INTRAVENOUS at 15:05

## 2024-10-17 RX ADMIN — PROTAMINE SULFATE 10 MG: 10 INJECTION, SOLUTION INTRAVENOUS at 17:47

## 2024-10-17 RX ADMIN — METHYLPREDNISOLONE SODIUM SUCCINATE 125 MG: 125 INJECTION, POWDER, LYOPHILIZED, FOR SOLUTION INTRAMUSCULAR; INTRAVENOUS at 17:43

## 2024-10-17 RX ADMIN — Medication 50 MG: at 12:42

## 2024-10-17 RX ADMIN — PHENYLEPHRINE HYDROCHLORIDE 50 MCG: 10 INJECTION INTRAVENOUS at 14:27

## 2024-10-17 RX ADMIN — PROPOFOL 100 MG: 10 INJECTION, EMULSION INTRAVENOUS at 12:41

## 2024-10-17 RX ADMIN — Medication 50 MG: at 13:39

## 2024-10-17 RX ADMIN — PROTAMINE SULFATE 10 MG: 10 INJECTION, SOLUTION INTRAVENOUS at 17:45

## 2024-10-17 RX ADMIN — PHENYLEPHRINE HYDROCHLORIDE 50 MCG: 10 INJECTION INTRAVENOUS at 13:12

## 2024-10-17 RX ADMIN — SODIUM CHLORIDE, POTASSIUM CHLORIDE, SODIUM LACTATE AND CALCIUM CHLORIDE: 600; 310; 30; 20 INJECTION, SOLUTION INTRAVENOUS at 12:31

## 2024-10-17 RX ADMIN — HEPARIN SODIUM 5000 UNITS: 1000 INJECTION INTRAVENOUS; SUBCUTANEOUS at 14:14

## 2024-10-17 RX ADMIN — PHENYLEPHRINE HYDROCHLORIDE 100 MCG: 10 INJECTION INTRAVENOUS at 13:44

## 2024-10-17 RX ADMIN — PROPOFOL 40 MG: 10 INJECTION, EMULSION INTRAVENOUS at 14:02

## 2024-10-17 RX ADMIN — HEPARIN SODIUM 5000 UNITS: 1000 INJECTION INTRAVENOUS; SUBCUTANEOUS at 14:40

## 2024-10-17 RX ADMIN — PHENYLEPHRINE HYDROCHLORIDE 100 MCG: 10 INJECTION INTRAVENOUS at 13:24

## 2024-10-17 RX ADMIN — HEPARIN SODIUM 2000 UNITS: 1000 INJECTION INTRAVENOUS; SUBCUTANEOUS at 15:02

## 2024-10-17 ASSESSMENT — LIFESTYLE VARIABLES: TOBACCO_USE: 0

## 2024-10-17 ASSESSMENT — ACTIVITIES OF DAILY LIVING (ADL)
ADLS_ACUITY_SCORE: 38
ADLS_ACUITY_SCORE: 35
ADLS_ACUITY_SCORE: 38
ADLS_ACUITY_SCORE: 38

## 2024-10-17 NOTE — DISCHARGE INSTRUCTIONS
Post-Ablation Discharge Instructions    Care of groin site:        Remove the Band-Aid after 24 hours. If there is minor oozing, apply another Band-aid and remove it after 12 hours.         Do NOT take a bath, use a hot tub, pool, or submerse in water for at least 3 days You may shower.         It is normal to have a small bruise or lump at the site.        Do not scrub the site.        Do not use lotion or powder near the puncture site for 3 days.        For the first 2 days: Do not stoop or squat. When you cough, sneeze or move your bowels, hold your hand over the puncture site and press gently.        Do not lift more than 10 pounds or exertional activity for 10 days.      If you start bleeding from the site in your groin:  Lie down flat and press firmly on the site.  Call your physician immediately, or, come to the emergency room.    Call 911 right away if you have bleeding that is heavy or does not stop.     Call your doctor/provider if:        You have a large or growing hard lump around the site.        The site is red, swollen, hot or tender.        Blood or fluid is draining from the site.        You have chills or a fever greater than 101 F (38 C).        Your leg or arm turns bluish, feels numb or cool.        You have hives, a rash or unusual itching.     Cardiovascular Clinic:   [address] [place] [address]. [place] [address]    Your Care Team:  EP Cardiology   Telephone Number     Nurses:   Raquel PAULINO [phone]    After business hours: [phone], select option 4, and ask for EP Fellow on-call to be paged.   Non-procedure scheduling:    Regina GRIFFIN   [phone]   Procedure scheduling:    Rosalinda Figueroa   [phone]   Device Clinic (Pacemakers, ICDs, Loop Recorders)    During business hours: [phone]  After business hours:   [phone]- select option 4 and ask for job code [id number].       As always, Thank you for trusting us with your health care needs

## 2024-10-17 NOTE — ANESTHESIA POSTPROCEDURE EVALUATION
Patient: Chelo Brooks    Procedure: Procedure(s):  EP Ablation Focal AFIB       Anesthesia Type:  General    Note:  Disposition: Outpatient   Postop Pain Control: Uneventful            Sign Out: Well controlled pain   PONV: No   Neuro/Psych: Uneventful            Sign Out: Acceptable/Baseline neuro status   Airway/Respiratory: Uneventful            Sign Out: Acceptable/Baseline resp. status   CV/Hemodynamics: Uneventful            Sign Out: Acceptable CV status; No obvious hypovolemia; No obvious fluid overload   Other NRE: NONE   DID A NON-ROUTINE EVENT OCCUR? No    Event details/Postop Comments:  Patient does not have supervision overnight, may have to be admitted for observation overnight.            Last vitals:  Vitals Value Taken Time   BP 93/51 10/17/24 1830   Temp 37  C (98.6  F) 10/17/24 1820   Pulse 52 10/17/24 1840   Resp 22 10/17/24 1840   SpO2 100 % 10/17/24 1840   Vitals shown include unfiled device data.    Electronically Signed By: Silverio Black MD  October 17, 2024  6:41 PM

## 2024-10-17 NOTE — Clinical Note
Arrhythmia Type: atrial fibrillation.   Method of Cardioversion: synchronous.   The arrhythmia was terminated.   Energy shock delivered: 360 joules.

## 2024-10-17 NOTE — ANESTHESIA PREPROCEDURE EVALUATION
Anesthesia Pre-Procedure Evaluation    Patient: Chelo Brooks   MRN: [id number] : 1945        Procedure : Procedure(s):  EP Ablation Focal AFIB          Past Medical History:   Diagnosis Date    Acquired renal cyst of left kidney     Adrenal nodule (H)     left - needs yearly CT    Ascending aorta dilatation (H)     Chronic atrial fibrillation (H) 11/15/2023    CKD (chronic kidney disease) stage 3, GFR 30-59 ml/min (H)     Degenerative joint disease (DJD) of hip     DJD (degenerative joint disease) 10/21/2005    Eczema, unspecified type 2018    Elevated parathyroid hormone 2019    Gallstones     Hepatitis B childhood     resolved, patient is not a carrier     Hyperlipidemia 2012    Hypertension goal BP (blood pressure) < 140/90     Lumbar spinal stenosis 2017    Mild persistent asthma without complication 2017    Morbid obesity due to excess calories (H) 2015    Surgical referral declined '16     FLOR (obstructive sleep apnea)     Osteopenia     Pulmonary emboli (H) 10/28/2019    Shingles 2014    scalp    Stasis dermatitis of both legs 2018    Thyroid nodule     Umbilical hernia     Vitamin D deficiency       Past Surgical History:   Procedure Laterality Date    ANESTHESIA CARDIOVERSION N/A 2023    Procedure: Anesthesia cardioversion @1330;  Surgeon: GENERIC ANESTHESIA PROVIDER;  Location: [facility] OR    ANESTHESIA CARDIOVERSION N/A 2024    Procedure: Anesthesia cardioversion@1330;  Surgeon: GENERIC ANESTHESIA PROVIDER;  Location: [facility] OR    IR PAROTID BIOPSY  2020    Z TOTAL KNEE ARTHROPLASTY  3/2000    right    [facility] TOTAL KNEE ARTHROPLASTY  12    left    ZC VAGINAL HYSTERECTOMY      benign, prolapse, ovaries remain       Allergies   Allergen Reactions    Adhesive Tape       Social History     Tobacco Use    Smoking status: Never     Passive exposure: Never    Smokeless tobacco: Never   Substance Use Topics    Alcohol use: Yes     Alcohol/week: 0.0  standard drinks of alcohol     Comment: very rare        Wt Readings from Last 1 Encounters:   09/12/24 (!) 154.2 kg (340 lb)        Anesthesia Evaluation            ROS/MED HX  ENT/Pulmonary:     (+) sleep apnea,                    Mild Persistent, asthma               (-) tobacco use   Neurologic:  - neg neurologic ROS  (-) no CVA   Cardiovascular:     (+) Dyslipidemia hypertension- -   -  - -   Taking blood thinners        UMANA.               Irregular Heartbeat/Palpitations (persistent afib s/p DCCV 12/2023, unsuccessful DCCV x3 8/2024),       Previous cardiac testing (11/28/2023)   Echo: Date: Results:  Left ventricular size, wall motion and function are normal. The ejection  fraction is 55-60%.  Global right ventricular function is normal.  Normal LA, NATHALY 28  IVC diameter <2.1 cm collapsing >50% with sniff suggests a normal RA pressure  of 3 mmHg      Stress Test:  Date: Results:    ECG Reviewed:  Date: Results:    Cath:  Date: Results:      METS/Exercise Tolerance: 3 - Able to walk 1-2 blocks without stopping    Hematologic:     (+) History of blood clots (Hx DVT),               Musculoskeletal:   (+)  arthritis,             GI/Hepatic:     (+) GERD,                   Renal/Genitourinary:     (+) renal disease, type: CRI,            Endo:     (+)               Obesity (BMI 52),       Psychiatric/Substance Use:       Infectious Disease:       Malignancy:       Other:               OUTSIDE LABS:  CBC:   Lab Results   Component Value Date    WBC 4.8 09/12/2024    WBC 4.3 11/15/2023    HGB 10.6 (L) 09/12/2024    HGB 12.7 11/15/2023    HCT 34.5 (L) 09/12/2024    HCT 39.4 11/15/2023     09/12/2024     11/15/2023     BMP:   Lab Results   Component Value Date     09/12/2024     07/10/2024    POTASSIUM 4.2 09/12/2024    POTASSIUM 4.6 08/05/2024    CHLORIDE 106 09/12/2024    CHLORIDE 105 07/10/2024    CO2 26 09/12/2024    CO2 30 (H) 07/10/2024    BUN 25.2 (H) 09/12/2024    BUN 28.2 (H)  "07/10/2024    CR 1.32 (H) 09/12/2024    CR 1.46 (H) 07/10/2024     (H) 09/12/2024    GLC 93 07/10/2024     COAGS:   Lab Results   Component Value Date    INR 2.50 (H) 09/03/2020     POC: No results found for: \"BGM\", \"HCG\", \"HCGS\"  HEPATIC:   Lab Results   Component Value Date    ALBUMIN 3.7 03/27/2023    PROTTOTAL 6.7 (L) 01/04/2022    ALT 12 06/26/2024    AST 20 06/26/2024    ALKPHOS 75 01/04/2022    BILITOTAL 0.7 01/04/2022     OTHER:   Lab Results   Component Value Date    HECTOR 9.4 09/12/2024    PHOS 3.7 03/27/2023    MAG 1.7 08/05/2024    TSH 0.56 07/10/2024    SED 18 03/06/2020       Anesthesia Plan    ASA Status:  3    NPO Status:  NPO Appropriate    Anesthesia Type: General.     - Airway: ETT   Induction: Intravenous.   Maintenance: Balanced.   Techniques and Equipment:     - Airway: Video-Laryngoscope     - Lines/Monitors: BIS, 2nd IV     - Blood: T&S     Consents            Postoperative Care    Pain management: Multi-modal analgesia, Oral pain medications.   PONV prophylaxis: Dexamethasone or Solumedrol, Ondansetron (or other 5HT-3)     Comments:               Jennifer Santoyo MD    I have reviewed the pertinent notes and labs in the chart from the past 30 days and (re)examined the patient.  Any updates or changes from those notes are reflected in this note.               # Hypertension: Noted on problem list             # Asthma: noted on problem list       "

## 2024-10-17 NOTE — ANESTHESIA PROCEDURE NOTES
Airway       Patient location during procedure: OR       Procedure Start/Stop Times: 10/17/2024 12:45 PM  Staff -        Anesthesiologist:  Laurie Kaur MD       Resident/Fellow: Jennifer Santoyo MD       Performed By: resident  Consent for Airway        Urgency: elective  Indications and Patient Condition       Indications for airway management: isaias-procedural       Induction type:intravenous       Mask difficulty assessment: 1 - vent by mask    Final Airway Details       Final airway type: endotracheal airway       Successful airway: ETT - single  Endotracheal Airway Details        ETT size (mm): 7.0       Cuffed: yes       Successful intubation technique: direct laryngoscopy       DL Blade Type: MAC 3       Grade View of Cords: 1       Adjucts: stylet       Position: Right       Measured from: gums/teeth       Secured at (cm): 20       Bite block used: None    Post intubation assessment        Placement verified by: capnometry, equal breath sounds and chest rise        Number of attempts at approach: 1       Secured with: pink tape       Ease of procedure: easy       Dentition: Intact and Unchanged    Medication(s) Administered   Medication Administration Time: 10/17/2024 12:45 PM       none

## 2024-10-17 NOTE — ANESTHESIA CARE TRANSFER NOTE
Patient: Chelo Brooks    Procedure: Procedure(s):  EP Ablation Focal AFIB       Diagnosis: paroxysmal atrial fibrillation  Diagnosis Additional Information: No value filed.    Anesthesia Type:   General     Note:    Oropharynx: oropharynx clear of all foreign objects and spontaneously breathing  Level of Consciousness: drowsy  Oxygen Supplementation: face mask  Level of Supplemental Oxygen (L/min / FiO2): 8  Independent Airway: airway patency satisfactory and stable  Dentition: dentition unchanged  Vital Signs Stable: post-procedure vital signs reviewed and stable  Report to RN Given: handoff report given  Patient transferred to: PACU    Handoff Report: Identifed the Patient, Identified the Reponsible Provider, Reviewed the pertinent medical history, Discussed the surgical course, Reviewed Intra-OP anesthesia mangement and issues during anesthesia, Set expectations for post-procedure period and Allowed opportunity for questions and acknowledgement of understanding      Vitals:  Vitals Value Taken Time   /65 10/17/24 1820   Temp     Pulse 52 10/17/24 1824   Resp 17 10/17/24 1824   SpO2 100 % 10/17/24 1824   Vitals shown include unfiled device data.    Electronically Signed By: MARIE Glass CRNA  October 17, 2024  6:25 PM

## 2024-10-17 NOTE — H&P
Electrophysiology Pre-Procedure History and Physical    Chelo Brooks MRN# [id number]   Age: 79 year old YOB: 1945      Date of Procedure: 10/17/2024  [facility]      Date of Exam 10/17/2024 Facility (Same day)       HPI:  Ms. Brooks is a 79 year old female with past medical history significant for persistent atrial fibrillation, CKD, essential HTN, FLOR and morbid obesity. She was found to be in AF at PCP visit Dec 2023. Echo with preserved LVEF. Given unknown chronicity and worsening UMANA, she was started on amio with successful DCCV done 12/28/23. Dyspnea on exertion did improve in sinus. Back in Afib at follow up in January, referred to EP for ablation evaluation. Saw EP 7/17/24, discussed repeat cardioversion on amio, if improvement in symptoms in sinus, pursuing ablation. She had DCCV x 3 unsuccessful attempts on 8/5/24. She reports ongoing dyspnea on exertion and elected to pursue ablation for which she presents today.           Active problem list:     Patient Active Problem List    Diagnosis Date Noted    Hyperlipidemia LDL goal <40 11/27/2012     Priority: High    Chronic atrial fibrillation (H) 11/15/2023     Priority: Medium    Umbilical hernia      Priority: Medium    Degenerative joint disease (DJD) of hip      Priority: Medium    Thyroid nodule      Priority: Medium    GERD (gastroesophageal reflux disease) 10/28/2019     Priority: Medium    Asthma, mild intermittent, well-controlled 06/18/2019     Priority: Medium    Elevated parathyroid hormone 06/18/2019     Priority: Medium    Stage 3b chronic kidney disease (CKD) (H)      Priority: Medium    Stasis dermatitis of both legs 04/05/2018     Priority: Medium    Combined forms of age-related cataract of both eyes 07/18/2017     Priority: Medium    Lumbar spinal stenosis 07/17/2017     Priority: Medium    FLOR (obstructive sleep apnea)- severe (AHI 89) 02/15/2017     Priority: Medium      Study Date: 2/14/2017- (329.0 lbs) The total sleep time was 96.0 minutes. The combined apnea/hypopnea index was 89.4 events per hour.  The REM AHI was n/a.  The supine AHI was 93.9 events per hour. RDI was 92.5 events per hour.  Lowest oxygen saturation was 77%.  Time spent less than or equal to 88% was 27.5 minutes.  Time spent less than or equal to 89% was 34.4 minutes.      Osteopenia      Priority: Medium    Morbid obesity due to excess calories (H) 11/23/2015     Priority: Medium     Surgical referral declined '16      Renal hypertension      Priority: Medium    History of bilateral knee replacement 10/23/2013     Priority: Medium    Allergic rhinitis due to animal dander      Priority: Medium     4/5/12 RAST pos. only to cat mildly      Dependent edema 06/17/2003     Priority: Medium            Medications (include herbals and vitamins):      No current facility-administered medications for this encounter.     Current Outpatient Medications   Medication Sig Dispense Refill    famotidine (PEPCID) 40 MG tablet TAKE 1 TABLET(40 MG) BY MOUTH DAILY 90 tablet 4    albuterol (PROAIR HFA/PROVENTIL HFA/VENTOLIN HFA) 108 (90 Base) MCG/ACT inhaler Inhale 1-2 puffs into the lungs every 4 hours as needed for shortness of breath / dyspnea 1 Inhaler 3    amiodarone (PACERONE) 200 MG tablet Take 1 tablet (200 mg) by mouth daily. 90 tablet 1    atorvastatin (LIPITOR) 40 MG tablet Take 1 tablet (40 mg) by mouth daily 90 tablet 3    bumetanide (BUMEX) 1 MG tablet TAKE HALF DAILY (Patient taking differently: as needed. TAKE HALF DAILY) 90 tablet 3    Cholecalciferol (VITAMIN D) 2000 UNITS tablet Take 2,000 Units by mouth daily      Cyanocobalamin (VITAMIN B-12 PO) Take by mouth daily      fluticasone (FLONASE) 50 MCG/ACT nasal spray Spray 2 sprays into both nostrils daily 48 g 3    gabapentin (NEURONTIN) 100 MG capsule Take 1 capsule (100 mg) by mouth 2 times daily as needed for neuropathic pain 60 capsule 1    loratadine  (CLARITIN) 10 MG tablet Take 1 tablet (10 mg) by mouth daily      magnesium 250 MG tablet Take 1 tablet (250 mg) by mouth daily 90 tablet 1    metoprolol succinate ER (TOPROL XL) 50 MG 24 hr tablet Take 1 tablet (50 mg) by mouth daily 90 tablet 3    mometasone (ELOCON) 0.1 % external cream Apply to affected area on legs twice daily as needed 100 g 3    olmesartan (BENICAR) 40 MG tablet TAKE 1 TABLET(40 MG) BY MOUTH DAILY 90 tablet 3    order for DME Equipment being ordered: Auto CPAP 11-18 1 Units 0    rivaroxaban ANTICOAGULANT (XARELTO) 20 MG TABS tablet Take 1 tablet (20 mg) by mouth daily (with dinner) 90 tablet 3           Medication List         Notice    Cannot display discharge medications because the patient has not yet been admitted.              Allergies:      Allergies   Allergen Reactions    Adhesive Tape              Social History:     Social History     Tobacco Use    Smoking status: Never     Passive exposure: Never    Smokeless tobacco: Never   Substance Use Topics    Alcohol use: Yes     Alcohol/week: 0.0 standard drinks of alcohol     Comment: very rare              Physical Exam:   All vitals have been reviewed  No data found.  No intake/output data recorded.  Airway assessment:   Patient is able to open mouth wide  Patient is able to stick out tongue}      ENT:   Normocephalic, without obvious abnormality, atraumatic, sinuses nontender on palpation, external ears without lesions, oral pharynx with moist mucous membranes, tonsils without erythema or exudates, gums normal and good dentition.     Neck:   Supple, symmetrical, trachea midline, no adenopathy, thyroid symmetric, not enlarged and no tenderness, skin normal     Lungs:   No increased work of breathing, good air exchange, clear to auscultation bilaterally, no crackles or wheezing     Cardiovascular:   Irregularly irregular.              Lab / Radiology Results:     Lab Results   Component Value Date    WBC 4.8 09/12/2024    WBC 4.0  08/06/2020    RBC 3.98 09/12/2024    RBC 4.26 08/06/2020    HGB 10.6 09/12/2024    HGB 12.3 08/06/2020    HCT 34.5 09/12/2024    HCT 37.3 08/06/2020    MCV 87 09/12/2024    MCV 88 08/06/2020    RDW 16.2 09/12/2024    RDW 13.9 08/06/2020     09/12/2024     08/06/2020      Lab Results   Component Value Date    WBC 4.8 09/12/2024    WBC 4.0 08/06/2020     Lab Results   Component Value Date     09/12/2024     08/06/2020     Lab Results   Component Value Date    HGB 10.6 09/12/2024    HGB 12.3 08/06/2020    HCT 34.5 09/12/2024    HCT 37.3 08/06/2020     Lab Results   Component Value Date     09/12/2024     08/06/2020    CO2 26 09/12/2024    CO2 28 03/27/2023    CO2 30 08/06/2020    BUN 25.2 09/12/2024    BUN 28 03/27/2023    BUN 25 08/06/2020     Lab Results   Component Value Date     09/12/2024     08/06/2020    CO2 26 09/12/2024    CO2 28 03/27/2023    CO2 30 08/06/2020    BUN 25.2 09/12/2024    BUN 28 03/27/2023    BUN 25 08/06/2020     Lab Results   Component Value Date    TSH 0.56 07/10/2024    TSH 1.61 04/05/2018             Plan:   Patient's active problems diagnostically and therapeutically optimized for the planned procedure. Patient here for AF ablation. Procedure explained in detail to patient including indications, risks, and benefits. The procedural risk of EP study and ablation were discussed in detail. Risks discussed include: vascular complications, CVA, AVB, pericardial effusion and tamponade, esophageal fistula, PV stenosis. AF ablation has 70% success at 1 year, 50% success at 5 years, and 30% need another ablation.  Even if ablation is successful anticoagulation may be needed lifelong.Patient states understanding and wishes to procced.     MARIE Leiva CNP  Electrophysiology Consult Service  Securely message with [employer]   Text page via [facility] Paging/Directory

## 2024-10-17 NOTE — Clinical Note
Potential access sites were evaluated for patency using ultrasound.   The left femoral artery, right femoral vein, and left femoral vein was selected. Access was obtained under with Sonosite guidance using a micropuncture 21 gauge needle with direct visualization of needle entry.

## 2024-10-18 VITALS
DIASTOLIC BLOOD PRESSURE: 82 MMHG | TEMPERATURE: 97.5 F | WEIGHT: 293 LBS | HEART RATE: 66 BPM | BODY MASS INDEX: 45.99 KG/M2 | SYSTOLIC BLOOD PRESSURE: 115 MMHG | HEIGHT: 67 IN | RESPIRATION RATE: 18 BRPM | OXYGEN SATURATION: 96 %

## 2024-10-18 LAB
ATRIAL RATE - MUSE: 60 BPM
DIASTOLIC BLOOD PRESSURE - MUSE: NORMAL MMHG
GLUCOSE BLDC GLUCOMTR-MCNC: 132 MG/DL (ref 70–99)
INTERPRETATION ECG - MUSE: NORMAL
P AXIS - MUSE: 93 DEGREES
PR INTERVAL - MUSE: 146 MS
QRS DURATION - MUSE: 96 MS
QT - MUSE: 484 MS
QTC - MUSE: 484 MS
R AXIS - MUSE: -3 DEGREES
SYSTOLIC BLOOD PRESSURE - MUSE: NORMAL MMHG
T AXIS - MUSE: 8 DEGREES
VENTRICULAR RATE- MUSE: 60 BPM

## 2024-10-18 PROCEDURE — 82962 GLUCOSE BLOOD TEST: CPT

## 2024-10-18 PROCEDURE — 99238 HOSP IP/OBS DSCHRG MGMT 30/<: CPT

## 2024-10-18 PROCEDURE — G0378 HOSPITAL OBSERVATION PER HR: HCPCS

## 2024-10-18 PROCEDURE — 250N000013 HC RX MED GY IP 250 OP 250 PS 637: Performed by: NURSE PRACTITIONER

## 2024-10-18 RX ADMIN — AMIODARONE HYDROCHLORIDE 200 MG: 200 TABLET ORAL at 09:20

## 2024-10-18 RX ADMIN — METOPROLOL SUCCINATE 50 MG: 50 TABLET, EXTENDED RELEASE ORAL at 09:20

## 2024-10-18 ASSESSMENT — ACTIVITIES OF DAILY LIVING (ADL)
ADLS_ACUITY_SCORE: 33
ADLS_ACUITY_SCORE: 39

## 2024-10-18 NOTE — PROGRESS NOTES
Observation Goals:     Patient tolerating liquids and food -- yes  Patient ambulating -- yes  Vital signs within patients normal limits -- yes  Patient has a  -- yes

## 2024-10-18 NOTE — PLAN OF CARE
Goal Outcome Evaluation:      Plan of Care Reviewed With: patient    Overall Patient Progress: improvingOverall Patient Progress: improving    Outcome Evaluation: discharge education provided    Discharge education provided to patient, questions answered, patient to discharge with daughter home.

## 2024-10-18 NOTE — DISCHARGE SUMMARY
"  Electrophysiology Discharge Summary  Date of Procedure: 10/17/24  DOS: 10/18/2024   Pre-procedure Diagnosis: Persistent Atrial Fibrillaltion resistant to AAT (Amiodarone)   Post-procedure Diagnosis: Successful pulmonary vein isolation     Hospital Course:  Ms. Brooks is a 79 year old female with past medical history significant for persistent atrial fibrillation, CKD, essential HTN, FLOR and morbid obesity. She was found to be in AF at PCP visit Dec 2023. Echo with preserved LVEF. Given unknown chronicity and worsening UMANA, she was started on amio with successful DCCV done 12/28/23. Dyspnea on exertion did improve in sinus. Back in Afib at follow up in January, referred to EP for ablation evaluation. Saw EP 7/17/24, discussed repeat cardioversion on amio, if improvement in symptoms in sinus, pursuing ablation. She had DCCV x 3 unsuccessful attempts on 8/5/24. She reports ongoing dyspnea on exertion and elected to pursue ablation for which she presented. She underwent successful pulmonary vein isolation yesterday, 10/17/24.   Patient underwent a successful PVI yesterday. She had an uneventful overnight stay. Telemetry reviewed showing sinus rhythm. Groin sites are soft, CDI, no bruit, no bleeding, and no hematoma. Groin sutures removed. Patient is tolerating oral intake, ambulating at baseline, and voiding without difficulties. Patient remains hemodynamically stable.     ROS:   10 point ROS neg other than the symptoms noted above.   Exam:  /82   Pulse 66   Temp 97.5  F (36.4  C) (Oral)   Resp 18   Ht 1.702 m (5' 7\")   Wt (!) 150 kg (330 lb 12.8 oz)   SpO2 96%   BMI 51.81 kg/m      GENERAL APPEARANCE: AxO, NAD   HEENT: NCAT, MMM.   CHEST: CTAB   CARDIOVASCULAR: S1S2, Reg, No m/r/g.   ABDOMEN: soft, nontender, nondistended   EXTREMITIES: No pedal edema. Distal pulses intact.   NEURO: Grossly nonfocal.   PSYCH: Normal affect.  SKIN: Warm and dry. Groin sutures CDI.    Discharge Medications:     Review of " your medicines        UNREVIEWED medicines. Ask your doctor about these medicines        Dose / Directions   famotidine 40 MG tablet  Commonly known as: PEPCID  Used for: Gastroesophageal reflux disease with esophagitis, unspecified whether hemorrhage      TAKE 1 TABLET(40 MG) BY MOUTH DAILY  Quantity: 90 tablet  Refills: 4     loratadine 10 MG tablet  Commonly known as: CLARITIN      Dose: 10 mg  Take 1 tablet (10 mg) by mouth daily  Refills: 0            CONTINUE these medicines which may have CHANGED, or have new prescriptions. If we are uncertain of the size of tablets/capsules you have at home, strength may be listed as something that might have changed.        Dose / Directions   bumetanide 1 MG tablet  Commonly known as: BUMEX  This may have changed:   when to take this  reasons to take this      TAKE HALF DAILY  Quantity: 90 tablet  Refills: 3            CONTINUE these medicines which have NOT CHANGED        Dose / Directions   albuterol 108 (90 Base) MCG/ACT inhaler  Commonly known as: PROAIR HFA/PROVENTIL HFA/VENTOLIN HFA      Dose: 1-2 puff  Inhale 1-2 puffs into the lungs every 4 hours as needed for shortness of breath / dyspnea  Quantity: 1 Inhaler  Refills: 3     amiodarone 200 MG tablet  Commonly known as: PACERONE  Used for: Chronic atrial fibrillation (H)      Dose: 200 mg  Take 1 tablet (200 mg) by mouth daily.  Quantity: 90 tablet  Refills: 1     atorvastatin 40 MG tablet  Commonly known as: LIPITOR  Used for: Hyperlipidemia LDL goal <40      Dose: 40 mg  Take 1 tablet (40 mg) by mouth daily  Quantity: 90 tablet  Refills: 3     fluticasone 50 MCG/ACT nasal spray  Commonly known as: FLONASE  Used for: Chronic allergic rhinitis due to animal hair and dander      Dose: 2 spray  Spray 2 sprays into both nostrils daily  Quantity: 48 g  Refills: 3     gabapentin 100 MG capsule  Commonly known as: NEURONTIN  Used for: Lumbar radiculopathy      Dose: 100 mg  Take 1 capsule (100 mg) by mouth 2 times daily  as needed for neuropathic pain  Quantity: 60 capsule  Refills: 1     magnesium 250 MG tablet  Used for: Renal hypertension, Chronic atrial fibrillation (H)      Dose: 1 tablet  Take 1 tablet (250 mg) by mouth daily  Quantity: 90 tablet  Refills: 1     metoprolol succinate ER 50 MG 24 hr tablet  Commonly known as: TOPROL XL      Dose: 50 mg  Take 1 tablet (50 mg) by mouth daily  Quantity: 90 tablet  Refills: 3     mometasone 0.1 % external cream  Commonly known as: ELOCON  Used for: Stasis dermatitis of both legs      Apply to affected area on legs twice daily as needed  Quantity: 100 g  Refills: 3     olmesartan 40 MG tablet  Commonly known as: BENICAR  Used for: Chronic atrial fibrillation (H), Heart failure with preserved ejection fraction, NYHA class II (H)      Dose: 40 mg  TAKE 1 TABLET(40 MG) BY MOUTH DAILY  Quantity: 90 tablet  Refills: 3     order for DME  Used for: FLOR (obstructive sleep apnea)- severe (AHI 89)      Equipment being ordered: Auto CPAP 11-18  Quantity: 1 Units  Refills: 0     rivaroxaban ANTICOAGULANT 20 MG Tabs tablet  Commonly known as: XARELTO  Used for: Chronic atrial fibrillation (H)      Dose: 20 mg  Take 1 tablet (20 mg) by mouth daily (with dinner)  Quantity: 90 tablet  Refills: 3     VITAMIN B-12 PO      Take by mouth daily  Refills: 0     vitamin D3 50 mcg (2000 units) tablet  Commonly known as: CHOLECALCIFEROL      Dose: 2,000 Units  Take 2,000 Units by mouth daily  Refills: 0              Patient Instructions:    Care of groin site:        Remove the Band-Aid after 24 hours. If there is minor oozing, apply another Band-aid and remove it after 12 hours.         Do NOT take a bath, use a hot tub, pool, or submerse in water for at least 3 days You may shower.         It is normal to have a small bruise or lump at the site.        Do not scrub the site.        Do not use lotion or powder near the puncture site for 3 days.        For the first 2 days: Do not stoop or squat. When you  cough, sneeze or move your bowels, hold your hand over the puncture site and press gently.        Do not lift more than 10 pounds or exertional activity for 10 days.      If you start bleeding from the site in your groin:  Lie down flat and press firmly on the site.  Call your physician immediately, or, come to the emergency room.    Call 911 right away if you have bleeding that is heavy or does not stop.     Call your doctor/provider if:        You have a large or growing hard lump around the site.        The site is red, swollen, hot or tender.        Blood or fluid is draining from the site.        You have chills or a fever greater than 101 F (38 C).        Your leg or arm turns bluish, feels numb or cool.        You have hives, a rash or unusual itching.     Plan & Follow up:  - Continue amiodarone 200 mg daily until follow up appointment   - Follow up as scheduled 3/13/25.       Desire Mensah PA-C  [facility]  Electrophysiology Consult Service  Pager: [phone]

## 2024-10-18 NOTE — PROGRESS NOTES
Admission to  at 2240  Diagnosis: S/p ablation  Admitted from: PACU  Via: Stretcher  Accompanied by: Patient transport  Teaching: Call don't fall, use of console, meal times, visiting hours, orientation to unit, when to call for the RN (angina/sob/dizzyness, etc.), and the importance of safety.   Access: L/R PIV   Telemetry: Placed on pt   Two nurse head-to-toe skin assessment performed by Laura GRAHAM RN and Trina GRAHAM RN.  Skin issues noted: Medial chest abrasion; circular abrasion on lateral aspect of left ankle; david/dry lower extremities. See PCS for further assessment and treatments.

## 2024-10-18 NOTE — PROGRESS NOTES
[id number]    Neuro: A&Ox4. Rested well between cares. Utilizing call light appropriately and able to make needs known.  Cardiac: VSS. SR/SB 50s-70s. Denies CP/SOB. R fem site noted to be leaking after suture removal and walking to commode, redressed around 0000. About 20 min later R fem site noted to be leaking again, leaked through dressing and gown. Redressed w quick clot.Trace amount of blood on R fem dressing-- no further bleeding noted. No electrolyte protocols or labs ordered yet this AM.  Respiratory: RR even and nonlabored w sats >90 on RA, 1L NC while sleeping. Lung sounds clear w dim bases.  GI/: No BM this shift. LBM 10/16. Adequate UOP.  Diet/appetite:  Had a pudding around midnight, tolerating well.   Activity:  Ax1 pivot to commode. R FA cast.  Pain: Declined pain throughout shift.  Skin:  No new skin integrity issues or concerns noted this shift.   LDA's: L PIV x2  Drips: None  Plan: No changes to current plan of care. Contact primary care team w changes or concerns.     Temp: 97.8  F (36.6  C) Temp src: Oral BP: 123/74 Pulse: 59   Resp: 22 SpO2: 97 % O2 Device: None (Room air) Oxygen Delivery: 1 LPM       Vitals:    10/17/24 1103 10/18/24 0331   Weight: 148.1 kg (326 lb 8 oz) (!) 150 kg (330 lb 12.8 oz)

## 2024-10-19 LAB
ATRIAL RATE - MUSE: NORMAL BPM
DIASTOLIC BLOOD PRESSURE - MUSE: NORMAL MMHG
INTERPRETATION ECG - MUSE: NORMAL
P AXIS - MUSE: NORMAL DEGREES
PR INTERVAL - MUSE: NORMAL MS
QRS DURATION - MUSE: 100 MS
QT - MUSE: 372 MS
QTC - MUSE: 462 MS
R AXIS - MUSE: -11 DEGREES
SYSTOLIC BLOOD PRESSURE - MUSE: NORMAL MMHG
T AXIS - MUSE: 25 DEGREES
VENTRICULAR RATE- MUSE: 93 BPM

## 2024-10-20 ENCOUNTER — PATIENT OUTREACH (OUTPATIENT)
Dept: CARE COORDINATION | Facility: CLINIC | Age: 79
End: 2024-10-20
Payer: COMMERCIAL

## 2024-10-20 NOTE — PROGRESS NOTES
[facility] Resource Center:   [facility] Resource Center Contact  [facility]/Voicemail     Clinical Data: Post-Discharge Outreach     Outreach attempted x 2.  Left message on patient's voicemail, providing [facility]'s central phone number of [phone] ([phone]) for questions/concerns and/or to schedule an appt with an [facility] provider, if they do not have a PCP.      Plan:  [facility] will do no further outreaches at this time.       Tran Lewis  Community Health Worker  [facility], [facility]  [phone]      *Connected Care Resource Team does NOT follow patient ongoing. Referrals are identified based on internal discharge reports and the outreach is to ensure patient has an understanding of their discharge instructions.

## 2024-10-23 ENCOUNTER — TELEPHONE (OUTPATIENT)
Dept: CARDIOLOGY | Facility: CLINIC | Age: 79
End: 2024-10-23
Payer: COMMERCIAL

## 2024-10-23 NOTE — TELEPHONE ENCOUNTER
[facility] Call Center    Phone Message    May a detailed message be left on voicemail: yes     Reason for Call: Other: Patient would like to speak to her care team following her ablation. No further details relayed. Please call her back to discuss.      Action Taken: Other: cardiology    Travel Screening: Not Applicable   Thank you!  Specialty Access Center      Date of Service:

## 2024-10-23 NOTE — TELEPHONE ENCOUNTER
"Spoke to patient who reports that since she left the hospital she has gotten a cold.  Reports when she woke up from surgery and in recovery she was directly below a vent that she feels contributed to her cold.  States that she did end up spending the night because she had such bad back pain s/p the afib ablation on 10/17.  She was disappointedly because her daughter was at the hospital for 9 hours and she was never received an update during the whole day from a nurse or surgeon after the procedure.  Patient was also unhappy because she did spend the night and was fasting \"for 35 hours\" and only got pudding to eat at night.  She was really hungry, was able to eat in the morning.      Patient also reports that she is really fatigued.  Today she is slightly better but wants to know if the fatigue is something normal.  Writer discussed that being fatigued is very common after an ablation.  Advised patient that if she continues to slowly get better than this is a good sign.  Explained if her cold gets worse and she gets a fever recommend to be seen.      Spoke to patient and apologized that her hospitalization and procedure was not a good experience.  That this will be discussed with upper management.      Katty Solis, RN  Cardiology Care Coordinator  [facility]  [phone] option 1    "

## 2024-11-02 ENCOUNTER — OFFICE VISIT (OUTPATIENT)
Dept: URGENT CARE | Facility: URGENT CARE | Age: 79
End: 2024-11-02
Payer: COMMERCIAL

## 2024-11-02 VITALS
RESPIRATION RATE: 22 BRPM | BODY MASS INDEX: 52.31 KG/M2 | SYSTOLIC BLOOD PRESSURE: 149 MMHG | TEMPERATURE: 98.5 F | HEART RATE: 45 BPM | DIASTOLIC BLOOD PRESSURE: 80 MMHG | OXYGEN SATURATION: 96 % | WEIGHT: 293 LBS

## 2024-11-02 DIAGNOSIS — I82.409 DEEP VEIN THROMBOSIS (DVT) OF LOWER EXTREMITY, UNSPECIFIED CHRONICITY, UNSPECIFIED LATERALITY, UNSPECIFIED VEIN (H): ICD-10-CM

## 2024-11-02 DIAGNOSIS — Z98.890 S/P ABLATION OF ATRIAL FIBRILLATION: ICD-10-CM

## 2024-11-02 DIAGNOSIS — Z86.79 S/P ABLATION OF ATRIAL FIBRILLATION: ICD-10-CM

## 2024-11-02 DIAGNOSIS — Z87.09 HISTORY OF ASTHMA: ICD-10-CM

## 2024-11-02 DIAGNOSIS — R06.02 SOB (SHORTNESS OF BREATH): Primary | ICD-10-CM

## 2024-11-02 DIAGNOSIS — I26.99 PULMONARY EMBOLISM, UNSPECIFIED CHRONICITY, UNSPECIFIED PULMONARY EMBOLISM TYPE, UNSPECIFIED WHETHER ACUTE COR PULMONALE PRESENT (H): ICD-10-CM

## 2024-11-02 PROCEDURE — 99214 OFFICE O/P EST MOD 30 MIN: CPT | Performed by: PHYSICIAN ASSISTANT

## 2024-11-02 RX ORDER — ALBUTEROL SULFATE 90 UG/1
1 INHALANT RESPIRATORY (INHALATION) EVERY 6 HOURS PRN
Qty: 18 G | Refills: 0 | Status: SHIPPED | OUTPATIENT
Start: 2024-11-02

## 2024-11-02 NOTE — PROGRESS NOTES
"  Chief Complaint   Patient presents with    Shortness of Breath     Had an ablation 2 weeks ago and right after that got a cold and since then has felt SOB when moving around, feels similar to when she had blood clots, some coughing and congestion     Musculoskeletal Problem     Left leg has been swollen and painful for \"forever\" according to pt              ASSESSMENT:     ICD-10-CM    1. SOB (shortness of breath)  R06.02 albuterol (PROAIR HFA/PROVENTIL HFA/VENTOLIN HFA) 108 (90 Base) MCG/ACT inhaler      2. S/P ablation of atrial fibrillation  Z98.890 albuterol (PROAIR HFA/PROVENTIL HFA/VENTOLIN HFA) 108 (90 Base) MCG/ACT inhaler    Z86.79       3. Pulmonary embolism, unspecified chronicity, unspecified pulmonary embolism type, unspecified whether acute cor pulmonale present (H)  I26.99 albuterol (PROAIR HFA/PROVENTIL HFA/VENTOLIN HFA) 108 (90 Base) MCG/ACT inhaler      4. Deep vein thrombosis (DVT) of lower extremity, unspecified chronicity, unspecified laterality, unspecified vein (H)  I82.409 albuterol (PROAIR HFA/PROVENTIL HFA/VENTOLIN HFA) 108 (90 Base) MCG/ACT inhaler      5. History of asthma  Z87.09 albuterol (PROAIR HFA/PROVENTIL HFA/VENTOLIN HFA) 108 (90 Base) MCG/ACT inhaler            PLAN: 80 yo sp ablation for atrial fib 2 weeks ago. SHORTNESS OF BREATH and left leg pain. Limited on resources. To ER for further evaluation and treatment. Feels similar to when she had blood clot in the lungs in the past.  Limited on resources here.  Unable to rule out pulmonary embolism or DVT.I have discussed clinical findings with patient.  Side effects of medications discussed.  Symptomatic care is discussed.  I have discussed the possibility of  worsening symptoms and indication to RTC or go to the ER if they occur.  All questions are answered, patient indicates understanding of these issues and is in agreement with plan.   Patient care instructions are discussed/given at the end of visit.   Does request " prescription for albuterol inhaler, done.    Daxa Brooks PA-C      SUBJECTIVE:    79-year-old female, status post ablation for atrial fibs 2 weeks ago presents for shortness of breath.  In the recovery room after the ablation she was right under a vent and ever since then has shortness of breath.  However the shortness of breath is worsening.  She does have a history of asthma but has not required an inhaler for several years.  She has a history of bilateral leg swelling but the left has been worse with increased pain over the last several days.  She has a history of pulmonary embolism and DVT.  She states this feels similar to when she had a pulmonary embolism.    Allergies   Allergen Reactions    Adhesive Tape        Past Medical History:   Diagnosis Date    Acquired renal cyst of left kidney     Adrenal nodule (H)     left - needs yearly CT    Ascending aorta dilatation (H)     Chronic atrial fibrillation (H) 11/15/2023    CKD (chronic kidney disease) stage 3, GFR 30-59 ml/min (H)     Degenerative joint disease (DJD) of hip     DJD (degenerative joint disease) 10/21/2005    Eczema, unspecified type 04/05/2018    Elevated parathyroid hormone 06/18/2019    Gallstones     Hepatitis B childhood     resolved, patient is not a carrier     Hyperlipidemia 11/27/2012    Hypertension goal BP (blood pressure) < 140/90     Lumbar spinal stenosis 07/17/2017    Mild persistent asthma without complication 05/30/2017    Morbid obesity due to excess calories (H) 11/23/2015    Surgical referral declined '16     FLOR (obstructive sleep apnea)     Osteopenia     Pulmonary emboli (H) 10/28/2019    Shingles 2014    scalp    Stasis dermatitis of both legs 04/05/2018    Thyroid nodule     Umbilical hernia     Vitamin D deficiency        Current Outpatient Medications   Medication Sig Dispense Refill    albuterol (PROAIR HFA/PROVENTIL HFA/VENTOLIN HFA) 108 (90 Base) MCG/ACT inhaler Inhale 1 puff into the lungs every 6 hours as  needed for shortness of breath, wheezing or cough. 18 g 0    albuterol (PROAIR HFA/PROVENTIL HFA/VENTOLIN HFA) 108 (90 Base) MCG/ACT inhaler Inhale 1-2 puffs into the lungs every 4 hours as needed for shortness of breath / dyspnea 1 Inhaler 3    amiodarone (PACERONE) 200 MG tablet Take 1 tablet (200 mg) by mouth daily. 90 tablet 1    atorvastatin (LIPITOR) 40 MG tablet Take 1 tablet (40 mg) by mouth daily 90 tablet 3    bumetanide (BUMEX) 1 MG tablet TAKE HALF DAILY (Patient taking differently: as needed. TAKE HALF DAILY) 90 tablet 3    famotidine (PEPCID) 40 MG tablet TAKE 1 TABLET(40 MG) BY MOUTH DAILY (Patient not taking: Reported on 11/2/2024) 90 tablet 4    fluticasone (FLONASE) 50 MCG/ACT nasal spray Spray 2 sprays into both nostrils daily 48 g 3    gabapentin (NEURONTIN) 100 MG capsule Take 1 capsule (100 mg) by mouth 2 times daily as needed for neuropathic pain 60 capsule 1    magnesium 250 MG tablet Take 1 tablet (250 mg) by mouth daily 90 tablet 1    metoprolol succinate ER (TOPROL XL) 50 MG 24 hr tablet Take 1 tablet (50 mg) by mouth daily 90 tablet 3    mometasone (ELOCON) 0.1 % external cream Apply to affected area on legs twice daily as needed 100 g 3    olmesartan (BENICAR) 40 MG tablet TAKE 1 TABLET(40 MG) BY MOUTH DAILY 90 tablet 3    order for DME Equipment being ordered: Auto CPAP 11-18 1 Units 0    rivaroxaban ANTICOAGULANT (XARELTO) 20 MG TABS tablet Take 1 tablet (20 mg) by mouth daily (with dinner) 90 tablet 3    Cholecalciferol (VITAMIN D) 2000 UNITS tablet Take 2,000 Units by mouth daily (Patient not taking: Reported on 11/2/2024)      Cyanocobalamin (VITAMIN B-12 PO) Take by mouth daily (Patient not taking: Reported on 11/2/2024)      loratadine (CLARITIN) 10 MG tablet Take 1 tablet (10 mg) by mouth daily (Patient not taking: Reported on 11/2/2024)       No current facility-administered medications for this visit.       Social History     Tobacco Use    Smoking status: Never     Passive  exposure: Never    Smokeless tobacco: Never   Substance Use Topics    Alcohol use: Yes     Alcohol/week: 0.0 standard drinks of alcohol     Comment: very rare         ROS:  CONSTITUTIONAL: Negative for fatigue or fever.  EYES: Negative for eye problems.  ENT: As above.  RESP: As above.  CV: Negative for chest pains.  GI: Negative for vomiting.  MUSCULOSKELETAL:  Negative for significant muscle or joint pains.  NEUROLOGIC: Negative for headaches.  SKIN: Negative for rash.  PSYCH: Normal mentation for age.    OBJECTIVE:  BP (!) 149/80 (BP Location: Left arm, Patient Position: Sitting, Cuff Size: Adult Regular)   Pulse (!) 45   Temp 98.5  F (36.9  C) (Tympanic)   Resp 22   Wt (!) 151.5 kg (334 lb)   SpO2 96%   BMI 52.31 kg/m    GENERAL APPEARANCE: Healthy, alert and no distress.  EYES:Conjunctiva/sclera clear.  EARS: No cerumen.   Ear canals w/o erythema.  TM's intact w/o erythema.    THROAT: No erythema w/o tonsillar enlargement . No exudates.  NECK: Supple, nontender, no lymphadenopathy.  RESP: Lungs clear to auscultation -distant breath sounds but no obvious  rales, rhonchi or wheezes.  CV: Regular rate and rhythm, normal S1 S2, no murmur noted.  NEURO: Awake, alert    SKIN: Bilateral lower extremities are swollen with brawny skin color changes.  Left lower leg is tender to palpation.        Daxa Brooks PA-C

## 2024-11-06 ENCOUNTER — OFFICE VISIT (OUTPATIENT)
Dept: FAMILY MEDICINE | Facility: CLINIC | Age: 79
End: 2024-11-06
Payer: COMMERCIAL

## 2024-11-06 ENCOUNTER — TELEPHONE (OUTPATIENT)
Dept: CARDIOLOGY | Facility: CLINIC | Age: 79
End: 2024-11-06

## 2024-11-06 VITALS
HEIGHT: 67 IN | BODY MASS INDEX: 45.99 KG/M2 | HEART RATE: 39 BPM | WEIGHT: 293 LBS | TEMPERATURE: 97.6 F | DIASTOLIC BLOOD PRESSURE: 54 MMHG | OXYGEN SATURATION: 100 % | RESPIRATION RATE: 16 BRPM | SYSTOLIC BLOOD PRESSURE: 125 MMHG

## 2024-11-06 DIAGNOSIS — Z01.818 PREOP GENERAL PHYSICAL EXAM: Primary | ICD-10-CM

## 2024-11-06 DIAGNOSIS — R73.01 IMPAIRED FASTING GLUCOSE: ICD-10-CM

## 2024-11-06 DIAGNOSIS — R53.83 FATIGUE, UNSPECIFIED TYPE: ICD-10-CM

## 2024-11-06 DIAGNOSIS — Z87.81 HISTORY OF WRIST FRACTURE: ICD-10-CM

## 2024-11-06 DIAGNOSIS — R00.1 BRADYCARDIA: ICD-10-CM

## 2024-11-06 LAB
BASOPHILS # BLD AUTO: 0 10E3/UL (ref 0–0.2)
BASOPHILS NFR BLD AUTO: 1 %
EOSINOPHIL # BLD AUTO: 0.2 10E3/UL (ref 0–0.7)
EOSINOPHIL NFR BLD AUTO: 4 %
ERYTHROCYTE [DISTWIDTH] IN BLOOD BY AUTOMATED COUNT: 17 % (ref 10–15)
EST. AVERAGE GLUCOSE BLD GHB EST-MCNC: 97 MG/DL
HBA1C MFR BLD: 5 % (ref 0–5.6)
HCT VFR BLD AUTO: 32.7 % (ref 35–47)
HGB BLD-MCNC: 10.2 G/DL (ref 11.7–15.7)
IMM GRANULOCYTES # BLD: 0 10E3/UL
IMM GRANULOCYTES NFR BLD: 1 %
LYMPHOCYTES # BLD AUTO: 0.8 10E3/UL (ref 0.8–5.3)
LYMPHOCYTES NFR BLD AUTO: 17 %
MCH RBC QN AUTO: 27.1 PG (ref 26.5–33)
MCHC RBC AUTO-ENTMCNC: 31.2 G/DL (ref 31.5–36.5)
MCV RBC AUTO: 87 FL (ref 78–100)
MONOCYTES # BLD AUTO: 0.4 10E3/UL (ref 0–1.3)
MONOCYTES NFR BLD AUTO: 8 %
NEUTROPHILS # BLD AUTO: 3.4 10E3/UL (ref 1.6–8.3)
NEUTROPHILS NFR BLD AUTO: 70 %
PLATELET # BLD AUTO: 167 10E3/UL (ref 150–450)
RBC # BLD AUTO: 3.77 10E6/UL (ref 3.8–5.2)
WBC # BLD AUTO: 4.9 10E3/UL (ref 4–11)

## 2024-11-06 PROCEDURE — 80053 COMPREHEN METABOLIC PANEL: CPT | Performed by: FAMILY MEDICINE

## 2024-11-06 PROCEDURE — 83036 HEMOGLOBIN GLYCOSYLATED A1C: CPT | Performed by: FAMILY MEDICINE

## 2024-11-06 PROCEDURE — 84443 ASSAY THYROID STIM HORMONE: CPT | Performed by: FAMILY MEDICINE

## 2024-11-06 PROCEDURE — 84439 ASSAY OF FREE THYROXINE: CPT | Performed by: FAMILY MEDICINE

## 2024-11-06 PROCEDURE — 85025 COMPLETE CBC W/AUTO DIFF WBC: CPT | Performed by: FAMILY MEDICINE

## 2024-11-06 PROCEDURE — G2211 COMPLEX E/M VISIT ADD ON: HCPCS | Performed by: FAMILY MEDICINE

## 2024-11-06 PROCEDURE — 93000 ELECTROCARDIOGRAM COMPLETE: CPT | Performed by: FAMILY MEDICINE

## 2024-11-06 PROCEDURE — 99214 OFFICE O/P EST MOD 30 MIN: CPT | Performed by: FAMILY MEDICINE

## 2024-11-06 PROCEDURE — 36415 COLL VENOUS BLD VENIPUNCTURE: CPT | Performed by: FAMILY MEDICINE

## 2024-11-06 ASSESSMENT — PAIN SCALES - GENERAL: PAINLEVEL_OUTOF10: MILD PAIN (2)

## 2024-11-06 NOTE — TELEPHONE ENCOUNTER
This encounter is being sent to inform the clinic that this patient has a referral from     Nate Jane MD in [facility]    for the diagnoses of Bradycardia [R00.1]  Fatigue, unspecified type [R53.83]  and has requested that this patient be seen within 3-5 days and/or with Cardiology.  Based on the availability of our provider(s), we are unable to accommodate this request.    Were all sites offered this patient?  Yes- pt established at [facility]    Does scheduling algorithm request to schedule next available?  Patient has been scheduled for the first available opening with Dr. Montano on 1/6/2025.  We have informed the patient that the clinic will review their referral and reach out if a sooner appointment is medically necessary.        Additional Information:   low hr 40 bpm, tired. pt just had ablation done 3 weeks ago Dr. Albarado

## 2024-11-06 NOTE — TELEPHONE ENCOUNTER
Called and spoke with patient. Patient was in for appointment and was notified by family pracitice provider that patient heart rate has been running low. Patient's heart rate running in the 40s. Patient reports that she is having tiredness since ablation. Patient had ablation on 10/17/24.    Patient is scheduled for return with Dr. Montano on 1/6/24. Offered appointment with Dr. Rhodes on 11/18/24. Patient is agreeable to sooner appointment. Patient did report that she was instructed to decrease metoprolol 25 mg daily. Informed patient that provider would be updated.    Bekah Zuniga, RN, BSN  Cardiology RN Care Coordinator   [place]   Phone: [phone]  Fax: [phone] ([place]) [phone] ([place])

## 2024-11-06 NOTE — PROGRESS NOTES
Preoperative Evaluation  [facility]  [address]  [place] [address]  Phone: [phone]  Primary Provider: Vita Zavala MD  Pre-op Performing Provider: Nate Jane MD  Nov 6, 2024 11/6/2024   Surgical Information   What procedure is being done? right wrist removal of deep implants    Facility or Hospital where procedure/surgery will be performed: [facility]    Who is doing the procedure / surgery? gauger    Date of surgery / procedure: 11/18/2024    Time of surgery / procedure: TBD    Where do you plan to recover after surgery? at home alone        Patient-reported     Fax number for surgical facility: [phone]        Rima Whitney is a 79 year old, presenting for the following:  Pre-Op Exam          11/6/2024    11:13 AM   Additional Questions   Roomed by Maritza ALLEN related to upcoming procedure: 79 year old female had orif 9-16-24 on  right  wrist. Now  to have  hardware removed Nov 18,  about 2 weeks from now.        11/6/2024   Pre-Op Questionnaire   Have you ever had a heart attack or stroke? No    Have you ever had surgery on your heart or blood vessels, such as a stent placement, a coronary artery bypass, or surgery on an artery in your head, neck, heart, or legs? (!) YES had cardioversion in August and ablation done  3 weeks ago for fibrillation.    Do you have chest pain with activity? No    Do you have a history of heart failure? No    Do you currently have a cold, bronchitis or symptoms of other infection? (!) YES cold symptoms for about 3  weeks    Do you have a cough, shortness of breath, or wheezing? (!) YES as above     Do you or anyone in your family have previous history of blood clots? (!) YES had blood clots several  years ago after vacation    Do you or does anyone in your family have a serious bleeding problem such as prolonged bleeding following surgeries or cuts? No    Have you ever had problems with anemia or been  told to take iron pills? No    Have you had any abnormal blood loss such as black, tarry or bloody stools, or abnormal vaginal bleeding? No    Have you ever had a blood transfusion? No    Are you willing to have a blood transfusion if it is medically needed before, during, or after your surgery? Yes    Have you or any of your relatives ever had problems with anesthesia? No    Do you have sleep apnea, excessive snoring or daytime drowsiness? (!) YES    Do you have a CPAP machine? Yes    Do you have any artifical heart valves or other implanted medical devices like a pacemaker, defibrillator, or continuous glucose monitor? No    Do you have artificial joints? (!) YES    Are you allergic to latex? (!) YES        Patient-reported     Health Care Directive  Patient does not have a Health Care Directive    Preoperative Review of   Patient not on any controlled  substance            Patient Active Problem List    Diagnosis Date Noted    Hyperlipidemia LDL goal <40 11/27/2012     Priority: High    Persistent atrial fibrillation (H) 10/17/2024     Priority: Medium    Chronic atrial fibrillation (H) 11/15/2023     Priority: Medium    Umbilical hernia      Priority: Medium    Degenerative joint disease (DJD) of hip      Priority: Medium    Thyroid nodule      Priority: Medium    GERD (gastroesophageal reflux disease) 10/28/2019     Priority: Medium    Asthma, mild intermittent, well-controlled 06/18/2019     Priority: Medium    Elevated parathyroid hormone 06/18/2019     Priority: Medium    Stage 3b chronic kidney disease (CKD) (H)      Priority: Medium    Stasis dermatitis of both legs 04/05/2018     Priority: Medium    Combined forms of age-related cataract of both eyes 07/18/2017     Priority: Medium    Lumbar spinal stenosis 07/17/2017     Priority: Medium    FLOR (obstructive sleep apnea)- severe (AHI 89) 02/15/2017     Priority: Medium     Study Date: 2/14/2017- (329.0 lbs) The total sleep time was 96.0 minutes. The  combined apnea/hypopnea index was 89.4 events per hour.  The REM AHI was n/a.  The supine AHI was 93.9 events per hour. RDI was 92.5 events per hour.  Lowest oxygen saturation was 77%.  Time spent less than or equal to 88% was 27.5 minutes.  Time spent less than or equal to 89% was 34.4 minutes.      Osteopenia      Priority: Medium    Morbid obesity due to excess calories (H) 11/23/2015     Priority: Medium     Surgical referral declined '16      Renal hypertension      Priority: Medium    History of bilateral knee replacement 10/23/2013     Priority: Medium    Allergic rhinitis due to animal dander      Priority: Medium     4/5/12 RAST pos. only to cat mildly      Dependent edema 06/17/2003     Priority: Medium      Past Medical History:   Diagnosis Date    Acquired renal cyst of left kidney     Adrenal nodule (H)     left - needs yearly CT    Ascending aorta dilatation (H)     Chronic atrial fibrillation (H) 11/15/2023    CKD (chronic kidney disease) stage 3, GFR 30-59 ml/min (H)     Degenerative joint disease (DJD) of hip     DJD (degenerative joint disease) 10/21/2005    Eczema, unspecified type 04/05/2018    Elevated parathyroid hormone 06/18/2019    Gallstones     Hepatitis B childhood     resolved, patient is not a carrier     Hyperlipidemia 11/27/2012    Hypertension goal BP (blood pressure) < 140/90     Lumbar spinal stenosis 07/17/2017    Mild persistent asthma without complication 05/30/2017    Morbid obesity due to excess calories (H) 11/23/2015    Surgical referral declined '16     FLOR (obstructive sleep apnea)     Osteopenia     Pulmonary emboli (H) 10/28/2019    Shingles 2014    scalp    Stasis dermatitis of both legs 04/05/2018    Thyroid nodule     Umbilical hernia     Vitamin D deficiency      Past Surgical History:   Procedure Laterality Date    ANESTHESIA CARDIOVERSION N/A 12/28/2023    Procedure: Anesthesia cardioversion @1330;  Surgeon: GENERIC ANESTHESIA PROVIDER;  [facility]: [facility]     ANESTHESIA CARDIOVERSION N/A 8/5/2024    Procedure: Anesthesia cardioversion@1330;  Surgeon: GENERIC ANESTHESIA PROVIDER;  [facility]    EP ABLATION FOCAL AFIB N/A 10/17/2024    Procedure: EP Ablation Focal AFIB;  Surgeon: Chay Albarado MD;  Location:  [facility] CATH LAB    IR PAROTID BIOPSY  5/21/2020    Z TOTAL KNEE ARTHROPLASTY  3/2000    right    [facility] TOTAL KNEE ARTHROPLASTY  6/8/12    left    C VAGINAL HYSTERECTOMY  1977    benign, prolapse, ovaries remain      Current Outpatient Medications   Medication Sig Dispense Refill    albuterol (PROAIR HFA/PROVENTIL HFA/VENTOLIN HFA) 108 (90 Base) MCG/ACT inhaler Inhale 1 puff into the lungs every 6 hours as needed for shortness of breath, wheezing or cough. 18 g 0    albuterol (PROAIR HFA/PROVENTIL HFA/VENTOLIN HFA) 108 (90 Base) MCG/ACT inhaler Inhale 1-2 puffs into the lungs every 4 hours as needed for shortness of breath / dyspnea 1 Inhaler 3    amiodarone (PACERONE) 200 MG tablet Take 1 tablet (200 mg) by mouth daily. 90 tablet 1    atorvastatin (LIPITOR) 40 MG tablet Take 1 tablet (40 mg) by mouth daily 90 tablet 3    bumetanide (BUMEX) 1 MG tablet TAKE HALF DAILY (Patient taking differently: as needed. TAKE HALF DAILY) 90 tablet 3    Cholecalciferol (VITAMIN D) 2000 UNITS tablet Take 2,000 Units by mouth daily.      Cyanocobalamin (VITAMIN B-12 PO) Take by mouth daily.      famotidine (PEPCID) 40 MG tablet TAKE 1 TABLET(40 MG) BY MOUTH DAILY 90 tablet 4    fluticasone (FLONASE) 50 MCG/ACT nasal spray Spray 2 sprays into both nostrils daily 48 g 3    gabapentin (NEURONTIN) 100 MG capsule Take 1 capsule (100 mg) by mouth 2 times daily as needed for neuropathic pain 60 capsule 1    loratadine (CLARITIN) 10 MG tablet Take 10 mg by mouth daily.      magnesium 250 MG tablet Take 1 tablet (250 mg) by mouth daily 90 tablet 1    metoprolol succinate ER (TOPROL XL) 50 MG 24 hr tablet Take 1 tablet (50 mg) by mouth daily 90 tablet 3    mometasone (ELOCON)  "0.1 % external cream Apply to affected area on legs twice daily as needed 100 g 3    olmesartan (BENICAR) 40 MG tablet TAKE 1 TABLET(40 MG) BY MOUTH DAILY 90 tablet 3    order for DME Equipment being ordered: Auto CPAP 11-18 1 Units 0    rivaroxaban ANTICOAGULANT (XARELTO) 20 MG TABS tablet Take 1 tablet (20 mg) by mouth daily (with dinner) 90 tablet 3       Allergies   Allergen Reactions    Adhesive Tape         Social History     Tobacco Use    Smoking status: Never     Passive exposure: Never    Smokeless tobacco: Never   Substance Use Topics    Alcohol use: Yes     Alcohol/week: 0.0 standard drinks of alcohol     Comment: very rare         History   Drug Use No             Review of Systems  Constitutional, HEENT, cardiovascular, pulmonary, GI, , musculoskeletal, neuro, skin, endocrine and psych systems are negative, except as otherwise noted.    Cold symptoms for 3 weeks    Now more tightness in chest    Not as much phlegm    Feeling dizzy    Afraid of falling    Not taking the bumex much    Objective    /54 (BP Location: Left arm, Patient Position: Chair, Cuff Size: Adult Large)   Pulse (!) 39   Temp 97.6  F (36.4  C) (Temporal)   Resp 16   Ht 1.702 m (5' 7\")   Wt (!) 152.6 kg (336 lb 6 oz)   SpO2 100%   BMI 52.68 kg/m     Estimated body mass index is 52.68 kg/m  as calculated from the following:    Height as of this encounter: 1.702 m (5' 7\").    Weight as of this encounter: 152.6 kg (336 lb 6 oz).  Physical Exam  GENERAL: alert and no distress  EYES: Eyes grossly normal to inspection, PERRL and conjunctivae and sclerae normal  HENT: ear canals and TM's normal, nose and mouth without ulcers or lesions  NECK: no adenopathy, no asymmetry, masses, or scars  RESP: lungs clear to auscultation - no rales, rhonchi or wheezes  CV: bradycardia, normal S1 S2, no S3 or S4, no murmur, click or rub, and patient has chronic appearing bilateral lower extremity edema but not much pitting. Some stasis " dermatitis.   ABDOMEN: soft, nontender, no hepatosplenomegaly, no masses and bowel sounds normal  MS: radial pulse left wrist very faint.  Has the splint on right wrist.   NEURO: Normal strength and tone, mentation intact and speech normal  PSYCH: mentation appears normal, affect normal/bright    Recent Labs   Lab Test 10/17/24  1056 09/12/24  1056   HGB 12.4 10.6*    202    143   POTASSIUM 3.8 4.2   CR 1.55* 1.32*        Diagnostics  Labs pending     Due to fatigue and low heart rate, we did EKG today.  Heart rate here is only 40.  She usually is in 60s.  Not in fibrillation.       ASSESSMENT / PLAN:  (Z01.818) Preop general physical exam  (primary encounter diagnosis)  Comment: note patient tired here.  Has been since ablation.    Plan: we are not approving patient for the hardware removal procedure    (Z87.81) History of wrist fracture  Comment: as above, patient due for hardware removal but not approved for this  Plan: needs to see cardiology     (R00.1) Bradycardia  Comment: patient to schedule with cardiology; put in urgent referral  Plan: EKG 12-lead complete w/read - Clinics, Adult         Cardiology Eval  Referral             (R53.83) Fatigue, unspecified type  Comment: check labs but the fatigue may be due to bradycardia  Plan: Adult Cardiology Eval  Referral, CBC         with Platelets & Differential, Comprehensive         metabolic panel, TSH with free T4 reflex             (R73.01) Impaired fasting glucose  Comment: check   Plan: Hemoglobin A1c             I did clarify with patient that if she worsens, go to emergency room       I reviewed the patient's medications, allergies, medical history, family history, and social history.    Nate Jane MD                      Revised Cardiac Risk Index (RCRI)  The patient has the following serious cardiovascular risks for perioperative complications:   Patient just had ablation done and now has very low heart rate.     RCRI  Interpretation: 1 point: Class II (low risk - 0.9% complication rate)         Signed Electronically by: Nate Jane MD  A copy of this evaluation report is provided to the requesting physician.

## 2024-11-06 NOTE — PATIENT INSTRUCTIONS
Hold off on the wrist hardware removal for now    Call for cardiology appointment to be seen in next few days    Take just 1/2 dose  metoprolol daily until seen by cardiology    If symptoms worsen, to emergency room    Stay well hydrated    We will send you lab results

## 2024-11-07 ENCOUNTER — TELEPHONE (OUTPATIENT)
Dept: FAMILY MEDICINE | Facility: CLINIC | Age: 79
End: 2024-11-07
Payer: COMMERCIAL

## 2024-11-07 DIAGNOSIS — E05.90 HYPERTHYROIDISM: Primary | ICD-10-CM

## 2024-11-07 LAB
ALBUMIN SERPL BCG-MCNC: 3.7 G/DL (ref 3.5–5.2)
ALP SERPL-CCNC: 79 U/L (ref 40–150)
ALT SERPL W P-5'-P-CCNC: 11 U/L (ref 0–50)
ANION GAP SERPL CALCULATED.3IONS-SCNC: 11 MMOL/L (ref 7–15)
AST SERPL W P-5'-P-CCNC: 14 U/L (ref 0–45)
BILIRUB SERPL-MCNC: 0.9 MG/DL
BUN SERPL-MCNC: 38.9 MG/DL (ref 8–23)
CALCIUM SERPL-MCNC: 9.2 MG/DL (ref 8.8–10.4)
CHLORIDE SERPL-SCNC: 106 MMOL/L (ref 98–107)
CREAT SERPL-MCNC: 2 MG/DL (ref 0.51–0.95)
EGFRCR SERPLBLD CKD-EPI 2021: 25 ML/MIN/1.73M2
GLUCOSE SERPL-MCNC: 93 MG/DL (ref 70–99)
HCO3 SERPL-SCNC: 25 MMOL/L (ref 22–29)
POTASSIUM SERPL-SCNC: 4.4 MMOL/L (ref 3.4–5.3)
PROT SERPL-MCNC: 6.3 G/DL (ref 6.4–8.3)
SODIUM SERPL-SCNC: 142 MMOL/L (ref 135–145)
T4 FREE SERPL-MCNC: 2.59 NG/DL (ref 0.9–1.7)
TSH SERPL DL<=0.005 MIU/L-ACNC: 0.26 UIU/ML (ref 0.3–4.2)

## 2024-11-08 NOTE — TELEPHONE ENCOUNTER
Spoke with pt and notified, understands and agrees.     Thanks,  BRIDGET Cotto  [facility]

## 2024-11-08 NOTE — TELEPHONE ENCOUNTER
I tried to call patient again, not available    Please try to call her again this afternoon    Labs show likely hyperthyroidism    She should see endocrinology soon    I did referral    She should also see cardiology soon ( we cancelled a surgery due to low heart rate )    Thanks    Nate Jane MD;

## 2024-11-08 NOTE — CONFIDENTIAL NOTE
I tried to call patient regarding labs.  Thyroid labs off, and creatinine and hemoglobin also off.  Patient not available.  Nate Jane MD

## 2024-11-08 NOTE — RESULT ENCOUNTER NOTE
I tried to call again    You should see endocrinology soon due to possible hyperthyroidism.  I did referral.  Call [phone] to schedule.    See cardiology soon also    Nate Jane MD

## 2024-11-08 NOTE — TELEPHONE ENCOUNTER
Tell her to stop metoprolol altogether.  Thank you.     You  Can, MD Patito2 days ago     CK  Patient had ablation on 10/17. She has been having bradycardia and fatigue since. She was in for a preop appointment for today for wrist surgery that is now being postponed and they noted that her heart rate was in the 40s and told her to only take a 0.5 tablet of metoprolol 50 mg. She will be starting this tomorrow. She is scheduled to see you in January, but I got her in with Dr. Rhodes on 11/18. Let me know if you recommend anything else.     Called and spoke with patient. Patient agreeable with plan. Patient is scheduled to see Cardiology on 11/19/24. Patient to update if anything changes.     Bekah Zuniga, RN, BSN  Cardiology RN Care Coordinator   [place]   Phone: [phone]  Fax: [phone] ([place]) [phone] ([place])

## 2024-11-08 NOTE — RESULT ENCOUNTER NOTE
I just tried to call you regarding these results    The thyroid labs are off, and the kidney function is worse than last time    Hemoglobin ( red blod count ) also lower than last time    I will try to call again soon    Nate Jane MD

## 2024-11-09 ENCOUNTER — MYC REFILL (OUTPATIENT)
Dept: FAMILY MEDICINE | Facility: CLINIC | Age: 79
End: 2024-11-09
Payer: COMMERCIAL

## 2024-11-09 DIAGNOSIS — I48.20 CHRONIC ATRIAL FIBRILLATION (H): ICD-10-CM

## 2024-11-11 ENCOUNTER — TELEPHONE (OUTPATIENT)
Dept: ENDOCRINOLOGY | Facility: CLINIC | Age: 79
End: 2024-11-11
Payer: COMMERCIAL

## 2024-11-11 NOTE — TELEPHONE ENCOUNTER
Left Voicemail (1st Attempt) for the patient to call back and schedule the following:    Appointment type: return endocrine   Provider: Bety   Return date: 11/20 virtual add on at 10 am   Specialty phone number: [phone]  Additional appointment(s) needed:   Additonal Notes: LVM, MyC x1 scheduled appt due to pt agreeing to telephone call in our previous encounter this morning.   Hello,    Could you please assist patient in scheduling with Dr. Albert on 11/20 at 10am, for video? This is on her call schedule.    Thank you!  [place]     Yes, I think that should be fine. I will send Dr. Albert a staff message letting her know. If for some reason Dr. Albert says no, I will call patient to let her know.    Thank you!  Araceli Rutledge on 11/11/2024 at 10:30 AM

## 2024-11-11 NOTE — TELEPHONE ENCOUNTER
Patient Contacted for the patient to call back and schedule the following:    Appointment type: return endocrine   Provider: Bety   Return date: 11/20 virtual add on at 10 am   Specialty phone number: [phone]  Additional appointment(s) needed:   Additonal Notes: Spoke to pt and is unable to do a virtual visit can only do telephone or in person. Sent message back to Araceli on if a telephone call would be okay.   Hello,    Could you please assist patient in scheduling with Dr. Albert on 11/20 at 10am, for video? This is on her call schedule.    Thank you!  [place]     *This is not a scheduled slot in Epic. You will have to manually add this patient onto the providers schedule by hitting select a time under the providers name when scheduling to add this patient by date, time, and location as stated above. Thank you*    Marsha Rutledge on 11/11/2024 at 9:23 AM

## 2024-11-12 NOTE — PROGRESS NOTES
ELECTROPHYSIOLOGY TELEPHONE VISIT    Assessment/Recommendations   Assessment/Plan:    Chelo Brooks is a 79 year old female with past medical history significant for persistent atrial fibrillation, CKD, essential HTN, FLOR and morbid obesity.     Persistent Atrial Fibrillation   1. Stroke Prophylaxis: CHADSVASC 4 ++age, +F, +HTN 4, corresponding to a 4.0% annual stroke / systemic embolism event rate. Continnue Xarelto 20 mg daily, no significant bleeding issues.   2. Rate Control: Metoprolol stopped today due to heart rates 38-45 bpm.   3. Rhythm Control: Found to be in AF at PCP visit Dec 2023. Echo with preserved LVEF. Given unknown chronicity and worsening UMANA, she was started on amio with successful DCCV done 12/28/23. Dyspnea on exertion did improve in sinus. Back in Afib at follow up in January, referred to EP for ablation evaluation. Saw EP 7/17/24, discussed repeat cardioversion on amio, if improvement in symptoms in sinus, pursuing ablation. She had DCCV x 3 unsuccessful attempts on 8/5/24. She reports ongoing dyspnea on exertion and elected to pursue ablation, she underwent successful pulmonary vein isolation 10/17/24.   She reports significant fatigue and dyspnea with exertion since the ablation, HR at home and recent office visits 38-45 bpm. In sinus rhythm at recent office visits. It was recommended she stop metoprolol, however, she states someone called her and said to continue this, she isn't sure who. We discussed stopping metoprolol today. Suspect symptoms may be in part due to chronotropic incompetence induced by metoprolol and amio. Recent TSH low and T4 elevated, will also have her decrease amiodarone to 100 mg daily with repeat TSH prior to follow up in January.     Follow up as scheduled with Dr Montano in January.   EP as scheduled in March.      History of Present Illness/Subjective    Ms. Chelo Brooks is a 79 year old female who comes in today for EP follow-up s/p PVI 10/17/24.    Ms.  Cecilia is a 79 year old female with past medical history significant for persistent atrial fibrillation, CKD, essential HTN, FLOR and morbid obesity.   She was found to be in AF at PCP visit Dec 2023. Echo with preserved LVEF. Given unknown chronicity and worsening UMANA, she was started on amio with successful DCCV done 12/28/23. Dyspnea on exertion did improve in sinus. Back in Afib at follow up in January, referred to EP for ablation evaluation. Saw EP 7/17/24, discussed repeat cardioversion on amio, if improvement in symptoms in sinus, pursuing ablation. She had DCCV x 3 unsuccessful attempts on 8/5/24. She reports ongoing dyspnea on exertion and elected to pursue ablation for which she presented. She underwent successful pulmonary vein isolation 10/17/24.     She presents today for follow up after ablation. She reports fatigue and low HR since the ablation, her metoprolol was decreased to 25 mg daily and then it was recommended she stop metoprolol. She reports decreasing it to 25 mg daily for one day, but then someone called to tell her she should continue it at full dose, her HR has remained 38-45 bpm. She isn't sure who called her to recommend she continue it at full dose. She reports significant fatigue and shortness of breath with exertion. She has been dealing with an ongoing URI since the ablation as well. Otherwise denies chest pain, palpitations, peripheral edema, paroxysmal nocturnal dyspnea, orthopnea, lightheadedness, dizziness, pre-syncope, or syncope.     I have reviewed and updated the patient's Past Medical History, Social History, Family History and Medication List.     Cardiographics (Personally Reviewed) :   Echo: 11/28/2023   Left ventricular size, wall motion and function are normal. The ejection  fraction is 55-60%.  Global right ventricular function is normal.  Normal LA, NATHLAY 28  IVC diameter <2.1 cm collapsing >50% with sniff suggests a normal RA pressure  of 3 mmHg.     Echo: 2/03/2022  "  Global and regional left ventricular function is normal with an EF of 55-60%.  The right ventricle is normal size.Global right ventricular function is  normal.  Proximal ascending aorta is mildly dilated measuring 4cm  IVC diameter <2.1 cm collapsing >50% with sniff suggests a normal RA pressure  of 3 mmHg.     CMR: 3/22/2017  IMPRESSION:  1.  Regadenoson stress perfusion: Normal perfusion at rest and  post-stress without evidence of inducible ischemia.  2.  Normal left ventricular size and systolic function with a  calculated ejection fraction of  62 %.  3.  Normal right ventricular size and systolic function with a  calculated ejection fraction of 60%.   4.  On delayed enhancement imaging, there is no abnormal  hyperenhancement to suggest myocardial scar/inflammation/infiltration.       Physical Examination   Ht 1.702 m (5' 7\")   Wt (!) 152.4 kg (336 lb)   BMI 52.63 kg/m    Wt Readings from Last 3 Encounters:   11/14/24 (!) 152.4 kg (336 lb)   11/06/24 (!) 152.6 kg (336 lb 6 oz)   11/02/24 (!) 151.5 kg (334 lb)        Medications  Allergies   Amiodarone 200 mg daily   Lipitor 40 mg daily   Metoprolol succinate 50 mg daily  Bumex 1 mg 1/2 tablet daily   Xarelto 20 mg daily     Vitamins   Magnesium  Albuterol   Gabapentin   Claritin    Allergies   Allergen Reactions    Adhesive Tape            Lab Results (Personally Reviewed)    Chemistry/lipid CBC Cardiac Enzymes/BNP/TSH/INR   Lab Results   Component Value Date    BUN 38.9 (H) 11/06/2024     11/06/2024    CO2 25 11/06/2024     Creatinine   Date Value Ref Range Status   11/06/2024 2.00 (H) 0.51 - 0.95 mg/dL Final   08/06/2020 1.32 (H) 0.52 - 1.04 mg/dL Final       Lab Results   Component Value Date    CHOL 131 06/26/2024    HDL 51 06/26/2024    LDL 64 06/26/2024      Lab Results   Component Value Date    WBC 4.9 11/06/2024    HGB 10.2 (L) 11/06/2024    HCT 32.7 (L) 11/06/2024    MCV 87 11/06/2024     11/06/2024    Lab Results   Component Value Date "    TSH 0.26 (L) 11/06/2024    INR 2.50 (H) 09/03/2020        I have reviewed the note as documented above.  This accurately captures the substance of my conversation with the patient.  The patient states understanding and is agreeable with the plan.     Telephone Visit Duration: 30 minutes     Desire Mensah PA-C  [facility]  Electrophysiology Consult Service  Pager: [phone]

## 2024-11-13 NOTE — TELEPHONE ENCOUNTER
Left Voicemail (2nd Attempt) for the patient to call back and schedule the following:    Appointment type: return endocrine   Provider: Bety   Return date: 11/20 virtual add on at 10 am   Specialty phone number: [phone]  Additional appointment(s) needed:   Additonal Notes: LVM, MyC x1 scheduled appt due to pt agreeing to telephone call in our previous encounter this morning.   Hello,    Could you please assist patient in scheduling with Dr. Albert on 11/20 at 10am, for video? This is on her call schedule.    Thank you!  Araceli      Yes, I think that should be fine. I will send Dr. Albert a staff message letting her know. If for some reason Dr. Albert says no, I will call patient to let her know.    Thank you!  Araceli Rutledge on 11/13/2024 at 8:53 AM

## 2024-11-14 ENCOUNTER — VIRTUAL VISIT (OUTPATIENT)
Dept: CARDIOLOGY | Facility: CLINIC | Age: 79
End: 2024-11-14
Payer: COMMERCIAL

## 2024-11-14 VITALS — WEIGHT: 293 LBS | HEIGHT: 67 IN | BODY MASS INDEX: 45.99 KG/M2

## 2024-11-14 DIAGNOSIS — Z79.899 LONG TERM CURRENT USE OF AMIODARONE: Primary | ICD-10-CM

## 2024-11-14 DIAGNOSIS — I48.20 CHRONIC ATRIAL FIBRILLATION (H): ICD-10-CM

## 2024-11-14 RX ORDER — AMIODARONE HYDROCHLORIDE 200 MG/1
TABLET ORAL
COMMUNITY
Start: 2024-11-14

## 2024-11-14 ASSESSMENT — PAIN SCALES - GENERAL: PAINLEVEL_OUTOF10: NO PAIN (0)

## 2024-11-14 NOTE — NURSING NOTE
Current patient location: [address] [place] [address]   [facility] [address]    Is the patient currently in the state of [place]? YES    Visit mode:TELEPHONE    If the visit is dropped, the patient can be reconnected by:TELEPHONE VISIT: Phone number: [phone]    Will anyone else be joining the visit? NO  (If patient encounters technical issues they should call [phone]    Are changes needed to the allergy or medication list? No    Are refills needed on medications prescribed by this physician? NO    Rooming Documentation:  Questionnaire(s) completed    Reason for visit: DANIEL LUNA

## 2024-11-14 NOTE — PATIENT INSTRUCTIONS
Take your medicines every day, as directed     Changes made today:  STOP Metoprolol Succinate   REDUCE Amiodarone to 100 mg once daily   Recheck Lab/TSH prior seeing Dr Montano in January 2025        Cardiology Care Coordinators:      Mary BURKS RN     Cardiology Rooming staff:  Carmelina CORTEZ    Phone  [phone]      Fax [phone]    To Contact us     During Business Hours:  [phone]     If you are needing refills please contact your pharmacy.     For urgent after hour care please call the [facility] at [phone] or the [facility] at [phone] and ask to speak to the cardiologist on call.            HOW TO CHECK YOUR BLOOD PRESSURE AT HOME:     Avoid eating, smoking, and exercising for at least 30 minutes before taking a reading.     Be sure you have taken your BP medication at least 2-3 hours before you check it.      Sit quietly for 10 minutes before a reading.      Sit in a chair with your feet flat on the floor. Rest your  arm on a table so that the arm cuff is at the same level as your heart.     Remain still during the reading.  Record your blood pressure and pulse in a log and bring to your next appointment.       Use [employer] allows you to communicate directly with your heart team through secure messaging.  [employer] can be accessed any time on your phone, computer, or tablet.  If you need assistance, we'd be happy to help!             Keep your Heart Appointments:     Keep follow-up with Dr Montano in January 2025  Keep follow-up with RENNY/Selene in March 2025.

## 2024-11-14 NOTE — NURSING NOTE
Med Reconcile: Reviewed and verified all current medications with the patient. The updated medication list was printed and given to the patient./ Stop Toprol-XL. Reduce Amiodarone to 100 mg daily.         Return Appointment  -Follow-up with Dr Montano January 2025 with repeat TSH prior .  -Follow-up  with HANANE-RAJI/ Selene in March 2025 as previously planned.

## 2024-11-20 ENCOUNTER — VIRTUAL VISIT (OUTPATIENT)
Dept: ENDOCRINOLOGY | Facility: CLINIC | Age: 79
End: 2024-11-20
Payer: COMMERCIAL

## 2024-11-20 DIAGNOSIS — E05.90 HYPERTHYROIDISM: ICD-10-CM

## 2024-11-20 DIAGNOSIS — E05.20 TOXIC NODULAR GOITER: ICD-10-CM

## 2024-11-20 DIAGNOSIS — E05.80 AMIODARONE-INDUCED HYPERTHYROIDISM: Primary | ICD-10-CM

## 2024-11-20 DIAGNOSIS — T46.2X5A AMIODARONE-INDUCED HYPERTHYROIDISM: Primary | ICD-10-CM

## 2024-11-20 DIAGNOSIS — D35.02 ADENOMA OF LEFT ADRENAL GLAND: ICD-10-CM

## 2024-11-20 ASSESSMENT — PAIN SCALES - GENERAL: PAINLEVEL_OUTOF10: NO PAIN (0)

## 2024-11-20 NOTE — NURSING NOTE
Current patient location: [address] [place] [address]   [facility] [address]    Is the patient currently in the state of [place]? YES    Visit mode:TELEPHONE    If the visit is dropped, the patient can be reconnected by:TELEPHONE VISIT: Phone number:   [phone]        Will anyone else be joining the visit? NO  (If patient encounters technical issues they should call [phone]    Are changes needed to the allergy or medication list? No, vf and pt made updates needed    Are refills needed on medications prescribed by this physician? Discuss with provider    Rooming Documentation:  Not applicable    Reason for visit: RECHECK (RETURN ENDOCRINE)    Alberta Solano VVF

## 2024-11-20 NOTE — LETTER
11/20/2024       RE: Chelo Brooks  [address]  [facility] [address]     Dear Colleague,    Thank you for referring your patient, Chelo Brooks, to the [facility] [place] at [facility]. Please see a copy of my visit note below.    This is a telephone encounter    Phone call start time: 10:14 am  Phone call end time: 10:44 am     =======================================================  Assessment     Chelo Brooks is a 79 year old female with a past medical history significant for obesity, hypertension, obstructive sleep apnea, A-fib, CKD stage III, secondary hyperparathyroidism, PE, seen for f/up.     1.  Multinodular goiter/thyrotoxicosis  The dominant left thyroid nodule which measured 8 cm in maximum diameter on the most recent ultrasound from December 2023, was benign on 2 biopsies.  Clinically, the patient does not endorse definite compressive symptoms.  Biochemically, she was recently diagnosed with thyrotoxicosis, in the context of treatment with amiodarone, which was started approximately 1 year ago.  Unclear whether or not amiodarone is going to be discontinued.  Currently, the patient is not on beta-blockers.  She does not endorse signs or symptoms suggestive of Graves' ophthalmopathy.  She also endorses experiencing some upper respiratory symptoms over the last month or so, which are suggestive of a possible URI.    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  Counseled patient on signs and symptoms of hyperthyroidism.  Recommendations:  Check thyroid hormone levels including total T3 and TSI.  The patient is going to try to have the lab work done this week.  Based on lab results, consider starting the patient on methimazole.  Counseled the patient on methimazole side effects, with allergic reactions and GI symptoms being more common but also discussed about rare side  effects like agranulocytosis.  Of note that the liver enzymes and WBC were normal on recent labs.  Consult to cardiology regarding the option of considering treatment with a beta-blocker.  Postpone the orthopedic surgery for now  Check pulse at home (the patient has a friend who has a blood pressure cuff) and contact me with the heart rate    2.  Left adrenal gland adenoma, stable on imaging from 0727-0507.  The 1 mg dexamethasone suppression test was negative in 2023.  Consider a formal evaluation for hyperaldosteronism at her next visit.    3.  Right wrist fracture  Follow-up DEXA scan     Orders Placed This Encounter   Procedures     [facility]; [facility]FT[facility]; Thyroid-stimulating Immunoglobulin (TSI) (Laboratory Miscellaneous Order)     T4 free     T3 total     TSH     =======================================================  The patient is seen in follow-up.  She was first evaluated by me in February 2024.    History of Present Illness:  Chelo Brooks is a 79 year old female with a past medical history significant for aortic dilatation, A-fib, CKD stage III, DJD, hyperparathyroidism, hypertension, obstructive sleep apnea, osteopenia, pulmonary embolism.     In September 2024 lost her balance and fell on her side on her hand. She suffered a distal radius fracture of the right wrist and she required surgery.  She is scheduled to have the plate removed and she failed the preop clearance as her thyroid hormone levels were elevated.    She was found to be in AF at PCP visit Dec 2023. She had DCCV x 3 unsuccessful attempts and she underwent successful ablation on 10/17/24.   Treatment with amiodarone was started in December 2023.  The current dose is 100 mg daily (dose decreased on 11/14/2024). Not sure if amiodarone is going to be discontinued. Her next cardiology apt is on January 6th.  She used to take metoprolol but it was recently discontinued by cardiology.     Following the ablation from 1 month ago,  the patient reports experiencing some cold symptoms, with productive cough, shortness of breath, some mild fever initially, runny nose and congestion.  Denies experiencing a sore throat or neck tenderness.  The shortness of breath has significantly improved.    She was exposed to IV contrast on 10/17/2024.    1.  Thyroid nodules/thyrotoxicosis  Chelo was initially diagnosed with thyroid nodules incidentally, on MRI, in 2019. She has not been aware of any neck enlargement. She denies dysphagia, voice hoarseness, lightheadedness. Of note that she does have a history of GERD.     Thyroid ultrasounds were done in 2019, 2020, 2021, 2022 and, most recently, on December 7, 2023.  There has been a gradual enlargement of the left thyroid nodule which occupies the whole left thyroid lobe and extends inferiorly in the mediastinum.  On prior CTs, there was mild rightward deviation of the lower trachea.  The nodule was benign on the biopsy from February 2020.  The same year, the patient underwent a right parotid gland biopsy which revealed a benign neoplasm. On the most recent ultrasound, the left dominant nodule measured 8.1 x 5.4 x 5.1 cm, while it measured 6.1 x 4.7 x 4.1 cm on prior ultrasounds, with the caveat that the nodule was not always measured using the same sections.  It was rebiopsied on 2/27/2024 and the second biopsy came back benign.  Over the years, TSH has been well in the normal range until June 2024, when it became low normal at 0.4.  On the most recent lab work from 11/6/2024, TSH was 0.26 while FT4 was elevated at 2.59.    2. New radial fracture   Chelo has no prior history of fractures.  She is scheduled to have a DEXA scan done in the near future.  Takes 2000 units vitamin D daily. Denies taking any calcium supplements or MVI.   Dairies: yogurt, 4 times a week, milk with cereals, some cheese.   Exercise: not much    3. Left adrenal adenoma   The abdominal CT from August 2020 identified a 1.2 cm left  adrenal nodule.  The nodule was stable in size compared with the prior abdominal CT from October 2019, [facility] at [facility].  Hounsfield unit density on the noncontrast CT from 2020 was consistent with a benign adenoma.   Prior sodium and potassium levels have been normal.  Recent GFR has been in the 20s.    Current antihypertensive medications:  Olmesartan, 40 mg daily  Bumetanide, 0.5 mg daily    2/20/2024  1 mg dexamethasone suppression test revealed a cortisol level of 1.1  11/6/2024  A1c 5  6/26/2024  LDL cholesterol 64, triglyceride 80    Past Medical History   Past Medical History:   Diagnosis Date     Acquired renal cyst of left kidney      Adrenal nodule (H)     left - needs yearly CT     Ascending aorta dilatation (H)      Chronic atrial fibrillation (H) 11/15/2023     CKD (chronic kidney disease) stage 3, GFR 30-59 ml/min (H)      Degenerative joint disease (DJD) of hip      DJD (degenerative joint disease) 10/21/2005     Eczema, unspecified type 04/05/2018     Elevated parathyroid hormone 06/18/2019     Gallstones      Hepatitis B childhood     resolved, patient is not a carrier      Hyperlipidemia 11/27/2012     Hypertension goal BP (blood pressure) < 140/90      Lumbar spinal stenosis 07/17/2017     Mild persistent asthma without complication 05/30/2017     Morbid obesity due to excess calories (H) 11/23/2015    Surgical referral declined '16      FLOR (obstructive sleep apnea)      Osteopenia      Pulmonary emboli (H) 10/28/2019     Shingles 2014    scalp     Stasis dermatitis of both legs 04/05/2018     Thyroid nodule      Umbilical hernia      Vitamin D deficiency    Pulmonary embolism     Past Surgical History   Past Surgical History:   Procedure Laterality Date     ANESTHESIA CARDIOVERSION N/A 12/28/2023    Procedure: Anesthesia cardioversion @1330;  Surgeon: GENERIC ANESTHESIA PROVIDER;  Location: [facility] OR     ANESTHESIA CARDIOVERSION N/A 8/5/2024    Procedure: Anesthesia cardioversion@1330;  Surgeon:  GENERIC ANESTHESIA PROVIDER;  Location: [facility] OR     EP ABLATION FOCAL AFIB N/A 10/17/2024    Procedure: EP Ablation Focal AFIB;  Surgeon: Chay Albarado MD;  Location:  HEART CARDIAC CATH LAB     IR PAROTID BIOPSY  5/21/2020     [facility] TOTAL KNEE ARTHROPLASTY  3/2000    right     [facility] TOTAL KNEE ARTHROPLASTY  6/8/12    left     [facility] VAGINAL HYSTERECTOMY  1977    benign, prolapse, ovaries remain        Current Medications    Current Outpatient Medications:      albuterol (PROAIR HFA/PROVENTIL HFA/VENTOLIN HFA) 108 (90 Base) MCG/ACT inhaler, Inhale 1 puff into the lungs every 6 hours as needed for shortness of breath, wheezing or cough., Disp: 18 g, Rfl: 0     albuterol (PROAIR HFA/PROVENTIL HFA/VENTOLIN HFA) 108 (90 Base) MCG/ACT inhaler, Inhale 1-2 puffs into the lungs every 4 hours as needed for shortness of breath / dyspnea, Disp: 1 Inhaler, Rfl: 3     amiodarone (PACERONE) 200 MG tablet, Take 1/2 tablet ( 100 mg) once daily, Disp: , Rfl:      atorvastatin (LIPITOR) 40 MG tablet, Take 1 tablet (40 mg) by mouth daily, Disp: 90 tablet, Rfl: 3     bumetanide (BUMEX) 1 MG tablet, TAKE HALF DAILY (Patient taking differently: Take 1 mg by mouth daily. TAKE HALF DAILY), Disp: 90 tablet, Rfl: 3     Cholecalciferol (VITAMIN D) 2000 UNITS tablet, Take 2,000 Units by mouth daily., Disp: , Rfl:      Cyanocobalamin (VITAMIN B-12 PO), Take by mouth daily., Disp: , Rfl:      fluticasone (FLONASE) 50 MCG/ACT nasal spray, Spray 2 sprays into both nostrils daily (Patient taking differently: Spray 2 sprays into both nostrils as needed.), Disp: 48 g, Rfl: 3     gabapentin (NEURONTIN) 100 MG capsule, Take 1 capsule (100 mg) by mouth 2 times daily as needed for neuropathic pain, Disp: 60 capsule, Rfl: 1     loratadine (CLARITIN) 10 MG tablet, Take 10 mg by mouth daily., Disp: , Rfl:      magnesium 250 MG tablet, Take 1 tablet (250 mg) by mouth daily, Disp: 90 tablet, Rfl: 1     mometasone (ELOCON) 0.1 % external cream, Apply to affected  area on legs twice daily as needed, Disp: 100 g, Rfl: 3     olmesartan (BENICAR) 40 MG tablet, TAKE 1 TABLET(40 MG) BY MOUTH DAILY, Disp: 90 tablet, Rfl: 3     order for DME, Equipment being ordered: Auto CPAP 11-18, Disp: 1 Units, Rfl: 0     rivaroxaban ANTICOAGULANT (XARELTO) 20 MG TABS tablet, Take 1 tablet (20 mg) by mouth daily (with dinner)., Disp: 90 tablet, Rfl: 4    Family History   Problem Relation Age of Onset     Circulatory Father         d. DVT     Heart Disease Father         pacer     Diabetes Mother      Hypertension Mother      Deep Vein Thrombosis Brother      Coronary Artery Disease Brother      Myocardial Infarction Brother      Deep Vein Thrombosis Brother      C.A.D. Paternal Uncle         MI      Heart Disease Brother 48        pacer      Diabetes Maternal Grandmother      Hypertension Maternal Grandmother      Diabetes Maternal Aunt      Hypertension Maternal Aunt      Cancer - colorectal Maternal Grandfather      C.A.D. Maternal Aunt         MI   Maternal grandfather - rectal cancer.     Social History  . She has 2 children. She denies smoking, drinking alcohol or using illicit drugs. Occupation: retired.     Review of Systems   Systemic:              fatigue; weight up maybe due to water retention - just restarted the diuretic   Eye:                      No eye symptoms; occasionally watery eyes when reading   Levi-Laryngeal:     as above   Breast:                  No breast symptoms  Cardiovascular:    No cardiovascular symptoms, no CP or palpitations   Pulmonary:           SOB improving   Gastrointestinal:   no constipation - controlled by having yogurt   Genitourinary:       No genitourinary symptoms, no increased thirst or urination   Endocrine:           recently she has noticed occasional episodes of hot flashes and night sweats   Neurological:       some light and intermittent hands tremor notice recently, she has a hard time maintaining her balance   Musculoskeletal:   chronic back pain   Skin:                     easy bruising - on a blood thinner; no facial hair, no acne; no significant hair falling out; no new stretch marks   Psychological:      some depression                Vital Signs     Previous Weights:    Wt Readings from Last 10 Encounters:   11/14/24 (!) 152.4 kg (336 lb)   11/06/24 (!) 152.6 kg (336 lb 6 oz)   11/02/24 (!) 151.5 kg (334 lb)   10/18/24 (!) 150 kg (330 lb 12.8 oz)   09/12/24 (!) 154.2 kg (340 lb)   09/03/24 (!) 152 kg (335 lb)   07/17/24 (!) 153.6 kg (338 lb 11.2 oz)   07/01/24 (!) 156.9 kg (346 lb)   02/13/24 (!) 152.9 kg (337 lb)   01/12/24 (!) 152 kg (335 lb)        There were no vitals taken for this visit.    Objective:  Psych: Alert and oriented times 3; coherent speech, normal rate and volume. The affect is normal.    Lab Results  I reviewed prior lab results documented in Epic.  TSH   Date Value Ref Range Status   11/06/2024 0.26 (L) 0.30 - 4.20 uIU/mL Final   07/10/2024 0.56 0.30 - 4.20 uIU/mL Final   06/26/2024 0.40 0.30 - 4.20 uIU/mL Final   12/19/2023 2.50 0.30 - 4.20 uIU/mL Final   04/05/2018 1.61 0.40 - 4.00 mU/L Final   01/25/2017 1.35 0.40 - 4.00 mU/L Final   11/23/2015 1.53 0.40 - 4.00 mU/L Final   10/23/2013 1.61 0.4 - 5.0 mU/L Final   02/23/2007 1.67 0.4 - 5.0 mU/L Final                             Again, thank you for allowing me to participate in the care of your patient.      Sincerely,    Nimisha Albert MD

## 2024-11-20 NOTE — PROGRESS NOTES
This is a telephone encounter    Phone call start time: 10:14 am  Phone call end time: 10:44 am     =======================================================  Assessment     Chelo Brooks is a 79 year old female with a past medical history significant for obesity, hypertension, obstructive sleep apnea, A-fib, CKD stage III, secondary hyperparathyroidism, PE, seen for f/up.     1.  Multinodular goiter/thyrotoxicosis  The dominant left thyroid nodule which measured 8 cm in maximum diameter on the most recent ultrasound from December 2023, was benign on 2 biopsies.  Clinically, the patient does not endorse definite compressive symptoms.  Biochemically, she was recently diagnosed with thyrotoxicosis, in the context of treatment with amiodarone, which was started approximately 1 year ago.  Unclear whether or not amiodarone is going to be discontinued.  Currently, the patient is not on beta-blockers.  She does not endorse signs or symptoms suggestive of Graves' ophthalmopathy.  She also endorses experiencing some upper respiratory symptoms over the last month or so, which are suggestive of a possible URI.    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  Counseled patient on signs and symptoms of hyperthyroidism.  Recommendations:  Check thyroid hormone levels including total T3 and TSI.  The patient is going to try to have the lab work done this week.  Based on lab results, consider starting the patient on methimazole.  Counseled the patient on methimazole side effects, with allergic reactions and GI symptoms being more common but also discussed about rare side effects like agranulocytosis.  Of note that the liver enzymes and WBC were normal on recent labs.  Consult to cardiology regarding the option of considering treatment with a beta-blocker, if needed. Of note that recently, the patient has been bradycardic.  Postpone the orthopedic surgery for now  Check pulse at home (the patient has a  friend who has a blood pressure cuff) and contact me with the heart rate    2.  Left adrenal gland adenoma, stable on imaging from [id number].  The 1 mg dexamethasone suppression test was negative in 2023.  Consider a formal evaluation for hyperaldosteronism at her next visit.    3.  Right wrist fracture  Follow-up DEXA scan     Orders Placed This Encounter   Procedures    [facility]; [facility]; Thyroid-stimulating Immunoglobulin (TSI) (Laboratory Miscellaneous Order)    T4 free    T3 total    TSH     =======================================================  The patient is seen in follow-up.  She was first evaluated by me in February 2024.    History of Present Illness:  Chelo Brooks is a 79 year old female with a past medical history significant for aortic dilatation, A-fib, CKD stage III, DJD, hyperparathyroidism, hypertension, obstructive sleep apnea, osteopenia, pulmonary embolism.     In September 2024 lost her balance and fell on her side on her hand. She suffered a distal radius fracture of the right wrist and she required surgery.  She is scheduled to have the plate removed and she failed the preop clearance as her thyroid hormone levels were elevated.    She was found to be in AF at PCP visit Dec 2023. She had DCCV x 3 unsuccessful attempts and she underwent successful ablation on 10/17/24.   Treatment with amiodarone was started in December 2023.  The current dose is 100 mg daily (dose decreased on 11/14/2024). Not sure if amiodarone is going to be discontinued. Her next cardiology apt is on January 6th.  She used to take metoprolol but it was recently discontinued by cardiology.     Following the ablation from 1 month ago, the patient reports experiencing some cold symptoms, with productive cough, shortness of breath, some mild fever initially, runny nose and congestion.  Denies experiencing a sore throat or neck tenderness.  The shortness of breath has significantly improved.    She was  exposed to IV contrast on 10/17/2024.  Recent HR was 39 on 11/6/24.     1.  Thyroid nodules/thyrotoxicosis  Chelo was initially diagnosed with thyroid nodules incidentally, on MRI, in 2019. She has not been aware of any neck enlargement. She denies dysphagia, voice hoarseness, lightheadedness. Of note that she does have a history of GERD.     Thyroid ultrasounds were done in 2019, 2020, 2021, 2022 and, most recently, on December 7, 2023.  There has been a gradual enlargement of the left thyroid nodule which occupies the whole left thyroid lobe and extends inferiorly in the mediastinum.  On prior CTs, there was mild rightward deviation of the lower trachea.  The nodule was benign on the biopsy from February 2020.  The same year, the patient underwent a right parotid gland biopsy which revealed a benign neoplasm. On the most recent ultrasound, the left dominant nodule measured 8.1 x 5.4 x 5.1 cm, while it measured 6.1 x 4.7 x 4.1 cm on prior ultrasounds, with the caveat that the nodule was not always measured using the same sections.  It was rebiopsied on 2/27/2024 and the second biopsy came back benign.  Over the years, TSH has been well in the normal range until June 2024, when it became low normal at 0.4.  On the most recent lab work from 11/6/2024, TSH was 0.26 while FT4 was elevated at 2.59.    2. New radial fracture   Chelo has no prior history of fractures.  She is scheduled to have a DEXA scan done in the near future.  Takes 2000 units vitamin D daily. Denies taking any calcium supplements or MVI.   Dairies: yogurt, 4 times a week, milk with cereals, some cheese.   Exercise: not much    3. Left adrenal adenoma   The abdominal CT from August 2020 identified a 1.2 cm left adrenal nodule.  The nodule was stable in size compared with the prior abdominal CT from October 2019, done at [facility].  Hounsfield unit density on the noncontrast CT from 2020 was consistent with a benign adenoma.   Prior sodium and potassium  levels have been normal.  Recent GFR has been in the 20s.    Current antihypertensive medications:  Olmesartan, 40 mg daily  Bumetanide, 0.5 mg daily    2/20/2024  1 mg dexamethasone suppression test revealed a cortisol level of 1.1  11/6/2024  A1c 5  6/26/2024  LDL cholesterol 64, triglyceride 80    Past Medical History   Past Medical History:   Diagnosis Date    Acquired renal cyst of left kidney     Adrenal nodule (H)     left - needs yearly CT    Ascending aorta dilatation (H)     Chronic atrial fibrillation (H) 11/15/2023    CKD (chronic kidney disease) stage 3, GFR 30-59 ml/min (H)     Degenerative joint disease (DJD) of hip     DJD (degenerative joint disease) 10/21/2005    Eczema, unspecified type 04/05/2018    Elevated parathyroid hormone 06/18/2019    Gallstones     Hepatitis B childhood     resolved, patient is not a carrier     Hyperlipidemia 11/27/2012    Hypertension goal BP (blood pressure) < 140/90     Lumbar spinal stenosis 07/17/2017    Mild persistent asthma without complication 05/30/2017    Morbid obesity due to excess calories (H) 11/23/2015    Surgical referral declined '16     FLOR (obstructive sleep apnea)     Osteopenia     Pulmonary emboli (H) 10/28/2019    Shingles 2014    scalp    Stasis dermatitis of both legs 04/05/2018    Thyroid nodule     Umbilical hernia     Vitamin D deficiency    Pulmonary embolism     Past Surgical History   Past Surgical History:   Procedure Laterality Date    ANESTHESIA CARDIOVERSION N/A 12/28/2023    Procedure: Anesthesia cardioversion @1330;  Surgeon: GENERIC ANESTHESIA PROVIDER;  Location:  OR    ANESTHESIA CARDIOVERSION N/A 8/5/2024    Procedure: Anesthesia cardioversion@1330;  Surgeon: GENERIC ANESTHESIA PROVIDER;  Location:  OR    EP ABLATION FOCAL AFIB N/A 10/17/2024    Procedure: EP Ablation Focal AFIB;  Surgeon: Chay Albarado MD;  Location:  HEART CARDIAC CATH LAB    IR PAROTID BIOPSY  5/21/2020    [facility] TOTAL KNEE ARTHROPLASTY  3/2000    right     [facility] TOTAL KNEE ARTHROPLASTY  6/8/12    left    [facility] VAGINAL HYSTERECTOMY  1977    benign, prolapse, ovaries remain        Current Medications    Current Outpatient Medications:     albuterol (PROAIR HFA/PROVENTIL HFA/VENTOLIN HFA) 108 (90 Base) MCG/ACT inhaler, Inhale 1 puff into the lungs every 6 hours as needed for shortness of breath, wheezing or cough., Disp: 18 g, Rfl: 0    albuterol (PROAIR HFA/PROVENTIL HFA/VENTOLIN HFA) 108 (90 Base) MCG/ACT inhaler, Inhale 1-2 puffs into the lungs every 4 hours as needed for shortness of breath / dyspnea, Disp: 1 Inhaler, Rfl: 3    amiodarone (PACERONE) 200 MG tablet, Take 1/2 tablet ( 100 mg) once daily, Disp: , Rfl:     atorvastatin (LIPITOR) 40 MG tablet, Take 1 tablet (40 mg) by mouth daily, Disp: 90 tablet, Rfl: 3    bumetanide (BUMEX) 1 MG tablet, TAKE HALF DAILY (Patient taking differently: Take 1 mg by mouth daily. TAKE HALF DAILY), Disp: 90 tablet, Rfl: 3    Cholecalciferol (VITAMIN D) 2000 UNITS tablet, Take 2,000 Units by mouth daily., Disp: , Rfl:     Cyanocobalamin (VITAMIN B-12 PO), Take by mouth daily., Disp: , Rfl:     fluticasone (FLONASE) 50 MCG/ACT nasal spray, Spray 2 sprays into both nostrils daily (Patient taking differently: Spray 2 sprays into both nostrils as needed.), Disp: 48 g, Rfl: 3    gabapentin (NEURONTIN) 100 MG capsule, Take 1 capsule (100 mg) by mouth 2 times daily as needed for neuropathic pain, Disp: 60 capsule, Rfl: 1    loratadine (CLARITIN) 10 MG tablet, Take 10 mg by mouth daily., Disp: , Rfl:     magnesium 250 MG tablet, Take 1 tablet (250 mg) by mouth daily, Disp: 90 tablet, Rfl: 1    mometasone (ELOCON) 0.1 % external cream, Apply to affected area on legs twice daily as needed, Disp: 100 g, Rfl: 3    olmesartan (BENICAR) 40 MG tablet, TAKE 1 TABLET(40 MG) BY MOUTH DAILY, Disp: 90 tablet, Rfl: 3    order for DME, Equipment being ordered: Auto CPAP 11-18, Disp: 1 Units, Rfl: 0    rivaroxaban ANTICOAGULANT (XARELTO) 20 MG TABS tablet,  Take 1 tablet (20 mg) by mouth daily (with dinner)., Disp: 90 tablet, Rfl: 4    Family History   Problem Relation Age of Onset    Circulatory Father         d. DVT    Heart Disease Father         pacer    Diabetes Mother     Hypertension Mother     Deep Vein Thrombosis Brother     Coronary Artery Disease Brother     Myocardial Infarction Brother     Deep Vein Thrombosis Brother     C.A.D. Paternal Uncle         MI     Heart Disease Brother 48        pacer     Diabetes Maternal Grandmother     Hypertension Maternal Grandmother     Diabetes Maternal Aunt     Hypertension Maternal Aunt     Cancer - colorectal Maternal Grandfather     C.A.D. Maternal Aunt         MI   Maternal grandfather - rectal cancer.     Social History  . She has 2 children. She denies smoking, drinking alcohol or using illicit drugs. Occupation: retired.     Review of Systems   Systemic:              fatigue; weight up maybe due to water retention - just restarted the diuretic   Eye:                      No eye symptoms; occasionally watery eyes when reading   Levi-Laryngeal:     as above   Breast:                  No breast symptoms  Cardiovascular:    No cardiovascular symptoms, no CP or palpitations   Pulmonary:           SOB improving   Gastrointestinal:   no constipation - controlled by having yogurt   Genitourinary:       No genitourinary symptoms, no increased thirst or urination   Endocrine:           recently she has noticed occasional episodes of hot flashes and night sweats   Neurological:       some light and intermittent hands tremor notice recently, she has a hard time maintaining her balance   Musculoskeletal:  chronic back pain   Skin:                     easy bruising - on a blood thinner; no facial hair, no acne; no significant hair falling out; no new stretch marks   Psychological:      some depression                Vital Signs     Previous Weights:    Wt Readings from Last 10 Encounters:   11/14/24 (!) 152.4 kg (336 lb)    11/06/24 (!) 152.6 kg (336 lb 6 oz)   11/02/24 (!) 151.5 kg (334 lb)   10/18/24 (!) 150 kg (330 lb 12.8 oz)   09/12/24 (!) 154.2 kg (340 lb)   09/03/24 (!) 152 kg (335 lb)   07/17/24 (!) 153.6 kg (338 lb 11.2 oz)   07/01/24 (!) 156.9 kg (346 lb)   02/13/24 (!) 152.9 kg (337 lb)   01/12/24 (!) 152 kg (335 lb)        There were no vitals taken for this visit.    Objective:  Psych: Alert and oriented times 3; coherent speech, normal rate and volume. The affect is normal.    Lab Results  I reviewed prior lab results documented in Epic.  TSH   Date Value Ref Range Status   11/06/2024 0.26 (L) 0.30 - 4.20 uIU/mL Final   07/10/2024 0.56 0.30 - 4.20 uIU/mL Final   06/26/2024 0.40 0.30 - 4.20 uIU/mL Final   12/19/2023 2.50 0.30 - 4.20 uIU/mL Final   04/05/2018 1.61 0.40 - 4.00 mU/L Final   01/25/2017 1.35 0.40 - 4.00 mU/L Final   11/23/2015 1.53 0.40 - 4.00 mU/L Final   10/23/2013 1.61 0.4 - 5.0 mU/L Final   02/23/2007 1.67 0.4 - 5.0 mU/L Final

## 2024-11-22 ENCOUNTER — LAB (OUTPATIENT)
Dept: LAB | Facility: CLINIC | Age: 79
End: 2024-11-22
Payer: COMMERCIAL

## 2024-11-22 DIAGNOSIS — N18.32 STAGE 3B CHRONIC KIDNEY DISEASE (CKD) (H): Primary | ICD-10-CM

## 2024-11-23 LAB
T3 SERPL-MCNC: 72 NG/DL (ref 85–202)
T4 FREE SERPL-MCNC: 2.64 NG/DL (ref 0.9–1.7)
TSH SERPL DL<=0.005 MIU/L-ACNC: 0.16 UIU/ML (ref 0.3–4.2)

## 2024-11-28 LAB — MAYO MISC RESULT: NORMAL

## 2024-12-01 ENCOUNTER — TRANSFERRED RECORDS (OUTPATIENT)
Dept: MULTI SPECIALTY CLINIC | Facility: CLINIC | Age: 79
End: 2024-12-01

## 2024-12-23 ENCOUNTER — LAB (OUTPATIENT)
Dept: LAB | Facility: CLINIC | Age: 79
End: 2024-12-23
Payer: COMMERCIAL

## 2024-12-23 DIAGNOSIS — Z79.899 LONG TERM CURRENT USE OF AMIODARONE: ICD-10-CM

## 2024-12-23 DIAGNOSIS — N18.32 STAGE 3B CHRONIC KIDNEY DISEASE (CKD) (H): Primary | ICD-10-CM

## 2024-12-23 LAB
CREAT UR-MCNC: 256 MG/DL
MICROALBUMIN UR-MCNC: 31.1 MG/L
MICROALBUMIN/CREAT UR: 12.15 MG/G CR (ref 0–25)
PTH-INTACT SERPL-MCNC: 82 PG/ML (ref 15–65)
T4 FREE SERPL-MCNC: 2.08 NG/DL (ref 0.9–1.7)
TSH SERPL DL<=0.005 MIU/L-ACNC: 0.07 UIU/ML (ref 0.3–4.2)

## 2024-12-23 PROCEDURE — 36415 COLL VENOUS BLD VENIPUNCTURE: CPT

## 2024-12-23 PROCEDURE — 82043 UR ALBUMIN QUANTITATIVE: CPT

## 2024-12-23 PROCEDURE — 82570 ASSAY OF URINE CREATININE: CPT

## 2024-12-23 PROCEDURE — 83970 ASSAY OF PARATHORMONE: CPT

## 2024-12-23 PROCEDURE — 84443 ASSAY THYROID STIM HORMONE: CPT

## 2024-12-23 PROCEDURE — 84439 ASSAY OF FREE THYROXINE: CPT

## 2025-01-02 PROBLEM — I48.19 PERSISTENT ATRIAL FIBRILLATION (H): Status: RESOLVED | Noted: 2024-10-17 | Resolved: 2025-01-02

## 2025-01-05 NOTE — PROGRESS NOTES
[facility]      Referring provider:Vita Zavala MD     HPI: Ms. Chelo Brooks is a 78 year old  female with PMH significant for    -Morbid obesity BMI 53  -Hypertension.   -Persistent atrial fibrillation  -CKD stage III  -Obstructive sleep apnea    She is seen today for follow-up.  Underwent successful a-fib ablation in 10/24. Telephone visit with EP 11/24, reported significant fatigue and dysnea, Metoprolol stopped due to bradycardia, amiodarone reduced to 100 mg due to deranged TFTs. Reports improvement in symptoms since this visit.    She reports feeling much better after the ablation. She reports some light-headedness with standing after sitting for a long time. No syncope. Reports some chest pressure/fullness with meals, about once a day, more in frequency over the last month. Says she used to be on famotidine but it has not been refilled so has not been taking it. Reports improvement in shortness of breath, can now walk comfortably within the apartment but Is short of breath with cleaning, lifting loads etc.    Prior cardiac work-up in 2017 shows normal cardiac MRI and sinus rhythm EKG.  Recent echocardiogram on 11/28/2023 shows normal biventricular function with no significant valve disease.    Medications: Atorvastatin 40 mg, olmesartan hydrochlorothiazide 40/25, Xarelto 20 mg, bumex 1 mg prn, amiodarone 100 mg daily    Medications, personal, family, and social history reviewed with patient and revised.    Interval history 1/10/2024:  Patient is being seen today for follow-up after recent cardioversion on 12/28/2023.  Patient tells me that she felt great after cardioversion which was successful.  UMANA has improved.  Denies chest pain, palpitations, dizziness.  She still has significant edema on both legs.  As you know I have started on Bumex a month ago and stopped  hydrochlorothiazide.  Her weight is the same.  She tells me she has to go to the bathroom a lot.    Interval history 7/1/2024:  Patient returns for follow-up.  Due to my previous visit I referred patient to catheter ablation.  Patient is scheduled to see EP on 7/17.    Patient continues to report shortness of breath with activity.  She feels very tired.  She is on CPAP.  Denies chest pain, palpitations.  She tells me that she has swelling on the lower extremity on both sides.  She has some leaky spots.  Has been avoiding diuretic for a long time.  She used as needed because of the concerns to use the restroom frequently.     Interval history 1/6/2024:  Patient underwent catheter ablation in October of this year.  Since then she has been maintaining sinus rhythm on amiodarone as well.  Reports improved functional capacity.  UMANA has improved.  Overall feeling well.    PAST MEDICAL HISTORY:  Past Medical History:   Diagnosis Date    Acquired renal cyst of left kidney     Adrenal nodule (H)     left - needs yearly CT    Ascending aorta dilatation (H)     Chronic atrial fibrillation (H) 11/15/2023    CKD (chronic kidney disease) stage 3, GFR 30-59 ml/min (H)     Degenerative joint disease (DJD) of hip     DJD (degenerative joint disease) 10/21/2005    Eczema, unspecified type 04/05/2018    Elevated parathyroid hormone 06/18/2019    Gallstones     Hepatitis B childhood     resolved, patient is not a carrier     Hyperlipidemia 11/27/2012    Hypertension goal BP (blood pressure) < 140/90     Low bone mass     Lumbar spinal stenosis 07/17/2017    Mild persistent asthma without complication 05/30/2017    Morbid obesity due to excess calories (H) 11/23/2015    Surgical referral declined '16     FLOR (obstructive sleep apnea)     Osteopenia     Pulmonary emboli (H) 10/28/2019    Shingles 2014    scalp    Stasis dermatitis of both legs 04/05/2018    Thyroid nodule     Umbilical hernia     Vitamin D deficiency        CURRENT  MEDICATIONS:  Current Outpatient Medications   Medication Sig Dispense Refill    albuterol (PROAIR HFA/PROVENTIL HFA/VENTOLIN HFA) 108 (90 Base) MCG/ACT inhaler Inhale 1 puff into the lungs every 6 hours as needed for shortness of breath, wheezing or cough. 18 g 0    amiodarone (PACERONE) 200 MG tablet Take 1/2 tablet ( 100 mg) once daily      atorvastatin (LIPITOR) 40 MG tablet Take 1 tablet (40 mg) by mouth daily 90 tablet 3    bumetanide (BUMEX) 1 MG tablet TAKE HALF DAILY (Patient taking differently: Take 1 mg by mouth daily. Pt taking once or twice per week) 90 tablet 3    Cholecalciferol (VITAMIN D) 2000 UNITS tablet Take 2,000 Units by mouth daily.      Cyanocobalamin (VITAMIN B-12 PO) Take by mouth daily.      fluticasone (FLONASE) 50 MCG/ACT nasal spray Spray 2 sprays into both nostrils daily (Patient taking differently: Spray 2 sprays into both nostrils as needed for other.) 48 g 3    gabapentin (NEURONTIN) 100 MG capsule Take 1 capsule (100 mg) by mouth 2 times daily as needed for neuropathic pain 60 capsule 1    loratadine (CLARITIN) 10 MG tablet Take 10 mg by mouth daily.      magnesium 250 MG tablet Take 1 tablet (250 mg) by mouth daily 90 tablet 1    methimazole (TAPAZOLE) 5 MG tablet Take 1 tablet (5 mg) by mouth daily. 90 tablet 3    mometasone (ELOCON) 0.1 % external cream Apply to affected area on legs twice daily as needed 100 g 3    olmesartan (BENICAR) 40 MG tablet TAKE 1 TABLET(40 MG) BY MOUTH DAILY 90 tablet 3    rivaroxaban ANTICOAGULANT (XARELTO) 20 MG TABS tablet Take 1 tablet (20 mg) by mouth daily (with dinner). 90 tablet 4    albuterol (PROAIR HFA/PROVENTIL HFA/VENTOLIN HFA) 108 (90 Base) MCG/ACT inhaler Inhale 1-2 puffs into the lungs every 4 hours as needed for shortness of breath / dyspnea (Patient not taking: No sig reported) 1 Inhaler 3    order for DME Equipment being ordered: Auto CPAP 11-18 1 Units 0       PAST SURGICAL HISTORY:  Past Surgical History:   Procedure Laterality  Date    ANESTHESIA CARDIOVERSION N/A 12/28/2023    Procedure: Anesthesia cardioversion @1330;  Surgeon: GENERIC ANESTHESIA PROVIDER;  Location: [facility] OR    ANESTHESIA CARDIOVERSION N/A 8/5/2024    Procedure: Anesthesia cardioversion@1330;  Surgeon: GENERIC ANESTHESIA PROVIDER;  Location: [facility] OR    EP ABLATION FOCAL AFIB N/A 10/17/2024    Procedure: EP Ablation Focal AFIB;  Surgeon: Chay Albarado MD;  Location:  HEART CARDIAC CATH LAB    IR PAROTID BIOPSY  5/21/2020    [facility] TOTAL KNEE ARTHROPLASTY  3/2000    right    [facility] TOTAL KNEE ARTHROPLASTY  6/8/12    left    [facility] VAGINAL HYSTERECTOMY  1977    benign, prolapse, ovaries remain        ALLERGIES:     Allergies   Allergen Reactions    Adhesive Tape        FAMILY HISTORY:  Family History   Problem Relation Age of Onset    Circulatory Father         d. DVT    Heart Disease Father         pacer    Diabetes Mother     Hypertension Mother     Deep Vein Thrombosis Brother     Coronary Artery Disease Brother     Myocardial Infarction Brother     Deep Vein Thrombosis Brother     C.A.D. Paternal Uncle         MI     Heart Disease Brother 48        pacer     Diabetes Maternal Grandmother     Hypertension Maternal Grandmother     Diabetes Maternal Aunt     Hypertension Maternal Aunt     Cancer - colorectal Maternal Grandfather     C.A.D. Maternal Aunt         MI    Glaucoma No family hx of     Macular Degeneration No family hx of          SOCIAL HISTORY:  Social History     Tobacco Use    Smoking status: Never     Passive exposure: Never    Smokeless tobacco: Never   Vaping Use    Vaping status: Never Used   Substance Use Topics    Alcohol use: Yes     Alcohol/week: 0.0 standard drinks of alcohol     Comment: very rare      Drug use: No       ROS:   Constitutional: No fever, chills, or sweats. Weight stable.   Cardiovascular: As per HPI.       Exam:  /72 (BP Location: Left arm, Patient Position: Chair, Cuff Size: Adult Regular)   Pulse 56   Wt 149.7 kg (330 lb)   SpO2 98%    BMI 51.69 kg/m    GENERAL APPEARANCE: alert and no distress  HEENT: no icterus, no central cyanosis  LYMPH/NECK: no adenopathy, no asymmetry  CARDIOVASCULAR: regular rhythm, normal S1, S2, no S3 or S4 and no murmur, click or rub, precordium quiet with normal PMI.  EXTREMITIES: 1+ pitting bilateral pretibial edema  NEURO: alert, normal speech,and affect  SKIN: no ecchymoses, no rashes     I have reviewed the labs and personally reviewed the imaging below and made my comment in the assessment and plan.    Labs:  CBC RESULTS:   Lab Results   Component Value Date    WBC 4.9 11/06/2024    WBC 4.0 08/06/2020    RBC 3.77 (L) 11/06/2024    RBC 4.26 08/06/2020    HGB 10.2 (L) 11/06/2024    HGB 12.3 08/06/2020    HCT 32.7 (L) 11/06/2024    HCT 37.3 08/06/2020    MCV 87 11/06/2024    MCV 88 08/06/2020    MCH 27.1 11/06/2024    MCH 28.9 08/06/2020    MCHC 31.2 (L) 11/06/2024    MCHC 33.0 08/06/2020    RDW 17.0 (H) 11/06/2024    RDW 13.9 08/06/2020     11/06/2024     08/06/2020       BMP RESULTS:  Lab Results   Component Value Date     11/06/2024     08/06/2020    POTASSIUM 4.4 11/06/2024    POTASSIUM 4.5 03/27/2023    POTASSIUM 3.9 08/06/2020    CHLORIDE 106 11/06/2024    CHLORIDE 108 03/27/2023    CHLORIDE 107 08/06/2020    CO2 25 11/06/2024    CO2 28 03/27/2023    CO2 30 08/06/2020    ANIONGAP 11 11/06/2024    ANIONGAP 4 03/27/2023    ANIONGAP 5 08/06/2020    GLC 93 11/06/2024     (H) 10/18/2024    GLC 92 03/27/2023    GLC 89 08/06/2020    BUN 38.9 (H) 11/06/2024    BUN 28 03/27/2023    BUN 25 08/06/2020    CR 2.00 (H) 11/06/2024    CR 1.32 (H) 08/06/2020    GFRESTIMATED 25 (L) 11/06/2024    GFRESTIMATED 40 (L) 08/06/2020    GFRESTBLACK 46 (L) 08/06/2020    HECTOR 9.2 11/06/2024    HECTOR 8.4 (L) 08/06/2020      DC cardioversion 12/28/2023: 1 200 J biphasic synchronized shock delivered with conversion to sinus rhythm.  CANDE 12/28/2023: Normal biventricular function with no significant valve  disease.  No LA appendage thrombus.  Echocardiogram 11/28/2023  Technically difficult study.Extremely poor acoustic windows.  Left ventricular size, wall motion and function are normal. The ejection  fraction is 55-60%.  Global right ventricular function is normal.  IVC diameter <2.1 cm collapsing >50% with sniff suggests a normal RA pressure  of 3 mmHg.  No pericardial effusion is present.    EKG 11/15/2023 atrial fibrillation.  Heart rate well controlled.            Cardiac MRI 2017  IMPRESSION:  1.  Regadenoson stress perfusion: Normal perfusion at rest and  post-stress without evidence of inducible ischemia.  2.  Normal left ventricular size and systolic function with a  calculated ejection fraction of  62 %.  3.  Normal right ventricular size and systolic function with a  calculated ejection fraction of 60%.   4.  On delayed enhancement imaging, there is no abnormal  hyperenhancement to suggest myocardial scar/inflammation/infiltration.     Assessment:    #Persistent atrial fibrillation status post ablation 10/24/2024, current sinus  #Morbid obesity  #UMANA and worsening functional capacity, improved after restoring sinus rhythm with catheter ablation of A-fib.  #HFpEF functional class II    In sinus rhythm on physical exam today. BP well controlled. She reports taking higher dose of hydrochlorothiazide than what is prescribed, will update med list. She is also taking bumex 1 mg PO weekly. Cr in 11/24 was 2, rising, concerning for possible overdiuresis/volume depletion given mild orthostatic symptoms too. Will recheck BMP and CBC in clinic today. Might need to hold diuretics if Cr still rising on today's check.    Plan:  -Continue Xarelto 20 mg.   -Continue amiodarone 100 mg daily  -Will adjust bumex dose based on Cr check  -Continue olmesartan 40 mg daily and hydrochlorothiazide 25 mg daily  -Metoprolol stopped 11/24 for bradycardia  -Pharmacy consult for GLP-1 agonist to help with weight loss in the setting of  HFpEF and a-fib.    Return to clinic in Sept-October 2025.    Bridgette Gaming MD  PGY-5 Cardiology    Attending note:     I, Patito Loaiza MD, saw this patient with the resident and agree with the resident/fellow's findings and plan of care as documented in the note.       I personally reviewed vital signs, medications, labs, imaging, and ECG .     The history and physical findings are accurate as recorded. My additional findings, if any, have been incorporated into the body of the note. The assessment and plans outlined reflect our joint decision making.     Total time spent 30 minutes including precharting, face-to-face clinic visit and medical documentation.        Patito LOAIZA MD  [facility] Division of Cardiology  Pager [phone]

## 2025-01-06 ENCOUNTER — TELEPHONE (OUTPATIENT)
Dept: CARDIOLOGY | Facility: CLINIC | Age: 80
End: 2025-01-06

## 2025-01-06 ENCOUNTER — OFFICE VISIT (OUTPATIENT)
Dept: CARDIOLOGY | Facility: CLINIC | Age: 80
End: 2025-01-06
Payer: COMMERCIAL

## 2025-01-06 VITALS
WEIGHT: 293 LBS | HEART RATE: 56 BPM | OXYGEN SATURATION: 98 % | DIASTOLIC BLOOD PRESSURE: 72 MMHG | BODY MASS INDEX: 51.69 KG/M2 | SYSTOLIC BLOOD PRESSURE: 128 MMHG

## 2025-01-06 DIAGNOSIS — Z86.79 S/P ABLATION OF ATRIAL FIBRILLATION: Primary | ICD-10-CM

## 2025-01-06 DIAGNOSIS — Z86.79 S/P ABLATION OF ATRIAL FIBRILLATION: ICD-10-CM

## 2025-01-06 DIAGNOSIS — Z98.890 S/P ABLATION OF ATRIAL FIBRILLATION: ICD-10-CM

## 2025-01-06 DIAGNOSIS — D50.8 OTHER IRON DEFICIENCY ANEMIA: ICD-10-CM

## 2025-01-06 DIAGNOSIS — I50.30 HEART FAILURE WITH PRESERVED EJECTION FRACTION, NYHA CLASS II (H): Primary | ICD-10-CM

## 2025-01-06 DIAGNOSIS — E78.5 HYPERLIPIDEMIA LDL GOAL <100: ICD-10-CM

## 2025-01-06 DIAGNOSIS — I50.30 HEART FAILURE WITH PRESERVED EJECTION FRACTION, NYHA CLASS II (H): ICD-10-CM

## 2025-01-06 DIAGNOSIS — D50.8 OTHER IRON DEFICIENCY ANEMIA: Primary | ICD-10-CM

## 2025-01-06 DIAGNOSIS — Z98.890 S/P ABLATION OF ATRIAL FIBRILLATION: Primary | ICD-10-CM

## 2025-01-06 LAB
ERYTHROCYTE [DISTWIDTH] IN BLOOD BY AUTOMATED COUNT: 16.3 % (ref 10–15)
HCT VFR BLD AUTO: 31 % (ref 35–47)
HGB BLD-MCNC: 9.8 G/DL (ref 11.7–15.7)
MCH RBC QN AUTO: 27.5 PG (ref 26.5–33)
MCHC RBC AUTO-ENTMCNC: 31.6 G/DL (ref 31.5–36.5)
MCV RBC AUTO: 87 FL (ref 78–100)
PLATELET # BLD AUTO: 178 10E3/UL (ref 150–450)
RBC # BLD AUTO: 3.57 10E6/UL (ref 3.8–5.2)
WBC # BLD AUTO: 3.9 10E3/UL (ref 4–11)

## 2025-01-06 RX ORDER — OLMESARTAN MEDOXOMIL AND HYDROCHLOROTHIAZIDE 40/25 40; 25 MG/1; MG/1
1 TABLET ORAL DAILY
COMMUNITY

## 2025-01-06 NOTE — NURSING NOTE
"Chief Complaint   Patient presents with    RECHECK       Initial /72 (BP Location: Left arm, Patient Position: Chair, Cuff Size: Adult Regular)   Pulse 56   Wt 149.7 kg (330 lb)   SpO2 98%   BMI 51.69 kg/m   Estimated body mass index is 51.69 kg/m  as calculated from the following:    Height as of 11/14/24: 1.702 m (5' 7\").    Weight as of this encounter: 149.7 kg (330 lb)..  BP completed using cuff size: regular    Daxa Sanchez, Visit Facilitator    "

## 2025-01-06 NOTE — TELEPHONE ENCOUNTER
From: Patito Loaiza MD   Sent: 1/6/2025   2:43 PM CST   To: [facility]     Your hemoglobin is even lower today.     I will recheck your hemoglobin in 2 days also refer you to GI to rule out GI bleeding.     DR LOAIZA   Cardiology     CBC lab order already in place. GI referral placed. Attempted to call patient. Left message for patient.    Bekah Zuniga, RN, BSN  Cardiology RN Care Coordinator   [place]   Phone: [phone]  Fax: [phone] ([facility]) [phone] ([place])

## 2025-01-06 NOTE — LETTER
1/6/2025      RE: Chelo Brooks  [facility] [address]  [facility] [address]       Dear Colleague,    Thank you for the opportunity to participate in the care of your patient, Chelo Brooks, at the [facility]. Please see a copy of my visit note below.                                                                                                             General Cardiology [facility]      Referring provider:Vita Zavala MD     HPI: Ms. Chelo Brooks is a 78 year old  female with PMH significant for    -Morbid obesity BMI 53  -Hypertension.   -Persistent atrial fibrillation  -CKD stage III  -Obstructive sleep apnea    She is seen today for follow-up.  Underwent successful a-fib ablation in 10/24. Telephone visit with EP 11/24, reported significant fatigue and dysnea, Metoprolol stopped due to bradycardia, amiodarone reduced to 100 mg due to deranged TFTs. Reports improvement in symptoms since this visit.    She reports feeling much better after the ablation. She reports some light-headedness with standing after sitting for a long time. No syncope. Reports some chest pressure/fullness with meals, about once a day, more in frequency over the last month. Says she used to be on famotidine but it has not been refilled so has not been taking it. Reports improvement in shortness of breath, can now walk comfortably within the apartment but Is short of breath with cleaning, lifting loads etc.    Prior cardiac work-up in 2017 shows normal cardiac MRI and sinus rhythm EKG.  Recent echocardiogram on 11/28/2023 shows normal biventricular function with no significant valve disease.    Medications: Atorvastatin 40 mg, olmesartan hydrochlorothiazide 40/25, Xarelto 20 mg, bumex 1 mg prn, amiodarone 100 mg daily    Medications, personal, family, and social history reviewed with patient and revised.    Interval  history 1/10/2024:  Patient is being seen today for follow-up after recent cardioversion on 12/28/2023.  Patient tells me that she felt great after cardioversion which was successful.  UMANA has improved.  Denies chest pain, palpitations, dizziness.  She still has significant edema on both legs.  As you know I have started on Bumex a month ago and stopped hydrochlorothiazide.  Her weight is the same.  She tells me she has to go to the bathroom a lot.    Interval history 7/1/2024:  Patient returns for follow-up.  Due to my previous visit I referred patient to catheter ablation.  Patient is scheduled to see EP on 7/17.    Patient continues to report shortness of breath with activity.  She feels very tired.  She is on CPAP.  Denies chest pain, palpitations.  She tells me that she has swelling on the lower extremity on both sides.  She has some leaky spots.  Has been avoiding diuretic for a long time.  She used as needed because of the concerns to use the restroom frequently.     Interval history 1/6/2024:  Patient underwent catheter ablation in October of this year.  Since then she has been maintaining sinus rhythm on amiodarone as well.  Reports improved functional capacity.  UMANA has improved.  Overall feeling well.    PAST MEDICAL HISTORY:  Past Medical History:   Diagnosis Date     Acquired renal cyst of left kidney      Adrenal nodule (H)     left - needs yearly CT     Ascending aorta dilatation (H)      Chronic atrial fibrillation (H) 11/15/2023     CKD (chronic kidney disease) stage 3, GFR 30-59 ml/min (H)      Degenerative joint disease (DJD) of hip      DJD (degenerative joint disease) 10/21/2005     Eczema, unspecified type 04/05/2018     Elevated parathyroid hormone 06/18/2019     Gallstones      Hepatitis B childhood     resolved, patient is not a carrier      Hyperlipidemia 11/27/2012     Hypertension goal BP (blood pressure) < 140/90      Low bone mass      Lumbar spinal stenosis 07/17/2017     Mild  persistent asthma without complication 05/30/2017     Morbid obesity due to excess calories (H) 11/23/2015    Surgical referral declined '16      FLOR (obstructive sleep apnea)      Osteopenia      Pulmonary emboli (H) 10/28/2019     Shingles 2014    scalp     Stasis dermatitis of both legs 04/05/2018     Thyroid nodule      Umbilical hernia      Vitamin D deficiency        CURRENT MEDICATIONS:  Current Outpatient Medications   Medication Sig Dispense Refill     albuterol (PROAIR HFA/PROVENTIL HFA/VENTOLIN HFA) 108 (90 Base) MCG/ACT inhaler Inhale 1 puff into the lungs every 6 hours as needed for shortness of breath, wheezing or cough. 18 g 0     amiodarone (PACERONE) 200 MG tablet Take 1/2 tablet ( 100 mg) once daily       atorvastatin (LIPITOR) 40 MG tablet Take 1 tablet (40 mg) by mouth daily 90 tablet 3     bumetanide (BUMEX) 1 MG tablet TAKE HALF DAILY (Patient taking differently: Take 1 mg by mouth daily. Pt taking once or twice per week) 90 tablet 3     Cholecalciferol (VITAMIN D) 2000 UNITS tablet Take 2,000 Units by mouth daily.       Cyanocobalamin (VITAMIN B-12 PO) Take by mouth daily.       fluticasone (FLONASE) 50 MCG/ACT nasal spray Spray 2 sprays into both nostrils daily (Patient taking differently: Spray 2 sprays into both nostrils as needed for other.) 48 g 3     gabapentin (NEURONTIN) 100 MG capsule Take 1 capsule (100 mg) by mouth 2 times daily as needed for neuropathic pain 60 capsule 1     loratadine (CLARITIN) 10 MG tablet Take 10 mg by mouth daily.       magnesium 250 MG tablet Take 1 tablet (250 mg) by mouth daily 90 tablet 1     methimazole (TAPAZOLE) 5 MG tablet Take 1 tablet (5 mg) by mouth daily. 90 tablet 3     mometasone (ELOCON) 0.1 % external cream Apply to affected area on legs twice daily as needed 100 g 3     olmesartan (BENICAR) 40 MG tablet TAKE 1 TABLET(40 MG) BY MOUTH DAILY 90 tablet 3     rivaroxaban ANTICOAGULANT (XARELTO) 20 MG TABS tablet Take 1 tablet (20 mg) by mouth daily  (with dinner). 90 tablet 4     albuterol (PROAIR HFA/PROVENTIL HFA/VENTOLIN HFA) 108 (90 Base) MCG/ACT inhaler Inhale 1-2 puffs into the lungs every 4 hours as needed for shortness of breath / dyspnea (Patient not taking: No sig reported) 1 Inhaler 3     order for DME Equipment being ordered: Auto CPAP 11-18 1 Units 0       PAST SURGICAL HISTORY:  Past Surgical History:   Procedure Laterality Date     ANESTHESIA CARDIOVERSION N/A 12/28/2023    Procedure: Anesthesia cardioversion @1330;  Surgeon: GENERIC ANESTHESIA PROVIDER;  Location: [facility] OR     ANESTHESIA CARDIOVERSION N/A 8/5/2024    Procedure: Anesthesia cardioversion@1330;  Surgeon: GENERIC ANESTHESIA PROVIDER;  Location: [facility] OR     EP ABLATION FOCAL AFIB N/A 10/17/2024    Procedure: EP Ablation Focal AFIB;  Surgeon: Chay Albarado MD;  Location:  HEART CARDIAC CATH LAB     IR PAROTID BIOPSY  5/21/2020     Z TOTAL KNEE ARTHROPLASTY  3/2000    right     C TOTAL KNEE ARTHROPLASTY  6/8/12    left     ZZC VAGINAL HYSTERECTOMY  1977    benign, prolapse, ovaries remain        ALLERGIES:     Allergies   Allergen Reactions     Adhesive Tape        FAMILY HISTORY:  Family History   Problem Relation Age of Onset     Circulatory Father         d. DVT     Heart Disease Father         pacer     Diabetes Mother      Hypertension Mother      Deep Vein Thrombosis Brother      Coronary Artery Disease Brother      Myocardial Infarction Brother      Deep Vein Thrombosis Brother      C.A.D. Paternal Uncle         MI      Heart Disease Brother 48        pacer      Diabetes Maternal Grandmother      Hypertension Maternal Grandmother      Diabetes Maternal Aunt      Hypertension Maternal Aunt      Cancer - colorectal Maternal Grandfather      C.A.D. Maternal Aunt         MI     Glaucoma No family hx of      Macular Degeneration No family hx of          SOCIAL HISTORY:  Social History     Tobacco Use     Smoking status: Never     Passive exposure: Never     Smokeless tobacco: Never    Vaping Use     Vaping status: Never Used   Substance Use Topics     Alcohol use: Yes     Alcohol/week: 0.0 standard drinks of alcohol     Comment: very rare       Drug use: No       ROS:   Constitutional: No fever, chills, or sweats. Weight stable.   Cardiovascular: As per HPI.       Exam:  /72 (BP Location: Left arm, Patient Position: Chair, Cuff Size: Adult Regular)   Pulse 56   Wt 149.7 kg (330 lb)   SpO2 98%   BMI 51.69 kg/m    GENERAL APPEARANCE: alert and no distress  HEENT: no icterus, no central cyanosis  LYMPH/NECK: no adenopathy, no asymmetry  CARDIOVASCULAR: regular rhythm, normal S1, S2, no S3 or S4 and no murmur, click or rub, precordium quiet with normal PMI.  EXTREMITIES: 1+ pitting bilateral pretibial edema  NEURO: alert, normal speech,and affect  SKIN: no ecchymoses, no rashes     I have reviewed the labs and personally reviewed the imaging below and made my comment in the assessment and plan.    Labs:  CBC RESULTS:   Lab Results   Component Value Date    WBC 4.9 11/06/2024    WBC 4.0 08/06/2020    RBC 3.77 (L) 11/06/2024    RBC 4.26 08/06/2020    HGB 10.2 (L) 11/06/2024    HGB 12.3 08/06/2020    HCT 32.7 (L) 11/06/2024    HCT 37.3 08/06/2020    MCV 87 11/06/2024    MCV 88 08/06/2020    MCH 27.1 11/06/2024    MCH 28.9 08/06/2020    MCHC 31.2 (L) 11/06/2024    MCHC 33.0 08/06/2020    RDW 17.0 (H) 11/06/2024    RDW 13.9 08/06/2020     11/06/2024     08/06/2020       BMP RESULTS:  Lab Results   Component Value Date     11/06/2024     08/06/2020    POTASSIUM 4.4 11/06/2024    POTASSIUM 4.5 03/27/2023    POTASSIUM 3.9 08/06/2020    CHLORIDE 106 11/06/2024    CHLORIDE 108 03/27/2023    CHLORIDE 107 08/06/2020    CO2 25 11/06/2024    CO2 28 03/27/2023    CO2 30 08/06/2020    ANIONGAP 11 11/06/2024    ANIONGAP 4 03/27/2023    ANIONGAP 5 08/06/2020    GLC 93 11/06/2024     (H) 10/18/2024    GLC 92 03/27/2023    GLC 89 08/06/2020    BUN 38.9 (H) 11/06/2024    BUN  28 03/27/2023    BUN 25 08/06/2020    CR 2.00 (H) 11/06/2024    CR 1.32 (H) 08/06/2020    GFRESTIMATED 25 (L) 11/06/2024    GFRESTIMATED 40 (L) 08/06/2020    GFRESTBLACK 46 (L) 08/06/2020    HECTOR 9.2 11/06/2024    HECTOR 8.4 (L) 08/06/2020      DC cardioversion 12/28/2023: 1 200 J biphasic synchronized shock delivered with conversion to sinus rhythm.  CANDE 12/28/2023: Normal biventricular function with no significant valve disease.  No LA appendage thrombus.  Echocardiogram 11/28/2023  Technically difficult study.Extremely poor acoustic windows.  Left ventricular size, wall motion and function are normal. The ejection  fraction is 55-60%.  Global right ventricular function is normal.  IVC diameter <2.1 cm collapsing >50% with sniff suggests a normal RA pressure  of 3 mmHg.  No pericardial effusion is present.    EKG 11/15/2023 atrial fibrillation.  Heart rate well controlled.            Cardiac MRI 2017  IMPRESSION:  1.  Regadenoson stress perfusion: Normal perfusion at rest and  post-stress without evidence of inducible ischemia.  2.  Normal left ventricular size and systolic function with a  calculated ejection fraction of  62 %.  3.  Normal right ventricular size and systolic function with a  calculated ejection fraction of 60%.   4.  On delayed enhancement imaging, there is no abnormal  hyperenhancement to suggest myocardial scar/inflammation/infiltration.     Assessment:    #Persistent atrial fibrillation status post ablation 10/24/2024, current sinus  #Morbid obesity  #UMANA and worsening functional capacity, improved after restoring sinus rhythm with catheter ablation of A-fib.  #HFpEF functional class II    In sinus rhythm on physical exam today. BP well controlled. She reports taking higher dose of hydrochlorothiazide than what is prescribed, will update med list. She is also taking bumex 1 mg PO weekly. Cr in 11/24 was 2, rising, concerning for possible overdiuresis/volume depletion given mild orthostatic  symptoms too. Will recheck BMP and CBC in clinic today. Might need to hold diuretics if Cr still rising on today's check.    Plan:  -Continue Xarelto 20 mg.   -Continue amiodarone 100 mg daily  -Will adjust bumex dose based on Cr check  -Continue olmesartan 40 mg daily and hydrochlorothiazide 25 mg daily  -Metoprolol stopped 11/24 for bradycardia  -Pharmacy consult for GLP-1 agonist to help with weight loss in the setting of HFpEF and a-fib.    Return to clinic in Sept-October 2025.    Bridgette Gaming MD  PGY-5 Cardiology    Attending note:     I, Patito Loaiza MD, saw this patient with the resident and agree with the resident/fellow's findings and plan of care as documented in the note.       I personally reviewed vital signs, medications, labs, imaging, and ECG .     The history and physical findings are accurate as recorded. My additional findings, if any, have been incorporated into the body of the note. The assessment and plans outlined reflect our joint decision making.     Total time spent 30 minutes including precharting, face-to-face clinic visit and medical documentation.        Patito LOAIZA MD  [facility] Division of Cardiology  Pager [phone]       Please do not hesitate to contact me if you have any questions/concerns.     Sincerely,     Patito Loaiza MD

## 2025-01-06 NOTE — PATIENT INSTRUCTIONS
Thank you for coming to the [facility] [place]; please note the following instructions:    1. Labs today    2. Referral MTM to discuss GLP-1 agonist to assist with weight loss    3. Follow up with Dr. Montano in September 2025        If you have any questions regarding your visit please contact your care team:     Cardiology  Telephone Number   Katty ODALYS., RN  Bekah PEREZ, RN  Linn BURKS, RN  Donna JOHNSON, RMA  Eileen RICO, TORY STANLEY, Clinic Facilitator  Daxa PEREZ, Clinic Facilitator [phone] (option 1)   For scheduling appts:     [phone] (select option 1)       For the Device Clinic (Pacemakers and ICD's)  RN's :  Estella Real   During business hours: [phone]    *After business hours:  [phone] (select option 4)      Normal test result notifications will be released via [employer] or mailed within 7 business days.  All other test results, will be communicated via telephone once reviewed by your cardiologist.    If you need a medication refill please contact your pharmacy.  Please allow 3 business days for your refill to be completed.    As always, thank you for trusting us with your health care needs!

## 2025-01-07 ENCOUNTER — MYC MEDICAL ADVICE (OUTPATIENT)
Dept: CARDIOLOGY | Facility: CLINIC | Age: 80
End: 2025-01-07

## 2025-01-07 ENCOUNTER — OFFICE VISIT (OUTPATIENT)
Dept: FAMILY MEDICINE | Facility: CLINIC | Age: 80
End: 2025-01-07
Payer: COMMERCIAL

## 2025-01-07 VITALS
HEART RATE: 62 BPM | DIASTOLIC BLOOD PRESSURE: 68 MMHG | WEIGHT: 293 LBS | HEIGHT: 67 IN | TEMPERATURE: 97.8 F | RESPIRATION RATE: 18 BRPM | SYSTOLIC BLOOD PRESSURE: 116 MMHG | BODY MASS INDEX: 45.99 KG/M2 | OXYGEN SATURATION: 97 %

## 2025-01-07 DIAGNOSIS — Z87.81 HISTORY OF WRIST FRACTURE: ICD-10-CM

## 2025-01-07 DIAGNOSIS — Z86.79 S/P ABLATION OF ATRIAL FIBRILLATION: ICD-10-CM

## 2025-01-07 DIAGNOSIS — Z98.890 S/P ABLATION OF ATRIAL FIBRILLATION: ICD-10-CM

## 2025-01-07 DIAGNOSIS — Z01.818 PREOP GENERAL PHYSICAL EXAM: Primary | ICD-10-CM

## 2025-01-07 LAB
ANION GAP SERPL CALCULATED.3IONS-SCNC: 11 MMOL/L (ref 7–15)
BUN SERPL-MCNC: 31.2 MG/DL (ref 8–23)
CALCIUM SERPL-MCNC: 9.8 MG/DL (ref 8.8–10.4)
CHLORIDE SERPL-SCNC: 102 MMOL/L (ref 98–107)
CREAT SERPL-MCNC: 1.82 MG/DL (ref 0.51–0.95)
EGFRCR SERPLBLD CKD-EPI 2021: 28 ML/MIN/1.73M2
FERRITIN SERPL-MCNC: 100 NG/ML (ref 11–328)
GLUCOSE SERPL-MCNC: 91 MG/DL (ref 70–99)
HCO3 SERPL-SCNC: 27 MMOL/L (ref 22–29)
POTASSIUM SERPL-SCNC: 4.8 MMOL/L (ref 3.4–5.3)
SODIUM SERPL-SCNC: 140 MMOL/L (ref 135–145)

## 2025-01-07 PROCEDURE — 99214 OFFICE O/P EST MOD 30 MIN: CPT | Performed by: FAMILY MEDICINE

## 2025-01-07 ASSESSMENT — PAIN SCALES - GENERAL: PAINLEVEL_OUTOF10: NO PAIN (0)

## 2025-01-07 NOTE — TELEPHONE ENCOUNTER
Called and reviewed lab results with patient. Patient scheduled for lab on 1/10 due to patient availability.     Bekah Zuniga, RN, BSN  Cardiology RN Care Coordinator   [place]   Phone: [phone]  Fax: [phone] ([place]) [phone] ([place])

## 2025-01-07 NOTE — PROGRESS NOTES
[facility] Evaluation  [facility]  [address]  [place] [address]  Phone: [phone]  Primary Provider: Vita Zavala MD  Pre-op Performing Provider: Nate Jane MD  Jan 7, 2025 1/7/2025   Surgical Information   What procedure is being done? preopp   [facility]ility [facility] where [facility]   Who is doing the procedure / surgery? dr guallpa   Date of surgery / procedure: jan 20th   Time of surgery / procedure: unknown   Where do you plan to recover after surgery? at home with family     Fax number for surgical facility: [phone]        Rima Whitney is a 79 year old, presenting for the following:  Pre-Op Exam          1/7/2025     9:59 AM   Additional Questions   Roomed by Maritza Ramirez     Via the Health Maintenance questionnaire, the patient has reported the following services have been completed DEXA: in my home 2024-12-01, this information has been sent to the abstraction team.  HPI related to upcoming procedure: 79 year old fractured wrist in Sept 2024, had surgery.  Now to have plate removed.         1/7/2025   Pre-Op Questionnaire   Have you ever had a heart attack or stroke? No   Have you ever had surgery on your heart or blood vessels, such as a stent placement, a coronary artery bypass, or surgery on an artery in your head, neck, heart, or legs? No   Do you have chest pain with activity? No   Do you have a history of heart failure? (!) YES but ejection fraction still okay    Do you currently have a cold, bronchitis or symptoms of other infection? No   Do you have a cough, shortness of breath, or wheezing? (!) YES no cough, a bit of shortness of breath but much better now   Do you or anyone in your family have previous history of blood clots? (!) YES in past had clots in legs and lungs after vacation about 4 years ago   Do you or does anyone in your family have a serious bleeding problem such as prolonged  bleeding following surgeries or cuts? No   Have you ever had problems with anemia or been told to take iron pills? No   Have you had any abnormal blood loss such as black, tarry or bloody stools, or abnormal vaginal bleeding? No   Have you ever had a blood transfusion? No   Are you willing to have a blood transfusion if it is medically needed before, during, or after your surgery? Yes   Have you or any of your relatives ever had problems with anesthesia? No   Do you have sleep apnea, excessive snoring or daytime drowsiness? (!) YES   Do you have a CPAP machine? Yes   Do you have any artifical heart valves or other implanted medical devices like a pacemaker, defibrillator, or continuous glucose monitor? No   Do you have artificial joints? (!) YES   Are you allergic to latex? (!) YES     Health Care Directive  Patient does not have a Health Care Directive    Preoperative Review of   Patient not on any controlled substances            Patient Active Problem List    Diagnosis Date Noted    Hyperlipidemia LDL goal <40 11/27/2012     Priority: High    Chronic atrial fibrillation (H) 11/15/2023     Priority: Medium    Umbilical hernia      Priority: Medium    Degenerative joint disease (DJD) of hip      Priority: Medium    Thyroid nodule      Priority: Medium    GERD (gastroesophageal reflux disease) 10/28/2019     Priority: Medium    Asthma, mild intermittent, well-controlled 06/18/2019     Priority: Medium    Elevated parathyroid hormone 06/18/2019     Priority: Medium    Stage 3b chronic kidney disease (CKD) (H)      Priority: Medium    Stasis dermatitis of both legs 04/05/2018     Priority: Medium    Combined forms of age-related cataract of both eyes 07/18/2017     Priority: Medium    Lumbar spinal stenosis 07/17/2017     Priority: Medium    FLOR (obstructive sleep apnea)- severe (AHI 89) 02/15/2017     Priority: Medium     Study Date: 2/14/2017- (329.0 lbs) The total sleep time was 96.0 minutes. The combined  apnea/hypopnea index was 89.4 events per hour.  The REM AHI was n/a.  The supine AHI was 93.9 events per hour. RDI was 92.5 events per hour.  Lowest oxygen saturation was 77%.  Time spent less than or equal to 88% was 27.5 minutes.  Time spent less than or equal to 89% was 34.4 minutes.      Low bone mass      Priority: Medium    Morbid obesity due to excess calories (H) 11/23/2015     Priority: Medium     Surgical referral declined '16      Renal hypertension      Priority: Medium    History of bilateral knee replacement 10/23/2013     Priority: Medium    Allergic rhinitis due to animal dander      Priority: Medium     4/5/12 RAST pos. only to cat mildly      Dependent edema 06/17/2003     Priority: Medium      Past Medical History:   Diagnosis Date    Acquired renal cyst of left kidney     Adrenal nodule (H)     left - needs yearly CT    Ascending aorta dilatation (H)     Chronic atrial fibrillation (H) 11/15/2023    CKD (chronic kidney disease) stage 3, GFR 30-59 ml/min (H)     Degenerative joint disease (DJD) of hip     DJD (degenerative joint disease) 10/21/2005    Eczema, unspecified type 04/05/2018    Elevated parathyroid hormone 06/18/2019    Gallstones     Hepatitis B childhood     resolved, patient is not a carrier     Hyperlipidemia 11/27/2012    Hypertension goal BP (blood pressure) < 140/90     Low bone mass     Lumbar spinal stenosis 07/17/2017    Mild persistent asthma without complication 05/30/2017    Morbid obesity due to excess calories (H) 11/23/2015    Surgical referral declined '16     FLOR (obstructive sleep apnea)     Osteopenia     Pulmonary emboli (H) 10/28/2019    Shingles 2014    scalp    Stasis dermatitis of both legs 04/05/2018    Thyroid nodule     Umbilical hernia     Vitamin D deficiency      Past Surgical History:   Procedure Laterality Date    ANESTHESIA CARDIOVERSION N/A 12/28/2023    Procedure: Anesthesia cardioversion @1330;  Surgeon: GENERIC ANESTHESIA PROVIDER;  [facility]     ANESTHESIA CARDIOVERSION N/A 8/5/2024    Procedure: Anesthesia cardioversion@1330;  Surgeon: GENERIC ANESTHESIA PROVIDER;  [facility]    EP ABLATION FOCAL AFIB N/A 10/17/2024    Procedure: EP Ablation Focal AFIB;  Surgeon: Chay Albarado MD;  Location:  [facility]    IR PAROTID BIOPSY  5/21/2020    Z TOTAL KNEE ARTHROPLASTY  3/2000    right    Z TOTAL KNEE ARTHROPLASTY  6/8/12    left    ZC VAGINAL HYSTERECTOMY  1977    benign, prolapse, ovaries remain      Current Outpatient Medications   Medication Sig Dispense Refill    albuterol (PROAIR HFA/PROVENTIL HFA/VENTOLIN HFA) 108 (90 Base) MCG/ACT inhaler Inhale 1 puff into the lungs every 6 hours as needed for shortness of breath, wheezing or cough. 18 g 0    albuterol (PROAIR HFA/PROVENTIL HFA/VENTOLIN HFA) 108 (90 Base) MCG/ACT inhaler Inhale 1-2 puffs into the lungs every 4 hours as needed for shortness of breath / dyspnea 1 Inhaler 3    amiodarone (PACERONE) 200 MG tablet Take 1/2 tablet ( 100 mg) once daily      atorvastatin (LIPITOR) 40 MG tablet Take 1 tablet (40 mg) by mouth daily 90 tablet 3    bumetanide (BUMEX) 1 MG tablet TAKE HALF DAILY (Patient taking differently: Take 1 mg by mouth daily. Pt taking once or twice per week) 90 tablet 3    Cholecalciferol (VITAMIN D) 2000 UNITS tablet Take 2,000 Units by mouth daily.      Cyanocobalamin (VITAMIN B-12 PO) Take by mouth daily.      fluticasone (FLONASE) 50 MCG/ACT nasal spray Spray 2 sprays into both nostrils daily (Patient taking differently: Spray 2 sprays into both nostrils as needed for other.) 48 g 3    gabapentin (NEURONTIN) 100 MG capsule Take 1 capsule (100 mg) by mouth 2 times daily as needed for neuropathic pain 60 capsule 1    loratadine (CLARITIN) 10 MG tablet Take 10 mg by mouth daily.      magnesium 250 MG tablet Take 1 tablet (250 mg) by mouth daily 90 tablet 1    methimazole (TAPAZOLE) 5 MG tablet Take 1 tablet (5 mg) by mouth daily. 90 tablet 3    mometasone  "(ELOCON) 0.1 % external cream Apply to affected area on legs twice daily as needed 100 g 3    olmesartan-hydrochlorothiazide (BENICAR HCT) 40-25 MG tablet Take 1 tablet by mouth daily.      order for DME Equipment being ordered: Auto CPAP 11-18 1 Units 0    rivaroxaban ANTICOAGULANT (XARELTO) 20 MG TABS tablet Take 1 tablet (20 mg) by mouth daily (with dinner). 90 tablet 4   Patient takes the bumetanide about once a week        Allergies   Allergen Reactions    Adhesive Tape         Social History     Tobacco Use    Smoking status: Never     Passive exposure: Never    Smokeless tobacco: Never   Substance Use Topics    Alcohol use: Yes     Alcohol/week: 0.0 standard drinks of alcohol     Comment: very rare         History   Drug Use No             Review of Systems  Constitutional, HEENT, cardiovascular, pulmonary, GI, , musculoskeletal, neuro, skin, endocrine and psych systems are negative, except as otherwise noted.    No recent illnesses    The shortness is better since ablation    Had the heart procedure done 10-17-24, pulmonary vein isolation    Objective    /68 (BP Location: Left arm, Patient Position: Chair, Cuff Size: Adult Large)   Pulse 62   Temp 97.8  F (36.6  C) (Temporal)   Resp 18   Ht 1.702 m (5' 7\")   Wt (!) 151 kg (333 lb)   SpO2 97%   BMI 52.16 kg/m     Estimated body mass index is 52.16 kg/m  as calculated from the following:    Height as of this encounter: 1.702 m (5' 7\").    Weight as of this encounter: 151 kg (333 lb).  Physical Exam  GENERAL: alert and no distress  EYES: Eyes grossly normal to inspection, PERRL and conjunctivae and sclerae normal  HENT: ear canals and TM's normal, nose and mouth without ulcers or lesions  NECK: no adenopathy, no asymmetry, masses, or scars  RESP: lungs clear to auscultation - no rales, rhonchi or wheezes  CV: regular rates and rhythm, normal S1 S2, no S3 or S4, peripheral pulses strong, no peripheral edema, and murmur present  ABDOMEN: soft, " nontender, no hepatosplenomegaly, no masses and bowel sounds normal  MS: patient with bilateral chronic appearing edema with some stasis dermatitis; splint on right forearm/ wrist.  Moves fingers fine.  SKIN: no suspicious lesions or rashes  NEURO: Normal strength and tone, mentation intact and speech normal  PSYCH: mentation appears normal, affect normal/bright    Recent Labs   Lab Test 01/06/25  1243 11/06/24  1234   HGB 9.8* 10.2*    167    142   POTASSIUM 4.8 4.4   CR 1.82* 2.00*   A1C  --  5.0        Diagnostics    Note patient has seen cardiology frequently recently, has multiple ekgs etc.  See in chart.  Had the procedure done in October for atrial fibrillation. Has been in normal rhythm since procedure.  No significant coronary artery disease ( see copy of CTA).    See labs done yesterday.       ASSESSMENT / PLAN:  (Z01.818) Preop general physical exam  (primary encounter diagnosis)  Comment: overall patient stable.  Recent procedure for atrial fibrillation.  Staying in sinus rhythm.   Plan: okay for procedure    (Z87.81) History of wrist fracture  Comment: to have hardware removed jan 20  Plan: as above     (Z98.890,  Z86.79) S/P ablation of atrial fibrillation  Comment: as above   Plan: as above     Patient will hold the rivaroxaban day before and day of procedure    Hold olmesartan/hydrochlorothiazide day of procedure  Main med to take day of procedure is amiodarone        I reviewed the patient's medications, allergies, medical history, family history, and social history.    Nate Jane MD              Revised Cardiac Risk Index (RCRI)  The patient has the following serious cardiovascular risks for perioperative complications:   - No serious cardiac risks = 0 points     RCRI Interpretation: 1 point: Class II (low risk - 0.9% complication rate)         Signed Electronically by: Nate Jane MD  A copy of this evaluation report is provided to the requesting physician.

## 2025-01-07 NOTE — TELEPHONE ENCOUNTER
Attempted to call patient. Left message for patient. Mychart message sent to patient. See encounter.    Bekah Zuniga, RN, BSN  Cardiology RN Care Coordinator   [place]   Phone: [phone]  Fax: [phone] ([place]) [phone] ([place])

## 2025-01-07 NOTE — TELEPHONE ENCOUNTER
From: Patito Loaiza MD   Sent: 1/6/2025   2:43 PM CST   To: [facility]     Your hemoglobin is even lower today.     I will recheck your hemoglobin in 2 days also refer you to GI to rule out GI bleeding.     DR LOAIZA   Cardiology      Multiple attempts to call patient. Unable to reach patient. CBC lab order in place. GI referral in place. Hapzing message sent to patient.    Bekah Zuniga RN, BSN  Cardiology RN Care Coordinator   [place]   Phone: [phone]  Fax: [phone] ([place]) [phone] ([place])

## 2025-01-07 NOTE — TELEPHONE ENCOUNTER
[facility] Call Center    Phone Message    May a detailed message be left on voicemail: yes     Reason for Call: Other: Please call pt back to advise of lab results.     Action Taken: Message routed to:  [facility] cardio    Travel Screening: Not Applicable    Thank you!  [facility]       Date of Service:

## 2025-01-07 NOTE — PATIENT INSTRUCTIONS
Hold rivaroxaban the day before and day of procedure    Hold olmesartan/ hydrochlorothiazide the am of procedure    Main med to take am of procedure is amiodarone    Follow directions from surgery center about not eating and drinking before procedure

## 2025-01-08 ENCOUNTER — TELEPHONE (OUTPATIENT)
Dept: GASTROENTEROLOGY | Facility: CLINIC | Age: 80
End: 2025-01-08
Payer: COMMERCIAL

## 2025-01-08 NOTE — TELEPHONE ENCOUNTER
[facility] Call Center    Phone Message    May a detailed message be left on voicemail: Yes    Reason for Call: Other: Patient is currently scheduled on 3/4, as visit type New GI Urgent. This is outside the expected timeline for this referral. Patient has been added to the waitlist.      PT requested this date     Action Taken: Message routed to:  Other: GI REFERRAL TRIAGE POOL     Travel Screening: Not Applicable

## 2025-01-09 ENCOUNTER — VIRTUAL VISIT (OUTPATIENT)
Facility: CLINIC | Age: 80
End: 2025-01-09
Attending: INTERNAL MEDICINE
Payer: COMMERCIAL

## 2025-01-09 DIAGNOSIS — E66.01 MORBID OBESITY DUE TO EXCESS CALORIES (H): Primary | Chronic | ICD-10-CM

## 2025-01-09 DIAGNOSIS — I48.20 CHRONIC ATRIAL FIBRILLATION (H): ICD-10-CM

## 2025-01-09 DIAGNOSIS — J30.81 ALLERGIC RHINITIS DUE TO ANIMAL DANDER: Chronic | ICD-10-CM

## 2025-01-09 DIAGNOSIS — Z78.9 TAKES DIETARY SUPPLEMENTS: ICD-10-CM

## 2025-01-09 DIAGNOSIS — E78.5 HYPERLIPIDEMIA, UNSPECIFIED HYPERLIPIDEMIA TYPE: ICD-10-CM

## 2025-01-09 DIAGNOSIS — R52 PAIN: ICD-10-CM

## 2025-01-09 DIAGNOSIS — J45.20 ASTHMA, MILD INTERMITTENT, WELL-CONTROLLED: ICD-10-CM

## 2025-01-09 DIAGNOSIS — G47.33 OSA (OBSTRUCTIVE SLEEP APNEA): Chronic | ICD-10-CM

## 2025-01-09 DIAGNOSIS — I12.9 RENAL HYPERTENSION: ICD-10-CM

## 2025-01-09 DIAGNOSIS — E05.80 AMIODARONE-INDUCED HYPERTHYROIDISM: ICD-10-CM

## 2025-01-09 DIAGNOSIS — T46.2X5A AMIODARONE-INDUCED HYPERTHYROIDISM: ICD-10-CM

## 2025-01-09 NOTE — PROGRESS NOTES
Medication Therapy Management (MTM) Encounter    ASSESSMENT:                            Medication Adherence/Access: No issues identified.    Weight Management   Patient would benefit from GLP1 given BMI of 52.16 kg/m2 with associated commodities of FLOR, hypertension, and hyperlipidemia. Would like to discuss previous history of cholelithiasis with Dr. Montano and previous history of thyroid nodules with patient's endocrinologist prior to starting GLP1. Answered all patient questions.     Afib/Hypertension, hyperlipidemia  LDL at goal, continue atorvastatin.  Blood pressure at goal of <130/80.   Appropriately on anticoagulation given elevated [id number] score.  CrCl using actual body weight is  61.3 ml/min (1/6/25 - scr 1.82, wt 333 lbs).     Allergy , asthma  Stable.      Supplements   Stable.      Hyperthyroid:   Stable, endocrine follow up as scheduled.     Pain   Stable.     PLAN:                            I will reach out to Dr. Montano and Dr. Albert about staring a weight loss medication. I will reach out to you once I hear back.     Follow-up: 4 weeks if GLP1 started     SUBJECTIVE/OBJECTIVE:                          Chelo Brooks is a 79 year old female seen for an initial visit. She was referred to me from Dr. Montano.    Reason for visit: Discuss GLP-1 agonist.    Allergies/ADRs: Reviewed in chart  Past Medical History: Reviewed in chart  Tobacco: She reports that she has never smoked. She has never been exposed to tobacco smoke. She has never used smokeless tobacco.    Medication Adherence/Access: no issues reported.  Reports she is not changing insurance plans for 2025. Reports last year she did hit the donut hole and many her copays were expensive.     Weight Management   She is interested in starting medications for weight loss. She is worried about the cost and would not be able to start if expensive. She reports a strong family history of obesity and diabetes.     Nutrition/Eating Habits: not following a  "specific diet currently. Has dieted in the past but with no success. She does not like to cook. Reports eating a lot of TV dinners and processed foods that are fast and simple. She likes sweets and candies. Reports drinking 1 bottle of Coke a day for many years. She is trying to drink more water. Thinks she eats too much later in the day/at night. Her main meal is at 6-6:30 pm. Trying to watch grocery cost due to fixed income.     Exercise/Activity: socially active with friends in her building.  Has not been to the gym in >1 yr. \"Legs are bad.\" Has an exercise program in her building but has not gone. Broke her wrist and still has a plate in place so limited exercise. Cleaning around her house. Not able to do stairs very well.     Medications Tried/Failed:  None.     Initial Consult Weight: 333 lbs     Relavant past medical history:  -denies any personal history of pancreatitis; denies any personal or family history of MEN2  -denies any personal or family history of medullary thyroid cancer. She does have a previous history of thyroid nodules with 2 benign biopsies.   -history of cholelithiasis -- CT from 8/2020 cholelithiasis without CT evidence of acute cholecystitis.     Wt Readings from Last 2 Encounters:   01/07/25 (!) 333 lb (151 kg)   01/06/25 330 lb (149.7 kg)     Estimated body mass index is 52.16 kg/m  as calculated from the following:    Height as of 1/7/25: 5' 7\" (1.702 m).    Weight as of 1/7/25: 333 lb (151 kg).    Afib/Hypertension, hyperlipidemia  Xarelto 20mg daily for stroke prevention   Amiodarone 100 mg daily    Atorvastatin 40 mg daily   Bumetanide 1 mg daily 1 time a week for lower leg swelling.   Olmesartan-hydrochlorothiazide 40-25 mg daily     Patient reports no current medication side effects.    Reports she needs bumetanide more in the summer and has less leg swelling in the winter.     Coronary calcified plaque is present on CT angio from 10/17/24     [id number] Score    Date " Calculated: 1/7/2025 10:29 AM  [id number] Score: 6       BP Readings from Last 3 Encounters:   01/07/25 116/68   01/06/25 128/72   11/06/24 125/54     Recent Labs   Lab Test 06/26/24  1138 03/27/23  1221   CHOL 131 145   HDL 51 54   LDL 64 71   TRIG 80 99     Allergy , asthma  Fluticasone nasal spray as needed -- not needing often   loratadine 10 mg once daily  Albuterol inhaler as needed -- using around every other day     Patient reports no current medication side effects.    Patient feels that current therapy is effective.      Supplements   Vitamin D 2000 units daily   Vitamin B12 daily   Magnesium 250 mg daily     No reported issues at this time.      Hyperthyroid:   Methimazole 5 mg daily     Reports fatigue. Managed by endocrinology, next follow up appointment on 1/28.    Pain   Gabapentin 100 mg twice daily as needed   Tylenol 2 tablets every morning     Reports she does not take the gabapentin often as she doesn't need it for her back pain.       Today's Vitals: There were no vitals taken for this visit.  ----------------      I spent 48 minutes with this patient today. All changes were made via collaborative practice agreement with Patito Montano.     A summary of these recommendations was sent via [employer].    Rena Low, EmilyD  Medication Therapy Management Pharmacist  [facility]    Telemedicine Visit Details  The patient's medications can be safely assessed via a telemedicine encounter.  Type of service:  Telephone visit  Originating Location (pt. Location): Home    Distant Location (provider location):  Off-site  Start Time:  1:00 PM  End Time: 1:48 PM     Medication Therapy Recommendations  No medication therapy recommendations to display

## 2025-01-13 RX ORDER — ACETAMINOPHEN 325 MG/1
650 TABLET ORAL EVERY MORNING
COMMUNITY

## 2025-01-13 NOTE — PATIENT INSTRUCTIONS
"Recommendations from today's MTM visit:                                                    MTM (medication therapy management) is a service provided by a clinical pharmacist designed to help you get the most of out of your medicines.   Today we reviewed what your medicines are for, how to know if they are working, that your medicines are safe and how to make your medicine regimen as easy as possible.      I will reach out to Dr. Montano and Dr. Albert about staring a weight loss medication. I will reach out to you once I hear back.     Follow-up: 4 weeks if GLP1 started     It was great speaking with you today.  I value your experience and would be very thankful for your time in providing feedback in our clinic survey. In the next few days, you may receive an email or text message from [employer] with a link to a survey related to your  clinical pharmacist.\"     To schedule another MTM appointment, please call the clinic directly or you may call the MTM scheduling line at [phone].    My Clinical Pharmacist's contact information:                                                      Please feel free to contact me with any questions or concerns you have.      Rena Low, PharmD  Medication Therapy Management Pharmacist  [facility]   "

## 2025-01-15 ENCOUNTER — MYC MEDICAL ADVICE (OUTPATIENT)
Dept: CARDIOLOGY | Facility: CLINIC | Age: 80
End: 2025-01-15
Payer: COMMERCIAL

## 2025-01-15 NOTE — TELEPHONE ENCOUNTER
From: Patito Montano MD   Sent: 1/15/2025   2:44 PM CST   To:  Cardiology     Chelo your hemoglobin is low but has been stable.  This has been a new issue over the last few months.     I think you need to see GI to rule out GI causes of anemia.  If you prefer we can place a referral.     Prepared Response message sent to patient.    Bekah Zuniga, RN, BSN  Cardiology RN Care Coordinator   [place]   Phone: [phone]  Fax: [phone] ([place]) [phone] ([place])

## 2025-01-23 ENCOUNTER — LAB (OUTPATIENT)
Dept: LAB | Facility: CLINIC | Age: 80
End: 2025-01-23
Payer: COMMERCIAL

## 2025-01-23 DIAGNOSIS — E04.2 MULTIPLE THYROID NODULES: ICD-10-CM

## 2025-01-23 DIAGNOSIS — E05.80 AMIODARONE-INDUCED HYPERTHYROIDISM: ICD-10-CM

## 2025-01-23 DIAGNOSIS — T46.2X5A AMIODARONE-INDUCED HYPERTHYROIDISM: ICD-10-CM

## 2025-01-26 LAB — CALCIT SERPL-MCNC: <2 PG/ML

## 2025-01-28 ENCOUNTER — OFFICE VISIT (OUTPATIENT)
Dept: ENDOCRINOLOGY | Facility: CLINIC | Age: 80
End: 2025-01-28
Payer: COMMERCIAL

## 2025-01-28 VITALS
OXYGEN SATURATION: 97 % | HEART RATE: 52 BPM | SYSTOLIC BLOOD PRESSURE: 111 MMHG | DIASTOLIC BLOOD PRESSURE: 64 MMHG | WEIGHT: 293 LBS | BODY MASS INDEX: 50.71 KG/M2

## 2025-01-28 DIAGNOSIS — T46.2X5A AMIODARONE-INDUCED HYPERTHYROIDISM: Primary | ICD-10-CM

## 2025-01-28 DIAGNOSIS — D35.02 ADENOMA OF LEFT ADRENAL GLAND: ICD-10-CM

## 2025-01-28 DIAGNOSIS — E05.20 TOXIC NODULAR GOITER: ICD-10-CM

## 2025-01-28 DIAGNOSIS — E66.9 OBESITY WITH SERIOUS COMORBIDITY, UNSPECIFIED CLASS, UNSPECIFIED OBESITY TYPE: ICD-10-CM

## 2025-01-28 DIAGNOSIS — E05.80 AMIODARONE-INDUCED HYPERTHYROIDISM: Primary | ICD-10-CM

## 2025-01-28 DIAGNOSIS — E04.2 MULTIPLE THYROID NODULES: ICD-10-CM

## 2025-01-28 RX ORDER — TIRZEPATIDE 2.5 MG/.5ML
2.5 INJECTION, SOLUTION SUBCUTANEOUS
Qty: 3 ML | Refills: 0 | OUTPATIENT
Start: 2025-01-28

## 2025-01-28 NOTE — PROGRESS NOTES
=======================================================  Assessment     Chelo Brooks is a 79 year old female with a past medical history significant for obesity, hypertension, obstructive sleep apnea, A-fib, CKD stage III, secondary hyperparathyroidism, PE, seen for f/up.     1.  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  Clinically, the patient does not endorse compressive neck symptoms.  Biochemically, she was recently diagnosed with thyrotoxicosis, in the context of treatment with amiodarone, which was started in 12/2023.  Unclear whether or not amiodarone is going to be discontinued.  Currently, the patient is not on beta-blockers, given the bradycadia.  TSI undetectable.  Treatment with methimazole was started in December 2024.    Recommendations:  Continue methimazole at 5 mg daily  Recheck the thyroid hormone levels in 2 months    2.  Left adrenal gland adenoma, stable on imaging from [id number].  The 1 mg dexamethasone suppression test was negative in 2024.   Screen for hyperaldosteronism with her next lab work.  Advised the patient to have the lab work done in the morning.  She does not need to fast.    3.  Obesity  Discussed about considering treatment with Zepbound and she agreed.  Counseled on the administration of this medications and side effects, with GI issues being more common but also discussed about the rare risk of pancreatitis and the questionable risk of medullary thyroid cancer.  For dose titration, I recommended to follow-up with the Pharm.D.    3.  Right wrist fracture  Advised the patient to have the result of the DXA scan faxed to us.    Orders Placed This Encounter   Procedures    Aldosterone    Renin activity    Potassium    TSH    T4 free    T3 total      =======================================================  The patient is seen in follow-up.  She was first evaluated by me in February 2024.    History of Present Illness:  Chelo Brooks is a 79 year old female with a past medical history significant for aortic dilatation, A-fib, CKD stage III, DJD, hyperparathyroidism, hypertension, obstructive sleep apnea, osteopenia, pulmonary embolism.     1.  Thyroid nodules/thyrotoxicosis  Chelo was initially diagnosed with thyroid nodules incidentally, on MRI, in 2019. She has not been aware of any neck enlargement. She denies dysphagia, voice hoarseness, lightheadedness.    In September 2024, she lost her balance and fell on her side on her hand. She suffered a distal radius fracture of the right wrist and she required surgery.  On preop clearance, her thyroid hormone levels were elevated.  She was found to be in AF at PCP visit Dec 2023. She had DCCV x 3 unsuccessful attempts and she underwent successful ablation on 10/17/24.   Treatment with amiodarone was started in December 2023.  The current dose is 100 mg daily (dose decreased on 11/14/2024). Not sure if amiodarone is going to be discontinued.     Thyroid ultrasounds were done in 2019, 2020, 2021, 2022 and, most recently, on December 7, 2023.  There has been a gradual enlargement of the left thyroid nodule which occupies the whole left thyroid lobe and extends inferiorly in the mediastinum.  On prior CTs, there was mild rightward deviation of the lower trachea.  The nodule was benign on the biopsy from February 2020.  The same year, the patient underwent a right parotid gland biopsy which revealed a benign neoplasm. On the 2023 ultrasound, the left dominant nodule measured 8.1 x 5.4 x 5.1 cm, while it measured 6.1 x 4.7 x 4.1 cm on prior ultrasounds, with the caveat that the nodule was not always measured using the same sections.  It was rebiopsied on 2/27/2024 and the second biopsy came back benign.  Over the  years, TSH has been well in the normal range until June 2024, when it became low normal at 0.4.      Pertinent labs reviewed:  11/6/2024 TSH 0.26  free T4 2.59  11/22/24   TSH 0.16  free T4 2.64 , Total T3 72, TSI 0.1 (0-0.55) . Started on 5 mg methimazole daily on 12/1/24.   1/23/25    TSH 0.27  free T4 2.2, total T3 59, calcitonin <2    She continues to c/o fatigue. Denies palpitations, tremor, insomnia, anxiety or depression. Recently she has noticed some cold intolerance.  Uses the CPAP but she doesn't wake up refresh.   She has some longstanding and intermittent constipation relieved by yogurt .     2. New radial fracture   Chelo has no prior history of fractures.      Takes 2000 units vitamin D daily. Denies taking any calcium supplements or MVI.   Dairies: yogurt, 4 times a week, milk with cereals, some cheese.   Exercise: not much    She was supposed to have a DXA scan scheduled and apparently she ended up having a home DXA scan done.  The result was not available for my review.     3. Left adrenal adenoma   The abdominal CT from August 2020 identified a 1.2 cm left adrenal nodule.  The nodule was stable in size compared with the prior abdominal CT from October 2019, done at [facility].  Hounsfield unit density on the noncontrast CT from 2020 was consistent with a benign adenoma.   Prior sodium and potassium levels have been normal.  Recent GFR has been in the 20s.    Current antihypertensive medications:  Olmesartan/HCTZ, 40-25 mg daily  Bumetanide, 0.5 mg daily    2/20/2024  1 mg dexamethasone suppression test revealed a cortisol level of 1.1  11/6/2024  A1c 5%  6/26/2024  LDL cholesterol 64, triglyceride 80    Past Medical History   Past Medical History:   Diagnosis Date    Acquired renal cyst of left kidney     Adrenal nodule     left - needs yearly CT    Ascending aorta dilatation     Chronic atrial fibrillation (H) 11/15/2023    CKD (chronic kidney disease) stage 3, GFR 30-59 ml/min (H)     Degenerative  joint disease (DJD) of hip     DJD (degenerative joint disease) 10/21/2005    Eczema, unspecified type 04/05/2018    Elevated parathyroid hormone 06/18/2019    Gallstones     Hepatitis B childhood     resolved, patient is not a carrier     Hyperlipidemia 11/27/2012    Hypertension goal BP (blood pressure) < 140/90     Low bone mass     Lumbar spinal stenosis 07/17/2017    Mild persistent asthma without complication 05/30/2017    Morbid obesity due to excess calories (H) 11/23/2015    Surgical referral declined '16     FLOR (obstructive sleep apnea)     Osteopenia     Pulmonary emboli (H) 10/28/2019    Shingles 2014    scalp    Stasis dermatitis of both legs 04/05/2018    Thyroid nodule     Umbilical hernia     Vitamin D deficiency    Pulmonary embolism     Past Surgical History   Past Surgical History:   Procedure Laterality Date    ANESTHESIA CARDIOVERSION N/A 12/28/2023    Procedure: Anesthesia cardioversion @1330;  Surgeon: GENERIC ANESTHESIA PROVIDER;  Location: [facility] OR    ANESTHESIA CARDIOVERSION N/A 8/5/2024    Procedure: Anesthesia cardioversion@1330;  Surgeon: GENERIC ANESTHESIA PROVIDER;  Location: [facility] OR    EP ABLATION FOCAL AFIB N/A 10/17/2024    Procedure: EP Ablation Focal AFIB;  Surgeon: Chay Albarado MD;  Location:  [facility]    IR PAROTID BIOPSY  5/21/2020    [facility] TOTAL KNEE ARTHROPLASTY  3/2000    right    [facility] TOTAL KNEE ARTHROPLASTY  6/8/12    left    [facility] VAGINAL HYSTERECTOMY  1977    benign, prolapse, ovaries remain        Current Medications    Current Outpatient Medications:     acetaminophen (TYLENOL) 325 MG tablet, Take 650 mg by mouth every morning., Disp: , Rfl:     albuterol (PROAIR HFA/PROVENTIL HFA/VENTOLIN HFA) 108 (90 Base) MCG/ACT inhaler, Inhale 1 puff into the lungs every 6 hours as needed for shortness of breath, wheezing or cough., Disp: 18 g, Rfl: 0    amiodarone (PACERONE) 200 MG tablet, Take 1/2 tablet ( 100 mg) once daily, Disp: , Rfl:     atorvastatin (LIPITOR) 40  MG tablet, Take 1 tablet (40 mg) by mouth daily, Disp: 90 tablet, Rfl: 3    bumetanide (BUMEX) 1 MG tablet, TAKE HALF DAILY (Patient taking differently: Take 1 mg by mouth daily. Pt taking once or twice per week), Disp: 90 tablet, Rfl: 3    Cholecalciferol (VITAMIN D) 2000 UNITS tablet, Take 2,000 Units by mouth daily., Disp: , Rfl:     Cyanocobalamin (VITAMIN B-12 PO), Take by mouth daily., Disp: , Rfl:     fluticasone (FLONASE) 50 MCG/ACT nasal spray, Spray 2 sprays into both nostrils daily (Patient taking differently: Spray 2 sprays into both nostrils as needed for other.), Disp: 48 g, Rfl: 3    gabapentin (NEURONTIN) 100 MG capsule, Take 1 capsule (100 mg) by mouth 2 times daily as needed for neuropathic pain (Patient not taking: Reported on 1/9/2025), Disp: 60 capsule, Rfl: 1    loratadine (CLARITIN) 10 MG tablet, Take 10 mg by mouth daily., Disp: , Rfl:     magnesium 250 MG tablet, Take 1 tablet (250 mg) by mouth daily, Disp: 90 tablet, Rfl: 1    methimazole (TAPAZOLE) 5 MG tablet, Take 1 tablet (5 mg) by mouth daily., Disp: 90 tablet, Rfl: 3    mometasone (ELOCON) 0.1 % external cream, Apply to affected area on legs twice daily as needed, Disp: 100 g, Rfl: 3    olmesartan-hydrochlorothiazide (BENICAR HCT) 40-25 MG tablet, Take 1 tablet by mouth daily., Disp: , Rfl:     order for DME, Equipment being ordered: Auto CPAP 11-18, Disp: 1 Units, Rfl: 0    rivaroxaban ANTICOAGULANT (XARELTO) 20 MG TABS tablet, Take 1 tablet (20 mg) by mouth daily (with dinner)., Disp: 90 tablet, Rfl: 4    Family History   Problem Relation Age of Onset    Circulatory Father         d. DVT    Heart Disease Father         pacer    Diabetes Mother     Hypertension Mother     Deep Vein Thrombosis Brother     Coronary Artery Disease Brother     Myocardial Infarction Brother     Deep Vein Thrombosis Brother     C.A.D. Paternal Uncle         MI     Heart Disease Brother 48        pacer     Diabetes Maternal Grandmother     Hypertension  Maternal Grandmother     Diabetes Maternal Aunt     Hypertension Maternal Aunt     Cancer - colorectal Maternal Grandfather     CMARILY. Maternal Aunt         MI   Maternal grandfather - rectal cancer.     Social History  . She has 2 children. She denies smoking, drinking alcohol or using illicit drugs. Occupation: retired.               Vital Signs     Previous Weights:    Wt Readings from Last 10 Encounters:   01/28/25 (!) 146.9 kg (323 lb 12.8 oz)   01/07/25 (!) 151 kg (333 lb)   01/06/25 149.7 kg (330 lb)   11/14/24 (!) 152.4 kg (336 lb)   11/06/24 (!) 152.6 kg (336 lb 6 oz)   11/02/24 (!) 151.5 kg (334 lb)   10/18/24 (!) 150 kg (330 lb 12.8 oz)   09/12/24 (!) 154.2 kg (340 lb)   09/03/24 (!) 152 kg (335 lb)   07/17/24 (!) 153.6 kg (338 lb 11.2 oz)        /64 (BP Location: Left arm, Patient Position: Sitting, Cuff Size: Adult Large)   Pulse 52   Wt (!) 146.9 kg (323 lb 12.8 oz)   SpO2 97%   BMI 50.71 kg/m      Physical Exam  General Appearance: alert, no distress noted   Eyes: grossly normal to inspection, conjunctivae and sclerae normal, no lid lag or stare   Respiratory: no audible wheeze, cough, or visible cyanosis.  No visible retractions or increased work of breathing.  Able to speak fully in complete sentences.  Neurological: Cranial nerves grossly intact, mentation intact and speech normal; no tremor of the hands   Skin: no lesions on exposed skin   Psychological: mentation appears normal, affect normal, judgement and insight intact, normal speech and appearance well-groomed    Lab Results  I reviewed prior lab results documented in Epic.  TSH   Date Value Ref Range Status   01/23/2025 0.27 (L) 0.30 - 4.20 uIU/mL Final   12/23/2024 0.07 (L) 0.30 - 4.20 uIU/mL Final   11/22/2024 0.16 (L) 0.30 - 4.20 uIU/mL Final   11/06/2024 0.26 (L) 0.30 - 4.20 uIU/mL Final   07/10/2024 0.56 0.30 - 4.20 uIU/mL Final   04/05/2018 1.61 0.40 - 4.00 mU/L Final   01/25/2017 1.35 0.40 - 4.00 mU/L Final    11/23/2015 1.53 0.40 - 4.00 mU/L Final   10/23/2013 1.61 0.4 - 5.0 mU/L Final   02/23/2007 1.67 0.4 - 5.0 mU/L Final       42 minutes spent on the date of the encounter doing chart review, history and exam, documentation and further activities as noted above.  The longitudinal plan of care for the diagnosis(es)/condition(s) as documented were addressed during this visit. Due to the added complexity in care, I will continue to support Chelo in the subsequent management and with ongoing continuity of care.

## 2025-01-28 NOTE — NURSING NOTE
Chelo Brooks's goals for this visit include:   Chief Complaint   Patient presents with    Follow Up    Thyroid Problem     nodule     She requests these members of her care team be copied on today's visit information: No    PCP: Vita Zavala    Referring Provider:  Referred Self, MD  No address on file    /64 (BP Location: Left arm, Patient Position: Sitting, Cuff Size: Adult Large)   Pulse 52   Wt (!) 146.9 kg (323 lb 12.8 oz)   SpO2 97%   BMI 50.71 kg/m      Do you need any medication refills at today's visit? NO

## 2025-01-28 NOTE — LETTER
1/28/2025      Chelo Brooks  [address] [facility] [address]  [place] [address]      Dear Colleague,    Thank you for referring your patient, Chelo Brooks, to the [facility]. Please see a copy of my visit note below.      =======================================================  Assessment     Chelo Brooks is a 79 year old female with a past medical history significant for obesity, hypertension, obstructive sleep apnea, A-fib, CKD stage III, secondary hyperparathyroidism, PE, seen for f/up.     1.  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  Clinically, the patient does not endorse compressive neck symptoms.  Biochemically, she was recently diagnosed with thyrotoxicosis, in the context of treatment with amiodarone, which was started in 12/2023.  Unclear whether or not amiodarone is going to be discontinued.  Currently, the patient is not on beta-blockers, given the bradycadia.  TSI undetectable.  Treatment with methimazole was started in December 2024.    Recommendations:  Continue methimazole at 5 mg daily  Recheck the thyroid hormone levels in 2 months    2.  Left adrenal gland adenoma, stable on imaging from 4592-9855.  The 1 mg dexamethasone suppression test was negative in 2024.   Screen for hyperaldosteronism with her next lab work.  Advised the patient to have the lab work done in the morning.  She does not need to fast.    3.  Obesity  Discussed about considering treatment with Zepbound and she agreed.  Counseled on the administration of this medications and side effects, with GI issues being more common but also discussed about the rare risk of pancreatitis and the questionable risk of medullary thyroid cancer.  For dose titration, I recommended to follow-up with the Pharm.D.    3.  Right  wrist fracture  Advised the patient to have the result of the DXA scan faxed to us.    Orders Placed This Encounter   Procedures     Aldosterone     Renin activity     Potassium     TSH     T4 free     T3 total     =======================================================  The patient is seen in follow-up.  She was first evaluated by me in February 2024.    History of Present Illness:  Chelo Brooks is a 79 year old female with a past medical history significant for aortic dilatation, A-fib, CKD stage III, DJD, hyperparathyroidism, hypertension, obstructive sleep apnea, osteopenia, pulmonary embolism.     1.  Thyroid nodules/thyrotoxicosis  Chelo was initially diagnosed with thyroid nodules incidentally, on MRI, in 2019. She has not been aware of any neck enlargement. She denies dysphagia, voice hoarseness, lightheadedness.    In September 2024, she lost her balance and fell on her side on her hand. She suffered a distal radius fracture of the right wrist and she required surgery.  On preop clearance, her thyroid hormone levels were elevated.  She was found to be in AF at PCP visit Dec 2023. She had DCCV x 3 unsuccessful attempts and she underwent successful ablation on 10/17/24.   Treatment with amiodarone was started in December 2023.  The current dose is 100 mg daily (dose decreased on 11/14/2024). Not sure if amiodarone is going to be discontinued.     Thyroid ultrasounds were done in 2019, 2020, 2021, 2022 and, most recently, on December 7, 2023.  There has been a gradual enlargement of the left thyroid nodule which occupies the whole left thyroid lobe and extends inferiorly in the mediastinum.  On prior CTs, there was mild rightward deviation of the lower trachea.  The nodule was benign on the biopsy from February 2020.  The same year, the patient underwent a right parotid gland biopsy which revealed a benign neoplasm. On the 2023 ultrasound, the left dominant nodule measured 8.1 x 5.4 x 5.1 cm, while it  measured 6.1 x 4.7 x 4.1 cm on prior ultrasounds, with the caveat that the nodule was not always measured using the same sections.  It was rebiopsied on 2/27/2024 and the second biopsy came back benign.  Over the years, TSH has been well in the normal range until June 2024, when it became low normal at 0.4.      Pertinent labs reviewed:  11/6/2024 TSH 0.26  free T4 2.59  11/22/24   TSH 0.16  free T4 2.64 , Total T3 72, TSI 0.1 (0-0.55) . Started on 5 mg methimazole daily on 12/1/24.   1/23/25    TSH 0.27  free T4 2.2, total T3 59, calcitonin <2    She continues to c/o fatigue. Denies palpitations, tremor, insomnia, anxiety or depression. Recently she has noticed some cold intolerance.  Uses the CPAP but she doesn't wake up refresh.   She has some longstanding and intermittent constipation relieved by yogurt .     2. New radial fracture   Chelo has no prior history of fractures.      Takes 2000 units vitamin D daily. Denies taking any calcium supplements or MVI.   Dairies: yogurt, 4 times a week, milk with cereals, some cheese.   Exercise: not much    She was supposed to have a DXA scan scheduled and apparently she ended up having a home DXA scan done.  The result was not available for my review.     3. Left adrenal adenoma   The abdominal CT from August 2020 identified a 1.2 cm left adrenal nodule.  The nodule was stable in size compared with the prior abdominal CT from October 2019, done at [facility].  Hounsfield unit density on the noncontrast CT from 2020 was consistent with a benign adenoma.   Prior sodium and potassium levels have been normal.  Recent GFR has been in the 20s.    Current antihypertensive medications:  Olmesartan/HCTZ, 40-25 mg daily  Bumetanide, 0.5 mg daily    2/20/2024  1 mg dexamethasone suppression test revealed a cortisol level of 1.1  11/6/2024  A1c 5%  6/26/2024  LDL cholesterol 64, triglyceride 80    Past Medical History   Past Medical History:   Diagnosis Date     Acquired renal cyst of  left kidney      Adrenal nodule     left - needs yearly CT     Ascending aorta dilatation      Chronic atrial fibrillation (H) 11/15/2023     CKD (chronic kidney disease) stage 3, GFR 30-59 ml/min (H)      Degenerative joint disease (DJD) of hip      DJD (degenerative joint disease) 10/21/2005     Eczema, unspecified type 04/05/2018     Elevated parathyroid hormone 06/18/2019     Gallstones      Hepatitis B childhood     resolved, patient is not a carrier      Hyperlipidemia 11/27/2012     Hypertension goal BP (blood pressure) < 140/90      Low bone mass      Lumbar spinal stenosis 07/17/2017     Mild persistent asthma without complication 05/30/2017     Morbid obesity due to excess calories (H) 11/23/2015    Surgical referral declined '16      FLOR (obstructive sleep apnea)      Osteopenia      Pulmonary emboli (H) 10/28/2019     Shingles 2014    scalp     Stasis dermatitis of both legs 04/05/2018     Thyroid nodule      Umbilical hernia      Vitamin D deficiency    Pulmonary embolism     Past Surgical History   Past Surgical History:   Procedure Laterality Date     ANESTHESIA CARDIOVERSION N/A 12/28/2023    Procedure: Anesthesia cardioversion @1330;  Surgeon: GENERIC ANESTHESIA PROVIDER;  Location: [facility]     ANESTHESIA CARDIOVERSION N/A 8/5/2024    Procedure: Anesthesia cardioversion@1330;  Surgeon: GENERIC ANESTHESIA PROVIDER;  Location[facility]     EP ABLATION FOCAL AFIB N/A 10/17/2024    Procedure: EP Ablation Focal AFIB;  Surgeon: Chay Albarado MD;  Location:  [facility]ARDI[facility] CATH LAB     IR PAROTID BIOPSY  5/21/2020     [facility] TOTAL KNEE ARTHROPLASTY  3/2000    right     [facility] TOTAL KNEE ARTHROPLASTY  6/8/12    left     [facility] VAGINAL HYSTERECTOMY  1977    benign, prolapse, ovaries remain        Current Medications    Current Outpatient Medications:      acetaminophen (TYLENOL) 325 MG tablet, Take 650 mg by mouth every morning., Disp: , Rfl:      albuterol (PROAIR HFA/PROVENTIL HFA/VENTOLIN HFA) 108 (90 Base) MCG/ACT  inhaler, Inhale 1 puff into the lungs every 6 hours as needed for shortness of breath, wheezing or cough., Disp: 18 g, Rfl: 0     amiodarone (PACERONE) 200 MG tablet, Take 1/2 tablet ( 100 mg) once daily, Disp: , Rfl:      atorvastatin (LIPITOR) 40 MG tablet, Take 1 tablet (40 mg) by mouth daily, Disp: 90 tablet, Rfl: 3     bumetanide (BUMEX) 1 MG tablet, TAKE HALF DAILY (Patient taking differently: Take 1 mg by mouth daily. Pt taking once or twice per week), Disp: 90 tablet, Rfl: 3     Cholecalciferol (VITAMIN D) 2000 UNITS tablet, Take 2,000 Units by mouth daily., Disp: , Rfl:      Cyanocobalamin (VITAMIN B-12 PO), Take by mouth daily., Disp: , Rfl:      fluticasone (FLONASE) 50 MCG/ACT nasal spray, Spray 2 sprays into both nostrils daily (Patient taking differently: Spray 2 sprays into both nostrils as needed for other.), Disp: 48 g, Rfl: 3     gabapentin (NEURONTIN) 100 MG capsule, Take 1 capsule (100 mg) by mouth 2 times daily as needed for neuropathic pain (Patient not taking: Reported on 1/9/2025), Disp: 60 capsule, Rfl: 1     loratadine (CLARITIN) 10 MG tablet, Take 10 mg by mouth daily., Disp: , Rfl:      magnesium 250 MG tablet, Take 1 tablet (250 mg) by mouth daily, Disp: 90 tablet, Rfl: 1     methimazole (TAPAZOLE) 5 MG tablet, Take 1 tablet (5 mg) by mouth daily., Disp: 90 tablet, Rfl: 3     mometasone (ELOCON) 0.1 % external cream, Apply to affected area on legs twice daily as needed, Disp: 100 g, Rfl: 3     olmesartan-hydrochlorothiazide (BENICAR HCT) 40-25 MG tablet, Take 1 tablet by mouth daily., Disp: , Rfl:      order for DME, Equipment being ordered: Auto CPAP 11-18, Disp: 1 Units, Rfl: 0     rivaroxaban ANTICOAGULANT (XARELTO) 20 MG TABS tablet, Take 1 tablet (20 mg) by mouth daily (with dinner)., Disp: 90 tablet, Rfl: 4    Family History   Problem Relation Age of Onset     Circulatory Father         d. DVT     Heart Disease Father         pacer     Diabetes Mother      Hypertension Mother       Deep Vein Thrombosis Brother      Coronary Artery Disease Brother      Myocardial Infarction Brother      Deep Vein Thrombosis Brother      C.A.D. Paternal Uncle         MI      Heart Disease Brother 48        pacer      Diabetes Maternal Grandmother      Hypertension Maternal Grandmother      Diabetes Maternal Aunt      Hypertension Maternal Aunt      Cancer - colorectal Maternal Grandfather      C.A.D. Maternal Aunt         MI   Maternal grandfather - rectal cancer.     Social History  . She has 2 children. She denies smoking, drinking alcohol or using illicit drugs. Occupation: retired.               Vital Signs     Previous Weights:    Wt Readings from Last 10 Encounters:   01/28/25 (!) 146.9 kg (323 lb 12.8 oz)   01/07/25 (!) 151 kg (333 lb)   01/06/25 149.7 kg (330 lb)   11/14/24 (!) 152.4 kg (336 lb)   11/06/24 (!) 152.6 kg (336 lb 6 oz)   11/02/24 (!) 151.5 kg (334 lb)   10/18/24 (!) 150 kg (330 lb 12.8 oz)   09/12/24 (!) 154.2 kg (340 lb)   09/03/24 (!) 152 kg (335 lb)   07/17/24 (!) 153.6 kg (338 lb 11.2 oz)        /64 (BP Location: Left arm, Patient Position: Sitting, Cuff Size: Adult Large)   Pulse 52   Wt (!) 146.9 kg (323 lb 12.8 oz)   SpO2 97%   BMI 50.71 kg/m      Physical Exam  General Appearance: alert, no distress noted   Eyes: grossly normal to inspection, conjunctivae and sclerae normal, no lid lag or stare   Respiratory: no audible wheeze, cough, or visible cyanosis.  No visible retractions or increased work of breathing.  Able to speak fully in complete sentences.  Neurological: Cranial nerves grossly intact, mentation intact and speech normal; no tremor of the hands   Skin: no lesions on exposed skin   Psychological: mentation appears normal, affect normal, judgement and insight intact, normal speech and appearance well-groomed    Lab Results  I reviewed prior lab results documented in Epic.  TSH   Date Value Ref Range Status   01/23/2025 0.27 (L) 0.30 - 4.20 uIU/mL Final    12/23/2024 0.07 (L) 0.30 - 4.20 uIU/mL Final   11/22/2024 0.16 (L) 0.30 - 4.20 uIU/mL Final   11/06/2024 0.26 (L) 0.30 - 4.20 uIU/mL Final   07/10/2024 0.56 0.30 - 4.20 uIU/mL Final   04/05/2018 1.61 0.40 - 4.00 mU/L Final   01/25/2017 1.35 0.40 - 4.00 mU/L Final   11/23/2015 1.53 0.40 - 4.00 mU/L Final   10/23/2013 1.61 0.4 - 5.0 mU/L Final   02/23/2007 1.67 0.4 - 5.0 mU/L Final       42 minutes spent on the date of the encounter doing chart review, history and exam, documentation and further activities as noted above.  The longitudinal plan of care for the diagnosis(es)/condition(s) as documented were addressed during this visit. Due to the added complexity in care, I will continue to support Chelo in the subsequent management and with ongoing continuity of care.                 Again, thank you for allowing me to participate in the care of your patient.        Sincerely,    Nimisha Albert MD    Electronically signed

## 2025-01-28 NOTE — PATIENT INSTRUCTIONS
Food Calcium in milligrams   Milk (skim, 2%, or whole; 8 oz [240 mL]) 300   Yogurt (6 oz [168 g]) 250   Orange juice (with calcium; 8 oz [240 mL]) 300   Tofu with calcium (0.5 cup [113 g]) 435   Cheese (1 oz [28 g]) 195 to 335 (hard cheese = higher calcium)   Cottage cheese (0.5 cup [113 g]) 130   Ice cream or frozen yogurt (0.5 cup [113 g]) 100   Non-dairy milks (soy, oat, almond; 8 oz [240 mL]) 300 to 450   Beans (0.5 cup cooked [113 g]) 60 to 80   Dark, leafy green vegetables (0.5 cup cooked [113 g]) 50 to 135   Almonds (24 whole) 70   Orange (1 medium) 60     Total daily dose of calcium from food products and/or supplements should be ~ 1000 mg.    Total daily dose of vitamin D should be 2000 units (50 mcg).       Welcome to the [facility]ics     Our Endocrinology Clinics are here to provide you with a team-based, collaborative approach in the diagnosis and treatment of patients with diabetes and endocrine disorders. The team is made up of Physicians, Physician Assistants, Certified Diabetes Educators, Registered Nurses, Medical Assistants, Emergency Medical Technicians, and many others, all of whom have the unified goal of providing our patients with high quality care.     Please see below for some helpful tips to best navigate and use the [facility] Endocrinology clinic:     [place] Respect: At [facility], we are committed to a respectful and safe space for all patients, visitors, and staff.  We believe that mutual respect between patients and their care team is the foundation of quality care.  It is our expectation that you will be treated with respect by your care team.  In turn, we ask that all communication with the care team (written and verbal) be respectful and free from profanity, threatening, or abusive language.  Disrespectful communication undermines our therapeutic relationship with you and may result in us being unable to continue to provide  your care.    Refills: A provider must see you at least annually to prescribe and refill medications. This is to ensure your safety as well as meet insurance and compliance regulations.    Scheduling: Many of our Providers offer both in-person or video visits. Please call to schedule any needed follow ups as soon as possible because our provider schedules fill up very quickly. Our care team has the right to require an in-person visit when they believe that it is medically necessary. Please remember that for any virtual visits, you must be in the state of [place] at the time of the visit, otherwise we are unable to see you and you will need to be rescheduled.    Missed Appointments: If you need to cancel or miss your scheduled appointment, please call the clinic at [phone] to reschedule.  Please note if you repeatedly miss appointments or repeatedly miss appointments without calling to inform us ahead of time (no-show), the clinic may elect to not allow you to reschedule without speaking to a manager, may require a Partnership In Care Agreement prior to rescheduling, or could result in you no longer being able to receive care from the clinic. Providing the clinic with timely notification if you have to miss an appointment, allows us to better serve the needs of all of our patients.    Primary Care Provider: Our Endocrinologists are Specialists in their field. We expect you to have a Primary Care Provider established to handle any needs outside of your diabetes and endocrine care.  We would be happy to assist you find a Primary Care Provider, if you do not have one.    Swish: Swish is a wonderful resource that allows you access to your Care Team via online or the remington. Please ask a member of the team if you would like help creating an account. Please note that it may take up to 2 business days for a response. Swish messages are not reviewed on weekends or after business hours.  Emergent or urgent care  needs should never be communicated via [employer].  If you experience a medical emergency call 911 or go to the nearest emergency room.    Labs: It is recommended that you stay within the [facility] for labs but you are welcome to obtain ordered labs (with some exceptions) from any location of your choice as long as they are able to complete and process the needed labs. If you need us to fax orders to your preferred lab, please provide us the name and fax number of the lab you would like to go to so we can fax the orders. If your labs are drawn outside of the [facility], please have them fax the results to [phone] ([place]) or [phone] ([place]) or via [facility]. It is your responsibility to ensure that outside lab results are sent to us.    We look forward to working with you. Please do not hesitate to reach out with any questions.    Thank you,    The Endocrine Team    [facility] Address:   [place] Address:     [address] [place] [address]    Phone: [phone]  Fax: [phone]   [address] [place] [address]    Phone: [phone]  Fax: [phone]     [facility] Cost Estimate Phone Number: [phone]    General Lab and Imaging Scheduling Phone Number: [phone]      If you are interested in exploring research opportunities in the Division of Diabetes, Endocrinology and Metabolism and within the [facility] you are welcome to view the following QR codes by scanning them using your phone's camera to access a link to more information.  Participating in a research study is voluntary. Your decision whether or not to participate will not affect your current or future relations with the [facility] or [employer]. Current available studies are:     Type 1 Diabetes  Adults     Pediatrics     Type 2 Diabetes  Weight Loss - People Treated with Diet or Metformin Only     Pediatrics and Young Adults

## 2025-02-12 ENCOUNTER — TELEPHONE (OUTPATIENT)
Dept: ENDOCRINOLOGY | Facility: CLINIC | Age: 80
End: 2025-02-12
Payer: COMMERCIAL

## 2025-02-13 NOTE — TELEPHONE ENCOUNTER
PA Initiation    Medication: ZEPBOUND 2.5 MG/0.5ML SC SOAJ  Insurance Company: [employer] - Phone [phone] Fax [phone]  Pharmacy Filling the Rx:    Filling Pharmacy Phone:    Filling Pharmacy Fax:    Start Date: 2/12/2025

## 2025-02-17 NOTE — TELEPHONE ENCOUNTER
Prior Authorization Approval    Medication: ZEPBOUND 2.5 MG/0.5ML SC SOAJ  Authorization Effective Date: 2/15/2025  Authorization Expiration Date: 2/14/2026  Approved Dose/Quantity: 2  Reference #: [id number]   Insurance Company: [employer] Phone [phone] Fax [phone]  Expected CoPay: $    CoPay Card Available:      Financial Assistance Needed:    Which Pharmacy is filling the prescription:    Pharmacy Notified: y  Patient Notified: y

## 2025-02-28 NOTE — PROGRESS NOTES
ELECTROPHYSIOLOGY CLINIC VISIT    Assessment/Recommendations   Assessment/Plan:    Chelo Brooks is a 79 year old female with past medical history significant for persistent atrial fibrillation s/p PVI 10/18/24, CKD, essential HTN, FLOR and morbid obesity.      Persistent Atrial Fibrillation   1. Stroke Prophylaxis: CHADSVASC 4 ++age, +F, +HTN 4, corresponding to a 4.0% annual stroke / systemic embolism event rate. Continue Xarelto 20 mg daily for now. If anemia is discovered to be due to GI bleed, could consider evaluation for watchman device.   2. Rate Control: Metoprolol stopped d/t bradycardia at 11/2024 visit.    3. Rhythm Control: Found to be in AF at PCP visit Dec 2023. Echo with preserved LVEF. Given unknown chronicity and worsening UMANA, she was started on amio with successful DCCV done 12/28/23. Dyspnea on exertion did improve in sinus. Back in Afib at follow up in January, referred to EP for ablation evaluation. Saw EP 7/17/24, discussed repeat cardioversion on amio, if improvement in symptoms in sinus, pursuing ablation. She had DCCV x 3 unsuccessful attempts on 8/5/24. She reports ongoing dyspnea on exertion and elected to pursue ablation, she underwent successful pulmonary vein isolation 10/17/24. She denies AF recurrence since ablation. Reported significant fatigue and dyspnea with exertion at post ablation follow up, so metoprolol stopped and amiodarone decreased to 100 mg daily. TSH was also low with elevated T4 at that time. She was started on methimazole 5 mg daily by her endocrinologist 12/2024. She continues on amiodarone 100 mg daily with improvement in symptoms. Rechecked TSH and LFTs today, wnl. There is no record of her completing PFTs recently, so will order PFTs today. She will require TFTs and LFTs every 6 months and PFTs annually while on amiodarone.     Follow up in 6 months with LFTs and TFTs prior.        History of Present Illness/Subjective    MsSamira Brooks is a 79 year  old female who comes in today for EP follow-up of AF.    Ms. Brooks is a 79 year old female with past medical history significant for persistent atrial fibrillation s/p PVI 10/18/24, CKD, essential HTN, FLOR and morbid obesity. She was found to be in AF at PCP visit Dec 2023. Echo with preserved LVEF. Given unknown chronicity and worsening UMANA, she was started on amio with successful DCCV done 12/28/23. Dyspnea on exertion did improve in sinus. Back in Afib at follow up in January, referred to EP for ablation evaluation. Saw EP 7/17/24, discussed repeat cardioversion on amio, if improvement in symptoms in sinus, pursuing ablation. She had DCCV x 3 unsuccessful attempts on 8/5/24. She reports ongoing dyspnea on exertion and elected to pursue ablation for which she presented. She underwent successful pulmonary vein isolation 10/17/24.      EP Visit 11/14/24 She presents today for follow up after ablation. She reports fatigue and low HR since the ablation, her metoprolol was decreased to 25 mg daily and then it was recommended she stop metoprolol. She reports decreasing it to 25 mg daily for one day, but then someone called to tell her she should continue it at full dose, her HR has remained 38-45 bpm. She isn't sure who called her to recommend she continue it at full dose. She reports significant fatigue and shortness of breath with exertion. She has been dealing with an ongoing URI since the ablation as well. Otherwise denies chest pain, palpitations, peripheral edema, paroxysmal nocturnal dyspnea, orthopnea, lightheadedness, dizziness, pre-syncope, or syncope.     She presents today in follow up. She reports feeling okay. She denies recurrence of AF/palpitations since the ablation. She reports she continues to feel fatigued, but SOB, low HR, and dizziness have improved since stopping the metoprolol and decreasing amiodarone from 200 mg to 100 mg. Continues to have some SOB with walking, but attributes it to  deconditioning as she has not been able to exercise much d/t back pain and leg weakness. She uses a walker. She has also been recovering from recent wrist fracture. She also recently started Zepbound to try to lose weight. Her hemoglobin has decreased over the last 4 months, most recently 9.9 on 1/10/25. She is seeing GI today to assess for GI bleed. She otherwise denies chest discomfort, palpitations, abdominal fullness/bloating or peripheral edema, paroxysmal nocturnal dyspnea, orthopnea, lightheadedness, dizziness, pre-syncope, or syncope. Presenting 12 lead ECG shows sinus bradycardia Vent Rate 55 bpm, W[place] 98 ms, QRS 90 ms, QTc 424 ms. Current cardiac medications include: amiodarone 100 mg, atorvastatin 40 mg, bumex 1 mg PRN, olmesartan-hydrochlorothiazide 40-25 mg, xarelto 20 mg.     I have reviewed and updated the patient's Past Medical History, Social History, Family History and Medication List.     Cardiographics (Personally Reviewed) :   Echo: 11/28/2023   Left ventricular size, wall motion and function are normal. The ejection  fraction is 55-60%.  Global right ventricular function is normal.  Normal LA, NATHALY 28  IVC diameter <2.1 cm collapsing >50% with sniff suggests a normal RA pressure  of 3 mmHg.     Echo: 2/03/2022   Global and regional left ventricular function is normal with an EF of 55-60%.  The right ventricle is normal size.Global right ventricular function is  normal.  Proximal ascending aorta is mildly dilated measuring 4cm  IVC diameter <2.1 cm collapsing >50% with sniff suggests a normal RA pressure  of 3 mmHg.     CMR: 3/22/2017  IMPRESSION:  1.  Regadenoson stress perfusion: Normal perfusion at rest and  post-stress without evidence of inducible ischemia.  2.  Normal left ventricular size and systolic function with a  calculated ejection fraction of  62 %.  3.  Normal right ventricular size and systolic function with a  calculated ejection fraction of 60%.   4.  On delayed enhancement  "imaging, there is no abnormal  hyperenhancement to suggest myocardial scar/inflammation/infiltration.       Physical Examination   /79 (BP Location: Left arm, Patient Position: Other (comments), Cuff Size: Adult Regular)   Pulse 56   Ht 1.702 m (5' 7\")   Wt (!) 144.7 kg (319 lb)   SpO2 96%   BMI 49.96 kg/m    Wt Readings from Last 3 Encounters:   03/04/25 (!) 144.7 kg (319 lb)   01/28/25 (!) 146.9 kg (323 lb 12.8 oz)   01/07/25 (!) 151 kg (333 lb)     General Appearance:   Alert, well-appearing and in no acute distress.   HEENT: Atraumatic, normocephalic.    Chest/Lungs:   Respirations unlabored.  Lungs are clear to auscultation.   Cardiovascular:   Regular rate and rhythm.  S1/S2. No murmur.    Abdomen:  Soft, nontender, nondistended.   Extremities: No cyanosis or clubbing. Trace ankle edema.     Musculoskeletal: Moves all extremities.     Skin: Warm, dry, intact.    Neurologic: Mood and affect are appropriate.  Alert and oriented to person, place, time, and situation.          Medications  Allergies   Current Outpatient Medications   Medication Sig Dispense Refill    acetaminophen (TYLENOL) 325 MG tablet Take 650 mg by mouth every morning.      albuterol (PROAIR HFA/PROVENTIL HFA/VENTOLIN HFA) 108 (90 Base) MCG/ACT inhaler Inhale 1 puff into the lungs every 6 hours as needed for shortness of breath, wheezing or cough. 18 g 0    amiodarone (PACERONE) 200 MG tablet Take 1/2 tablet ( 100 mg) once daily      atorvastatin (LIPITOR) 40 MG tablet Take 1 tablet (40 mg) by mouth daily 90 tablet 3    bumetanide (BUMEX) 1 MG tablet TAKE HALF DAILY 90 tablet 3    Cholecalciferol (VITAMIN D) 2000 UNITS tablet Take 2,000 Units by mouth daily.      Cyanocobalamin (VITAMIN B-12 PO) Take by mouth daily.      fluticasone (FLONASE) 50 MCG/ACT nasal spray Spray 2 sprays into both nostrils daily 48 g 3    gabapentin (NEURONTIN) 100 MG capsule Take 1 capsule (100 mg) by mouth 2 times daily as needed for neuropathic pain 60 " capsule 1    loratadine (CLARITIN) 10 MG tablet Take 10 mg by mouth daily.      magnesium 250 MG tablet Take 1 tablet (250 mg) by mouth daily 90 tablet 1    methimazole (TAPAZOLE) 5 MG tablet Take 1 tablet (5 mg) by mouth daily. 90 tablet 3    mometasone (ELOCON) 0.1 % external cream Apply to affected area on legs twice daily as needed 100 g 3    olmesartan-hydrochlorothiazide (BENICAR HCT) 40-25 MG tablet Take 1 tablet by mouth daily.      order for DME Equipment being ordered: Auto CPAP 11-18 1 Units 0    rivaroxaban ANTICOAGULANT (XARELTO) 20 MG TABS tablet Take 1 tablet (20 mg) by mouth daily (with dinner). 90 tablet 4    ZEPBOUND 2.5 MG/0.5ML prefilled pen Inject 0.5 mLs (2.5 mg) subcutaneously every 7 days. 3 mL 0    Allergies   Allergen Reactions    Adhesive Tape          Lab Results (Personally Reviewed)    Chemistry/lipid CBC Cardiac Enzymes/BNP/TSH/INR   Lab Results   Component Value Date    BUN 31.2 (H) 01/06/2025     01/06/2025    CO2 27 01/06/2025     Creatinine   Date Value Ref Range Status   01/06/2025 1.82 (H) 0.51 - 0.95 mg/dL Final   08/06/2020 1.32 (H) 0.52 - 1.04 mg/dL Final       Lab Results   Component Value Date    CHOL 131 06/26/2024    HDL 51 06/26/2024    LDL 64 06/26/2024      Lab Results   Component Value Date    WBC 4.7 01/10/2025    HGB 9.9 (L) 01/10/2025    HCT 31.9 (L) 01/10/2025    MCV 89 01/10/2025     01/10/2025    Lab Results   Component Value Date    TSH 0.74 03/04/2025    INR 2.50 (H) 09/03/2020        The patient states understanding and is agreeable with the plan.     Selene Nelson PA-C  [facility]  Electrophysiology Consult Service  Pager #[phone]    I spent a total of 20 minutes face to face with Chelo Brooks during today's office visit. I have spent an additional 15 minutes today on chart review and documentation.

## 2025-03-04 ENCOUNTER — LAB (OUTPATIENT)
Dept: LAB | Facility: CLINIC | Age: 80
End: 2025-03-04
Payer: COMMERCIAL

## 2025-03-04 ENCOUNTER — OFFICE VISIT (OUTPATIENT)
Dept: CARDIOLOGY | Facility: CLINIC | Age: 80
End: 2025-03-04
Payer: COMMERCIAL

## 2025-03-04 ENCOUNTER — OFFICE VISIT (OUTPATIENT)
Dept: GASTROENTEROLOGY | Facility: CLINIC | Age: 80
End: 2025-03-04
Attending: INTERNAL MEDICINE
Payer: COMMERCIAL

## 2025-03-04 VITALS
OXYGEN SATURATION: 96 % | WEIGHT: 293 LBS | BODY MASS INDEX: 45.99 KG/M2 | HEIGHT: 67 IN | HEART RATE: 56 BPM | SYSTOLIC BLOOD PRESSURE: 137 MMHG | DIASTOLIC BLOOD PRESSURE: 79 MMHG

## 2025-03-04 VITALS
BODY MASS INDEX: 45.99 KG/M2 | SYSTOLIC BLOOD PRESSURE: 124 MMHG | OXYGEN SATURATION: 94 % | DIASTOLIC BLOOD PRESSURE: 54 MMHG | HEART RATE: 54 BPM | HEIGHT: 67 IN | WEIGHT: 293 LBS

## 2025-03-04 DIAGNOSIS — I48.20 CHRONIC ATRIAL FIBRILLATION (H): ICD-10-CM

## 2025-03-04 DIAGNOSIS — Z79.899 LONG TERM CURRENT USE OF AMIODARONE: ICD-10-CM

## 2025-03-04 DIAGNOSIS — Z79.899 ON AMIODARONE THERAPY: ICD-10-CM

## 2025-03-04 DIAGNOSIS — I50.30 HEART FAILURE WITH PRESERVED EJECTION FRACTION, NYHA CLASS II (H): ICD-10-CM

## 2025-03-04 DIAGNOSIS — I48.19 PERSISTENT ATRIAL FIBRILLATION (H): Primary | ICD-10-CM

## 2025-03-04 DIAGNOSIS — Z86.79 S/P ABLATION OF ATRIAL FIBRILLATION: ICD-10-CM

## 2025-03-04 DIAGNOSIS — Z98.890 S/P ABLATION OF ATRIAL FIBRILLATION: ICD-10-CM

## 2025-03-04 DIAGNOSIS — D64.9 NORMOCYTIC ANEMIA: ICD-10-CM

## 2025-03-04 DIAGNOSIS — D64.9 NORMOCYTIC ANEMIA: Primary | ICD-10-CM

## 2025-03-04 DIAGNOSIS — E78.5 HYPERLIPIDEMIA LDL GOAL <100: ICD-10-CM

## 2025-03-04 LAB
ALBUMIN SERPL BCG-MCNC: 3.8 G/DL (ref 3.5–5.2)
ALP SERPL-CCNC: 93 U/L (ref 40–150)
ALT SERPL W P-5'-P-CCNC: 17 U/L (ref 0–50)
ALT SERPL W P-5'-P-CCNC: 18 U/L (ref 0–50)
ANION GAP SERPL CALCULATED.3IONS-SCNC: 12 MMOL/L (ref 7–15)
AST SERPL W P-5'-P-CCNC: 26 U/L (ref 0–45)
AST SERPL W P-5'-P-CCNC: 26 U/L (ref 0–45)
ATRIAL RATE - MUSE: 55 BPM
BASOPHILS # BLD AUTO: 0.1 10E3/UL (ref 0–0.2)
BASOPHILS NFR BLD AUTO: 1 %
BILIRUB SERPL-MCNC: 0.8 MG/DL
BUN SERPL-MCNC: 23 MG/DL (ref 8–23)
CALCIUM SERPL-MCNC: 9.7 MG/DL (ref 8.8–10.4)
CHLORIDE SERPL-SCNC: 104 MMOL/L (ref 98–107)
CREAT SERPL-MCNC: 1.59 MG/DL (ref 0.51–0.95)
DIASTOLIC BLOOD PRESSURE - MUSE: NORMAL MMHG
EGFRCR SERPLBLD CKD-EPI 2021: 33 ML/MIN/1.73M2
EOSINOPHIL # BLD AUTO: 0.2 10E3/UL (ref 0–0.7)
EOSINOPHIL NFR BLD AUTO: 5 %
ERYTHROCYTE [DISTWIDTH] IN BLOOD BY AUTOMATED COUNT: 15.9 % (ref 10–15)
FERRITIN SERPL-MCNC: 82 NG/ML (ref 11–328)
FOLATE SERPL-MCNC: 7.7 NG/ML (ref 4.6–34.8)
GLUCOSE SERPL-MCNC: 124 MG/DL (ref 70–99)
HCO3 SERPL-SCNC: 26 MMOL/L (ref 22–29)
HCT VFR BLD AUTO: 36.1 % (ref 35–47)
HGB BLD-MCNC: 11.1 G/DL (ref 11.7–15.7)
IMM GRANULOCYTES # BLD: 0 10E3/UL
IMM GRANULOCYTES NFR BLD: 1 %
INTERPRETATION ECG - MUSE: NORMAL
IRON BINDING CAPACITY (ROCHE): 287 UG/DL (ref 240–430)
IRON SATN MFR SERPL: 16 % (ref 15–46)
IRON SERPL-MCNC: 45 UG/DL (ref 37–145)
LYMPHOCYTES # BLD AUTO: 0.8 10E3/UL (ref 0.8–5.3)
LYMPHOCYTES NFR BLD AUTO: 17 %
MCH RBC QN AUTO: 26.2 PG (ref 26.5–33)
MCHC RBC AUTO-ENTMCNC: 30.7 G/DL (ref 31.5–36.5)
MCV RBC AUTO: 85 FL (ref 78–100)
MONOCYTES # BLD AUTO: 0.4 10E3/UL (ref 0–1.3)
MONOCYTES NFR BLD AUTO: 9 %
NEUTROPHILS # BLD AUTO: 2.9 10E3/UL (ref 1.6–8.3)
NEUTROPHILS NFR BLD AUTO: 67 %
NRBC # BLD AUTO: 0 10E3/UL
NRBC BLD AUTO-RTO: 0 /100
P AXIS - MUSE: -28 DEGREES
PLATELET # BLD AUTO: 207 10E3/UL (ref 150–450)
POTASSIUM SERPL-SCNC: 4.2 MMOL/L (ref 3.4–5.3)
PR INTERVAL - MUSE: 98 MS
PROT SERPL-MCNC: 6.7 G/DL (ref 6.4–8.3)
QRS DURATION - MUSE: 90 MS
QT - MUSE: 444 MS
QTC - MUSE: 424 MS
R AXIS - MUSE: -22 DEGREES
RBC # BLD AUTO: 4.24 10E6/UL (ref 3.8–5.2)
SODIUM SERPL-SCNC: 142 MMOL/L (ref 135–145)
SYSTOLIC BLOOD PRESSURE - MUSE: NORMAL MMHG
T AXIS - MUSE: 7 DEGREES
TSH SERPL DL<=0.005 MIU/L-ACNC: 0.74 UIU/ML (ref 0.3–4.2)
VENTRICULAR RATE- MUSE: 55 BPM
VIT B12 SERPL-MCNC: 2627 PG/ML (ref 232–1245)
WBC # BLD AUTO: 4.4 10E3/UL (ref 4–11)

## 2025-03-04 PROCEDURE — 83540 ASSAY OF IRON: CPT

## 2025-03-04 PROCEDURE — 82607 VITAMIN B-12: CPT

## 2025-03-04 PROCEDURE — 3078F DIAST BP <80 MM HG: CPT | Performed by: PHYSICIAN ASSISTANT

## 2025-03-04 PROCEDURE — 83550 IRON BINDING TEST: CPT

## 2025-03-04 PROCEDURE — 85025 COMPLETE CBC W/AUTO DIFF WBC: CPT | Performed by: PHYSICIAN ASSISTANT

## 2025-03-04 PROCEDURE — 84443 ASSAY THYROID STIM HORMONE: CPT

## 2025-03-04 PROCEDURE — 99204 OFFICE O/P NEW MOD 45 MIN: CPT | Performed by: PHYSICIAN ASSISTANT

## 2025-03-04 PROCEDURE — 80053 COMPREHEN METABOLIC PANEL: CPT

## 2025-03-04 PROCEDURE — 36415 COLL VENOUS BLD VENIPUNCTURE: CPT

## 2025-03-04 PROCEDURE — 82746 ASSAY OF FOLIC ACID SERUM: CPT | Performed by: PHYSICIAN ASSISTANT

## 2025-03-04 PROCEDURE — 1126F AMNT PAIN NOTED NONE PRSNT: CPT | Performed by: PHYSICIAN ASSISTANT

## 2025-03-04 PROCEDURE — 82728 ASSAY OF FERRITIN: CPT

## 2025-03-04 PROCEDURE — 3074F SYST BP LT 130 MM HG: CPT | Performed by: PHYSICIAN ASSISTANT

## 2025-03-04 RX ORDER — AMIODARONE HYDROCHLORIDE 200 MG/1
TABLET ORAL
Qty: 45 TABLET | Refills: 1 | Status: SHIPPED | OUTPATIENT
Start: 2025-03-04

## 2025-03-04 ASSESSMENT — PAIN SCALES - GENERAL: PAINLEVEL_OUTOF10: NO PAIN (0)

## 2025-03-04 NOTE — NURSING NOTE
"Chief Complaint   Patient presents with    New Patient     New consult for low hemoglobin levels.     She requests these members of her care team be copied on today's visit information:  PCP: Vita Zavala    Referring Provider:  Patito Montano MD  [address] [place] [id number]    Vitals:    03/04/25 1450   BP: 124/54   BP Location: Left arm   Patient Position: Sitting   Cuff Size: Adult Regular   Pulse: 54   SpO2: 94%   Weight: (!) 144.7 kg (319 lb)   Height: 1.702 m (5' 7\")     Body mass index is 49.96 kg/m .    Do you have a history of colon cancer in your immediate family? NO    Medications were reconciled.          Elma Vargas CMA        "

## 2025-03-04 NOTE — PATIENT INSTRUCTIONS
Take your medicines every day, as directed     Changes made today:  No medication changes   Complete a pulmonary Function Test          Cardiology Care Coordinators:      Bekah BURKS RN     Cardiology Rooming Staff:  Jeniffer ZHANG EMT    Phone  [phone]      Fax [phone]    To Contact us     During Business Hours:  [phone]     If you are needing refills please contact your pharmacy.     For urgent after hour care please call the [facility] at [phone] or the [facility] at [phone] and ask to speak to the cardiologist on call.    If you are having a medical emergency, please call 911.            HOW TO CHECK YOUR BLOOD PRESSURE AT HOME:     Avoid eating, smoking, and exercising for at least 30 minutes before taking a reading.     Be sure you have taken your BP medication at least 2-3 hours before you check it.      Sit quietly for 10 minutes before a reading.      Sit in a chair with your feet flat on the floor. Rest your  arm on a table so that the arm cuff is at the same level as your heart.     Remain still during the reading.  Record your blood pressure and pulse in a log and bring to your next appointment.       Use [employer] allows you to communicate directly with your heart team through secure messaging.  [employer] can be accessed any time on your phone, computer, or tablet.  If you need assistance, we'd be happy to help!             Keep your Heart Appointments:     Follow-up in 6 months

## 2025-03-04 NOTE — NURSING NOTE
Med Reconcile: Reviewed and verified all current medications with the patient. The updated medication list was printed and given to the patient./ No medication changes. Complete a PFT at next available.       Return Appointment:   -Follow-up in 6 months     Patient stated she understood all health information given and agreed to call with further questions or concerns.

## 2025-03-04 NOTE — NURSING NOTE
"Chief Complaint   Patient presents with    Follow Up     POST ABLATION       Initial /79 (BP Location: Left arm, Patient Position: Other (comments), Cuff Size: Adult Regular)   Pulse 56   Ht 1.702 m (5' 7\")   Wt (!) 144.7 kg (319 lb)   SpO2 96%   BMI 49.96 kg/m   Estimated body mass index is 49.96 kg/m  as calculated from the following:    Height as of this encounter: 1.702 m (5' 7\").    Weight as of this encounter: 144.7 kg (319 lb)..  BP completed using cuff size: regular    Jeniffer GONZALEZ  "

## 2025-03-04 NOTE — LETTER
3/4/2025      Chelo Brooks  [facility] [address]  [facility] [address]      Dear Colleague,    Thank you for referring your patient, Chelo Brooks, to the [facility]. Please see a copy of my visit note below.    NEW PATIENT GI CLINIC VISIT    CC/REFERRING MD:  Patito Montano  REASON FOR CONSULTATION:   Patito Montano for   Chief Complaint   Patient presents with     New Patient     New consult for low hemoglobin levels.       ASSESSMENT/PLAN: Patient here for evaluation of normocytic anemia.  Concern was for possible occult GI bleeding.  She has no overt GI bleeding on symptom report.  She eats an average diet and does not avoid any specific food groups.  Normal LFTs and platelets recently.  We see that her creatinine has been rising to some degree, now technically stage IV CKD.  We spent some time reviewing potential etiologies for her anemia.  This would be most consistent with anemia of chronic disease/related to her CKD, less likely GI bleed or nutritional deficiency.  Will recheck blood counts today along with micronutrients and address deficiencies as needed.  If hemoglobin has declined further, we will discuss EGD and colonoscopy, though patient remains concerned about colon prep.  Reviewed low-volume split prep, this might be feasible.  I think she likely needs to visit with nephrology again given her worsening creatinine as well.  Will follow-up with patient after labs are completed.    1. Heart failure with preserved ejection fraction, NYHA class II (H)  - Adult GI  Referral - Consult Only    2. Hyperlipidemia LDL goal <100  - Adult GI  Referral - Consult Only    3. S/P ablation of atrial fibrillation  - Adult GI  Referral - Consult Only    4. Normocytic anemia (Primary)  - CBC with platelets and differential; Future  - Comprehensive metabolic panel (BMP + Alb, Alk Phos, ALT, AST, Total. Bili, TP); Future  - Iron and iron binding capacity;  Future  - Ferritin; Future  - Vitamin B12; Future  - Folate; Future  - CBC with platelets and differential  - Folate        RTC PRN    Thank you for this consultation.  It was a pleasure to participate in the care of this patient; please contact us with any further questions.      34 minutes spent on the date of the encounter doing chart review, patient visit, and documentation    This note was created with voice recognition software, and while reviewed for accuracy, typos may remain.     Dino Fisher PA-C  Division of Gastroenterology, Hepatology and Nutrition  [facility]  Chelo Brooks is a 79 year old female with a past medical history significant for persistent AF status post PVI 10/18/2024, continued on Xarelto, stage III/stage IV CKD, hypertension, FLOR, and obesity that is seen as a new patient in the GI clinic today for anemia.  Patient seen by cardiology couple of months ago and laboratory testing indicated mild decrease in hemoglobin, initially noted in the fall but then persisting again in January, 9.8, rechecked again at 9.9.  Normocytic, ferritin 100.  No overt GI bleeding, specifically denying hematochezia, melena, abdominal pain, hematemesis, coffee-ground emesis.  She has reflux occasionally.  EGD in 2016 initially suspicious for short segment Lewis's, but esophageal biopsies were normal.  She had mild gastritis but no H. pylori.  No duodenal pathology.  Has never had colonoscopy, does not drink much for liquids and does not think that she would ever be able to do a full volume colon prep.  I see that her creatinine has increased in the last several months, as high as 2.0.  Previously saw nephrology but not recently.  TSH was elevated recently but has normalized as of today, LFTs normal as well.  She has tiredness but not significantly fatigued, no shortness of breath.    Surgical history pertinent for hysterectomy.  Family medical history  notable for colorectal cancer in maternal grandfather.  No tobacco, alcohol, or drug use.    ROS:    10 point ROS neg other than the symptoms noted above in the HPI.    PREVIOUS ENDOSCOPY:  Reviewed, see HPI    PERTINENT RELEVANT IMAGING OR LABS:  Reviewed    ALLERGIES:     Allergies   Allergen Reactions     Adhesive Tape        PERTINENT MEDICATIONS:    Current Outpatient Medications:      acetaminophen (TYLENOL) 325 MG tablet, Take 650 mg by mouth every morning., Disp: , Rfl:      albuterol (PROAIR HFA/PROVENTIL HFA/VENTOLIN HFA) 108 (90 Base) MCG/ACT inhaler, Inhale 1 puff into the lungs every 6 hours as needed for shortness of breath, wheezing or cough., Disp: 18 g, Rfl: 0     amiodarone (PACERONE) 200 MG tablet, Take 1/2 tablet ( 100 mg) once daily, Disp: 45 tablet, Rfl: 1     atorvastatin (LIPITOR) 40 MG tablet, Take 1 tablet (40 mg) by mouth daily, Disp: 90 tablet, Rfl: 3     bumetanide (BUMEX) 1 MG tablet, TAKE HALF DAILY, Disp: 90 tablet, Rfl: 3     Cholecalciferol (VITAMIN D) 2000 UNITS tablet, Take 2,000 Units by mouth daily., Disp: , Rfl:      Cyanocobalamin (VITAMIN B-12 PO), Take by mouth daily., Disp: , Rfl:      fluticasone (FLONASE) 50 MCG/ACT nasal spray, Spray 2 sprays into both nostrils daily, Disp: 48 g, Rfl: 3     gabapentin (NEURONTIN) 100 MG capsule, Take 1 capsule (100 mg) by mouth 2 times daily as needed for neuropathic pain, Disp: 60 capsule, Rfl: 1     loratadine (CLARITIN) 10 MG tablet, Take 10 mg by mouth daily., Disp: , Rfl:      magnesium 250 MG tablet, Take 1 tablet (250 mg) by mouth daily, Disp: 90 tablet, Rfl: 1     methimazole (TAPAZOLE) 5 MG tablet, Take 1 tablet (5 mg) by mouth daily., Disp: 90 tablet, Rfl: 3     mometasone (ELOCON) 0.1 % external cream, Apply to affected area on legs twice daily as needed, Disp: 100 g, Rfl: 3     olmesartan-hydrochlorothiazide (BENICAR HCT) 40-25 MG tablet, Take 1 tablet by mouth daily., Disp: , Rfl:      order for DME, Equipment being ordered:  Auto CPAP 11-18, Disp: 1 Units, Rfl: 0     rivaroxaban ANTICOAGULANT (XARELTO) 20 MG TABS tablet, Take 1 tablet (20 mg) by mouth daily (with dinner)., Disp: 90 tablet, Rfl: 4     ZEPBOUND 2.5 MG/0.5ML prefilled pen, Inject 0.5 mLs (2.5 mg) subcutaneously every 7 days., Disp: 3 mL, Rfl: 0    PROBLEM LIST  Patient Active Problem List    Diagnosis Date Noted     Hyperlipidemia LDL goal <40 11/27/2012     Priority: High     Chronic atrial fibrillation (H) 11/15/2023     Priority: Medium     Umbilical hernia      Priority: Medium     Degenerative joint disease (DJD) of hip      Priority: Medium     Thyroid nodule      Priority: Medium     GERD (gastroesophageal reflux disease) 10/28/2019     Priority: Medium     Asthma, mild intermittent, well-controlled 06/18/2019     Priority: Medium     Elevated parathyroid hormone 06/18/2019     Priority: Medium     Stage 3b chronic kidney disease (CKD) (H)      Priority: Medium     Stasis dermatitis of both legs 04/05/2018     Priority: Medium     Combined forms of age-related cataract of both eyes 07/18/2017     Priority: Medium     Lumbar spinal stenosis 07/17/2017     Priority: Medium     FLOR (obstructive sleep apnea)- severe (AHI 89) 02/15/2017     Priority: Medium     Study Date: 2/14/2017- (329.0 lbs) The total sleep time was 96.0 minutes. The combined apnea/hypopnea index was 89.4 events per hour.  The REM AHI was n/a.  The supine AHI was 93.9 events per hour. RDI was 92.5 events per hour.  Lowest oxygen saturation was 77%.  Time spent less than or equal to 88% was 27.5 minutes.  Time spent less than or equal to 89% was 34.4 minutes.       Low bone mass      Priority: Medium     Morbid obesity due to excess calories (H) 11/23/2015     Priority: Medium     Surgical referral declined '16       Renal hypertension      Priority: Medium     History of bilateral knee replacement 10/23/2013     Priority: Medium     Allergic rhinitis due to animal dander      Priority: Medium      4/5/12 RAST pos. only to cat mildly       Dependent edema 06/17/2003     Priority: Medium       PERTINENT PAST MEDICAL HISTORY:  Past Medical History:   Diagnosis Date     Acquired renal cyst of left kidney      Adrenal nodule     left - needs yearly CT     Ascending aorta dilatation      Chronic atrial fibrillation (H) 11/15/2023     CKD (chronic kidney disease) stage 3, GFR 30-59 ml/min (H)      Degenerative joint disease (DJD) of hip      DJD (degenerative joint disease) 10/21/2005     Eczema, unspecified type 04/05/2018     Elevated parathyroid hormone 06/18/2019     Gallstones      Hepatitis B childhood     resolved, patient is not a carrier      Hyperlipidemia 11/27/2012     Hypertension goal BP (blood pressure) < 140/90      Low bone mass      Lumbar spinal stenosis 07/17/2017     Mild persistent asthma without complication 05/30/2017     Morbid obesity due to excess calories (H) 11/23/2015    Surgical referral declined '16      FLOR (obstructive sleep apnea)      Osteopenia      Pulmonary emboli (H) 10/28/2019     Shingles 2014    scalp     Stasis dermatitis of both legs 04/05/2018     Thyroid nodule      Umbilical hernia      Vitamin D deficiency        PREVIOUS SURGERIES:  Past Surgical History:   Procedure Laterality Date     ANESTHESIA CARDIOVERSION N/A 12/28/2023    Procedure: Anesthesia cardioversion @1330;  Surgeon: GENERIC ANESTHESIA PROVIDER;  Location: [facility] OR     ANESTHESIA CARDIOVERSION N/A 8/5/2024    Procedure: Anesthesia cardioversion@1330;  Surgeon: GENERIC ANESTHESIA PROVIDER;  Location: [facility] OR     EP ABLATION FOCAL AFIB N/A 10/17/2024    Procedure: EP Ablation Focal AFIB;  Surgeon: Chay Albarado MD;  Location:  HEART CARDIAC CATH LAB     IR PAROTID BIOPSY  5/21/2020     Z TOTAL KNEE ARTHROPLASTY  3/2000    right     [facility] TOTAL KNEE ARTHROPLASTY  6/8/12    left     [facility] VAGINAL HYSTERECTOMY  1977    benign, prolapse, ovaries remain        SOCIAL HISTORY:  Social History     Socioeconomic  History     Marital status:      Spouse name: Not on file     Number of children: 2     Years of education: 12     Highest education level: Not on file   Occupational History     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 Not Asked   Social History Narrative     Not on file     Social Drivers of Health     Financial Resource Strain: Low Risk  (10/18/2024)    Financial Resource Strain      Within the past 12 months, have you or your family members you live with been unable to get utilities (heat, electricity) when it was really needed?: No   Food Insecurity: Low Risk  (10/18/2024)    Food Insecurity      Within the past 12 months, did you worry that your food would run out before you got money to buy more?: No      Within the past 12 months, did the food you bought just not last and you didn t have money to get more?: No   Transportation Needs: Low Risk  (10/18/2024)    Transportation Needs      Within the past 12 months, has lack of transportation kept you from medical appointments, getting your medicines, non-medical meetings or appointments, work, or from getting things that you need?: No   Physical Activity: Not on file   Stress: Not on file   Social Connections: Not on file   Interpersonal Safety: Low Risk  (10/17/2024)    Interpersonal Safety      Do you feel physically and emotionally safe where you currently live?: Yes      Within the past 12 months, have you been hit, slapped, kicked or otherwise physically hurt by  "someone?: No      Within the past 12 months, have you been humiliated or emotionally abused in other ways by your partner or ex-partner?: No   Housing Stability: Low Risk  (10/18/2024)    Housing Stability      Do you have housing? : Yes      Are you worried about losing your housing?: No       FAMILY HISTORY:  Family History   Problem Relation Age of Onset     Circulatory Father         d. DVT     Heart Disease Father         pacer     Diabetes Mother      Hypertension Mother      Deep Vein Thrombosis Brother      Coronary Artery Disease Brother      Myocardial Infarction Brother      Deep Vein Thrombosis Brother      C.A.D. Paternal Uncle         MI      Heart Disease Brother 48        pacer      Diabetes Maternal Grandmother      Hypertension Maternal Grandmother      Diabetes Maternal Aunt      Hypertension Maternal Aunt      Cancer - colorectal Maternal Grandfather      C.A.D. Maternal Aunt         MI     Glaucoma No family hx of      Macular Degeneration No family hx of        Past/family/social history reviewed and no changes    PHYSICAL EXAMINATION:  Constitutional: aaox3, cooperative, pleasant, not dyspneic/diaphoretic, no acute distress  Vitals reviewed: /54 (BP Location: Left arm, Patient Position: Sitting, Cuff Size: Adult Regular)   Pulse 54   Ht 1.702 m (5' 7\")   Wt (!) 144.7 kg (319 lb)   SpO2 94%   BMI 49.96 kg/m    Wt:   Wt Readings from Last 2 Encounters:   03/04/25 (!) 144.7 kg (319 lb)   03/04/25 (!) 144.7 kg (319 lb)      Eyes: Sclera anicteric/injected  CV: No edema  Respiratory: Unlabored breathing  Skin: warm, perfused, no jaundice  Psych: Normal affect  MSK: Normal gait                      Again, thank you for allowing me to participate in the care of your patient.        Sincerely,        Dino Fisher PA-C    Electronically signed"

## 2025-03-04 NOTE — PROGRESS NOTES
NEW PATIENT GI CLINIC VISIT    CC/REFERRING MD:  Patito Montano  REASON FOR CONSULTATION:   Patito Montano for   Chief Complaint   Patient presents with    New Patient     New consult for low hemoglobin levels.       ASSESSMENT/PLAN: Patient here for evaluation of normocytic anemia.  Concern was for possible occult GI bleeding.  She has no overt GI bleeding on symptom report.  She eats an average diet and does not avoid any specific food groups.  Normal LFTs and platelets recently.  We see that her creatinine has been rising to some degree, now technically stage IV CKD.  We spent some time reviewing potential etiologies for her anemia.  This would be most consistent with anemia of chronic disease/related to her CKD, less likely GI bleed or nutritional deficiency.  Will recheck blood counts today along with micronutrients and address deficiencies as needed.  If hemoglobin has declined further, we will discuss EGD and colonoscopy, though patient remains concerned about colon prep.  Reviewed low-volume split prep, this might be feasible.  I think she likely needs to visit with nephrology again given her worsening creatinine as well.  Will follow-up with patient after labs are completed.    1. Heart failure with preserved ejection fraction, NYHA class II (H)  - Adult GI  Referral - Consult Only    2. Hyperlipidemia LDL goal <100  - Adult GI  Referral - Consult Only    3. S/P ablation of atrial fibrillation  - Adult GI  Referral - Consult Only    4. Normocytic anemia (Primary)  - CBC with platelets and differential; Future  - Comprehensive metabolic panel (BMP + Alb, Alk Phos, ALT, AST, Total. Bili, TP); Future  - Iron and iron binding capacity; Future  - Ferritin; Future  - Vitamin B12; Future  - Folate; Future  - CBC with platelets and differential  - Folate        RTC PRN    Thank you for this consultation.  It was a pleasure to participate in the care of this patient; please contact us with any  further questions.      34 minutes spent on the date of the encounter doing chart review, patient visit, and documentation    This note was created with voice recognition software, and while reviewed for accuracy, typos may remain.     Dino Fisher PA-C  Division of Gastroenterology, Hepatology and Nutrition  [facility]  Chelo Brooks is a 79 year old female with a past medical history significant for persistent AF status post PVI 10/18/2024, continued on Xarelto, stage III/stage IV CKD, hypertension, FLOR, and obesity that is seen as a new patient in the GI clinic today for anemia.  Patient seen by cardiology couple of months ago and laboratory testing indicated mild decrease in hemoglobin, initially noted in the fall but then persisting again in January, 9.8, rechecked again at 9.9.  Normocytic, ferritin 100.  No overt GI bleeding, specifically denying hematochezia, melena, abdominal pain, hematemesis, coffee-ground emesis.  She has reflux occasionally.  EGD in 2016 initially suspicious for short segment Lewis's, but esophageal biopsies were normal.  She had mild gastritis but no H. pylori.  No duodenal pathology.  Has never had colonoscopy, does not drink much for liquids and does not think that she would ever be able to do a full volume colon prep.  I see that her creatinine has increased in the last several months, as high as 2.0.  Previously saw nephrology but not recently.  TSH was elevated recently but has normalized as of today, LFTs normal as well.  She has tiredness but not significantly fatigued, no shortness of breath.    Surgical history pertinent for hysterectomy.  Family medical history notable for colorectal cancer in maternal grandfather.  No tobacco, alcohol, or drug use.    ROS:    10 point ROS neg other than the symptoms noted above in the HPI.    PREVIOUS ENDOSCOPY:  Reviewed, see HPI    PERTINENT RELEVANT IMAGING OR  LABS:  Reviewed    ALLERGIES:     Allergies   Allergen Reactions    Adhesive Tape        PERTINENT MEDICATIONS:    Current Outpatient Medications:     acetaminophen (TYLENOL) 325 MG tablet, Take 650 mg by mouth every morning., Disp: , Rfl:     albuterol (PROAIR HFA/PROVENTIL HFA/VENTOLIN HFA) 108 (90 Base) MCG/ACT inhaler, Inhale 1 puff into the lungs every 6 hours as needed for shortness of breath, wheezing or cough., Disp: 18 g, Rfl: 0    amiodarone (PACERONE) 200 MG tablet, Take 1/2 tablet ( 100 mg) once daily, Disp: 45 tablet, Rfl: 1    atorvastatin (LIPITOR) 40 MG tablet, Take 1 tablet (40 mg) by mouth daily, Disp: 90 tablet, Rfl: 3    bumetanide (BUMEX) 1 MG tablet, TAKE HALF DAILY, Disp: 90 tablet, Rfl: 3    Cholecalciferol (VITAMIN D) 2000 UNITS tablet, Take 2,000 Units by mouth daily., Disp: , Rfl:     Cyanocobalamin (VITAMIN B-12 PO), Take by mouth daily., Disp: , Rfl:     fluticasone (FLONASE) 50 MCG/ACT nasal spray, Spray 2 sprays into both nostrils daily, Disp: 48 g, Rfl: 3    gabapentin (NEURONTIN) 100 MG capsule, Take 1 capsule (100 mg) by mouth 2 times daily as needed for neuropathic pain, Disp: 60 capsule, Rfl: 1    loratadine (CLARITIN) 10 MG tablet, Take 10 mg by mouth daily., Disp: , Rfl:     magnesium 250 MG tablet, Take 1 tablet (250 mg) by mouth daily, Disp: 90 tablet, Rfl: 1    methimazole (TAPAZOLE) 5 MG tablet, Take 1 tablet (5 mg) by mouth daily., Disp: 90 tablet, Rfl: 3    mometasone (ELOCON) 0.1 % external cream, Apply to affected area on legs twice daily as needed, Disp: 100 g, Rfl: 3    olmesartan-hydrochlorothiazide (BENICAR HCT) 40-25 MG tablet, Take 1 tablet by mouth daily., Disp: , Rfl:     order for DME, Equipment being ordered: Auto CPAP 11-18, Disp: 1 Units, Rfl: 0    rivaroxaban ANTICOAGULANT (XARELTO) 20 MG TABS tablet, Take 1 tablet (20 mg) by mouth daily (with dinner)., Disp: 90 tablet, Rfl: 4    ZEPBOUND 2.5 MG/0.5ML prefilled pen, Inject 0.5 mLs (2.5 mg) subcutaneously  every 7 days., Disp: 3 mL, Rfl: 0    PROBLEM LIST  Patient Active Problem List    Diagnosis Date Noted    Hyperlipidemia LDL goal <40 11/27/2012     Priority: High    Chronic atrial fibrillation (H) 11/15/2023     Priority: Medium    Umbilical hernia      Priority: Medium    Degenerative joint disease (DJD) of hip      Priority: Medium    Thyroid nodule      Priority: Medium    GERD (gastroesophageal reflux disease) 10/28/2019     Priority: Medium    Asthma, mild intermittent, well-controlled 06/18/2019     Priority: Medium    Elevated parathyroid hormone 06/18/2019     Priority: Medium    Stage 3b chronic kidney disease (CKD) (H)      Priority: Medium    Stasis dermatitis of both legs 04/05/2018     Priority: Medium    Combined forms of age-related cataract of both eyes 07/18/2017     Priority: Medium    Lumbar spinal stenosis 07/17/2017     Priority: Medium    FLOR (obstructive sleep apnea)- severe (AHI 89) 02/15/2017     Priority: Medium     Study Date: 2/14/2017- (329.0 lbs) The total sleep time was 96.0 minutes. The combined apnea/hypopnea index was 89.4 events per hour.  The REM AHI was n/a.  The supine AHI was 93.9 events per hour. RDI was 92.5 events per hour.  Lowest oxygen saturation was 77%.  Time spent less than or equal to 88% was 27.5 minutes.  Time spent less than or equal to 89% was 34.4 minutes.      Low bone mass      Priority: Medium    Morbid obesity due to excess calories (H) 11/23/2015     Priority: Medium     Surgical referral declined '16      Renal hypertension      Priority: Medium    History of bilateral knee replacement 10/23/2013     Priority: Medium    Allergic rhinitis due to animal dander      Priority: Medium     4/5/12 RAST pos. only to cat mildly      Dependent edema 06/17/2003     Priority: Medium       PERTINENT PAST MEDICAL HISTORY:  Past Medical History:   Diagnosis Date    Acquired renal cyst of left kidney     Adrenal nodule     left - needs yearly CT    Ascending aorta  dilatation     Chronic atrial fibrillation (H) 11/15/2023    CKD (chronic kidney disease) stage 3, GFR 30-59 ml/min (H)     Degenerative joint disease (DJD) of hip     DJD (degenerative joint disease) 10/21/2005    Eczema, unspecified type 04/05/2018    Elevated parathyroid hormone 06/18/2019    Gallstones     Hepatitis B childhood     resolved, patient is not a carrier     Hyperlipidemia 11/27/2012    Hypertension goal BP (blood pressure) < 140/90     Low bone mass     Lumbar spinal stenosis 07/17/2017    Mild persistent asthma without complication 05/30/2017    Morbid obesity due to excess calories (H) 11/23/2015    Surgical referral declined '16     FLOR (obstructive sleep apnea)     Osteopenia     Pulmonary emboli (H) 10/28/2019    Shingles 2014    scalp    Stasis dermatitis of both legs 04/05/2018    Thyroid nodule     Umbilical hernia     Vitamin D deficiency        PREVIOUS SURGERIES:  Past Surgical History:   Procedure Laterality Date    ANESTHESIA CARDIOVERSION N/A 12/28/2023    Procedure: Anesthesia cardioversion @1330;  Surgeon: GENERIC ANESTHESIA PROVIDER;  Location: [facility] OR    ANESTHESIA CARDIOVERSION N/A 8/5/2024    Procedure: Anesthesia cardioversion@1330;  Surgeon: GENERIC ANESTHESIA PROVIDER;  Location: [facility] OR    EP ABLATION FOCAL AFIB N/A 10/17/2024    Procedure: EP Ablation Focal AFIB;  Surgeon: Chay Albarado MD;  Location:  [facility]ATH LAB    IR PAROTID BIOPSY  5/21/2020    Z TOTAL KNEE ARTHROPLASTY  3/2000    right    ZC TOTAL KNEE ARTHROPLASTY  6/8/12    left    ZZC VAGINAL HYSTERECTOMY  1977    benign, prolapse, ovaries remain        SOCIAL HISTORY:  Social History     Socioeconomic History    Marital status:      Spouse name: Not on file    Number of children: 2    Years of education: 12    Highest education level: Not on file   Occupational History    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 Not Asked   Social History Narrative    Not on file     Social Drivers of Health     Financial Resource Strain: Low Risk  (10/18/2024)    Financial Resource Strain     Within the past 12 months, have you or your family members you live with been unable to get utilities (heat, electricity) when it was really needed?: No   Food Insecurity: Low Risk  (10/18/2024)    Food Insecurity     Within the past 12 months, did you worry that your food would run out before you got money to buy more?: No     Within the past 12 months, did the food you bought just not last and you didn t have money to get more?: No   Transportation Needs: Low Risk  (10/18/2024)    Transportation Needs     Within the past 12 months, has lack of transportation kept you from medical appointments, getting your medicines, non-medical meetings or appointments, work, or from getting things that you need?: No   Physical Activity: Not on file   Stress: Not on file   Social Connections: Not on file   Interpersonal Safety: Low Risk  (10/17/2024)    Interpersonal Safety     Do you feel physically and emotionally safe where you currently live?: Yes     Within the past 12 months, have you been hit, slapped, kicked or otherwise physically hurt by someone?: No     Within the past 12 months, have you been humiliated or emotionally abused in other ways by your partner or ex-partner?: No   Housing Stability: Low Risk  (10/18/2024)    Housing Stability     Do you have housing? : Yes     Are you worried about losing your housing?: No       FAMILY HISTORY:  Family  "History   Problem Relation Age of Onset    Circulatory Father         d. DVT    Heart Disease Father         pacer    Diabetes Mother     Hypertension Mother     Deep Vein Thrombosis Brother     Coronary Artery Disease Brother     Myocardial Infarction Brother     Deep Vein Thrombosis Brother     C.A.D. Paternal Uncle         MI     Heart Disease Brother 48        pacer     Diabetes Maternal Grandmother     Hypertension Maternal Grandmother     Diabetes Maternal Aunt     Hypertension Maternal Aunt     Cancer - colorectal Maternal Grandfather     C.A.D. Maternal Aunt         MI    Glaucoma No family hx of     Macular Degeneration No family hx of        Past/family/social history reviewed and no changes    PHYSICAL EXAMINATION:  Constitutional: aaox3, cooperative, pleasant, not dyspneic/diaphoretic, no acute distress  Vitals reviewed: /54 (BP Location: Left arm, Patient Position: Sitting, Cuff Size: Adult Regular)   Pulse 54   Ht 1.702 m (5' 7\")   Wt (!) 144.7 kg (319 lb)   SpO2 94%   BMI 49.96 kg/m    Wt:   Wt Readings from Last 2 Encounters:   03/04/25 (!) 144.7 kg (319 lb)   03/04/25 (!) 144.7 kg (319 lb)      Eyes: Sclera anicteric/injected  CV: No edema  Respiratory: Unlabored breathing  Skin: warm, perfused, no jaundice  Psych: Normal affect  MSK: Normal gait                    "

## 2025-03-05 NOTE — RESULT ENCOUNTER NOTE
Ashlie Whitney,    Your lab tests from yesterday are available for you to review.  Overall, these look quite reassuring.  Your hemoglobin has recovered well up to 11.1, there are no significant nutritional deficiencies, and your creatinine (kidney function) has improved to some degree as well.  As we discussed, I do not think this is consistent with active GI bleeding.  You do not need to have an endoscopy or colonoscopy at this time.  I suggest continuing to have this monitored by your primary care doctor.    Please let me know if you have any questions.    Sincerely,  Dino BASS [facility] GI, Hepatology, and Nutrition

## 2025-03-11 DIAGNOSIS — E66.9 OBESITY WITH SERIOUS COMORBIDITY, UNSPECIFIED CLASS, UNSPECIFIED OBESITY TYPE: ICD-10-CM

## 2025-03-11 RX ORDER — TIRZEPATIDE 2.5 MG/.5ML
2.5 INJECTION, SOLUTION SUBCUTANEOUS
Qty: 3 ML | Refills: 0 | Status: SHIPPED | OUTPATIENT
Start: 2025-03-11

## 2025-03-11 RX ORDER — TIRZEPATIDE 2.5 MG/.5ML
INJECTION, SOLUTION SUBCUTANEOUS
Qty: 2 ML | OUTPATIENT
Start: 2025-03-11

## 2025-03-14 DIAGNOSIS — I50.30 HEART FAILURE WITH PRESERVED EJECTION FRACTION, NYHA CLASS I (H): Primary | ICD-10-CM

## 2025-03-19 RX ORDER — BUMETANIDE 1 MG/1
TABLET ORAL
Qty: 90 TABLET | Refills: 3 | Status: SHIPPED | OUTPATIENT
Start: 2025-03-19

## 2025-03-27 ENCOUNTER — OFFICE VISIT (OUTPATIENT)
Dept: NURSING | Facility: CLINIC | Age: 80
End: 2025-03-27
Payer: COMMERCIAL

## 2025-03-27 VITALS — HEART RATE: 53 BPM | BODY MASS INDEX: 50.21 KG/M2 | WEIGHT: 293 LBS | OXYGEN SATURATION: 97 %

## 2025-03-27 DIAGNOSIS — Z79.899 ON AMIODARONE THERAPY: ICD-10-CM

## 2025-03-27 DIAGNOSIS — I48.19 PERSISTENT ATRIAL FIBRILLATION (H): ICD-10-CM

## 2025-03-27 DIAGNOSIS — J45.20 ASTHMA, MILD INTERMITTENT, WELL-CONTROLLED: Primary | ICD-10-CM

## 2025-03-27 LAB
DLCOUNC-%PRED-PRE: 76 %
DLCOUNC-PRE: 15.1 ML/MIN/MMHG
DLCOUNC-PRED: 19.62 ML/MIN/MMHG
ERV-%PRED-PRE: 6 %
ERV-PRE: 0.07 L
ERV-PRED: 0.99 L
EXPTIME-PRE: 6.29 SEC
FEF2575-%PRED-PRE: 73 %
FEF2575-PRE: 1.19 L/SEC
FEF2575-PRED: 1.61 L/SEC
FEFMAX-%PRED-PRE: 95 %
FEFMAX-PRE: 5.11 L/SEC
FEFMAX-PRED: 5.37 L/SEC
FEV1-%PRED-PRE: 86 %
FEV1-PRE: 1.73 L
FEV1FEV6-PRE: 73 %
FEV1FEV6-PRED: 78 %
FEV1FVC-PRE: 73 %
FEV1FVC-PRED: 78 %
FEV1SVC-PRE: 70 %
FEV1SVC-PRED: 62 %
FIFMAX-PRE: 3.73 L/SEC
FRCPLETH-%PRED-PRE: 102 %
FRCPLETH-PRE: 3.22 L
FRCPLETH-PRED: 3.14 L
FVC-%PRED-PRE: 91 %
FVC-PRE: 2.38 L
FVC-PRED: 2.61 L
IC-%PRED-PRE: 122 %
IC-PRE: 2.42 L
IC-PRED: 1.98 L
RVPLETH-%PRED-PRE: 136 %
RVPLETH-PRE: 3.15 L
RVPLETH-PRED: 2.31 L
TLCPLETH-%PRED-PRE: 105 %
TLCPLETH-PRE: 5.64 L
TLCPLETH-PRED: 5.36 L
VA-%PRED-PRE: 91 %
VA-PRE: 4.43 L
VC-%PRED-PRE: 76 %
VC-PRE: 2.49 L
VC-PRED: 3.26 L

## 2025-04-02 ENCOUNTER — LAB (OUTPATIENT)
Dept: LAB | Facility: CLINIC | Age: 80
End: 2025-04-02
Payer: COMMERCIAL

## 2025-04-02 DIAGNOSIS — T46.2X5A AMIODARONE-INDUCED HYPERTHYROIDISM: ICD-10-CM

## 2025-04-02 DIAGNOSIS — D35.02 ADENOMA OF LEFT ADRENAL GLAND: ICD-10-CM

## 2025-04-02 DIAGNOSIS — E05.80 AMIODARONE-INDUCED HYPERTHYROIDISM: ICD-10-CM

## 2025-04-02 LAB
POTASSIUM SERPL-SCNC: 4.7 MMOL/L (ref 3.4–5.3)
T3 SERPL-MCNC: 68 NG/DL (ref 85–202)
T4 FREE SERPL-MCNC: 1.63 NG/DL (ref 0.9–1.7)
TSH SERPL DL<=0.005 MIU/L-ACNC: 1.86 UIU/ML (ref 0.3–4.2)

## 2025-04-02 PROCEDURE — 36415 COLL VENOUS BLD VENIPUNCTURE: CPT

## 2025-04-02 PROCEDURE — 84480 ASSAY TRIIODOTHYRONINE (T3): CPT

## 2025-04-02 PROCEDURE — 99000 SPECIMEN HANDLING OFFICE-LAB: CPT

## 2025-04-02 PROCEDURE — 84439 ASSAY OF FREE THYROXINE: CPT

## 2025-04-02 PROCEDURE — 84244 ASSAY OF RENIN: CPT | Mod: 90

## 2025-04-02 PROCEDURE — 84443 ASSAY THYROID STIM HORMONE: CPT

## 2025-04-02 PROCEDURE — 84132 ASSAY OF SERUM POTASSIUM: CPT

## 2025-04-05 LAB — RENIN PLAS-CCNC: 5.6 NG/ML/HR

## 2025-04-15 ENCOUNTER — TELEPHONE (OUTPATIENT)
Facility: CLINIC | Age: 80
End: 2025-04-15
Payer: COMMERCIAL

## 2025-04-15 DIAGNOSIS — E66.9 OBESITY WITH SERIOUS COMORBIDITY, UNSPECIFIED CLASS, UNSPECIFIED OBESITY TYPE: ICD-10-CM

## 2025-04-15 DIAGNOSIS — G47.33 OSA (OBSTRUCTIVE SLEEP APNEA): Primary | ICD-10-CM

## 2025-04-15 NOTE — TELEPHONE ENCOUNTER
Patient is requesting refills of her zepbound. She will be out after tomorrow. She has a follow up visit scheduled with you on 4/22/25.    Delfina Duran  [facility]

## 2025-04-16 RX ORDER — TIRZEPATIDE 2.5 MG/.5ML
2.5 INJECTION, SOLUTION SUBCUTANEOUS
Qty: 3 ML | Refills: 0 | Status: SHIPPED | OUTPATIENT
Start: 2025-04-16

## 2025-05-06 ENCOUNTER — OFFICE VISIT (OUTPATIENT)
Dept: OPTOMETRY | Facility: CLINIC | Age: 80
End: 2025-05-06
Payer: COMMERCIAL

## 2025-05-06 DIAGNOSIS — H25.13 NUCLEAR AGE-RELATED CATARACT, BOTH EYES: ICD-10-CM

## 2025-05-06 DIAGNOSIS — D31.32 CHOROIDAL NEVUS OF LEFT EYE: ICD-10-CM

## 2025-05-06 DIAGNOSIS — H52.223 REGULAR ASTIGMATISM OF BOTH EYES: ICD-10-CM

## 2025-05-06 DIAGNOSIS — H43.813 PVD (POSTERIOR VITREOUS DETACHMENT), BILATERAL: ICD-10-CM

## 2025-05-06 DIAGNOSIS — Z01.01 ENCOUNTER FOR EXAMINATION OF EYES AND VISION WITH ABNORMAL FINDINGS: Primary | ICD-10-CM

## 2025-05-06 DIAGNOSIS — H52.4 PRESBYOPIA: ICD-10-CM

## 2025-05-06 PROCEDURE — 92014 COMPRE OPH EXAM EST PT 1/>: CPT | Performed by: OPTOMETRIST

## 2025-05-06 PROCEDURE — 92015 DETERMINE REFRACTIVE STATE: CPT | Performed by: OPTOMETRIST

## 2025-05-06 ASSESSMENT — VISUAL ACUITY
OS_PH_CC: 20/25
CORRECTION_TYPE: GLASSES
OS_PH_CC+: -1
METHOD: SNELLEN - LINEAR
OD_CC: 20/30
OS_CC: 20/30
OD_PH_CC: 20/25
OS_CC: 20/25
OD_CC: 20/30 -1
OD_CC+: -2
OS_CC+: -1
OD_PH_CC+: -1

## 2025-05-06 ASSESSMENT — SLIT LAMP EXAM - LIDS: COMMENTS: NORMAL

## 2025-05-06 ASSESSMENT — REFRACTION_WEARINGRX
OD_ADD: +2.75
OD_CYLINDER: +0.50
OS_AXIS: 151
OS_SPHERE: +1.25
OD_AXIS: 150
SPECS_TYPE: PAL
OS_CYLINDER: +1.25
OD_SPHERE: +1.00
OS_ADD: +2.75

## 2025-05-06 ASSESSMENT — REFRACTION_MANIFEST
OS_SPHERE: +1.00
OS_CYLINDER: +1.25
OD_CYLINDER: +1.00
OD_SPHERE: +0.50
OD_ADD: +2.75
OS_SPHERE: +0.25
OS_AXIS: 157
OS_ADD: +2.75
OD_AXIS: 004
OD_SPHERE: +0.75
OS_AXIS: 153
OD_CYLINDER: +0.50
OS_CYLINDER: +1.00
OD_AXIS: 162
METHOD_AUTOREFRACTION: 1

## 2025-05-06 ASSESSMENT — CONF VISUAL FIELD
OD_SUPERIOR_NASAL_RESTRICTION: 0
OS_INFERIOR_TEMPORAL_RESTRICTION: 0
OD_INFERIOR_TEMPORAL_RESTRICTION: 0
OS_NORMAL: 1
OD_NORMAL: 1
OS_SUPERIOR_TEMPORAL_RESTRICTION: 0
OD_INFERIOR_NASAL_RESTRICTION: 0
OS_INFERIOR_NASAL_RESTRICTION: 0
OD_SUPERIOR_TEMPORAL_RESTRICTION: 0
OS_SUPERIOR_NASAL_RESTRICTION: 0

## 2025-05-06 ASSESSMENT — CUP TO DISC RATIO
OS_RATIO: 0.3
OD_RATIO: 0.25

## 2025-05-06 ASSESSMENT — TONOMETRY
OS_IOP_MMHG: 17
IOP_METHOD: ICARE
OD_IOP_MMHG: 18

## 2025-05-06 ASSESSMENT — EXTERNAL EXAM - LEFT EYE: OS_EXAM: NORMAL

## 2025-05-06 ASSESSMENT — EXTERNAL EXAM - RIGHT EYE: OD_EXAM: NORMAL

## 2025-05-06 NOTE — PROGRESS NOTES
"Chief Complaint   Patient presents with    COMPREHENSIVE EYE EXAM         Last Eye Exam: 2/8/23  Dilated Previously: Yes    What are you currently using to see?  glasses       Distance Vision Acuity: Noticed gradual change in both eyes. She has trouble seeing print on the TV and has to get close to signs while driving to read them    Near Vision Acuity: Satisfied with vision while reading with glasses    Eye Comfort: dry, mostly when she reads for a long time  Do you use eye drops? : No  Occupation or Hobbies: likes to read and sew    Mela Wooten  Optometric Assistant, [facility]      Medical, surgical and family histories reviewed and updated 5/6/2025.       OBJECTIVE: See Ophthalmology exam    ASSESSMENT:    ICD-10-CM    1. Encounter for examination of eyes and vision with abnormal findings  Z01.01       2. Nuclear age-related cataract, both eyes  H25.13       3. 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 PVD (posterior vitreous detachment), bilateral  H43.813       5. Regular astigmatism of both eyes  H52.223       6. Presbyopia  H52.4           PLAN:     Patient Instructions   You have the start of mild cataracts.  You may notice some blurred vision or glare with night driving.  It is important that you wear good sunglasses to protect your eyes from the ultraviolet light from the sun.     You have a PVD- posterior vitreous detachment which is due to the gel of the eye shrinking and clumping together.  This can sometimes cause holes or tears in the retina.  The signs of a retinal detachment are flashes of light or a \"curtain veil\" coming over your vision. If you notice any of these changes return to clinic for re-evaluation.     Updated glasses prescription provided today.   Allow 2 weeks to adapt to the new glasses.     Return in 1 year for a comprehensive eye exam, or sooner if needed.      The effects of the dilating drops last for 4- 6 hours.  You will be more sensitive to light and vision will be blurry up " close.  Mydriatic sunglasses were given if needed.     Luis Amor, OD  [facility]  [address] [place] [address]. [place]  [id number]    [phone]

## 2025-05-06 NOTE — PATIENT INSTRUCTIONS
"You have the start of mild cataracts.  You may notice some blurred vision or glare with night driving.  It is important that you wear good sunglasses to protect your eyes from the ultraviolet light from the sun.     You have a PVD- posterior vitreous detachment which is due to the gel of the eye shrinking and clumping together.  This can sometimes cause holes or tears in the retina.  The signs of a retinal detachment are flashes of light or a \"curtain veil\" coming over your vision. If you notice any of these changes return to clinic for re-evaluation.     Updated glasses prescription provided today.   Allow 2 weeks to adapt to the new glasses.     Return in 1 year for a comprehensive eye exam, or sooner if needed.      The effects of the dilating drops last for 4- 6 hours.  You will be more sensitive to light and vision will be blurry up close.  Mydriatic sunglasses were given if needed.     Luis Amor, OD  [facility]  [address] [place] [address]. [place]  [id number]    [phone]   "

## 2025-05-06 NOTE — LETTER
"5/6/2025      Chelo Brooks  [address] [place] [address] [address]  [facility] [id number]      Dear Colleague,    Thank you for referring your patient, Chelo Brooks, to the [facility]. Please see a copy of my visit note below.    Chief Complaint   Patient presents with     COMPREHENSIVE EYE EXAM         Last Eye Exam: 2/8/23  Dilated Previously: Yes    What are you currently using to see?  glasses       Distance Vision Acuity: Noticed gradual change in both eyes. She has trouble seeing print on the TV and has to get close to signs while driving to read them    Near Vision Acuity: Satisfied with vision while reading with glasses    Eye Comfort: dry, mostly when she reads for a long time  Do you use eye drops? : No  Occupation or Hobbies: likes to read and sew    Mela Wooten  Optometric Assistant, [facility]      Medical, surgical and family histories reviewed and updated 5/6/2025.       OBJECTIVE: See Ophthalmology exam    ASSESSMENT:    ICD-10-CM    1. Encounter for examination of eyes and vision with abnormal findings  Z01.01       2. Nuclear age-related cataract, both eyes  H25.13       3. 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 PVD (posterior vitreous detachment), bilateral  H43.813       5. Regular astigmatism of both eyes  H52.223       6. Presbyopia  H52.4           PLAN:     Patient Instructions   You have the start of mild cataracts.  You may notice some blurred vision or glare with night driving.  It is important that you wear good sunglasses to protect your eyes from the ultraviolet light from the sun.     You have a PVD- posterior vitreous detachment which is due to the gel of the eye shrinking and clumping together.  This can sometimes cause holes or tears in the retina.  The signs of a retinal detachment are flashes of light or a \"curtain veil\" coming over your vision. If you notice any of these changes return to clinic for re-evaluation.     Updated glasses prescription provided " today.   Allow 2 weeks to adapt to the new glasses.     Return in 1 year for a comprehensive eye exam, or sooner if needed.      The effects of the dilating drops last for 4- 6 hours.  You will be more sensitive to light and vision will be blurry up close.  Mydriatic sunglasses were given if needed.     Luis Amor OD  [address] [place]  [address]    [phone]        Again, thank you for allowing me to participate in the care of your patient.        Sincerely,        Luis Amor OD    Electronically signed

## 2025-05-08 ENCOUNTER — VIRTUAL VISIT (OUTPATIENT)
Facility: CLINIC | Age: 80
End: 2025-05-08
Attending: INTERNAL MEDICINE
Payer: COMMERCIAL

## 2025-05-08 DIAGNOSIS — G47.33 OSA (OBSTRUCTIVE SLEEP APNEA): Chronic | ICD-10-CM

## 2025-05-08 DIAGNOSIS — E66.01 MORBID OBESITY DUE TO EXCESS CALORIES (H): Primary | Chronic | ICD-10-CM

## 2025-05-08 NOTE — PATIENT INSTRUCTIONS
"Recommendations from today's MTM visit:                                                      Once you have completed your supply of Zepbound 2.5 mg weekly you can increase your dose to Zepbound 5 mg weekly.      Please reach out to me via [employer] with any questions or concerns about your increased dose prior to our scheduled follow-up.      Follow-up: 3 months - 8/7/25 at 1pm     It was great speaking with you today.  I value your experience and would be very thankful for your time in providing feedback in our clinic survey. In the next few days, you may receive an email or text message from [employer] with a link to a survey related to your  clinical pharmacist.\"     To schedule another MTM appointment, please call the clinic directly or you may call the MTM scheduling line at [phone].    My Clinical Pharmacist's contact information:                                                      Please feel free to contact me with any questions or concerns you have.      Rena Low, PharmD  Medication Therapy Management Pharmacist  [facility]   "

## 2025-05-08 NOTE — PROGRESS NOTES
Medication Therapy Management (MTM) Encounter    ASSESSMENT:                            Medication Adherence/Access: No issues identified.    Weight Management:  Patient is tolerating Zepbound well. Recommended a slower titration given her history of cholelithiasis. She has been on Zepbound 2.5 mg weekly for about 3 months. Since patient is tolerating medication well and feels like she is not losing weight, I would recommend increasing Zepbound to 5 mg subcutaneous weekly. Answered patient's questions.     PLAN:                            Once you have completed your supply of Zepbound 2.5 mg weekly you can increase your dose to Zepbound 5 mg weekly.     Please reach out to me via [employer] with any questions or concerns about your increased dose prior to our scheduled follow-up.     Follow-up: 3 months - 8/7/25 at 1pm     SUBJECTIVE/OBJECTIVE:                          Chelo Brooks is a 80 year old female seen for a follow-up visit.       Reason for visit: Zepbound follow up.    Allergies/ADRs: Reviewed in chart  Past Medical History: Reviewed in chart  Tobacco: She reports that she has never smoked. She has never been exposed to tobacco smoke. She has never used smokeless tobacco.    Medication Adherence/Access: no issues reported.    Weight Management:  Zepbound 2.5 mg weekly - she is on her third prescription of this, and has 1 dose left. Cost of $100 per month.     Patient says she is doing well on Zepbound. She has some constipation, but this is not out of the normal for her. She does try to eat yogurt 3-4 times a week and finds this helpful for constipation management. No heartburn, but says she is burping more often.   She just got a new scale, and is weighing herself occasionally. The most recent weight she can recall is 316 lbs. She is feeling sweeney and not eating as much, but doesn't seems feel like she is losing a lot of weight.    Nutrition/Eating Habits: Doesn't like to cook. Tries to eat salads.  "    Initial Consult Weight: 333 lbs      Relavant past medical history:  -denies any personal history of pancreatitis; denies any personal or family history of MEN2  -denies any personal or family history of medullary thyroid cancer. She does have a previous history of thyroid nodules with 2 benign biopsies. Endocrinologist Dr. Albert was ok with starting on GLP1 and initiated patient on Zepbound. Dr. Albert recommended [facility] follow up for dose titration.   -history of cholelithiasis -- CT from 8/2020 cholelithiasis without CT evidence of acute cholecystitis.     Wt Readings from Last 4 Encounters:   03/27/25 (!) 320 lb 9.6 oz (145.4 kg)   03/04/25 (!) 319 lb (144.7 kg)   03/04/25 (!) 319 lb (144.7 kg)   01/28/25 (!) 323 lb 12.8 oz (146.9 kg)     Estimated body mass index is 50.21 kg/m  as calculated from the following:    Height as of 3/4/25: 5' 7\" (1.702 m).    Weight as of 3/27/25: 320 lb 9.6 oz (145.4 kg).    ----------------  I spent 17 minutes with this patient today. All changes were made via collaborative practice agreement with cardiology provider Patito Montano MD and with endocrinology provider  Nimisha Albert MD.     A summary of these recommendations was sent via clinic portal.    Rena Low PharmD  Medication Therapy Management Pharmacist  [facility]    Rhina Bolaños PharmD  Medication Therapy Management Pharmacist   [facility]    Telemedicine Visit Details  The patient's medications can be safely assessed via a telemedicine encounter.  Type of service:  Telephone visit  Originating Location (pt. Location): Home    Distant Location (provider location):  Off-site  Start Time: 1:00 PM  End Time: 1:17 PM  "

## 2025-05-08 NOTE — Clinical Note
Woody Albert,  Patient is doing well on Zepbound. Increased the dose today for additional weight loss benefits. Thanks!

## 2025-05-15 ENCOUNTER — TELEPHONE (OUTPATIENT)
Dept: CARDIOLOGY | Facility: CLINIC | Age: 80
End: 2025-05-15
Payer: COMMERCIAL

## 2025-08-05 DIAGNOSIS — E78.5 HYPERLIPIDEMIA LDL GOAL <100: ICD-10-CM

## 2025-08-06 ENCOUNTER — OFFICE VISIT (OUTPATIENT)
Dept: ENDOCRINOLOGY | Facility: CLINIC | Age: 80
End: 2025-08-06
Payer: COMMERCIAL

## 2025-08-06 VITALS
OXYGEN SATURATION: 96 % | WEIGHT: 293 LBS | HEART RATE: 54 BPM | SYSTOLIC BLOOD PRESSURE: 145 MMHG | DIASTOLIC BLOOD PRESSURE: 83 MMHG | BODY MASS INDEX: 52.47 KG/M2 | RESPIRATION RATE: 18 BRPM

## 2025-08-06 DIAGNOSIS — E66.9 OBESITY WITH SERIOUS COMORBIDITY, UNSPECIFIED CLASS, UNSPECIFIED OBESITY TYPE: ICD-10-CM

## 2025-08-06 DIAGNOSIS — E04.2 MULTIPLE THYROID NODULES: ICD-10-CM

## 2025-08-06 DIAGNOSIS — D35.02 ADENOMA OF LEFT ADRENAL GLAND: ICD-10-CM

## 2025-08-06 DIAGNOSIS — E05.80 AMIODARONE-INDUCED HYPERTHYROIDISM: Primary | ICD-10-CM

## 2025-08-06 DIAGNOSIS — T46.2X5A AMIODARONE-INDUCED HYPERTHYROIDISM: Primary | ICD-10-CM

## 2025-08-06 DIAGNOSIS — R50.9 FEVER, UNSPECIFIED FEVER CAUSE: ICD-10-CM

## 2025-08-06 DIAGNOSIS — N95.8 OTHER SPECIFIED MENOPAUSAL AND PERIMENOPAUSAL DISORDERS: ICD-10-CM

## 2025-08-06 LAB
BASOPHILS # BLD AUTO: 0.1 10E3/UL (ref 0–0.2)
BASOPHILS NFR BLD AUTO: 1 %
EOSINOPHIL # BLD AUTO: 0.2 10E3/UL (ref 0–0.7)
EOSINOPHIL NFR BLD AUTO: 4 %
ERYTHROCYTE [DISTWIDTH] IN BLOOD BY AUTOMATED COUNT: 15 % (ref 10–15)
HCT VFR BLD AUTO: 32.7 % (ref 35–47)
HGB BLD-MCNC: 10.3 G/DL (ref 11.7–15.7)
IMM GRANULOCYTES # BLD: 0 10E3/UL
IMM GRANULOCYTES NFR BLD: 1 %
LYMPHOCYTES # BLD AUTO: 0.9 10E3/UL (ref 0.8–5.3)
LYMPHOCYTES NFR BLD AUTO: 20 %
MCH RBC QN AUTO: 28.6 PG (ref 26.5–33)
MCHC RBC AUTO-ENTMCNC: 31.5 G/DL (ref 31.5–36.5)
MCV RBC AUTO: 91 FL (ref 78–100)
MONOCYTES # BLD AUTO: 0.4 10E3/UL (ref 0–1.3)
MONOCYTES NFR BLD AUTO: 10 %
NEUTROPHILS # BLD AUTO: 2.8 10E3/UL (ref 1.6–8.3)
NEUTROPHILS NFR BLD AUTO: 64 %
NRBC # BLD AUTO: 0 10E3/UL
NRBC BLD AUTO-RTO: 0 /100
PLATELET # BLD AUTO: 158 10E3/UL (ref 150–450)
RBC # BLD AUTO: 3.6 10E6/UL (ref 3.8–5.2)
T4 FREE SERPL-MCNC: 1.7 NG/DL (ref 0.9–1.7)
TSH SERPL DL<=0.005 MIU/L-ACNC: 3.5 UIU/ML (ref 0.3–4.2)
WBC # BLD AUTO: 4.3 10E3/UL (ref 4–11)

## 2025-08-06 RX ORDER — ATORVASTATIN CALCIUM 40 MG/1
40 TABLET, FILM COATED ORAL DAILY
Qty: 90 TABLET | Refills: 3 | Status: SHIPPED | OUTPATIENT
Start: 2025-08-06

## 2025-08-07 ENCOUNTER — PATIENT OUTREACH (OUTPATIENT)
Dept: CARE COORDINATION | Facility: CLINIC | Age: 80
End: 2025-08-07
Payer: COMMERCIAL

## 2025-08-07 ENCOUNTER — VIRTUAL VISIT (OUTPATIENT)
Facility: CLINIC | Age: 80
End: 2025-08-07
Attending: INTERNAL MEDICINE
Payer: COMMERCIAL

## 2025-08-07 DIAGNOSIS — I12.9 RENAL HYPERTENSION: ICD-10-CM

## 2025-08-07 DIAGNOSIS — E66.01 MORBID OBESITY DUE TO EXCESS CALORIES (H): Primary | ICD-10-CM

## 2025-08-07 DIAGNOSIS — I48.20 CHRONIC ATRIAL FIBRILLATION (H): ICD-10-CM

## 2025-08-14 ENCOUNTER — ANCILLARY PROCEDURE (OUTPATIENT)
Dept: BONE DENSITY | Facility: CLINIC | Age: 80
End: 2025-08-14
Attending: INTERNAL MEDICINE
Payer: COMMERCIAL

## 2025-08-14 DIAGNOSIS — E66.9 OBESITY WITH SERIOUS COMORBIDITY, UNSPECIFIED CLASS, UNSPECIFIED OBESITY TYPE: ICD-10-CM

## 2025-08-14 DIAGNOSIS — N95.8 OTHER SPECIFIED MENOPAUSAL AND PERIMENOPAUSAL DISORDERS: ICD-10-CM

## 2025-08-14 PROCEDURE — 77081 DXA BONE DENSITY APPENDICULR: CPT | Mod: TC | Performed by: PHYSICIAN ASSISTANT

## 2025-08-14 PROCEDURE — 77080 DXA BONE DENSITY AXIAL: CPT | Mod: TC | Performed by: PHYSICIAN ASSISTANT

## 2025-08-25 ENCOUNTER — TELEPHONE (OUTPATIENT)
Dept: ENDOCRINOLOGY | Facility: CLINIC | Age: 80
End: 2025-08-25
Payer: COMMERCIAL

## 2025-08-27 ENCOUNTER — VIRTUAL VISIT (OUTPATIENT)
Dept: ENDOCRINOLOGY | Facility: CLINIC | Age: 80
End: 2025-08-27
Payer: COMMERCIAL

## 2025-08-27 DIAGNOSIS — E66.9 OBESITY WITH SERIOUS COMORBIDITY, UNSPECIFIED CLASS, UNSPECIFIED OBESITY TYPE: ICD-10-CM

## 2025-08-27 DIAGNOSIS — E05.80 AMIODARONE-INDUCED HYPERTHYROIDISM: ICD-10-CM

## 2025-08-27 DIAGNOSIS — M85.89 OSTEOPENIA OF MULTIPLE SITES: Primary | ICD-10-CM

## 2025-08-27 DIAGNOSIS — T46.2X5A AMIODARONE-INDUCED HYPERTHYROIDISM: ICD-10-CM

## 2025-08-27 PROCEDURE — G2211 COMPLEX E/M VISIT ADD ON: HCPCS | Mod: 95 | Performed by: INTERNAL MEDICINE

## 2025-08-27 PROCEDURE — 1126F AMNT PAIN NOTED NONE PRSNT: CPT | Mod: 95 | Performed by: INTERNAL MEDICINE

## 2025-08-27 PROCEDURE — 98007 SYNCH AUDIO-VIDEO EST HI 40: CPT | Performed by: INTERNAL MEDICINE

## 2025-08-27 RX ORDER — DIPHENHYDRAMINE HYDROCHLORIDE 50 MG/ML
25 INJECTION, SOLUTION INTRAMUSCULAR; INTRAVENOUS
Start: 2025-09-10

## 2025-08-27 RX ORDER — DIPHENHYDRAMINE HYDROCHLORIDE 50 MG/ML
50 INJECTION, SOLUTION INTRAMUSCULAR; INTRAVENOUS
Start: 2025-09-10

## 2025-08-27 RX ORDER — ALBUTEROL SULFATE 90 UG/1
1-2 INHALANT RESPIRATORY (INHALATION)
Start: 2025-09-10

## 2025-08-27 RX ORDER — MEPERIDINE HYDROCHLORIDE 25 MG/ML
25 INJECTION INTRAMUSCULAR; INTRAVENOUS; SUBCUTANEOUS
OUTPATIENT
Start: 2025-09-10

## 2025-08-27 RX ORDER — METHYLPREDNISOLONE SODIUM SUCCINATE 40 MG/ML
40 INJECTION INTRAMUSCULAR; INTRAVENOUS
Start: 2025-09-10

## 2025-08-27 RX ORDER — EPINEPHRINE 1 MG/ML
0.3 INJECTION, SOLUTION INTRAMUSCULAR; SUBCUTANEOUS EVERY 5 MIN PRN
OUTPATIENT
Start: 2025-09-10

## 2025-08-27 RX ORDER — ALBUTEROL SULFATE 0.83 MG/ML
2.5 SOLUTION RESPIRATORY (INHALATION)
OUTPATIENT
Start: 2025-09-10

## 2025-09-02 ENCOUNTER — OFFICE VISIT (OUTPATIENT)
Dept: FAMILY MEDICINE | Facility: CLINIC | Age: 80
End: 2025-09-02
Payer: COMMERCIAL

## 2025-09-02 VITALS
SYSTOLIC BLOOD PRESSURE: 165 MMHG | RESPIRATION RATE: 18 BRPM | OXYGEN SATURATION: 92 % | DIASTOLIC BLOOD PRESSURE: 80 MMHG | HEART RATE: 49 BPM | TEMPERATURE: 98.5 F | HEIGHT: 66 IN | WEIGHT: 293 LBS | BODY MASS INDEX: 47.09 KG/M2

## 2025-09-02 DIAGNOSIS — I48.20 CHRONIC ATRIAL FIBRILLATION (H): ICD-10-CM

## 2025-09-02 DIAGNOSIS — J45.20 ASTHMA, MILD INTERMITTENT, WELL-CONTROLLED: ICD-10-CM

## 2025-09-02 DIAGNOSIS — G47.33 OSA (OBSTRUCTIVE SLEEP APNEA): Chronic | ICD-10-CM

## 2025-09-02 DIAGNOSIS — E78.5 HYPERLIPIDEMIA LDL GOAL <100: ICD-10-CM

## 2025-09-02 DIAGNOSIS — E05.80 AMIODARONE-INDUCED HYPERTHYROIDISM: ICD-10-CM

## 2025-09-02 DIAGNOSIS — Z23 NEED FOR VACCINATION: ICD-10-CM

## 2025-09-02 DIAGNOSIS — N18.32 STAGE 3B CHRONIC KIDNEY DISEASE (CKD) (H): ICD-10-CM

## 2025-09-02 DIAGNOSIS — I12.9 RENAL HYPERTENSION: ICD-10-CM

## 2025-09-02 DIAGNOSIS — T46.2X5A AMIODARONE-INDUCED HYPERTHYROIDISM: ICD-10-CM

## 2025-09-02 DIAGNOSIS — Z00.00 ENCOUNTER FOR MEDICARE ANNUAL WELLNESS EXAM: Primary | ICD-10-CM

## 2025-09-02 DIAGNOSIS — M85.89 OSTEOPENIA OF MULTIPLE SITES: ICD-10-CM

## 2025-09-02 DIAGNOSIS — E66.01 MORBID OBESITY DUE TO EXCESS CALORIES (H): Chronic | ICD-10-CM

## 2025-09-02 DIAGNOSIS — I50.32 CHRONIC HEART FAILURE WITH PRESERVED EJECTION FRACTION (H): ICD-10-CM

## 2025-09-02 PROBLEM — D63.8 ANEMIA OF CHRONIC DISEASE: Status: ACTIVE | Noted: 2025-09-02

## 2025-09-02 LAB
ALBUMIN SERPL BCG-MCNC: 3.8 G/DL (ref 3.5–5.2)
ALBUMIN SERPL BCG-MCNC: 4 G/DL (ref 3.5–5.2)
ALP SERPL-CCNC: 85 U/L (ref 40–150)
ALT SERPL W P-5'-P-CCNC: 12 U/L (ref 0–50)
ANION GAP SERPL CALCULATED.3IONS-SCNC: 10 MMOL/L (ref 7–15)
AST SERPL W P-5'-P-CCNC: 21 U/L (ref 0–45)
BILIRUB SERPL-MCNC: 0.8 MG/DL
BUN SERPL-MCNC: 27 MG/DL (ref 8–23)
CALCIUM SERPL-MCNC: 9.5 MG/DL (ref 8.8–10.4)
CHLORIDE SERPL-SCNC: 106 MMOL/L (ref 98–107)
CHOLEST SERPL-MCNC: 111 MG/DL
CREAT SERPL-MCNC: 1.49 MG/DL (ref 0.51–0.95)
CREAT UR-MCNC: 147 MG/DL
EGFRCR SERPLBLD CKD-EPI 2021: 36 ML/MIN/1.73M2
FASTING STATUS PATIENT QL REPORTED: ABNORMAL
FASTING STATUS PATIENT QL REPORTED: NORMAL
GLUCOSE SERPL-MCNC: 92 MG/DL (ref 70–99)
HCO3 SERPL-SCNC: 26 MMOL/L (ref 22–29)
HDLC SERPL-MCNC: 51 MG/DL
HGB BLD-MCNC: 10.1 G/DL (ref 11.7–15.7)
LDLC SERPL CALC-MCNC: 47 MG/DL
MCV RBC AUTO: 91.8 FL (ref 78–100)
MICROALBUMIN UR-MCNC: 35.2 MG/L
MICROALBUMIN/CREAT UR: 23.95 MG/G CR (ref 0–25)
NONHDLC SERPL-MCNC: 60 MG/DL
PHOSPHATE SERPL-MCNC: 3.6 MG/DL (ref 2.5–4.5)
POTASSIUM SERPL-SCNC: 4.8 MMOL/L (ref 3.4–5.3)
PROT SERPL-MCNC: 6.4 G/DL (ref 6.4–8.3)
PTH-INTACT SERPL-MCNC: 70 PG/ML (ref 15–65)
SODIUM SERPL-SCNC: 142 MMOL/L (ref 135–145)
TRIGL SERPL-MCNC: 63 MG/DL
TSH SERPL DL<=0.005 MIU/L-ACNC: 3.33 UIU/ML (ref 0.3–4.2)

## 2025-09-02 PROCEDURE — G0008 ADMIN INFLUENZA VIRUS VAC: HCPCS | Performed by: FAMILY MEDICINE

## 2025-09-02 PROCEDURE — 36415 COLL VENOUS BLD VENIPUNCTURE: CPT | Performed by: FAMILY MEDICINE

## 2025-09-02 PROCEDURE — 82570 ASSAY OF URINE CREATININE: CPT | Performed by: FAMILY MEDICINE

## 2025-09-02 PROCEDURE — 85018 HEMOGLOBIN: CPT | Performed by: FAMILY MEDICINE

## 2025-09-02 PROCEDURE — 83970 ASSAY OF PARATHORMONE: CPT | Performed by: FAMILY MEDICINE

## 2025-09-02 PROCEDURE — 80053 COMPREHEN METABOLIC PANEL: CPT | Performed by: FAMILY MEDICINE

## 2025-09-02 PROCEDURE — G2211 COMPLEX E/M VISIT ADD ON: HCPCS | Performed by: FAMILY MEDICINE

## 2025-09-02 PROCEDURE — 84443 ASSAY THYROID STIM HORMONE: CPT | Performed by: FAMILY MEDICINE

## 2025-09-02 PROCEDURE — 3077F SYST BP >= 140 MM HG: CPT | Performed by: FAMILY MEDICINE

## 2025-09-02 PROCEDURE — 82043 UR ALBUMIN QUANTITATIVE: CPT | Performed by: FAMILY MEDICINE

## 2025-09-02 PROCEDURE — 84100 ASSAY OF PHOSPHORUS: CPT | Performed by: FAMILY MEDICINE

## 2025-09-02 PROCEDURE — 99214 OFFICE O/P EST MOD 30 MIN: CPT | Mod: 25 | Performed by: FAMILY MEDICINE

## 2025-09-02 PROCEDURE — 90662 IIV NO PRSV INCREASED AG IM: CPT | Performed by: FAMILY MEDICINE

## 2025-09-02 PROCEDURE — 3079F DIAST BP 80-89 MM HG: CPT | Performed by: FAMILY MEDICINE

## 2025-09-02 PROCEDURE — G0439 PPPS, SUBSEQ VISIT: HCPCS | Performed by: FAMILY MEDICINE

## 2025-09-02 PROCEDURE — 80061 LIPID PANEL: CPT | Performed by: FAMILY MEDICINE

## 2025-09-02 RX ORDER — ALBUTEROL SULFATE 90 UG/1
1 INHALANT RESPIRATORY (INHALATION) EVERY 6 HOURS PRN
Qty: 18 G | Refills: 5 | Status: SHIPPED | OUTPATIENT
Start: 2025-09-02

## 2025-09-02 RX ORDER — OLMESARTAN MEDOXOMIL AND HYDROCHLOROTHIAZIDE 40/25 40; 25 MG/1; MG/1
1 TABLET ORAL DAILY
Qty: 90 TABLET | Refills: 0 | Status: SHIPPED | OUTPATIENT
Start: 2025-09-02

## 2025-09-02 SDOH — HEALTH STABILITY: PHYSICAL HEALTH: ON AVERAGE, HOW MANY MINUTES DO YOU ENGAGE IN EXERCISE AT THIS LEVEL?: 10 MIN

## 2025-09-02 SDOH — HEALTH STABILITY: PHYSICAL HEALTH: ON AVERAGE, HOW MANY DAYS PER WEEK DO YOU ENGAGE IN MODERATE TO STRENUOUS EXERCISE (LIKE A BRISK WALK)?: 1 DAY

## 2025-09-02 ASSESSMENT — ASTHMA QUESTIONNAIRES
QUESTION_3 LAST FOUR WEEKS HOW OFTEN DID YOUR ASTHMA SYMPTOMS (WHEEZING, COUGHING, SHORTNESS OF BREATH, CHEST TIGHTNESS OR PAIN) WAKE YOU UP AT NIGHT OR EARLIER THAN USUAL IN THE MORNING: NOT AT ALL
QUESTION_2 LAST FOUR WEEKS HOW OFTEN HAVE YOU HAD SHORTNESS OF BREATH: MORE THAN ONCE A DAY
QUESTION_5 LAST FOUR WEEKS HOW WOULD YOU RATE YOUR ASTHMA CONTROL: SOMEWHAT CONTROLLED
ACT_TOTALSCORE: 17
QUESTION_1 LAST FOUR WEEKS HOW MUCH OF THE TIME DID YOUR ASTHMA KEEP YOU FROM GETTING AS MUCH DONE AT WORK, SCHOOL OR AT HOME: SOME OF THE TIME
QUESTION_4 LAST FOUR WEEKS HOW OFTEN HAVE YOU USED YOUR RESCUE INHALER OR NEBULIZER MEDICATION (SUCH AS ALBUTEROL): NOT AT ALL

## (undated) DEVICE — KIT TRANSDUCER SINGLE W/BI-IV ADMIN SET PXMK2277

## (undated) DEVICE — NDL TRANSSEPTAL BRK XS 18GA 71CM BRK-1 CVD G407209

## (undated) DEVICE — KIT VENOUS FLUSH 60210177

## (undated) DEVICE — CATH ABLATION 8FR 115CM D-F CURVE BI-DIR QDOT MICRO D139505

## (undated) DEVICE — INTRO SHEATH AVANTI 4FRX23CM 504604T

## (undated) DEVICE — INTRODUCER SHEATH FAST-CATH CATH-LOCK 7FRX12CM 406702

## (undated) DEVICE — ELECTRODE DEFIB CADENCE 22550R

## (undated) DEVICE — INTRODUCER SHEATH FAST-CATH 9FRX12CM 406116

## (undated) DEVICE — REPRO BARD EP XT DECAPL STRBL DX EP CATH 6F

## (undated) DEVICE — CATH SOUNDSTAR 8FRX90CM 10439011

## (undated) DEVICE — INTRO CATH 12CM 8.5FR FST-CATH

## (undated) DEVICE — CATH NAV PENTARAY F CURVE

## (undated) DEVICE — TUBE SET SMARKABLATE IRRIGATION

## (undated) DEVICE — PATCH CARTO 3 EXTERNAL REFERENCE 3D MAPPING CREFP6

## (undated) DEVICE — INTRODUCER SHEATH FAST-CATH SWARTZ 8.5FRX63CM SL1 CVD 406849

## (undated) DEVICE — KIT MICROINTRODUCER VASCULAR VSI 4FRX0.018INX40CM 7195X

## (undated) RX ORDER — DIAZEPAM 5 MG
TABLET ORAL
Status: DISPENSED
Start: 2017-03-22

## (undated) RX ORDER — FENTANYL CITRATE 50 UG/ML
INJECTION, SOLUTION INTRAMUSCULAR; INTRAVENOUS
Status: DISPENSED
Start: 2024-10-17

## (undated) RX ORDER — LIDOCAINE HYDROCHLORIDE 20 MG/ML
SOLUTION OROPHARYNGEAL
Status: DISPENSED
Start: 2023-12-28

## (undated) RX ORDER — REGADENOSON 0.08 MG/ML
INJECTION, SOLUTION INTRAVENOUS
Status: DISPENSED
Start: 2017-03-22

## (undated) RX ORDER — FENTANYL CITRATE 50 UG/ML
INJECTION, SOLUTION INTRAMUSCULAR; INTRAVENOUS
Status: DISPENSED
Start: 2020-05-21

## (undated) RX ORDER — HEPARIN SODIUM 1000 [USP'U]/ML
INJECTION, SOLUTION INTRAVENOUS; SUBCUTANEOUS
Status: DISPENSED
Start: 2024-10-17

## (undated) RX ORDER — SODIUM CHLORIDE 9 MG/ML
INJECTION, SOLUTION INTRAVENOUS
Status: DISPENSED
Start: 2020-05-21

## (undated) RX ORDER — FENTANYL CITRATE-0.9 % NACL/PF 10 MCG/ML
PLASTIC BAG, INJECTION (ML) INTRAVENOUS
Status: DISPENSED
Start: 2024-10-17

## (undated) RX ORDER — METHYLPREDNISOLONE SODIUM SUCCINATE 125 MG/2ML
INJECTION INTRAMUSCULAR; INTRAVENOUS
Status: DISPENSED
Start: 2024-10-17

## (undated) RX ORDER — FENTANYL CITRATE 50 UG/ML
INJECTION, SOLUTION INTRAMUSCULAR; INTRAVENOUS
Status: DISPENSED
Start: 2023-12-28

## (undated) RX ORDER — HYDROMORPHONE HYDROCHLORIDE 1 MG/ML
INJECTION, SOLUTION INTRAMUSCULAR; INTRAVENOUS; SUBCUTANEOUS
Status: DISPENSED
Start: 2024-10-17

## (undated) RX ORDER — MAGNESIUM SULFATE HEPTAHYDRATE 40 MG/ML
INJECTION, SOLUTION INTRAVENOUS
Status: DISPENSED
Start: 2023-12-28

## (undated) RX ORDER — BUPIVACAINE HYDROCHLORIDE 5 MG/ML
INJECTION, SOLUTION EPIDURAL; INTRACAUDAL
Status: DISPENSED
Start: 2024-10-17

## (undated) RX ORDER — LIDOCAINE HYDROCHLORIDE 10 MG/ML
INJECTION, SOLUTION EPIDURAL; INFILTRATION; INTRACAUDAL; PERINEURAL
Status: DISPENSED
Start: 2020-05-21

## (undated) RX ORDER — PROTAMINE SULFATE 10 MG/ML
INJECTION, SOLUTION INTRAVENOUS
Status: DISPENSED
Start: 2024-10-17